# Patient Record
Sex: FEMALE | Race: WHITE | Employment: UNEMPLOYED | URBAN - METROPOLITAN AREA
[De-identification: names, ages, dates, MRNs, and addresses within clinical notes are randomized per-mention and may not be internally consistent; named-entity substitution may affect disease eponyms.]

---

## 2017-01-02 ENCOUNTER — HOSPITAL ENCOUNTER (INPATIENT)
Age: 82
LOS: 1 days | Discharge: HOME HEALTH | DRG: 203 | End: 2017-01-04
Attending: EMERGENCY MEDICINE | Admitting: INTERNAL MEDICINE
Payer: MEDICARE

## 2017-01-02 ENCOUNTER — APPOINTMENT (OUTPATIENT)
Dept: GENERAL RADIOLOGY | Age: 82
DRG: 203 | End: 2017-01-02
Payer: MEDICARE

## 2017-01-02 DIAGNOSIS — J98.8 CONGESTION OF UPPER AIRWAY: ICD-10-CM

## 2017-01-02 DIAGNOSIS — R05.9 COUGH: ICD-10-CM

## 2017-01-02 DIAGNOSIS — J18.9 PNEUMONIA DUE TO ORGANISM: Primary | ICD-10-CM

## 2017-01-02 PROBLEM — R06.89 DYSPNEA AND RESPIRATORY ABNORMALITIES: Status: ACTIVE | Noted: 2017-01-02

## 2017-01-02 PROBLEM — R06.00 DYSPNEA AND RESPIRATORY ABNORMALITIES: Status: ACTIVE | Noted: 2017-01-02

## 2017-01-02 LAB
ANION GAP SERPL CALCULATED.3IONS-SCNC: 16 MEQ/L (ref 7–13)
BASOPHILS ABSOLUTE: 0 K/UL (ref 0–0.2)
BASOPHILS RELATIVE PERCENT: 0.2 %
BUN BLDV-MCNC: 31 MG/DL (ref 8–23)
CALCIUM SERPL-MCNC: 9.8 MG/DL (ref 8.6–10.2)
CHLORIDE BLD-SCNC: 95 MEQ/L (ref 98–107)
CO2: 24 MEQ/L (ref 22–29)
CREAT SERPL-MCNC: 1.04 MG/DL (ref 0.5–0.9)
EOSINOPHILS ABSOLUTE: 0.2 K/UL (ref 0–0.7)
EOSINOPHILS RELATIVE PERCENT: 1.9 %
GFR AFRICAN AMERICAN: >60
GFR NON-AFRICAN AMERICAN: 49.8
GLUCOSE BLD-MCNC: 256 MG/DL (ref 74–109)
GLUCOSE BLD-MCNC: 286 MG/DL (ref 60–115)
GLUCOSE BLD-MCNC: 287 MG/DL (ref 60–115)
GLUCOSE BLD-MCNC: 373 MG/DL (ref 60–115)
HBA1C MFR BLD: 8.6 % (ref 4.8–5.9)
HCT VFR BLD CALC: 36.8 % (ref 37–47)
HEMOGLOBIN: 12 G/DL (ref 12–16)
LYMPHOCYTES ABSOLUTE: 1 K/UL (ref 1–4.8)
LYMPHOCYTES RELATIVE PERCENT: 10.8 %
MCH RBC QN AUTO: 30.7 PG (ref 27–31.3)
MCHC RBC AUTO-ENTMCNC: 32.6 % (ref 33–37)
MCV RBC AUTO: 94.1 FL (ref 82–100)
MONOCYTES ABSOLUTE: 0.9 K/UL (ref 0.2–0.8)
MONOCYTES RELATIVE PERCENT: 9.4 %
NEUTROPHILS ABSOLUTE: 7.4 K/UL (ref 1.4–6.5)
NEUTROPHILS RELATIVE PERCENT: 77.7 %
PDW BLD-RTO: 12.8 % (ref 11.5–14.5)
PERFORMED ON: ABNORMAL
PLATELET # BLD: 206 K/UL (ref 130–400)
POTASSIUM SERPL-SCNC: 4.5 MEQ/L (ref 3.5–5.1)
RAPID INFLUENZA  B AGN: NEGATIVE
RAPID INFLUENZA A AGN: NEGATIVE
RBC # BLD: 3.91 M/UL (ref 4.2–5.4)
SODIUM BLD-SCNC: 135 MEQ/L (ref 132–144)
WBC # BLD: 9.5 K/UL (ref 4.8–10.8)

## 2017-01-02 PROCEDURE — 96376 TX/PRO/DX INJ SAME DRUG ADON: CPT

## 2017-01-02 PROCEDURE — 83036 HEMOGLOBIN GLYCOSYLATED A1C: CPT

## 2017-01-02 PROCEDURE — 6360000002 HC RX W HCPCS: Performed by: INTERNAL MEDICINE

## 2017-01-02 PROCEDURE — G0378 HOSPITAL OBSERVATION PER HR: HCPCS

## 2017-01-02 PROCEDURE — 36415 COLL VENOUS BLD VENIPUNCTURE: CPT

## 2017-01-02 PROCEDURE — 6370000000 HC RX 637 (ALT 250 FOR IP): Performed by: INTERNAL MEDICINE

## 2017-01-02 PROCEDURE — 80048 BASIC METABOLIC PNL TOTAL CA: CPT

## 2017-01-02 PROCEDURE — 94640 AIRWAY INHALATION TREATMENT: CPT

## 2017-01-02 PROCEDURE — 96372 THER/PROPH/DIAG INJ SC/IM: CPT

## 2017-01-02 PROCEDURE — 96375 TX/PRO/DX INJ NEW DRUG ADDON: CPT

## 2017-01-02 PROCEDURE — 71020 XR CHEST STANDARD TWO VW: CPT

## 2017-01-02 PROCEDURE — 2580000003 HC RX 258

## 2017-01-02 PROCEDURE — 6360000002 HC RX W HCPCS: Performed by: EMERGENCY MEDICINE

## 2017-01-02 PROCEDURE — 93005 ELECTROCARDIOGRAM TRACING: CPT

## 2017-01-02 PROCEDURE — 86403 PARTICLE AGGLUT ANTBDY SCRN: CPT

## 2017-01-02 PROCEDURE — 85025 COMPLETE CBC W/AUTO DIFF WBC: CPT

## 2017-01-02 PROCEDURE — 96374 THER/PROPH/DIAG INJ IV PUSH: CPT

## 2017-01-02 PROCEDURE — 2580000003 HC RX 258: Performed by: INTERNAL MEDICINE

## 2017-01-02 PROCEDURE — 2700000000 HC OXYGEN THERAPY PER DAY

## 2017-01-02 PROCEDURE — 96368 THER/DIAG CONCURRENT INF: CPT

## 2017-01-02 PROCEDURE — 99285 EMERGENCY DEPT VISIT HI MDM: CPT

## 2017-01-02 PROCEDURE — 96365 THER/PROPH/DIAG IV INF INIT: CPT

## 2017-01-02 PROCEDURE — 94761 N-INVAS EAR/PLS OXIMETRY MLT: CPT

## 2017-01-02 PROCEDURE — 2580000003 HC RX 258: Performed by: EMERGENCY MEDICINE

## 2017-01-02 RX ORDER — NITROGLYCERIN 0.4 MG/1
0.4 TABLET SUBLINGUAL EVERY 5 MIN PRN
Status: DISCONTINUED | OUTPATIENT
Start: 2017-01-02 | End: 2017-01-04 | Stop reason: HOSPADM

## 2017-01-02 RX ORDER — METHYLPREDNISOLONE SODIUM SUCCINATE 40 MG/ML
40 INJECTION, POWDER, LYOPHILIZED, FOR SOLUTION INTRAMUSCULAR; INTRAVENOUS EVERY 12 HOURS
Status: COMPLETED | OUTPATIENT
Start: 2017-01-02 | End: 2017-01-02

## 2017-01-02 RX ORDER — LISINOPRIL 20 MG/1
20 TABLET ORAL DAILY
Status: DISCONTINUED | OUTPATIENT
Start: 2017-01-02 | End: 2017-01-02

## 2017-01-02 RX ORDER — DEXTROSE MONOHYDRATE 50 MG/ML
100 INJECTION, SOLUTION INTRAVENOUS PRN
Status: DISCONTINUED | OUTPATIENT
Start: 2017-01-02 | End: 2017-01-04 | Stop reason: HOSPADM

## 2017-01-02 RX ORDER — GUAIFENESIN 600 MG/1
600 TABLET, EXTENDED RELEASE ORAL 2 TIMES DAILY
Status: DISCONTINUED | OUTPATIENT
Start: 2017-01-02 | End: 2017-01-02

## 2017-01-02 RX ORDER — CLONIDINE HYDROCHLORIDE 0.1 MG/1
0.1 TABLET ORAL EVERY 4 HOURS PRN
Status: DISCONTINUED | OUTPATIENT
Start: 2017-01-02 | End: 2017-01-04 | Stop reason: HOSPADM

## 2017-01-02 RX ORDER — HYDRALAZINE HYDROCHLORIDE 20 MG/ML
10 INJECTION INTRAMUSCULAR; INTRAVENOUS EVERY 4 HOURS PRN
Status: DISCONTINUED | OUTPATIENT
Start: 2017-01-02 | End: 2017-01-04 | Stop reason: HOSPADM

## 2017-01-02 RX ORDER — FAMOTIDINE 20 MG/1
20 TABLET, FILM COATED ORAL DAILY
Status: DISCONTINUED | OUTPATIENT
Start: 2017-01-02 | End: 2017-01-04 | Stop reason: HOSPADM

## 2017-01-02 RX ORDER — DEXTROSE MONOHYDRATE 50 MG/ML
INJECTION, SOLUTION INTRAVENOUS
Status: COMPLETED
Start: 2017-01-02 | End: 2017-01-02

## 2017-01-02 RX ORDER — NICOTINE POLACRILEX 4 MG
15 LOZENGE BUCCAL PRN
Status: DISCONTINUED | OUTPATIENT
Start: 2017-01-02 | End: 2017-01-04 | Stop reason: HOSPADM

## 2017-01-02 RX ORDER — IPRATROPIUM BROMIDE AND ALBUTEROL SULFATE 2.5; .5 MG/3ML; MG/3ML
1 SOLUTION RESPIRATORY (INHALATION) 3 TIMES DAILY
Status: DISCONTINUED | OUTPATIENT
Start: 2017-01-02 | End: 2017-01-04 | Stop reason: HOSPADM

## 2017-01-02 RX ORDER — ACETAMINOPHEN 325 MG/1
650 TABLET ORAL EVERY 4 HOURS PRN
Status: DISCONTINUED | OUTPATIENT
Start: 2017-01-02 | End: 2017-01-04 | Stop reason: HOSPADM

## 2017-01-02 RX ORDER — LISINOPRIL 20 MG/1
20 TABLET ORAL 2 TIMES DAILY
Status: DISCONTINUED | OUTPATIENT
Start: 2017-01-02 | End: 2017-01-04 | Stop reason: HOSPADM

## 2017-01-02 RX ORDER — POLYETHYLENE GLYCOL 3350 17 G/17G
17 POWDER, FOR SOLUTION ORAL DAILY PRN
Status: DISCONTINUED | OUTPATIENT
Start: 2017-01-02 | End: 2017-01-04 | Stop reason: HOSPADM

## 2017-01-02 RX ORDER — GUAIFENESIN 600 MG/1
1200 TABLET, EXTENDED RELEASE ORAL 2 TIMES DAILY
Status: DISCONTINUED | OUTPATIENT
Start: 2017-01-02 | End: 2017-01-04 | Stop reason: HOSPADM

## 2017-01-02 RX ORDER — SODIUM CHLORIDE 0.9 % (FLUSH) 0.9 %
10 SYRINGE (ML) INJECTION PRN
Status: DISCONTINUED | OUTPATIENT
Start: 2017-01-02 | End: 2017-01-04 | Stop reason: HOSPADM

## 2017-01-02 RX ORDER — SODIUM CHLORIDE 450 MG/100ML
INJECTION, SOLUTION INTRAVENOUS CONTINUOUS
Status: DISCONTINUED | OUTPATIENT
Start: 2017-01-02 | End: 2017-01-02

## 2017-01-02 RX ORDER — GUAIFENESIN 100 MG/5ML
10 SOLUTION ORAL EVERY 4 HOURS PRN
Status: DISCONTINUED | OUTPATIENT
Start: 2017-01-02 | End: 2017-01-04 | Stop reason: HOSPADM

## 2017-01-02 RX ORDER — ATORVASTATIN CALCIUM 10 MG/1
10 TABLET, FILM COATED ORAL NIGHTLY
Status: DISCONTINUED | OUTPATIENT
Start: 2017-01-02 | End: 2017-01-04 | Stop reason: HOSPADM

## 2017-01-02 RX ORDER — FUROSEMIDE 10 MG/ML
40 INJECTION INTRAMUSCULAR; INTRAVENOUS ONCE
Status: COMPLETED | OUTPATIENT
Start: 2017-01-02 | End: 2017-01-02

## 2017-01-02 RX ORDER — OXYCODONE HYDROCHLORIDE 5 MG/1
10 TABLET ORAL EVERY 4 HOURS PRN
Status: DISCONTINUED | OUTPATIENT
Start: 2017-01-02 | End: 2017-01-02 | Stop reason: SDUPTHER

## 2017-01-02 RX ORDER — DOCUSATE SODIUM 100 MG/1
100 CAPSULE, LIQUID FILLED ORAL DAILY PRN
Status: DISCONTINUED | OUTPATIENT
Start: 2017-01-02 | End: 2017-01-04 | Stop reason: HOSPADM

## 2017-01-02 RX ORDER — ALBUTEROL SULFATE 2.5 MG/3ML
2.5 SOLUTION RESPIRATORY (INHALATION)
Status: DISCONTINUED | OUTPATIENT
Start: 2017-01-02 | End: 2017-01-02

## 2017-01-02 RX ORDER — ASPIRIN 81 MG/1
81 TABLET, CHEWABLE ORAL DAILY
Status: DISCONTINUED | OUTPATIENT
Start: 2017-01-02 | End: 2017-01-04 | Stop reason: HOSPADM

## 2017-01-02 RX ORDER — SENNA PLUS 8.6 MG/1
1 TABLET ORAL 2 TIMES DAILY PRN
Status: DISCONTINUED | OUTPATIENT
Start: 2017-01-02 | End: 2017-01-04 | Stop reason: HOSPADM

## 2017-01-02 RX ORDER — ONDANSETRON 2 MG/ML
4 INJECTION INTRAMUSCULAR; INTRAVENOUS EVERY 6 HOURS PRN
Status: DISCONTINUED | OUTPATIENT
Start: 2017-01-02 | End: 2017-01-04 | Stop reason: HOSPADM

## 2017-01-02 RX ORDER — SODIUM CHLORIDE 0.9 % (FLUSH) 0.9 %
10 SYRINGE (ML) INJECTION EVERY 12 HOURS SCHEDULED
Status: DISCONTINUED | OUTPATIENT
Start: 2017-01-02 | End: 2017-01-04 | Stop reason: HOSPADM

## 2017-01-02 RX ORDER — DEXTROSE MONOHYDRATE 25 G/50ML
12.5 INJECTION, SOLUTION INTRAVENOUS PRN
Status: DISCONTINUED | OUTPATIENT
Start: 2017-01-02 | End: 2017-01-04 | Stop reason: HOSPADM

## 2017-01-02 RX ORDER — OXYCODONE HYDROCHLORIDE 5 MG/1
5 TABLET ORAL EVERY 4 HOURS PRN
Status: DISCONTINUED | OUTPATIENT
Start: 2017-01-02 | End: 2017-01-02 | Stop reason: SDUPTHER

## 2017-01-02 RX ORDER — INSULIN GLARGINE 100 [IU]/ML
20 INJECTION, SOLUTION SUBCUTANEOUS NIGHTLY
Status: DISCONTINUED | OUTPATIENT
Start: 2017-01-02 | End: 2017-01-04 | Stop reason: HOSPADM

## 2017-01-02 RX ADMIN — SODIUM CHLORIDE: 4.5 INJECTION, SOLUTION INTRAVENOUS at 06:02

## 2017-01-02 RX ADMIN — ASPIRIN 81 MG 81 MG: 81 TABLET ORAL at 08:14

## 2017-01-02 RX ADMIN — LISINOPRIL 20 MG: 20 TABLET ORAL at 21:09

## 2017-01-02 RX ADMIN — GUAIFENESIN 10 ML: 100 SOLUTION ORAL at 11:59

## 2017-01-02 RX ADMIN — IPRATROPIUM BROMIDE AND ALBUTEROL SULFATE 1 AMPULE: 2.5; .5 SOLUTION RESPIRATORY (INHALATION) at 18:14

## 2017-01-02 RX ADMIN — FUROSEMIDE 40 MG: 10 INJECTION, SOLUTION INTRAMUSCULAR; INTRAVENOUS at 12:00

## 2017-01-02 RX ADMIN — VITAMIN D, TAB 1000IU (100/BT) 1000 UNITS: 25 TAB at 08:14

## 2017-01-02 RX ADMIN — METHYLPREDNISOLONE SODIUM SUCCINATE 40 MG: 40 INJECTION, POWDER, FOR SOLUTION INTRAMUSCULAR; INTRAVENOUS at 06:02

## 2017-01-02 RX ADMIN — Medication 10 ML: at 21:09

## 2017-01-02 RX ADMIN — INSULIN GLARGINE 20 UNITS: 100 INJECTION, SOLUTION SUBCUTANEOUS at 20:19

## 2017-01-02 RX ADMIN — IPRATROPIUM BROMIDE AND ALBUTEROL SULFATE 1 AMPULE: 2.5; .5 SOLUTION RESPIRATORY (INHALATION) at 11:31

## 2017-01-02 RX ADMIN — METFORMIN HYDROCHLORIDE 500 MG: 500 TABLET, FILM COATED ORAL at 17:00

## 2017-01-02 RX ADMIN — GUAIFENESIN 1200 MG: 600 TABLET, EXTENDED RELEASE ORAL at 11:59

## 2017-01-02 RX ADMIN — CEFTRIAXONE 1 G: 1 INJECTION, POWDER, FOR SOLUTION INTRAMUSCULAR; INTRAVENOUS at 05:17

## 2017-01-02 RX ADMIN — METOPROLOL TARTRATE 25 MG: 25 TABLET ORAL at 18:45

## 2017-01-02 RX ADMIN — DEXTROSE MONOHYDRATE: 50 INJECTION, SOLUTION INTRAVENOUS at 05:18

## 2017-01-02 RX ADMIN — PHENOL 1 LOZENGE: 29 LOZENGE ORAL at 10:30

## 2017-01-02 RX ADMIN — INSULIN LISPRO 3 UNITS: 100 INJECTION, SOLUTION INTRAVENOUS; SUBCUTANEOUS at 20:19

## 2017-01-02 RX ADMIN — IPRATROPIUM BROMIDE 0.5 MG: 0.5 SOLUTION RESPIRATORY (INHALATION) at 04:09

## 2017-01-02 RX ADMIN — METHYLPREDNISOLONE SODIUM SUCCINATE 40 MG: 40 INJECTION, POWDER, FOR SOLUTION INTRAMUSCULAR; INTRAVENOUS at 18:07

## 2017-01-02 RX ADMIN — ALBUTEROL SULFATE 2.5 MG: 2.5 SOLUTION RESPIRATORY (INHALATION) at 04:10

## 2017-01-02 RX ADMIN — GUAIFENESIN 10 ML: 100 SOLUTION ORAL at 06:02

## 2017-01-02 RX ADMIN — ENOXAPARIN SODIUM 40 MG: 40 INJECTION SUBCUTANEOUS at 09:00

## 2017-01-02 RX ADMIN — GUAIFENESIN 1200 MG: 600 TABLET, EXTENDED RELEASE ORAL at 21:09

## 2017-01-02 RX ADMIN — METFORMIN HYDROCHLORIDE 500 MG: 500 TABLET, FILM COATED ORAL at 08:14

## 2017-01-02 RX ADMIN — INSULIN LISPRO 10 UNITS: 100 INJECTION, SOLUTION INTRAVENOUS; SUBCUTANEOUS at 16:56

## 2017-01-02 RX ADMIN — GUAIFENESIN 10 ML: 100 SOLUTION ORAL at 18:49

## 2017-01-02 RX ADMIN — ATORVASTATIN CALCIUM 10 MG: 10 TABLET, FILM COATED ORAL at 21:09

## 2017-01-02 RX ADMIN — AZITHROMYCIN MONOHYDRATE 500 MG: 500 INJECTION, POWDER, LYOPHILIZED, FOR SOLUTION INTRAVENOUS at 05:16

## 2017-01-02 RX ADMIN — FAMOTIDINE 20 MG: 20 TABLET, FILM COATED ORAL at 08:14

## 2017-01-02 RX ADMIN — Medication 400 MG: at 08:14

## 2017-01-02 RX ADMIN — LISINOPRIL 20 MG: 20 TABLET ORAL at 08:14

## 2017-01-02 ASSESSMENT — ENCOUNTER SYMPTOMS
RHINORRHEA: 1
ABDOMINAL PAIN: 0
SHORTNESS OF BREATH: 0
COUGH: 1
BACK PAIN: 0
CHEST TIGHTNESS: 0
WHEEZING: 0
DIARRHEA: 0
EYE PAIN: 0
NAUSEA: 0
VOMITING: 0
BLOOD IN STOOL: 0
TROUBLE SWALLOWING: 0

## 2017-01-02 ASSESSMENT — PAIN SCALES - GENERAL
PAINLEVEL_OUTOF10: 0
PAINLEVEL_OUTOF10: 5
PAINLEVEL_OUTOF10: 0

## 2017-01-02 ASSESSMENT — PAIN DESCRIPTION - LOCATION: LOCATION: THROAT

## 2017-01-02 ASSESSMENT — PAIN DESCRIPTION - DESCRIPTORS: DESCRIPTORS: SORE

## 2017-01-03 LAB
ALBUMIN SERPL-MCNC: 3.9 G/DL (ref 3.9–4.9)
ALP BLD-CCNC: 53 U/L (ref 40–130)
ALT SERPL-CCNC: 15 U/L (ref 0–33)
ANION GAP SERPL CALCULATED.3IONS-SCNC: 18 MEQ/L (ref 7–13)
AST SERPL-CCNC: 12 U/L (ref 0–35)
BASOPHILS ABSOLUTE: 0 K/UL (ref 0–0.2)
BASOPHILS RELATIVE PERCENT: 0 %
BILIRUB SERPL-MCNC: 0.3 MG/DL (ref 0–1.2)
BUN BLDV-MCNC: 27 MG/DL (ref 8–23)
CALCIUM SERPL-MCNC: 9.5 MG/DL (ref 8.6–10.2)
CHLORIDE BLD-SCNC: 88 MEQ/L (ref 98–107)
CO2: 27 MEQ/L (ref 22–29)
CREAT SERPL-MCNC: 1 MG/DL (ref 0.5–0.9)
EOSINOPHILS ABSOLUTE: 0 K/UL (ref 0–0.7)
EOSINOPHILS RELATIVE PERCENT: 0 %
GFR AFRICAN AMERICAN: >60
GFR NON-AFRICAN AMERICAN: 52.1
GLOBULIN: 2.5 G/DL (ref 2.3–3.5)
GLUCOSE BLD-MCNC: 138 MG/DL (ref 60–115)
GLUCOSE BLD-MCNC: 282 MG/DL (ref 60–115)
GLUCOSE BLD-MCNC: 288 MG/DL (ref 60–115)
GLUCOSE BLD-MCNC: 335 MG/DL (ref 74–109)
GLUCOSE BLD-MCNC: 368 MG/DL (ref 60–115)
HCT VFR BLD CALC: 36 % (ref 37–47)
HEMOGLOBIN: 11.9 G/DL (ref 12–16)
LYMPHOCYTES ABSOLUTE: 0.7 K/UL (ref 1–4.8)
LYMPHOCYTES RELATIVE PERCENT: 5.4 %
MCH RBC QN AUTO: 31.2 PG (ref 27–31.3)
MCHC RBC AUTO-ENTMCNC: 33.2 % (ref 33–37)
MCV RBC AUTO: 93.9 FL (ref 82–100)
MONOCYTES ABSOLUTE: 0.7 K/UL (ref 0.2–0.8)
MONOCYTES RELATIVE PERCENT: 5.1 %
NEUTROPHILS ABSOLUTE: 11.9 K/UL (ref 1.4–6.5)
NEUTROPHILS RELATIVE PERCENT: 89.5 %
PDW BLD-RTO: 12.5 % (ref 11.5–14.5)
PERFORMED ON: ABNORMAL
PLATELET # BLD: 212 K/UL (ref 130–400)
POTASSIUM SERPL-SCNC: 4.4 MEQ/L (ref 3.5–5.1)
PRO-BNP: 555 PG/ML
RBC # BLD: 3.83 M/UL (ref 4.2–5.4)
SODIUM BLD-SCNC: 133 MEQ/L (ref 132–144)
TOTAL PROTEIN: 6.4 G/DL (ref 6.4–8.1)
WBC # BLD: 13.3 K/UL (ref 4.8–10.8)

## 2017-01-03 PROCEDURE — 2700000000 HC OXYGEN THERAPY PER DAY

## 2017-01-03 PROCEDURE — 6370000000 HC RX 637 (ALT 250 FOR IP): Performed by: INTERNAL MEDICINE

## 2017-01-03 PROCEDURE — 83880 ASSAY OF NATRIURETIC PEPTIDE: CPT

## 2017-01-03 PROCEDURE — 96372 THER/PROPH/DIAG INJ SC/IM: CPT

## 2017-01-03 PROCEDURE — 96375 TX/PRO/DX INJ NEW DRUG ADDON: CPT

## 2017-01-03 PROCEDURE — 2580000003 HC RX 258: Performed by: INTERNAL MEDICINE

## 2017-01-03 PROCEDURE — 36415 COLL VENOUS BLD VENIPUNCTURE: CPT

## 2017-01-03 PROCEDURE — 94640 AIRWAY INHALATION TREATMENT: CPT

## 2017-01-03 PROCEDURE — 96366 THER/PROPH/DIAG IV INF ADDON: CPT

## 2017-01-03 PROCEDURE — 85025 COMPLETE CBC W/AUTO DIFF WBC: CPT

## 2017-01-03 PROCEDURE — 94761 N-INVAS EAR/PLS OXIMETRY MLT: CPT

## 2017-01-03 PROCEDURE — 80053 COMPREHEN METABOLIC PANEL: CPT

## 2017-01-03 PROCEDURE — 6360000002 HC RX W HCPCS: Performed by: INTERNAL MEDICINE

## 2017-01-03 PROCEDURE — 1210000000 HC MED SURG R&B

## 2017-01-03 RX ORDER — CODEINE PHOSPHATE AND GUAIFENESIN 10; 100 MG/5ML; MG/5ML
5 SOLUTION ORAL EVERY 4 HOURS PRN
Status: DISCONTINUED | OUTPATIENT
Start: 2017-01-03 | End: 2017-01-04 | Stop reason: HOSPADM

## 2017-01-03 RX ORDER — FUROSEMIDE 40 MG/1
40 TABLET ORAL ONCE
Status: COMPLETED | OUTPATIENT
Start: 2017-01-03 | End: 2017-01-03

## 2017-01-03 RX ORDER — INSULIN GLARGINE 100 [IU]/ML
10 INJECTION, SOLUTION SUBCUTANEOUS ONCE
Status: COMPLETED | OUTPATIENT
Start: 2017-01-03 | End: 2017-01-03

## 2017-01-03 RX ORDER — CODEINE PHOSPHATE AND GUAIFENESIN 10; 100 MG/5ML; MG/5ML
5 SOLUTION ORAL ONCE
Status: COMPLETED | OUTPATIENT
Start: 2017-01-03 | End: 2017-01-03

## 2017-01-03 RX ADMIN — ONDANSETRON 4 MG: 2 INJECTION INTRAMUSCULAR; INTRAVENOUS at 05:20

## 2017-01-03 RX ADMIN — LISINOPRIL 20 MG: 20 TABLET ORAL at 19:53

## 2017-01-03 RX ADMIN — IPRATROPIUM BROMIDE AND ALBUTEROL SULFATE 1 AMPULE: 2.5; .5 SOLUTION RESPIRATORY (INHALATION) at 05:53

## 2017-01-03 RX ADMIN — IPRATROPIUM BROMIDE AND ALBUTEROL SULFATE 1 AMPULE: 2.5; .5 SOLUTION RESPIRATORY (INHALATION) at 11:22

## 2017-01-03 RX ADMIN — ENOXAPARIN SODIUM 40 MG: 40 INJECTION SUBCUTANEOUS at 09:00

## 2017-01-03 RX ADMIN — Medication 400 MG: at 08:26

## 2017-01-03 RX ADMIN — BENZOCAINE AND MENTHOL 1 LOZENGE: 15; 3.6 LOZENGE ORAL at 13:00

## 2017-01-03 RX ADMIN — METOPROLOL TARTRATE 25 MG: 25 TABLET ORAL at 08:26

## 2017-01-03 RX ADMIN — METFORMIN HYDROCHLORIDE 500 MG: 500 TABLET, FILM COATED ORAL at 17:00

## 2017-01-03 RX ADMIN — BENZOCAINE AND MENTHOL 1 LOZENGE: 15; 3.6 LOZENGE ORAL at 19:53

## 2017-01-03 RX ADMIN — ACETAMINOPHEN 650 MG: 325 TABLET ORAL at 18:43

## 2017-01-03 RX ADMIN — IPRATROPIUM BROMIDE AND ALBUTEROL SULFATE 1 AMPULE: 2.5; .5 SOLUTION RESPIRATORY (INHALATION) at 18:24

## 2017-01-03 RX ADMIN — GUAIFENESIN AND CODEINE PHOSPHATE 5 ML: 10; 100 LIQUID ORAL at 20:02

## 2017-01-03 RX ADMIN — ATORVASTATIN CALCIUM 10 MG: 10 TABLET, FILM COATED ORAL at 19:52

## 2017-01-03 RX ADMIN — INSULIN LISPRO 3 UNITS: 100 INJECTION, SOLUTION INTRAVENOUS; SUBCUTANEOUS at 20:03

## 2017-01-03 RX ADMIN — Medication 10 ML: at 09:53

## 2017-01-03 RX ADMIN — METOPROLOL TARTRATE 25 MG: 25 TABLET ORAL at 19:53

## 2017-01-03 RX ADMIN — GUAIFENESIN 10 ML: 100 SOLUTION ORAL at 15:40

## 2017-01-03 RX ADMIN — HYDRALAZINE HYDROCHLORIDE 10 MG: 20 INJECTION INTRAMUSCULAR; INTRAVENOUS at 09:48

## 2017-01-03 RX ADMIN — GUAIFENESIN 1200 MG: 600 TABLET, EXTENDED RELEASE ORAL at 08:26

## 2017-01-03 RX ADMIN — INSULIN GLARGINE 10 UNITS: 100 INJECTION, SOLUTION SUBCUTANEOUS at 09:57

## 2017-01-03 RX ADMIN — BENZOCAINE AND MENTHOL 1 LOZENGE: 15; 3.6 LOZENGE ORAL at 15:44

## 2017-01-03 RX ADMIN — ACETAMINOPHEN 650 MG: 325 TABLET ORAL at 12:17

## 2017-01-03 RX ADMIN — GUAIFENESIN 1200 MG: 600 TABLET, EXTENDED RELEASE ORAL at 19:52

## 2017-01-03 RX ADMIN — METFORMIN HYDROCHLORIDE 500 MG: 500 TABLET, FILM COATED ORAL at 08:26

## 2017-01-03 RX ADMIN — VITAMIN D, TAB 1000IU (100/BT) 1000 UNITS: 25 TAB at 08:26

## 2017-01-03 RX ADMIN — LISINOPRIL 20 MG: 20 TABLET ORAL at 08:26

## 2017-01-03 RX ADMIN — FAMOTIDINE 20 MG: 20 TABLET, FILM COATED ORAL at 08:26

## 2017-01-03 RX ADMIN — INSULIN LISPRO 6 UNITS: 100 INJECTION, SOLUTION INTRAVENOUS; SUBCUTANEOUS at 11:59

## 2017-01-03 RX ADMIN — GUAIFENESIN AND CODEINE PHOSPHATE 5 ML: 10; 100 LIQUID ORAL at 09:48

## 2017-01-03 RX ADMIN — INSULIN GLARGINE 20 UNITS: 100 INJECTION, SOLUTION SUBCUTANEOUS at 20:02

## 2017-01-03 RX ADMIN — ASPIRIN 81 MG 81 MG: 81 TABLET ORAL at 08:26

## 2017-01-03 RX ADMIN — FUROSEMIDE 40 MG: 40 TABLET ORAL at 09:48

## 2017-01-03 RX ADMIN — INSULIN LISPRO 10 UNITS: 100 INJECTION, SOLUTION INTRAVENOUS; SUBCUTANEOUS at 08:27

## 2017-01-03 RX ADMIN — BENZOCAINE AND MENTHOL 1 LOZENGE: 15; 3.6 LOZENGE ORAL at 22:40

## 2017-01-03 RX ADMIN — AZITHROMYCIN MONOHYDRATE 500 MG: 500 INJECTION, POWDER, LYOPHILIZED, FOR SOLUTION INTRAVENOUS at 05:15

## 2017-01-03 RX ADMIN — Medication 10 ML: at 19:57

## 2017-01-03 RX ADMIN — BENZOCAINE AND MENTHOL 1 LOZENGE: 15; 3.6 LOZENGE ORAL at 09:48

## 2017-01-03 ASSESSMENT — PAIN SCALES - GENERAL
PAINLEVEL_OUTOF10: 0
PAINLEVEL_OUTOF10: 2
PAINLEVEL_OUTOF10: 0
PAINLEVEL_OUTOF10: 2
PAINLEVEL_OUTOF10: 3
PAINLEVEL_OUTOF10: 0

## 2017-01-04 VITALS
OXYGEN SATURATION: 95 % | HEART RATE: 105 BPM | TEMPERATURE: 98 F | RESPIRATION RATE: 22 BRPM | HEIGHT: 63 IN | DIASTOLIC BLOOD PRESSURE: 75 MMHG | BODY MASS INDEX: 29.84 KG/M2 | WEIGHT: 168.43 LBS | SYSTOLIC BLOOD PRESSURE: 150 MMHG

## 2017-01-04 LAB
GLUCOSE BLD-MCNC: 206 MG/DL (ref 60–115)
GLUCOSE BLD-MCNC: 295 MG/DL (ref 60–115)
PERFORMED ON: ABNORMAL
PERFORMED ON: ABNORMAL

## 2017-01-04 PROCEDURE — 6360000002 HC RX W HCPCS: Performed by: INTERNAL MEDICINE

## 2017-01-04 PROCEDURE — 2580000003 HC RX 258: Performed by: INTERNAL MEDICINE

## 2017-01-04 PROCEDURE — 6370000000 HC RX 637 (ALT 250 FOR IP): Performed by: INTERNAL MEDICINE

## 2017-01-04 PROCEDURE — 94761 N-INVAS EAR/PLS OXIMETRY MLT: CPT

## 2017-01-04 PROCEDURE — 94640 AIRWAY INHALATION TREATMENT: CPT

## 2017-01-04 RX ORDER — GUAIFENESIN 600 MG/1
1200 TABLET, EXTENDED RELEASE ORAL 2 TIMES DAILY
Qty: 20 TABLET | Refills: 0 | Status: SHIPPED | OUTPATIENT
Start: 2017-01-04 | End: 2017-01-09 | Stop reason: ALTCHOICE

## 2017-01-04 RX ORDER — AZITHROMYCIN 250 MG/1
250 TABLET, FILM COATED ORAL DAILY
Qty: 5 TABLET | Refills: 0 | Status: SHIPPED | OUTPATIENT
Start: 2017-01-04 | End: 2017-01-09 | Stop reason: ALTCHOICE

## 2017-01-04 RX ORDER — INSULIN GLARGINE 100 [IU]/ML
25 INJECTION, SOLUTION SUBCUTANEOUS NIGHTLY
Qty: 1 VIAL | Refills: 3 | Status: SHIPPED | OUTPATIENT
Start: 2017-01-04 | End: 2017-01-25 | Stop reason: ALTCHOICE

## 2017-01-04 RX ADMIN — ASPIRIN 81 MG 81 MG: 81 TABLET ORAL at 08:37

## 2017-01-04 RX ADMIN — GUAIFENESIN AND CODEINE PHOSPHATE 5 ML: 10; 100 LIQUID ORAL at 08:37

## 2017-01-04 RX ADMIN — VITAMIN D, TAB 1000IU (100/BT) 1000 UNITS: 25 TAB at 08:38

## 2017-01-04 RX ADMIN — METOPROLOL TARTRATE 25 MG: 25 TABLET ORAL at 08:38

## 2017-01-04 RX ADMIN — IPRATROPIUM BROMIDE AND ALBUTEROL SULFATE 1 AMPULE: 2.5; .5 SOLUTION RESPIRATORY (INHALATION) at 11:39

## 2017-01-04 RX ADMIN — IPRATROPIUM BROMIDE AND ALBUTEROL SULFATE 1 AMPULE: 2.5; .5 SOLUTION RESPIRATORY (INHALATION) at 05:52

## 2017-01-04 RX ADMIN — ACETAMINOPHEN 650 MG: 325 TABLET ORAL at 08:37

## 2017-01-04 RX ADMIN — LISINOPRIL 20 MG: 20 TABLET ORAL at 08:37

## 2017-01-04 RX ADMIN — FAMOTIDINE 20 MG: 20 TABLET, FILM COATED ORAL at 08:37

## 2017-01-04 RX ADMIN — BENZOCAINE AND MENTHOL 1 LOZENGE: 15; 3.6 LOZENGE ORAL at 05:46

## 2017-01-04 RX ADMIN — INSULIN LISPRO 6 UNITS: 100 INJECTION, SOLUTION INTRAVENOUS; SUBCUTANEOUS at 12:49

## 2017-01-04 RX ADMIN — AZITHROMYCIN MONOHYDRATE 500 MG: 500 INJECTION, POWDER, LYOPHILIZED, FOR SOLUTION INTRAVENOUS at 05:47

## 2017-01-04 RX ADMIN — METFORMIN HYDROCHLORIDE 500 MG: 500 TABLET, FILM COATED ORAL at 08:37

## 2017-01-04 RX ADMIN — Medication 400 MG: at 08:38

## 2017-01-04 RX ADMIN — BENZOCAINE AND MENTHOL 1 LOZENGE: 15; 3.6 LOZENGE ORAL at 08:37

## 2017-01-04 RX ADMIN — INSULIN LISPRO 4 UNITS: 100 INJECTION, SOLUTION INTRAVENOUS; SUBCUTANEOUS at 08:39

## 2017-01-04 RX ADMIN — GUAIFENESIN 1200 MG: 600 TABLET, EXTENDED RELEASE ORAL at 08:37

## 2017-01-04 RX ADMIN — ENOXAPARIN SODIUM 40 MG: 40 INJECTION SUBCUTANEOUS at 08:38

## 2017-01-04 ASSESSMENT — PAIN SCALES - GENERAL: PAINLEVEL_OUTOF10: 4

## 2017-01-05 ENCOUNTER — TELEPHONE (OUTPATIENT)
Dept: INTERNAL MEDICINE | Age: 82
End: 2017-01-05

## 2017-01-09 ENCOUNTER — OFFICE VISIT (OUTPATIENT)
Dept: INTERNAL MEDICINE | Age: 82
End: 2017-01-09

## 2017-01-09 VITALS
BODY MASS INDEX: 29.3 KG/M2 | DIASTOLIC BLOOD PRESSURE: 68 MMHG | SYSTOLIC BLOOD PRESSURE: 124 MMHG | HEART RATE: 73 BPM | HEIGHT: 63 IN | WEIGHT: 165.4 LBS

## 2017-01-09 DIAGNOSIS — J40 BRONCHITIS: Primary | ICD-10-CM

## 2017-01-09 PROCEDURE — 99495 TRANSJ CARE MGMT MOD F2F 14D: CPT | Performed by: FAMILY MEDICINE

## 2017-01-09 RX ORDER — GUAIFENESIN 600 MG/1
1200 TABLET, EXTENDED RELEASE ORAL 2 TIMES DAILY
Qty: 20 TABLET | Refills: 0 | Status: SHIPPED | OUTPATIENT
Start: 2017-01-09 | End: 2017-01-14

## 2017-01-09 RX ORDER — ALBUTEROL SULFATE 2.5 MG/3ML
2.5 SOLUTION RESPIRATORY (INHALATION) EVERY 6 HOURS PRN
Qty: 120 EACH | Refills: 3 | Status: SHIPPED | OUTPATIENT
Start: 2017-01-09 | End: 2017-05-01 | Stop reason: ALTCHOICE

## 2017-01-16 DIAGNOSIS — E11.9 TYPE 2 DIABETES MELLITUS WITHOUT COMPLICATION, WITHOUT LONG-TERM CURRENT USE OF INSULIN (HCC): ICD-10-CM

## 2017-01-16 RX ORDER — GLUCOSAMINE HCL/CHONDROITIN SU 500-400 MG
CAPSULE ORAL
Qty: 200 STRIP | Refills: 5 | Status: SHIPPED | OUTPATIENT
Start: 2017-01-16 | End: 2018-04-06 | Stop reason: SDUPTHER

## 2017-01-25 ENCOUNTER — OFFICE VISIT (OUTPATIENT)
Dept: INTERNAL MEDICINE | Age: 82
End: 2017-01-25

## 2017-01-25 VITALS
BODY MASS INDEX: 29.19 KG/M2 | HEART RATE: 79 BPM | DIASTOLIC BLOOD PRESSURE: 56 MMHG | SYSTOLIC BLOOD PRESSURE: 140 MMHG | WEIGHT: 164.8 LBS

## 2017-01-25 DIAGNOSIS — J40 BRONCHITIS: Primary | ICD-10-CM

## 2017-01-25 PROCEDURE — 99213 OFFICE O/P EST LOW 20 MIN: CPT | Performed by: FAMILY MEDICINE

## 2017-01-26 LAB
EKG ATRIAL RATE: 94 BPM
EKG P AXIS: 65 DEGREES
EKG P-R INTERVAL: 178 MS
EKG Q-T INTERVAL: 348 MS
EKG QRS DURATION: 84 MS
EKG QTC CALCULATION (BAZETT): 435 MS
EKG R AXIS: -4 DEGREES
EKG T AXIS: 70 DEGREES
EKG VENTRICULAR RATE: 94 BPM

## 2017-01-27 ENCOUNTER — TELEPHONE (OUTPATIENT)
Dept: INTERNAL MEDICINE | Age: 82
End: 2017-01-27

## 2017-02-09 RX ORDER — TRIAMTERENE AND HYDROCHLOROTHIAZIDE 37.5; 25 MG/1; MG/1
TABLET ORAL
Qty: 90 TABLET | Refills: 3 | OUTPATIENT
Start: 2017-02-09

## 2017-02-09 RX ORDER — LISINOPRIL 20 MG/1
TABLET ORAL
Qty: 90 TABLET | Refills: 3 | OUTPATIENT
Start: 2017-02-09

## 2017-02-09 RX ORDER — LISINOPRIL 20 MG/1
TABLET ORAL
Qty: 90 TABLET | Refills: 2 | Status: SHIPPED | OUTPATIENT
Start: 2017-02-09 | End: 2017-11-20 | Stop reason: SDUPTHER

## 2017-02-09 RX ORDER — TRIAMTERENE AND HYDROCHLOROTHIAZIDE 37.5; 25 MG/1; MG/1
TABLET ORAL
Qty: 90 TABLET | Refills: 2 | Status: SHIPPED | OUTPATIENT
Start: 2017-02-09 | End: 2017-11-20 | Stop reason: SDUPTHER

## 2017-05-01 ENCOUNTER — HOSPITAL ENCOUNTER (OUTPATIENT)
Age: 82
Setting detail: SPECIMEN
Discharge: HOME | End: 2017-05-01
Payer: MEDICARE

## 2017-05-01 ENCOUNTER — OFFICE VISIT (OUTPATIENT)
Dept: INTERNAL MEDICINE | Age: 82
End: 2017-05-01

## 2017-05-01 VITALS
BODY MASS INDEX: 29.8 KG/M2 | HEIGHT: 63 IN | HEART RATE: 75 BPM | DIASTOLIC BLOOD PRESSURE: 80 MMHG | SYSTOLIC BLOOD PRESSURE: 132 MMHG | WEIGHT: 168.2 LBS

## 2017-05-01 DIAGNOSIS — N39.498 OTHER URINARY INCONTINENCE: ICD-10-CM

## 2017-05-01 DIAGNOSIS — R30.0 DYSURIA: ICD-10-CM

## 2017-05-01 DIAGNOSIS — I10 ESSENTIAL HYPERTENSION: ICD-10-CM

## 2017-05-01 DIAGNOSIS — R30.0 DYSURIA: Primary | ICD-10-CM

## 2017-05-01 LAB
BILIRUBIN, POC: ABNORMAL
BLOOD URINE, POC: ABNORMAL
CLARITY, POC: ABNORMAL
COLOR, POC: ABNORMAL
GLUCOSE URINE, POC: ABNORMAL
KETONES, POC: ABNORMAL
LEUKOCYTE EST, POC: ABNORMAL
NITRITE, POC: ABNORMAL
PH, POC: 6
PROTEIN, POC: ABNORMAL
SPECIFIC GRAVITY, POC: 1.02
UROBILINOGEN, POC: ABNORMAL

## 2017-05-01 PROCEDURE — 81002 URINALYSIS NONAUTO W/O SCOPE: CPT | Performed by: FAMILY MEDICINE

## 2017-05-01 PROCEDURE — 99213 OFFICE O/P EST LOW 20 MIN: CPT | Performed by: FAMILY MEDICINE

## 2017-05-01 PROCEDURE — 87086 URINE CULTURE/COLONY COUNT: CPT

## 2017-05-01 RX ORDER — INCONTINENCE PAD,LINER,DISP
1 EACH MISCELLANEOUS NIGHTLY
Qty: 1 PACKAGE | Refills: 11 | Status: SHIPPED | OUTPATIENT
Start: 2017-05-01

## 2017-05-01 RX ORDER — INCONTINENCE PAD,LINER,DISP
1 EACH MISCELLANEOUS NIGHTLY
Qty: 1 PACKAGE | Refills: 11 | Status: SHIPPED | OUTPATIENT
Start: 2017-05-01 | End: 2017-05-01 | Stop reason: SDUPTHER

## 2017-05-01 RX ORDER — SULFAMETHOXAZOLE AND TRIMETHOPRIM 800; 160 MG/1; MG/1
1 TABLET ORAL 2 TIMES DAILY
Qty: 6 TABLET | Refills: 0 | Status: SHIPPED | OUTPATIENT
Start: 2017-05-01 | End: 2017-05-04

## 2017-05-01 ASSESSMENT — PATIENT HEALTH QUESTIONNAIRE - PHQ9
SUM OF ALL RESPONSES TO PHQ9 QUESTIONS 1 & 2: 0
1. LITTLE INTEREST OR PLEASURE IN DOING THINGS: 0
SUM OF ALL RESPONSES TO PHQ QUESTIONS 1-9: 0
2. FEELING DOWN, DEPRESSED OR HOPELESS: 0

## 2017-05-01 ASSESSMENT — ENCOUNTER SYMPTOMS
EYE ITCHING: 0
EYE DISCHARGE: 0
EYE PAIN: 0

## 2017-05-03 LAB — URINE CULTURE, ROUTINE: NORMAL

## 2017-05-05 ENCOUNTER — TELEPHONE (OUTPATIENT)
Dept: INTERNAL MEDICINE | Age: 82
End: 2017-05-05

## 2017-05-05 DIAGNOSIS — R31.9 HEMATURIA: Primary | ICD-10-CM

## 2017-05-05 LAB
BILIRUBIN, POC: NORMAL
BLOOD URINE, POC: NORMAL
CLARITY, POC: CLEAR
COLOR, POC: NORMAL
GLUCOSE URINE, POC: NORMAL
KETONES, POC: NORMAL
LEUKOCYTE EST, POC: NORMAL
NITRITE, POC: NORMAL
PH, POC: NORMAL
PROTEIN, POC: NORMAL
SPECIFIC GRAVITY, POC: 1.02
UROBILINOGEN, POC: NORMAL

## 2017-05-05 PROCEDURE — 81002 URINALYSIS NONAUTO W/O SCOPE: CPT | Performed by: FAMILY MEDICINE

## 2017-05-15 RX ORDER — ATORVASTATIN CALCIUM 10 MG/1
TABLET, FILM COATED ORAL
Qty: 90 TABLET | Refills: 3 | Status: SHIPPED | OUTPATIENT
Start: 2017-05-15 | End: 2018-12-13 | Stop reason: SDUPTHER

## 2017-06-15 ENCOUNTER — OFFICE VISIT (OUTPATIENT)
Dept: INTERNAL MEDICINE | Age: 82
End: 2017-06-15

## 2017-06-15 VITALS
SYSTOLIC BLOOD PRESSURE: 122 MMHG | HEART RATE: 64 BPM | BODY MASS INDEX: 29.77 KG/M2 | DIASTOLIC BLOOD PRESSURE: 68 MMHG | WEIGHT: 168 LBS | HEIGHT: 63 IN

## 2017-06-15 DIAGNOSIS — E78.5 HYPERLIPIDEMIA, UNSPECIFIED HYPERLIPIDEMIA TYPE: ICD-10-CM

## 2017-06-15 DIAGNOSIS — L30.9 ECZEMA, UNSPECIFIED TYPE: ICD-10-CM

## 2017-06-15 DIAGNOSIS — N18.3 CKD (CHRONIC KIDNEY DISEASE), STAGE 3 (MODERATE): ICD-10-CM

## 2017-06-15 DIAGNOSIS — N39.3 SI (STRESS INCONTINENCE), FEMALE: ICD-10-CM

## 2017-06-15 DIAGNOSIS — M48.061 LUMBAR SPINAL STENOSIS: ICD-10-CM

## 2017-06-15 DIAGNOSIS — I10 ESSENTIAL HYPERTENSION: ICD-10-CM

## 2017-06-15 DIAGNOSIS — G89.29 CHRONIC LOW BACK PAIN, UNSPECIFIED BACK PAIN LATERALITY, WITH SCIATICA PRESENCE UNSPECIFIED: ICD-10-CM

## 2017-06-15 DIAGNOSIS — E11.8 TYPE 2 DIABETES MELLITUS WITH COMPLICATION, WITHOUT LONG-TERM CURRENT USE OF INSULIN (HCC): Primary | ICD-10-CM

## 2017-06-15 DIAGNOSIS — M54.5 CHRONIC LOW BACK PAIN, UNSPECIFIED BACK PAIN LATERALITY, WITH SCIATICA PRESENCE UNSPECIFIED: ICD-10-CM

## 2017-06-15 LAB — HBA1C MFR BLD: 8.8 %

## 2017-06-15 PROCEDURE — 99215 OFFICE O/P EST HI 40 MIN: CPT | Performed by: FAMILY MEDICINE

## 2017-06-15 PROCEDURE — 83036 HEMOGLOBIN GLYCOSYLATED A1C: CPT | Performed by: FAMILY MEDICINE

## 2017-06-16 DIAGNOSIS — E11.9 TYPE 2 DIABETES MELLITUS WITHOUT COMPLICATION (HCC): ICD-10-CM

## 2017-06-16 RX ORDER — GLUCOSAM/CHON-MSM1/C/MANG/BOSW 500-416.6
TABLET ORAL
Qty: 200 EACH | Refills: 3 | Status: SHIPPED | OUTPATIENT
Start: 2017-06-16 | End: 2019-04-10 | Stop reason: SDUPTHER

## 2017-10-18 ENCOUNTER — OFFICE VISIT (OUTPATIENT)
Dept: INTERNAL MEDICINE | Age: 82
End: 2017-10-18

## 2017-10-18 ENCOUNTER — HOSPITAL ENCOUNTER (OUTPATIENT)
Age: 82
Setting detail: SPECIMEN
Discharge: HOME | End: 2017-10-18
Payer: MEDICARE

## 2017-10-18 VITALS
HEIGHT: 63 IN | BODY MASS INDEX: 29.88 KG/M2 | HEART RATE: 79 BPM | DIASTOLIC BLOOD PRESSURE: 84 MMHG | WEIGHT: 168.6 LBS | SYSTOLIC BLOOD PRESSURE: 124 MMHG

## 2017-10-18 DIAGNOSIS — R31.9 URINARY TRACT INFECTION WITH HEMATURIA, SITE UNSPECIFIED: Primary | ICD-10-CM

## 2017-10-18 DIAGNOSIS — N39.0 URINARY TRACT INFECTION WITH HEMATURIA, SITE UNSPECIFIED: ICD-10-CM

## 2017-10-18 DIAGNOSIS — R31.9 URINARY TRACT INFECTION WITH HEMATURIA, SITE UNSPECIFIED: ICD-10-CM

## 2017-10-18 DIAGNOSIS — N39.0 URINARY TRACT INFECTION WITH HEMATURIA, SITE UNSPECIFIED: Primary | ICD-10-CM

## 2017-10-18 LAB
BILIRUBIN, POC: ABNORMAL
BLOOD URINE, POC: 2
CLARITY, POC: ABNORMAL
COLOR, POC: YELLOW
GLUCOSE URINE, POC: ABNORMAL
KETONES, POC: ABNORMAL
LEUKOCYTE EST, POC: 3
NITRITE, POC: ABNORMAL
PH, POC: 6
PROTEIN, POC: 2
SPECIFIC GRAVITY, POC: 1.02
UROBILINOGEN, POC: ABNORMAL

## 2017-10-18 PROCEDURE — 87186 SC STD MICRODIL/AGAR DIL: CPT

## 2017-10-18 PROCEDURE — 99213 OFFICE O/P EST LOW 20 MIN: CPT | Performed by: FAMILY MEDICINE

## 2017-10-18 PROCEDURE — 87077 CULTURE AEROBIC IDENTIFY: CPT

## 2017-10-18 PROCEDURE — 81002 URINALYSIS NONAUTO W/O SCOPE: CPT | Performed by: FAMILY MEDICINE

## 2017-10-18 PROCEDURE — 87086 URINE CULTURE/COLONY COUNT: CPT

## 2017-10-18 RX ORDER — SULFAMETHOXAZOLE AND TRIMETHOPRIM 800; 160 MG/1; MG/1
1 TABLET ORAL 2 TIMES DAILY
Qty: 10 TABLET | Refills: 0 | Status: SHIPPED | OUTPATIENT
Start: 2017-10-18 | End: 2017-10-23

## 2017-10-18 ASSESSMENT — ENCOUNTER SYMPTOMS
APNEA: 0
CHEST TIGHTNESS: 0
CHOKING: 0

## 2017-10-18 NOTE — PROGRESS NOTES
Patient: Macy Dumont    YOB: 1927    Date: 10/18/17    Patient Active Problem List    Diagnosis Date Noted    CKD (chronic kidney disease) 03/18/2015     Priority: High     Overview Note:     Stage G3a A2      HLD (hyperlipidemia) 03/18/2015     Priority: High    HTN (hypertension) 11/29/2011     Priority: High    Diabetes mellitus 11/29/2011     Priority: High    Lumbar spinal stenosis 12/22/2016     Priority: Medium    Chronic low back pain 08/17/2016     Priority: Medium    Eczematous dermatitis      Priority: Low    Vitamin D deficiency 03/18/2015     Priority: Low    SI (stress incontinence), female 05/29/2012     Priority: Low    Osteoarthritis 05/29/2012     Priority: Low     Past Medical History:   Diagnosis Date    Arthritis     Chicken pox     Chronic back pain     Chronic low back pain 8/17/2016    Eczematous dermatitis     History of bladder infections     Hyperlipidemia     Hypertension     Measles     Mumps     Osteoarthritis 5/29/2012    SCC (squamous cell carcinoma), arm 2013    right upper arm, 2015 right forarm    SI (stress incontinence), female 5/29/2012    Type II or unspecified type diabetes mellitus without mention of complication, not stated as uncontrolled     Whooping cough      Past Surgical History:   Procedure Laterality Date    ANKLE SURGERY      broken left ankle.  APPENDECTOMY      BREAST BIOPSY      x2 left breast    CATARACT REMOVAL  2004    bilateral    CYSTOCELE REPAIR      EYE SURGERY      bilateral cataract    HYSTERECTOMY      complete at age 39    [address]      as a child     Family History   Problem Relation Age of Onset    Diabetes Mother     Heart Disease Father      Social History     Social History    Marital status:      Spouse name: N/A    Number of children: N/A    Years of education: N/A     Occupational History    Not on file.      Social History Main Topics    Smoking

## 2017-10-18 NOTE — PATIENT INSTRUCTIONS
Patient Education     all antibiotics can increase the risk of developing diarrheal infections as well as diarrhea as a side effect. To combat this, I recommended eating yogurt or taking a probiotic daily while on it. Urinary Tract Infection in Women: Care Instructions  Your Care Instructions    A urinary tract infection, or UTI, is a general term for an infection anywhere between the kidneys and the urethra (where urine comes out). Most UTIs are bladder infections. They often cause pain or burning when you urinate. UTIs are caused by bacteria and can be cured with antibiotics. Be sure to complete your treatment so that the infection goes away. Follow-up care is a key part of your treatment and safety. Be sure to make and go to all appointments, and call your doctor if you are having problems. It's also a good idea to know your test results and keep a list of the medicines you take. How can you care for yourself at home? · Take your antibiotics as directed. Do not stop taking them just because you feel better. You need to take the full course of antibiotics. · Drink extra water and other fluids for the next day or two. This may help wash out the bacteria that are causing the infection. (If you have kidney, heart, or liver disease and have to limit fluids, talk with your doctor before you increase your fluid intake.)  · Avoid drinks that are carbonated or have caffeine. They can irritate the bladder. · Urinate often. Try to empty your bladder each time. · To relieve pain, take a hot bath or lay a heating pad set on low over your lower belly or genital area. Never go to sleep with a heating pad in place. To prevent UTIs  · Drink plenty of water each day. This helps you urinate often, which clears bacteria from your system. (If you have kidney, heart, or liver disease and have to limit fluids, talk with your doctor before you increase your fluid intake.)  · Urinate when you need to.   · Urinate right

## 2017-10-21 LAB
ORGANISM: ABNORMAL
URINE CULTURE, ROUTINE: ABNORMAL
URINE CULTURE, ROUTINE: ABNORMAL

## 2017-11-01 LAB
BILIRUBIN, POC: NORMAL
BLOOD URINE, POC: NORMAL
CLARITY, POC: CLEAR
COLOR, POC: NORMAL
GLUCOSE URINE, POC: NORMAL
KETONES, POC: NORMAL
LEUKOCYTE EST, POC: NORMAL
NITRITE, POC: NORMAL
PH, POC: 5.5
PROTEIN, POC: NORMAL
SPECIFIC GRAVITY, POC: 1.02
UROBILINOGEN, POC: NORMAL

## 2017-11-02 ENCOUNTER — TELEPHONE (OUTPATIENT)
Dept: INTERNAL MEDICINE | Age: 82
End: 2017-11-02

## 2017-11-02 DIAGNOSIS — R30.0 DYSURIA: Primary | ICD-10-CM

## 2017-11-02 PROCEDURE — 81002 URINALYSIS NONAUTO W/O SCOPE: CPT | Performed by: FAMILY MEDICINE

## 2017-11-20 RX ORDER — LISINOPRIL 20 MG/1
TABLET ORAL
Qty: 90 TABLET | Refills: 2 | Status: ON HOLD | OUTPATIENT
Start: 2017-11-20 | End: 2018-02-07 | Stop reason: HOSPADM

## 2017-11-20 RX ORDER — TRIAMTERENE AND HYDROCHLOROTHIAZIDE 37.5; 25 MG/1; MG/1
TABLET ORAL
Qty: 90 TABLET | Refills: 2 | Status: SHIPPED | OUTPATIENT
Start: 2017-11-20 | End: 2018-01-02

## 2017-11-20 NOTE — TELEPHONE ENCOUNTER
Received refill request from: Shilpa Rogers    Prescription for: Triamterene, Lisinopril     Last Office Visit: 06-15-17    Pharmacy: [facility]

## 2017-11-20 NOTE — TELEPHONE ENCOUNTER
Pt called and said that her medication was not refilled and she is running low, I put in for the refill request. I also looked back and saw that it was refused but unsure why.

## 2017-11-21 ENCOUNTER — APPOINTMENT (OUTPATIENT)
Dept: CT IMAGING | Age: 82
End: 2017-11-21
Payer: MEDICARE

## 2017-11-21 ENCOUNTER — HOSPITAL ENCOUNTER (EMERGENCY)
Age: 82
Discharge: HOME | End: 2017-11-21
Attending: EMERGENCY MEDICINE
Payer: MEDICARE

## 2017-11-21 VITALS
BODY MASS INDEX: 29.59 KG/M2 | SYSTOLIC BLOOD PRESSURE: 205 MMHG | WEIGHT: 167 LBS | OXYGEN SATURATION: 99 % | HEIGHT: 63 IN | DIASTOLIC BLOOD PRESSURE: 78 MMHG | RESPIRATION RATE: 16 BRPM | HEART RATE: 58 BPM | TEMPERATURE: 98.4 F

## 2017-11-21 DIAGNOSIS — S00.31XA ABRASION OF NOSE, INITIAL ENCOUNTER: ICD-10-CM

## 2017-11-21 DIAGNOSIS — S00.83XA CONTUSION OF FACE, INITIAL ENCOUNTER: ICD-10-CM

## 2017-11-21 DIAGNOSIS — S09.90XA CLOSED HEAD INJURY, INITIAL ENCOUNTER: Primary | ICD-10-CM

## 2017-11-21 PROCEDURE — 99283 EMERGENCY DEPT VISIT LOW MDM: CPT

## 2017-11-21 PROCEDURE — 70450 CT HEAD/BRAIN W/O DYE: CPT

## 2017-11-21 PROCEDURE — 70486 CT MAXILLOFACIAL W/O DYE: CPT

## 2017-11-21 PROCEDURE — 6370000000 HC RX 637 (ALT 250 FOR IP): Performed by: EMERGENCY MEDICINE

## 2017-11-21 PROCEDURE — 6360000002 HC RX W HCPCS: Performed by: EMERGENCY MEDICINE

## 2017-11-21 RX ORDER — GINSENG 100 MG
CAPSULE ORAL ONCE
Status: COMPLETED | OUTPATIENT
Start: 2017-11-21 | End: 2017-11-21

## 2017-11-21 RX ORDER — ONDANSETRON 4 MG/1
4 TABLET, ORALLY DISINTEGRATING ORAL EVERY 8 HOURS PRN
Qty: 20 TABLET | Refills: 0 | Status: SHIPPED | OUTPATIENT
Start: 2017-11-21 | End: 2017-11-27 | Stop reason: ALTCHOICE

## 2017-11-21 RX ORDER — HYDROCODONE BITARTRATE AND ACETAMINOPHEN 5; 325 MG/1; MG/1
1 TABLET ORAL EVERY 6 HOURS PRN
Qty: 10 TABLET | Refills: 0 | Status: SHIPPED | OUTPATIENT
Start: 2017-11-21 | End: 2017-11-27 | Stop reason: ALTCHOICE

## 2017-11-21 RX ORDER — ONDANSETRON 4 MG/1
4 TABLET, ORALLY DISINTEGRATING ORAL ONCE
Status: COMPLETED | OUTPATIENT
Start: 2017-11-21 | End: 2017-11-21

## 2017-11-21 RX ORDER — ACETAMINOPHEN 325 MG/1
650 TABLET ORAL ONCE
Status: COMPLETED | OUTPATIENT
Start: 2017-11-21 | End: 2017-11-21

## 2017-11-21 RX ADMIN — ACETAMINOPHEN 650 MG: 325 TABLET ORAL at 12:50

## 2017-11-21 RX ADMIN — ONDANSETRON 4 MG: 4 TABLET, ORALLY DISINTEGRATING ORAL at 12:26

## 2017-11-21 RX ADMIN — BACITRACIN: 500 OINTMENT TOPICAL at 12:50

## 2017-11-21 ASSESSMENT — ENCOUNTER SYMPTOMS
VOMITING: 0
EYES NEGATIVE: 1
DIARRHEA: 0
EYE PAIN: 0
GASTROINTESTINAL NEGATIVE: 1
RESPIRATORY NEGATIVE: 1
EYE DISCHARGE: 0
NAUSEA: 0
SHORTNESS OF BREATH: 0
ABDOMINAL DISTENTION: 0
BACK PAIN: 0
WHEEZING: 0
ABDOMINAL PAIN: 0
BLOOD IN STOOL: 0
SORE THROAT: 0
CHEST TIGHTNESS: 0
SINUS PAIN: 0
COUGH: 0

## 2017-11-21 ASSESSMENT — PAIN SCALES - GENERAL: PAINLEVEL_OUTOF10: 1

## 2017-11-21 NOTE — ED TRIAGE NOTES
Patient to room #9 for fall at 1000 today. Patient states that she landed on hands and knees while hitting the bridge of her nose on a chair. Patient denies any LOC. 2 cm x 2 cm skin tear is noted to bridge of nose. Patient states the skin tear was caused from her glasses during the fall. Denies any other injuries at this time.

## 2017-11-21 NOTE — ED PROVIDER NOTES
[address] [place] [address] ED  eMERGENCY dEPARTMENT eNCOUnter      Pt Name: Orin Dale  MRN: [id number]  [place] 6/10/1927  Date of evaluation: 11/21/2017  Provider: Leonard Beth, [facility]       Chief Complaint   Patient presents with    Fall     Fall at 1000 today causing patient to hit the bridge of her nose on a chair. HISTORY OF PRESENT ILLNESS   (Location/Symptom, Timing/Onset, Context/Setting, Quality, Duration, Modifying Factors, Severity)  Note limiting factors. Orin Dale is a 80 y.o. female who presents to the emergency department Complaining of right ORBIT right nose pain status post fall. She denies any loss of consciousness. It was a mechanical fall due to her slippers she states. Patient denies any loss of consciousness. Patient is on aspirin a day. HPI    Nursing Notes were reviewed. REVIEW OF SYSTEMS    (2-9 systems for level 4, 10 or more for level 5)     Review of Systems   Constitutional: Negative. Negative for chills and fever. HENT: Negative. Negative for ear pain, sinus pain and sore throat. Eyes: Negative. Negative for pain and discharge. Respiratory: Negative. Negative for cough, chest tightness, shortness of breath and wheezing. Cardiovascular: Negative. Negative for chest pain, palpitations and leg swelling. Gastrointestinal: Negative. Negative for abdominal distention, abdominal pain, blood in stool, diarrhea, nausea and vomiting. Endocrine: Negative. Negative for polydipsia and polyuria. Genitourinary: Negative. Negative for difficulty urinating, dysuria, flank pain, frequency, hematuria and urgency. Musculoskeletal: Negative. Negative for arthralgias, back pain, myalgias and neck pain. Skin: Negative. Negative for rash and wound. Neurological: Negative. Negative for dizziness, seizures, syncope, weakness and headaches. Hematological: Negative. Negative for adenopathy. Does not bruise/bleed easily. intracranial pathology and CT of the faceshows no acute fracture    Interpretation per the Radiologist below, if available at the time of this note:    CT FACIAL BONES WO CONTRAST   Final Result   NO FACIAL BONE FRACTURE. INCIDENTAL FINDINGS AS ABOVE. CT Head WO Contrast   Final Result   NO ACUTE INTRACRANIAL PATHOLOGY. ED BEDSIDE ULTRASOUND:   Performed by ED Physician - none    LABS:  Labs Reviewed - No data to display    All other labs were within normal range or not returned as of this dictation. EMERGENCY DEPARTMENT COURSE and DIFFERENTIAL DIAGNOSIS/MDM:   Vitals:    Vitals:    11/21/17 1111 11/21/17 1228   BP: (!) 194/76 (!) 213/88   Pulse: 60 62   Resp: 16 16   Temp: 98.4 °F (36.9 °C)    TempSrc: Oral    SpO2: 96% 96%   Weight: 167 lb (75.8 kg)    Height: 5' 3\" (1.6 m)        Ross CT of head and face are negative the patient may have a slight concussion she has some nausea. An headache she was treated with Tylenol and Zofran here. She'll be sent home with bedrest until headache dizziness and any nausea or gone. Follow-up with her primary. MDM    CRITICAL CARE TIME   Total Critical Care time was 0 minutes, excluding separately reportable procedures. There was a high probability of clinically significant/life threatening deterioration in the patient's condition which required my urgent intervention. CONSULTS:  None    PROCEDURES:  Unless otherwise noted below, none     Procedures    FINAL IMPRESSION      1. Closed head injury, initial encounter    2. Contusion of face, initial encounter    3. Abrasion of nose, initial encounter          DISPOSITION/PLAN   DISPOSITION Decision to Discharge    PATIENT REFERRED TO:  Logan Smith MD  [facility]  [address]  [address] [place] [id number]  [phone]    In 1 week        DISCHARGE MEDICATIONS:  New Prescriptions    HYDROCODONE-ACETAMINOPHEN (NORCO) 5-325 MG PER TABLET    Take 1 tablet by mouth every 6 hours as needed for Pain .

## 2017-11-21 NOTE — ED NOTES
Dr. Tanis Aase notified of patient's elevated blood pressure. No new orders.      Neetu Yeh RN  11/21/17 1527

## 2017-11-27 ENCOUNTER — OFFICE VISIT (OUTPATIENT)
Dept: INTERNAL MEDICINE | Age: 82
End: 2017-11-27

## 2017-11-27 VITALS
BODY MASS INDEX: 29.69 KG/M2 | SYSTOLIC BLOOD PRESSURE: 158 MMHG | WEIGHT: 167.6 LBS | DIASTOLIC BLOOD PRESSURE: 80 MMHG | OXYGEN SATURATION: 99 % | HEART RATE: 70 BPM

## 2017-11-27 DIAGNOSIS — Z91.81 HISTORY OF RECENT FALL: ICD-10-CM

## 2017-11-27 DIAGNOSIS — I10 ESSENTIAL HYPERTENSION: Primary | ICD-10-CM

## 2017-11-27 PROCEDURE — 1123F ACP DISCUSS/DSCN MKR DOCD: CPT | Performed by: PHYSICIAN ASSISTANT

## 2017-11-27 PROCEDURE — 4040F PNEUMOC VAC/ADMIN/RCVD: CPT | Performed by: PHYSICIAN ASSISTANT

## 2017-11-27 PROCEDURE — G8417 CALC BMI ABV UP PARAM F/U: HCPCS | Performed by: PHYSICIAN ASSISTANT

## 2017-11-27 PROCEDURE — 1090F PRES/ABSN URINE INCON ASSESS: CPT | Performed by: PHYSICIAN ASSISTANT

## 2017-11-27 PROCEDURE — 1036F TOBACCO NON-USER: CPT | Performed by: PHYSICIAN ASSISTANT

## 2017-11-27 PROCEDURE — G8427 DOCREV CUR MEDS BY ELIG CLIN: HCPCS | Performed by: PHYSICIAN ASSISTANT

## 2017-11-27 PROCEDURE — G8482 FLU IMMUNIZE ORDER/ADMIN: HCPCS | Performed by: PHYSICIAN ASSISTANT

## 2017-11-27 PROCEDURE — 99213 OFFICE O/P EST LOW 20 MIN: CPT | Performed by: PHYSICIAN ASSISTANT

## 2017-12-21 ENCOUNTER — HOSPITAL ENCOUNTER (OUTPATIENT)
Age: 82
Setting detail: SPECIMEN
Discharge: HOME | End: 2017-12-21
Payer: MEDICARE

## 2017-12-21 ENCOUNTER — OFFICE VISIT (OUTPATIENT)
Dept: INTERNAL MEDICINE | Age: 82
End: 2017-12-21

## 2017-12-21 VITALS
DIASTOLIC BLOOD PRESSURE: 62 MMHG | SYSTOLIC BLOOD PRESSURE: 110 MMHG | BODY MASS INDEX: 29.95 KG/M2 | WEIGHT: 169 LBS | HEART RATE: 63 BPM | HEIGHT: 63 IN

## 2017-12-21 DIAGNOSIS — Z13.89 SCREENING FOR NEPHROPATHY: ICD-10-CM

## 2017-12-21 DIAGNOSIS — D64.9 ANEMIA, UNSPECIFIED TYPE: ICD-10-CM

## 2017-12-21 DIAGNOSIS — E11.8 TYPE 2 DIABETES MELLITUS WITH COMPLICATION, WITHOUT LONG-TERM CURRENT USE OF INSULIN (HCC): Primary | ICD-10-CM

## 2017-12-21 DIAGNOSIS — E78.5 HYPERLIPIDEMIA, UNSPECIFIED HYPERLIPIDEMIA TYPE: ICD-10-CM

## 2017-12-21 DIAGNOSIS — I10 ESSENTIAL HYPERTENSION: ICD-10-CM

## 2017-12-21 DIAGNOSIS — E11.8 TYPE 2 DIABETES MELLITUS WITH COMPLICATION, WITHOUT LONG-TERM CURRENT USE OF INSULIN (HCC): ICD-10-CM

## 2017-12-21 LAB
BASOPHILS ABSOLUTE: 0.1 K/UL (ref 0–0.2)
BASOPHILS RELATIVE PERCENT: 1 %
CREATININE URINE: 61.9 MG/DL
EOSINOPHILS ABSOLUTE: 0.6 K/UL (ref 0–0.7)
EOSINOPHILS RELATIVE PERCENT: 8.1 %
HBA1C MFR BLD: 7.2 %
HCT VFR BLD CALC: 37.1 % (ref 37–47)
HEMOGLOBIN: 12.4 G/DL (ref 12–16)
LYMPHOCYTES ABSOLUTE: 1.6 K/UL (ref 1–4.8)
LYMPHOCYTES RELATIVE PERCENT: 19.8 %
MCH RBC QN AUTO: 31.9 PG (ref 27–31.3)
MCHC RBC AUTO-ENTMCNC: 33.4 % (ref 33–37)
MCV RBC AUTO: 95.5 FL (ref 82–100)
MICROALBUMIN UR-MCNC: 5.8 MG/DL
MICROALBUMIN/CREAT UR-RTO: 93.7 MG/G (ref 0–30)
MONOCYTES ABSOLUTE: 0.5 K/UL (ref 0.2–0.8)
MONOCYTES RELATIVE PERCENT: 5.7 %
NEUTROPHILS ABSOLUTE: 5.2 K/UL (ref 1.4–6.5)
NEUTROPHILS RELATIVE PERCENT: 65.4 %
PDW BLD-RTO: 14.2 % (ref 11.5–14.5)
PLATELET # BLD: 218 K/UL (ref 130–400)
RBC # BLD: 3.88 M/UL (ref 4.2–5.4)
RETICULOCYTE ABSOLUTE COUNT: 0.04 M/CUMM (ref 0.02–0.11)
RETICULOCYTE COUNT PCT: 1.1 % (ref 0.6–2.2)
WBC # BLD: 7.9 K/UL (ref 4.8–10.8)

## 2017-12-21 PROCEDURE — G8427 DOCREV CUR MEDS BY ELIG CLIN: HCPCS | Performed by: FAMILY MEDICINE

## 2017-12-21 PROCEDURE — 82043 UR ALBUMIN QUANTITATIVE: CPT

## 2017-12-21 PROCEDURE — 82728 ASSAY OF FERRITIN: CPT

## 2017-12-21 PROCEDURE — 82607 VITAMIN B-12: CPT

## 2017-12-21 PROCEDURE — 1036F TOBACCO NON-USER: CPT | Performed by: FAMILY MEDICINE

## 2017-12-21 PROCEDURE — G8417 CALC BMI ABV UP PARAM F/U: HCPCS | Performed by: FAMILY MEDICINE

## 2017-12-21 PROCEDURE — 99214 OFFICE O/P EST MOD 30 MIN: CPT | Performed by: FAMILY MEDICINE

## 2017-12-21 PROCEDURE — G8482 FLU IMMUNIZE ORDER/ADMIN: HCPCS | Performed by: FAMILY MEDICINE

## 2017-12-21 PROCEDURE — 80061 LIPID PANEL: CPT

## 2017-12-21 PROCEDURE — 83550 IRON BINDING TEST: CPT

## 2017-12-21 PROCEDURE — 1123F ACP DISCUSS/DSCN MKR DOCD: CPT | Performed by: FAMILY MEDICINE

## 2017-12-21 PROCEDURE — 82746 ASSAY OF FOLIC ACID SERUM: CPT

## 2017-12-21 PROCEDURE — 85025 COMPLETE CBC W/AUTO DIFF WBC: CPT

## 2017-12-21 PROCEDURE — 83036 HEMOGLOBIN GLYCOSYLATED A1C: CPT | Performed by: FAMILY MEDICINE

## 2017-12-21 PROCEDURE — 80053 COMPREHEN METABOLIC PANEL: CPT

## 2017-12-21 PROCEDURE — 82570 ASSAY OF URINE CREATININE: CPT

## 2017-12-21 PROCEDURE — 1090F PRES/ABSN URINE INCON ASSESS: CPT | Performed by: FAMILY MEDICINE

## 2017-12-21 PROCEDURE — 36415 COLL VENOUS BLD VENIPUNCTURE: CPT | Performed by: FAMILY MEDICINE

## 2017-12-21 PROCEDURE — 83540 ASSAY OF IRON: CPT

## 2017-12-21 PROCEDURE — 85046 RETICYTE/HGB CONCENTRATE: CPT

## 2017-12-21 PROCEDURE — 4040F PNEUMOC VAC/ADMIN/RCVD: CPT | Performed by: FAMILY MEDICINE

## 2017-12-21 ASSESSMENT — ENCOUNTER SYMPTOMS
EYE DISCHARGE: 0
EYE PAIN: 0
EYE ITCHING: 0
APNEA: 0
CHOKING: 0
CHEST TIGHTNESS: 0

## 2017-12-21 NOTE — PATIENT INSTRUCTIONS
Hypertension / Hyperlipidemia Management    Blood Pressure Log      Tips to manage your condition    1. Keep your blood pressure below 130/80   Make sure your blood pressure is measured at every office visit    2. If you take antihypertensive drug therapy return for follow-up monthly until B/P goal is reached. 3. Follow-up with your physician to have your potassium and creatinine measured every 6 months. 4. Measure your blood pressure at home (use log to track your progress). 5. Keep your LDL (bad) cholesterol level below 130   Make sure your lipids are measured once a year     6. If you take LDL-lowering drug therapy return for follow-up every 6 months    Tips for healthy living    1. Start your day with breakfast  2. Exercise and slowly progress to (brisk walking, bike riding, etc) 30 minutes 3 to 5 days a week. 3. Snack in moderation (limit eating sugary or salty foods to no more than 3 times a week). 4. Eat more grains and vegetables (have no more than 3 servings of fruit daily). 5. Avoid tobacco use. 6. Drink alcohol in moderation (no more than 1 serving daily for woman and no more than 2 servings daily for men)  7. Obtain annual flu shot  8. Refer to patient education handouts given to you today. [facility] [place] Address Phone Website   Patrice Ortiz [facility]  [address] [place] [address], [address] [place] [address] [phone] [email or web address]       Weight Management Community Resources   Organization Address Phone Website   [facility] [address] [facility], [address] [place] [address] [phone] n/a   Weight Watchers Multiple meeting locations throughout [address] [email or web address]     Tops n/a n/a [email or web address] [address]  [address] [phone] [email or web address]    [facility] [address] [place] [address] [facility] [phone] [facility]    [address] [facility] [address] [phone]        Diabetes Management    Blood Sugar Log  Day Breakfast Lunch Dinner    Before 2 hrs After Before 2 hrs After Before 2 hrs After    Time Number Time  Number Time Number Time Number Time  Number Time Number   Sun               Mon Tues Wed Thurs               Fri               Sat                 2. Keep your blood sugar level (A1c) below 7.0%   If your blood sugar is ABOVE 7.0%, get an A1c test every 3 months.  If your blood sugar is BELOW 7.0%, get an A1c test every 6 months. 3. Keep your blood pressure below 130/80   Make sure your blood pressure is measured at every office visit    4. Obtain a foot exam once a year during your doctors visit; also, do foot exam on yourself every week or have someone do it for you     5. Keep your LDL (bad) cholesterol level below 100   Make sure your lipids are measured once a year     6. Obtain a urine test (microalbumin) once a year    7. Obtain an eye exam once a year    8. Learn to stay healthy living with Diabetes   Attend Diabetes Education course if ordered by your physician    9. Exercise and slowly progress to (brisk walking, bike riding, etc) 30 minutes 3 to 5 days a week. 10. Obtain annual flu shot    11. Obtain pneumonia shot if you have not done so  12. Refer to patient education handouts given to you today    Diabetes Management Community Resources   Organization Address Program Phone / [address] [facility] [id number] [place] [address] Diabetes Self-Management  [phone]   [facility] GI[address] [place], [place] Diabetes Self-Management  [address] [place], [place]  Diabetes Self-Management  [address] [place] [address] [place] [facility] Help with Food [phone]   Partnership for Prescription Assistance n/a

## 2017-12-21 NOTE — PROGRESS NOTES
Patient: Angelo Murillo    YOB: 1927    Date: 12/21/17    Patient Active Problem List    Diagnosis Date Noted    CKD (chronic kidney disease) 03/18/2015     Priority: High     Overview Note:     Stage G3a A2      HLD (hyperlipidemia) 03/18/2015     Priority: High    HTN (hypertension) 11/29/2011     Priority: High    Diabetes mellitus 11/29/2011     Priority: High    Lumbar spinal stenosis 12/22/2016     Priority: Medium    Chronic low back pain 08/17/2016     Priority: Medium    Eczematous dermatitis      Priority: Low    Vitamin D deficiency 03/18/2015     Priority: Low    SI (stress incontinence), female 05/29/2012     Priority: Low    Osteoarthritis 05/29/2012     Priority: Low     Past Medical History:   Diagnosis Date    Arthritis     Chicken pox     Chronic back pain     Chronic low back pain 8/17/2016    Eczematous dermatitis     History of bladder infections     Hyperlipidemia     Hypertension     Measles     Mumps     Osteoarthritis 5/29/2012    SCC (squamous cell carcinoma), arm 2013    right upper arm, 2015 right forarm    SI (stress incontinence), female 5/29/2012    Type II or unspecified type diabetes mellitus without mention of complication, not stated as uncontrolled     Whooping cough      Past Surgical History:   Procedure Laterality Date    ANKLE SURGERY      broken left ankle.  APPENDECTOMY      BREAST BIOPSY      x2 left breast    CATARACT REMOVAL  2004    bilateral    CYSTOCELE REPAIR      EYE SURGERY      bilateral cataract    HYSTERECTOMY      complete at age 39    [address]      as a child     Social History     Social History    Marital status:      Spouse name: N/A    Number of children: N/A    Years of education: N/A     Occupational History    Not on file.      Social History Main Topics    Smoking status: Never Smoker    Smokeless tobacco: Never Used    Alcohol use No    Drug use: No    Sexual Hyperlipidemia       HPI:  Treatment Adherence:   Medication compliance:  compliant all of the time  Diet compliance:  compliant all of the time  Current exercise: no regular exercise    Diabetes Mellitus Type 2: Current symptoms/problems include none. Home blood sugar records:  fasting range: 156  Any episodes of hypoglycemia? no  Tobacco history: She  reports that she has never smoked. She has never used smokeless tobacco.   Daily Aspirin? Yes  Have you had your annual diabetic retinal (eye) exam? No    Hypertension:  Home blood pressure monitoring: No.  She is adherent to a low sodium diet. Patient denies chest pain, shortness of breath, headache and lightheadedness. Antihypertensive medication side effects: no medication side effects noted. Use of agents associated with hypertension: none. Hyperlipidemia:  No new myalgias or GI upset on atorvastatin (Lipitor).        Lab Results   Component Value Date    LABA1C 7.2 12/21/2017    LABA1C 8.8 06/15/2017    LABA1C 8.6 (H) 01/02/2017     Lab Results   Component Value Date    LABMICR 1.80 08/17/2016    CREATININE 1.00 (H) 01/03/2017     Lab Results   Component Value Date    ALT 15 01/03/2017    AST 12 01/03/2017     Lab Results   Component Value Date    CHOL 145 08/17/2016    TRIG 184 07/01/2015    HDL 56 07/01/2015    LDLCALC 65 07/01/2015    LDLDIRECT 64 08/17/2016        Diabetes and Hypertension Visit Information    BP Readings from Last 3 Encounters:   12/21/17 110/62   11/27/17 (!) 158/80   11/21/17 (!) 205/78          Hemoglobin A1C (%)   Date Value   12/21/2017 7.2   06/15/2017 8.8   01/02/2017 8.6 (H)     Microalbumin, Random Urine (mg/dL)   Date Value   08/17/2016 1.80     LDL Calculated (mg/dL)   Date Value   07/01/2015 65     HDL (mg/dL)   Date Value   07/01/2015 56     BUN (mg/dL)   Date Value   01/03/2017 27 (H)     CREATININE (mg/dL)   Date Value   01/03/2017 1.00 (H)     Glucose (mg/dL)   Date Value   01/03/2017 335 (H)   05/25/2012 154 (H) controlled  Plan: continue current medications    Hyperlipidemia, unspecified hyperlipidemia type  -     Lipid Panel; Future  Stable, controlled  Plan: continue current medications    Screening for nephropathy  -     Microalbumin / Creatinine Urine Ratio; Future    Anemia, unspecified type  -     CBC Auto Differential; Future  -     Iron And Tibc; Future  -     Ferritin; Future  -     Reticulocytes; Future  -     Vitamin B12 & Folate; Future  Will do work up        Orders Placed This Encounter   Procedures    Lipid Panel     Standing Status:   Future     Standing Expiration Date:   12/21/2018     Order Specific Question:   Is Patient Fasting?/# of Hours     Answer:   8    Microalbumin / Creatinine Urine Ratio     Standing Status:   Future     Standing Expiration Date:   12/21/2018    Comprehensive Metabolic Panel     Standing Status:   Future     Standing Expiration Date:   12/21/2018    CBC Auto Differential     Standing Status:   Future     Standing Expiration Date:   2/21/2018    Iron And Tibc     Standing Status:   Future     Standing Expiration Date:   12/21/2018     Order Specific Question:   Is Patient Fasting? Answer:   yes     Order Specific Question:   No of Hours? Answer:   10 hrs    Ferritin     Standing Status:   Future     Standing Expiration Date:   12/21/2018    Reticulocytes     Standing Status:   Future     Standing Expiration Date:   12/21/2018    Vitamin B12 & Folate     Standing Status:   Future     Standing Expiration Date:   12/21/2018    POCT glycosylated hemoglobin (Hb A1C)      I personally reviewed all recent labs and imaging pertaining to conditions mentioned above in assessment/plan in Psychiatric and care everywhere, discussed results with patient in office    Diabetes Counseling   Patient was counseled regarding disease risks and adopting healthy behaviors. Patient was provided education materials to assist with self management.  Patient was provided log (or received log during previous visit) to record blood pressure, food intake and/or blood sugar. Patient was instructed to keep log up-to-date and to always bring log to all office visits. Reviewed the following:  - Morbidity from diabetes involves both macrovascular (atherosclerosis) and microvascular disease (retinopathy, nephropathy, and neuropathy). - Monitoring recommendations for patients with diabetes were discussed  1. Smoking cessation counseling (Every visit)   2. Blood pressure (Every visit) Goal <140/80   3. Dilated eye examination (Annually, more than annually if significant retinopathy)   4. Foot examination (Annually, every visit if peripheral vascular disease or neuropathy)   5. Laboratory studies:    - Fasting serum lipid profile (Annually)  cholesterol (goal LDL less than 100  mg/dL    - A1C (Every three to six months) Goal <7 percent   - Urinary albumin-to-creatinine ratio (Annually, protein excretion and serum  creatinine should also be monitored if persistent albuminuria is present)    - Serum creatinine, initially as indicated  6. ASA (75 to 162 mg/day) in patients with or at high risk for cardiovascular disease  7. Vaccinations:   - Pneumococcus, One time Patients over age 72 need a second dose if vaccine  was received ? 5 years previously and age was <65 at time of vaccination    - Influenza, Annually    - Hepatitis B, They should have had the three dose series   8. Education, self management review Annually      Return in about 6 months (around 6/21/2018) for Diabetes, Hypertension, Hyperlipidemia.     Dr. Vicki oDminguez      12/21/17  10:15 AM

## 2017-12-22 LAB
ALBUMIN SERPL-MCNC: 4.1 G/DL (ref 3.9–4.9)
ALP BLD-CCNC: 47 U/L (ref 40–130)
ALT SERPL-CCNC: 12 U/L (ref 0–33)
ANION GAP SERPL CALCULATED.3IONS-SCNC: 10 MEQ/L (ref 7–13)
AST SERPL-CCNC: 11 U/L (ref 0–35)
BILIRUB SERPL-MCNC: 0.4 MG/DL (ref 0–1.2)
BUN BLDV-MCNC: 29 MG/DL (ref 8–23)
CALCIUM SERPL-MCNC: 10.2 MG/DL (ref 8.6–10.2)
CHLORIDE BLD-SCNC: 103 MEQ/L (ref 98–107)
CHOLESTEROL, TOTAL: 143 MG/DL (ref 0–199)
CO2: 29 MEQ/L (ref 22–29)
CREAT SERPL-MCNC: 0.87 MG/DL (ref 0.5–0.9)
FERRITIN: 99.1 NG/ML (ref 13–150)
FOLATE: 11.9 NG/ML (ref 7.3–26.1)
GFR AFRICAN AMERICAN: >60
GFR NON-AFRICAN AMERICAN: >60
GLOBULIN: 2.4 G/DL (ref 2.3–3.5)
GLUCOSE BLD-MCNC: 190 MG/DL (ref 74–109)
HDLC SERPL-MCNC: 60 MG/DL (ref 40–59)
IRON SATURATION: 24 % (ref 11–46)
IRON: 80 UG/DL (ref 37–145)
LDL CHOLESTEROL CALCULATED: 61 MG/DL (ref 0–129)
POTASSIUM SERPL-SCNC: 5.7 MEQ/L (ref 3.5–5.1)
SODIUM BLD-SCNC: 142 MEQ/L (ref 132–144)
TOTAL IRON BINDING CAPACITY: 335 UG/DL (ref 178–450)
TOTAL PROTEIN: 6.5 G/DL (ref 6.4–8.1)
TRIGL SERPL-MCNC: 110 MG/DL (ref 0–200)
VITAMIN B-12: 262 PG/ML (ref 211–946)

## 2017-12-28 ENCOUNTER — HOSPITAL ENCOUNTER (OUTPATIENT)
Age: 82
Setting detail: SPECIMEN
Discharge: HOME | End: 2017-12-28
Payer: MEDICARE

## 2017-12-28 ENCOUNTER — OFFICE VISIT (OUTPATIENT)
Dept: INTERNAL MEDICINE | Age: 82
End: 2017-12-28

## 2017-12-28 VITALS
HEIGHT: 63 IN | DIASTOLIC BLOOD PRESSURE: 64 MMHG | WEIGHT: 171 LBS | HEART RATE: 84 BPM | SYSTOLIC BLOOD PRESSURE: 128 MMHG | BODY MASS INDEX: 30.3 KG/M2

## 2017-12-28 DIAGNOSIS — M79.10 MYALGIA: ICD-10-CM

## 2017-12-28 DIAGNOSIS — E87.5 ACUTE HYPERKALEMIA: ICD-10-CM

## 2017-12-28 DIAGNOSIS — E87.5 ACUTE HYPERKALEMIA: Primary | ICD-10-CM

## 2017-12-28 PROCEDURE — 1123F ACP DISCUSS/DSCN MKR DOCD: CPT | Performed by: PHYSICIAN ASSISTANT

## 2017-12-28 PROCEDURE — G8417 CALC BMI ABV UP PARAM F/U: HCPCS | Performed by: PHYSICIAN ASSISTANT

## 2017-12-28 PROCEDURE — G8427 DOCREV CUR MEDS BY ELIG CLIN: HCPCS | Performed by: PHYSICIAN ASSISTANT

## 2017-12-28 PROCEDURE — G8482 FLU IMMUNIZE ORDER/ADMIN: HCPCS | Performed by: PHYSICIAN ASSISTANT

## 2017-12-28 PROCEDURE — 1036F TOBACCO NON-USER: CPT | Performed by: PHYSICIAN ASSISTANT

## 2017-12-28 PROCEDURE — 1090F PRES/ABSN URINE INCON ASSESS: CPT | Performed by: PHYSICIAN ASSISTANT

## 2017-12-28 PROCEDURE — 99213 OFFICE O/P EST LOW 20 MIN: CPT | Performed by: PHYSICIAN ASSISTANT

## 2017-12-28 PROCEDURE — 4040F PNEUMOC VAC/ADMIN/RCVD: CPT | Performed by: PHYSICIAN ASSISTANT

## 2017-12-28 PROCEDURE — 84132 ASSAY OF SERUM POTASSIUM: CPT

## 2017-12-28 NOTE — PROGRESS NOTES
SUBJECTIVE  Mikaela Peterson, 80 y.o. female presents today with:  Chief Complaint   Patient presents with    Fatigue     wonder if it was due to elevated potassium levels     Extremity Weakness     PCP:  Vicky Biggs MD      HPI    Here to f/u on high potassium  Needs repeat potassium today, last 5.7  Pt c/o fatigue and weakness since potassium elevated  Denies any heart symptoms, no cp or palpations     Past Medical History:   Diagnosis Date    Arthritis     Chicken pox     Chronic back pain     Chronic low back pain 8/17/2016    Eczematous dermatitis     History of bladder infections     Hyperlipidemia     Hypertension     Measles     Mumps     Osteoarthritis 5/29/2012    SCC (squamous cell carcinoma), arm 2013    right upper arm, 2015 right forarm    SI (stress incontinence), female 5/29/2012    Type II or unspecified type diabetes mellitus without mention of complication, not stated as uncontrolled     Whooping cough      Past Surgical History:   Procedure Laterality Date    ANKLE SURGERY      broken left ankle.  APPENDECTOMY      BREAST BIOPSY      x2 left breast    CATARACT REMOVAL  2004    bilateral    CYSTOCELE REPAIR      EYE SURGERY      bilateral cataract    HYSTERECTOMY      complete at age 39    [address]      as a child     Social History     Social History    Marital status:      Spouse name: N/A    Number of children: N/A    Years of education: N/A     Occupational History    Not on file. Social History Main Topics    Smoking status: Never Smoker    Smokeless tobacco: Never Used    Alcohol use No    Drug use: No    Sexual activity: Not on file     Other Topics Concern    Not on file     Social History Narrative    No narrative on file     Review of Systems   Constitutional: Positive for fatigue. Negative for appetite change and chills. Cardiovascular: Negative for chest pain, palpitations and leg swelling.    Neurological:

## 2017-12-29 LAB — POTASSIUM SERPL-SCNC: 5.6 MEQ/L (ref 3.5–5.1)

## 2018-01-02 ENCOUNTER — TELEPHONE (OUTPATIENT)
Dept: INTERNAL MEDICINE | Age: 83
End: 2018-01-02

## 2018-01-03 ENCOUNTER — APPOINTMENT (OUTPATIENT)
Dept: CT IMAGING | Age: 83
DRG: 689 | End: 2018-01-03
Payer: MEDICARE

## 2018-01-03 ENCOUNTER — APPOINTMENT (OUTPATIENT)
Dept: GENERAL RADIOLOGY | Age: 83
DRG: 689 | End: 2018-01-03
Payer: MEDICARE

## 2018-01-03 ENCOUNTER — HOSPITAL ENCOUNTER (INPATIENT)
Age: 83
LOS: 6 days | Discharge: NURSING, REHAB OR LONG-TERM CARE | DRG: 689 | End: 2018-01-09
Attending: EMERGENCY MEDICINE | Admitting: INTERNAL MEDICINE
Payer: MEDICARE

## 2018-01-03 DIAGNOSIS — R53.1 GENERAL WEAKNESS: Primary | ICD-10-CM

## 2018-01-03 DIAGNOSIS — N30.00 ACUTE CYSTITIS WITHOUT HEMATURIA: ICD-10-CM

## 2018-01-03 DIAGNOSIS — I10 ESSENTIAL HYPERTENSION: ICD-10-CM

## 2018-01-03 DIAGNOSIS — E86.0 DEHYDRATION: ICD-10-CM

## 2018-01-03 DIAGNOSIS — R26.2 AMBULATORY DYSFUNCTION: ICD-10-CM

## 2018-01-03 PROBLEM — N39.0 UTI (URINARY TRACT INFECTION): Status: ACTIVE | Noted: 2018-01-03

## 2018-01-03 LAB
ALBUMIN SERPL-MCNC: 3.9 G/DL (ref 3.9–4.9)
ALP BLD-CCNC: 49 U/L (ref 40–130)
ALT SERPL-CCNC: 11 U/L (ref 0–33)
ANION GAP SERPL CALCULATED.3IONS-SCNC: 13 MEQ/L (ref 7–13)
AST SERPL-CCNC: 10 U/L (ref 0–35)
BACTERIA: ABNORMAL /HPF
BASOPHILS ABSOLUTE: 0 K/UL (ref 0–0.2)
BASOPHILS RELATIVE PERCENT: 0.3 %
BILIRUB SERPL-MCNC: 0.3 MG/DL (ref 0–1.2)
BILIRUBIN URINE: NEGATIVE
BLOOD, URINE: ABNORMAL
BUN BLDV-MCNC: 33 MG/DL (ref 8–23)
CALCIUM SERPL-MCNC: 12.2 MG/DL (ref 8.6–10.2)
CHLORIDE BLD-SCNC: 98 MEQ/L (ref 98–107)
CLARITY: ABNORMAL
CO2: 27 MEQ/L (ref 22–29)
COLOR: ABNORMAL
CREAT SERPL-MCNC: 0.96 MG/DL (ref 0.5–0.9)
EKG ATRIAL RATE: 60 BPM
EKG P AXIS: 58 DEGREES
EKG P-R INTERVAL: 182 MS
EKG Q-T INTERVAL: 378 MS
EKG QRS DURATION: 90 MS
EKG QTC CALCULATION (BAZETT): 378 MS
EKG R AXIS: 7 DEGREES
EKG T AXIS: 62 DEGREES
EKG VENTRICULAR RATE: 60 BPM
EOSINOPHILS ABSOLUTE: 0.6 K/UL (ref 0–0.7)
EOSINOPHILS RELATIVE PERCENT: 7.8 %
EPITHELIAL CELLS, UA: ABNORMAL /HPF
GFR AFRICAN AMERICAN: >60
GFR NON-AFRICAN AMERICAN: 54.5
GLOBULIN: 2.3 G/DL (ref 2.3–3.5)
GLUCOSE BLD-MCNC: 142 MG/DL (ref 60–115)
GLUCOSE BLD-MCNC: 145 MG/DL (ref 60–115)
GLUCOSE BLD-MCNC: 158 MG/DL (ref 60–115)
GLUCOSE BLD-MCNC: 159 MG/DL (ref 74–109)
GLUCOSE URINE: NEGATIVE MG/DL
HCT VFR BLD CALC: 31.6 % (ref 37–47)
HEMOGLOBIN: 10.8 G/DL (ref 12–16)
INR BLD: 1
KETONES, URINE: NEGATIVE MG/DL
LEUKOCYTE ESTERASE, URINE: ABNORMAL
LYMPHOCYTES ABSOLUTE: 1.8 K/UL (ref 1–4.8)
LYMPHOCYTES RELATIVE PERCENT: 22.9 %
MCH RBC QN AUTO: 32 PG (ref 27–31.3)
MCHC RBC AUTO-ENTMCNC: 34.1 % (ref 33–37)
MCV RBC AUTO: 93.7 FL (ref 82–100)
MONOCYTES ABSOLUTE: 0.5 K/UL (ref 0.2–0.8)
MONOCYTES RELATIVE PERCENT: 6.6 %
NEUTROPHILS ABSOLUTE: 5 K/UL (ref 1.4–6.5)
NEUTROPHILS RELATIVE PERCENT: 62.4 %
NITRITE, URINE: POSITIVE
PDW BLD-RTO: 13.6 % (ref 11.5–14.5)
PERFORMED ON: ABNORMAL
PH UA: 6 (ref 5–9)
PLATELET # BLD: 214 K/UL (ref 130–400)
POTASSIUM SERPL-SCNC: 5 MEQ/L (ref 3.5–5.1)
PROTEIN UA: NEGATIVE MG/DL
PROTHROMBIN TIME: 10.1 SEC (ref 9.6–12.3)
RBC # BLD: 3.38 M/UL (ref 4.2–5.4)
RBC UA: ABNORMAL /HPF (ref 0–2)
SODIUM BLD-SCNC: 138 MEQ/L (ref 132–144)
SPECIFIC GRAVITY UA: 1.01 (ref 1–1.03)
TOTAL CK: 19 U/L (ref 0–170)
TOTAL PROTEIN: 6.2 G/DL (ref 6.4–8.1)
URINE REFLEX TO CULTURE: YES
URINE TYPE: ABNORMAL
UROBILINOGEN, URINE: 0.2 E.U./DL
WBC # BLD: 8 K/UL (ref 4.8–10.8)
WBC UA: ABNORMAL /HPF (ref 0–5)

## 2018-01-03 PROCEDURE — G8987 SELF CARE CURRENT STATUS: HCPCS

## 2018-01-03 PROCEDURE — 85025 COMPLETE CBC W/AUTO DIFF WBC: CPT

## 2018-01-03 PROCEDURE — 81001 URINALYSIS AUTO W/SCOPE: CPT

## 2018-01-03 PROCEDURE — 96375 TX/PRO/DX INJ NEW DRUG ADDON: CPT

## 2018-01-03 PROCEDURE — 87186 SC STD MICRODIL/AGAR DIL: CPT

## 2018-01-03 PROCEDURE — 1210000000 HC MED SURG R&B

## 2018-01-03 PROCEDURE — G8988 SELF CARE GOAL STATUS: HCPCS

## 2018-01-03 PROCEDURE — 2500000003 HC RX 250 WO HCPCS

## 2018-01-03 PROCEDURE — 97535 SELF CARE MNGMENT TRAINING: CPT

## 2018-01-03 PROCEDURE — 97166 OT EVAL MOD COMPLEX 45 MIN: CPT

## 2018-01-03 PROCEDURE — 6360000002 HC RX W HCPCS: Performed by: EMERGENCY MEDICINE

## 2018-01-03 PROCEDURE — 80053 COMPREHEN METABOLIC PANEL: CPT

## 2018-01-03 PROCEDURE — 82550 ASSAY OF CK (CPK): CPT

## 2018-01-03 PROCEDURE — 87086 URINE CULTURE/COLONY COUNT: CPT

## 2018-01-03 PROCEDURE — 84484 ASSAY OF TROPONIN QUANT: CPT

## 2018-01-03 PROCEDURE — 87077 CULTURE AEROBIC IDENTIFY: CPT

## 2018-01-03 PROCEDURE — 96376 TX/PRO/DX INJ SAME DRUG ADON: CPT

## 2018-01-03 PROCEDURE — 6360000002 HC RX W HCPCS: Performed by: INTERNAL MEDICINE

## 2018-01-03 PROCEDURE — 70450 CT HEAD/BRAIN W/O DYE: CPT

## 2018-01-03 PROCEDURE — 2500000003 HC RX 250 WO HCPCS: Performed by: EMERGENCY MEDICINE

## 2018-01-03 PROCEDURE — 36415 COLL VENOUS BLD VENIPUNCTURE: CPT

## 2018-01-03 PROCEDURE — 96372 THER/PROPH/DIAG INJ SC/IM: CPT

## 2018-01-03 PROCEDURE — 6370000000 HC RX 637 (ALT 250 FOR IP): Performed by: INTERNAL MEDICINE

## 2018-01-03 PROCEDURE — 73564 X-RAY EXAM KNEE 4 OR MORE: CPT

## 2018-01-03 PROCEDURE — 85610 PROTHROMBIN TIME: CPT

## 2018-01-03 PROCEDURE — 2580000003 HC RX 258: Performed by: EMERGENCY MEDICINE

## 2018-01-03 PROCEDURE — 97530 THERAPEUTIC ACTIVITIES: CPT

## 2018-01-03 PROCEDURE — 71045 X-RAY EXAM CHEST 1 VIEW: CPT

## 2018-01-03 PROCEDURE — 96374 THER/PROPH/DIAG INJ IV PUSH: CPT

## 2018-01-03 PROCEDURE — 93005 ELECTROCARDIOGRAM TRACING: CPT

## 2018-01-03 PROCEDURE — 99285 EMERGENCY DEPT VISIT HI MDM: CPT

## 2018-01-03 RX ORDER — SODIUM CHLORIDE 0.9 % (FLUSH) 0.9 %
3 SYRINGE (ML) INJECTION EVERY 8 HOURS
Status: DISCONTINUED | OUTPATIENT
Start: 2018-01-03 | End: 2018-01-03

## 2018-01-03 RX ORDER — ACETAMINOPHEN 325 MG/1
650 TABLET ORAL EVERY 4 HOURS PRN
Status: DISCONTINUED | OUTPATIENT
Start: 2018-01-03 | End: 2018-01-09 | Stop reason: HOSPADM

## 2018-01-03 RX ORDER — LABETALOL HYDROCHLORIDE 5 MG/ML
INJECTION, SOLUTION INTRAVENOUS
Status: COMPLETED
Start: 2018-01-03 | End: 2018-01-03

## 2018-01-03 RX ORDER — LISINOPRIL 20 MG/1
20 TABLET ORAL 2 TIMES DAILY
Status: DISCONTINUED | OUTPATIENT
Start: 2018-01-03 | End: 2018-01-04

## 2018-01-03 RX ORDER — SENNA PLUS 8.6 MG/1
1 TABLET ORAL NIGHTLY
Status: DISCONTINUED | OUTPATIENT
Start: 2018-01-03 | End: 2018-01-09 | Stop reason: HOSPADM

## 2018-01-03 RX ORDER — SODIUM CHLORIDE 9 MG/ML
INJECTION, SOLUTION INTRAVENOUS CONTINUOUS
Status: DISCONTINUED | OUTPATIENT
Start: 2018-01-03 | End: 2018-01-03

## 2018-01-03 RX ORDER — LISINOPRIL 20 MG/1
20 TABLET ORAL DAILY
Status: DISCONTINUED | OUTPATIENT
Start: 2018-01-03 | End: 2018-01-03

## 2018-01-03 RX ORDER — LABETALOL HYDROCHLORIDE 5 MG/ML
10 INJECTION, SOLUTION INTRAVENOUS ONCE
Status: COMPLETED | OUTPATIENT
Start: 2018-01-03 | End: 2018-01-03

## 2018-01-03 RX ORDER — DEXTROSE MONOHYDRATE 25 G/50ML
12.5 INJECTION, SOLUTION INTRAVENOUS PRN
Status: DISCONTINUED | OUTPATIENT
Start: 2018-01-03 | End: 2018-01-09 | Stop reason: HOSPADM

## 2018-01-03 RX ORDER — DOCUSATE SODIUM 100 MG/1
100 CAPSULE, LIQUID FILLED ORAL 2 TIMES DAILY
Status: DISCONTINUED | OUTPATIENT
Start: 2018-01-03 | End: 2018-01-09 | Stop reason: HOSPADM

## 2018-01-03 RX ORDER — DEXTROSE MONOHYDRATE 50 MG/ML
100 INJECTION, SOLUTION INTRAVENOUS PRN
Status: DISCONTINUED | OUTPATIENT
Start: 2018-01-03 | End: 2018-01-09 | Stop reason: HOSPADM

## 2018-01-03 RX ORDER — POLYETHYLENE GLYCOL 3350 17 G/17G
17 POWDER, FOR SOLUTION ORAL DAILY
Status: DISCONTINUED | OUTPATIENT
Start: 2018-01-03 | End: 2018-01-09 | Stop reason: HOSPADM

## 2018-01-03 RX ORDER — SODIUM CHLORIDE 0.9 % (FLUSH) 0.9 %
10 SYRINGE (ML) INJECTION PRN
Status: DISCONTINUED | OUTPATIENT
Start: 2018-01-03 | End: 2018-01-09 | Stop reason: HOSPADM

## 2018-01-03 RX ORDER — SODIUM CHLORIDE 0.9 % (FLUSH) 0.9 %
10 SYRINGE (ML) INJECTION EVERY 12 HOURS SCHEDULED
Status: DISCONTINUED | OUTPATIENT
Start: 2018-01-03 | End: 2018-01-09 | Stop reason: HOSPADM

## 2018-01-03 RX ORDER — ASPIRIN 81 MG/1
81 TABLET ORAL DAILY
Status: DISCONTINUED | OUTPATIENT
Start: 2018-01-03 | End: 2018-01-09 | Stop reason: HOSPADM

## 2018-01-03 RX ORDER — TRIAMTERENE AND HYDROCHLOROTHIAZIDE 37.5; 25 MG/1; MG/1
1 TABLET ORAL DAILY
Status: ON HOLD | COMMUNITY
End: 2018-02-07 | Stop reason: HOSPADM

## 2018-01-03 RX ORDER — ATORVASTATIN CALCIUM 10 MG/1
10 TABLET, FILM COATED ORAL DAILY
Status: DISCONTINUED | OUTPATIENT
Start: 2018-01-03 | End: 2018-01-09 | Stop reason: HOSPADM

## 2018-01-03 RX ORDER — TRIAMTERENE AND HYDROCHLOROTHIAZIDE 37.5; 25 MG/1; MG/1
1 TABLET ORAL DAILY
Status: DISCONTINUED | OUTPATIENT
Start: 2018-01-03 | End: 2018-01-07

## 2018-01-03 RX ORDER — ONDANSETRON 2 MG/ML
4 INJECTION INTRAMUSCULAR; INTRAVENOUS EVERY 6 HOURS PRN
Status: DISCONTINUED | OUTPATIENT
Start: 2018-01-03 | End: 2018-01-09 | Stop reason: HOSPADM

## 2018-01-03 RX ORDER — NICOTINE POLACRILEX 4 MG
15 LOZENGE BUCCAL PRN
Status: DISCONTINUED | OUTPATIENT
Start: 2018-01-03 | End: 2018-01-09 | Stop reason: HOSPADM

## 2018-01-03 RX ORDER — BISACODYL 10 MG
10 SUPPOSITORY, RECTAL RECTAL DAILY PRN
Status: DISCONTINUED | OUTPATIENT
Start: 2018-01-03 | End: 2018-01-09 | Stop reason: HOSPADM

## 2018-01-03 RX ADMIN — SODIUM CHLORIDE: 9 INJECTION, SOLUTION INTRAVENOUS at 08:56

## 2018-01-03 RX ADMIN — ENOXAPARIN SODIUM 40 MG: 100 INJECTION SUBCUTANEOUS at 10:50

## 2018-01-03 RX ADMIN — SODIUM CHLORIDE: 9 INJECTION, SOLUTION INTRAVENOUS at 10:00

## 2018-01-03 RX ADMIN — ONDANSETRON 4 MG: 2 INJECTION INTRAMUSCULAR; INTRAVENOUS at 16:16

## 2018-01-03 RX ADMIN — DOCUSATE SODIUM 100 MG: 100 CAPSULE, LIQUID FILLED ORAL at 21:40

## 2018-01-03 RX ADMIN — ACETAMINOPHEN 650 MG: 325 TABLET ORAL at 16:03

## 2018-01-03 RX ADMIN — LABETALOL HYDROCHLORIDE 10 MG: 5 INJECTION, SOLUTION INTRAVENOUS at 07:51

## 2018-01-03 RX ADMIN — WATER 1 G: 1 INJECTION INTRAMUSCULAR; INTRAVENOUS; SUBCUTANEOUS at 08:47

## 2018-01-03 RX ADMIN — Medication 3 ML: at 07:53

## 2018-01-03 RX ADMIN — ATORVASTATIN CALCIUM 10 MG: 10 TABLET, FILM COATED ORAL at 21:40

## 2018-01-03 RX ADMIN — LABETALOL HYDROCHLORIDE 5 MG: 5 INJECTION, SOLUTION INTRAVENOUS at 09:21

## 2018-01-03 RX ADMIN — LISINOPRIL 20 MG: 20 TABLET ORAL at 21:40

## 2018-01-03 RX ADMIN — METOPROLOL TARTRATE 25 MG: 25 TABLET ORAL at 21:40

## 2018-01-03 RX ADMIN — DOCUSATE SODIUM 100 MG: 100 CAPSULE, LIQUID FILLED ORAL at 10:50

## 2018-01-03 RX ADMIN — SENNOSIDES 8.6 MG: 8.6 TABLET, FILM COATED ORAL at 21:40

## 2018-01-03 RX ADMIN — METFORMIN HYDROCHLORIDE 1000 MG: 500 TABLET, FILM COATED ORAL at 16:03

## 2018-01-03 ASSESSMENT — ENCOUNTER SYMPTOMS
SORE THROAT: 0
FACIAL SWELLING: 0
TROUBLE SWALLOWING: 0
STRIDOR: 0
CONSTIPATION: 0
CHOKING: 0
SINUS PRESSURE: 0
ABDOMINAL PAIN: 0
EYE REDNESS: 0
COUGH: 0
WHEEZING: 0
CHEST TIGHTNESS: 0
EYE PAIN: 0
VOICE CHANGE: 0
BLOOD IN STOOL: 0
EYE DISCHARGE: 0
SHORTNESS OF BREATH: 0
DIARRHEA: 0
BACK PAIN: 0
VOMITING: 0

## 2018-01-03 ASSESSMENT — PAIN DESCRIPTION - PAIN TYPE
TYPE: ACUTE PAIN

## 2018-01-03 ASSESSMENT — PAIN DESCRIPTION - ORIENTATION
ORIENTATION: RIGHT;LEFT
ORIENTATION: LOWER
ORIENTATION: RIGHT;LEFT

## 2018-01-03 ASSESSMENT — PAIN SCALES - GENERAL
PAINLEVEL_OUTOF10: 0
PAINLEVEL_OUTOF10: 8
PAINLEVEL_OUTOF10: 0
PAINLEVEL_OUTOF10: 10
PAINLEVEL_OUTOF10: 2
PAINLEVEL_OUTOF10: 2

## 2018-01-03 ASSESSMENT — PAIN DESCRIPTION - LOCATION
LOCATION: KNEE
LOCATION: KNEE
LOCATION: SHOULDER;BACK

## 2018-01-03 ASSESSMENT — PAIN DESCRIPTION - ONSET
ONSET: GRADUAL
ONSET: GRADUAL

## 2018-01-03 NOTE — H&P
distress, appears stated age and cooperative. HEENT:  Normal cephalic, atraumatic without obvious deformity. Pupils equal, round, and reactive to light. Extra ocular muscles intact. Conjunctivae/corneas clear. Neck: Supple, with full range of motion. No jugular venous distention. Trachea midline. Respiratory:  Normal respiratory effort. Clear to auscultation, bilaterally without Rales/Wheezes/Rhonchi. Cardiovascular:  Regular rate and rhythm with normal S1/S2 without murmurs, rubs or gallops. Abdomen: Soft, non-tender, non-distended with normal bowel sounds. Musculoskeletal:  trace edema bilaterally. Full range of motion without deformity. Skin: Skin color, texture, turgor normal.  No rashes or lesions. Neurologic: resting tremor in upper extremities. Cranial nerves: II-XII intact, grossly non-focal.  Psychiatric:partially  Alert and oriented,   Capillary Refill: Brisk,< 3 seconds   Peripheral Pulses: +2 palpable, equal bilaterally       Labs:     Recent Labs      01/03/18   0737   WBC  8.0   HGB  10.8*   HCT  31.6*   PLT  214     Recent Labs      01/03/18   0737   NA  138   K  5.0   CL  98   CO2  27   BUN  33*   CREATININE  0.96*   CALCIUM  12.2*     Recent Labs      01/03/18   0737   AST  10   ALT  11   BILITOT  0.3   ALKPHOS  49     Recent Labs      01/03/18   0737   INR  1.0     Recent Labs      01/03/18   0737   CKTOTAL  19       Urinalysis:      Lab Results   Component Value Date    NITRU POSITIVE 01/03/2018    WBCUA 10-20 01/03/2018    BACTERIA Moderate 01/03/2018    RBCUA 3-5 01/03/2018    BLOODU TRACE 01/03/2018    SPECGRAV 1.015 01/03/2018    GLUCOSEU Negative 01/03/2018           XR CHEST PORTABLE   Final Result   Impression: No acute process         XR KNEE LEFT (MIN 4 VIEWS)   Final Result   No definitive fracture is seen bilaterally in the knees. A lucency seen on the AP projection in the superior pole of the tibia on the left could represent spurring.  There are mild degenerative changes and evidence of CPPD. Loose bodies in the    right knee are not excluded. XR KNEE RIGHT (MIN 4 VIEWS)   Final Result   No definitive fracture is seen bilaterally in the knees. A lucency seen on the AP projection in the superior pole of the tibia on the left could represent spurring. There are mild degenerative changes and evidence of CPPD. Loose bodies in the    right knee are not excluded. CT Head WO Contrast   Final Result   1. Age-appropriate cortical atrophy and periventricular microangiopathy. 2. Otherwise unremarkable unenhanced CT brain with no acute hemorrhage or extra-axial fluid collection      All CT scans at this facility use dose modulation, iterative reconstruction, and/or weight based dosing when appropriate to reduce radiation dose to as low as reasonably achievable. ASSESSMENT:    Active Hospital Problems    Diagnosis Date Noted    HTN (hypertension) [I10] 11/29/2011     Priority: High    Diabetes mellitus [E11.9] 11/29/2011     Priority: High    UTI (urinary tract infection) [N39.0] 01/03/2018       PLAN:        DVT Prophylaxis: lovenox  Diet: DIET CARB CONTROL;  Code Status: Full Code    PT/OT Eval Status: pending    Dispo - Mechanical fall, weakness, resting tremor- neurology evaluation pending, PT ordered, supportive care. No focal neurological deficit able to identified  UTI- atbs, UC obtained, report pending  HTN- not well controlled, increased lisinopril, follow up clinically  Hyperkalemia- recheck K+ in AM, if persist- may needs to DC ACE inhibitors  DM- restart home meds, ISS, diet, follow up clinically  Stable for admission at [facility], will follow along with consultants        Lorraine Ram MD    Thank you Gualberto Roldan MD for the opportunity to be involved in this patient's care. If you have any questions or concerns please feel free to contact me.

## 2018-01-03 NOTE — ED PROVIDER NOTES
COMPRESSION STOCKINGS MISC    by Does not apply route Knee high, 20-30 mmHg    FLUTICASONE (FLONASE) 50 MCG/ACT NASAL SPRAY    instill 2 sprays into each nostril once daily    GLUCOSE BLOOD (BLOOD GLUCOSE TEST STRIPS) STRP    Test 2-3 x per day  Dx: E11.9    INCONTINENCE SUPPLY DISPOSABLE (DEPEND UNDERGARMENTS) MISC    1 each by Does not apply route nightly    LISINOPRIL (PRINIVIL;ZESTRIL) 20 MG TABLET    TAKE 1 TABLET DAILY    METFORMIN (GLUCOPHAGE) 1000 MG TABLET    Take 1 tablet by mouth 2 times daily (with meals)    METOPROLOL TARTRATE (LOPRESSOR) 25 MG TABLET    Take 1 tablet by mouth 2 times daily    OMEGA-3 FATTY ACIDS (FISH OIL) 1200 MG CAPS    Take  by mouth daily. TRIAMTERENE-HYDROCHLOROTHIAZIDE (MAXZIDE-25) 37.5-25 MG PER TABLET    Take 1 tablet by mouth daily    TRUEPLUS LANCETS 28G MISC    TEST TWO TIMES DAILY    VITAMIN D (CHOLECALCIFEROL) 1000 UNITS CAPS CAPSULE    Take 1,000 Units by mouth daily. ALLERGIES     Metoclopramide; Pantoprazole; Pcn [penicillins]; Protonix [pantoprazole sodium]; and Tramadol    FAMILY HISTORY       Family History   Problem Relation Age of Onset    Diabetes Mother     Heart Disease Father           SOCIAL HISTORY       Social History     Social History    Marital status:      Spouse name: N/A    Number of children: N/A    Years of education: N/A     Social History Main Topics    Smoking status: Never Smoker    Smokeless tobacco: Never Used    Alcohol use No    Drug use: No    Sexual activity: Not Asked     Other Topics Concern    None     Social History Narrative    None       SCREENINGS   NIH Stroke Scale  NIH Stroke Scale Assessed: Yes  Interval: Baseline  Level of Consciousness (1a. ): Alert  LOC Questions (1b. ):  Answers both correctly  LOC Commands (1c. ): Obeys both correctly  Best Gaze (2. ): Normal  Visual (3. ): No visual loss  Facial Palsy (4. ): Normal  Motor Arm, Left (5a. ): No drift  Motor Arm, Right (5b. ): No drift  Motor Leg, Left (6a. ): No drift  Motor Leg, Right (6b. ): No drift  Limb Ataxia (7. ): Absent  Sensory (8. ): Normal  Best Language (9. ): No aphasia  Dysarthria (10. ): Normal  Extinction and Inattention (11): No neglect  Total: 0Glasgow Coma Scale  Eye Opening: Spontaneous  Best Verbal Response: Oriented  Best Motor Response: Obeys commands  Freeland Coma Scale Score: 15        PHYSICAL EXAM    (up to 7 for level 4, 8 or more for level 5)     ED Triage Vitals [01/03/18 0724]   BP Temp Temp src Pulse Resp SpO2 Height Weight   -- -- -- -- -- -- 5' 3\" (1.6 m) 170 lb (77.1 kg)       Physical Exam   Constitutional: She is oriented to person, place, and time. She appears well-developed. HENT:   Head: Normocephalic. Neck: Neck supple. Cardiovascular: Normal rate and normal heart sounds. Exam reveals no gallop. No murmur heard. Pulmonary/Chest: Breath sounds normal. No respiratory distress. She has no wheezes. Abdominal: Soft. Bowel sounds are normal. She exhibits no mass. There is no rebound. Musculoskeletal: Normal range of motion. She exhibits no edema or tenderness. Neurological: She is alert and oriented to person, place, and time. No cranial nerve deficit. She exhibits normal muscle tone. Skin: Skin is warm. No rash noted. No erythema. Psychiatric: Her behavior is normal. Thought content normal.       DIAGNOSTIC RESULTS     EKG: All EKG's are interpreted by the Emergency Department Physician who either signs or Co-signs this chart in the absence of a cardiologist.        RADIOLOGY:   Non-plain film images such as CT, Ultrasound and MRI are read by the radiologist. Plain radiographic images are visualized and preliminarily interpreted by the emergency physician with the below findings:      Interpretation per the Radiologist below, if available at the time of this note:    CT Head WO Contrast   Final Result   1. Age-appropriate cortical atrophy and periventricular microangiopathy.       2. Otherwise unremarkable unenhanced CT brain with no acute hemorrhage or extra-axial fluid collection      All CT scans at this facility use dose modulation, iterative reconstruction, and/or weight based dosing when appropriate to reduce radiation dose to as low as reasonably achievable. XR CHEST PORTABLE    (Results Pending)   XR KNEE LEFT (3 VIEWS)    (Results Pending)   XR KNEE RIGHT (3 VIEWS)    (Results Pending)         ED BEDSIDE ULTRASOUND:   Performed by ED Physician - none    LABS:  Labs Reviewed   COMPREHENSIVE METABOLIC PANEL - Abnormal; Notable for the following:        Result Value    Glucose 159 (*)     BUN 33 (*)     CREATININE 0.96 (*)     GFR Non- 54.5 (*)     Calcium 12.2 (*)     Total Protein 6.2 (*)     All other components within normal limits   URINE RT REFLEX TO CULTURE - Abnormal; Notable for the following:     Clarity, UA SL CLOUDY (*)     Blood, Urine TRACE (*)     Nitrite, Urine POSITIVE (*)     Leukocyte Esterase, Urine LARGE (*)     All other components within normal limits   CBC WITH AUTO DIFFERENTIAL - Abnormal; Notable for the following:     RBC 3.38 (*)     Hemoglobin 10.8 (*)     Hematocrit 31.6 (*)     MCH 32.0 (*)     All other components within normal limits   MICROSCOPIC URINALYSIS - Abnormal; Notable for the following:     WBC, UA 10-20 (*)     RBC, UA 3-5 (*)     All other components within normal limits   URINE CULTURE   CK   PROTIME-INR   TROPONIN       All other labs were within normal range or not returned as of this dictation.     EMERGENCY DEPARTMENT COURSE and DIFFERENTIAL DIAGNOSIS/MDM:   Vitals:    Vitals:    01/03/18 0724 01/03/18 0733 01/03/18 0800 01/03/18 0801   BP:  (!) 223/95  (!) 181/78   Pulse:  61     Resp:  18 18    Temp:  98.1 °F (36.7 °C)     TempSrc:  Oral     SpO2:  97%     Weight: 170 lb (77.1 kg)      Height: 5' 3\" (1.6 m)          MDM  Number of Diagnoses or Management Options  Acute cystitis without hematuria:   Ambulatory dysfunction: signed)  Attending Emergency Physician       Elijah Brady MD  01/03/18 6399       Elijah Brady MD  01/05/18 4460

## 2018-01-03 NOTE — PROGRESS NOTES
Occupational Therapy   Occupational Therapy Initial Assessment  Date: 1/3/2018   Patient Name: Mariaelena Hudson  MRN: [id number]     : 6/10/1927    Patient Diagnosis(es): The primary encounter diagnosis was General weakness. Diagnoses of Dehydration, Acute cystitis without hematuria, Ambulatory dysfunction, and Essential hypertension were also pertinent to this visit. has a past medical history of Arthritis; Chicken pox; Chronic back pain; Chronic low back pain; Eczematous dermatitis; History of bladder infections; Hyperlipidemia; Hypertension; Measles; Mumps; Osteoarthritis; SCC (squamous cell carcinoma), arm; SI (stress incontinence), female; Type II or unspecified type diabetes mellitus without mention of complication, not stated as uncontrolled; and Whooping cough. has a past surgical history that includes Appendectomy; Rectocele repair; Cystocele repair; Ankle surgery; Breast biopsy; Cataract removal (); eye surgery; Tonsillectomy; and Hysterectomy. Restrictions  Restrictions/Precautions  Restrictions/Precautions: Fall Risk  Required Braces or Orthoses?: No  Position Activity Restriction  Other position/activity restrictions:  Montero    Subjective   General  Chart Reviewed: Yes  Patient assessed for rehabilitation services?: Yes  Family / Caregiver Present: Yes (son)  Referring Practitioner: Dr. Rashi Lacy  Diagnosis: General weakness (primary), dehydration, acute cystitis with hematuria  Subjective  Subjective: \"I set this water here so I drink more\"  General Comment  Comments: Pt supine with HOB elevated, finished with lunch, agreeable to therapy/eval  Pain Assessment  Patient Currently in Pain: Yes  Pain Assessment: 0-10  Pain Level: 8  Pain Type: Acute pain  Pain Location: Shoulder, Back (both shoulders )  Pain Orientation: Lower (both shoulders, lower back)     Social/Functional History  Social/Functional History  Lives With: Son (son lives upstairs, pt lives on main level)  Type of Home:

## 2018-01-03 NOTE — PROGRESS NOTES
Chart reviewed. Patient is from home with son, who resides upstairs. Per OT note, patient has needed increased assistance with ADLs for several weeks prior to hospitalization. SS will follow with patient and family after PT completes assessment and recommendation received.

## 2018-01-03 NOTE — ED TRIAGE NOTES
Patient to room #9 via EMS for c/o fatigue, unbalanced and slight confusion. Patient A&Ox4, heart sounds strong and regular, lung sounds clear, BSx4, LUQ and LLQ are noted to have hypoactive sounds, +2 generalized edema is noted. Son at bedside.

## 2018-01-04 ENCOUNTER — HOSPITAL ENCOUNTER (OUTPATIENT)
Dept: MRI IMAGING | Age: 83
Discharge: HOME | End: 2018-01-04
Payer: MEDICARE

## 2018-01-04 LAB
AMMONIA: 37 UMOL/L (ref 11–51)
ANION GAP SERPL CALCULATED.3IONS-SCNC: 16 MEQ/L (ref 7–13)
BUN BLDV-MCNC: 34 MG/DL (ref 8–23)
C-REACTIVE PROTEIN, HIGH SENSITIVITY: 3.2 MG/L (ref 0–5)
CALCIUM SERPL-MCNC: 11.9 MG/DL (ref 8.6–10.2)
CHLORIDE BLD-SCNC: 97 MEQ/L (ref 98–107)
CO2: 23 MEQ/L (ref 22–29)
CREAT SERPL-MCNC: 1.1 MG/DL (ref 0.5–0.9)
FOLATE: 8.7 NG/ML (ref 7.3–26.1)
GFR AFRICAN AMERICAN: 56.4
GFR NON-AFRICAN AMERICAN: 46.6
GLUCOSE BLD-MCNC: 127 MG/DL (ref 60–115)
GLUCOSE BLD-MCNC: 134 MG/DL (ref 60–115)
GLUCOSE BLD-MCNC: 152 MG/DL (ref 60–115)
GLUCOSE BLD-MCNC: 178 MG/DL (ref 60–115)
GLUCOSE BLD-MCNC: 192 MG/DL (ref 74–109)
HCT VFR BLD CALC: 33.4 % (ref 37–47)
HEMOGLOBIN: 11 G/DL (ref 12–16)
MCH RBC QN AUTO: 30.8 PG (ref 27–31.3)
MCHC RBC AUTO-ENTMCNC: 32.8 % (ref 33–37)
MCV RBC AUTO: 93.7 FL (ref 82–100)
PDW BLD-RTO: 13.7 % (ref 11.5–14.5)
PERFORMED ON: ABNORMAL
PLATELET # BLD: 226 K/UL (ref 130–400)
POTASSIUM SERPL-SCNC: 4.8 MEQ/L (ref 3.5–5.1)
RBC # BLD: 3.56 M/UL (ref 4.2–5.4)
SEDIMENTATION RATE, ERYTHROCYTE: 16 MM (ref 0–30)
SODIUM BLD-SCNC: 136 MEQ/L (ref 132–144)
T4 FREE: 1.41 NG/DL (ref 0.93–1.7)
VITAMIN B-12: 188 PG/ML (ref 211–946)
WBC # BLD: 9.9 K/UL (ref 4.8–10.8)

## 2018-01-04 PROCEDURE — 1210000000 HC MED SURG R&B

## 2018-01-04 PROCEDURE — G8979 MOBILITY GOAL STATUS: HCPCS

## 2018-01-04 PROCEDURE — 87493 C DIFF AMPLIFIED PROBE: CPT

## 2018-01-04 PROCEDURE — 86141 C-REACTIVE PROTEIN HS: CPT

## 2018-01-04 PROCEDURE — 85652 RBC SED RATE AUTOMATED: CPT

## 2018-01-04 PROCEDURE — 82140 ASSAY OF AMMONIA: CPT

## 2018-01-04 PROCEDURE — 96372 THER/PROPH/DIAG INJ SC/IM: CPT

## 2018-01-04 PROCEDURE — 84439 ASSAY OF FREE THYROXINE: CPT

## 2018-01-04 PROCEDURE — 97530 THERAPEUTIC ACTIVITIES: CPT

## 2018-01-04 PROCEDURE — 82607 VITAMIN B-12: CPT

## 2018-01-04 PROCEDURE — 36415 COLL VENOUS BLD VENIPUNCTURE: CPT

## 2018-01-04 PROCEDURE — 86618 LYME DISEASE ANTIBODY: CPT

## 2018-01-04 PROCEDURE — 97110 THERAPEUTIC EXERCISES: CPT

## 2018-01-04 PROCEDURE — 70551 MRI BRAIN STEM W/O DYE: CPT

## 2018-01-04 PROCEDURE — 2580000003 HC RX 258: Performed by: INTERNAL MEDICINE

## 2018-01-04 PROCEDURE — 97162 PT EVAL MOD COMPLEX 30 MIN: CPT

## 2018-01-04 PROCEDURE — 6370000000 HC RX 637 (ALT 250 FOR IP): Performed by: INTERNAL MEDICINE

## 2018-01-04 PROCEDURE — G8978 MOBILITY CURRENT STATUS: HCPCS

## 2018-01-04 PROCEDURE — 80048 BASIC METABOLIC PNL TOTAL CA: CPT

## 2018-01-04 PROCEDURE — 82746 ASSAY OF FOLIC ACID SERUM: CPT

## 2018-01-04 PROCEDURE — 85027 COMPLETE CBC AUTOMATED: CPT

## 2018-01-04 PROCEDURE — 97116 GAIT TRAINING THERAPY: CPT

## 2018-01-04 PROCEDURE — 83921 ORGANIC ACID SINGLE QUANT: CPT

## 2018-01-04 PROCEDURE — 6360000002 HC RX W HCPCS: Performed by: INTERNAL MEDICINE

## 2018-01-04 PROCEDURE — 96376 TX/PRO/DX INJ SAME DRUG ADON: CPT

## 2018-01-04 RX ORDER — AMLODIPINE BESYLATE 5 MG/1
5 TABLET ORAL DAILY
Status: DISCONTINUED | OUTPATIENT
Start: 2018-01-04 | End: 2018-01-06

## 2018-01-04 RX ORDER — METRONIDAZOLE 500 MG/1
500 TABLET ORAL EVERY 6 HOURS SCHEDULED
Status: DISCONTINUED | OUTPATIENT
Start: 2018-01-04 | End: 2018-01-06

## 2018-01-04 RX ORDER — LISINOPRIL 20 MG/1
40 TABLET ORAL 2 TIMES DAILY
Status: DISCONTINUED | OUTPATIENT
Start: 2018-01-04 | End: 2018-01-09 | Stop reason: HOSPADM

## 2018-01-04 RX ADMIN — METFORMIN HYDROCHLORIDE 1000 MG: 500 TABLET, FILM COATED ORAL at 18:35

## 2018-01-04 RX ADMIN — METOPROLOL TARTRATE 25 MG: 25 TABLET ORAL at 20:41

## 2018-01-04 RX ADMIN — Medication 10 ML: at 09:22

## 2018-01-04 RX ADMIN — METFORMIN HYDROCHLORIDE 1000 MG: 500 TABLET, FILM COATED ORAL at 09:11

## 2018-01-04 RX ADMIN — ACETAMINOPHEN 650 MG: 325 TABLET ORAL at 06:51

## 2018-01-04 RX ADMIN — METRONIDAZOLE 500 MG: 500 TABLET ORAL at 15:11

## 2018-01-04 RX ADMIN — METRONIDAZOLE 500 MG: 500 TABLET ORAL at 18:46

## 2018-01-04 RX ADMIN — METFORMIN HYDROCHLORIDE 1000 MG: 500 TABLET, FILM COATED ORAL at 06:52

## 2018-01-04 RX ADMIN — TRIAMTERENE AND HYDROCHLOROTHIAZIDE 1 TABLET: 37.5; 25 TABLET ORAL at 09:11

## 2018-01-04 RX ADMIN — AMLODIPINE BESYLATE 5 MG: 5 TABLET ORAL at 15:11

## 2018-01-04 RX ADMIN — ENOXAPARIN SODIUM 40 MG: 100 INJECTION SUBCUTANEOUS at 09:04

## 2018-01-04 RX ADMIN — LISINOPRIL 40 MG: 20 TABLET ORAL at 20:40

## 2018-01-04 RX ADMIN — WATER 1 G: 1 INJECTION INTRAMUSCULAR; INTRAVENOUS; SUBCUTANEOUS at 00:12

## 2018-01-04 RX ADMIN — DOCUSATE SODIUM 100 MG: 100 CAPSULE, LIQUID FILLED ORAL at 20:41

## 2018-01-04 RX ADMIN — METOPROLOL TARTRATE 25 MG: 25 TABLET ORAL at 09:10

## 2018-01-04 RX ADMIN — VITAMIN D, TAB 1000IU (100/BT) 1000 UNITS: 25 TAB at 09:11

## 2018-01-04 RX ADMIN — ONDANSETRON 4 MG: 2 INJECTION INTRAMUSCULAR; INTRAVENOUS at 09:05

## 2018-01-04 RX ADMIN — Medication 10 ML: at 20:43

## 2018-01-04 RX ADMIN — Medication 10 ML: at 00:13

## 2018-01-04 RX ADMIN — SENNOSIDES 8.6 MG: 8.6 TABLET, FILM COATED ORAL at 20:40

## 2018-01-04 RX ADMIN — ATORVASTATIN CALCIUM 10 MG: 10 TABLET, FILM COATED ORAL at 20:40

## 2018-01-04 RX ADMIN — LISINOPRIL 40 MG: 20 TABLET ORAL at 09:11

## 2018-01-04 RX ADMIN — ASPIRIN 81 MG: 81 TABLET, COATED ORAL at 09:12

## 2018-01-04 ASSESSMENT — PAIN SCALES - GENERAL
PAINLEVEL_OUTOF10: 5
PAINLEVEL_OUTOF10: 9
PAINLEVEL_OUTOF10: 9
PAINLEVEL_OUTOF10: 4

## 2018-01-04 ASSESSMENT — PAIN DESCRIPTION - LOCATION
LOCATION: BACK;KNEE
LOCATION: BACK;KNEE;ARM

## 2018-01-04 ASSESSMENT — PAIN DESCRIPTION - PAIN TYPE
TYPE: ACUTE PAIN
TYPE: ACUTE PAIN

## 2018-01-04 NOTE — PROGRESS NOTES
of Home: House  Home Layout: Two level  Home Access: Stairs to enter with rails, Stairs to enter without rails  Entrance Stairs - Number of Steps:  (1 to porch and 1 to kitchen)  Entrance Stairs - Rails: Left (if 2+1  step entrance used)  Bathroom Shower/Tub: Walk-in shower, Shower chair with back  Bathroom Toilet: Standard  Bathroom Equipment: Grab bars in shower, Shower chair  Bathroom Accessibility: Walker accessible  Home Equipment: 4 wheeled walker, Reacher, [employer] Help From: Family (2 sons and dtr, family bought Rollator last Saturday)  ADL Assistance: Independent  Bath: Minimal assistance  Toileting: Needs assistance  Homemaking Responsibilities: Yes  Active : No  Occupation: Retired  Leisure & Hobbies: Senior puzzles   Additional Comments: son reports pt began needing increased assist with all ADL/fxl mob x ~1-2 wk prior to hospitalization 2* increased weakness  Objective     Observation/Palpation  Observation: Iv right UE, luna    AROM RLE (degrees)  RLE AROM: WFL  AROM LLE (degrees)  LLE AROM : WFL  AROM RUE (degrees)  RUE General AROM: see OT eval  AROM LUE (degrees)  LUE General AROM: see OT eval  Strength RLE  Comment: hip grossly 4-/5, knee 4-/5, and ankle 4/5  Strength LLE  Comment: hip grossly 4-/5, knee 4-/5, and ankle 4/5        Bed mobility  Sit to Supine: Moderate assistance (HOB at 30 deg)  Comment: retropelling in sitting with Mod A initiallly then SBA for static sit  Transfers  Sit to Stand: Minimal Assistance; Moderate Assistance (2 person)  Stand to sit: Minimal Assistance; Moderate Assistance (2 person)  Bed to Chair: Moderate assistance (2 person with max vc for safety)  Ambulation  Ambulation?: Yes  Ambulation 1  Surface: level tile  Device: Rolling Walker  Assistance:  Moderate assistance;Minimal assistance (2 person )  Quality of Gait: small JUAN, small steps decreased lift for swing phase  Distance:  3 ft  Comments: pt tries to sit too quickly , not completley turnted to chair and not reaching back for armrests, reports feels weak  Stairs/Curb  Stairs? :  (will test when appropriate)   second walk 16ft x2 with Min A x 2 person, fatigues quickly needs vc for safety getting back to chair sits quickly  Balance  Sitting - Static: Fair  Sitting - Dynamic: Fair;-  Standing - Static: Poor;+  Standing - Dynamic: Poor        Assessment   Body structures, Functions, Activity limitations: Decreased functional mobility ; Decreased strength;Decreased endurance;Decreased balance  Assessment: 89yof with generalized weakness and decreasedoverall functinal independnece, needing 2 person assist for transfers and mobility, recommend DEISY or SNF at discharge  Treatment Diagnosis: difficulty walking, LE weakness  Prognosis: Good  Patient Education: educated goals , treatment plan, and subacute/SNF recommendation  REQUIRES PT FOLLOW UP: Yes  Activity Tolerance  Activity Tolerance: Patient limited by fatigue;Patient limited by endurance  PT Equipment Recommendations  Other: TBD     Discharge Recommendations:         Plan   Plan  Times per week:  (5-7x/week)  Times per day:  (1-2x/day)  Current Treatment Recommendations: Strengthening, Gait Training, Endurance Training, Transfer Training, Home Exercise Program, Patient/Caregiver Education & Training, Safety Education & Training, Equipment Evaluation, Education, & procurement    G-Code  PT G-Codes  Functional Assessment Tool Used: professional judgement  Functional Limitation: Mobility: Walking and moving around  Mobility: Walking and Moving Around Current Status (): At least 80 percent but less than 100 percent impaired, limited or restricted  Mobility: Walking and Moving Around Goal Status ():  At least 60 percent but less than 80 percent impaired, limited or restricted    Goals  Short term goals  Time Frame for Short term goals: 3-5 days  Short term goal 1: sit to stand with Min A of 1  Short term goal 2: Ambulate >=50 ft with ww and Min A of

## 2018-01-04 NOTE — PROGRESS NOTES
Occupational Therapy  Facility/Department: [facility] SURG UNIT  Daily Treatment Note  NAME: Mikhail Rayo  : 6/10/1927  MRN: [id number]    Date of Service: 2018    Patient Diagnosis(es): The primary encounter diagnosis was General weakness. Diagnoses of Dehydration, Acute cystitis without hematuria, Ambulatory dysfunction, and Essential hypertension were also pertinent to this visit. has a past medical history of Arthritis; Chicken pox; Chronic back pain; Chronic low back pain; Eczematous dermatitis; History of bladder infections; Hyperlipidemia; Hypertension; Measles; Mumps; Osteoarthritis; SCC (squamous cell carcinoma), arm; SI (stress incontinence), female; Type II or unspecified type diabetes mellitus without mention of complication, not stated as uncontrolled; and Whooping cough. has a past surgical history that includes Appendectomy; Rectocele repair; Cystocele repair; Ankle surgery; Breast biopsy; Cataract removal (); eye surgery; Tonsillectomy; and Hysterectomy. Restrictions  Restrictions/Precautions  Restrictions/Precautions: Fall Risk  Required Braces or Orthoses?: No  Position Activity Restriction  Other position/activity restrictions:  Montero, being tested currently for C-diff possibilty  Subjective   General  Chart Reviewed: Yes  Patient assessed for rehabilitation services?: Yes  Family / Caregiver Present: Yes (son)  Referring Practitioner: Dr. America Harris  Diagnosis: General weakness (primary), dehydration, acute cystitis with hematuria  Subjective  Subjective: \"I'm just weak\"  General Comment  Comments: Pt supine with HOB elevated, finished with lunch, agreeable to therapy/eval  Pain Assessment  Patient Currently in Pain: Yes  Pain Assessment: 0-10  Pain Level: 4  Pain Type: Acute pain  Pain Location: Back;Knee;Arm (both arms & knee new, h/o back pain)  Pain Intervention(s): Ambulation/Increased activity;Repositioned  Vital Signs  Patient Currently in Pain: Yes   Orientation     Objective Neuromuscular Re-education, Safety Education & Training, Patient/Caregiver Education & Training, Self-Care / ADL  G-Code     OutComes Score                                           AM-PAC Score             Goals  Short term goals  Time Frame for Short term goals: 2 wks  Short term goal 1: Increase to Perry/CGA bed mob sup <-> sit and rolling  Short term goal 2: Increase to Perry/CGA UB dressing, modA LB dressing  Short term goal 3: Tolerate BUE ther ex/act [id number] prior to fatigue to increase strength/endurance for ADL  Short term goal 4: Increase to 3-5min standing leonardo/endurance prior to fatigue to decrease fall risk during ADL  Long term goals  Time Frame for Long term goals : 3-4 wks  Long term goal 1: Increase to SBA/Spv bed mob sup <-> sit and rolling  Long term goal 2: Increase to SBA/Spv UB dressing, CGA LB dressing  Long term goal 3: Increase BUE shoulder flexion MMT to 4-/5 to increase safety and I with ADL  Long term goal 4: Increase to 5-7min standing leonardo/endurance prior to fatigue to decrease fall risk during ADL  Long term goal 5:  Increase to setup for G/H tasks  Patient Goals   Patient goals : Get stronger and return home       Therapy Time   Individual Concurrent Group Co-treatment   Time In  10:35         Time Out  11:20         Minutes  [address], [place]

## 2018-01-04 NOTE — CONSULTS
- The patient is a 80year old, right-handed male. Consultation requested by Dr. Juliane Reyez (FMD: Era Looney). The history was  obtained from the patient and available records. Progress notes, x-rays, lab results, and other inpatient notes were reviewed. CC -- Encephalopathy -- HPI -- Fall 1 w ago at home. Since then pt is less active, confused, weak, and on day of admission  noted to have change in speech. On admission, UA highly suggestive of UTI. IMAGING: CT brain rev'd - atrophy; on my review - highly suggestive of white matter disease, especially in watershed divide  areas. PMH -- hypertension, hyperlipidaemia, diabetes mellitus, osteoarthritis, VZV, measles, mumps, pertussis, squamous cell carcinoma, eczema  PSH -- appendectomy, hysterectomy, cataract removal, tonsillectomy & adenoidectomy, ankle surg s/p fx, breast bx, cystocoele repair  Aller -- penicillins, metoclopramide, pantoprazole, tramadol  Meds -- see reconciliation sheet. FHx --  SHx -- No tob No EtOH Son lives with pt; cooks for her. ROS -- Negatives: malaise rash headache dizziness diplopia nausea ataxia angina palpitations cough vomiting incontinence dysuria  arthralgias weight gain anorexia alopecia nystagmus drowsiness tremor memory difficulty Positives: none. Also see HPI for elements of this section, particularly PMH, allergies, FH, ROS, documented therein. Physical Examination --  The patient is well groomed, mildly obese, and in no acute distress. Vital signs: reviewed as per the graphic sheet. Skin: examination demonstrated no evident lesions. HEENT: bruits absent (carotid and supraclavicular). Heart: cardiac rhythm regular, with no murmur. Lungs: breath sounds normal throughout. .  Abdomen: grossly normal.  Extremities: no clubbing, cyanosis, or edema. Neurological Examination --  Mental status: The patient is alert. Orientation is to person, place, and time.  Thought content and form is normal.  Comprehension is normal. Phonation, articulation, resonance, and prosody are normal. Calculations are not  completed, even 20-3. This despite the fact that pt stated she write her own checks for her bills. CNN: Pupils are equal, 4 mm OU, round, and normally reactive to light and accommodation, both directly and consensually. Visual fields are full to confrontation. Ptosis is absent. Extra-ocular movements are full. Nystagmus is not observed. Funduscopic exam is normal. Masseters are of normal strength. Facial movement is normal. Hearing  is grossly normal. There is no dysarthria. The gag reflex is strong bilaterally. Sternocleidomastoids and trapezii  are of normal strength. The tongue in the midline. Motor: Muscle testing including , finger abductors, biceps, triceps, deltoid, toe flexors and extensors, tibialis anterior,  triceps surae, quadriceps femoris, biceps femoris, and iliopsoases was normal. Tone is normal. Muscle bulk is normal.  Fasciculations are not seen. Pronator drift was not evident. Sensory: Sensation to to touch, temperature, and vibration was normal in the arms, legs, and face. Romberg is negative. Reflexes: biceps triceps brachioradialis patellar Achilles plantar  R 2+ 2+ 2+ 2+ 2+ flexor  L 2+ 2+ 2+ 2+ 2+ flexor  Grasp absent; glabellar absent; Antonios absent; palmomental absent; crossed adductors absent. Coordination: Dysmetria and dysdiadochokinesia are absent. Tremor is present; dystonia is absent; chorea is absent. Intention tremor BUE, mod. Father had a similar tremor. Gait: Station and gait are normal.  Musculoskeletal: Scoliosis and lordosis are not evident. Impression Encephalopathy, most likely metabolic (due to infection). Tremor, suspect benign essential tremor. 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 Recommendations and Patient Instructions --  1. Additional labs as per orders. 2. EEG. 3. MRI brain. 4. Carotid u/s.

## 2018-01-04 NOTE — PROGRESS NOTES
Hospitalist Progress Note      PCP: Jackie Sullivan MD    Date of Admission: 1/3/2018    Chief Complaint: weakness/diarrhea    Subjective: pt awake, slightly confused     Medications:  Reviewed    Infusion Medications    dextrose       Scheduled Medications    lisinopril  40 mg Oral BID    amLODIPine  5 mg Oral Daily    metroNIDAZOLE  500 mg Oral 4 times per day    vitamin D  1,000 Units Oral Daily    atorvastatin  10 mg Oral Daily    aspirin  81 mg Oral Daily    metFORMIN  1,000 mg Oral BID WC    metoprolol tartrate  25 mg Oral BID    triamterene-hydrochlorothiazide  1 tablet Oral Daily    sodium chloride flush  10 mL Intravenous 2 times per day    docusate sodium  100 mg Oral BID    enoxaparin  40 mg Subcutaneous Daily    insulin lispro  0-12 Units Subcutaneous TID WC    insulin lispro  0-6 Units Subcutaneous Nightly    cefTRIAXone (ROCEPHIN) IV  1 g Intravenous Q24H    polyethylene glycol  17 g Oral Daily    senna  1 tablet Oral Nightly     PRN Meds: sodium chloride flush, acetaminophen, magnesium hydroxide, bisacodyl, ondansetron, glucose, dextrose, glucagon (rDNA), dextrose      Intake/Output Summary (Last 24 hours) at 01/04/18 0929  Last data filed at 01/04/18 3721   Gross per 24 hour   Intake             2294 ml   Output             1475 ml   Net              819 ml       Exam:    BP (!) 183/61   Pulse 76   Temp 98.4 °F (36.9 °C) (Oral)   Resp 18   Ht 5' 3\" (1.6 m)   Wt 170 lb (77.1 kg)   SpO2 96%   BMI 30.11 kg/m²     General appearance: No apparent distress, appears stated age and cooperative. HEENT: Pupils equal, round, and reactive to light. Conjunctivae/corneas clear. Neck: Supple, with full range of motion. No jugular venous distention. Trachea midline. Respiratory:  Normal respiratory effort. Clear to auscultation, bilaterally without Rales/Wheezes/Rhonchi. Cardiovascular: Regular rate and rhythm with normal S1/S2 without murmurs, rubs or gallops.   Abdomen: Soft, non-tender, non-distended with normal bowel sounds. Musculoskeletal: No clubbing, cyanosis or edema bilaterally. Full range of motion without deformity. Skin: Skin color, texture, turgor normal.  No rashes or lesions. Neurologic:  Neurovascularly intact without any focal sensory/motor deficits. Psychiatric:partially Alert and oriented, slow with responds   Capillary Refill: Brisk,< 3 seconds   Peripheral Pulses: +2 palpable, equal bilaterally       Labs:   Recent Labs      01/03/18   0737  01/04/18   0813   WBC  8.0  9.9   HGB  10.8*  11.0*   HCT  31.6*  33.4*   PLT  214  226     Recent Labs      01/03/18   0737   NA  138   K  5.0   CL  98   CO2  27   BUN  33*   CREATININE  0.96*   CALCIUM  12.2*     Recent Labs      01/03/18   0737   AST  10   ALT  11   BILITOT  0.3   ALKPHOS  49     Recent Labs      01/03/18   0737   INR  1.0     Recent Labs      01/03/18   0737   CKTOTAL  19       Urinalysis:    Lab Results   Component Value Date    NITRU POSITIVE 01/03/2018    WBCUA 10-20 01/03/2018    BACTERIA Moderate 01/03/2018    RBCUA 3-5 01/03/2018    BLOODU TRACE 01/03/2018    SPECGRAV 1.015 01/03/2018    GLUCOSEU Negative 01/03/2018       Radiology:  XR CHEST PORTABLE   Final Result   Impression: No acute process         XR KNEE LEFT (MIN 4 VIEWS)   Final Result   No definitive fracture is seen bilaterally in the knees. A lucency seen on the AP projection in the superior pole of the tibia on the left could represent spurring. There are mild degenerative changes and evidence of CPPD. Loose bodies in the    right knee are not excluded. XR KNEE RIGHT (MIN 4 VIEWS)   Final Result   No definitive fracture is seen bilaterally in the knees. A lucency seen on the AP projection in the superior pole of the tibia on the left could represent spurring. There are mild degenerative changes and evidence of CPPD. Loose bodies in the    right knee are not excluded. CT Head WO Contrast   Final Result   1.

## 2018-01-05 LAB
ANION GAP SERPL CALCULATED.3IONS-SCNC: 16 MEQ/L (ref 7–13)
BASOPHILS ABSOLUTE: 0 K/UL (ref 0–0.2)
BASOPHILS RELATIVE PERCENT: 0.4 %
BUN BLDV-MCNC: 29 MG/DL (ref 8–23)
CHLORIDE BLD-SCNC: 98 MEQ/L (ref 98–107)
CLOSTRIDIUM DIFFICILE DNA AMPLIFICATION: NORMAL
CO2: 24 MEQ/L (ref 22–29)
CREAT SERPL-MCNC: 1.08 MG/DL (ref 0.5–0.9)
EOSINOPHILS ABSOLUTE: 0.3 K/UL (ref 0–0.7)
EOSINOPHILS RELATIVE PERCENT: 3 %
GFR AFRICAN AMERICAN: 57.6
GFR NON-AFRICAN AMERICAN: 47.6
GLUCOSE BLD-MCNC: 147 MG/DL (ref 60–115)
GLUCOSE BLD-MCNC: 180 MG/DL (ref 60–115)
GLUCOSE BLD-MCNC: 183 MG/DL (ref 60–115)
GLUCOSE BLD-MCNC: 184 MG/DL (ref 74–109)
GLUCOSE BLD-MCNC: 187 MG/DL (ref 60–115)
HCT VFR BLD CALC: 36.2 % (ref 37–47)
HEMOGLOBIN: 12.1 G/DL (ref 12–16)
LYME, EIA: 0.08 LIV (ref 0–1.2)
LYMPHOCYTES ABSOLUTE: 1.6 K/UL (ref 1–4.8)
LYMPHOCYTES RELATIVE PERCENT: 15.2 %
MCH RBC QN AUTO: 31.1 PG (ref 27–31.3)
MCHC RBC AUTO-ENTMCNC: 33.5 % (ref 33–37)
MCV RBC AUTO: 92.9 FL (ref 82–100)
MONOCYTES ABSOLUTE: 0.6 K/UL (ref 0.2–0.8)
MONOCYTES RELATIVE PERCENT: 5.7 %
NEUTROPHILS ABSOLUTE: 7.9 K/UL (ref 1.4–6.5)
NEUTROPHILS RELATIVE PERCENT: 75.7 %
ORGANISM: ABNORMAL
PDW BLD-RTO: 13.9 % (ref 11.5–14.5)
PERFORMED ON: ABNORMAL
PLATELET # BLD: 252 K/UL (ref 130–400)
POTASSIUM SERPL-SCNC: 4.9 MEQ/L (ref 3.5–5.1)
RBC # BLD: 3.9 M/UL (ref 4.2–5.4)
SODIUM BLD-SCNC: 138 MEQ/L (ref 132–144)
TSH SERPL DL<=0.05 MIU/L-ACNC: 2.05 UIU/ML (ref 0.27–4.2)
URINE CULTURE, ROUTINE: ABNORMAL
URINE CULTURE, ROUTINE: ABNORMAL
WBC # BLD: 10.4 K/UL (ref 4.8–10.8)

## 2018-01-05 PROCEDURE — 96375 TX/PRO/DX INJ NEW DRUG ADDON: CPT

## 2018-01-05 PROCEDURE — 97530 THERAPEUTIC ACTIVITIES: CPT

## 2018-01-05 PROCEDURE — 1210000000 HC MED SURG R&B

## 2018-01-05 PROCEDURE — 97535 SELF CARE MNGMENT TRAINING: CPT

## 2018-01-05 PROCEDURE — 6370000000 HC RX 637 (ALT 250 FOR IP): Performed by: INTERNAL MEDICINE

## 2018-01-05 PROCEDURE — 96372 THER/PROPH/DIAG INJ SC/IM: CPT

## 2018-01-05 PROCEDURE — 95813 EEG EXTND MNTR 61-119 MIN: CPT

## 2018-01-05 PROCEDURE — 97112 NEUROMUSCULAR REEDUCATION: CPT

## 2018-01-05 PROCEDURE — 6360000002 HC RX W HCPCS: Performed by: INTERNAL MEDICINE

## 2018-01-05 PROCEDURE — 80048 BASIC METABOLIC PNL TOTAL CA: CPT

## 2018-01-05 PROCEDURE — 97110 THERAPEUTIC EXERCISES: CPT

## 2018-01-05 PROCEDURE — 97116 GAIT TRAINING THERAPY: CPT

## 2018-01-05 PROCEDURE — 84443 ASSAY THYROID STIM HORMONE: CPT

## 2018-01-05 PROCEDURE — 36415 COLL VENOUS BLD VENIPUNCTURE: CPT

## 2018-01-05 PROCEDURE — 2580000003 HC RX 258: Performed by: INTERNAL MEDICINE

## 2018-01-05 PROCEDURE — 85025 COMPLETE CBC W/AUTO DIFF WBC: CPT

## 2018-01-05 RX ORDER — NITROFURANTOIN 25; 75 MG/1; MG/1
100 CAPSULE ORAL EVERY 12 HOURS SCHEDULED
Status: DISCONTINUED | OUTPATIENT
Start: 2018-01-05 | End: 2018-01-09 | Stop reason: HOSPADM

## 2018-01-05 RX ADMIN — WATER 1 G: 1 INJECTION INTRAMUSCULAR; INTRAVENOUS; SUBCUTANEOUS at 00:11

## 2018-01-05 RX ADMIN — DOCUSATE SODIUM 100 MG: 100 CAPSULE, LIQUID FILLED ORAL at 09:14

## 2018-01-05 RX ADMIN — METRONIDAZOLE 500 MG: 500 TABLET ORAL at 06:21

## 2018-01-05 RX ADMIN — VITAMIN D, TAB 1000IU (100/BT) 1000 UNITS: 25 TAB at 09:14

## 2018-01-05 RX ADMIN — ACETAMINOPHEN 650 MG: 325 TABLET ORAL at 06:27

## 2018-01-05 RX ADMIN — NITROFURANTOIN (MONOHYDRATE/MACROCRYSTALS) 100 MG: 75; 25 CAPSULE ORAL at 09:57

## 2018-01-05 RX ADMIN — ASPIRIN 81 MG: 81 TABLET, COATED ORAL at 09:15

## 2018-01-05 RX ADMIN — METRONIDAZOLE 500 MG: 500 TABLET ORAL at 12:00

## 2018-01-05 RX ADMIN — POLYETHYLENE GLYCOL 3350 17 G: 17 POWDER, FOR SOLUTION ORAL at 09:13

## 2018-01-05 RX ADMIN — LISINOPRIL 40 MG: 20 TABLET ORAL at 21:33

## 2018-01-05 RX ADMIN — DOCUSATE SODIUM 100 MG: 100 CAPSULE, LIQUID FILLED ORAL at 21:38

## 2018-01-05 RX ADMIN — METOPROLOL TARTRATE 25 MG: 25 TABLET ORAL at 21:27

## 2018-01-05 RX ADMIN — METOPROLOL TARTRATE 25 MG: 25 TABLET ORAL at 09:14

## 2018-01-05 RX ADMIN — AMLODIPINE BESYLATE 5 MG: 5 TABLET ORAL at 09:15

## 2018-01-05 RX ADMIN — METRONIDAZOLE 500 MG: 500 TABLET ORAL at 00:11

## 2018-01-05 RX ADMIN — METFORMIN HYDROCHLORIDE 1000 MG: 500 TABLET, FILM COATED ORAL at 09:14

## 2018-01-05 RX ADMIN — ATORVASTATIN CALCIUM 10 MG: 10 TABLET, FILM COATED ORAL at 21:26

## 2018-01-05 RX ADMIN — LISINOPRIL 40 MG: 20 TABLET ORAL at 09:14

## 2018-01-05 RX ADMIN — TRIAMTERENE AND HYDROCHLOROTHIAZIDE 1 TABLET: 37.5; 25 TABLET ORAL at 09:14

## 2018-01-05 RX ADMIN — Medication 10 ML: at 22:33

## 2018-01-05 RX ADMIN — NITROFURANTOIN (MONOHYDRATE/MACROCRYSTALS) 100 MG: 75; 25 CAPSULE ORAL at 21:27

## 2018-01-05 RX ADMIN — ENOXAPARIN SODIUM 40 MG: 100 INJECTION SUBCUTANEOUS at 21:33

## 2018-01-05 RX ADMIN — SENNOSIDES 8.6 MG: 8.6 TABLET, FILM COATED ORAL at 21:26

## 2018-01-05 ASSESSMENT — ENCOUNTER SYMPTOMS
COUGH: 0
FACIAL SWELLING: 0
EYE DISCHARGE: 0
SHORTNESS OF BREATH: 0
CHEST TIGHTNESS: 0
SINUS PRESSURE: 0
VOICE CHANGE: 0
WHEEZING: 0
TROUBLE SWALLOWING: 0
DIARRHEA: 0
BACK PAIN: 0
CONSTIPATION: 0
STRIDOR: 0
BLOOD IN STOOL: 0
EYE REDNESS: 0
EYE PAIN: 0
ABDOMINAL PAIN: 0
CHOKING: 0
VOMITING: 0
SORE THROAT: 0

## 2018-01-05 ASSESSMENT — PAIN SCALES - GENERAL
PAINLEVEL_OUTOF10: 0
PAINLEVEL_OUTOF10: 5
PAINLEVEL_OUTOF10: 0

## 2018-01-05 NOTE — PROGRESS NOTES
Occupational Therapy  Facility/Department: [facility] MED SURG UNIT  Daily Treatment Note  NAME: Mariaelena Hudson  : 6/10/1927  MRN: [id number]    Date of Service: 2018    Patient Diagnosis(es): The primary encounter diagnosis was General weakness. Diagnoses of Dehydration, Acute cystitis without hematuria, Ambulatory dysfunction, and Essential hypertension were also pertinent to this visit. has a past medical history of Arthritis; Chicken pox; Chronic back pain; Chronic low back pain; Eczematous dermatitis; History of bladder infections; Hyperlipidemia; Hypertension; Measles; Mumps; Osteoarthritis; SCC (squamous cell carcinoma), arm; SI (stress incontinence), female; Type II or unspecified type diabetes mellitus without mention of complication, not stated as uncontrolled; and Whooping cough. has a past surgical history that includes Appendectomy; Rectocele repair; Cystocele repair; Ankle surgery; Breast biopsy; Cataract removal (); eye surgery; Tonsillectomy; and Hysterectomy. Restrictions  Restrictions/Precautions  Restrictions/Precautions: Fall Risk  Required Braces or Orthoses?: No  Position Activity Restriction  Other position/activity restrictions: Kylah, being tested currently for C-diff possibilty  Subjective   General  Chart Reviewed: Yes  Patient assessed for rehabilitation services?: Yes  Family / Caregiver Present: Yes (son)  Referring Practitioner: Dr. Rashi Lacy  Diagnosis: General weakness (primary), dehydration, acute cystitis with hematuria  Subjective  Subjective: \"I'm afraid of falling\"  General Comment  Comments: Pt supine with HOB elevated, finished with lunch, agreeable to therapy/eval  Pain Assessment  Patient Currently in Pain: No  Pain Assessment: 0-10  Pain Level: 0  Vital Signs  Patient Currently in Pain: No   Orientation     Objective    ADL  UE Dressing:  Moderate assistance  Toileting: Maximum assistance (x2 at toilet- BM incontinence episode- totalA cleanup)        Balance  Sitting

## 2018-01-05 NOTE — PROGRESS NOTES
Hospitalist Progress Note      PCP: Lenka Hutchinson MD    Date of Admission: 1/3/2018    Chief Complaint: weakness    Subjective: pt in bed, looks comfortable     Medications:  Reviewed    Infusion Medications    dextrose       Scheduled Medications    nitrofurantoin (macrocrystal-monohydrate)  100 mg Oral 2 times per day    lisinopril  40 mg Oral BID    amLODIPine  5 mg Oral Daily    metroNIDAZOLE  500 mg Oral 4 times per day    vitamin D  1,000 Units Oral Daily    atorvastatin  10 mg Oral Daily    aspirin  81 mg Oral Daily    metFORMIN  1,000 mg Oral BID     metoprolol tartrate  25 mg Oral BID    triamterene-hydrochlorothiazide  1 tablet Oral Daily    sodium chloride flush  10 mL Intravenous 2 times per day    docusate sodium  100 mg Oral BID    enoxaparin  40 mg Subcutaneous Daily    insulin lispro  0-12 Units Subcutaneous TID WC    insulin lispro  0-6 Units Subcutaneous Nightly    polyethylene glycol  17 g Oral Daily    senna  1 tablet Oral Nightly     PRN Meds: sodium chloride flush, acetaminophen, magnesium hydroxide, bisacodyl, ondansetron, glucose, dextrose, glucagon (rDNA), dextrose      Intake/Output Summary (Last 24 hours) at 01/05/18 0921  Last data filed at 01/05/18 8079   Gross per 24 hour   Intake              720 ml   Output             1200 ml   Net             -480 ml       Exam:    BP (!) 144/73   Pulse 70   Temp 98.8 °F (37.1 °C) (Oral)   Resp 20   Ht 5' 3\" (1.6 m)   Wt 170 lb (77.1 kg)   SpO2 95%   BMI 30.11 kg/m²     General appearance: No apparent distress, appears stated age and cooperative. HEENT: Pupils equal, round, and reactive to light. Conjunctivae/corneas clear. Neck: Supple, with full range of motion. No jugular venous distention. Trachea midline. Respiratory:  Normal respiratory effort. Clear to auscultation, bilaterally without Rales/Wheezes/Rhonchi.   Cardiovascular: Regular rate and rhythm with normal S1/S2 without murmurs, rubs or gallops. Abdomen: Soft, non-tender, non-distended with normal bowel sounds. Musculoskeletal: No clubbing, cyanosis or edema bilaterally. Full range of motion without deformity. Skin: Skin color, texture, turgor normal.  No rashes or lesions. Neurologic:  Neurovascularly intact without any focal sensory/motor deficits. Cranial nerves: II-XII intact, grossly non-focal.  Psychiatric:partially Alert and oriented, thought content appropriate, normal insight  Capillary Refill: Brisk,< 3 seconds   Peripheral Pulses: +2 palpable, equal bilaterally       Labs:   Recent Labs      01/03/18   0737  01/04/18   0813  01/05/18   0526   WBC  8.0  9.9  10.4   HGB  10.8*  11.0*  12.1   HCT  31.6*  33.4*  36.2*   PLT  214  226  252     Recent Labs      01/03/18   0737  01/04/18   0814  01/05/18   0526   NA  138  136  138   K  5.0  4.8  4.9   CL  98  97*  98   CO2  27  23  24   BUN  33*  34*  29*   CREATININE  0.96*  1.10*  1.08*   CALCIUM  12.2*  11.9*   --      Recent Labs      01/03/18   0737   AST  10   ALT  11   BILITOT  0.3   ALKPHOS  49     Recent Labs      01/03/18   0737   INR  1.0     Recent Labs      01/03/18   0737   CKTOTAL  19       Urinalysis:    Lab Results   Component Value Date    NITRU POSITIVE 01/03/2018    WBCUA 10-20 01/03/2018    BACTERIA Moderate 01/03/2018    RBCUA 3-5 01/03/2018    BLOODU TRACE 01/03/2018    SPECGRAV 1.015 01/03/2018    GLUCOSEU Negative 01/03/2018       Radiology:  MRI Brain WO Contrast   Final Result      XR CHEST PORTABLE   Final Result   Impression: No acute process         XR KNEE LEFT (MIN 4 VIEWS)   Final Result   No definitive fracture is seen bilaterally in the knees. A lucency seen on the AP projection in the superior pole of the tibia on the left could represent spurring. There are mild degenerative changes and evidence of CPPD. Loose bodies in the    right knee are not excluded.             XR KNEE RIGHT (MIN 4 VIEWS)   Final Result   No definitive fracture is seen bilaterally

## 2018-01-05 NOTE — PROGRESS NOTES
Reviewed: Yes  Family / Caregiver Present: Yes  Referring Practitioner: Dr Adryan Hsu  Subjective  Subjective: Pt lying in recliner and agreeable to therapy session. Pain Screening  Patient Currently in Pain: No  Vital Signs  Patient Currently in Pain: No       Orientation     Objective   Bed mobility  Scooting: Moderate assistance (in preparation for transfer)  Transfers  Sit to Stand: Minimal Assistance; Moderate Assistance (min/modAx2 to stand from recliner. pt able to scoot to edge )  Stand to sit: Minimal Assistance (minAx2)  Bed to Chair: Minimal assistance; Moderate assistance (min/modAx2 w/ assistance to turn AD and maxVC's)  Comment: at beginning of session pt transfered from bed>WC with small steps and plopped down in seat without reaching back and without stepping back towards chair all the way. pt performed total of 4 stand pivot transfers. std pvt WC<>toilet w/ use of grab bars [place]/modAx2 and stand pivot from WC>recliner with use of [employer] minAx2 with pt displaying increased follow through reaching back for chair and stepping back towards chair at end of session. Ambulation  Ambulation?: Yes  Ambulation 1  Surface: level tile  Device: Rolling Walker  Other Apparatus: Wheelchair follow  Assistance: Minimal assistance (minAx2 )  Quality of Gait: narrow JUAN, small step lengths, tends to keep AD farther OBOS, tends to look at floor. Distance: 54'  Comments: VC's for fwd gaze and inc posture with good follow through, pt started walking out of her brief at end of walk and had pt sit in [facility] to transfer to restroom      Balance  Sitting - Static: Fair  Sitting - Dynamic: Fair;-  Standing - Static: Fair;-  Standing - Dynamic: Poor;+  Comments: Pt sat EOB to change gown with minAx1 progressing to CGAx1 with frequent VC's to increase posture and fwd lean to prevent retro lean.    Exercises  Hamstring Sets: x 5 w/ YTB (maxVC's)  Quad Sets: attempted with pt unable to follow thorugh with VC's  Hip Flexion: x 10 alternating  Knee Long Arc Quad: x10 alternating H2  Ankle Pumps: x 10 seated  Other exercises  Other exercises?: Yes  Other exercises 1: pillow sq x 10                         Assessment   Body structures, Functions, Activity limitations: Decreased functional mobility ; Decreased strength;Decreased endurance;Decreased balance  Assessment: Pt tolerated seated LE ther ex with no c/o pain, just fatigue. Pt had difficulty keeping her eyes open and requested to lay back in bed. Pt had been sitting up in chair since 10:00 this morning. Pt transfered from chair>bed with [employer] w/ maxVC's for sequencing and min/modAx2 for safety. Call light and bed alarm in place. COntinue to progress as leonardo.    Treatment Diagnosis: difficulty walking, LE weakness  REQUIRES PT FOLLOW UP: Yes  Activity Tolerance  Activity Tolerance: Patient limited by fatigue;Patient limited by endurance       Discharge Recommendations:       G-Code     OutComes Score                                                    AM-PAC Score             Goals  Short term goals  Time Frame for Short term goals: 3-5 days  Short term goal 1: sit to stand with Min A of 1  Short term goal 2: Ambulate >=50 ft with ww and Min A of 2  Short term goal 3: tolerate 2x10 reps LE seated or supine EFRAÍN  Long term goals  Time Frame for Long term goals : same as [facility] pt  Patient Goals   Patient goals : get stronger and not fall again    Plan    Plan  Times per week:  (5-7x)  Times per day:  (1-2x)  Current Treatment Recommendations: Strengthening, Gait Training, Endurance Training, Transfer Training, Home Exercise Program, Patient/Caregiver Education & Training, Safety Education & Training, Equipment Evaluation, Education, & procurement     Therapy Time   Individual Concurrent Group Co-treatment   Time In  1400         Time Out  1440         Minutes  [address], [place]  License and [address] Number: [id number]

## 2018-01-06 ENCOUNTER — APPOINTMENT (OUTPATIENT)
Dept: ULTRASOUND IMAGING | Age: 83
DRG: 689 | End: 2018-01-06
Payer: MEDICARE

## 2018-01-06 LAB
GLUCOSE BLD-MCNC: 140 MG/DL (ref 60–115)
GLUCOSE BLD-MCNC: 144 MG/DL (ref 60–115)
GLUCOSE BLD-MCNC: 155 MG/DL (ref 60–115)
GLUCOSE BLD-MCNC: 223 MG/DL (ref 60–115)
METHYLMALONIC ACID: 0.3 UMOL/L (ref 0–0.4)
PERFORMED ON: ABNORMAL

## 2018-01-06 PROCEDURE — 97110 THERAPEUTIC EXERCISES: CPT

## 2018-01-06 PROCEDURE — 97116 GAIT TRAINING THERAPY: CPT

## 2018-01-06 PROCEDURE — 96372 THER/PROPH/DIAG INJ SC/IM: CPT

## 2018-01-06 PROCEDURE — 6370000000 HC RX 637 (ALT 250 FOR IP): Performed by: INTERNAL MEDICINE

## 2018-01-06 PROCEDURE — 1210000000 HC MED SURG R&B

## 2018-01-06 PROCEDURE — 6360000002 HC RX W HCPCS: Performed by: INTERNAL MEDICINE

## 2018-01-06 PROCEDURE — 2580000003 HC RX 258: Performed by: INTERNAL MEDICINE

## 2018-01-06 PROCEDURE — 93880 EXTRACRANIAL BILAT STUDY: CPT

## 2018-01-06 RX ORDER — AMLODIPINE BESYLATE 10 MG/1
10 TABLET ORAL DAILY
Status: DISCONTINUED | OUTPATIENT
Start: 2018-01-07 | End: 2018-01-09 | Stop reason: HOSPADM

## 2018-01-06 RX ADMIN — NITROFURANTOIN (MONOHYDRATE/MACROCRYSTALS) 100 MG: 75; 25 CAPSULE ORAL at 09:18

## 2018-01-06 RX ADMIN — METRONIDAZOLE 500 MG: 500 TABLET ORAL at 06:03

## 2018-01-06 RX ADMIN — VITAMIN D, TAB 1000IU (100/BT) 1000 UNITS: 25 TAB at 09:18

## 2018-01-06 RX ADMIN — METOPROLOL TARTRATE 25 MG: 25 TABLET ORAL at 20:32

## 2018-01-06 RX ADMIN — METFORMIN HYDROCHLORIDE 1000 MG: 500 TABLET, FILM COATED ORAL at 09:17

## 2018-01-06 RX ADMIN — METFORMIN HYDROCHLORIDE 1000 MG: 500 TABLET, FILM COATED ORAL at 17:37

## 2018-01-06 RX ADMIN — TRIAMTERENE AND HYDROCHLOROTHIAZIDE 1 TABLET: 37.5; 25 TABLET ORAL at 09:18

## 2018-01-06 RX ADMIN — ASPIRIN 81 MG: 81 TABLET, COATED ORAL at 09:18

## 2018-01-06 RX ADMIN — METOPROLOL TARTRATE 25 MG: 25 TABLET ORAL at 09:18

## 2018-01-06 RX ADMIN — LISINOPRIL 40 MG: 20 TABLET ORAL at 09:18

## 2018-01-06 RX ADMIN — Medication 10 ML: at 20:58

## 2018-01-06 RX ADMIN — METRONIDAZOLE 500 MG: 500 TABLET ORAL at 00:51

## 2018-01-06 RX ADMIN — NITROFURANTOIN (MONOHYDRATE/MACROCRYSTALS) 100 MG: 75; 25 CAPSULE ORAL at 20:32

## 2018-01-06 RX ADMIN — ATORVASTATIN CALCIUM 10 MG: 10 TABLET, FILM COATED ORAL at 20:32

## 2018-01-06 RX ADMIN — LISINOPRIL 40 MG: 20 TABLET ORAL at 20:32

## 2018-01-06 RX ADMIN — ENOXAPARIN SODIUM 40 MG: 100 INJECTION SUBCUTANEOUS at 20:58

## 2018-01-06 RX ADMIN — AMLODIPINE BESYLATE 5 MG: 5 TABLET ORAL at 09:18

## 2018-01-06 ASSESSMENT — PAIN SCALES - GENERAL: PAINLEVEL_OUTOF10: 0

## 2018-01-06 NOTE — PROGRESS NOTES
Hospitalist Progress Note      PCP: Marlene Herrera MD    Date of Admission: 1/3/2018    Chief Complaint: weakness    Subjective: pt awake, looks comfortable     Medications:  Reviewed    Infusion Medications    dextrose       Scheduled Medications    [START ON 1/7/2018] amLODIPine  10 mg Oral Daily    nitrofurantoin (macrocrystal-monohydrate)  100 mg Oral 2 times per day    lisinopril  40 mg Oral BID    vitamin D  1,000 Units Oral Daily    atorvastatin  10 mg Oral Daily    aspirin  81 mg Oral Daily    metFORMIN  1,000 mg Oral BID     metoprolol tartrate  25 mg Oral BID    triamterene-hydrochlorothiazide  1 tablet Oral Daily    sodium chloride flush  10 mL Intravenous 2 times per day    docusate sodium  100 mg Oral BID    enoxaparin  40 mg Subcutaneous Daily    insulin lispro  0-12 Units Subcutaneous TID     insulin lispro  0-6 Units Subcutaneous Nightly    polyethylene glycol  17 g Oral Daily    senna  1 tablet Oral Nightly     PRN Meds: sodium chloride flush, acetaminophen, magnesium hydroxide, bisacodyl, ondansetron, glucose, dextrose, glucagon (rDNA), dextrose      Intake/Output Summary (Last 24 hours) at 01/06/18 0919  Last data filed at 01/05/18 2000   Gross per 24 hour   Intake                0 ml   Output              300 ml   Net             -300 ml       Exam:    BP (!) 159/78   Pulse 75   Temp 98.2 °F (36.8 °C) (Oral)   Resp 18   Ht 5' 3\" (1.6 m)   Wt 170 lb (77.1 kg)   SpO2 99%   BMI 30.11 kg/m²     General appearance: No apparent distress, appears stated age and cooperative. HEENT: Pupils equal, round, and reactive to light. Conjunctivae/corneas clear. Neck: Supple, with full range of motion. No jugular venous distention. Trachea midline. Respiratory:  Normal respiratory effort. Clear to auscultation, bilaterally without Rales/Wheezes/Rhonchi. Cardiovascular: Regular rate and rhythm with normal S1/S2 without murmurs, rubs or gallops.   Abdomen: Soft, non-tender,

## 2018-01-07 LAB
GLUCOSE BLD-MCNC: 156 MG/DL (ref 60–115)
GLUCOSE BLD-MCNC: 169 MG/DL (ref 60–115)
GLUCOSE BLD-MCNC: 177 MG/DL (ref 60–115)
GLUCOSE BLD-MCNC: 291 MG/DL (ref 60–115)
PERFORMED ON: ABNORMAL

## 2018-01-07 PROCEDURE — 1210000000 HC MED SURG R&B

## 2018-01-07 PROCEDURE — 97116 GAIT TRAINING THERAPY: CPT

## 2018-01-07 PROCEDURE — 97530 THERAPEUTIC ACTIVITIES: CPT

## 2018-01-07 PROCEDURE — 6360000002 HC RX W HCPCS: Performed by: INTERNAL MEDICINE

## 2018-01-07 PROCEDURE — 96372 THER/PROPH/DIAG INJ SC/IM: CPT

## 2018-01-07 PROCEDURE — 6370000000 HC RX 637 (ALT 250 FOR IP): Performed by: INTERNAL MEDICINE

## 2018-01-07 RX ORDER — TRIAMTERENE AND HYDROCHLOROTHIAZIDE 37.5; 25 MG/1; MG/1
2 TABLET ORAL DAILY
Status: DISCONTINUED | OUTPATIENT
Start: 2018-01-08 | End: 2018-01-07

## 2018-01-07 RX ORDER — TRIAMTERENE AND HYDROCHLOROTHIAZIDE 37.5; 25 MG/1; MG/1
1 TABLET ORAL DAILY
Status: DISCONTINUED | OUTPATIENT
Start: 2018-01-08 | End: 2018-01-09 | Stop reason: HOSPADM

## 2018-01-07 RX ADMIN — NITROFURANTOIN (MONOHYDRATE/MACROCRYSTALS) 100 MG: 75; 25 CAPSULE ORAL at 21:53

## 2018-01-07 RX ADMIN — ENOXAPARIN SODIUM 40 MG: 100 INJECTION SUBCUTANEOUS at 09:28

## 2018-01-07 RX ADMIN — ASPIRIN 81 MG: 81 TABLET, COATED ORAL at 09:28

## 2018-01-07 RX ADMIN — DOCUSATE SODIUM 100 MG: 100 CAPSULE, LIQUID FILLED ORAL at 21:53

## 2018-01-07 RX ADMIN — TRIAMTERENE AND HYDROCHLOROTHIAZIDE 1 TABLET: 37.5; 25 TABLET ORAL at 09:33

## 2018-01-07 RX ADMIN — METOPROLOL TARTRATE 25 MG: 25 TABLET ORAL at 09:33

## 2018-01-07 RX ADMIN — AMLODIPINE BESYLATE 10 MG: 10 TABLET ORAL at 09:33

## 2018-01-07 RX ADMIN — NITROFURANTOIN (MONOHYDRATE/MACROCRYSTALS) 100 MG: 75; 25 CAPSULE ORAL at 09:27

## 2018-01-07 RX ADMIN — LISINOPRIL 40 MG: 20 TABLET ORAL at 21:53

## 2018-01-07 RX ADMIN — LISINOPRIL 40 MG: 20 TABLET ORAL at 09:33

## 2018-01-07 RX ADMIN — SENNOSIDES 8.6 MG: 8.6 TABLET, FILM COATED ORAL at 21:53

## 2018-01-07 RX ADMIN — METFORMIN HYDROCHLORIDE 1000 MG: 500 TABLET, FILM COATED ORAL at 09:28

## 2018-01-07 RX ADMIN — METOPROLOL TARTRATE 25 MG: 25 TABLET ORAL at 21:53

## 2018-01-07 RX ADMIN — ATORVASTATIN CALCIUM 10 MG: 10 TABLET, FILM COATED ORAL at 21:53

## 2018-01-07 RX ADMIN — METFORMIN HYDROCHLORIDE 1000 MG: 500 TABLET, FILM COATED ORAL at 17:31

## 2018-01-07 RX ADMIN — VITAMIN D, TAB 1000IU (100/BT) 1000 UNITS: 25 TAB at 09:28

## 2018-01-07 ASSESSMENT — PAIN SCALES - GENERAL
PAINLEVEL_OUTOF10: 0

## 2018-01-07 ASSESSMENT — PAIN DESCRIPTION - PAIN TYPE: TYPE: CHRONIC PAIN

## 2018-01-07 ASSESSMENT — PAIN DESCRIPTION - LOCATION: LOCATION: OTHER (COMMENT)

## 2018-01-07 NOTE — PROGRESS NOTES
Physical Therapy  Facility/Department: [facility] MED SURG UNIT  Daily Treatment Note  NAME: Lord Garcia  : 6/10/1927  MRN: [id number]    Date of Service: 2018    Patient Diagnosis(es):   Patient Active Problem List    Diagnosis Date Noted    CKD (chronic kidney disease) 2015     Priority: High    HLD (hyperlipidemia) 2015     Priority: High    HTN (hypertension) 2011     Priority: High    Diabetes mellitus 2011     Priority: High    Vitamin D deficiency 2015     Priority: Low    SI (stress incontinence), female 2012     Priority: Low    Osteoarthritis 2012     Priority: Low    UTI (urinary tract infection) 2018    Lumbar spinal stenosis 2016    Chronic low back pain 2016    Eczematous dermatitis        Past Medical History:   Diagnosis Date    Arthritis     Chicken pox     Chronic back pain     Chronic low back pain 2016    Eczematous dermatitis     History of bladder infections     Hyperlipidemia     Hypertension     Measles     Mumps     Osteoarthritis 2012    SCC (squamous cell carcinoma), arm     right upper arm,  right forarm    SI (stress incontinence), female 2012    Type II or unspecified type diabetes mellitus without mention of complication, not stated as uncontrolled     Whooping cough      Past Surgical History:   Procedure Laterality Date    ANKLE SURGERY      broken left ankle.  APPENDECTOMY      BREAST BIOPSY      x2 left breast    CATARACT REMOVAL      bilateral    CYSTOCELE REPAIR      EYE SURGERY      bilateral cataract    HYSTERECTOMY      complete at age 39    [address]      as a child       Restrictions  Restrictions/Precautions  Restrictions/Precautions: Fall Risk  Required Braces or Orthoses?: No  Position Activity Restriction  Other position/activity restrictions:  Kylah, being tested currently for C-diff possibilty  Subjective   General  Chart Reviewed: Yes  Family / Caregiver Present: No  Subjective  Subjective: Dizziness and nausea prevented ambuation and exercises this morning (vital signs were off, nursing aware and intervening for pt.)  General Comment  Comments: Amb and ex deferrred secondary to pt not feeling well, nursing notified  Pain Screening  Patient Currently in Pain: Denies (\"normal aches and pains\")  Pain Assessment  Pain Assessment: 0-10  Pain Level: 0  Pain Type: Chronic pain  Pain Location: Other (Comment) (Generalized)  Vital Signs  Patient Currently in Pain: Denies (\"normal aches and pains\")  Patient Observation  Observations: Did'nt sleep well last night       Orientation     Objective   Bed mobility  Bridging: Contact guard assistance  Transfers  Sit to Stand:  Moderate Assistance (x1, became dizzy and nauseous)  Stand to sit: Minimal Assistance (x1, with verbal and tactile cues for hand placment)  Comment: Needed verbal and tactile cues for both standing and sitting  Ambulation  Ambulation?: Yes  Ambulation 1  Surface: level tile  Device: Rolling Walker  Assistance: Minimal assistance  Quality of Gait:  (Unable to step secondary to dizziness)  Comments: sat on EOB and told tp pup feet to see if dissiness cleared, became worse  Stairs/Curb  Stairs?: No     Balance  Posture: Poor  Sitting - Static: Fair (while sitting would lean to right, corrected with verbal cue)  Sitting - Dynamic: Fair;-  Standing - Static: Fair  Standing - Dynamic: Fair;-  Comments: Sat EOB for vital check, SAT%@ low-86 (nursing got O2 for pt)  Exercises  Comments: deferred secondary to vitals being off                        Assessment      Patient Education: hand placement for transitions and ankle pumping for circulation  REQUIRES PT FOLLOW UP: Yes  Activity Tolerance  Activity Tolerance: Treatment limited secondary to medical complications (free text)  Activity Tolerance:  (nursing asware and they were notifing )       Discharge Recommendations: G-Code     OutComes Score                                                    AM-PAC Score             Goals  Short term goals  Time Frame for Short term goals: 3-5 days  Short term goal 1: sit to stand with Min A of 1  Short term goal 2: Ambulate >=50 ft with ww and Min A of 2  Short term goal 3: tolerate 2x10 reps LE seated or supine EFRAÍN  Long term goals  Time Frame for Long term goals : same as [facility] pt  Patient Goals   Patient goals : get stronger and not fall again    Plan    Plan  Times per week:  (5-7x)  Times per day:  (1-2x)  Current Treatment Recommendations: Strengthening, Gait Training, Endurance Training, Transfer Training, Home Exercise Program, Patient/Caregiver Education & Training, Safety Education & Training, Equipment Evaluation, Education, & procurement  Safety Devices  Type of devices: Bed alarm in place, Call light within reach, Gait belt, Left in bed  Restraints  Initially in place: Yes     Therapy Time   Individual Concurrent Group Co-treatment   Time In  11:00         Time Out  11:50         Minutes  50 min                 Elda Chauhan PT  License and Documentation Cosign  Therapy License Number: [id number]

## 2018-01-08 LAB
ANION GAP SERPL CALCULATED.3IONS-SCNC: 17 MEQ/L (ref 7–13)
BASOPHILS ABSOLUTE: 0.1 K/UL (ref 0–0.2)
BASOPHILS RELATIVE PERCENT: 1.1 %
BUN BLDV-MCNC: 39 MG/DL (ref 8–23)
CALCIUM SERPL-MCNC: 12.9 MG/DL (ref 8.6–10.2)
CHLORIDE BLD-SCNC: 96 MEQ/L (ref 98–107)
CO2: 22 MEQ/L (ref 22–29)
CREAT SERPL-MCNC: 1.64 MG/DL (ref 0.5–0.9)
EOSINOPHILS ABSOLUTE: 1.2 K/UL (ref 0–0.7)
EOSINOPHILS RELATIVE PERCENT: 10.1 %
GFR AFRICAN AMERICAN: 35.6
GFR NON-AFRICAN AMERICAN: 29.4
GLUCOSE BLD-MCNC: 125 MG/DL (ref 60–115)
GLUCOSE BLD-MCNC: 138 MG/DL (ref 60–115)
GLUCOSE BLD-MCNC: 145 MG/DL (ref 60–115)
GLUCOSE BLD-MCNC: 152 MG/DL (ref 74–109)
GLUCOSE BLD-MCNC: 157 MG/DL (ref 60–115)
HCT VFR BLD CALC: 34 % (ref 37–47)
HEMOGLOBIN: 11.3 G/DL (ref 12–16)
LYMPHOCYTES ABSOLUTE: 1.9 K/UL (ref 1–4.8)
LYMPHOCYTES RELATIVE PERCENT: 15.3 %
MCH RBC QN AUTO: 31.1 PG (ref 27–31.3)
MCHC RBC AUTO-ENTMCNC: 33.1 % (ref 33–37)
MCV RBC AUTO: 93.9 FL (ref 82–100)
MONOCYTES ABSOLUTE: 0.8 K/UL (ref 0.2–0.8)
MONOCYTES RELATIVE PERCENT: 6.6 %
NEUTROPHILS ABSOLUTE: 8.2 K/UL (ref 1.4–6.5)
NEUTROPHILS RELATIVE PERCENT: 66.9 %
PDW BLD-RTO: 13.9 % (ref 11.5–14.5)
PERFORMED ON: ABNORMAL
PLATELET # BLD: 176 K/UL (ref 130–400)
POTASSIUM SERPL-SCNC: 4.8 MEQ/L (ref 3.5–5.1)
RBC # BLD: 3.62 M/UL (ref 4.2–5.4)
SODIUM BLD-SCNC: 135 MEQ/L (ref 132–144)
WBC # BLD: 12.3 K/UL (ref 4.8–10.8)

## 2018-01-08 PROCEDURE — 97110 THERAPEUTIC EXERCISES: CPT

## 2018-01-08 PROCEDURE — 97530 THERAPEUTIC ACTIVITIES: CPT

## 2018-01-08 PROCEDURE — 97535 SELF CARE MNGMENT TRAINING: CPT

## 2018-01-08 PROCEDURE — 80048 BASIC METABOLIC PNL TOTAL CA: CPT

## 2018-01-08 PROCEDURE — 6370000000 HC RX 637 (ALT 250 FOR IP): Performed by: INTERNAL MEDICINE

## 2018-01-08 PROCEDURE — 2500000003 HC RX 250 WO HCPCS: Performed by: INTERNAL MEDICINE

## 2018-01-08 PROCEDURE — 36415 COLL VENOUS BLD VENIPUNCTURE: CPT

## 2018-01-08 PROCEDURE — 94761 N-INVAS EAR/PLS OXIMETRY MLT: CPT

## 2018-01-08 PROCEDURE — 2700000000 HC OXYGEN THERAPY PER DAY

## 2018-01-08 PROCEDURE — 6360000002 HC RX W HCPCS: Performed by: INTERNAL MEDICINE

## 2018-01-08 PROCEDURE — 96372 THER/PROPH/DIAG INJ SC/IM: CPT

## 2018-01-08 PROCEDURE — 85025 COMPLETE CBC W/AUTO DIFF WBC: CPT

## 2018-01-08 PROCEDURE — 97116 GAIT TRAINING THERAPY: CPT

## 2018-01-08 PROCEDURE — 1210000000 HC MED SURG R&B

## 2018-01-08 RX ORDER — SODIUM CHLORIDE 0.9 % (FLUSH) 0.9 %
10 SYRINGE (ML) INJECTION PRN
Status: CANCELLED | OUTPATIENT
Start: 2018-01-08

## 2018-01-08 RX ORDER — LISINOPRIL 20 MG/1
40 TABLET ORAL 2 TIMES DAILY
Status: CANCELLED | OUTPATIENT
Start: 2018-01-08

## 2018-01-08 RX ORDER — DEXTROSE MONOHYDRATE 25 G/50ML
12.5 INJECTION, SOLUTION INTRAVENOUS PRN
Status: CANCELLED | OUTPATIENT
Start: 2018-01-08

## 2018-01-08 RX ORDER — NITROFURANTOIN 25; 75 MG/1; MG/1
100 CAPSULE ORAL EVERY 12 HOURS SCHEDULED
Status: CANCELLED | OUTPATIENT
Start: 2018-01-08 | End: 2018-01-12

## 2018-01-08 RX ORDER — NICOTINE POLACRILEX 4 MG
15 LOZENGE BUCCAL PRN
Status: CANCELLED | OUTPATIENT
Start: 2018-01-08

## 2018-01-08 RX ORDER — SODIUM CHLORIDE 0.9 % (FLUSH) 0.9 %
10 SYRINGE (ML) INJECTION EVERY 12 HOURS SCHEDULED
Status: CANCELLED | OUTPATIENT
Start: 2018-01-08

## 2018-01-08 RX ORDER — TRIAMTERENE AND HYDROCHLOROTHIAZIDE 37.5; 25 MG/1; MG/1
1 TABLET ORAL DAILY
Status: CANCELLED | OUTPATIENT
Start: 2018-01-09

## 2018-01-08 RX ORDER — SENNA PLUS 8.6 MG/1
1 TABLET ORAL NIGHTLY
Status: CANCELLED | OUTPATIENT
Start: 2018-01-08

## 2018-01-08 RX ORDER — DOCUSATE SODIUM 100 MG/1
100 CAPSULE, LIQUID FILLED ORAL 2 TIMES DAILY
Status: CANCELLED | OUTPATIENT
Start: 2018-01-08

## 2018-01-08 RX ORDER — PREDNISONE 10 MG/1
10 TABLET ORAL DAILY
Status: DISCONTINUED | OUTPATIENT
Start: 2018-01-08 | End: 2018-01-09 | Stop reason: HOSPADM

## 2018-01-08 RX ORDER — ONDANSETRON 2 MG/ML
4 INJECTION INTRAMUSCULAR; INTRAVENOUS EVERY 6 HOURS PRN
Status: CANCELLED | OUTPATIENT
Start: 2018-01-08

## 2018-01-08 RX ORDER — POLYETHYLENE GLYCOL 3350 17 G/17G
17 POWDER, FOR SOLUTION ORAL DAILY
Status: CANCELLED | OUTPATIENT
Start: 2018-01-09

## 2018-01-08 RX ORDER — BISACODYL 10 MG
10 SUPPOSITORY, RECTAL RECTAL DAILY PRN
Status: CANCELLED | OUTPATIENT
Start: 2018-01-08

## 2018-01-08 RX ORDER — ATORVASTATIN CALCIUM 10 MG/1
10 TABLET, FILM COATED ORAL DAILY
Status: CANCELLED | OUTPATIENT
Start: 2018-01-08

## 2018-01-08 RX ORDER — DIPHENHYDRAMINE HCL 12.5MG/5ML
12.5 LIQUID (ML) ORAL EVERY 6 HOURS PRN
Status: DISCONTINUED | OUTPATIENT
Start: 2018-01-08 | End: 2018-01-09 | Stop reason: HOSPADM

## 2018-01-08 RX ORDER — DEXTROSE MONOHYDRATE 50 MG/ML
100 INJECTION, SOLUTION INTRAVENOUS PRN
Status: CANCELLED | OUTPATIENT
Start: 2018-01-08

## 2018-01-08 RX ORDER — AMLODIPINE BESYLATE 10 MG/1
10 TABLET ORAL DAILY
Status: CANCELLED | OUTPATIENT
Start: 2018-01-09

## 2018-01-08 RX ORDER — ASPIRIN 81 MG/1
81 TABLET ORAL DAILY
Status: CANCELLED | OUTPATIENT
Start: 2018-01-09

## 2018-01-08 RX ORDER — ACETAMINOPHEN 325 MG/1
650 TABLET ORAL EVERY 4 HOURS PRN
Status: CANCELLED | OUTPATIENT
Start: 2018-01-08

## 2018-01-08 RX ADMIN — VITAMIN D, TAB 1000IU (100/BT) 1000 UNITS: 25 TAB at 08:31

## 2018-01-08 RX ADMIN — TRIAMTERENE AND HYDROCHLOROTHIAZIDE 1 TABLET: 37.5; 25 TABLET ORAL at 08:31

## 2018-01-08 RX ADMIN — POLYETHYLENE GLYCOL 3350 17 G: 17 POWDER, FOR SOLUTION ORAL at 08:30

## 2018-01-08 RX ADMIN — METOPROLOL TARTRATE 25 MG: 25 TABLET ORAL at 08:31

## 2018-01-08 RX ADMIN — NITROFURANTOIN (MONOHYDRATE/MACROCRYSTALS) 100 MG: 75; 25 CAPSULE ORAL at 20:07

## 2018-01-08 RX ADMIN — Medication: at 14:38

## 2018-01-08 RX ADMIN — METOPROLOL TARTRATE 25 MG: 25 TABLET ORAL at 20:07

## 2018-01-08 RX ADMIN — LISINOPRIL 40 MG: 20 TABLET ORAL at 20:07

## 2018-01-08 RX ADMIN — METFORMIN HYDROCHLORIDE 1000 MG: 500 TABLET, FILM COATED ORAL at 08:31

## 2018-01-08 RX ADMIN — DOCUSATE SODIUM 100 MG: 100 CAPSULE, LIQUID FILLED ORAL at 08:31

## 2018-01-08 RX ADMIN — ENOXAPARIN SODIUM 40 MG: 100 INJECTION SUBCUTANEOUS at 08:29

## 2018-01-08 RX ADMIN — NITROFURANTOIN (MONOHYDRATE/MACROCRYSTALS) 100 MG: 75; 25 CAPSULE ORAL at 08:31

## 2018-01-08 RX ADMIN — ASPIRIN 81 MG: 81 TABLET, COATED ORAL at 08:32

## 2018-01-08 RX ADMIN — LISINOPRIL 40 MG: 20 TABLET ORAL at 08:31

## 2018-01-08 RX ADMIN — Medication: at 20:12

## 2018-01-08 RX ADMIN — ATORVASTATIN CALCIUM 10 MG: 10 TABLET, FILM COATED ORAL at 20:07

## 2018-01-08 RX ADMIN — METFORMIN HYDROCHLORIDE 1000 MG: 500 TABLET, FILM COATED ORAL at 16:27

## 2018-01-08 RX ADMIN — AMLODIPINE BESYLATE 10 MG: 10 TABLET ORAL at 08:32

## 2018-01-08 RX ADMIN — PREDNISONE 10 MG: 10 TABLET ORAL at 20:08

## 2018-01-08 ASSESSMENT — PAIN SCALES - GENERAL
PAINLEVEL_OUTOF10: 0

## 2018-01-08 NOTE — PROGRESS NOTES
Chart reviewed and patient discussed in daily quality rounds. Awaiting insurance approval to admit patient into skilled unit for PT/OT. SS to follow.

## 2018-01-08 NOTE — PROGRESS NOTES
end therapy, required constant cuing to stay alert.    Treatment Diagnosis: difficulty walking, LE weakness  REQUIRES PT FOLLOW UP: Yes  Activity Tolerance  Activity Tolerance: Patient limited by fatigue;Patient limited by endurance       Discharge Recommendations:       G-Code     OutComes Score                                                    AM-PAC Score             Goals  Short term goals  Time Frame for Short term goals: 3-5 days  Short term goal 1: sit to stand with Min A of 1  Short term goal 2: Ambulate >=50 ft with ww and Min A of 2  Short term goal 3: tolerate 2x10 reps LE seated or supine EFRAÍN  Long term goals  Time Frame for Long term goals : same as [facility] pt  Patient Goals   Patient goals : get stronger and not fall again    Plan    Plan  Times per week:  (5-7x)  Times per day:  (1-2x)  Current Treatment Recommendations: Strengthening, Gait Training, Endurance Training, Transfer Training, Home Exercise Program, Patient/Caregiver Education & Training, Safety Education & Training, Equipment Evaluation, Education, & procurement  Safety Devices  Type of devices: Bed alarm in place, Call light within reach, Gait belt, Left in bed  Restraints  Initially in place: Yes     Therapy Time   Individual      Time In 1100         Time Out 1200         Minutes 60  therex 39  theract [address] [facility]  License and [address] Number: [id number]

## 2018-01-08 NOTE — PROGRESS NOTES
Reviewed: Yes  Family / Caregiver Present: No  Subjective  Subjective: pt sitting in chair upon arrival and agreeable to therapy. Pain Screening  Patient Currently in Pain: Denies  Vital Signs  BP Location: Right upper arm  Level of Consciousness: Alert  Patient Currently in Pain: Denies  Oxygen Therapy  O2 Device: Nasal cannula       Orientation     Objective      Transfers  Sit to Stand: Moderate Assistance x2 from recliner   Stand to sit: Minimal Assistance   VC's for hand placement  Ambulation  Ambulation?: Yes  Ambulation 1  Surface: level tile  Device: Rolling Walker  Assistance: Minimal assistance  Quality of Gait: small shuffling steps  Distance: 2 steps x2  Comments: Pt c/o knees hurting, very weak and shakey  Stairs/Curb  Stairs?: No     Balance  Posture: Poor  Sitting - Static: Fair   Sitting - Dynamic: Fair;-  Standing - Static: Fair  Standing - Dynamic: Fair;-  Exercises  Hip Flexion: 2x10  Hip Abduction: 2x10  Knee Long Arc Quad: 2x10  Ankle Pumps: 20x  Comments: slow pace, rest breaks required, falling asleep                        Assessment   Body structures, Functions, Activity limitations: Decreased functional mobility ; Decreased strength;Decreased endurance;Decreased balance  Assessment: Pt performed seated exercises very slowly. fatigues quickly. attempted ambulating but to tired at this time.   Mod A x2 sit to stand   Treatment Diagnosis: difficulty walking, LE weakness  Prognosis: Good  Patient Education: hand placement for transitions and ankle pumping for circulation  REQUIRES PT FOLLOW UP: Yes  Activity Tolerance  Activity Tolerance: Treatment limited secondary to medical complications (free text)  Activity Tolerance:  (nursing asware and they were notifing )  PT Equipment Recommendations  Other: TBD       Discharge Recommendations:  [facility] rehab       Goals  Short term goals  Time Frame for Short term goals: 3-5 days  Short term goal 1: sit to stand with Min A of 1  Short term goal 2:

## 2018-01-08 NOTE — PROGRESS NOTES
Occupational Therapy  Facility/Department: [facility] MED SURG UNIT  Daily Treatment Note  NAME: Cody Moreno  : 6/10/1927  MRN: [id number]    Date of Service: 2018    Patient Diagnosis(es): The primary encounter diagnosis was General weakness. Diagnoses of Dehydration, Acute cystitis without hematuria, Ambulatory dysfunction, and Essential hypertension were also pertinent to this visit. has a past medical history of Arthritis; Chicken pox; Chronic back pain; Chronic low back pain; Eczematous dermatitis; History of bladder infections; Hyperlipidemia; Hypertension; Measles; Mumps; Osteoarthritis; SCC (squamous cell carcinoma), arm; SI (stress incontinence), female; Type II or unspecified type diabetes mellitus without mention of complication, not stated as uncontrolled; and Whooping cough. has a past surgical history that includes Appendectomy; Rectocele repair; Cystocele repair; Ankle surgery; Breast biopsy; Cataract removal (); eye surgery; Tonsillectomy; and Hysterectomy. Restrictions  Restrictions/Precautions  Restrictions/Precautions: Fall Risk  Required Braces or Orthoses?: No  Position Activity Restriction  Other position/activity restrictions: Kylah, being tested currently for C-diff possibilty  Subjective   General  Chart Reviewed: Yes  Patient assessed for rehabilitation services?: Yes  Family / Caregiver Present: Yes (son)  Referring Practitioner: Dr. Charity Sommer  Diagnosis: General weakness (primary), dehydration, acute cystitis with hematuria  Subjective  Subjective: \"I'm afraid of falling\"  General Comment  Comments: Pt supine with HOB elevated, finished with lunch, agreeable to therapy/eval      Orientation     Objective    ADL  UE Dressing:  Moderate assistance  LE Dressing: Maximum assistance        Standing Balance  Time:  (30-40 seconds 2x)  Sit to stand:  (Mod A x2)  Stand to sit:  (Mod A x2)     Transfers  Sit to stand:  (Mod A x2)  Stand to sit:  (Mod A x2)        Coordination  Fine

## 2018-01-09 ENCOUNTER — HOSPITAL ENCOUNTER (INPATIENT)
Age: 83
LOS: 3 days | Discharge: NURSING, REHAB OR LONG-TERM CARE | DRG: 947 | End: 2018-01-12
Attending: INTERNAL MEDICINE | Admitting: INTERNAL MEDICINE
Payer: MEDICARE

## 2018-01-09 VITALS
SYSTOLIC BLOOD PRESSURE: 155 MMHG | TEMPERATURE: 98.5 F | DIASTOLIC BLOOD PRESSURE: 86 MMHG | HEIGHT: 63 IN | RESPIRATION RATE: 18 BRPM | BODY MASS INDEX: 30.12 KG/M2 | OXYGEN SATURATION: 94 % | WEIGHT: 170 LBS | HEART RATE: 69 BPM

## 2018-01-09 LAB
GLUCOSE BLD-MCNC: 194 MG/DL (ref 60–115)
GLUCOSE BLD-MCNC: 196 MG/DL (ref 60–115)
GLUCOSE BLD-MCNC: 222 MG/DL (ref 60–115)
GLUCOSE BLD-MCNC: 229 MG/DL (ref 60–115)
PERFORMED ON: ABNORMAL

## 2018-01-09 PROCEDURE — 97530 THERAPEUTIC ACTIVITIES: CPT

## 2018-01-09 PROCEDURE — 97535 SELF CARE MNGMENT TRAINING: CPT

## 2018-01-09 PROCEDURE — 6370000000 HC RX 637 (ALT 250 FOR IP): Performed by: INTERNAL MEDICINE

## 2018-01-09 PROCEDURE — G8996 SWALLOW CURRENT STATUS: HCPCS

## 2018-01-09 PROCEDURE — 1200000000 HC SEMI PRIVATE

## 2018-01-09 PROCEDURE — 97110 THERAPEUTIC EXERCISES: CPT

## 2018-01-09 PROCEDURE — 2580000003 HC RX 258: Performed by: INTERNAL MEDICINE

## 2018-01-09 PROCEDURE — 97116 GAIT TRAINING THERAPY: CPT

## 2018-01-09 PROCEDURE — 92610 EVALUATE SWALLOWING FUNCTION: CPT

## 2018-01-09 PROCEDURE — G8997 SWALLOW GOAL STATUS: HCPCS

## 2018-01-09 RX ORDER — NITROFURANTOIN 25; 75 MG/1; MG/1
100 CAPSULE ORAL EVERY 12 HOURS SCHEDULED
Status: DISCONTINUED | OUTPATIENT
Start: 2018-01-09 | End: 2018-01-12 | Stop reason: HOSPADM

## 2018-01-09 RX ORDER — TRIAMTERENE AND HYDROCHLOROTHIAZIDE 37.5; 25 MG/1; MG/1
1 TABLET ORAL DAILY
Status: DISCONTINUED | OUTPATIENT
Start: 2018-01-09 | End: 2018-01-10

## 2018-01-09 RX ORDER — ASPIRIN 81 MG/1
81 TABLET ORAL DAILY
Status: DISCONTINUED | OUTPATIENT
Start: 2018-01-09 | End: 2018-01-12 | Stop reason: HOSPADM

## 2018-01-09 RX ORDER — ATORVASTATIN CALCIUM 10 MG/1
10 TABLET, FILM COATED ORAL DAILY
Status: DISCONTINUED | OUTPATIENT
Start: 2018-01-09 | End: 2018-01-12 | Stop reason: HOSPADM

## 2018-01-09 RX ORDER — ACETAMINOPHEN 325 MG/1
650 TABLET ORAL EVERY 4 HOURS PRN
Status: DISCONTINUED | OUTPATIENT
Start: 2018-01-09 | End: 2018-01-12 | Stop reason: HOSPADM

## 2018-01-09 RX ORDER — DEXTROSE MONOHYDRATE 25 G/50ML
12.5 INJECTION, SOLUTION INTRAVENOUS PRN
Status: DISCONTINUED | OUTPATIENT
Start: 2018-01-09 | End: 2018-01-12 | Stop reason: HOSPADM

## 2018-01-09 RX ORDER — SODIUM CHLORIDE 0.9 % (FLUSH) 0.9 %
10 SYRINGE (ML) INJECTION PRN
Status: DISCONTINUED | OUTPATIENT
Start: 2018-01-09 | End: 2018-01-12 | Stop reason: HOSPADM

## 2018-01-09 RX ORDER — NICOTINE POLACRILEX 4 MG
15 LOZENGE BUCCAL PRN
Status: DISCONTINUED | OUTPATIENT
Start: 2018-01-09 | End: 2018-01-12 | Stop reason: HOSPADM

## 2018-01-09 RX ORDER — ONDANSETRON 2 MG/ML
4 INJECTION INTRAMUSCULAR; INTRAVENOUS EVERY 6 HOURS PRN
Status: DISCONTINUED | OUTPATIENT
Start: 2018-01-09 | End: 2018-01-12 | Stop reason: HOSPADM

## 2018-01-09 RX ORDER — SENNA PLUS 8.6 MG/1
1 TABLET ORAL NIGHTLY
Status: DISCONTINUED | OUTPATIENT
Start: 2018-01-09 | End: 2018-01-10

## 2018-01-09 RX ORDER — AMLODIPINE BESYLATE 10 MG/1
10 TABLET ORAL DAILY
Status: DISCONTINUED | OUTPATIENT
Start: 2018-01-09 | End: 2018-01-12 | Stop reason: HOSPADM

## 2018-01-09 RX ORDER — BISACODYL 10 MG
10 SUPPOSITORY, RECTAL RECTAL DAILY PRN
Status: DISCONTINUED | OUTPATIENT
Start: 2018-01-09 | End: 2018-01-10

## 2018-01-09 RX ORDER — SODIUM CHLORIDE 0.9 % (FLUSH) 0.9 %
10 SYRINGE (ML) INJECTION EVERY 12 HOURS SCHEDULED
Status: DISCONTINUED | OUTPATIENT
Start: 2018-01-09 | End: 2018-01-12 | Stop reason: HOSPADM

## 2018-01-09 RX ORDER — DEXTROSE MONOHYDRATE 50 MG/ML
100 INJECTION, SOLUTION INTRAVENOUS PRN
Status: DISCONTINUED | OUTPATIENT
Start: 2018-01-09 | End: 2018-01-12 | Stop reason: HOSPADM

## 2018-01-09 RX ORDER — DOCUSATE SODIUM 100 MG/1
100 CAPSULE, LIQUID FILLED ORAL 2 TIMES DAILY
Status: DISCONTINUED | OUTPATIENT
Start: 2018-01-09 | End: 2018-01-10

## 2018-01-09 RX ORDER — POLYETHYLENE GLYCOL 3350 17 G/17G
17 POWDER, FOR SOLUTION ORAL DAILY
Status: DISCONTINUED | OUTPATIENT
Start: 2018-01-09 | End: 2018-01-10

## 2018-01-09 RX ORDER — LISINOPRIL 20 MG/1
40 TABLET ORAL 2 TIMES DAILY
Status: DISCONTINUED | OUTPATIENT
Start: 2018-01-09 | End: 2018-01-12

## 2018-01-09 RX ADMIN — NITROFURANTOIN (MONOHYDRATE/MACROCRYSTALS) 100 MG: 75; 25 CAPSULE ORAL at 20:27

## 2018-01-09 RX ADMIN — ATORVASTATIN CALCIUM 10 MG: 10 TABLET, FILM COATED ORAL at 20:27

## 2018-01-09 RX ADMIN — METOPROLOL TARTRATE 25 MG: 25 TABLET ORAL at 20:27

## 2018-01-09 RX ADMIN — LISINOPRIL 40 MG: 20 TABLET ORAL at 20:27

## 2018-01-09 RX ADMIN — ACETAMINOPHEN 650 MG: 325 TABLET ORAL at 06:21

## 2018-01-09 RX ADMIN — Medication 10 ML: at 20:27

## 2018-01-09 ASSESSMENT — PAIN SCALES - GENERAL: PAINLEVEL_OUTOF10: 4

## 2018-01-09 NOTE — H&P
Hospital Medicine History & Physical      PCP: Javier Evans MD    Date of Admission: 1/3/2018    Date of Service: 1/9/18      Chief Complaint:  weakness      History Of Present Illness:  80 y.o. female who presented to [facility] after fall, severe weakness, was diagnosed with UTI/dehydration, after completing acute stay was transferred to skilled status to complete rehabilitation     Past Medical History:          Diagnosis Date    Arthritis     Chicken pox     Chronic back pain     Chronic low back pain 8/17/2016    Eczematous dermatitis     History of bladder infections     Hyperlipidemia     Hypertension     Measles     Mumps     Osteoarthritis 5/29/2012    SCC (squamous cell carcinoma), arm 2013    right upper arm, 2015 right forarm    SI (stress incontinence), female 5/29/2012    Type II or unspecified type diabetes mellitus without mention of complication, not stated as uncontrolled     Whooping cough        Past Surgical History:          Procedure Laterality Date    ANKLE SURGERY      broken left ankle.  APPENDECTOMY      BREAST BIOPSY      x2 left breast    CATARACT REMOVAL  2004    bilateral    CYSTOCELE REPAIR      EYE SURGERY      bilateral cataract    HYSTERECTOMY      complete at age 39    [address]      as a child       Medications Prior to Admission:      Prior to Admission medications    Medication Sig Start Date End Date Taking?  Authorizing Provider   triamterene-hydrochlorothiazide (MAXZIDE-25) 37.5-25 MG per tablet Take 1 tablet by mouth daily   Yes Historical Provider, MD   lisinopril (PRINIVIL;ZESTRIL) 20 MG tablet TAKE 1 TABLET DAILY 11/20/17  Yes Javier Evans MD   TRUEPLUS LANCETS 28G MISC TEST TWO TIMES DAILY 6/16/17  Yes PEDRO LUIS Villa   metFORMIN (GLUCOPHAGE) 1000 MG tablet Take 1 tablet by mouth 2 times daily (with meals) 6/15/17  Yes Javeir Evans MD   metoprolol tartrate (LOPRESSOR) 25 MG tablet Take 1 tablet by mouth 2 times daily 5/1/17  Yes Blanca Lugo MD   Incontinence Supply Disposable (DEPEND UNDERGARMENTS) MISC 1 each by Does not apply route nightly 5/1/17  Yes Blanca Lugo MD   Glucose Blood (BLOOD GLUCOSE TEST STRIPS) STRP Test 2-3 x per day  Dx: E11.9 1/16/17  Yes Blanca Lugo MD   Blood Glucose Monitoring Suppl (TRUE METRIX AIR GLUCOSE METER) W/DEVICE KIT  4/26/16  Yes Historical Provider, MD   Blood Glucose Monitoring Suppl PRUDENCIO Dx: E11.9 4/27/16  Yes Jaguar March, DO   Compression Stockings MISC by Does not apply route Knee high, 20-30 mmHg 6/24/15  Yes Blnaca Lugo MD   aspirin 81 MG tablet Take 81 mg by mouth daily. Yes Historical Provider, MD   atorvastatin (LIPITOR) 10 MG tablet take 1 tablet by mouth once daily 5/15/17   Blanca Lugo MD   fluticasone [facility]) 50 MCG/ACT nasal spray instill 2 sprays into each nostril once daily 7/18/16   Mariaelena Post PA-C   Vitamin D (CHOLECALCIFEROL) 1000 UNITS CAPS capsule Take 1,000 Units by mouth daily. Historical Provider, MD   Omega-3 Fatty Acids (FISH OIL) 1200 MG CAPS Take  by mouth daily. Historical Provider, MD       Allergies:  Metoclopramide; Pantoprazole; Pcn [penicillins]; Protonix [pantoprazole sodium]; and Tramadol    Social History:      The patient currently lives at home    TOBACCO:   reports that she has never smoked. She has never used smokeless tobacco.  ETOH:   reports that she does not drink alcohol. Family History:       Reviewed in detail and negative for DM, CAD, Cancer, CVA. Positive as follows:        Problem Relation Age of Onset    Diabetes Mother     Heart Disease Father        REVIEW OF SYSTEMS:   Pertinent positives as noted in the HPI. All other systems reviewed and negative.     PHYSICAL EXAM:    BP (!) 155/86   Pulse 69   Temp 98.5 °F (36.9 °C) (Oral)   Resp 18   Ht 5' 3\" (1.6 m)   Wt 170 lb (77.1 kg)   SpO2 96%   BMI 30.11 kg/m²     General appearance:  No apparent distress, appears stated

## 2018-01-09 NOTE — PROGRESS NOTES
leakage and reduced oral performances. Recommendations: The following recommendations are suggested:    Pureed diet  All liquids to nectar thickness  Proper positioning  Dysphagia therapy to address oral motor performances  Aspiration precautions                  Plan: Patient to be seen 1-3 times weekly. Goals: Patient to demonstrate good oral agility performances on demand. Patient to demonstrate no pouching of solids. Patient/family involved in developing goals and treatment plan: Reviewed with patient's son. Subjective:   Previous level of function and limitations: Normal diet,but the son reported a decline in status and occasional chocking.                     Objective:Return to normal diet consistency                                             Cognition: appears adequate          Additional Assessments: none at this time             Prognosis: favorable       Education: Reviewed with patient,son, staff       G-Code:CK            Therapy Time:   Individual Concurrent Group Co-treatment   Time In  11:10         Time Out  11:45         Minutes  35                 PK Kendrick  Ph.D.  [facility]-SLP/A  [id number]  [id number]  1/9/2018 11:45 AM

## 2018-01-09 NOTE — PROGRESS NOTES
sit-Mod A x2  Pt. Appears tired and confused  Pt. Does not answer when asked questions  Pt. Falls asleep in between exercises  Pt.'s family wants pt. To engage in OT and get stronger  Trained and educated pt.'s family in pt.'s statis and exercises                                                          Type of ROM/Therapeutic Exercise  Type of ROM/Therapeutic Exercise: AROM (6 sets in all planes and directions 2 sets of 10)  With rest breaks in between and demonstrated 3x each  Exercises  Grasp/Release:  (hand and arm squeezes with BUE with therapy balls 15x)  To increase and maintain UB strength                    Assessment      REQUIRES OT FOLLOW UP: Yes       Discharge Recommendations:  [address]  Times per week: 3-6  Plan weeks: 3-4  Current Treatment Recommendations: Strengthening, ROM, Balance Training, Functional Mobility Training, Endurance Training, Neuromuscular Re-education, Safety Education & Training, Patient/Caregiver Education & Training, Self-Care / ADL  G-Code     OutComes Score                                           AM-PAC Score             Goals  Short term goals  Time Frame for Short term goals: 2 wks  Short term goal 1: Increase to Perry/CGA bed mob sup <-> sit and rolling  Short term goal 2: Increase to Perry/CGA UB dressing, modA LB dressing  Short term goal 3: Tolerate BUE ther ex/act [id number] prior to fatigue to increase strength/endurance for ADL  Short term goal 4: Increase to 3-5min standing leonardo/endurance prior to fatigue to decrease fall risk during ADL  Long term goals  Time Frame for Long term goals : 3-4 wks  Long term goal 1: Increase to SBA/Spv bed mob sup <-> sit and rolling  Long term goal 2: Increase to SBA/Spv UB dressing, CGA LB dressing  Long term goal 3: Increase BUE shoulder flexion MMT to 4-/5 to increase safety and I with ADL  Long term goal 4:  Increase to 5-7min standing leonardo/endurance prior to fatigue to decrease fall risk

## 2018-01-10 LAB
GLUCOSE BLD-MCNC: 137 MG/DL (ref 60–115)
GLUCOSE BLD-MCNC: 143 MG/DL (ref 60–115)
GLUCOSE BLD-MCNC: 195 MG/DL (ref 60–115)
GLUCOSE BLD-MCNC: 257 MG/DL (ref 60–115)
PERFORMED ON: ABNORMAL

## 2018-01-10 PROCEDURE — G8987 SELF CARE CURRENT STATUS: HCPCS

## 2018-01-10 PROCEDURE — 97165 OT EVAL LOW COMPLEX 30 MIN: CPT

## 2018-01-10 PROCEDURE — 6360000002 HC RX W HCPCS: Performed by: INTERNAL MEDICINE

## 2018-01-10 PROCEDURE — 97530 THERAPEUTIC ACTIVITIES: CPT

## 2018-01-10 PROCEDURE — 1200000000 HC SEMI PRIVATE

## 2018-01-10 PROCEDURE — 2500000003 HC RX 250 WO HCPCS: Performed by: INTERNAL MEDICINE

## 2018-01-10 PROCEDURE — 6370000000 HC RX 637 (ALT 250 FOR IP): Performed by: INTERNAL MEDICINE

## 2018-01-10 PROCEDURE — 97110 THERAPEUTIC EXERCISES: CPT

## 2018-01-10 PROCEDURE — G8988 SELF CARE GOAL STATUS: HCPCS

## 2018-01-10 PROCEDURE — 97162 PT EVAL MOD COMPLEX 30 MIN: CPT

## 2018-01-10 RX ORDER — TRIAMTERENE AND HYDROCHLOROTHIAZIDE 37.5; 25 MG/1; MG/1
1 TABLET ORAL 2 TIMES DAILY
Status: DISCONTINUED | OUTPATIENT
Start: 2018-01-10 | End: 2018-01-12

## 2018-01-10 RX ADMIN — NITROFURANTOIN (MONOHYDRATE/MACROCRYSTALS) 100 MG: 75; 25 CAPSULE ORAL at 10:09

## 2018-01-10 RX ADMIN — TRIAMTERENE AND HYDROCHLOROTHIAZIDE 1 TABLET: 37.5; 25 TABLET ORAL at 21:32

## 2018-01-10 RX ADMIN — METFORMIN HYDROCHLORIDE 1000 MG: 500 TABLET, FILM COATED ORAL at 10:09

## 2018-01-10 RX ADMIN — ACETAMINOPHEN 650 MG: 325 TABLET ORAL at 06:48

## 2018-01-10 RX ADMIN — NITROFURANTOIN (MONOHYDRATE/MACROCRYSTALS) 100 MG: 75; 25 CAPSULE ORAL at 21:32

## 2018-01-10 RX ADMIN — Medication: at 17:13

## 2018-01-10 RX ADMIN — METOPROLOL TARTRATE 25 MG: 25 TABLET ORAL at 21:32

## 2018-01-10 RX ADMIN — ASPIRIN 81 MG: 81 TABLET, COATED ORAL at 10:09

## 2018-01-10 RX ADMIN — METFORMIN HYDROCHLORIDE 1000 MG: 500 TABLET, FILM COATED ORAL at 17:13

## 2018-01-10 RX ADMIN — VITAMIN D, TAB 1000IU (100/BT) 1000 UNITS: 25 TAB at 10:09

## 2018-01-10 RX ADMIN — LISINOPRIL 40 MG: 20 TABLET ORAL at 21:32

## 2018-01-10 RX ADMIN — METOPROLOL TARTRATE 25 MG: 25 TABLET ORAL at 10:09

## 2018-01-10 RX ADMIN — ENOXAPARIN SODIUM 30 MG: 30 INJECTION SUBCUTANEOUS at 10:08

## 2018-01-10 RX ADMIN — TRIAMTERENE AND HYDROCHLOROTHIAZIDE 1 TABLET: 37.5; 25 TABLET ORAL at 10:09

## 2018-01-10 RX ADMIN — LISINOPRIL 40 MG: 20 TABLET ORAL at 10:09

## 2018-01-10 RX ADMIN — AMLODIPINE BESYLATE 10 MG: 10 TABLET ORAL at 10:09

## 2018-01-10 RX ADMIN — ATORVASTATIN CALCIUM 10 MG: 10 TABLET, FILM COATED ORAL at 21:32

## 2018-01-10 ASSESSMENT — PAIN SCALES - GENERAL
PAINLEVEL_OUTOF10: 0
PAINLEVEL_OUTOF10: 4

## 2018-01-10 ASSESSMENT — PAIN DESCRIPTION - PAIN TYPE: TYPE: CHRONIC PAIN

## 2018-01-10 NOTE — PROGRESS NOTES
with Moda A and wheelchair follow  Short term goal 3:  [address] lift 2 minutes before fatigued  Short term goal 4: toelrate 2x10 LE supine or seated EFRAÍN with Assist  Long term goals  Time Frame for Long term goals : 3-4 weeks  Long term goal 1: bed to chair with <= CGA of 1 person/ sit to stand with <= CGA of 1 person  Long term goal 2: ambulate >=75ft with ww and <= CGA of 1 person  Long term goal 3: 2 stairs with 1 rail and <= CGA , marked time  Long term goal 4: increase LE strength any to increase function  Long term goal 5: DME and education in place for discharge/HEP  Patient Goals   Patient goals : get stronger and go home    Plan    Plan  Times per week: 5-7x/week  Times per day:  (1-2 x/day)  Current Treatment Recommendations: Strengthening, Functional Mobility Training, Transfer Training, Endurance Training, Gait Training, Stair training, Home Exercise Program, Cognitive Reorientation, Patient/Caregiver Education & Training, Equipment Evaluation, Education, & procurement, Positioning  Safety Devices  Type of devices:  All fall risk precautions in place, Bed alarm in place, Call light within reach, Left in bed  Restraints  Initially in place: Yes     Therapy Time   Individual Concurrent Group Co-treatment   Time In  1300         Time Out  1340         Minutes  [address], PTA  License and [address] Number: [id number]

## 2018-01-10 NOTE — PROGRESS NOTES
Occupational Therapy   Occupational Therapy Initial Assessment  Date: 1/10/2018   Patient Name: Vero Schulz  MRN: [id number]     : 6/10/1927    Patient Diagnosis(es): There were no encounter diagnoses. has a past medical history of Arthritis; Chicken pox; Chronic back pain; Chronic low back pain; Eczematous dermatitis; History of bladder infections; Hyperlipidemia; Hypertension; Measles; Mumps; Osteoarthritis; SCC (squamous cell carcinoma), arm; SI (stress incontinence), female; Type II or unspecified type diabetes mellitus without mention of complication, not stated as uncontrolled; and Whooping cough. has a past surgical history that includes Appendectomy; Rectocele repair; Cystocele repair; Ankle surgery; Breast biopsy; Cataract removal (); eye surgery; Tonsillectomy; and Hysterectomy. Restrictions  Restrictions/Precautions  Restrictions/Precautions: (P) Fall Risk (Puree/NTL diet per ST as of 18)  Required Braces or Orthoses?: (P) No  Co-tx with PTA for safety and pt requires 2 person assist for xfers, bed mob, ADL    Subjective   General  Chart Reviewed: Yes  Patient assessed for rehabilitation services?: Yes  Family / Caregiver Present: No  Referring Practitioner: Dr. Gina Burroughs  Subjective  Subjective: \"I'm getting tired\"  General Comment  Comments: Pt seated up in recliner chair BLE elevated, visiting with 2 sons.   Pain Assessment  Patient Currently in Pain: Yes  Pain Assessment: 0-10  Pain Level:  (unable to state a number)  Pain Type: Chronic pain  Pain Location:  (both elbows)  Response to Pain Intervention: Asleep with RR greater than 10     Social/Functional History  Social/Functional History  Lives With: Son (son lives upstairs)  Type of Home: House  Home Layout: Two level  Home Access: Stairs to enter without rails  Entrance Stairs - Number of Steps: 1 step to patio and one step in the door  Entrance Stairs - Rails: Left  Bathroom Shower/Tub: Walk-in shower, Shower chair with awareness;Decreased endurance;Decreased balance;Decreased coordination  Assessment: co-tx with PTA 2* pt 2 person assist and for safety and 2* pt very poor endurance. Noted decline in fxn since last OT eval. pt also presents very fatigued, max cues for sequencing of all activities  Prognosis: Poor  Decision Making: High Complexity  Discharge Recommendations: Subacute/Skilled Nursing Facility;24 hour supervision or assist  REQUIRES OT FOLLOW UP: Yes  OT Equipment Recommendations  Equipment Needed: No        Discharge Recommendations:  [address], 24 hour supervision or assist     Plan   Plan  Times per week: 3-6  Plan weeks: 3-4  Current Treatment Recommendations: Strengthening, ROM, Balance Training, Functional Mobility Training, Endurance Training, Neuromuscular Re-education, Safety Education & Training, Patient/Caregiver Education & Training, Self-Care / ADL    G-Code  OT G-codes  Functional Assessment Tool Used: PSFS  Functional Limitation: Self care  Self Care Current Status (): At least 80 percent but less than 100 percent impaired, limited or restricted  Self Care Goal Status (): At least 60 percent but less than 80 percent impaired, limited or restricted  OutComes Score                                           AM-PAC Score             Goals  Short term goals  Time Frame for Short term goals: 2 wks  Short term goal 1: Increase to modA bed mob sup <-> sit and rolling  Short term goal 2: Increase to maxA UB/LB dressing  Short term goal 3: Tolerate BUE ther ex/act [id number] prior to fatigue to increase strength/endurance for ADL  Short term goal 4: Increase to 1-3min standing leonardo/endurance prior to fatigue to decrease fall risk during ADL  Long term goals  Time Frame for Long term goals : 3-4 wks  Long term goal 1: Increase to [place] bed mob sup <-> sit and rolling  Long term goal 2:  Increase to modA UB/LB dressing  Long term goal 3: Tolerate BUE ther ex/act 2x15 reps all planes

## 2018-01-10 NOTE — PROGRESS NOTES
Intake Improving    Nutrition Risk Level: Moderate    Nutrition Interventions:   Continue current diet  Continued Inpatient Monitoring, Education not appropriate at this time    Nutrition Evaluation:   · Evaluation: Goals set   · Goals: po intake > 75% of meals, if intake decreases to 50%, will trial frozen ONS. Glu<140. Fluid intake to meet  75% of estimated needs. · Monitoring: Meal Intake, Diet Tolerance, Fluid Balance, Ascites/Edema, Skin Integrity, Weight, Pertinent Labs, Chewing/Swallowing    See Adult Nutrition Doc Flowsheet for more detail.      Electronically signed by Juan Shane RD, LD on 1/10/18 at 2:49 PM

## 2018-01-10 NOTE — PROGRESS NOTES
Neurovascularly intact without any focal sensory/motor deficits. Psychiatric: Alert and oriented,   Capillary Refill: Brisk,< 3 seconds   Peripheral Pulses: +2 palpable, equal bilaterally       Labs:   Recent Labs      01/08/18   0546   WBC  12.3*   HGB  11.3*   HCT  34.0*   PLT  176     Recent Labs      01/08/18   0546   NA  135   K  4.8   CL  96*   CO2  22   BUN  39*   CREATININE  1.64*   CALCIUM  12.9*     No results for input(s): AST, ALT, BILIDIR, BILITOT, ALKPHOS in the last 72 hours. No results for input(s): INR in the last 72 hours. No results for input(s): Dalphine [place] in the last 72 hours. Urinalysis:      Lab Results   Component Value Date    NITRU POSITIVE 01/03/2018    WBCUA 10-20 01/03/2018    BACTERIA Moderate 01/03/2018    RBCUA 3-5 01/03/2018    BLOODU TRACE 01/03/2018    SPECGRAV 1.015 01/03/2018    GLUCOSEU Negative 01/03/2018       Radiology:  No orders to display           Assessment/Plan:    Active Hospital Problems    Diagnosis Date Noted    UTI (urinary tract infection) [N39.0] 01/03/2018         DVT Prophylaxis: lovenox  Diet: DIET DYSPHAGIA I PUREED;  Nectar Thick  Code Status: Full Code    PT/OT Eval Status: done    Dispo - Weakness- rehab orders  HTN-increase maxzide to BID, follow up clinically    Vomiting, chocking-speech eval done, diet modified to pureed   UTI- to complete PO atbs  metabolic encephalopathy, dementia- baseline, continue with supportive care    Stable for subacute status at [facility]       Electronically signed by Alek Colon MD on 1/10/2018 at 8:56 AM

## 2018-01-10 NOTE — PROGRESS NOTES
Physical Therapy    Facility/Department: [facility] MED SURG UNIT  Initial Assessment    NAME: Gianfranco Centeno  : 6/10/1927  MRN: [id number]    Date of Service: 1/10/2018    Patient Diagnosis(es): There were no encounter diagnoses. has a past medical history of Arthritis; Chicken pox; Chronic back pain; Chronic low back pain; Eczematous dermatitis; History of bladder infections; Hyperlipidemia; Hypertension; Measles; Mumps; Osteoarthritis; SCC (squamous cell carcinoma), arm; SI (stress incontinence), female; Type II or unspecified type diabetes mellitus without mention of complication, not stated as uncontrolled; and Whooping cough. has a past surgical history that includes Appendectomy; Rectocele repair; Cystocele repair; Ankle surgery; Breast biopsy; Cataract removal (); eye surgery; Tonsillectomy; and Hysterectomy.     Restrictions  Restrictions/Precautions  Restrictions/Precautions: Fall Risk (diet nectar thick and pureed as of  18 per ST)  Vision/Hearing        Subjective  General  Family / Caregiver Present: No  Referring Practitioner: Dr Deejay Warren  Referral Date : 01/10/18  Diagnosis: UTI, weakness, with recent fall   Follows Commands: Impaired (slower response time than medical evalaution, needs more time to process)  Subjective  Subjective: Pt with flat affect, but responsive to questions at slower pace response, ? time to process  Pain Screening  Patient Currently in Pain: Denies  Pain Assessment  Pain Assessment: 0-10  Pain Level: 0       Orientation  Orientation  Overall Orientation Status: Impaired  Orientation Level: Oriented to person;Oriented to situation (2018 year with [facility])    Social/Functional History  Social/Functional History  Lives With: Son (son lives upstairs)  Type of Home: House  Home Layout: Two level  Home Access: Stairs to enter without rails  Entrance Stairs - Number of Steps: 1 step to patio and one step in the door  Entrance Stairs - Rails: Left  Bathroom Balance  Posture: Fair (flexed)  Sitting - Static: Fair;- (slight lenan back and to right needed cues)  Sitting - Dynamic: Poor;+  Standing - Static: Fair;- (clarke steady)  Standing - Dynamic: Poor  Comments: max standing with [place] lift of 30sec x 1 this date  Exercises  Heelslides: in recliner AAROM x5 reps each therapist doing majority of motion  Hip Abduction: in recliner AAROM x5 reps each therapist doing majority of motion  Ankle Pumps: 20x     Assessment   Body structures, Functions, Activity limitations: Decreased functional mobility ; Decreased strength;Decreased balance;Decreased endurance;Decreased cognition  Assessment: 89yof known to PT from medical evaluation last week, pt with decrease in strength now more weakness noted in LLE than RLE, LLE was strongest leg last week, slow to process and initiate, increased cing from past week, appropriatee for subacute trial, but may need slower paced rehaba and/ or 24 hour asssist at discharge if does nto progress as quickly as anticipated  Treatment Diagnosis: decresed functional mobility, LE weakness  Prognosis: Good  Decision Making: Low Complexity  Activity Tolerance  Activity Tolerance: Patient limited by fatigue;Patient limited by cognitive status; Patient limited by endurance  Activity Tolerance: slow response time, cuing to stay on task and participate  PT Equipment Recommendations  Other: TBD     Discharge Recommendations:  Continue to assess pending progress (may need 24 hour assist at facility)      Plan   Plan  Times per week: 5-7x/week  Times per day:  (1-2x/day)  Current Treatment Recommendations: Strengthening, Functional Mobility Training, Transfer Training, Endurance Training, Gait Training, Stair training, Home Exercise Program, Cognitive Reorientation, Patient/Caregiver Education & Training, Equipment Evaluation, Education, & procurement, Positioning  Safety Devices  Type of devices: Bed alarm in place, Call light within reach, Gait belt,

## 2018-01-11 LAB
ANION GAP SERPL CALCULATED.3IONS-SCNC: 18 MEQ/L (ref 7–13)
BASOPHILS ABSOLUTE: 0.1 K/UL (ref 0–0.2)
BASOPHILS RELATIVE PERCENT: 0.5 %
BUN BLDV-MCNC: 52 MG/DL (ref 8–23)
CALCIUM SERPL-MCNC: 14.9 MG/DL (ref 8.6–10.2)
CHLORIDE BLD-SCNC: 98 MEQ/L (ref 98–107)
CO2: 20 MEQ/L (ref 22–29)
CREAT SERPL-MCNC: 1.51 MG/DL (ref 0.5–0.9)
EKG ATRIAL RATE: 71 BPM
EKG ATRIAL RATE: 77 BPM
EKG P AXIS: 57 DEGREES
EKG P AXIS: 59 DEGREES
EKG P-R INTERVAL: 182 MS
EKG P-R INTERVAL: 204 MS
EKG Q-T INTERVAL: 338 MS
EKG Q-T INTERVAL: 366 MS
EKG QRS DURATION: 88 MS
EKG QRS DURATION: 88 MS
EKG QTC CALCULATION (BAZETT): 382 MS
EKG QTC CALCULATION (BAZETT): 397 MS
EKG R AXIS: 0 DEGREES
EKG R AXIS: 2 DEGREES
EKG T AXIS: 90 DEGREES
EKG T AXIS: 98 DEGREES
EKG VENTRICULAR RATE: 71 BPM
EKG VENTRICULAR RATE: 77 BPM
EOSINOPHILS ABSOLUTE: 0.8 K/UL (ref 0–0.7)
EOSINOPHILS RELATIVE PERCENT: 6.7 %
GFR AFRICAN AMERICAN: 39.1
GFR NON-AFRICAN AMERICAN: 32.3
GLUCOSE BLD-MCNC: 142 MG/DL (ref 60–115)
GLUCOSE BLD-MCNC: 145 MG/DL (ref 74–109)
GLUCOSE BLD-MCNC: 146 MG/DL (ref 60–115)
GLUCOSE BLD-MCNC: 174 MG/DL (ref 60–115)
HCT VFR BLD CALC: 39.5 % (ref 37–47)
HEMOGLOBIN: 13 G/DL (ref 12–16)
LYMPHOCYTES ABSOLUTE: 2.1 K/UL (ref 1–4.8)
LYMPHOCYTES RELATIVE PERCENT: 17.2 %
MAGNESIUM: 1.4 MG/DL (ref 1.7–2.3)
MCH RBC QN AUTO: 30.9 PG (ref 27–31.3)
MCHC RBC AUTO-ENTMCNC: 32.9 % (ref 33–37)
MCV RBC AUTO: 93.9 FL (ref 82–100)
MONOCYTES ABSOLUTE: 0.5 K/UL (ref 0.2–0.8)
MONOCYTES RELATIVE PERCENT: 4 %
NEUTROPHILS ABSOLUTE: 8.6 K/UL (ref 1.4–6.5)
NEUTROPHILS RELATIVE PERCENT: 71.6 %
PARATHYROID HORMONE INTACT: 10.5 PG/ML (ref 15–65)
PDW BLD-RTO: 13.6 % (ref 11.5–14.5)
PERFORMED ON: ABNORMAL
PHOSPHORUS: 3.9 MG/DL (ref 2.5–4.5)
PLATELET # BLD: 283 K/UL (ref 130–400)
POTASSIUM SERPL-SCNC: 5.3 MEQ/L (ref 3.5–5.1)
RBC # BLD: 4.2 M/UL (ref 4.2–5.4)
SODIUM BLD-SCNC: 136 MEQ/L (ref 132–144)
TSH SERPL DL<=0.05 MIU/L-ACNC: 1.72 UIU/ML (ref 0.27–4.2)
VITAMIN D 25-HYDROXY: 37.3 NG/ML (ref 30–100)
WBC # BLD: 12.1 K/UL (ref 4.8–10.8)

## 2018-01-11 PROCEDURE — 83970 ASSAY OF PARATHORMONE: CPT

## 2018-01-11 PROCEDURE — G8997 SWALLOW GOAL STATUS: HCPCS

## 2018-01-11 PROCEDURE — 92610 EVALUATE SWALLOWING FUNCTION: CPT

## 2018-01-11 PROCEDURE — 84443 ASSAY THYROID STIM HORMONE: CPT

## 2018-01-11 PROCEDURE — 83735 ASSAY OF MAGNESIUM: CPT

## 2018-01-11 PROCEDURE — 85025 COMPLETE CBC W/AUTO DIFF WBC: CPT

## 2018-01-11 PROCEDURE — 6370000000 HC RX 637 (ALT 250 FOR IP): Performed by: INTERNAL MEDICINE

## 2018-01-11 PROCEDURE — 93005 ELECTROCARDIOGRAM TRACING: CPT

## 2018-01-11 PROCEDURE — 97530 THERAPEUTIC ACTIVITIES: CPT

## 2018-01-11 PROCEDURE — 2580000003 HC RX 258: Performed by: INTERNAL MEDICINE

## 2018-01-11 PROCEDURE — G8996 SWALLOW CURRENT STATUS: HCPCS

## 2018-01-11 PROCEDURE — 82306 VITAMIN D 25 HYDROXY: CPT

## 2018-01-11 PROCEDURE — 97535 SELF CARE MNGMENT TRAINING: CPT

## 2018-01-11 PROCEDURE — 36415 COLL VENOUS BLD VENIPUNCTURE: CPT

## 2018-01-11 PROCEDURE — 6360000002 HC RX W HCPCS: Performed by: INTERNAL MEDICINE

## 2018-01-11 PROCEDURE — 80048 BASIC METABOLIC PNL TOTAL CA: CPT

## 2018-01-11 PROCEDURE — 1200000000 HC SEMI PRIVATE

## 2018-01-11 PROCEDURE — 97140 MANUAL THERAPY 1/> REGIONS: CPT

## 2018-01-11 PROCEDURE — 84100 ASSAY OF PHOSPHORUS: CPT

## 2018-01-11 RX ORDER — MAGNESIUM SULFATE 1 G/100ML
1 INJECTION INTRAVENOUS ONCE
Status: COMPLETED | OUTPATIENT
Start: 2018-01-11 | End: 2018-01-11

## 2018-01-11 RX ORDER — SODIUM CHLORIDE 9 MG/ML
INJECTION, SOLUTION INTRAVENOUS CONTINUOUS
Status: DISCONTINUED | OUTPATIENT
Start: 2018-01-11 | End: 2018-01-12 | Stop reason: HOSPADM

## 2018-01-11 RX ADMIN — METOPROLOL TARTRATE 25 MG: 25 TABLET ORAL at 09:07

## 2018-01-11 RX ADMIN — ENOXAPARIN SODIUM 30 MG: 30 INJECTION SUBCUTANEOUS at 09:06

## 2018-01-11 RX ADMIN — NITROFURANTOIN (MONOHYDRATE/MACROCRYSTALS) 100 MG: 75; 25 CAPSULE ORAL at 09:07

## 2018-01-11 RX ADMIN — LISINOPRIL 40 MG: 20 TABLET ORAL at 20:16

## 2018-01-11 RX ADMIN — METFORMIN HYDROCHLORIDE 1000 MG: 500 TABLET, FILM COATED ORAL at 09:06

## 2018-01-11 RX ADMIN — METOPROLOL TARTRATE 25 MG: 25 TABLET ORAL at 20:16

## 2018-01-11 RX ADMIN — MAGNESIUM SULFATE HEPTAHYDRATE 1 G: 1 INJECTION, SOLUTION INTRAVENOUS at 12:40

## 2018-01-11 RX ADMIN — LISINOPRIL 40 MG: 20 TABLET ORAL at 09:07

## 2018-01-11 RX ADMIN — METFORMIN HYDROCHLORIDE 1000 MG: 500 TABLET, FILM COATED ORAL at 17:18

## 2018-01-11 RX ADMIN — AMLODIPINE BESYLATE 10 MG: 10 TABLET ORAL at 09:07

## 2018-01-11 RX ADMIN — SODIUM CHLORIDE: 900 INJECTION, SOLUTION INTRAVENOUS at 09:09

## 2018-01-11 RX ADMIN — Medication 10 ML: at 20:18

## 2018-01-11 RX ADMIN — TRIAMTERENE AND HYDROCHLOROTHIAZIDE 1 TABLET: 37.5; 25 TABLET ORAL at 20:16

## 2018-01-11 RX ADMIN — TRIAMTERENE AND HYDROCHLOROTHIAZIDE 1 TABLET: 37.5; 25 TABLET ORAL at 09:07

## 2018-01-11 RX ADMIN — ATORVASTATIN CALCIUM 10 MG: 10 TABLET, FILM COATED ORAL at 20:16

## 2018-01-11 RX ADMIN — NITROFURANTOIN (MONOHYDRATE/MACROCRYSTALS) 100 MG: 75; 25 CAPSULE ORAL at 20:16

## 2018-01-11 RX ADMIN — VITAMIN D, TAB 1000IU (100/BT) 1000 UNITS: 25 TAB at 09:07

## 2018-01-11 RX ADMIN — SODIUM CHLORIDE: 900 INJECTION, SOLUTION INTRAVENOUS at 23:07

## 2018-01-11 RX ADMIN — ASPIRIN 81 MG: 81 TABLET, COATED ORAL at 09:07

## 2018-01-11 ASSESSMENT — PAIN SCALES - GENERAL
PAINLEVEL_OUTOF10: 0

## 2018-01-11 NOTE — H&P
Take 1 tablet by mouth 2 times daily (with meals) 6/15/17   Yes Luis Scott MD   metoprolol tartrate (LOPRESSOR) 25 MG tablet Take 1 tablet by mouth 2 times daily 5/1/17   Yes Luis Scott MD   Incontinence Supply Disposable (DEPEND UNDERGARMENTS) MISC 1 each by Does not apply route nightly 5/1/17   Yes Luis Scott MD   Glucose Blood (BLOOD GLUCOSE TEST STRIPS) STRP Test 2-3 x per day  Dx: E11.9 1/16/17   Yes Luis Scott MD   Blood Glucose Monitoring Suppl (TRUE METRIX AIR GLUCOSE METER) W/DEVICE KIT   4/26/16   Yes Historical Provider, MD   Blood Glucose Monitoring Suppl PRUDENCIO Dx: E11.9 4/27/16   Yes Sherren Balm, DO   Compression Stockings MISC by Does not apply route Knee high, 20-30 mmHg 6/24/15   Yes Luis Scott MD   aspirin 81 MG tablet Take 81 mg by mouth daily.     Yes Historical Provider, MD   atorvastatin (LIPITOR) 10 MG tablet take 1 tablet by mouth once daily 5/15/17     Luis Scott MD   fluticasone (FLONASE) 50 MCG/ACT nasal spray instill 2 sprays into each nostril once daily 7/18/16     Mariaelena Post PA-C   Vitamin D (CHOLECALCIFEROL) 1000 UNITS CAPS capsule Take 1,000 Units by mouth daily.       Historical Provider, MD   Omega-3 Fatty Acids (FISH OIL) 1200 MG CAPS Take  by mouth daily.       Historical Provider, MD            Allergies:  Metoclopramide; Pantoprazole; Pcn [penicillins]; Protonix [pantoprazole sodium]; and Tramadol     Social History:       The patient currently lives at home     TOBACCO:   reports that she has never smoked. She has never used smokeless tobacco.  ETOH:   reports that she does not drink alcohol.        Family History:        Reviewed in detail and negative for DM, CAD, Cancer, CVA. Positive as follows:     Family History             Problem Relation Age of Onset    Diabetes Mother      Heart Disease Father              REVIEW OF SYSTEMS:   Pertinent positives as noted in the HPI.  All other systems reviewed and negative.     PHYSICAL Result   1. RIGHT INTERNAL CAROTID ARTERY: <50% STENOSIS. 2. LEFT INTERNAL CAROTID ARTERY: <50% STENOSIS. 3. MILD TO MODERATE CAROTID PLAQUE BURDEN IN BOTH CAROTID BIFURCATIONS IN THE NECK. 4. BILATERALLY PATENT VERTEBRAL ARTERIES WITH ANTEGRADE BLOOD FLOW.       Validated velocity measurements with angiographic measurements, velocity criteria are extrapolated from diameter data as defined by the Society of Radiologist in [address] [place] [address] Radiology 2003; 209;401-702\".         MRI Brain WO Contrast   Final Result       XR CHEST PORTABLE   Final Result   Impression: No acute process           XR KNEE LEFT (MIN 4 VIEWS)   Final Result   No definitive fracture is seen bilaterally in the knees. A lucency seen on the AP projection in the superior pole of the tibia on the left could represent spurring. There are mild degenerative changes and evidence of CPPD. Loose bodies in the    right knee are not excluded.               XR KNEE RIGHT (MIN 4 VIEWS)   Final Result   No definitive fracture is seen bilaterally in the knees. A lucency seen on the AP projection in the superior pole of the tibia on the left could represent spurring. There are mild degenerative changes and evidence of CPPD. Loose bodies in the    right knee are not excluded.               CT Head WO Contrast   Final Result   1.   Age-appropriate cortical atrophy and periventricular microangiopathy.       2. Otherwise unremarkable unenhanced CT brain with no acute hemorrhage or extra-axial fluid collection       All CT scans at this facility use dose modulation, iterative reconstruction, and/or weight based dosing when appropriate to reduce radiation dose to as low as reasonably achievable.                 ASSESSMENT:           Active Hospital Problems     Diagnosis Date Noted    HTN (hypertension) [I10] 11/29/2011       Priority: High    Diabetes mellitus [E11.9] 11/29/2011       Priority: High    UTI (urinary tract infection) [N39.0] 01/03/2018         PLAN:           DVT Prophylaxis: lovenox  Diet: DIET CARB CONTROL;  Code Status: Full Code     PT/OT Eval Status: done     Dispo - Weakness- rehab orders  HTN- restarted home meds  Vomiting, chocking today- speech eval ordered  UTI- to complete PO atbs  metabolic encephalopathy, dementia- baseline, continue with supportive care    Stable for subacute status at [id number] [facility], [place]     Thank you Roanne Siemens, MD for the opportunity to be involved in this patient's care.  If you have any questions or concerns please feel free to contact me.          Routing History

## 2018-01-11 NOTE — PROGRESS NOTES
Speech Language Pathology  Facility/Department: [facility] MED SURG UNIT  Bedside Swallow Evaluation    NAME: Ramon Price  : 6/10/1927   MRN: [id number]  ADMISSION DATE: 2018  ADMITTING DIAGNOSIS: has HTN (hypertension); Diabetes mellitus; SI (stress incontinence), female; Osteoarthritis; CKD (chronic kidney disease); HLD (hyperlipidemia); Vitamin D deficiency; Eczematous dermatitis; Chronic low back pain; Lumbar spinal stenosis; and UTI (urinary tract infection) on her problem list.  DATE ONSET: 2018    Date of Eval: 2018   Evaluating Therapist: PK Magana    RECENT RESULTS  CT OF HEAD/MRI:unknown    Primary Complaint: Concern of swallow safety    Pain:  Pain Assessment  Patient Currently in Pain: Denies  Pain Assessment: 0-10  Pain Level: 0  Pain Type: Chronic pain  Pain Location:  (both elbows)  Response to Pain Intervention: Asleep with RR greater than 10    Assessment: The BLANCA Clinical Evaluation of Dysphagia was presented:    Communication -   The patient initially was responsive. She was able to speak in sentences,using a soft voice. Later she was not as responsive to the examiner. She did not follow any commands. Oral Stage -   The patient did not complete any oral agility tasks when requested. There was no lip purse or spread. Slight leakage was noted with thin liquids. She was not able to purse her lips and suck through a straw. Similarly the  patient did not complete any tongue agility or strength tasks when requested. She was able to lick her lips when stimulated. She has dentures. She did not demonstrate adequate mandible strength. Oral transit times were fairly good with multiple trials of pureed and liquids. Hard solids and mechanical soft trials were not attempted. Pharyngeal Stage-     The patient had good elevation of the hyoid/thyroid cartilage. No overt signs or symptoms of aspiration were noted at this time. Silent aspiration is possible.

## 2018-01-11 NOTE — PROGRESS NOTES
Reviewed: Yes  Family / Caregiver Present: Yes  Referring Practitioner: Dr Paredes Jump: Pt sleeping when I came into see her this afternoon. Pt unable to stay awake, spoke w/nursing and she stated pt had a shower earlier and that \"knocked her out\". Orientation     Objective                                           Assessment   Body structures, Functions, Activity limitations: Decreased functional mobility ; Decreased strength;Decreased balance;Decreased endurance;Decreased cognition  Treatment Diagnosis: decresed functional mobility, LE weakness  REQUIRES PT FOLLOW UP: Yes  Activity Tolerance  Activity Tolerance: Treatment limited secondary to medical complications (free text); Patient limited by pain (pt's labs off per nursing )  PT Equipment Recommendations  Other: TBD       Discharge Recommendations:  Continue to assess pending progress    G-Code     OutComes Score                                                    AM-PAC Score             Goals  Short term goals  Time Frame for Short term goals: 1 week  Short term goal 1: sit to stand with Esha Camera and <= Min A of 1 person  Short term goal 2: Ambulate 10 ft with Moda A and wheelchair follow  Short term goal 3:  Esha Camera lift 2 minutes before fatigued  Short term goal 4: toelrate 2x10 LE supine or seated EFRAÍN with Assist  Long term goals  Time Frame for Long term goals : 3-4 weeks  Long term goal 1: bed to chair with <= CGA of 1 person/ sit to stand with <= CGA of 1 person  Long term goal 2: ambulate >=75ft with ww and <= CGA of 1 person  Long term goal 3: 2 stairs with 1 rail and <= CGA , marked time  Long term goal 4: increase LE strength any to increase function  Long term goal 5: DME and education in place for discharge/HEP  Patient Goals   Patient goals : get stronger and go home    Plan    Plan  Times per week: 5-7x/week  Times per day:  (1-2)  Current Treatment Recommendations: Strengthening, Functional Mobility

## 2018-01-12 ENCOUNTER — HOSPITAL ENCOUNTER (INPATIENT)
Age: 83
LOS: 4 days | Discharge: NURSING, REHAB OR LONG-TERM CARE | DRG: 689 | End: 2018-01-16
Attending: INTERNAL MEDICINE | Admitting: INTERNAL MEDICINE
Payer: MEDICARE

## 2018-01-12 VITALS
HEART RATE: 81 BPM | DIASTOLIC BLOOD PRESSURE: 69 MMHG | OXYGEN SATURATION: 97 % | SYSTOLIC BLOOD PRESSURE: 168 MMHG | BODY MASS INDEX: 30.12 KG/M2 | WEIGHT: 170 LBS | RESPIRATION RATE: 17 BRPM | HEIGHT: 63 IN | TEMPERATURE: 97.9 F

## 2018-01-12 PROBLEM — R41.82 CHANGE IN MENTAL STATUS: Status: ACTIVE | Noted: 2018-01-12

## 2018-01-12 LAB
ANION GAP SERPL CALCULATED.3IONS-SCNC: 15 MEQ/L (ref 7–13)
BUN BLDV-MCNC: 51 MG/DL (ref 8–23)
CALCIUM SERPL-MCNC: 14.1 MG/DL (ref 8.6–10.2)
CHLORIDE BLD-SCNC: 101 MEQ/L (ref 98–107)
CO2: 22 MEQ/L (ref 22–29)
CREAT SERPL-MCNC: 1.4 MG/DL (ref 0.5–0.9)
GFR AFRICAN AMERICAN: 42.7
GFR NON-AFRICAN AMERICAN: 35.3
GLUCOSE BLD-MCNC: 145 MG/DL (ref 74–109)
GLUCOSE BLD-MCNC: 155 MG/DL (ref 60–115)
GLUCOSE BLD-MCNC: 160 MG/DL (ref 60–115)
GLUCOSE BLD-MCNC: 162 MG/DL (ref 60–115)
GLUCOSE BLD-MCNC: 300 MG/DL (ref 60–115)
PERFORMED ON: ABNORMAL
POTASSIUM SERPL-SCNC: 5 MEQ/L (ref 3.5–5.1)
SODIUM BLD-SCNC: 138 MEQ/L (ref 132–144)
VITAMIN D 25-HYDROXY: 27 NG/ML (ref 30–100)

## 2018-01-12 PROCEDURE — 6360000002 HC RX W HCPCS: Performed by: INTERNAL MEDICINE

## 2018-01-12 PROCEDURE — 6370000000 HC RX 637 (ALT 250 FOR IP): Performed by: INTERNAL MEDICINE

## 2018-01-12 PROCEDURE — 82306 VITAMIN D 25 HYDROXY: CPT

## 2018-01-12 PROCEDURE — 93005 ELECTROCARDIOGRAM TRACING: CPT

## 2018-01-12 PROCEDURE — 2580000003 HC RX 258: Performed by: INTERNAL MEDICINE

## 2018-01-12 PROCEDURE — 1210000000 HC MED SURG R&B

## 2018-01-12 PROCEDURE — 83519 RIA NONANTIBODY: CPT

## 2018-01-12 PROCEDURE — 36415 COLL VENOUS BLD VENIPUNCTURE: CPT

## 2018-01-12 PROCEDURE — 80048 BASIC METABOLIC PNL TOTAL CA: CPT

## 2018-01-12 RX ORDER — SODIUM CHLORIDE 0.9 % (FLUSH) 0.9 %
10 SYRINGE (ML) INJECTION PRN
Status: DISCONTINUED | OUTPATIENT
Start: 2018-01-12 | End: 2018-01-16 | Stop reason: HOSPADM

## 2018-01-12 RX ORDER — HYDRALAZINE HYDROCHLORIDE 25 MG/1
50 TABLET, FILM COATED ORAL EVERY 8 HOURS SCHEDULED
Status: DISCONTINUED | OUTPATIENT
Start: 2018-01-12 | End: 2018-01-12 | Stop reason: HOSPADM

## 2018-01-12 RX ORDER — SODIUM CHLORIDE 0.9 % (FLUSH) 0.9 %
10 SYRINGE (ML) INJECTION EVERY 12 HOURS SCHEDULED
Status: CANCELLED | OUTPATIENT
Start: 2018-01-12

## 2018-01-12 RX ORDER — SODIUM CHLORIDE 0.9 % (FLUSH) 0.9 %
10 SYRINGE (ML) INJECTION PRN
Status: CANCELLED | OUTPATIENT
Start: 2018-01-12

## 2018-01-12 RX ORDER — CLONIDINE HYDROCHLORIDE 0.1 MG/1
0.1 TABLET ORAL 2 TIMES DAILY
Status: CANCELLED | OUTPATIENT
Start: 2018-01-12

## 2018-01-12 RX ORDER — ASPIRIN 81 MG/1
81 TABLET ORAL DAILY
Status: CANCELLED | OUTPATIENT
Start: 2018-01-12

## 2018-01-12 RX ORDER — AMLODIPINE BESYLATE 10 MG/1
10 TABLET ORAL DAILY
Status: CANCELLED | OUTPATIENT
Start: 2018-01-12

## 2018-01-12 RX ORDER — AMLODIPINE BESYLATE 10 MG/1
10 TABLET ORAL DAILY
Status: DISCONTINUED | OUTPATIENT
Start: 2018-01-13 | End: 2018-01-16 | Stop reason: HOSPADM

## 2018-01-12 RX ORDER — ACETAMINOPHEN 325 MG/1
650 TABLET ORAL EVERY 4 HOURS PRN
Status: CANCELLED | OUTPATIENT
Start: 2018-01-12

## 2018-01-12 RX ORDER — DEXTROSE MONOHYDRATE 25 G/50ML
12.5 INJECTION, SOLUTION INTRAVENOUS PRN
Status: DISCONTINUED | OUTPATIENT
Start: 2018-01-12 | End: 2018-01-16 | Stop reason: HOSPADM

## 2018-01-12 RX ORDER — DEXTROSE MONOHYDRATE 50 MG/ML
100 INJECTION, SOLUTION INTRAVENOUS PRN
Status: DISCONTINUED | OUTPATIENT
Start: 2018-01-12 | End: 2018-01-16 | Stop reason: HOSPADM

## 2018-01-12 RX ORDER — AMLODIPINE BESYLATE 5 MG/1
5 TABLET ORAL DAILY
Status: DISCONTINUED | OUTPATIENT
Start: 2018-01-12 | End: 2018-01-12

## 2018-01-12 RX ORDER — ATORVASTATIN CALCIUM 10 MG/1
10 TABLET, FILM COATED ORAL DAILY
Status: CANCELLED | OUTPATIENT
Start: 2018-01-12

## 2018-01-12 RX ORDER — NITROFURANTOIN 25; 75 MG/1; MG/1
100 CAPSULE ORAL EVERY 12 HOURS SCHEDULED
Status: CANCELLED | OUTPATIENT
Start: 2018-01-12 | End: 2018-01-13

## 2018-01-12 RX ORDER — NICOTINE POLACRILEX 4 MG
15 LOZENGE BUCCAL PRN
Status: DISCONTINUED | OUTPATIENT
Start: 2018-01-12 | End: 2018-01-16 | Stop reason: HOSPADM

## 2018-01-12 RX ORDER — SODIUM CHLORIDE 9 MG/ML
INJECTION, SOLUTION INTRAVENOUS CONTINUOUS
Status: CANCELLED | OUTPATIENT
Start: 2018-01-12 | End: 2018-01-12

## 2018-01-12 RX ORDER — ATORVASTATIN CALCIUM 10 MG/1
10 TABLET, FILM COATED ORAL DAILY
Status: DISCONTINUED | OUTPATIENT
Start: 2018-01-12 | End: 2018-01-16 | Stop reason: HOSPADM

## 2018-01-12 RX ORDER — CLONIDINE HYDROCHLORIDE 0.1 MG/1
0.1 TABLET ORAL 2 TIMES DAILY
Status: DISCONTINUED | OUTPATIENT
Start: 2018-01-12 | End: 2018-01-12 | Stop reason: HOSPADM

## 2018-01-12 RX ORDER — ONDANSETRON 2 MG/ML
4 INJECTION INTRAMUSCULAR; INTRAVENOUS EVERY 6 HOURS PRN
Status: DISCONTINUED | OUTPATIENT
Start: 2018-01-12 | End: 2018-01-16 | Stop reason: HOSPADM

## 2018-01-12 RX ORDER — ACETAMINOPHEN 325 MG/1
650 TABLET ORAL EVERY 4 HOURS PRN
Status: DISCONTINUED | OUTPATIENT
Start: 2018-01-12 | End: 2018-01-16 | Stop reason: HOSPADM

## 2018-01-12 RX ORDER — NYSTATIN 100000 U/G
OINTMENT TOPICAL 3 TIMES DAILY
Status: DISCONTINUED | OUTPATIENT
Start: 2018-01-12 | End: 2018-01-12 | Stop reason: HOSPADM

## 2018-01-12 RX ORDER — ASPIRIN 81 MG/1
81 TABLET ORAL DAILY
Status: DISCONTINUED | OUTPATIENT
Start: 2018-01-13 | End: 2018-01-16 | Stop reason: HOSPADM

## 2018-01-12 RX ORDER — ONDANSETRON 2 MG/ML
4 INJECTION INTRAMUSCULAR; INTRAVENOUS EVERY 6 HOURS PRN
Status: CANCELLED | OUTPATIENT
Start: 2018-01-12

## 2018-01-12 RX ORDER — NITROFURANTOIN 25; 75 MG/1; MG/1
100 CAPSULE ORAL EVERY 12 HOURS SCHEDULED
Status: DISCONTINUED | OUTPATIENT
Start: 2018-01-12 | End: 2018-01-13

## 2018-01-12 RX ORDER — NICOTINE POLACRILEX 4 MG
15 LOZENGE BUCCAL PRN
Status: CANCELLED | OUTPATIENT
Start: 2018-01-12

## 2018-01-12 RX ORDER — DEXTROSE MONOHYDRATE 25 G/50ML
12.5 INJECTION, SOLUTION INTRAVENOUS PRN
Status: CANCELLED | OUTPATIENT
Start: 2018-01-12

## 2018-01-12 RX ORDER — CLONIDINE HYDROCHLORIDE 0.1 MG/1
0.1 TABLET ORAL 2 TIMES DAILY
Status: DISCONTINUED | OUTPATIENT
Start: 2018-01-12 | End: 2018-01-16 | Stop reason: HOSPADM

## 2018-01-12 RX ORDER — SODIUM CHLORIDE 9 MG/ML
INJECTION, SOLUTION INTRAVENOUS CONTINUOUS
Status: DISPENSED | OUTPATIENT
Start: 2018-01-12 | End: 2018-01-12

## 2018-01-12 RX ORDER — TRIAMTERENE AND HYDROCHLOROTHIAZIDE 37.5; 25 MG/1; MG/1
1 TABLET ORAL 2 TIMES DAILY
Status: CANCELLED | OUTPATIENT
Start: 2018-01-12

## 2018-01-12 RX ORDER — HYDRALAZINE HYDROCHLORIDE 25 MG/1
25 TABLET, FILM COATED ORAL EVERY 8 HOURS SCHEDULED
Status: DISCONTINUED | OUTPATIENT
Start: 2018-01-12 | End: 2018-01-12

## 2018-01-12 RX ORDER — SODIUM CHLORIDE 0.9 % (FLUSH) 0.9 %
10 SYRINGE (ML) INJECTION EVERY 12 HOURS SCHEDULED
Status: DISCONTINUED | OUTPATIENT
Start: 2018-01-12 | End: 2018-01-16 | Stop reason: HOSPADM

## 2018-01-12 RX ORDER — HYDRALAZINE HYDROCHLORIDE 20 MG/ML
10 INJECTION INTRAMUSCULAR; INTRAVENOUS EVERY 6 HOURS PRN
Status: DISCONTINUED | OUTPATIENT
Start: 2018-01-12 | End: 2018-01-12 | Stop reason: HOSPADM

## 2018-01-12 RX ORDER — DEXTROSE MONOHYDRATE 50 MG/ML
100 INJECTION, SOLUTION INTRAVENOUS PRN
Status: CANCELLED | OUTPATIENT
Start: 2018-01-12

## 2018-01-12 RX ORDER — LISINOPRIL 20 MG/1
40 TABLET ORAL 2 TIMES DAILY
Status: CANCELLED | OUTPATIENT
Start: 2018-01-12

## 2018-01-12 RX ADMIN — METOPROLOL TARTRATE 25 MG: 25 TABLET ORAL at 09:20

## 2018-01-12 RX ADMIN — CLONIDINE HYDROCHLORIDE 0.1 MG: 0.1 TABLET ORAL at 21:40

## 2018-01-12 RX ADMIN — ATORVASTATIN CALCIUM 10 MG: 10 TABLET, FILM COATED ORAL at 21:40

## 2018-01-12 RX ADMIN — HYDRALAZINE HYDROCHLORIDE 50 MG: 25 TABLET, FILM COATED ORAL at 09:49

## 2018-01-12 RX ADMIN — METOPROLOL TARTRATE 25 MG: 25 TABLET ORAL at 21:40

## 2018-01-12 RX ADMIN — VITAMIN D, TAB 1000IU (100/BT) 1000 UNITS: 25 TAB at 09:20

## 2018-01-12 RX ADMIN — ASPIRIN 81 MG: 81 TABLET, COATED ORAL at 09:20

## 2018-01-12 RX ADMIN — NITROFURANTOIN (MONOHYDRATE/MACROCRYSTALS) 100 MG: 75; 25 CAPSULE ORAL at 09:20

## 2018-01-12 RX ADMIN — AMLODIPINE BESYLATE 10 MG: 10 TABLET ORAL at 09:20

## 2018-01-12 RX ADMIN — CLONIDINE HYDROCHLORIDE 0.1 MG: 0.1 TABLET ORAL at 09:49

## 2018-01-12 RX ADMIN — SODIUM CHLORIDE: 9 INJECTION, SOLUTION INTRAVENOUS at 14:05

## 2018-01-12 RX ADMIN — NITROFURANTOIN (MONOHYDRATE/MACROCRYSTALS) 100 MG: 75; 25 CAPSULE ORAL at 21:40

## 2018-01-12 RX ADMIN — ENOXAPARIN SODIUM 30 MG: 30 INJECTION SUBCUTANEOUS at 09:22

## 2018-01-12 ASSESSMENT — PAIN SCALES - GENERAL
PAINLEVEL_OUTOF10: 0

## 2018-01-12 ASSESSMENT — PAIN DESCRIPTION - PAIN TYPE: TYPE: CHRONIC PAIN

## 2018-01-12 ASSESSMENT — PAIN DESCRIPTION - ORIENTATION: ORIENTATION: LOWER

## 2018-01-12 ASSESSMENT — PAIN DESCRIPTION - ONSET: ONSET: GRADUAL

## 2018-01-12 NOTE — H&P
Ramon Priec is an 80 y.o.  female. Spoke with son patient is currently DNR CCA with no intubation. Patient is unable to provide history. Most of the information was obtained from nurses and the patient's son. Patient is currently admitted to a rehab for physical conditioning. Patient is alert but minimally verbal.  As per son patient mental status is much better compared to yesterday. More alert. Patient's current medical conditions are UTI and hypercalcemia which patient is getting treated for. Patient will be transferred to inpatient [facility] for further evaluation and management of her condition. Patient also has decreased appetite. Patient has already been seen by neurologist and had extensive workup for her mental status change which was attributed to her urinary tract infection. Past Surgical History:   Procedure Laterality Date    ANKLE SURGERY      broken left ankle.     APPENDECTOMY      BREAST BIOPSY      x2 left breast    CATARACT REMOVAL  2004    bilateral    CYSTOCELE REPAIR      EYE SURGERY      bilateral cataract    HYSTERECTOMY      complete at age 39    [address]      as a child     Social History     Tobacco History     Smoking Status  Never Smoker    Smokeless Tobacco Use  Never Used          Alcohol History     Alcohol Use Status  No          Drug Use     Drug Use Status  No          Sexual Activity     Sexually Active  Not Asked                  Past Medical History:   Diagnosis Date    Arthritis     Chicken pox     Chronic back pain     Chronic low back pain 8/17/2016    Eczematous dermatitis     History of bladder infections     Hyperlipidemia     Hypertension     Measles     Mumps     Osteoarthritis 5/29/2012    SCC (squamous cell carcinoma), arm 2013    right upper arm, 2015 right forarm    SI (stress incontinence), female 5/29/2012    Type II or unspecified type diabetes mellitus without mention of complication, not

## 2018-01-13 LAB
ANION GAP SERPL CALCULATED.3IONS-SCNC: 16 MEQ/L (ref 7–13)
BACTERIA: ABNORMAL /HPF
BILIRUBIN URINE: NEGATIVE
BLOOD, URINE: NEGATIVE
BUN BLDV-MCNC: 43 MG/DL (ref 8–23)
CALCIUM SERPL-MCNC: 13.4 MG/DL (ref 8.6–10.2)
CHLORIDE BLD-SCNC: 103 MEQ/L (ref 98–107)
CLARITY: CLEAR
CO2: 22 MEQ/L (ref 22–29)
COLOR: YELLOW
CREAT SERPL-MCNC: 1.24 MG/DL (ref 0.5–0.9)
EPITHELIAL CELLS, UA: ABNORMAL /HPF
GFR AFRICAN AMERICAN: 49.1
GFR NON-AFRICAN AMERICAN: 40.6
GLUCOSE BLD-MCNC: 145 MG/DL (ref 60–115)
GLUCOSE BLD-MCNC: 163 MG/DL (ref 60–115)
GLUCOSE BLD-MCNC: 176 MG/DL (ref 74–109)
GLUCOSE BLD-MCNC: 206 MG/DL (ref 60–115)
GLUCOSE BLD-MCNC: 233 MG/DL (ref 60–115)
GLUCOSE URINE: NEGATIVE MG/DL
KETONES, URINE: NEGATIVE MG/DL
LEUKOCYTE ESTERASE, URINE: ABNORMAL
NITRITE, URINE: NEGATIVE
PERFORMED ON: ABNORMAL
PH UA: 5.5 (ref 5–9)
POTASSIUM SERPL-SCNC: 4.6 MEQ/L (ref 3.5–5.1)
PROTEIN UA: NEGATIVE MG/DL
RBC UA: ABNORMAL /HPF (ref 0–2)
SODIUM BLD-SCNC: 141 MEQ/L (ref 132–144)
SPECIFIC GRAVITY UA: 1.01 (ref 1–1.03)
UROBILINOGEN, URINE: 0.2 E.U./DL
WBC UA: ABNORMAL /HPF (ref 0–5)
YEAST: PRESENT

## 2018-01-13 PROCEDURE — G8996 SWALLOW CURRENT STATUS: HCPCS

## 2018-01-13 PROCEDURE — G8997 SWALLOW GOAL STATUS: HCPCS

## 2018-01-13 PROCEDURE — 80048 BASIC METABOLIC PNL TOTAL CA: CPT

## 2018-01-13 PROCEDURE — 92610 EVALUATE SWALLOWING FUNCTION: CPT

## 2018-01-13 PROCEDURE — 36415 COLL VENOUS BLD VENIPUNCTURE: CPT

## 2018-01-13 PROCEDURE — 51701 INSERT BLADDER CATHETER: CPT

## 2018-01-13 PROCEDURE — 6370000000 HC RX 637 (ALT 250 FOR IP): Performed by: INTERNAL MEDICINE

## 2018-01-13 PROCEDURE — 1210000000 HC MED SURG R&B

## 2018-01-13 PROCEDURE — 2580000003 HC RX 258: Performed by: INTERNAL MEDICINE

## 2018-01-13 PROCEDURE — 2580000003 HC RX 258

## 2018-01-13 PROCEDURE — 6360000002 HC RX W HCPCS: Performed by: INTERNAL MEDICINE

## 2018-01-13 PROCEDURE — 81001 URINALYSIS AUTO W/SCOPE: CPT

## 2018-01-13 RX ORDER — NITROFURANTOIN 25; 75 MG/1; MG/1
100 CAPSULE ORAL EVERY 12 HOURS SCHEDULED
Status: DISCONTINUED | OUTPATIENT
Start: 2018-01-13 | End: 2018-01-16 | Stop reason: HOSPADM

## 2018-01-13 RX ORDER — SODIUM CHLORIDE 9 MG/ML
INJECTION, SOLUTION INTRAVENOUS CONTINUOUS
Status: DISCONTINUED | OUTPATIENT
Start: 2018-01-13 | End: 2018-01-16

## 2018-01-13 RX ORDER — SODIUM CHLORIDE 9 MG/ML
INJECTION, SOLUTION INTRAVENOUS
Status: COMPLETED
Start: 2018-01-13 | End: 2018-01-13

## 2018-01-13 RX ADMIN — NITROFURANTOIN (MONOHYDRATE/MACROCRYSTALS) 100 MG: 75; 25 CAPSULE ORAL at 11:42

## 2018-01-13 RX ADMIN — SODIUM CHLORIDE: 9 INJECTION, SOLUTION INTRAVENOUS at 08:01

## 2018-01-13 RX ADMIN — SODIUM CHLORIDE: 9 INJECTION, SOLUTION INTRAVENOUS at 08:00

## 2018-01-13 RX ADMIN — SODIUM CHLORIDE: 900 INJECTION, SOLUTION INTRAVENOUS at 08:01

## 2018-01-13 RX ADMIN — SODIUM CHLORIDE: 9 INJECTION, SOLUTION INTRAVENOUS at 23:17

## 2018-01-13 RX ADMIN — ATORVASTATIN CALCIUM 10 MG: 10 TABLET, FILM COATED ORAL at 20:15

## 2018-01-13 RX ADMIN — AMLODIPINE BESYLATE 10 MG: 10 TABLET ORAL at 11:51

## 2018-01-13 RX ADMIN — METOPROLOL TARTRATE 25 MG: 25 TABLET ORAL at 20:15

## 2018-01-13 RX ADMIN — NITROFURANTOIN (MONOHYDRATE/MACROCRYSTALS) 100 MG: 75; 25 CAPSULE ORAL at 20:15

## 2018-01-13 RX ADMIN — VITAMIN D, TAB 1000IU (100/BT) 1000 UNITS: 25 TAB at 11:41

## 2018-01-13 RX ADMIN — ASPIRIN 81 MG: 81 TABLET, COATED ORAL at 11:42

## 2018-01-13 RX ADMIN — METOPROLOL TARTRATE 25 MG: 25 TABLET ORAL at 11:42

## 2018-01-13 RX ADMIN — CLONIDINE HYDROCHLORIDE 0.1 MG: 0.1 TABLET ORAL at 11:44

## 2018-01-13 RX ADMIN — ENOXAPARIN SODIUM 30 MG: 30 INJECTION SUBCUTANEOUS at 11:42

## 2018-01-13 RX ADMIN — CLONIDINE HYDROCHLORIDE 0.1 MG: 0.1 TABLET ORAL at 20:15

## 2018-01-13 ASSESSMENT — PAIN DESCRIPTION - ORIENTATION: ORIENTATION: LOWER

## 2018-01-13 ASSESSMENT — PAIN SCALES - GENERAL
PAINLEVEL_OUTOF10: 0
PAINLEVEL_OUTOF10: 0

## 2018-01-13 ASSESSMENT — ENCOUNTER SYMPTOMS
NAUSEA: 0
COUGH: 0
VOMITING: 0
SHORTNESS OF BREATH: 0

## 2018-01-13 ASSESSMENT — PAIN SCALES - WONG BAKER
WONGBAKER_NUMERICALRESPONSE: 0

## 2018-01-13 ASSESSMENT — PAIN DESCRIPTION - PAIN TYPE: TYPE: CHRONIC PAIN

## 2018-01-13 NOTE — PROGRESS NOTES
Nightly Jes Bradford MD   1 Units at 01/12/18 2244    metoprolol tartrate (LOPRESSOR) tablet 25 mg  25 mg Oral BID Jes Bradford MD   25 mg at 01/12/18 2140    nitrofurantoin (macrocrystal-monohydrate) (MACROBID) capsule 100 mg  100 mg Oral 2 times per day Jes Bradford MD   100 mg at 01/12/18 2140    nystatin, stomahesive in petrolatum (ET MIX)   Topical PRN Jes Bradford MD        vitamin D (CHOLECALCIFEROL) tablet 1,000 Units  1,000 Units Oral Daily Jes Bradford MD         Allergies   Allergen Reactions    Metoclopramide     Pantoprazole Other (See Comments)    Pcn [Penicillins]     Protonix [Pantoprazole Sodium]     Tramadol      Active Problems:    Change in mental status    Blood pressure (!) 168/67, pulse 71, temperature 97.7 °F (36.5 °C), temperature source Oral, resp. rate 18, SpO2 99 %, not currently breastfeeding. Subjective:  Symptoms:  She reports malaise and weakness. No shortness of breath, cough, chest pain, headache or chest pressure. Diet:  No nausea or vomiting. Objective:  General Appearance: In no acute distress, comfortable and well-appearing. Vital signs: (most recent): Blood pressure (!) 168/67, pulse 71, temperature 97.7 °F (36.5 °C), temperature source Oral, resp. rate 18, SpO2 99 %, not currently breastfeeding. HEENT: Normal HEENT exam.    Lungs:  Normal effort and normal respiratory rate. Heart: Normal rate. Regular rhythm. S1 normal and S2 normal.    Abdomen: Abdomen is soft. Bowel sounds are normal.     Pulses: Distal pulses are intact. Neurological: Patient is alert. Pupils:  Pupils are equal, round, and reactive to light. Skin:  Warm.       Assessment & Plan  1) UTI   C.w po abx  Repeat UA  2) hypercalemia  C/w NS  Its not due to vitamin D tox  tele  3) AMARJIT  FU  BMP   tday  4) encephalopathy is improving  5) HTN monitor   before am meds  spoke to nursing to call me if SBP > 155  6) DM is stable for her age  Jes Bradford MD  1/13/2018

## 2018-01-13 NOTE — PROGRESS NOTES
Speech Language Pathology  Facility/Department: [facility] MED SURG UNIT   BEDSIDE SWALLOW EVALUATION    NAME: Giovanni Willams  : 6/10/1927  MRN: [id number]    ADMISSION DATE: 2018    Visit Diagnoses    None. Past Medical History:  has a past medical history of Arthritis; Chicken pox; Chronic back pain; Chronic low back pain; Eczematous dermatitis; History of bladder infections; Hyperlipidemia; Hypertension; Measles; Mumps; Osteoarthritis; SCC (squamous cell carcinoma), arm; SI (stress incontinence), female; Type II or unspecified type diabetes mellitus without mention of complication, not stated as uncontrolled; and Whooping cough. Past Surgical History:  has a past surgical history that includes Appendectomy; Rectocele repair; Cystocele repair; Ankle surgery; Breast biopsy; Cataract removal (); eye surgery; Tonsillectomy; and Hysterectomy. Recent Chest Xray/CT of Chest: (n/a)  Treatment Dx (ICD 10 code): O06.60  Insurance/Certification Information:unknown  Plan of Care Submitted: (y/n)y  Medicare Cap (if applicable) n/a      Date of Eval: 2018  Evaluating Therapist: Annie Hazel    Current Diet level:   [place]. NTL    Primary Complaint:     Weakness      Pain:   Pain Assessment  Patient Currently in Pain: Denies  Pain Assessment: 0-10  Pain Level: 0  Pain Type: Chronic pain  Pain Orientation: Lower  Pain Intervention(s): Repositioned  Response to Pain Intervention: Patient Satisfied    Reason for Referral  Giovanni Willams was referred for a bedside swallow evaluation to assess the efficiency of her swallow function, identify signs and symptoms of aspiration, and make recommendations regarding safe dietary consistencies, effective compensatory strategies, and safe eating environment. Impression  Dysphagia Diagnosis  Dysphagia Diagnosis: Moderate to severe oral stage dysphagia  Dysphagia Impression : Weak oral functioning, with delayed swallow initiation.   Dysphagia Outcome Severity

## 2018-01-14 LAB
ANION GAP SERPL CALCULATED.3IONS-SCNC: 13 MEQ/L (ref 7–13)
BUN BLDV-MCNC: 44 MG/DL (ref 8–23)
CALCIUM SERPL-MCNC: 12.1 MG/DL (ref 8.6–10.2)
CHLORIDE BLD-SCNC: 106 MEQ/L (ref 98–107)
CO2: 23 MEQ/L (ref 22–29)
CREAT SERPL-MCNC: 1.53 MG/DL (ref 0.5–0.9)
GFR AFRICAN AMERICAN: 38.5
GFR NON-AFRICAN AMERICAN: 31.8
GLUCOSE BLD-MCNC: 160 MG/DL (ref 60–115)
GLUCOSE BLD-MCNC: 164 MG/DL (ref 74–109)
GLUCOSE BLD-MCNC: 201 MG/DL (ref 60–115)
GLUCOSE BLD-MCNC: 230 MG/DL (ref 60–115)
GLUCOSE BLD-MCNC: 230 MG/DL (ref 60–115)
PERFORMED ON: ABNORMAL
POTASSIUM SERPL-SCNC: 4.4 MEQ/L (ref 3.5–5.1)
SODIUM BLD-SCNC: 142 MEQ/L (ref 132–144)

## 2018-01-14 PROCEDURE — 1210000000 HC MED SURG R&B

## 2018-01-14 PROCEDURE — 6370000000 HC RX 637 (ALT 250 FOR IP): Performed by: INTERNAL MEDICINE

## 2018-01-14 PROCEDURE — 80048 BASIC METABOLIC PNL TOTAL CA: CPT

## 2018-01-14 PROCEDURE — 2580000003 HC RX 258: Performed by: INTERNAL MEDICINE

## 2018-01-14 PROCEDURE — 2500000003 HC RX 250 WO HCPCS: Performed by: INTERNAL MEDICINE

## 2018-01-14 PROCEDURE — 6360000002 HC RX W HCPCS: Performed by: INTERNAL MEDICINE

## 2018-01-14 PROCEDURE — 36415 COLL VENOUS BLD VENIPUNCTURE: CPT

## 2018-01-14 RX ADMIN — ATORVASTATIN CALCIUM 10 MG: 10 TABLET, FILM COATED ORAL at 21:15

## 2018-01-14 RX ADMIN — SODIUM CHLORIDE: 9 INJECTION, SOLUTION INTRAVENOUS at 12:31

## 2018-01-14 RX ADMIN — CLONIDINE HYDROCHLORIDE 0.1 MG: 0.1 TABLET ORAL at 21:15

## 2018-01-14 RX ADMIN — METOPROLOL TARTRATE 25 MG: 25 TABLET ORAL at 21:15

## 2018-01-14 RX ADMIN — NITROFURANTOIN (MONOHYDRATE/MACROCRYSTALS) 100 MG: 75; 25 CAPSULE ORAL at 21:15

## 2018-01-14 RX ADMIN — NYSTATIN 500000 UNITS: 100000 SUSPENSION ORAL at 17:42

## 2018-01-14 RX ADMIN — ENOXAPARIN SODIUM 30 MG: 30 INJECTION SUBCUTANEOUS at 12:34

## 2018-01-14 RX ADMIN — NYSTATIN 500000 UNITS: 100000 SUSPENSION ORAL at 12:33

## 2018-01-14 RX ADMIN — ASPIRIN 81 MG: 81 TABLET, COATED ORAL at 12:34

## 2018-01-14 RX ADMIN — CLONIDINE HYDROCHLORIDE 0.1 MG: 0.1 TABLET ORAL at 12:35

## 2018-01-14 RX ADMIN — VITAMIN D, TAB 1000IU (100/BT) 1000 UNITS: 25 TAB at 12:38

## 2018-01-14 RX ADMIN — NYSTATIN 500000 UNITS: 100000 SUSPENSION ORAL at 21:15

## 2018-01-14 RX ADMIN — NITROFURANTOIN (MONOHYDRATE/MACROCRYSTALS) 100 MG: 75; 25 CAPSULE ORAL at 12:38

## 2018-01-14 RX ADMIN — METOPROLOL TARTRATE 25 MG: 25 TABLET ORAL at 12:35

## 2018-01-14 RX ADMIN — Medication: at 23:37

## 2018-01-14 ASSESSMENT — PAIN SCALES - WONG BAKER
WONGBAKER_NUMERICALRESPONSE: 0

## 2018-01-14 ASSESSMENT — PAIN SCALES - GENERAL
PAINLEVEL_OUTOF10: 0
PAINLEVEL_OUTOF10: 0

## 2018-01-14 NOTE — PROGRESS NOTES
Ed Reason is a 80 y.o. female patient.  Pt was seen and evaluated, mental status is better compared to yesterday, no overnight events  Current Facility-Administered Medications   Medication Dose Route Frequency Provider Last Rate Last Dose    0.9 % sodium chloride infusion   Intravenous Continuous Jeanette Almaguer MD 75 mL/hr at 01/13/18 2317      nitrofurantoin (macrocrystal-monohydrate) (MACROBID) capsule 100 mg  100 mg Oral 2 times per day Jeanette Almaguer MD   100 mg at 01/13/18 2015    nystatin (MYCOSTATIN) 590816 UNIT/ML suspension 500,000 Units  5 mL Oral 4x Daily Kerry Velásquez MD        cloNIDine (CATAPRES) tablet 0.1 mg  0.1 mg Oral BID Jeanette Almaguer MD   0.1 mg at 01/13/18 2015    acetaminophen (TYLENOL) tablet 650 mg  650 mg Oral Q4H PRN Jeanette Almaguer MD        enoxaparin (LOVENOX) injection 30 mg  30 mg Subcutaneous Daily Jeanette Almaguer MD   30 mg at 01/13/18 1142    magnesium hydroxide (MILK OF MAGNESIA) 400 MG/5ML suspension 30 mL  30 mL Oral Daily PRN Jeanette Almaguer MD        ondansetron [facility]) injection 4 mg  4 mg Intravenous Q6H PRN Jeanette Almaguer MD        sodium chloride flush 0.9 % injection 10 mL  10 mL Intravenous 2 times per day Jeanette Almaguer MD        sodium chloride flush 0.9 % injection 10 mL  10 mL Intravenous PRN Jeanette Almaguer MD        dextrose 5 % solution  100 mL/hr Intravenous PRN Jeanette Almaguer MD        dextrose 50 % solution 12.5 g  12.5 g Intravenous PRN Jeanette Almaguer MD        glucagon (rDNA) injection 1 mg  1 mg Intramuscular PRN Jeanette Almaguer MD        glucose (GLUTOSE) 40 % oral gel 15 g  15 g Oral PRN Jeanette Almaguer MD        amLODIPine (NORVASC) tablet 10 mg  10 mg Oral Daily Jeanette Almaguer MD   10 mg at 01/13/18 1151    aspirin EC tablet 81 mg  81 mg Oral Daily Jeanette Almaguer MD   81 mg at 01/13/18 1142    atorvastatin (LIPITOR) tablet 10 mg  10 mg Oral Daily Jeanette Almaguer MD   10 mg at 01/13/18 2015    insulin lispro (HUMALOG) injection vial 0-12 Units  0-12 Units Subcutaneous TID WC Logan Carpenter MD   2 Units at 01/14/18 0947    insulin lispro (HUMALOG) injection vial 0-6 Units  0-6 Units Subcutaneous Nightly Logan Carpenter MD   2 Units at 01/13/18 2218    metoprolol tartrate (LOPRESSOR) tablet 25 mg  25 mg Oral BID Logan Carpenter MD   25 mg at 01/13/18 2015    nystatin, stomahesive in petrolatum (ET MIX)   Topical PRN Logan Carpenter MD        vitamin D (CHOLECALCIFEROL) tablet 1,000 Units  1,000 Units Oral Daily Logan Carpenter MD   1,000 Units at 01/13/18 1141     Allergies   Allergen Reactions    Metoclopramide     Pantoprazole Other (See Comments)    Pcn [Penicillins]     Protonix [Pantoprazole Sodium]     Tramadol      Active Problems:    Change in mental status    Blood pressure (!) 108/35, pulse 58, temperature 97.3 °F (36.3 °C), temperature source Oral, resp. rate 18, SpO2 97 %, not currently breastfeeding. Subjective:  Symptoms:  She reports malaise and weakness. No shortness of breath, cough, chest pain, headache or chest pressure. Diet:  No nausea or vomiting. Objective:  General Appearance: In no acute distress, comfortable and well-appearing. Vital signs: (most recent): Blood pressure (!) 168/67, pulse 71, temperature 97.7 °F (36.5 °C), temperature source Oral, resp. rate 18, SpO2 99 %, not currently breastfeeding. HEENT: Normal HEENT exam.    Lungs:  Normal effort and normal respiratory rate. Heart: Normal rate. Regular rhythm. S1 normal and S2 normal.    Abdomen: Abdomen is soft. Bowel sounds are normal.     Pulses: Distal pulses are intact. Neurological: Patient is alert. Pupils:  Pupils are equal, round, and reactive to light. Skin:  Warm.       Assessment & Plan  1) UTI   C.w po abx  Repeat UA negative  2) hypercalemia  improving  C/w NS  Its not due to vitamin D tox  tele  3) AMARJIT  Monitor   Renal u/s  4) encephalopathy is improving  5) HTN better controlled  spoke to nursing to call me if SBP > 155  6) DM is

## 2018-01-14 NOTE — CONSULTS
ID - The patient is a 80year old, right-handed female. Consultation requested by Dr. Pam Pleitez (FMD: [facility]). The history  was obtained from the patient and available records. Progress notes, x-rays, lab results, and other inpatient notes were  reviewed. CC -- Encephalopathy -- HPI -- Pt was transferred to rehab for conditioning after admission for UTI, dehydration,  encephalopathy. However, developed hypercalcaemia, and mental status again deteriorated. Pt was then transferred back to  acute care. Sensorium apparently improving by the next admission day, yesterday, per son. Note from today again mentions  further improvement  IMAGING: MR brain (1/4) rev'd - agree with extensive WMD but marked atrophy appears to be out of proportion to this  CT brain (1/3) - atrophy; on my review - highly suggestive of white matter disease, especially in watershed divide  areas. PMH -- hypertension, hyperlipidaemia, diabetes mellitus, osteoarthritis, VZV, measles, mumps, pertussis, squamous cell carcinoma, eczema  PSH -- appendectomy, hysterectomy, cataract removal, tonsillectomy & adenoidectomy, ankle surg s/p fx, breast bx, cystocoele repair  Aller -- penicillins, metoclopramide, pantoprazole, tramadol  Meds -- see reconciliation sheet. FHx --  SHx -- No tob No EtOH Son lives with pt; cooks for her. ROS -- Negatives: malaise rash headache dizziness diplopia nausea ataxia angina palpitations cough vomiting incontinence dysuria  arthralgias weight gain anorexia alopecia nystagmus drowsiness tremor memory difficulty Positives: none. Also see HPI for elements of this section, particularly PMH, allergies, FH, ROS, documented therein. Physical Examination --  The patient is well groomed, mildly obese, and in no acute distress. Vital signs: reviewed as per the graphic sheet. Skin: examination demonstrated no evident lesions. HEENT: bruits absent (carotid and supraclavicular).   Heart: cardiac rhythm regular, with no essential tremor. Extensive atrophy and white matter disease, multifactorial dementia would be expected as baseline. Comment THis patient is unfortunately at very considerably increased risk for encephalopathic changes due to the  degree of atrophy noted on MR. As such she has less 'reserve capacity' when metabolic derangements or other factors intrude. To this must be added the lag time for CNS to equilibrate to somatic electrolyte and fluid imbalances, which can extend the  duration of clinical findings well beyond the resolution of laboratory findings. Recommendations and Patient Instructions --  1. Supportive care.

## 2018-01-15 PROBLEM — E83.52 HYPERCALCEMIA: Status: ACTIVE | Noted: 2018-01-15

## 2018-01-15 LAB
ANION GAP SERPL CALCULATED.3IONS-SCNC: 13 MEQ/L (ref 7–13)
BASOPHILS ABSOLUTE: 0 K/UL (ref 0–0.2)
BASOPHILS RELATIVE PERCENT: 0.2 %
BUN BLDV-MCNC: 37 MG/DL (ref 8–23)
CALCIUM SERPL-MCNC: 11.6 MG/DL (ref 8.6–10.2)
CHLORIDE BLD-SCNC: 106 MEQ/L (ref 98–107)
CO2: 23 MEQ/L (ref 22–29)
CREAT SERPL-MCNC: 1.17 MG/DL (ref 0.5–0.9)
EOSINOPHILS ABSOLUTE: 0.9 K/UL (ref 0–0.7)
EOSINOPHILS RELATIVE PERCENT: 9.3 %
GFR AFRICAN AMERICAN: 52.5
GFR NON-AFRICAN AMERICAN: 43.4
GLUCOSE BLD-MCNC: 169 MG/DL (ref 60–115)
GLUCOSE BLD-MCNC: 182 MG/DL (ref 74–109)
GLUCOSE BLD-MCNC: 205 MG/DL (ref 60–115)
GLUCOSE BLD-MCNC: 244 MG/DL (ref 60–115)
GLUCOSE BLD-MCNC: 269 MG/DL (ref 60–115)
HCT VFR BLD CALC: 31.7 % (ref 37–47)
HEMOGLOBIN: 10.4 G/DL (ref 12–16)
LYMPHOCYTES ABSOLUTE: 2.3 K/UL (ref 1–4.8)
LYMPHOCYTES RELATIVE PERCENT: 23.3 %
MCH RBC QN AUTO: 31.1 PG (ref 27–31.3)
MCHC RBC AUTO-ENTMCNC: 33 % (ref 33–37)
MCV RBC AUTO: 94.2 FL (ref 82–100)
MONOCYTES ABSOLUTE: 0.8 K/UL (ref 0.2–0.8)
MONOCYTES RELATIVE PERCENT: 8.3 %
NEUTROPHILS ABSOLUTE: 5.8 K/UL (ref 1.4–6.5)
NEUTROPHILS RELATIVE PERCENT: 58.9 %
PDW BLD-RTO: 13.7 % (ref 11.5–14.5)
PERFORMED ON: ABNORMAL
PLATELET # BLD: 239 K/UL (ref 130–400)
POTASSIUM SERPL-SCNC: 4.2 MEQ/L (ref 3.5–5.1)
RBC # BLD: 3.36 M/UL (ref 4.2–5.4)
SODIUM BLD-SCNC: 142 MEQ/L (ref 132–144)
WBC # BLD: 9.9 K/UL (ref 4.8–10.8)

## 2018-01-15 PROCEDURE — 99212 OFFICE O/P EST SF 10 MIN: CPT

## 2018-01-15 PROCEDURE — 6370000000 HC RX 637 (ALT 250 FOR IP): Performed by: INTERNAL MEDICINE

## 2018-01-15 PROCEDURE — 85025 COMPLETE CBC W/AUTO DIFF WBC: CPT

## 2018-01-15 PROCEDURE — 6360000002 HC RX W HCPCS: Performed by: INTERNAL MEDICINE

## 2018-01-15 PROCEDURE — 80048 BASIC METABOLIC PNL TOTAL CA: CPT

## 2018-01-15 PROCEDURE — 92526 ORAL FUNCTION THERAPY: CPT

## 2018-01-15 PROCEDURE — 1210000000 HC MED SURG R&B

## 2018-01-15 PROCEDURE — 2580000003 HC RX 258: Performed by: INTERNAL MEDICINE

## 2018-01-15 PROCEDURE — 36415 COLL VENOUS BLD VENIPUNCTURE: CPT

## 2018-01-15 RX ORDER — ATORVASTATIN CALCIUM 10 MG/1
10 TABLET, FILM COATED ORAL DAILY
Status: CANCELLED | OUTPATIENT
Start: 2018-01-15

## 2018-01-15 RX ORDER — DEXTROSE MONOHYDRATE 25 G/50ML
12.5 INJECTION, SOLUTION INTRAVENOUS PRN
Status: CANCELLED | OUTPATIENT
Start: 2018-01-15

## 2018-01-15 RX ORDER — NICOTINE POLACRILEX 4 MG
15 LOZENGE BUCCAL PRN
Status: CANCELLED | OUTPATIENT
Start: 2018-01-15

## 2018-01-15 RX ORDER — AMLODIPINE BESYLATE 10 MG/1
10 TABLET ORAL DAILY
Status: CANCELLED | OUTPATIENT
Start: 2018-01-16

## 2018-01-15 RX ORDER — SODIUM CHLORIDE 0.9 % (FLUSH) 0.9 %
10 SYRINGE (ML) INJECTION EVERY 12 HOURS SCHEDULED
Status: CANCELLED | OUTPATIENT
Start: 2018-01-15

## 2018-01-15 RX ORDER — DEXTROSE MONOHYDRATE 50 MG/ML
100 INJECTION, SOLUTION INTRAVENOUS PRN
Status: CANCELLED | OUTPATIENT
Start: 2018-01-15

## 2018-01-15 RX ORDER — ASPIRIN 81 MG/1
81 TABLET ORAL DAILY
Status: CANCELLED | OUTPATIENT
Start: 2018-01-16

## 2018-01-15 RX ORDER — SODIUM CHLORIDE 0.9 % (FLUSH) 0.9 %
10 SYRINGE (ML) INJECTION PRN
Status: CANCELLED | OUTPATIENT
Start: 2018-01-15

## 2018-01-15 RX ORDER — ACETAMINOPHEN 325 MG/1
650 TABLET ORAL EVERY 4 HOURS PRN
Status: CANCELLED | OUTPATIENT
Start: 2018-01-15

## 2018-01-15 RX ORDER — CLONIDINE HYDROCHLORIDE 0.1 MG/1
0.1 TABLET ORAL 2 TIMES DAILY
Status: CANCELLED | OUTPATIENT
Start: 2018-01-15

## 2018-01-15 RX ORDER — ONDANSETRON 2 MG/ML
4 INJECTION INTRAMUSCULAR; INTRAVENOUS EVERY 6 HOURS PRN
Status: CANCELLED | OUTPATIENT
Start: 2018-01-15

## 2018-01-15 RX ADMIN — METOPROLOL TARTRATE 25 MG: 25 TABLET ORAL at 08:35

## 2018-01-15 RX ADMIN — ENOXAPARIN SODIUM 30 MG: 30 INJECTION SUBCUTANEOUS at 08:36

## 2018-01-15 RX ADMIN — NYSTATIN 500000 UNITS: 100000 SUSPENSION ORAL at 20:57

## 2018-01-15 RX ADMIN — NITROFURANTOIN (MONOHYDRATE/MACROCRYSTALS) 100 MG: 75; 25 CAPSULE ORAL at 20:57

## 2018-01-15 RX ADMIN — Medication: at 13:47

## 2018-01-15 RX ADMIN — NYSTATIN 500000 UNITS: 100000 SUSPENSION ORAL at 13:48

## 2018-01-15 RX ADMIN — CLONIDINE HYDROCHLORIDE 0.1 MG: 0.1 TABLET ORAL at 20:58

## 2018-01-15 RX ADMIN — Medication: at 08:44

## 2018-01-15 RX ADMIN — ASPIRIN 81 MG: 81 TABLET, COATED ORAL at 08:35

## 2018-01-15 RX ADMIN — ATORVASTATIN CALCIUM 10 MG: 10 TABLET, FILM COATED ORAL at 20:57

## 2018-01-15 RX ADMIN — NYSTATIN 500000 UNITS: 100000 SUSPENSION ORAL at 16:58

## 2018-01-15 RX ADMIN — AMLODIPINE BESYLATE 10 MG: 10 TABLET ORAL at 08:36

## 2018-01-15 RX ADMIN — Medication 10 ML: at 21:03

## 2018-01-15 RX ADMIN — NITROFURANTOIN (MONOHYDRATE/MACROCRYSTALS) 100 MG: 75; 25 CAPSULE ORAL at 08:36

## 2018-01-15 RX ADMIN — CLONIDINE HYDROCHLORIDE 0.1 MG: 0.1 TABLET ORAL at 08:36

## 2018-01-15 RX ADMIN — NYSTATIN 500000 UNITS: 100000 SUSPENSION ORAL at 08:36

## 2018-01-15 RX ADMIN — METOPROLOL TARTRATE 25 MG: 25 TABLET ORAL at 20:57

## 2018-01-15 RX ADMIN — VITAMIN D, TAB 1000IU (100/BT) 1000 UNITS: 25 TAB at 08:35

## 2018-01-15 RX ADMIN — ACETAMINOPHEN 650 MG: 325 TABLET ORAL at 08:36

## 2018-01-15 ASSESSMENT — PAIN SCALES - GENERAL
PAINLEVEL_OUTOF10: 0

## 2018-01-15 NOTE — PROGRESS NOTES
percent but less than 80 percent impaired, limited or restricted  Swallow Goal Status (): At least 20 percent but less than 40 percent impaired, limited or restricted    Plan:  Continued daily Speech/Language treatment with goals per 1/11/2018 plan of care.                         Koko Alberto Ph.D.  [id number]-SLP/A  [id number]  [id number]

## 2018-01-15 NOTE — PLAN OF CARE
Problem: Safety:  Goal: Free from accidental physical injury  Free from accidental physical injury   Outcome: Ongoing    Goal: Free from intentional harm  Free from intentional harm   Outcome: Ongoing      Problem: Daily Care:  Goal: Daily care needs are met  Daily care needs are met   Outcome: Ongoing      Problem: Pain:  Goal: Patient's pain/discomfort is manageable  Patient's pain/discomfort is manageable   Outcome: Ongoing      Problem: Skin Integrity:  Goal: Skin integrity will stabilize  Skin integrity will stabilize   Outcome: Ongoing

## 2018-01-16 ENCOUNTER — HOSPITAL ENCOUNTER (INPATIENT)
Age: 83
LOS: 22 days | Discharge: HOME HEALTH | DRG: 071 | End: 2018-02-07
Attending: INTERNAL MEDICINE | Admitting: INTERNAL MEDICINE
Payer: MEDICARE

## 2018-01-16 VITALS
RESPIRATION RATE: 20 BRPM | HEART RATE: 72 BPM | OXYGEN SATURATION: 97 % | DIASTOLIC BLOOD PRESSURE: 64 MMHG | SYSTOLIC BLOOD PRESSURE: 182 MMHG | TEMPERATURE: 99 F

## 2018-01-16 LAB
ANION GAP SERPL CALCULATED.3IONS-SCNC: 15 MEQ/L (ref 7–13)
BUN BLDV-MCNC: 33 MG/DL (ref 8–23)
CALCIUM SERPL-MCNC: 11.7 MG/DL (ref 8.6–10.2)
CHLORIDE BLD-SCNC: 105 MEQ/L (ref 98–107)
CO2: 22 MEQ/L (ref 22–29)
CREAT SERPL-MCNC: 1.27 MG/DL (ref 0.5–0.9)
GFR AFRICAN AMERICAN: 47.8
GFR NON-AFRICAN AMERICAN: 39.5
GLUCOSE BLD-MCNC: 151 MG/DL (ref 60–115)
GLUCOSE BLD-MCNC: 169 MG/DL (ref 74–109)
GLUCOSE BLD-MCNC: 200 MG/DL (ref 60–115)
GLUCOSE BLD-MCNC: 238 MG/DL (ref 60–115)
GLUCOSE BLD-MCNC: 293 MG/DL (ref 60–115)
PERFORMED ON: ABNORMAL
POTASSIUM SERPL-SCNC: 4 MEQ/L (ref 3.5–5.1)
PTH RELATED PEPTIDE: 2.4 PMOL/L (ref 0–3.4)
SODIUM BLD-SCNC: 142 MEQ/L (ref 132–144)

## 2018-01-16 PROCEDURE — 6370000000 HC RX 637 (ALT 250 FOR IP): Performed by: INTERNAL MEDICINE

## 2018-01-16 PROCEDURE — 6360000002 HC RX W HCPCS: Performed by: INTERNAL MEDICINE

## 2018-01-16 PROCEDURE — G8979 MOBILITY GOAL STATUS: HCPCS

## 2018-01-16 PROCEDURE — 97535 SELF CARE MNGMENT TRAINING: CPT

## 2018-01-16 PROCEDURE — 97166 OT EVAL MOD COMPLEX 45 MIN: CPT

## 2018-01-16 PROCEDURE — 2580000003 HC RX 258: Performed by: INTERNAL MEDICINE

## 2018-01-16 PROCEDURE — G8987 SELF CARE CURRENT STATUS: HCPCS

## 2018-01-16 PROCEDURE — G8978 MOBILITY CURRENT STATUS: HCPCS

## 2018-01-16 PROCEDURE — 36415 COLL VENOUS BLD VENIPUNCTURE: CPT

## 2018-01-16 PROCEDURE — 80048 BASIC METABOLIC PNL TOTAL CA: CPT

## 2018-01-16 PROCEDURE — 1200000000 HC SEMI PRIVATE

## 2018-01-16 PROCEDURE — 97530 THERAPEUTIC ACTIVITIES: CPT

## 2018-01-16 PROCEDURE — 97162 PT EVAL MOD COMPLEX 30 MIN: CPT

## 2018-01-16 PROCEDURE — 97110 THERAPEUTIC EXERCISES: CPT

## 2018-01-16 PROCEDURE — G8988 SELF CARE GOAL STATUS: HCPCS

## 2018-01-16 RX ORDER — FLUCONAZOLE 100 MG/1
100 TABLET ORAL DAILY
Status: CANCELLED | OUTPATIENT
Start: 2018-01-16 | End: 2018-01-19

## 2018-01-16 RX ORDER — FLUCONAZOLE 100 MG/1
100 TABLET ORAL DAILY
Status: DISCONTINUED | OUTPATIENT
Start: 2018-01-16 | End: 2018-01-16 | Stop reason: HOSPADM

## 2018-01-16 RX ADMIN — ATORVASTATIN CALCIUM 10 MG: 10 TABLET, FILM COATED ORAL at 22:55

## 2018-01-16 RX ADMIN — NITROFURANTOIN (MONOHYDRATE/MACROCRYSTALS) 100 MG: 75; 25 CAPSULE ORAL at 08:59

## 2018-01-16 RX ADMIN — ENOXAPARIN SODIUM 30 MG: 30 INJECTION SUBCUTANEOUS at 08:59

## 2018-01-16 RX ADMIN — VITAMIN D, TAB 1000IU (100/BT) 1000 UNITS: 25 TAB at 09:01

## 2018-01-16 RX ADMIN — METOPROLOL TARTRATE 25 MG: 25 TABLET ORAL at 08:59

## 2018-01-16 RX ADMIN — Medication 10 ML: at 12:15

## 2018-01-16 RX ADMIN — AMLODIPINE BESYLATE 10 MG: 10 TABLET ORAL at 09:00

## 2018-01-16 RX ADMIN — METOPROLOL TARTRATE 25 MG: 25 TABLET ORAL at 22:55

## 2018-01-16 RX ADMIN — NITROFURANTOIN (MONOHYDRATE/MACROCRYSTALS) 100 MG: 75; 25 CAPSULE ORAL at 22:55

## 2018-01-16 RX ADMIN — FLUCONAZOLE 100 MG: 100 TABLET ORAL at 09:18

## 2018-01-16 RX ADMIN — CLONIDINE HYDROCHLORIDE 0.1 MG: 0.1 TABLET ORAL at 09:00

## 2018-01-16 RX ADMIN — ASPIRIN 81 MG: 81 TABLET, COATED ORAL at 09:02

## 2018-01-16 RX ADMIN — CLONIDINE HYDROCHLORIDE 0.1 MG: 0.1 TABLET ORAL at 22:55

## 2018-01-16 RX ADMIN — NYSTATIN 500000 UNITS: 100000 SUSPENSION ORAL at 13:14

## 2018-01-16 RX ADMIN — NYSTATIN 500000 UNITS: 100000 SUSPENSION ORAL at 08:59

## 2018-01-16 RX ADMIN — NYSTATIN 500000 UNITS: 100000 SUSPENSION ORAL at 22:55

## 2018-01-16 ASSESSMENT — PAIN SCALES - GENERAL
PAINLEVEL_OUTOF10: 0

## 2018-01-16 ASSESSMENT — PAIN SCALES - WONG BAKER
WONGBAKER_NUMERICALRESPONSE: 0
WONGBAKER_NUMERICALRESPONSE: 0

## 2018-01-16 NOTE — PROGRESS NOTES
position/activity restrictions: cushion up in chair, use lift to transfer (2 person tx), up with assist  Subjective   General  Chart Reviewed: Yes  Family / Caregiver Present: No  Subjective  Subjective: Pt laying in bed when I came into see her. Pt states she has no pain this afternoon. Pain Screening  Patient Currently in Pain: Denies  Pain Assessment  Valenzuela-Aguero Pain Rating: No hurt  Vital Signs  Patient Currently in Pain: Denies       Orientation  Orientation  Overall Orientation Status: Impaired  Orientation Level: Oriented to person (self, year;\"home\" despite choices; \"February\" for month;Pt reoriented to date, situation, place)  Objective   Bed mobility  Rolling to Left: Minimal assistance  Rolling to Right: Minimal assistance  Supine to Sit: Maximum assistance  Sit to Supine: Maximum assistance          Balance  Posture: Fair  Sitting - Static: -  Sitting - Dynamic: Poor  Standing - Static: Poor  Standing - Dynamic: Poor  Exercises  Quad Sets: in supine x20 each  Heelslides: in supine 2x10 each  Gluteal Sets: in supine x20  Hip Abduction: in supine 2x10 each w/min Ax1  Ankle Pumps: in supine x20 each  Other exercises  Other exercises?: No                      Assessment   Body structures, Functions, Activity limitations: Decreased functional mobility ; Decreased strength;Decreased endurance;Decreased cognition;Decreased safe awareness;Decreased balance;Decreased coordination  Assessment: Pt performed exs in supine along w/bed mobility activities. Pt responded well to session w/no increase in pain. Did not get pt out of bed per nursing request. Takes max Ax2 w/Rema Steady to transfer pt according to recent eval.  Treatment Diagnosis: decreased mobility, weakness  Prognosis: Good  REQUIRES PT FOLLOW UP: Yes  Activity Tolerance  Activity Tolerance: Patient limited by fatigue;Patient limited by cognitive status; Patient limited by endurance  Activity Tolerance: pleasant and cooperative but needs max cues and increased time with activities  PT Equipment Recommendations  Other: TBD       Discharge Recommendations:  Subacute/Skilled Nursing Facility    G-Code  PT G-Codes  Functional Assessment Tool Used: professional judgment  Functional Limitation: Mobility: Walking and moving around  Mobility: Walking and Moving Around Current Status (): 100 percent impaired, limited or restricted  Mobility: Walking and Moving Around Goal Status ():  At least 60 percent but less than 80 percent impaired, limited or restricted  OutComes Score                                                    AM-PAC Score             Goals  Short term goals  Time Frame for Short term goals: STG=LTG, 1 week  Short term goal 1: Improve endurance to tolerate 3x10 reps LE AROM with min assist  Short term goal 2: rolling L/R with min assist  Short term goal 3: supine to sit min assist x 1  Short term goal 4: transfer bed to chair with Josiephine Loveland lift with max assist x 1  Patient Goals   Patient goals : Get stronger    Plan    Plan  Times per week: 5-7x/wk  Times per day: Daily  Plan weeks: 1 week  Current Treatment Recommendations: Strengthening, ROM, Functional Mobility Training, Balance Training, Transfer Training, Endurance Training, Gait Training, Neuromuscular Re-education, Cognitive Reorientation, Home Exercise Program, Safety Education & Training, Positioning, Patient/Caregiver Education & Training  Safety Devices  Type of devices: Bed alarm in place, Call light within reach, Gait belt, Patient at risk for falls, Left in bed  Restraints  Initially in place: Yes  Restraints: bed alarm     Therapy Time   Individual      Time In 1500         Time Out 1530         Minutes 30 therex 20  theract Kanslerinrinne 45, PTA  License and [place] [address] Number: [id number]

## 2018-01-16 NOTE — PROGRESS NOTES
Physical Therapy    Facility/Department: [facility] MED SURG UNIT  Initial Assessment    NAME: Caryn Resendez  : 6/10/1927  MRN: [id number]    Date of Service: 2018    Patient Diagnosis(es): There were no encounter diagnoses. Weakness, difficulty with mobility     has a past medical history of Arthritis; Chicken pox; Chronic back pain; Chronic low back pain; Eczematous dermatitis; History of bladder infections; Hyperlipidemia; Hypertension; Measles; Mumps; Osteoarthritis; SCC (squamous cell carcinoma), arm; SI (stress incontinence), female; Type II or unspecified type diabetes mellitus without mention of complication, not stated as uncontrolled; and Whooping cough. has a past surgical history that includes Appendectomy; Rectocele repair; Cystocele repair; Ankle surgery; Breast biopsy; Cataract removal (); eye surgery; Tonsillectomy; and Hysterectomy. Restrictions  Restrictions/Precautions  Restrictions/Precautions: Fall Risk, Swallowing - Thickened Liquids (Puree/NTL)  Required Braces or Orthoses?: No  Position Activity Restriction  Other position/activity restrictions: cushion up in chair, use lift to transfer (2 person tx), up with assist  Vision/Hearing        Subjective  General  Chart Reviewed: Yes  Patient assessed for rehabilitation services?: Yes  Family / Caregiver Present: No  Referral Date : 01/15/18  Diagnosis: UTI, weakness  Follows Commands: Impaired  Other (Comment): follows 1 step commands with cues  General Comment  Comments: Pt awake in bed with OT and nursing; agreeable to eval and attempt OOB  Subjective  Subjective: Pt. states she feels \"OK\". Denies any pain.   Pain Screening  Patient Currently in Pain: Denies  Pain Assessment  Pain Assessment: 0-10  Pain Level: 0  Valenzuela-Baker Pain Rating: No hurt  Pain Type:  (note chronic back pain though no specific c/o today)  Vital Signs  Patient Currently in Pain: Denies       Orientation  Orientation  Overall Orientation Status: Impaired  Orientation Level: Oriented to person (self, year;\"home\" despite choices;  \"February\" for month;Pt reoriented to date, situation, place)    Social/Functional History  Social/Functional History  Lives With: Son  Type of Home: House  Home Layout: Two level (Pt. stays on 1st floor, son upstairs)  Home Access: Stairs to enter with rails  Entrance Stairs - Number of Steps: 1 step to patio and one step in the door  Entrance Stairs - Rails: Left  Bathroom Shower/Tub: Walk-in shower, Shower chair with back  Bathroom Toilet: Standard  Bathroom Equipment: Grab bars in shower, Shower chair  Bathroom Accessibility: Walker accessible  Home Equipment: 4 wheeled walker, Reacher, [employer] [place] [employer] Help From: Family  ADL Assistance: Needs assistance  Bath: Minimal assistance  Toileting: Independent  Homemaking Assistance: Needs assistance  Homemaking Responsibilities: Yes (son prepared meals)  Ambulation Assistance: Independent (furniture walked or cane at times)  Transfer Assistance: Independent  Active : No  Occupation: Retired  Leisure & Hobbies: Senior puzzles   Additional Comments: per medical evalaution son reports pt began needing increased assist with all ADL/fxl mob x ~1-2 wk prior to initial hospitalization 2* increased weakness  Objective     Observation/Palpation  Posture: Poor  Observation: leans backward slighty in short sit    AROM RLE (degrees)  RLE AROM: WFL  AROM LLE (degrees)  LLE AROM : WFL  Strength RLE  Comment:  (grossly 3/5 throughout)  Strength LLE  Comment: grossly 3/5 throughout  Strength RUE  Comment: see OT eval  Strength LUE  Comment: see OT eval  Tone RLE  RLE Tone: Normotonic  Tone LLE  LLE Tone: Normotonic  Motor Control  Gross Motor?: WFL  Sensation  Overall Sensation Status: WFL  Bed mobility  Bridging: Maximum assistance (max cues)  Rolling to Left: Moderate assistance  Rolling to Right: Moderate assistance  Supine to Sit: Moderate assistance  Sit to Supine: Maximum assistance  Scooting: Dependent/Total  Transfers  Sit to Stand: Maximum Assistance (x2 to walker; unable to stand fully upright, very flexed at trunk with retropulsion)  Stand to sit: Maximum Assistance  Bed to Chair: Unable to assess  Comment: Attempted to tranfser bed to chair initialy with walker, but unable; attempted with Sera Stedy lift, but unable to maneuver safely to current chair in room and required max assist x 2 persons to get in/out of lift  Ambulation  Ambulation?: No  Ambulation 1  Comments: unable at this time due to weakness and poor balance and posture     Balance  Posture: Fair  Sitting - Static: -  Sitting - Dynamic: Poor  Standing - Static: Poor  Standing - Dynamic: Poor  Exercises  Heelslides: 2x10 bilateral LE with mod cues  Hip Abduction: supine 2x10 bilateral with min assist  Knee Short Arc Quad: x10 bilateral  Ankle Pumps: x10 with max cues  Comments: CS and cues  Other exercises  Other exercises?: No     Assessment   Body structures, Functions, Activity limitations: Decreased functional mobility ; Decreased strength;Decreased endurance;Decreased cognition;Decreased safe awareness;Decreased balance;Decreased coordination  Assessment: Pt is a 81 yo with UTI, weakness throughout and confusion resulting in increased need for assistance with all mobility incuding rolling, sit <> supine and transfers. Sitting and standign balance also impaired and requiring max assist x 2 persons at this time. Will benefit from course of PT to help imrpove strength, balance and mobility. Treatment Diagnosis: decreased mobility, weakness  Prognosis: Good  Decision Making: Medium Complexity  Barriers to Learning: cognition  REQUIRES PT FOLLOW UP: Yes  Activity Tolerance  Activity Tolerance: Patient limited by fatigue;Patient limited by cognitive status; Patient limited by endurance  Activity Tolerance: pleasant and cooperative but needs max cues and increased time with activities  PT Equipment Recommendations  Other: TBD     Discharge

## 2018-01-16 NOTE — PROGRESS NOTES
Occupational Therapy   Occupational Therapy Initial Assessment  Date: 2018   Patient Name: Marya Lindsey  MRN: [id number]     : 6/10/1927    Patient Diagnosis(es): There were no encounter diagnoses. has a past medical history of Arthritis; Chicken pox; Chronic back pain; Chronic low back pain; Eczematous dermatitis; History of bladder infections; Hyperlipidemia; Hypertension; Measles; Mumps; Osteoarthritis; SCC (squamous cell carcinoma), arm; SI (stress incontinence), female; Type II or unspecified type diabetes mellitus without mention of complication, not stated as uncontrolled; and Whooping cough. has a past surgical history that includes Appendectomy; Rectocele repair; Cystocele repair; Ankle surgery; Breast biopsy; Cataract removal (); eye surgery; Tonsillectomy; and Hysterectomy.            Restrictions  Restrictions/Precautions  Restrictions/Precautions: Fall Risk, Swallowing - Thickened Liquids (Puree/NTL)  Required Braces or Orthoses?: No  Position Activity Restriction  Other position/activity restrictions: cushion up in chair, use lift to transfer (2 person tx), up with assist    Subjective   General  Chart Reviewed: Yes  Patient assessed for rehabilitation services?: Yes  Family / Caregiver Present: Yes  Referring Practitioner: Dr. Luanne Flanagan  Diagnosis: UTI  Pain Assessment  Patient Currently in Pain: Denies  Pain Assessment: 0-10  Valenzuela-Baker Pain Rating: No hurt  Pain Level: 0  Pain Type:  (note chronic back pain though no specific c/o today)  Pain Orientation: Lower  Patient's Stated Pain Goal: No pain  Pain Intervention(s): Repositioned  Response to Pain Intervention: Patient Satisfied  Multiple Pain Sites: No  Pre Treatment Pain Screening  Comments / Details: Pt seen for part of tx with PT for safety with xfer training  Social/Functional History  Social/Functional History  Lives With: Son  Type of Home: House  Home Layout: Two level (Pt. stays on 1st floor, son upstairs)  Home Access: Stairs to enter with rails  Entrance Stairs - Number of Steps: 1 step to patio and one step in the door  Entrance Stairs - Rails: Left  Bathroom Shower/Tub: Walk-in shower, Shower chair with back  Bathroom Toilet: Standard  Bathroom Equipment: Grab bars in shower, Shower chair  Bathroom Accessibility: Walker accessible  Home Equipment: 4 wheeled walker, Reacher, [address] Help From: Family  ADL Assistance: Needs assistance  Bath: Minimal assistance  Toileting: Independent  Homemaking Assistance: Needs assistance  Homemaking Responsibilities: Yes (son prepared meals)  Ambulation Assistance: Independent (furniture walked or cane at times)  Transfer Assistance: Independent  Active : No  Occupation: Retired  Leisure & Hobbies: Senior puzzles   Additional Comments: per medical evalaution son reports pt began needing increased assist with all ADL/fxl mob x ~1-2 wk prior to initial hospitalization 2* increased weakness       Objective   Vision: Impaired  Vision Exceptions: Cataracts; Wears glasses at all times  Hearing: Within functional limits    Orientation  Overall Orientation Status: Impaired  Orientation Level: Disoriented to time;Disoriented to place; Disoriented to situation  Observation/Palpation  Observation: Pt more alert and visiting with son upon therapist entry. pt smiling, agreeable to therapy, no reports of pain or distress.  IV R elbow ditch  Balance  Sitting Balance: Minimal assistance (to CGA while sitting EOB)  Standing Balance: Unable to assess(comment) (NT due to safety)  Standing Balance  Sit to stand: Maximum assistance (x2 person)  Stand to sit: Maximum assistance (x2 person)  ADL  Grooming: Minimal assistance  UE Bathing: Maximum assistance  LE Bathing: Dependent/Total  UE Dressing: Maximum assistance  LE Dressing: Dependent/Total  Toileting: Dependent/Total  Tone RUE  RUE Tone: Normotonic  Tone LUE  LUE Tone: Normotonic  Coordination  Coordination and Movement description: Decreased restricted  Self Care Goal Status (): At least 60 percent but less than 80 percent impaired, limited or restricted  OutComes Score                                           AM-PAC Score             Goals  Short term goals  Time Frame for Short term goals: 2 wks  Short term goal 1: Increase to [place] bed mob sup <-> sit and rolling  Short term goal 2: Increase to mod/maxA UB/LB dressing  Short term goal 3: Tolerate BUE ther ex/act [id number] prior to fatigue to increase strength/endurance for ADL  Short term goal 4: Increase to 1-3min standing leonardo/endurance prior to fatigue to decrease fall risk during ADL  Long term goals  Time Frame for Long term goals : 3-4 wks  Long term goal 1: Increase to [place]/CGA bed mob sup <-> sit and rolling  Long term goal 2: Increase to min/modA UB/LB dressing  Long term goal 3: Tolerate BUE ther ex/act 2x15 reps all planes prior to fatigue to increase strength/endurance for ADL  Long term goal 4: Increase to 3-5min standing leonardo/endurance prior to fatigue to decrease fall risk during ADL  Long term goal 5:  Increase to Setup/spv for G/H tasks  Patient Goals   Patient goals : Get stronger       Therapy Time   Individual Concurrent Group Co-treatment   Time In  8:15         Time Out  9:30         Minutes  Holland Meehan [address] [place] [address] [phone]

## 2018-01-16 NOTE — PROGRESS NOTES
Hospitalist Progress Note      PCP: Theodis Moritz, MD    Date of Admission: 1/12/2018    Chief Complaint: none today    Subjective: pt awake/alert, looks comfortable     Medications:  Reviewed    Infusion Medications    dextrose       Scheduled Medications    nitrofurantoin (macrocrystal-monohydrate)  100 mg Oral 2 times per day    nystatin  5 mL Oral 4x Daily    cloNIDine  0.1 mg Oral BID    enoxaparin  30 mg Subcutaneous Daily    sodium chloride flush  10 mL Intravenous 2 times per day    amLODIPine  10 mg Oral Daily    aspirin  81 mg Oral Daily    atorvastatin  10 mg Oral Daily    insulin lispro  0-12 Units Subcutaneous TID WC    insulin lispro  0-6 Units Subcutaneous Nightly    metoprolol tartrate  25 mg Oral BID    vitamin D  1,000 Units Oral Daily     PRN Meds: acetaminophen, magnesium hydroxide, ondansetron, sodium chloride flush, dextrose, dextrose, glucagon (rDNA), glucose, nystatin, stomahesive in petrolatum      Intake/Output Summary (Last 24 hours) at 01/16/18 0843  Last data filed at 01/15/18 2308   Gross per 24 hour   Intake              915 ml   Output                0 ml   Net              915 ml       Exam:    BP (!) 158/72   Pulse 66   Temp 98.1 °F (36.7 °C) (Oral)   Resp 20   SpO2 99%     General appearance: No apparent distress, appears stated age and cooperative. HEENT: Pupils equal, round, and reactive to light. Conjunctivae/corneas clear. Neck: Supple, with full range of motion. No jugular venous distention. Trachea midline. Respiratory:  Normal respiratory effort. Clear to auscultation, bilaterally without Rales/Wheezes/Rhonchi. Cardiovascular: Regular rate and rhythm with normal S1/S2 without murmurs, rubs or gallops. Abdomen: Soft, non-tender, non-distended with normal bowel sounds. Musculoskeletal: No clubbing, cyanosis or edema bilaterally. Full range of motion without deformity. Skin: Skin color, texture, turgor normal.  No rashes or lesions.   Neurologic: Neurovascularly intact without any focal sensory/motor deficits. Cranial nerves: II-XII intact, grossly non-focal.  Psychiatric:partially Alert and oriented,  Capillary Refill: Brisk,< 3 seconds   Peripheral Pulses: +2 palpable, equal bilaterally       Labs:   Recent Labs      01/15/18   0455   WBC  9.9   HGB  10.4*   HCT  31.7*   PLT  239     Recent Labs      01/14/18   0441  01/15/18   0455  01/16/18   0530   NA  142  142  142   K  4.4  4.2  4.0   CL  106  106  105   CO2  23  23  22   BUN  44*  37*  33*   CREATININE  1.53*  1.17*  1.27*   CALCIUM  12.1*  11.6*  11.7*     No results for input(s): AST, ALT, BILIDIR, BILITOT, ALKPHOS in the last 72 hours. No results for input(s): INR in the last 72 hours. No results for input(s): [place] in the last 72 hours. Urinalysis:    Lab Results   Component Value Date    NITRU Negative 01/13/2018    WBCUA 6-10 01/13/2018    BACTERIA Few 01/13/2018    RBCUA 0-2 01/13/2018    BLOODU Negative 01/13/2018    SPECGRAV 1.015 01/13/2018    GLUCOSEU Negative 01/13/2018       Radiology:  No orders to display           Assessment/Plan:    Active Hospital Problems    Diagnosis Date Noted    Hypercalcemia [E83.52] 01/15/2018    Change in mental status [R41.82] 01/12/2018         DVT Prophylaxis: lovenox  Diet: DIET DYSPHAGIA I PUREED;  Nectar Thick  Code Status: DNR-CCA    PT/OT Eval Status: done    Dispo - UTI- treated, will complete PO atbs  Encephalopathy/dementia- baseline, supportive care  Hypercalcemia- resolving with IV hydration, nephrology to see pt  HTN- controlled  General weakness- rehab orders   Stable for subacute status at [facility]- awaiting pre sertification per insurance company       Electronically signed by Caty Bartholomew MD on 1/16/2018 at 8:43 AM

## 2018-01-17 LAB
ANION GAP SERPL CALCULATED.3IONS-SCNC: 15 MEQ/L (ref 7–13)
BUN BLDV-MCNC: 27 MG/DL (ref 8–23)
CALCIUM SERPL-MCNC: 11.7 MG/DL (ref 8.6–10.2)
CHLORIDE BLD-SCNC: 102 MEQ/L (ref 98–107)
CO2: 24 MEQ/L (ref 22–29)
CREAT SERPL-MCNC: 1.08 MG/DL (ref 0.5–0.9)
GFR AFRICAN AMERICAN: 57.6
GFR NON-AFRICAN AMERICAN: 47.6
GLUCOSE BLD-MCNC: 145 MG/DL (ref 60–115)
GLUCOSE BLD-MCNC: 154 MG/DL (ref 74–109)
GLUCOSE BLD-MCNC: 167 MG/DL (ref 60–115)
GLUCOSE BLD-MCNC: 195 MG/DL (ref 60–115)
GLUCOSE BLD-MCNC: 207 MG/DL (ref 60–115)
HOMOCYSTEINE: 32.7 UMOL/L (ref 0–15)
PERFORMED ON: ABNORMAL
POTASSIUM SERPL-SCNC: 3.5 MEQ/L (ref 3.5–5.1)
SODIUM BLD-SCNC: 141 MEQ/L (ref 132–144)
VITAMIN D 25-HYDROXY: 27.9 NG/ML (ref 30–100)

## 2018-01-17 PROCEDURE — 97530 THERAPEUTIC ACTIVITIES: CPT

## 2018-01-17 PROCEDURE — 82784 ASSAY IGA/IGD/IGG/IGM EACH: CPT

## 2018-01-17 PROCEDURE — 86334 IMMUNOFIX E-PHORESIS SERUM: CPT

## 2018-01-17 PROCEDURE — 80048 BASIC METABOLIC PNL TOTAL CA: CPT

## 2018-01-17 PROCEDURE — 82306 VITAMIN D 25 HYDROXY: CPT

## 2018-01-17 PROCEDURE — 36415 COLL VENOUS BLD VENIPUNCTURE: CPT

## 2018-01-17 PROCEDURE — 6370000000 HC RX 637 (ALT 250 FOR IP): Performed by: INTERNAL MEDICINE

## 2018-01-17 PROCEDURE — 2580000003 HC RX 258: Performed by: INTERNAL MEDICINE

## 2018-01-17 PROCEDURE — 93010 ELECTROCARDIOGRAM REPORT: CPT | Performed by: INTERNAL MEDICINE

## 2018-01-17 PROCEDURE — 84165 PROTEIN E-PHORESIS SERUM: CPT

## 2018-01-17 PROCEDURE — 97110 THERAPEUTIC EXERCISES: CPT

## 2018-01-17 PROCEDURE — 6360000002 HC RX W HCPCS: Performed by: INTERNAL MEDICINE

## 2018-01-17 PROCEDURE — 84160 ASSAY OF PROTEIN ANY SOURCE: CPT

## 2018-01-17 PROCEDURE — 1200000000 HC SEMI PRIVATE

## 2018-01-17 PROCEDURE — 86592 SYPHILIS TEST NON-TREP QUAL: CPT

## 2018-01-17 PROCEDURE — 83090 ASSAY OF HOMOCYSTEINE: CPT

## 2018-01-17 RX ORDER — SODIUM CHLORIDE 0.9 % (FLUSH) 0.9 %
10 SYRINGE (ML) INJECTION PRN
Status: DISCONTINUED | OUTPATIENT
Start: 2018-01-17 | End: 2018-02-05

## 2018-01-17 RX ORDER — ATORVASTATIN CALCIUM 10 MG/1
10 TABLET, FILM COATED ORAL DAILY
Status: DISCONTINUED | OUTPATIENT
Start: 2018-01-17 | End: 2018-02-07 | Stop reason: HOSPADM

## 2018-01-17 RX ORDER — NICOTINE POLACRILEX 4 MG
15 LOZENGE BUCCAL PRN
Status: DISCONTINUED | OUTPATIENT
Start: 2018-01-17 | End: 2018-02-07 | Stop reason: HOSPADM

## 2018-01-17 RX ORDER — SODIUM CHLORIDE 0.9 % (FLUSH) 0.9 %
10 SYRINGE (ML) INJECTION EVERY 12 HOURS SCHEDULED
Status: DISCONTINUED | OUTPATIENT
Start: 2018-01-17 | End: 2018-02-05

## 2018-01-17 RX ORDER — DEXTROSE MONOHYDRATE 25 G/50ML
12.5 INJECTION, SOLUTION INTRAVENOUS PRN
Status: DISCONTINUED | OUTPATIENT
Start: 2018-01-17 | End: 2018-02-07 | Stop reason: HOSPADM

## 2018-01-17 RX ORDER — ONDANSETRON 2 MG/ML
4 INJECTION INTRAMUSCULAR; INTRAVENOUS EVERY 6 HOURS PRN
Status: DISCONTINUED | OUTPATIENT
Start: 2018-01-17 | End: 2018-02-07 | Stop reason: HOSPADM

## 2018-01-17 RX ORDER — CLONIDINE HYDROCHLORIDE 0.1 MG/1
0.1 TABLET ORAL 2 TIMES DAILY
Status: DISCONTINUED | OUTPATIENT
Start: 2018-01-17 | End: 2018-01-19

## 2018-01-17 RX ORDER — ASPIRIN 81 MG/1
81 TABLET ORAL DAILY
Status: DISCONTINUED | OUTPATIENT
Start: 2018-01-17 | End: 2018-02-07 | Stop reason: HOSPADM

## 2018-01-17 RX ORDER — FLUCONAZOLE 100 MG/1
100 TABLET ORAL DAILY
Status: COMPLETED | OUTPATIENT
Start: 2018-01-17 | End: 2018-01-19

## 2018-01-17 RX ORDER — ACETAMINOPHEN 325 MG/1
650 TABLET ORAL EVERY 4 HOURS PRN
Status: DISCONTINUED | OUTPATIENT
Start: 2018-01-17 | End: 2018-02-07 | Stop reason: HOSPADM

## 2018-01-17 RX ORDER — DEXTROSE MONOHYDRATE 50 MG/ML
100 INJECTION, SOLUTION INTRAVENOUS PRN
Status: DISCONTINUED | OUTPATIENT
Start: 2018-01-17 | End: 2018-02-07 | Stop reason: HOSPADM

## 2018-01-17 RX ORDER — AMLODIPINE BESYLATE 10 MG/1
10 TABLET ORAL DAILY
Status: DISCONTINUED | OUTPATIENT
Start: 2018-01-17 | End: 2018-02-07 | Stop reason: HOSPADM

## 2018-01-17 RX ADMIN — METOPROLOL TARTRATE 25 MG: 25 TABLET, FILM COATED ORAL at 09:15

## 2018-01-17 RX ADMIN — AMLODIPINE BESYLATE 10 MG: 10 TABLET ORAL at 09:15

## 2018-01-17 RX ADMIN — NYSTATIN 500000 UNITS: 100000 SUSPENSION ORAL at 14:03

## 2018-01-17 RX ADMIN — VITAMIN D, TAB 1000IU (100/BT) 1000 UNITS: 25 TAB at 09:15

## 2018-01-17 RX ADMIN — ENOXAPARIN SODIUM 30 MG: 30 INJECTION SUBCUTANEOUS at 09:15

## 2018-01-17 RX ADMIN — NYSTATIN 500000 UNITS: 100000 SUSPENSION ORAL at 09:15

## 2018-01-17 RX ADMIN — METOPROLOL TARTRATE 25 MG: 25 TABLET, FILM COATED ORAL at 20:08

## 2018-01-17 RX ADMIN — FLUCONAZOLE 100 MG: 100 TABLET ORAL at 09:15

## 2018-01-17 RX ADMIN — ATORVASTATIN CALCIUM 10 MG: 10 TABLET, FILM COATED ORAL at 20:08

## 2018-01-17 RX ADMIN — ASPIRIN 81 MG: 81 TABLET ORAL at 09:15

## 2018-01-17 RX ADMIN — CLONIDINE HYDROCHLORIDE 0.1 MG: 0.1 TABLET ORAL at 20:08

## 2018-01-17 RX ADMIN — ACETAMINOPHEN 650 MG: 325 TABLET ORAL at 14:03

## 2018-01-17 RX ADMIN — NYSTATIN 500000 UNITS: 100000 SUSPENSION ORAL at 20:11

## 2018-01-17 RX ADMIN — NYSTATIN 500000 UNITS: 100000 SUSPENSION ORAL at 17:33

## 2018-01-17 RX ADMIN — CLONIDINE HYDROCHLORIDE 0.1 MG: 0.1 TABLET ORAL at 09:15

## 2018-01-17 ASSESSMENT — PAIN SCALES - GENERAL
PAINLEVEL_OUTOF10: 0
PAINLEVEL_OUTOF10: 4
PAINLEVEL_OUTOF10: 10
PAINLEVEL_OUTOF10: 0

## 2018-01-17 ASSESSMENT — PAIN SCALES - WONG BAKER: WONGBAKER_NUMERICALRESPONSE: 0

## 2018-01-17 NOTE — PROGRESS NOTES
position/activity restrictions: cushion up in chair, use lift to transfer (2 person tx), up with assist  Subjective              Orientation     Objective    LTGs added this date as pt evaluated as medical patient but was actually a patient transferred to subacute unit. Assessment   Assessment: will need 24 hour assist at discharge home provided f=versus facility at current levels  Treatment Diagnosis: decreased mobility, weakness  REQUIRES PT FOLLOW UP: Yes  Activity Tolerance  Activity Tolerance: Patient limited by pain; Patient limited by fatigue  PT Equipment Recommendations  Other: TBD       Discharge Recommendations:  2hour assist at home vs SNF/NH   Goals  Short term goals  Time Frame for Short term goals: 1  Short term goal 1: Improve endurance to tolerate 2x10 reps LE AROM with min assist  Short term goal 2: rolling L/R with min assist  Short term goal 3: supine to sit min assist x 1  Short term goal 4: transfer bed to chair with [place] Steady lift with max assist x 1  Long term goals  Time Frame for Long term goals : 3  Long term goal 1: bed to chair with <= CGA of 1 person/ sit to stand with <= CGA of 1 person  Long term goal 2: ambulate >=75ft with ww and <= CGA of 1 person  Long term goal 3: 2 stairs with 1 rail and <= CGA , marked time  Long term goal 4: increase LE strength any to increase function  Long term goal 5: DME and education in place for discharge/HEP  Patient Goals   Patient goals : Get stronger    Plan    Plan  Times per week: 5-7x/wk  Times per day:  (1-2x/day)  Plan weeks: 3  Current Treatment Recommendations: Strengthening, ROM, Functional Mobility Training, Balance Training, Transfer Training, Endurance Training, Gait Training, Neuromuscular Re-education, Cognitive Reorientation, Home Exercise Program, Safety Education & Training, Positioning, Patient/Caregiver Education & Training  Safety Devices  Type of devices: Bed alarm in place, Call light within reach, Gait belt, Patient at risk for falls, Left in bed  Restraints  Initially in place: Yes     Therapy Time   Individual Concurrent Group Co-treatment   Time In           Time Out           Minutes  [address], [id number]

## 2018-01-17 NOTE — PROGRESS NOTES
position/activity restrictions: cushion up in chair, use lift to transfer (2 person tx), up with assist  Subjective   General  Chart Reviewed: Yes  Family / Caregiver Present: Yes  Referring Practitioner: Dr Tierra Roberts  Subjective  Subjective: Pt. up in bed reports just got back to bed before lunch. Pt. reports L hip sore rates as 10/10 but pt. smiling upon arrival for PT. Orientation     Objective   Bed mobility  Scooting: Dependent/Total  Transfers  Sit to Stand: Moderate Assistance (x2)  Stand to sit: Moderate Assistance (x2)  Comment: Pt. toelrates standing with [facility] with max x2 for 1 min then trialed using Genet Daugherty for increased safety for improved static standing. 1  [facility] A x1 for 1 min and second stand with min mod A x2 for ~30 sec. Pt. requires max cues for upright posture verbal and tactile. Balance  Posture: Fair (flexed )  Sitting - Static: Fair  Sitting - Dynamic: Poor  Standing - Static: Poor  Standing - Dynamic: Poor                           Assessment   Body structures, Functions, Activity limitations: Decreased functional mobility ; Decreased strength;Decreased endurance;Decreased cognition;Decreased safe awareness;Decreased balance;Decreased coordination  Assessment: Focused bed mobility and transfers pt. toelrates increased stancding with max VC and tactile cues using both [facility] and Genet Daugherty. Pt. reports fatigue post and returned to bed with bed alarm and call light in place. Cotreatment for safety with transfers. Treatment Diagnosis: decreased mobility, weakness  Prognosis: Good  REQUIRES PT FOLLOW UP: Yes  Activity Tolerance  Activity Tolerance: Patient limited by pain; Patient limited by fatigue       Discharge Recommendations:  Subacute/Skilled Nursing Facility    G-Code     OutComes Score                                                    AM-PAC Score             Goals  Short term goals  Time Frame for Short term goals: STG=LTG, 1 week  Short term

## 2018-01-17 NOTE — PROGRESS NOTES
position/activity restrictions: cushion up in chair, use lift to transfer (2 person tx), up with assist  Subjective   General  Chart Reviewed: (P) Yes  Family / Caregiver Present: (P) No  Subjective  Subjective: (P) pt sitting up in chair since 7am and agreeable to therapy      Pain Screening  Patient Currently in Pain: (P) Denies           Objective    Sit to supine maxA BLE and trunk into bed     Transfers  Sit to Stand: (P) Moderate Assistance (x2 to walker; unable to stand fully upright, very flexed at trunk with retropulsion)  Stand to sit: (P) Moderate Assistance  Ambulation  Ambulation?: (P) No  Ambulation 1  Surface: (P) level tile  Device: (P) Rolling Walker  Assistance: (P) Moderate assistance (x2)  Quality of Gait: (P) shuffling feet  Distance: (P) 3-4 steps  Comments: (P) max vc's to move feet and ww     Balance  Posture: (P) Fair  Sitting - Static: (P) -  Sitting - Dynamic: (P) Poor  Standing - Static: (P) Poor  Standing - Dynamic: (P) Poor  Exercises  Hip flex 10x  Hip ab/ad 10x  LAQ 10x  AP 20x  GS 20x  l                 Assessment   Body structures, Functions, Activity limitations: (P) Decreased functional mobility ; Decreased strength;Decreased endurance;Decreased cognition;Decreased safe awareness;Decreased balance;Decreased coordination  Assessment: (P) pt performed seated exercise. [address] [place] x2 for sit to stand from recliner. Pt took 3-4 steps from chair to bed with max vc's to move ww and feet. Treatment Diagnosis: (P) decreased mobility, weakness  Prognosis: (P) Good  REQUIRES PT FOLLOW UP: (P) Yes  Activity Tolerance  Activity Tolerance: (P) Patient limited by fatigue;Patient limited by cognitive status; Patient limited by endurance  Activity Tolerance: (P) pleasant and cooperative but needs max cues and increased time with activities  PT Equipment Recommendations  Other: (P) TBD       Discharge Recommendations:  Subacute/Skilled Nursing Facility  Goals  Short term goals  Time Frame for Short term

## 2018-01-17 NOTE — PROGRESS NOTES
vs Estimated Needs: Intake Meets Needs    Nutrition Risk Level: Moderate    Nutrition Interventions:   Continue current diet, Start ONS  Continued Inpatient Monitoring, Education not appropriate at this time    Nutrition Evaluation:   · Evaluation: Goals set   · Goals: po intake > 75% of meals. Fluid intake to meet > 75% estimated needs. Glu <140. Offer least restrictive, safe diet as per SLT recommendations. · Monitoring: Diet Progression, Meal Intake, Supplement Intake, Fluid Balance, Ascites/Edema, Mental Status/Confusion, Weight, Pertinent Labs, Chewing/Swallowing    See Adult Nutrition Doc Flowsheet for more detail.      Electronically signed by Adan Cadet RD, LD on 1/17/18 at 5:51 PM

## 2018-01-18 LAB
ANION GAP SERPL CALCULATED.3IONS-SCNC: 15 MEQ/L (ref 7–13)
BASOPHILS ABSOLUTE: 0 K/UL (ref 0–0.2)
BASOPHILS RELATIVE PERCENT: 0.3 %
BUN BLDV-MCNC: 27 MG/DL (ref 8–23)
CALCIUM SERPL-MCNC: 11.6 MG/DL (ref 8.6–10.2)
CHLORIDE BLD-SCNC: 102 MEQ/L (ref 98–107)
CO2: 23 MEQ/L (ref 22–29)
CREAT SERPL-MCNC: 1.23 MG/DL (ref 0.5–0.9)
EOSINOPHILS ABSOLUTE: 0.8 K/UL (ref 0–0.7)
EOSINOPHILS RELATIVE PERCENT: 6.5 %
GFR AFRICAN AMERICAN: 49.6
GFR NON-AFRICAN AMERICAN: 41
GLUCOSE BLD-MCNC: 169 MG/DL (ref 60–115)
GLUCOSE BLD-MCNC: 171 MG/DL (ref 74–109)
GLUCOSE BLD-MCNC: 230 MG/DL (ref 60–115)
GLUCOSE BLD-MCNC: 344 MG/DL (ref 60–115)
GLUCOSE BLD-MCNC: 369 MG/DL (ref 60–115)
GLUCOSE BLD-MCNC: 383 MG/DL (ref 60–115)
HCT VFR BLD CALC: 32.7 % (ref 37–47)
HEMOGLOBIN: 10.9 G/DL (ref 12–16)
LYMPHOCYTES ABSOLUTE: 2.9 K/UL (ref 1–4.8)
LYMPHOCYTES RELATIVE PERCENT: 25 %
MCH RBC QN AUTO: 31.1 PG (ref 27–31.3)
MCHC RBC AUTO-ENTMCNC: 33.2 % (ref 33–37)
MCV RBC AUTO: 93.6 FL (ref 82–100)
MONOCYTES ABSOLUTE: 0.8 K/UL (ref 0.2–0.8)
MONOCYTES RELATIVE PERCENT: 6.8 %
NEUTROPHILS ABSOLUTE: 7.2 K/UL (ref 1.4–6.5)
NEUTROPHILS RELATIVE PERCENT: 61.4 %
PDW BLD-RTO: 13.6 % (ref 11.5–14.5)
PERFORMED ON: ABNORMAL
PLATELET # BLD: 255 K/UL (ref 130–400)
POTASSIUM SERPL-SCNC: 3.9 MEQ/L (ref 3.5–5.1)
RBC # BLD: 3.49 M/UL (ref 4.2–5.4)
RPR: NORMAL
SODIUM BLD-SCNC: 140 MEQ/L (ref 132–144)
WBC # BLD: 11.7 K/UL (ref 4.8–10.8)

## 2018-01-18 PROCEDURE — 97110 THERAPEUTIC EXERCISES: CPT

## 2018-01-18 PROCEDURE — 97530 THERAPEUTIC ACTIVITIES: CPT

## 2018-01-18 PROCEDURE — 80048 BASIC METABOLIC PNL TOTAL CA: CPT

## 2018-01-18 PROCEDURE — 6370000000 HC RX 637 (ALT 250 FOR IP): Performed by: INTERNAL MEDICINE

## 2018-01-18 PROCEDURE — 36415 COLL VENOUS BLD VENIPUNCTURE: CPT

## 2018-01-18 PROCEDURE — 1200000000 HC SEMI PRIVATE

## 2018-01-18 PROCEDURE — 6360000002 HC RX W HCPCS: Performed by: INTERNAL MEDICINE

## 2018-01-18 PROCEDURE — G8998 SWALLOW D/C STATUS: HCPCS

## 2018-01-18 PROCEDURE — 92610 EVALUATE SWALLOWING FUNCTION: CPT

## 2018-01-18 PROCEDURE — 85025 COMPLETE CBC W/AUTO DIFF WBC: CPT

## 2018-01-18 RX ADMIN — AMLODIPINE BESYLATE 10 MG: 10 TABLET ORAL at 08:26

## 2018-01-18 RX ADMIN — ATORVASTATIN CALCIUM 10 MG: 10 TABLET, FILM COATED ORAL at 20:39

## 2018-01-18 RX ADMIN — FLUCONAZOLE 100 MG: 100 TABLET ORAL at 08:26

## 2018-01-18 RX ADMIN — NYSTATIN 500000 UNITS: 100000 SUSPENSION ORAL at 20:40

## 2018-01-18 RX ADMIN — CLONIDINE HYDROCHLORIDE 0.1 MG: 0.1 TABLET ORAL at 20:41

## 2018-01-18 RX ADMIN — NYSTATIN 500000 UNITS: 100000 SUSPENSION ORAL at 12:04

## 2018-01-18 RX ADMIN — METOPROLOL TARTRATE 25 MG: 25 TABLET, FILM COATED ORAL at 20:39

## 2018-01-18 RX ADMIN — NYSTATIN 500000 UNITS: 100000 SUSPENSION ORAL at 08:28

## 2018-01-18 RX ADMIN — ACETAMINOPHEN 650 MG: 325 TABLET ORAL at 16:48

## 2018-01-18 RX ADMIN — METOPROLOL TARTRATE 25 MG: 25 TABLET, FILM COATED ORAL at 08:26

## 2018-01-18 RX ADMIN — CLONIDINE HYDROCHLORIDE 0.1 MG: 0.1 TABLET ORAL at 08:26

## 2018-01-18 RX ADMIN — ENOXAPARIN SODIUM 30 MG: 30 INJECTION SUBCUTANEOUS at 08:25

## 2018-01-18 RX ADMIN — ASPIRIN 81 MG: 81 TABLET ORAL at 08:26

## 2018-01-18 ASSESSMENT — PAIN SCALES - WONG BAKER
WONGBAKER_NUMERICALRESPONSE: 0
WONGBAKER_NUMERICALRESPONSE: 0

## 2018-01-18 ASSESSMENT — PAIN SCALES - GENERAL
PAINLEVEL_OUTOF10: 0
PAINLEVEL_OUTOF10: 2
PAINLEVEL_OUTOF10: 0
PAINLEVEL_OUTOF10: 2

## 2018-01-18 NOTE — PROGRESS NOTES
Physical Therapy  Facility/Department: [facility] MED SURG UNIT  Daily Treatment Note  NAME: Gianfranco Centeno  : 6/10/1927  MRN: [id number]    Date of Service: 2018    Patient Diagnosis(es):   Patient Active Problem List    Diagnosis Date Noted    CKD (chronic kidney disease) 2015     Priority: High    HLD (hyperlipidemia) 2015     Priority: High    HTN (hypertension) 2011     Priority: High    Diabetes mellitus 2011     Priority: High    Vitamin D deficiency 2015     Priority: Low    SI (stress incontinence), female 2012     Priority: Low    Osteoarthritis 2012     Priority: Low    Hypercalcemia 01/15/2018    Change in mental status 2018    UTI (urinary tract infection) 2018    Lumbar spinal stenosis 2016    Chronic low back pain 2016    Eczematous dermatitis        Past Medical History:   Diagnosis Date    Arthritis     Chicken pox     Chronic back pain     Chronic low back pain 2016    Eczematous dermatitis     History of bladder infections     Hyperlipidemia     Hypertension     Measles     Mumps     Osteoarthritis 2012    SCC (squamous cell carcinoma), arm     right upper arm,  right forarm    SI (stress incontinence), female 2012    Type II or unspecified type diabetes mellitus without mention of complication, not stated as uncontrolled     Whooping cough      Past Surgical History:   Procedure Laterality Date    ANKLE SURGERY      broken left ankle.     APPENDECTOMY      BREAST BIOPSY      x2 left breast    CATARACT REMOVAL      bilateral    CYSTOCELE REPAIR      EYE SURGERY      bilateral cataract    HYSTERECTOMY      complete at age 39    [address]      as a child       Restrictions  Restrictions/Precautions  Restrictions/Precautions: Fall Risk (Pt now on normal consistency diet)  Required Braces or Orthoses?: No  Position Activity Restriction  Other assist  Short term goal 2: rolling L/R with min assist  Short term goal 3: supine to sit min assist x 1  Short term goal 4: transfer bed to chair with Travis Crow Steady lift with max assist x 1  Long term goals  Time Frame for Long term goals : 3-4 weeks  Long term goal 1: bed to chair with <= CGA of 1 person/ sit to stand with <= CGA of 1 person  Long term goal 2: ambulate >=75ft with ww and <= CGA of 1 person  Long term goal 3: 2 stairs with 1 rail and <= CGA , marked time  Long term goal 4: increase LE strength any to increase function  Long term goal 5: DME and education in place for discharge/HEP  Patient Goals   Patient goals : Get stronger    Plan    Plan  Times per week: 5-7x/wk  Times per day: Daily  Plan weeks: 1 week  Current Treatment Recommendations: Strengthening, ROM, Functional Mobility Training, Balance Training, Transfer Training, Endurance Training, Gait Training, Neuromuscular Re-education, Cognitive Reorientation, Home Exercise Program, Safety Education & Training, Positioning, Patient/Caregiver Education & Training  Safety Devices  Type of devices: Bed alarm in place, Call light within reach, Gait belt, Patient at risk for falls, Left in bed  Restraints  Initially in place: Yes  Restraints: bed alarm     Therapy Time   Individual Concurrent Group Co-treatment   Time In  1030         Time Out  1100         Minutes  Parisa Wu 34, PTA  License and [address] Number: [id number]

## 2018-01-18 NOTE — PROGRESS NOTES
wks  Long term goal 1: Increase to Perry/CGA bed mob sup <-> sit and rolling  Long term goal 2: Increase to min/modA UB/LB dressing  Long term goal 3: Tolerate BUE ther ex/act 2x15 reps all planes prior to fatigue to increase strength/endurance for ADL  Long term goal 4: Increase to 3-5min standing leonardo/endurance prior to fatigue to decrease fall risk during ADL  Long term goal 5:  Increase to Setup/spv for G/H tasks       Therapy Time   Individual Concurrent Group Co-treatment   Time In  12:40         Time Out  1:35         Minutes  54      [address], [place]

## 2018-01-18 NOTE — CONSULTS
[address]                       [address], [address] [facility]                                   CONSULTATION    PATIENT NAME: René Figueroa                      :        06/10/1927  MED REC NO:   [id number]                            ROOM:  ACCOUNT NO:   [de-identified]                           ADMIT DATE: 2018  PROVIDER:     oJni Zaragoza DO    CONSULT DATE:  2018    HISTORY OF PRESENT ILLNESS:  This is a 79-year-old who is being seen at the  request of Dr. Trino Reyna for hypercalcemia. Son is in the room who she lives  with at this time. The patient appears to be in no distress. At the time  of her admission to the hospital, the patient was known to have an elevated  calcium of 13.4. There was no evidence of malignancy and PTH _____ in  addition to intact PTH were normal.  The patient was noted to be on  thiazide diuretic. The patient has had an improvement in her calcium with  hydration and discontinuation of thiazide diuretic. At this time, she  appears to be in no distress. ALLERGIES TO MEDICATIONS:  REGLAN, PROTONIX, PENICILLIN, TRAMADOL. PAST SURGICAL HISTORY:  Appendectomy, rectocele repair, cystocele repair,  left ankle surgery repair. Biopsy, left breast x2, cataracts bilaterally,  hysterectomy, and adenoidectomy. MEDICATIONS:  At the time of her admission Zestril, Maxzide, Lipitor,  Lopressor, vitamin D, fish oil, Flonase. HABITS:  No smoking, no alcohol. No opioids. PHYSICAL EXAMINATION:  VITAL SIGNS:  173 pounds at the time of my evaluation, blood pressure  140/60, heart rate is 65 and regular, respirations 18 a minute, afebrile. HEENT:  Normocephalic. Pupils equal and react to light. Extraocular  muscles are intact. NECK:  Supple. No JVD, bruits or adenopathy. LUNGS:  Clear. No wheezing, rales, or rhonchi. HEART:  Regular. 1/6 systolic murmur. ABDOMEN:  Soft. No guarding or rigidity. Bowel sounds are present. No  distention. Lower limbs show no edema. SKIN:  Warm and dry. IMPRESSION:  1. Hypercalcemia, possibly related to thiazide diuretic in addition to  mild dehydration on admission. 2.  Diabetes mellitus type 2.  3.  Hypertension. 4.  Mental status change, possibly related to hypercalcemia. 5.  Stress incontinence. 6.  CKD 3, GFR 47 mL per minute. PLAN:  Discontinue thiazide diuretic, IV fluids. Further evaluation and  treatment based on results of hydration and discontinued thiazide. No  evidence of malignancy.         Mercedez Salguero DO    D: 01/17/2018 13:32:18       T: 01/17/2018 13:36:33     [email or web address]  Job#: [id number]     Doc#: [id number]    CC:

## 2018-01-18 NOTE — PROGRESS NOTES
Speech Language Pathology  Facility/Department: [facility] MED SURG UNIT  Dysphagia Re-Evaluation/ Discharge Summary    NAME: Dionicio Fraire  : 6/10/1927   MRN: [id number]  ADMISSION DATE: 2018  ADMITTING DIAGNOSIS: has HTN (hypertension); Diabetes mellitus; SI (stress incontinence), female; Osteoarthritis; CKD (chronic kidney disease); HLD (hyperlipidemia); Vitamin D deficiency; Eczematous dermatitis; Chronic low back pain; Lumbar spinal stenosis; UTI (urinary tract infection); Change in mental status; and Hypercalcemia on her problem list.  DATE ONSET:     Date of Eval: 2018   Evaluating Therapist: PK Cheng    RECENT RESULTS  CT OF HEAD/MRI:unknown    Primary Complaint: Dysphagia  Patient seen for previous evaluation/treatment and on a modified diet. This evaluation is to determine an appropriate diet consistency for safe eating and to develop intervention strategies to address these difficulties. Pain:  Pain Assessment  Patient Currently in Pain: Yes  Pain Assessment: 0-10  Valenzuela-Baker Pain Rating: No hurt  Pain Level: 0  Patient's Stated Pain Goal: No pain  Pain Intervention(s): Repositioned  Response to Pain Intervention: Patient Satisfied  Multiple Pain Sites: No    Assessment: The BLANCA Clinical Evaluation of Dysphagia was presented:  Oral Stage -  The patient demonstrated good performances for lip seal, spread, and seal.  No leakage was noted. Lingual strength, range, and performances were judged to be adequate. No pouching  occurred. Dentition is good with dentures that are well fitting. There was no pouching of food. Mandible strength was good. Pharyngeal/Esophageal Stage -  The patient had good elevation of the hyoid/thyroid cartilage. No signs or symptoms of aspiration were observed with multiple trials of solids and liquids. There was no coughing, throat clearing, change in vocal quality noted. Silent aspiration is possible. Recommendations:  1.  Change diet to regular consistency  2. Change liquids to thin consistency. 3. Proper positioning. 4. Loose aspiration precautions  5. Possible MBS if continued aspiration is suspected. 6. Further dysphagia therapy is not indicated. Plan: D/C dysphagia therapy  Goals: none      Patient/family involved in developing goals and treatment plan: Reviewed with patient and staff. Subjective:   Previous level of function and limitations: Normal food and liquid consistencies. Objective:Met                                             Cognition: [facility]          Additional Assessments: none indicated. Prognosis:Completed. Education: Reviewed with patient/staff.        G-Code:CH            Therapy Time:   Individual Concurrent Group Co-treatment   Time In  9:25         Time Out  10:05         Minutes  40                 Tabitha Izquierdo, SLP Ph.D.  CCC-SLP/A  [id number]  [id number]  1/18/2018 12:52 PM

## 2018-01-19 ENCOUNTER — APPOINTMENT (OUTPATIENT)
Dept: ULTRASOUND IMAGING | Age: 83
DRG: 071 | End: 2018-01-19
Attending: INTERNAL MEDICINE
Payer: MEDICARE

## 2018-01-19 ENCOUNTER — APPOINTMENT (OUTPATIENT)
Dept: GENERAL RADIOLOGY | Age: 83
DRG: 071 | End: 2018-01-19
Attending: INTERNAL MEDICINE
Payer: MEDICARE

## 2018-01-19 LAB
GLUCOSE BLD-MCNC: 148 MG/DL (ref 60–115)
GLUCOSE BLD-MCNC: 193 MG/DL (ref 60–115)
GLUCOSE BLD-MCNC: 193 MG/DL (ref 60–115)
GLUCOSE BLD-MCNC: 294 MG/DL (ref 60–115)
PERFORMED ON: ABNORMAL

## 2018-01-19 PROCEDURE — 6370000000 HC RX 637 (ALT 250 FOR IP): Performed by: INTERNAL MEDICINE

## 2018-01-19 PROCEDURE — 97116 GAIT TRAINING THERAPY: CPT

## 2018-01-19 PROCEDURE — 97530 THERAPEUTIC ACTIVITIES: CPT

## 2018-01-19 PROCEDURE — 97112 NEUROMUSCULAR REEDUCATION: CPT

## 2018-01-19 PROCEDURE — 6360000002 HC RX W HCPCS: Performed by: INTERNAL MEDICINE

## 2018-01-19 PROCEDURE — 74018 RADEX ABDOMEN 1 VIEW: CPT

## 2018-01-19 PROCEDURE — 97110 THERAPEUTIC EXERCISES: CPT

## 2018-01-19 PROCEDURE — 1200000000 HC SEMI PRIVATE

## 2018-01-19 PROCEDURE — 76705 ECHO EXAM OF ABDOMEN: CPT

## 2018-01-19 RX ORDER — CLONIDINE HYDROCHLORIDE 0.1 MG/1
0.1 TABLET ORAL 3 TIMES DAILY
Status: DISCONTINUED | OUTPATIENT
Start: 2018-01-19 | End: 2018-01-20

## 2018-01-19 RX ADMIN — ASPIRIN 81 MG: 81 TABLET ORAL at 09:26

## 2018-01-19 RX ADMIN — METOPROLOL TARTRATE 25 MG: 25 TABLET, FILM COATED ORAL at 09:26

## 2018-01-19 RX ADMIN — AMLODIPINE BESYLATE 10 MG: 10 TABLET ORAL at 09:26

## 2018-01-19 RX ADMIN — NYSTATIN 500000 UNITS: 100000 SUSPENSION ORAL at 09:31

## 2018-01-19 RX ADMIN — ACETAMINOPHEN 650 MG: 325 TABLET ORAL at 09:26

## 2018-01-19 RX ADMIN — METOPROLOL TARTRATE 25 MG: 25 TABLET, FILM COATED ORAL at 20:13

## 2018-01-19 RX ADMIN — CLONIDINE HYDROCHLORIDE 0.1 MG: 0.1 TABLET ORAL at 09:26

## 2018-01-19 RX ADMIN — CLONIDINE HYDROCHLORIDE 0.1 MG: 0.1 TABLET ORAL at 20:13

## 2018-01-19 RX ADMIN — ENOXAPARIN SODIUM 30 MG: 30 INJECTION SUBCUTANEOUS at 20:18

## 2018-01-19 RX ADMIN — ATORVASTATIN CALCIUM 10 MG: 10 TABLET, FILM COATED ORAL at 20:13

## 2018-01-19 RX ADMIN — FLUCONAZOLE 100 MG: 100 TABLET ORAL at 09:26

## 2018-01-19 RX ADMIN — METFORMIN HYDROCHLORIDE 1000 MG: 500 TABLET ORAL at 17:04

## 2018-01-19 ASSESSMENT — PAIN DESCRIPTION - LOCATION
LOCATION: BACK
LOCATION: BACK

## 2018-01-19 ASSESSMENT — PAIN SCALES - GENERAL
PAINLEVEL_OUTOF10: 8
PAINLEVEL_OUTOF10: 8
PAINLEVEL_OUTOF10: 7
PAINLEVEL_OUTOF10: 0

## 2018-01-19 ASSESSMENT — PAIN DESCRIPTION - PAIN TYPE: TYPE: CHRONIC PAIN

## 2018-01-19 ASSESSMENT — PAIN SCALES - WONG BAKER: WONGBAKER_NUMERICALRESPONSE: 0

## 2018-01-19 NOTE — PROGRESS NOTES
strength;Decreased endurance;Decreased cognition;Decreased safe awareness;Decreased balance;Decreased coordination  Assessment: Pt performed exs in both supine and seated this session along w/bed mobility and functional transfers requiring mod to max Ax1. Ambulates 3-4 steps from bed to chair w/mod-max Ax1 and vc's for posture and technique. Pt states her back pain after therapy is a 5/10. Treatment Diagnosis: decreased mobility, weakness  Prognosis: Good  REQUIRES PT FOLLOW UP: Yes  Activity Tolerance  Activity Tolerance: Patient limited by pain; Patient limited by fatigue  Activity Tolerance: pleasant and cooperative but needs max cues and increased time with activities  PT Equipment Recommendations  Other: TBD       Discharge Recommendations:  Subacute/Skilled Nursing Facility    G-Code     OutComes Score                                                    AM-PAC Score             Goals  Short term goals  Time Frame for Short term goals: STG=LTG, 1 week  Short term goal 1: Improve endurance to tolerate 3x10 reps LE AROM with min assist  Short term goal 2: rolling L/R with min assist  Short term goal 3: supine to sit min assist x 1  Short term goal 4: [facility] with max assist x 1  Long term goals  Time Frame for Long term goals : 3-4 weeks  Long term goal 1: bed to chair with <= CGA of 1 person/ sit to stand with <= CGA of 1 person  Long term goal 2: ambulate >=75ft with ww and <= CGA of 1 person  Long term goal 3: 2 stairs with 1 rail and <= CGA , marked time  Long term goal 4: increase LE strength any to increase function  Long term goal 5: DME and education in place for discharge/HEP  Patient Goals   Patient goals : Get stronger    Plan    Plan  Times per week: 5-7x/wk  Times per day: Daily  Plan weeks: 1 week  Current Treatment Recommendations: Strengthening, ROM, Functional Mobility Training, Balance Training, Transfer Training, Endurance Training, Gait Training,

## 2018-01-19 NOTE — PROGRESS NOTES
without any focal sensory/motor deficits. Cranial nerves: II-XII intact, grossly non-focal.  Psychiatric:more Alert and oriented,   Capillary Refill: Brisk,< 3 seconds   Peripheral Pulses: +2 palpable, equal bilaterally       Labs:   Recent Labs      01/18/18   0446   WBC  11.7*   HGB  10.9*   HCT  32.7*   PLT  255     Recent Labs      01/17/18   0445  01/18/18   0446   NA  141  140   K  3.5  3.9   CL  102  102   CO2  24  23   BUN  27*  27*   CREATININE  1.08*  1.23*   CALCIUM  11.7*  11.6*     No results for input(s): AST, ALT, BILIDIR, BILITOT, ALKPHOS in the last 72 hours. No results for input(s): INR in the last 72 hours. No results for input(s): [place] in the last 72 hours.     Urinalysis:    Lab Results   Component Value Date    NITRU Negative 01/13/2018    WBCUA 6-10 01/13/2018    BACTERIA Few 01/13/2018    RBCUA 0-2 01/13/2018    BLOODU Negative 01/13/2018    SPECGRAV 1.015 01/13/2018    GLUCOSEU Negative 01/13/2018       Radiology:  XR ABDOMEN (KUB) (SINGLE AP VIEW)    (Results Pending)   US GALLBLADDER RUQ    (Results Pending)           Assessment/Plan:    Active Hospital Problems    Diagnosis Date Noted    HTN (hypertension) [I10] 11/29/2011     Priority: High    Diabetes mellitus [E11.9] 11/29/2011     Priority: High    Change in mental status [R41.82] 01/12/2018         DVT Prophylaxis: lovenox  Diet: Dietary Nutrition Supplements: Frozen Oral Supplement  DIET GENERAL;  Code Status: DNR-CCA    PT/OT Eval Status: done    Dispo -Abd pain- work up in progress   UTI- treated,    Encephalopathy/dementia- baseline, supportive care  Hypercalcemia- resolving with IV hydration, nephrology to see pt  HTN- not well controlled, increase clonidine to TID  General weakness- rehab orders   Stable for subacute status at [facility] signed by Namita Bermeo MD on 1/19/2018 at 9:43 AM

## 2018-01-19 NOTE — PROGRESS NOTES
Occupational Therapy  Facility/Department: [facility] MED SURG UNIT  Daily Treatment Note  NAME: Amy Martinez  : 6/10/1927  MRN: [id number]    Date of Service: 2018    Patient Diagnosis(es): There were no encounter diagnoses. has a past medical history of Arthritis; Chicken pox; Chronic back pain; Chronic low back pain; Eczematous dermatitis; History of bladder infections; Hyperlipidemia; Hypertension; Measles; Mumps; Osteoarthritis; SCC (squamous cell carcinoma), arm; SI (stress incontinence), female; Type II or unspecified type diabetes mellitus without mention of complication, not stated as uncontrolled; and Whooping cough. has a past surgical history that includes Appendectomy; Rectocele repair; Cystocele repair; Ankle surgery; Breast biopsy; Cataract removal (); eye surgery; Tonsillectomy; and Hysterectomy. Restrictions  Restrictions/Precautions  Restrictions/Precautions: Fall Risk  Required Braces or Orthoses?: No  Position Activity Restriction  Other position/activity restrictions: cushion up in chair, use lift to transfer (2 person tx), up with assist  Subjective   Subjective  Subjective: Co-tx with PTA for safety, 2 person assist, and 2* pt low endurance  Pain Assessment  Patient Currently in Pain: Yes  Pain Assessment: 0-10  Pain Level: 8  Pain Location: Back (however, pt smiling, pleasant- appears 3/10 on Faces rating)  Vital Signs  Patient Currently in Pain: Yes   Orientation     Objective             Standing Balance  Time: Stand <1min with modA initially, then Perry  Sit to stand:  Moderate assistance (provided margaux pad for pt should pt fatigue in afternoon)  Stand to sit: Moderate assistance  Comment: stand pivot xfer maxA (max cues for sequencing/safety)  Bed mobility  Rolling to Right: Minimal assistance  Supine to Sit: Moderate assistance  Sit to Supine: Maximum assistance  Scooting: Maximal assistance  Comment: pulling up in bed totalA  Transfers  Stand Pivot Transfers:  (bed <-> w/c

## 2018-01-20 LAB
ALBUMIN SERPL-MCNC: 3.12 G/DL (ref 3.75–5.01)
ALPHA-1-GLOBULIN: 0.34 G/DL (ref 0.19–0.46)
ALPHA-2-GLOBULIN: 1.07 G/DL (ref 0.48–1.05)
BETA GLOBULIN: 0.76 G/DL (ref 0.48–1.1)
GAMMA GLOBULIN: 0.61 G/DL (ref 0.62–1.51)
GLUCOSE BLD-MCNC: 186 MG/DL (ref 60–115)
GLUCOSE BLD-MCNC: 214 MG/DL (ref 60–115)
GLUCOSE BLD-MCNC: 291 MG/DL (ref 60–115)
IGA: 238 MG/DL (ref 68–408)
IGG: 640 MG/DL (ref 768–1632)
IGM: 42 MG/DL (ref 35–263)
IMMUNOFIXATION REFLEX: ABNORMAL
PERFORMED ON: ABNORMAL
SPE/IFE INTERPRETATION: ABNORMAL
TOTAL PROTEIN: 5.9 G/DL (ref 6–8.3)

## 2018-01-20 PROCEDURE — 97530 THERAPEUTIC ACTIVITIES: CPT

## 2018-01-20 PROCEDURE — 6360000002 HC RX W HCPCS: Performed by: INTERNAL MEDICINE

## 2018-01-20 PROCEDURE — 97110 THERAPEUTIC EXERCISES: CPT

## 2018-01-20 PROCEDURE — 1200000000 HC SEMI PRIVATE

## 2018-01-20 PROCEDURE — 6370000000 HC RX 637 (ALT 250 FOR IP): Performed by: INTERNAL MEDICINE

## 2018-01-20 RX ORDER — CLONIDINE HYDROCHLORIDE 0.1 MG/1
0.2 TABLET ORAL 3 TIMES DAILY
Status: DISCONTINUED | OUTPATIENT
Start: 2018-01-20 | End: 2018-01-21

## 2018-01-20 RX ADMIN — ASPIRIN 81 MG: 81 TABLET ORAL at 08:37

## 2018-01-20 RX ADMIN — AMLODIPINE BESYLATE 10 MG: 10 TABLET ORAL at 07:28

## 2018-01-20 RX ADMIN — METFORMIN HYDROCHLORIDE 1000 MG: 500 TABLET ORAL at 17:00

## 2018-01-20 RX ADMIN — METOPROLOL TARTRATE 25 MG: 25 TABLET, FILM COATED ORAL at 08:37

## 2018-01-20 RX ADMIN — ENOXAPARIN SODIUM 30 MG: 30 INJECTION SUBCUTANEOUS at 08:36

## 2018-01-20 RX ADMIN — CLONIDINE HYDROCHLORIDE 0.2 MG: 0.1 TABLET ORAL at 15:04

## 2018-01-20 RX ADMIN — METFORMIN HYDROCHLORIDE 1000 MG: 500 TABLET ORAL at 08:37

## 2018-01-20 RX ADMIN — ATORVASTATIN CALCIUM 10 MG: 10 TABLET, FILM COATED ORAL at 21:59

## 2018-01-20 RX ADMIN — CLONIDINE HYDROCHLORIDE 0.1 MG: 0.1 TABLET ORAL at 08:37

## 2018-01-20 ASSESSMENT — PAIN SCALES - GENERAL
PAINLEVEL_OUTOF10: 0
PAINLEVEL_OUTOF10: 0

## 2018-01-20 ASSESSMENT — PAIN DESCRIPTION - ORIENTATION: ORIENTATION: RIGHT;LEFT

## 2018-01-20 ASSESSMENT — PAIN DESCRIPTION - LOCATION: LOCATION: LEG

## 2018-01-20 NOTE — PROGRESS NOTES
Recommendations:  Subacute/Skilled Nursing Facility    G-Code     OutComes Score                                                    AM-PAC Score             Goals  Short term goals  Time Frame for Short term goals: STG=LTG, 1 week  Short term goal 1: Improve endurance to tolerate 3x10 reps LE AROM with min assist  Short term goal 2: rolling L/R with min assist  Short term goal 3: supine to sit min assist x 1  Short term goal 4: transfer bed to chair with Edouard Ramos Steady lift with max assist x 1  Long term goals  Time Frame for Long term goals : 3-4 weeks  Long term goal 1: bed to chair with <= CGA of 1 person/ sit to stand with <= CGA of 1 person  Long term goal 2: ambulate >=75ft with ww and <= CGA of 1 person  Long term goal 3: 2 stairs with 1 rail and <= CGA , marked time  Long term goal 4: increase LE strength any to increase function  Long term goal 5: DME and education in place for discharge/HEP  Patient Goals   Patient goals : Get stronger    Plan    Plan  Times per week: 5-7x/wk  Times per day: Daily  Plan weeks: 1 week  Current Treatment Recommendations: Strengthening, ROM, Functional Mobility Training, Balance Training, Transfer Training, Endurance Training, Gait Training, Neuromuscular Re-education, Cognitive Reorientation, Home Exercise Program, Safety Education & Training, Positioning, Patient/Caregiver Education & Training  Safety Devices  Type of devices: Bed alarm in place, Call light within reach, Gait belt, Patient at risk for falls, Left in bed  Restraints  Initially in place: Yes  Restraints: bed alarm     Therapy Time   Individual Concurrent Group Co-treatment   Time In  0830         Time Out  0900         Minutes  [facility], [facility]  License and [address] Number: [id number]

## 2018-01-20 NOTE — PROGRESS NOTES
without any focal sensory/motor deficits. Cranial nerves: II-XII intact, grossly non-focal.  Psychiatric: Alert and oriented, thought content appropriate, normal insight  Capillary Refill: Brisk,< 3 seconds   Peripheral Pulses: +2 palpable, equal bilaterally       Labs:   Recent Labs      01/18/18   0446   WBC  11.7*   HGB  10.9*   HCT  32.7*   PLT  255     Recent Labs      01/18/18   0446   NA  140   K  3.9   CL  102   CO2  23   BUN  27*   CREATININE  1.23*   CALCIUM  11.6*     No results for input(s): AST, ALT, BILIDIR, BILITOT, ALKPHOS in the last 72 hours. No results for input(s): INR in the last 72 hours. No results for input(s): [place] in the last 72 hours. Urinalysis:    Lab Results   Component Value Date    NITRU Negative 01/13/2018    WBCUA 6-10 01/13/2018    BACTERIA Few 01/13/2018    RBCUA 0-2 01/13/2018    BLOODU Negative 01/13/2018    SPECGRAV 1.015 01/13/2018    GLUCOSEU Negative 01/13/2018       Radiology:  US GALLBLADDER RUQ   Final Result   1. The liver is unremarkable 2. No evidence of cholecystitis or Cholelithiasis 3. The pancreas is unremarkable      COMPARISON: No prior      HISTORY:  ABDOMINAL PAIN       COMMENTS: R41.82 Change in mental status ICD10      TECHNIQUE: US GALLBLADDER RUQ      FINDINGS:   The liver is normal in echogenicity. No intrahepatic ductal dilatation seen. The gallbladder contains no echogenic foci to suggest gallstones or polyps. No pericholecystic edema or gallbladder wall thickening seen. The common duct measures  4 mm . The pancreas is normal in echogenicity. No peripancreatic fluid or focal mass identified. It is also within normal limits of size.          XR ABDOMEN (KUB) (SINGLE AP VIEW)   Final Result   NONSPECIFIC BOWEL GAS PATTERN              Assessment/Plan:    Active Hospital Problems    Diagnosis Date Noted    HTN (hypertension) [I10] 11/29/2011     Priority: High    Diabetes mellitus [E11.9] 11/29/2011     Priority: High

## 2018-01-21 LAB
GLUCOSE BLD-MCNC: 151 MG/DL (ref 60–115)
GLUCOSE BLD-MCNC: 245 MG/DL (ref 60–115)
GLUCOSE BLD-MCNC: 276 MG/DL (ref 60–115)
GLUCOSE BLD-MCNC: 281 MG/DL (ref 60–115)
PERFORMED ON: ABNORMAL

## 2018-01-21 PROCEDURE — 6360000002 HC RX W HCPCS: Performed by: INTERNAL MEDICINE

## 2018-01-21 PROCEDURE — 1200000000 HC SEMI PRIVATE

## 2018-01-21 PROCEDURE — 97110 THERAPEUTIC EXERCISES: CPT

## 2018-01-21 PROCEDURE — 6370000000 HC RX 637 (ALT 250 FOR IP): Performed by: INTERNAL MEDICINE

## 2018-01-21 PROCEDURE — 97530 THERAPEUTIC ACTIVITIES: CPT

## 2018-01-21 RX ORDER — CLONIDINE HYDROCHLORIDE 0.1 MG/1
0.1 TABLET ORAL 3 TIMES DAILY
Status: DISCONTINUED | OUTPATIENT
Start: 2018-01-21 | End: 2018-01-27

## 2018-01-21 RX ADMIN — CLONIDINE HYDROCHLORIDE 0.1 MG: 0.1 TABLET ORAL at 21:38

## 2018-01-21 RX ADMIN — ACETAMINOPHEN 650 MG: 325 TABLET ORAL at 15:02

## 2018-01-21 RX ADMIN — ATORVASTATIN CALCIUM 10 MG: 10 TABLET, FILM COATED ORAL at 21:36

## 2018-01-21 RX ADMIN — METFORMIN HYDROCHLORIDE 1000 MG: 500 TABLET ORAL at 07:29

## 2018-01-21 RX ADMIN — AMLODIPINE BESYLATE 10 MG: 10 TABLET ORAL at 07:29

## 2018-01-21 RX ADMIN — METFORMIN HYDROCHLORIDE 1000 MG: 500 TABLET ORAL at 17:17

## 2018-01-21 RX ADMIN — CLONIDINE HYDROCHLORIDE 0.1 MG: 0.1 TABLET ORAL at 15:30

## 2018-01-21 RX ADMIN — ASPIRIN 81 MG: 81 TABLET ORAL at 07:29

## 2018-01-21 RX ADMIN — ENOXAPARIN SODIUM 30 MG: 30 INJECTION SUBCUTANEOUS at 07:29

## 2018-01-21 ASSESSMENT — PAIN SCALES - GENERAL
PAINLEVEL_OUTOF10: 4
PAINLEVEL_OUTOF10: 4

## 2018-01-21 ASSESSMENT — PAIN DESCRIPTION - LOCATION: LOCATION: GENERALIZED

## 2018-01-21 ASSESSMENT — PAIN DESCRIPTION - DESCRIPTORS: DESCRIPTORS: ACHING

## 2018-01-21 ASSESSMENT — PAIN DESCRIPTION - PAIN TYPE: TYPE: CHRONIC PAIN

## 2018-01-21 NOTE — PROGRESS NOTES
focal sensory/motor deficits. Cranial nerves: II-XII intact, grossly non-focal.  Psychiatric:partially Alert and oriented,   Capillary Refill: Brisk,< 3 seconds   Peripheral Pulses: +2 palpable, equal bilaterally       Labs:   No results for input(s): WBC, HGB, HCT, PLT in the last 72 hours. No results for input(s): NA, K, CL, CO2, BUN, CREATININE, CALCIUM, PHOS in the last 72 hours. Invalid input(s): MAGNES  No results for input(s): AST, ALT, BILIDIR, BILITOT, ALKPHOS in the last 72 hours. No results for input(s): INR in the last 72 hours. No results for input(s): Germán Godinez in the last 72 hours. Urinalysis:    Lab Results   Component Value Date    NITRU Negative 01/13/2018    WBCUA 6-10 01/13/2018    BACTERIA Few 01/13/2018    RBCUA 0-2 01/13/2018    BLOODU Negative 01/13/2018    SPECGRAV 1.015 01/13/2018    GLUCOSEU Negative 01/13/2018       Radiology:  US GALLBLADDER RUQ   Final Result   1. The liver is unremarkable 2. No evidence of cholecystitis or Cholelithiasis 3. The pancreas is unremarkable      COMPARISON: No prior      HISTORY:  ABDOMINAL PAIN       COMMENTS: R41.82 Change in mental status ICD10      TECHNIQUE: US GALLBLADDER RUQ      FINDINGS:   The liver is normal in echogenicity. No intrahepatic ductal dilatation seen. The gallbladder contains no echogenic foci to suggest gallstones or polyps. No pericholecystic edema or gallbladder wall thickening seen. The common duct measures  4 mm . The pancreas is normal in echogenicity. No peripancreatic fluid or focal mass identified. It is also within normal limits of size.          XR ABDOMEN (KUB) (SINGLE AP VIEW)   Final Result   NONSPECIFIC BOWEL GAS PATTERN              Assessment/Plan:    Active Hospital Problems    Diagnosis Date Noted    HTN (hypertension) [I10] 11/29/2011     Priority: High    Diabetes mellitus [E11.9] 11/29/2011     Priority: High    Change in mental status [R41.82] 01/12/2018         DVT

## 2018-01-22 LAB
ANION GAP SERPL CALCULATED.3IONS-SCNC: 17 MEQ/L (ref 7–13)
BASOPHILS ABSOLUTE: 0 K/UL (ref 0–0.2)
BASOPHILS RELATIVE PERCENT: 0.4 %
BUN BLDV-MCNC: 28 MG/DL (ref 8–23)
CALCIUM SERPL-MCNC: 11.4 MG/DL (ref 8.6–10.2)
CHLORIDE BLD-SCNC: 101 MEQ/L (ref 98–107)
CO2: 22 MEQ/L (ref 22–29)
CREAT SERPL-MCNC: 1.09 MG/DL (ref 0.5–0.9)
EOSINOPHILS ABSOLUTE: 0.7 K/UL (ref 0–0.7)
EOSINOPHILS RELATIVE PERCENT: 6.2 %
GFR AFRICAN AMERICAN: 57
GFR NON-AFRICAN AMERICAN: 47.1
GLUCOSE BLD-MCNC: 140 MG/DL (ref 60–115)
GLUCOSE BLD-MCNC: 147 MG/DL (ref 60–115)
GLUCOSE BLD-MCNC: 148 MG/DL (ref 74–109)
GLUCOSE BLD-MCNC: 152 MG/DL (ref 60–115)
GLUCOSE BLD-MCNC: 153 MG/DL (ref 60–115)
GLUCOSE BLD-MCNC: 201 MG/DL (ref 60–115)
HCT VFR BLD CALC: 31.6 % (ref 37–47)
HEMOGLOBIN: 10.5 G/DL (ref 12–16)
LYMPHOCYTES ABSOLUTE: 2.5 K/UL (ref 1–4.8)
LYMPHOCYTES RELATIVE PERCENT: 22.8 %
MCH RBC QN AUTO: 31.1 PG (ref 27–31.3)
MCHC RBC AUTO-ENTMCNC: 33.4 % (ref 33–37)
MCV RBC AUTO: 93 FL (ref 82–100)
MONOCYTES ABSOLUTE: 0.7 K/UL (ref 0.2–0.8)
MONOCYTES RELATIVE PERCENT: 6.6 %
NEUTROPHILS ABSOLUTE: 6.9 K/UL (ref 1.4–6.5)
NEUTROPHILS RELATIVE PERCENT: 64 %
PDW BLD-RTO: 13.8 % (ref 11.5–14.5)
PERFORMED ON: ABNORMAL
PLATELET # BLD: 252 K/UL (ref 130–400)
POTASSIUM SERPL-SCNC: 4.2 MEQ/L (ref 3.5–5.1)
RBC # BLD: 3.39 M/UL (ref 4.2–5.4)
SODIUM BLD-SCNC: 140 MEQ/L (ref 132–144)
WBC # BLD: 10.7 K/UL (ref 4.8–10.8)

## 2018-01-22 PROCEDURE — 97110 THERAPEUTIC EXERCISES: CPT

## 2018-01-22 PROCEDURE — 85025 COMPLETE CBC W/AUTO DIFF WBC: CPT

## 2018-01-22 PROCEDURE — 80048 BASIC METABOLIC PNL TOTAL CA: CPT

## 2018-01-22 PROCEDURE — 36415 COLL VENOUS BLD VENIPUNCTURE: CPT

## 2018-01-22 PROCEDURE — 97530 THERAPEUTIC ACTIVITIES: CPT

## 2018-01-22 PROCEDURE — 6360000002 HC RX W HCPCS: Performed by: INTERNAL MEDICINE

## 2018-01-22 PROCEDURE — 1200000000 HC SEMI PRIVATE

## 2018-01-22 PROCEDURE — 6370000000 HC RX 637 (ALT 250 FOR IP): Performed by: INTERNAL MEDICINE

## 2018-01-22 PROCEDURE — 97116 GAIT TRAINING THERAPY: CPT

## 2018-01-22 RX ADMIN — ASPIRIN 81 MG: 81 TABLET ORAL at 09:26

## 2018-01-22 RX ADMIN — METOPROLOL TARTRATE 25 MG: 25 TABLET, FILM COATED ORAL at 20:19

## 2018-01-22 RX ADMIN — CLONIDINE HYDROCHLORIDE 0.1 MG: 0.1 TABLET ORAL at 20:19

## 2018-01-22 RX ADMIN — METFORMIN HYDROCHLORIDE 1000 MG: 500 TABLET ORAL at 09:26

## 2018-01-22 RX ADMIN — ATORVASTATIN CALCIUM 10 MG: 10 TABLET, FILM COATED ORAL at 20:19

## 2018-01-22 RX ADMIN — METOPROLOL TARTRATE 25 MG: 25 TABLET, FILM COATED ORAL at 09:26

## 2018-01-22 RX ADMIN — CLONIDINE HYDROCHLORIDE 0.1 MG: 0.1 TABLET ORAL at 14:41

## 2018-01-22 RX ADMIN — CLONIDINE HYDROCHLORIDE 0.1 MG: 0.1 TABLET ORAL at 09:25

## 2018-01-22 RX ADMIN — AMLODIPINE BESYLATE 10 MG: 10 TABLET ORAL at 09:26

## 2018-01-22 RX ADMIN — ENOXAPARIN SODIUM 30 MG: 30 INJECTION SUBCUTANEOUS at 09:25

## 2018-01-22 RX ADMIN — ACETAMINOPHEN 650 MG: 325 TABLET ORAL at 17:31

## 2018-01-22 RX ADMIN — METFORMIN HYDROCHLORIDE 1000 MG: 500 TABLET ORAL at 16:45

## 2018-01-22 ASSESSMENT — PAIN SCALES - GENERAL
PAINLEVEL_OUTOF10: 0
PAINLEVEL_OUTOF10: 3
PAINLEVEL_OUTOF10: 0

## 2018-01-22 NOTE — PROGRESS NOTES
chair, use lift to transfer (2 person tx), up with assist  Subjective   General  Chart Reviewed: Yes  Family / Caregiver Present: No  Referring Practitioner: Dr Christine Coker  Subjective  Subjective: Pt lying in bed and agreeable to therapy session. Pain Screening  Patient Currently in Pain: No  Vital Signs  Patient Currently in Pain: No       Orientation     Objective   Bed mobility  Scooting: Moderate assistance  Transfers  Sit to Stand: Minimal Assistance (minAx2 )  Stand to sit: Minimal Assistance (minAx2 )  Ambulation  Ambulation?: Yes  Ambulation 1  Surface: level tile  Device: Rolling Walker  Assistance:  (modAx1 + CGAx1/minAx1)  Quality of Gait: shuffling feet, narrow JUAN, retro lean, flexed trunk  Distance: 5'   Comments: vc's for posture and technique along w/safety        Exercises  Hamstring Sets: 2 x 10 RTB   Hip Flexion: 2 x 10   Knee Long Arc Quad: x20   Ankle Pumps: x 20                        Assessment   Body structures, Functions, Activity limitations: Decreased functional mobility ; Decreased strength;Decreased endurance;Decreased cognition;Decreased safe awareness;Decreased balance;Decreased coordination  Assessment: Performed co-treat with OT this date for inc safety and assist with transfers. Pt tolerated small gait trial this modAx2 +CGA/minAx1 w/ WC follow. Pt returned to room with call light and chair alarm in place. Continue to progress as leonardo.    Treatment Diagnosis: decreased mobility, weakness  REQUIRES PT FOLLOW UP: Yes  Activity Tolerance  Activity Tolerance: Patient limited by fatigue  PT Equipment Recommendations  Other: TBD       Discharge Recommendations:  Subacute/Skilled Nursing Facility    G-Code     OutComes Score                                                    AM-PAC Score             Goals  Short term goals  Time Frame for Short term goals: STG=LTG, 1 week  Short term goal 1: Improve endurance to tolerate 3x10 reps LE AROM with min assist  Short term goal 2: rolling L/R with min assist  Short term goal 3: supine to sit min assist x 1  Short term goal 4: transfer bed to chair with [facility] Steady lift with max assist x 1  Long term goals  Time Frame for Long term goals : 3-4 weeks  Long term goal 1: bed to chair with <= CGA of 1 person/ sit to stand with <= CGA of 1 person  Long term goal 2: ambulate >=75ft with ww and <= CGA of 1 person  Long term goal 3: 2 stairs with 1 rail and <= CGA , marked time  Long term goal 4: increase LE strength any to increase function  Long term goal 5: DME and education in place for discharge/HEP  Patient Goals   Patient goals : Get stronger    Plan    Plan  Times per week: 5-7x/wk  Times per day: Daily  Plan weeks: 1 week  Current Treatment Recommendations: Strengthening, ROM, Functional Mobility Training, Balance Training, Transfer Training, Endurance Training, Gait Training, Neuromuscular Re-education, Cognitive Reorientation, Home Exercise Program, Safety Education & Training, Positioning, Patient/Caregiver Education & Training  Safety Devices  Type of devices: Bed alarm in place, Call light within reach, Gait belt, Patient at risk for falls, Left in bed  Restraints  Initially in place: Yes  Restraints: bed alarm     Therapy Time   Individual Concurrent Group Co-treatment   Time In  1115         Time Out  1200         Minutes  45      cotreat with OT 45 min           Ethel Bailey PTA  License and [address] Number: [id number]

## 2018-01-22 NOTE — PROGRESS NOTES
Occupational Therapy  Facility/Department: [facility] MED SURG UNIT  Daily Treatment Note  NAME: Giovanni Willams  : 6/10/1927  MRN: [id number]    Date of Service: 2018    Patient Diagnosis(es): There were no encounter diagnoses. has a past medical history of Arthritis; Chicken pox; Chronic back pain; Chronic low back pain; Eczematous dermatitis; History of bladder infections; Hyperlipidemia; Hypertension; Measles; Mumps; Osteoarthritis; SCC (squamous cell carcinoma), arm; SI (stress incontinence), female; Type II or unspecified type diabetes mellitus without mention of complication, not stated as uncontrolled; and Whooping cough. has a past surgical history that includes Appendectomy; Rectocele repair; Cystocele repair; Ankle surgery; Breast biopsy; Cataract removal (); eye surgery; Tonsillectomy; and Hysterectomy.     Restrictions  Restrictions/Precautions  Restrictions/Precautions: Fall Risk  Required Braces or Orthoses?: No  Position Activity Restriction  Other position/activity restrictions: cushion up in chair, use lift to transfer (2 person tx), up with assist  Subjective   Subjective  Subjective: Co-tx with PTA for safety, 2 person assist, and 2* pt low endurance  Pain Assessment  Patient Currently in Pain: No  Pain Assessment: 0-10  Pain Level: 0  Vital Signs  Patient Currently in Pain: No   Orientation     Objective             Standing Balance  Time: 3min  Activity: fxl mob with RW- Perry x2/modA  Sit to stand: Minimal assistance (x2 with cues for arm placement)  Stand to sit: Minimal assistance (x2 with cues for arm placement)  Functional Mobility  Functional - Mobility Device: Rolling Walker  Assist Level: Minimal assistance (x2/modA)  Functional Mobility Comments: max cues for upright posture 2* retropulsion and bending  Bed mobility  Supine to Sit: Minimal assistance (using bed rail, cues for sequencing )  Scooting: Contact guard assistance (while seated EOB- scooting forward in prep for Tolerate BUE ther ex/act [id number] prior to fatigue to increase strength/endurance for ADL  Short term goal 4: Increase to 1-3min standing leonardo/endurance prior to fatigue to decrease fall risk during ADL  Long term goals  Time Frame for Long term goals : 3-4 wks  Long term goal 1: Increase to Perry/CGA bed mob sup <-> sit and rolling  Long term goal 2: Increase to min/modA UB/LB dressing  Long term goal 3: Tolerate BUE ther ex/act 2x15 reps all planes prior to fatigue to increase strength/endurance for ADL  Long term goal 4: Increase to 3-5min standing leonardo/endurance prior to fatigue to decrease fall risk during ADL  Long term goal 5:  Increase to Setup/spv for G/H tasks       Therapy Time   Individual Concurrent Group Co-treatment   Time In  11:15         Time Out  12:00         Minutes  [address], [place]

## 2018-01-23 LAB
GLUCOSE BLD-MCNC: 128 MG/DL (ref 60–115)
GLUCOSE BLD-MCNC: 139 MG/DL (ref 60–115)
GLUCOSE BLD-MCNC: 169 MG/DL (ref 60–115)
GLUCOSE BLD-MCNC: 170 MG/DL (ref 60–115)
PERFORMED ON: ABNORMAL

## 2018-01-23 PROCEDURE — 97530 THERAPEUTIC ACTIVITIES: CPT

## 2018-01-23 PROCEDURE — 97116 GAIT TRAINING THERAPY: CPT

## 2018-01-23 PROCEDURE — 97112 NEUROMUSCULAR REEDUCATION: CPT

## 2018-01-23 PROCEDURE — 6360000002 HC RX W HCPCS: Performed by: INTERNAL MEDICINE

## 2018-01-23 PROCEDURE — 97535 SELF CARE MNGMENT TRAINING: CPT

## 2018-01-23 PROCEDURE — 6370000000 HC RX 637 (ALT 250 FOR IP): Performed by: INTERNAL MEDICINE

## 2018-01-23 PROCEDURE — 1200000000 HC SEMI PRIVATE

## 2018-01-23 RX ADMIN — CLONIDINE HYDROCHLORIDE 0.1 MG: 0.1 TABLET ORAL at 08:40

## 2018-01-23 RX ADMIN — METOPROLOL TARTRATE 25 MG: 25 TABLET, FILM COATED ORAL at 20:23

## 2018-01-23 RX ADMIN — ENOXAPARIN SODIUM 30 MG: 30 INJECTION SUBCUTANEOUS at 08:40

## 2018-01-23 RX ADMIN — ASPIRIN 81 MG: 81 TABLET ORAL at 09:34

## 2018-01-23 RX ADMIN — CLONIDINE HYDROCHLORIDE 0.1 MG: 0.1 TABLET ORAL at 20:23

## 2018-01-23 RX ADMIN — CLONIDINE HYDROCHLORIDE 0.1 MG: 0.1 TABLET ORAL at 16:06

## 2018-01-23 RX ADMIN — ATORVASTATIN CALCIUM 10 MG: 10 TABLET, FILM COATED ORAL at 20:23

## 2018-01-23 RX ADMIN — AMLODIPINE BESYLATE 10 MG: 10 TABLET ORAL at 08:40

## 2018-01-23 RX ADMIN — METFORMIN HYDROCHLORIDE 1000 MG: 500 TABLET ORAL at 08:40

## 2018-01-23 RX ADMIN — METFORMIN HYDROCHLORIDE 1000 MG: 500 TABLET ORAL at 18:16

## 2018-01-23 RX ADMIN — METOPROLOL TARTRATE 25 MG: 25 TABLET, FILM COATED ORAL at 08:40

## 2018-01-23 ASSESSMENT — PAIN SCALES - GENERAL
PAINLEVEL_OUTOF10: 0
PAINLEVEL_OUTOF10: 5
PAINLEVEL_OUTOF10: 0

## 2018-01-23 NOTE — PROGRESS NOTES
Physical Therapy  Facility/Department: [facility] MED SURG UNIT  Daily Treatment Note  NAME: Elisabeth Waldron  : 6/10/1927  MRN: [id number]    Date of Service: 2018    Patient Diagnosis(es):   Patient Active Problem List    Diagnosis Date Noted    CKD (chronic kidney disease) 2015     Priority: High    HLD (hyperlipidemia) 2015     Priority: High    HTN (hypertension) 2011     Priority: High    Diabetes mellitus 2011     Priority: High    Vitamin D deficiency 2015     Priority: Low    SI (stress incontinence), female 2012     Priority: Low    Osteoarthritis 2012     Priority: Low    Hypercalcemia 01/15/2018    Change in mental status 2018    UTI (urinary tract infection) 2018    Lumbar spinal stenosis 2016    Chronic low back pain 2016    Eczematous dermatitis        Past Medical History:   Diagnosis Date    Arthritis     Chicken pox     Chronic back pain     Chronic low back pain 2016    Eczematous dermatitis     History of bladder infections     Hyperlipidemia     Hypertension     Measles     Mumps     Osteoarthritis 2012    SCC (squamous cell carcinoma), arm     right upper arm,  right forarm    SI (stress incontinence), female 2012    Type II or unspecified type diabetes mellitus without mention of complication, not stated as uncontrolled     Whooping cough      Past Surgical History:   Procedure Laterality Date    ANKLE SURGERY      broken left ankle.     APPENDECTOMY      BREAST BIOPSY      x2 left breast    CATARACT REMOVAL      bilateral    CYSTOCELE REPAIR      EYE SURGERY      bilateral cataract    HYSTERECTOMY      complete at age 39    [address]      as a child       Restrictions  Restrictions/Precautions  Restrictions/Precautions: Fall Risk  Required Braces or Orthoses?: No  Position Activity Restriction  Other position/activity restrictions: cushion up in Performed co-treat with OT secondary to low endurance and for inc safety. Pt ambulated inc distance this date with CGAx1 + SBAx1. Pt left to sit up in chair with call light and chair alarm in place. Continue to progress as tolerated.    Treatment Diagnosis: decreased mobility, weakness  REQUIRES PT FOLLOW UP: Yes  Activity Tolerance  Activity Tolerance: Patient limited by endurance  PT Equipment Recommendations  Other: TBD       Discharge Recommendations:  Subacute/Skilled Nursing Facility    G-Code     OutComes Score                                                    AM-PAC Score             Goals  Short term goals  Time Frame for Short term goals: STG=LTG, 1 week  Short term goal 1: Improve endurance to tolerate 3x10 reps LE AROM with min assist  Short term goal 2: rolling L/R with min assist  Short term goal 3: supine to sit min assist x 1  Short term goal 4: [facility] with max assist x 1  Long term goals  Time Frame for Long term goals : 3-4 weeks  Long term goal 1: bed to chair with <= CGA of 1 person/ sit to stand with <= CGA of 1 person  Long term goal 2: ambulate >=75ft with ww and <= CGA of 1 person  Long term goal 3: 2 stairs with 1 rail and <= CGA , marked time  Long term goal 4: increase LE strength any to increase function  Long term goal 5: DME and education in place for discharge/HEP  Patient Goals   Patient goals : Get stronger    Plan    Plan  Times per week: 5-7x/wk  Times per day: Daily  Plan weeks: 1 week  Current Treatment Recommendations: Strengthening, ROM, Functional Mobility Training, Balance Training, Transfer Training, Endurance Training, Gait Training, Neuromuscular Re-education, Cognitive Reorientation, Home Exercise Program, Safety Education & Training, Positioning, Patient/Caregiver Education & Training  Safety Devices  Type of devices: Bed alarm in place, Call light within reach, Gait belt, Patient at risk for falls, Left in bed  Restraints  Initially in place: Yes  Restraints: bed alarm     Therapy Time   Individual Concurrent Group Co-treatment   Time In  0830         Time Out  0905         Minutes  35      35 min cotreat with [address], [facility]  License and [address] Number: [id number]

## 2018-01-23 NOTE — PROGRESS NOTES
Hospitalist Progress Note      PCP: Corine Chavez MD    Date of Admission: 1/16/2018    Chief Complaint:    Rehab for weakness    HPI:   No new events     Subjective:  12 point ROS negative other than mentioned above       Medications:  Reviewed    Infusion Medications    dextrose       Scheduled Medications    cloNIDine  0.1 mg Oral TID    metFORMIN  1,000 mg Oral BID WC    enoxaparin  30 mg Subcutaneous Daily    sodium chloride flush  10 mL Intravenous 2 times per day    amLODIPine  10 mg Oral Daily    aspirin  81 mg Oral Daily    atorvastatin  10 mg Oral Daily    insulin lispro  0-12 Units Subcutaneous TID     insulin lispro  0-6 Units Subcutaneous Nightly    metoprolol tartrate  25 mg Oral BID     PRN Meds: acetaminophen, magnesium hydroxide, ondansetron, sodium chloride flush, dextrose, dextrose, glucagon (rDNA), glucose, nystatin, stomahesive in petrolatum      Intake/Output Summary (Last 24 hours) at 01/23/18 1010  Last data filed at 01/22/18 2025   Gross per 24 hour   Intake              890 ml   Output              400 ml   Net              490 ml       Exam:    BP (!) 149/52   Pulse 69   Temp 97.7 °F (36.5 °C) (Oral)   Resp 18   Ht 5' 3\" (1.6 m)   Wt 173 lb 11.2 oz (78.8 kg)   SpO2 95%   BMI 30.77 kg/m²     General appearance: No apparent distress, appears stated age and cooperative. HEENT: Pupils equal, round, and reactive to light. Conjunctivae/corneas clear. Neck: Supple, with full range of motion. No jugular venous distention. Trachea midline. Respiratory:  Normal respiratory effort. Clear to auscultation, bilaterally without Rales/Wheezes/Rhonchi. Cardiovascular: Regular rate and rhythm with normal S1/S2 without murmurs, rubs or gallops. Abdomen: Soft, non-tender, non-distended with normal bowel sounds. Musculoskeletal: No clubbing, cyanosis or edema bilaterally. Full range of motion without deformity.   Skin: Skin color, texture, turgor normal.  No rashes or lesions. Neuro: Non Focal. Symetrical motor and tone. Nl Comprehension, Alert,awake and oriented. NL CN. Symetrical tone and reflexes. Psychiatric: Alert and oriented, thought content appropriate, normal insight  Capillary Refill: Brisk,< 3 seconds   Peripheral Pulses: +2 palpable, equal bilaterally       Labs:   Recent Labs      01/22/18   0611   WBC  10.7   HGB  10.5*   HCT  31.6*   PLT  252     Recent Labs      01/22/18   0611   NA  140   K  4.2   CL  101   CO2  22   BUN  28*   CREATININE  1.09*   CALCIUM  11.4*     No results for input(s): AST, ALT, BILIDIR, BILITOT, ALKPHOS in the last 72 hours. No results for input(s): INR in the last 72 hours. No results for input(s): Jose Dooley in the last 72 hours. Urinalysis:    Lab Results   Component Value Date    NITRU Negative 01/13/2018    WBCUA 6-10 01/13/2018    BACTERIA Few 01/13/2018    RBCUA 0-2 01/13/2018    BLOODU Negative 01/13/2018    SPECGRAV 1.015 01/13/2018    GLUCOSEU Negative 01/13/2018       Radiology:  US GALLBLADDER RUQ   Final Result   1. The liver is unremarkable 2. No evidence of cholecystitis or Cholelithiasis 3. The pancreas is unremarkable      COMPARISON: No prior      HISTORY:  ABDOMINAL PAIN       COMMENTS: R41.82 Change in mental status ICD10      TECHNIQUE: US GALLBLADDER RUQ      FINDINGS:   The liver is normal in echogenicity. No intrahepatic ductal dilatation seen. The gallbladder contains no echogenic foci to suggest gallstones or polyps. No pericholecystic edema or gallbladder wall thickening seen. The common duct measures  4 mm . The pancreas is normal in echogenicity. No peripancreatic fluid or focal mass identified. It is also within normal limits of size.          XR ABDOMEN (KUB) (SINGLE AP VIEW)   Final Result   NONSPECIFIC BOWEL GAS PATTERN              Assessment/Plan:    Active Hospital Problems    Diagnosis Date Noted    HTN (hypertension) [I10] 11/29/2011     Priority: High    Diabetes

## 2018-01-23 NOTE — PROGRESS NOTES
Physical Therapy  Facility/Department: [facility] MED SURG UNIT  Daily Treatment Note  NAME: Dionicio Fraire  : 6/10/1927  MRN: [id number]    Date of Service: 2018    Patient Diagnosis(es):   Patient Active Problem List    Diagnosis Date Noted    CKD (chronic kidney disease) 2015     Priority: High    HLD (hyperlipidemia) 2015     Priority: High    HTN (hypertension) 2011     Priority: High    Diabetes mellitus 2011     Priority: High    Vitamin D deficiency 2015     Priority: Low    SI (stress incontinence), female 2012     Priority: Low    Osteoarthritis 2012     Priority: Low    Hypercalcemia 01/15/2018    Change in mental status 2018    UTI (urinary tract infection) 2018    Lumbar spinal stenosis 2016    Chronic low back pain 2016    Eczematous dermatitis        Past Medical History:   Diagnosis Date    Arthritis     Chicken pox     Chronic back pain     Chronic low back pain 2016    Eczematous dermatitis     History of bladder infections     Hyperlipidemia     Hypertension     Measles     Mumps     Osteoarthritis 2012    SCC (squamous cell carcinoma), arm     right upper arm,  right forarm    SI (stress incontinence), female 2012    Type II or unspecified type diabetes mellitus without mention of complication, not stated as uncontrolled     Whooping cough      Past Surgical History:   Procedure Laterality Date    ANKLE SURGERY      broken left ankle.     APPENDECTOMY      BREAST BIOPSY      x2 left breast    CATARACT REMOVAL      bilateral    CYSTOCELE REPAIR      EYE SURGERY      bilateral cataract    HYSTERECTOMY      complete at age 39    [address]      as a child       Restrictions  Restrictions/Precautions  Restrictions/Precautions: Fall Risk  Required Braces or Orthoses?: No  Position Activity Restriction  Other position/activity restrictions: cushion up in bed alarm     Therapy Time   Individual Concurrent Group Co-treatment   Time In  1         Time Out  1540         Minutes  [address] [facility], [facility]  License and [address] Number: [id number]

## 2018-01-23 NOTE — PROGRESS NOTES
total sit <-> stands)  Functional Mobility  Functional - Mobility Device: Rolling Walker  Functional Mobility Comments: CGA x1 with SBA x1 for 16ft  Bed mobility  Supine to Sit: Contact guard assistance (with HOB elevated 15*, use of bed rail and cues for sequence)  Scooting: Minimal assistance (scoot toward EOB 2* mattress, but able to scoot CGA in chair)  Transfers  Stand Step Transfers: Minimal assistance (x1 and SBA x1 for safety)  Sit to stand: Minimal assistance (x2)  Stand to sit: Minimal assistance (x2 person. pt completed 4 sit <-> stands)  Transfer Comments: cues for arm placement and safety                                                                 Assessment   Performance deficits / Impairments: Decreased functional mobility ; Decreased ADL status; Decreased strength;Decreased ROM; Decreased safe awareness;Decreased endurance;Decreased balance;Decreased coordination;Decreased cognition  Assessment: Pt participating very well this date with decreased assist for bed mob/fxl mob and xfers  Prognosis: Good  Discharge Recommendations: Subacute/Skilled Nursing Facility;24 hour supervision or assist  REQUIRES OT FOLLOW UP: Yes     Pt now making good gains/improvements in therapy. Seen with PTA for session for safety/2 person assist    Discharge Recommendations:  [address], 24 hour supervision or assist     Plan   Plan  Times per week: 3-6  Plan weeks: 3-4  Current Treatment Recommendations: Strengthening, ROM, Balance Training, Functional Mobility Training, Endurance Training, Neuromuscular Re-education, Safety Education & Training, Patient/Caregiver Education & Training, Self-Care / ADL  G-Code     OutComes Score                                           AM-PAC Score             Goals  Short term goals  Time Frame for Short term goals: 2 wks  Short term goal 1: Increase to Perry bed mob sup <-> sit and rolling  Short term goal 2:  Increase to mod/maxA UB/LB dressing  Short term goal 3:

## 2018-01-24 LAB
GLUCOSE BLD-MCNC: 149 MG/DL (ref 60–115)
GLUCOSE BLD-MCNC: 161 MG/DL (ref 60–115)
GLUCOSE BLD-MCNC: 170 MG/DL (ref 60–115)
GLUCOSE BLD-MCNC: 185 MG/DL (ref 60–115)
PERFORMED ON: ABNORMAL

## 2018-01-24 PROCEDURE — 97116 GAIT TRAINING THERAPY: CPT

## 2018-01-24 PROCEDURE — 97530 THERAPEUTIC ACTIVITIES: CPT

## 2018-01-24 PROCEDURE — 6370000000 HC RX 637 (ALT 250 FOR IP): Performed by: INTERNAL MEDICINE

## 2018-01-24 PROCEDURE — 97535 SELF CARE MNGMENT TRAINING: CPT

## 2018-01-24 PROCEDURE — 6360000002 HC RX W HCPCS: Performed by: INTERNAL MEDICINE

## 2018-01-24 PROCEDURE — 97110 THERAPEUTIC EXERCISES: CPT

## 2018-01-24 PROCEDURE — 1200000000 HC SEMI PRIVATE

## 2018-01-24 RX ADMIN — ACETAMINOPHEN 650 MG: 325 TABLET ORAL at 08:32

## 2018-01-24 RX ADMIN — ENOXAPARIN SODIUM 30 MG: 30 INJECTION SUBCUTANEOUS at 08:32

## 2018-01-24 RX ADMIN — METFORMIN HYDROCHLORIDE 1000 MG: 500 TABLET ORAL at 16:42

## 2018-01-24 RX ADMIN — CLONIDINE HYDROCHLORIDE 0.1 MG: 0.1 TABLET ORAL at 20:05

## 2018-01-24 RX ADMIN — METFORMIN HYDROCHLORIDE 1000 MG: 500 TABLET ORAL at 08:32

## 2018-01-24 RX ADMIN — ATORVASTATIN CALCIUM 10 MG: 10 TABLET, FILM COATED ORAL at 20:04

## 2018-01-24 RX ADMIN — ACETAMINOPHEN 650 MG: 325 TABLET ORAL at 20:04

## 2018-01-24 RX ADMIN — METOPROLOL TARTRATE 25 MG: 25 TABLET, FILM COATED ORAL at 08:32

## 2018-01-24 RX ADMIN — METOPROLOL TARTRATE 25 MG: 25 TABLET, FILM COATED ORAL at 20:05

## 2018-01-24 RX ADMIN — AMLODIPINE BESYLATE 10 MG: 10 TABLET ORAL at 08:32

## 2018-01-24 RX ADMIN — ASPIRIN 81 MG: 81 TABLET ORAL at 08:33

## 2018-01-24 RX ADMIN — CLONIDINE HYDROCHLORIDE 0.1 MG: 0.1 TABLET ORAL at 08:33

## 2018-01-24 RX ADMIN — CLONIDINE HYDROCHLORIDE 0.1 MG: 0.1 TABLET ORAL at 14:03

## 2018-01-24 ASSESSMENT — PAIN SCALES - GENERAL
PAINLEVEL_OUTOF10: 2
PAINLEVEL_OUTOF10: 0
PAINLEVEL_OUTOF10: 2
PAINLEVEL_OUTOF10: 0
PAINLEVEL_OUTOF10: 0

## 2018-01-24 NOTE — PROGRESS NOTES
Physical Therapy  Facility/Department: [facility] SUBACUTE UNIT  Daily Treatment Note  NAME: Adri Nicole  : 6/10/1927  MRN: [id number]    Date of Service: 2018    Patient Diagnosis(es):   Patient Active Problem List    Diagnosis Date Noted    CKD (chronic kidney disease) 2015     Priority: High    HLD (hyperlipidemia) 2015     Priority: High    HTN (hypertension) 2011     Priority: High    Diabetes mellitus 2011     Priority: High    Vitamin D deficiency 2015     Priority: Low    SI (stress incontinence), female 2012     Priority: Low    Osteoarthritis 2012     Priority: Low    Hypercalcemia 01/15/2018    Change in mental status 2018    UTI (urinary tract infection) 2018    Lumbar spinal stenosis 2016    Chronic low back pain 2016    Eczematous dermatitis        Past Medical History:   Diagnosis Date    Arthritis     Chicken pox     Chronic back pain     Chronic low back pain 2016    Eczematous dermatitis     History of bladder infections     Hyperlipidemia     Hypertension     Measles     Mumps     Osteoarthritis 2012    SCC (squamous cell carcinoma), arm     right upper arm,  right forarm    SI (stress incontinence), female 2012    Type II or unspecified type diabetes mellitus without mention of complication, not stated as uncontrolled     Whooping cough      Past Surgical History:   Procedure Laterality Date    ANKLE SURGERY      broken left ankle.     APPENDECTOMY      BREAST BIOPSY      x2 left breast    CATARACT REMOVAL      bilateral    CYSTOCELE REPAIR      EYE SURGERY      bilateral cataract    HYSTERECTOMY      complete at age 39    [address]      as a child       Restrictions  Restrictions/Precautions  Restrictions/Precautions: Fall Risk  Required Braces or Orthoses?: No  Position Activity Restriction  Other position/activity restrictions: cushion up in chair, use lift to transfer (2 person tx), up with assist  Subjective      Objective     Pt and 3 sons present for Interdisciplinary Care Conference this date. See separate note for full report. Pt being able to participate with ambulation now and needing less assistance for transfers. Assessment    Pt more alert and able to participate physically past 5 days, needing less assistance with transfers and increasing standing tolerance and ambulation distance.           Discharge Recommendations:  24 hour care at discharge recommended with [address] term goals  Time Frame for Short term goals: STG=LTG, 1 week  Short term goal 1: Improve endurance to tolerate 3x10 reps LE AROM with min assist  Short term goal 2: rolling L/R with min assist  Short term goal 3: supine to sit min assist x 1  Short term goal 4: transfer bed to chair with Lavone Cocker Steady lift with max assist x 1  Long term goals  Time Frame for Long term goals : 3-4 weeks  Long term goal 1: bed to chair with <=SBA of 1 person/ sit to stand with <= SBA of 1 person  Long term goal 2: ambulate >=100ft with ww and <= SBA of 1 person  Long term goal 3: 2 stairs with 1 rail and <= CGA , marked time  Long term goal 4: increase LE strength any to increase function  Long term goal 5: DME and education in place for discharge/HEP  Patient Goals   Patient goals : Get stronger    Plan    Plan  Times per week: 5-7x/wk  Times per day: Daily  Plan weeks: 1 week  Current Treatment Recommendations: Strengthening, ROM, Functional Mobility Training, Balance Training, Transfer Training, Endurance Training, Gait Training, Neuromuscular Re-education, Cognitive Reorientation, Home Exercise Program, Safety Education & Training, Positioning, Patient/Caregiver Education & Training  Safety Devices  Type of devices: Bed alarm in place, Call light within reach, Gait belt, Patient at risk for falls, Left in bed  Restraints  Initially in place: Yes  Restraints: bed alarm Therapy Time   Individual Concurrent Group Co-treatment   Time In  105         Time Out  [address], [id number]

## 2018-01-24 NOTE — PROGRESS NOTES
WC to go to care conference. Continue to progress as leonardo.    Treatment Diagnosis: decreased mobility, weakness  REQUIRES PT FOLLOW UP: Yes  Activity Tolerance  Activity Tolerance: Patient limited by endurance  PT Equipment Recommendations  Other: TBD       Discharge Recommendations:  Subacute/Skilled Nursing Facility    G-Code     OutComes Score                                                    AM-PAC Score             Goals  Short term goals  Time Frame for Short term goals: STG=LTG, 1 week  Short term goal 1: Improve endurance to tolerate 3x10 reps LE AROM with min assist  Short term goal 2: rolling L/R with min assist  Short term goal 3: supine to sit min assist x 1  Short term goal 4: transfer bed to chair with Edouard Ramos Steady lift with max assist x 1  Long term goals  Time Frame for Long term goals : 3-4 weeks  Long term goal 1: bed to chair with <= CGA of 1 person/ sit to stand with <= CGA of 1 person  Long term goal 2: ambulate >=75ft with ww and <= CGA of 1 person  Long term goal 3: 2 stairs with 1 rail and <= CGA , marked time  Long term goal 4: increase LE strength any to increase function  Long term goal 5: DME and education in place for discharge/HEP  Patient Goals   Patient goals : Get stronger    Plan    Plan  Times per week: 5-7x/wk  Times per day: Daily  Plan weeks: 1 week  Current Treatment Recommendations: Strengthening, ROM, Functional Mobility Training, Balance Training, Transfer Training, Endurance Training, Gait Training, Neuromuscular Re-education, Cognitive Reorientation, Home Exercise Program, Safety Education & Training, Positioning, Patient/Caregiver Education & Training  Safety Devices  Type of devices: Bed alarm in place, Call light within reach, Gait belt, Patient at risk for falls, Left in bed  Restraints  Initially in place: Yes  Restraints: bed alarm     Therapy Time   Individual Concurrent Group Co-treatment   Time In  1230         Time Out  1300         Minutes  30 Tigre Grissom, PTA  License and [address] Number: [id number]

## 2018-01-24 NOTE — PROGRESS NOTES
Physical Therapy  Facility/Department: [facility] MED SURG UNIT  Daily Treatment Note  NAME: Henna Kendrick  : 6/10/1927  MRN: [id number]    Date of Service: 2018    Patient Diagnosis(es):   Patient Active Problem List    Diagnosis Date Noted    CKD (chronic kidney disease) 2015     Priority: High    HLD (hyperlipidemia) 2015     Priority: High    HTN (hypertension) 2011     Priority: High    Diabetes mellitus 2011     Priority: High    Vitamin D deficiency 2015     Priority: Low    SI (stress incontinence), female 2012     Priority: Low    Osteoarthritis 2012     Priority: Low    Hypercalcemia 01/15/2018    Change in mental status 2018    UTI (urinary tract infection) 2018    Lumbar spinal stenosis 2016    Chronic low back pain 2016    Eczematous dermatitis        Past Medical History:   Diagnosis Date    Arthritis     Chicken pox     Chronic back pain     Chronic low back pain 2016    Eczematous dermatitis     History of bladder infections     Hyperlipidemia     Hypertension     Measles     Mumps     Osteoarthritis 2012    SCC (squamous cell carcinoma), arm     right upper arm,  right forarm    SI (stress incontinence), female 2012    Type II or unspecified type diabetes mellitus without mention of complication, not stated as uncontrolled     Whooping cough      Past Surgical History:   Procedure Laterality Date    ANKLE SURGERY      broken left ankle.     APPENDECTOMY      BREAST BIOPSY      x2 left breast    CATARACT REMOVAL      bilateral    CYSTOCELE REPAIR      EYE SURGERY      bilateral cataract    HYSTERECTOMY      complete at age 39    [address]      as a child       Restrictions  Restrictions/Precautions  Restrictions/Precautions: Fall Risk  Required Braces or Orthoses?: No  Position Activity Restriction  Other position/activity restrictions: cushion up in

## 2018-01-25 LAB
ANION GAP SERPL CALCULATED.3IONS-SCNC: 14 MEQ/L (ref 7–13)
BASOPHILS ABSOLUTE: 0.1 K/UL (ref 0–0.2)
BASOPHILS RELATIVE PERCENT: 0.5 %
BUN BLDV-MCNC: 32 MG/DL (ref 8–23)
CALCIUM SERPL-MCNC: 11.5 MG/DL (ref 8.6–10.2)
CHLORIDE BLD-SCNC: 100 MEQ/L (ref 98–107)
CO2: 24 MEQ/L (ref 22–29)
CREAT SERPL-MCNC: 1.07 MG/DL (ref 0.5–0.9)
EOSINOPHILS ABSOLUTE: 0.6 K/UL (ref 0–0.7)
EOSINOPHILS RELATIVE PERCENT: 6.2 %
GFR AFRICAN AMERICAN: 58.2
GFR NON-AFRICAN AMERICAN: 48.1
GLUCOSE BLD-MCNC: 134 MG/DL (ref 74–109)
GLUCOSE BLD-MCNC: 136 MG/DL (ref 60–115)
GLUCOSE BLD-MCNC: 141 MG/DL (ref 60–115)
GLUCOSE BLD-MCNC: 148 MG/DL (ref 60–115)
GLUCOSE BLD-MCNC: 184 MG/DL (ref 60–115)
HCT VFR BLD CALC: 32.4 % (ref 37–47)
HEMOGLOBIN: 10.8 G/DL (ref 12–16)
LYMPHOCYTES ABSOLUTE: 2.3 K/UL (ref 1–4.8)
LYMPHOCYTES RELATIVE PERCENT: 21.8 %
MCH RBC QN AUTO: 31.1 PG (ref 27–31.3)
MCHC RBC AUTO-ENTMCNC: 33.3 % (ref 33–37)
MCV RBC AUTO: 93.4 FL (ref 82–100)
MONOCYTES ABSOLUTE: 0.6 K/UL (ref 0.2–0.8)
MONOCYTES RELATIVE PERCENT: 5.4 %
NEUTROPHILS ABSOLUTE: 6.8 K/UL (ref 1.4–6.5)
NEUTROPHILS RELATIVE PERCENT: 66.1 %
PDW BLD-RTO: 13.9 % (ref 11.5–14.5)
PERFORMED ON: ABNORMAL
PLATELET # BLD: 241 K/UL (ref 130–400)
POTASSIUM SERPL-SCNC: 4.8 MEQ/L (ref 3.5–5.1)
RBC # BLD: 3.47 M/UL (ref 4.2–5.4)
SODIUM BLD-SCNC: 138 MEQ/L (ref 132–144)
WBC # BLD: 10.3 K/UL (ref 4.8–10.8)

## 2018-01-25 PROCEDURE — 97112 NEUROMUSCULAR REEDUCATION: CPT

## 2018-01-25 PROCEDURE — 80048 BASIC METABOLIC PNL TOTAL CA: CPT

## 2018-01-25 PROCEDURE — 1200000000 HC SEMI PRIVATE

## 2018-01-25 PROCEDURE — 97116 GAIT TRAINING THERAPY: CPT

## 2018-01-25 PROCEDURE — 85025 COMPLETE CBC W/AUTO DIFF WBC: CPT

## 2018-01-25 PROCEDURE — 97530 THERAPEUTIC ACTIVITIES: CPT

## 2018-01-25 PROCEDURE — 36415 COLL VENOUS BLD VENIPUNCTURE: CPT

## 2018-01-25 PROCEDURE — 6360000002 HC RX W HCPCS: Performed by: INTERNAL MEDICINE

## 2018-01-25 PROCEDURE — 2500000003 HC RX 250 WO HCPCS: Performed by: INTERNAL MEDICINE

## 2018-01-25 PROCEDURE — 97535 SELF CARE MNGMENT TRAINING: CPT

## 2018-01-25 PROCEDURE — 97110 THERAPEUTIC EXERCISES: CPT

## 2018-01-25 PROCEDURE — 6370000000 HC RX 637 (ALT 250 FOR IP): Performed by: INTERNAL MEDICINE

## 2018-01-25 RX ADMIN — CLONIDINE HYDROCHLORIDE 0.1 MG: 0.1 TABLET ORAL at 08:12

## 2018-01-25 RX ADMIN — METOPROLOL TARTRATE 25 MG: 25 TABLET, FILM COATED ORAL at 20:52

## 2018-01-25 RX ADMIN — ENOXAPARIN SODIUM 30 MG: 30 INJECTION SUBCUTANEOUS at 08:11

## 2018-01-25 RX ADMIN — Medication: at 15:50

## 2018-01-25 RX ADMIN — METFORMIN HYDROCHLORIDE 1000 MG: 500 TABLET ORAL at 16:32

## 2018-01-25 RX ADMIN — METOPROLOL TARTRATE 25 MG: 25 TABLET, FILM COATED ORAL at 08:12

## 2018-01-25 RX ADMIN — CLONIDINE HYDROCHLORIDE 0.1 MG: 0.1 TABLET ORAL at 15:49

## 2018-01-25 RX ADMIN — Medication: at 08:11

## 2018-01-25 RX ADMIN — ASPIRIN 81 MG: 81 TABLET ORAL at 08:11

## 2018-01-25 RX ADMIN — AMLODIPINE BESYLATE 10 MG: 10 TABLET ORAL at 08:12

## 2018-01-25 RX ADMIN — ATORVASTATIN CALCIUM 10 MG: 10 TABLET, FILM COATED ORAL at 20:52

## 2018-01-25 RX ADMIN — METFORMIN HYDROCHLORIDE 1000 MG: 500 TABLET ORAL at 08:11

## 2018-01-25 RX ADMIN — CLONIDINE HYDROCHLORIDE 0.1 MG: 0.1 TABLET ORAL at 20:52

## 2018-01-25 ASSESSMENT — PAIN SCALES - GENERAL
PAINLEVEL_OUTOF10: 0

## 2018-01-25 ASSESSMENT — PAIN SCALES - WONG BAKER
WONGBAKER_NUMERICALRESPONSE: 0
WONGBAKER_NUMERICALRESPONSE: 0

## 2018-01-25 NOTE — PROGRESS NOTES
Physical Therapy  Facility/Department: [facility] MED SURG UNIT  Daily Treatment Note  NAME: Ed Reason  : 6/10/1927  MRN: [id number]    Date of Service: 2018    Patient Diagnosis(es):   Patient Active Problem List    Diagnosis Date Noted    CKD (chronic kidney disease) 2015     Priority: High    HLD (hyperlipidemia) 2015     Priority: High    HTN (hypertension) 2011     Priority: High    Diabetes mellitus 2011     Priority: High    Vitamin D deficiency 2015     Priority: Low    SI (stress incontinence), female 2012     Priority: Low    Osteoarthritis 2012     Priority: Low    Hypercalcemia 01/15/2018    Change in mental status 2018    UTI (urinary tract infection) 2018    Lumbar spinal stenosis 2016    Chronic low back pain 2016    Eczematous dermatitis        Past Medical History:   Diagnosis Date    Arthritis     Chicken pox     Chronic back pain     Chronic low back pain 2016    Eczematous dermatitis     History of bladder infections     Hyperlipidemia     Hypertension     Measles     Mumps     Osteoarthritis 2012    SCC (squamous cell carcinoma), arm     right upper arm,  right forarm    SI (stress incontinence), female 2012    Type II or unspecified type diabetes mellitus without mention of complication, not stated as uncontrolled     Whooping cough      Past Surgical History:   Procedure Laterality Date    ANKLE SURGERY      broken left ankle.     APPENDECTOMY      BREAST BIOPSY      x2 left breast    CATARACT REMOVAL      bilateral    CYSTOCELE REPAIR      EYE SURGERY      bilateral cataract    HYSTERECTOMY      complete at age 39    [address]      as a child       Restrictions  Restrictions/Precautions  Restrictions/Precautions: Fall Risk  Required Braces or Orthoses?: No  Position Activity Restriction  Other position/activity restrictions: cushion up in

## 2018-01-25 NOTE — PROGRESS NOTES
Occupational Therapy  Facility/Department: [facility] MED SURG UNIT  Daily Treatment Note  NAME: Souleymane Wright  : 6/10/1927  MRN: [id number]    Date of Service: 2018    Patient Diagnosis(es): There were no encounter diagnoses. has a past medical history of Arthritis; Chicken pox; Chronic back pain; Chronic low back pain; Eczematous dermatitis; History of bladder infections; Hyperlipidemia; Hypertension; Measles; Mumps; Osteoarthritis; SCC (squamous cell carcinoma), arm; SI (stress incontinence), female; Type II or unspecified type diabetes mellitus without mention of complication, not stated as uncontrolled; and Whooping cough. has a past surgical history that includes Appendectomy; Rectocele repair; Cystocele repair; Ankle surgery; Breast biopsy; Cataract removal (); eye surgery; Tonsillectomy; and Hysterectomy. Restrictions  Restrictions/Precautions  Restrictions/Precautions: Fall Risk  Required Braces or Orthoses?: No  Position Activity Restriction  Other position/activity restrictions: cushion up in chair, use lift to transfer (2 person tx), up with assist  Subjective   Subjective  Subjective: pt. Is agreeable to OT      Orientation     Objective    ADL  Grooming: Minimal assistance  UE Bathing: Stand by assistance;Contact guard assistance  LE Bathing: Moderate assistance (feet and back side)  UE Dressing: Stand by assistance;Contact guard assistance  LE Dressing: Moderate assistance (start pantsMin A and finish pulling up Min A)  Toileting:  Moderate assistance (cleanliness of backside)        Standing Balance  Time:  (7 minutes while in bathroom for ADLs)  Sit to stand: Minimal assistance  Stand to sit: Minimal assistance  Comment:  (second person at [facility] for safety with close w/c follow)     Transfers  Sit to stand: Minimal assistance  Stand to sit: Minimal assistance                                                                 Assessment      REQUIRES OT FOLLOW UP: Yes       Discharge

## 2018-01-26 LAB
GLUCOSE BLD-MCNC: 121 MG/DL (ref 60–115)
GLUCOSE BLD-MCNC: 168 MG/DL (ref 60–115)
GLUCOSE BLD-MCNC: 171 MG/DL (ref 60–115)
GLUCOSE BLD-MCNC: 184 MG/DL (ref 60–115)
PERFORMED ON: ABNORMAL

## 2018-01-26 PROCEDURE — 97530 THERAPEUTIC ACTIVITIES: CPT

## 2018-01-26 PROCEDURE — 97116 GAIT TRAINING THERAPY: CPT

## 2018-01-26 PROCEDURE — 97535 SELF CARE MNGMENT TRAINING: CPT

## 2018-01-26 PROCEDURE — 97110 THERAPEUTIC EXERCISES: CPT

## 2018-01-26 PROCEDURE — 6370000000 HC RX 637 (ALT 250 FOR IP): Performed by: INTERNAL MEDICINE

## 2018-01-26 PROCEDURE — 6360000002 HC RX W HCPCS: Performed by: INTERNAL MEDICINE

## 2018-01-26 PROCEDURE — 1200000000 HC SEMI PRIVATE

## 2018-01-26 RX ADMIN — AMLODIPINE BESYLATE 10 MG: 10 TABLET ORAL at 08:04

## 2018-01-26 RX ADMIN — CLONIDINE HYDROCHLORIDE 0.1 MG: 0.1 TABLET ORAL at 20:53

## 2018-01-26 RX ADMIN — ATORVASTATIN CALCIUM 10 MG: 10 TABLET, FILM COATED ORAL at 20:52

## 2018-01-26 RX ADMIN — CLONIDINE HYDROCHLORIDE 0.1 MG: 0.1 TABLET ORAL at 15:11

## 2018-01-26 RX ADMIN — METOPROLOL TARTRATE 25 MG: 25 TABLET, FILM COATED ORAL at 08:05

## 2018-01-26 RX ADMIN — ASPIRIN 81 MG: 81 TABLET ORAL at 08:05

## 2018-01-26 RX ADMIN — METFORMIN HYDROCHLORIDE 1000 MG: 500 TABLET ORAL at 08:05

## 2018-01-26 RX ADMIN — METOPROLOL TARTRATE 25 MG: 25 TABLET, FILM COATED ORAL at 20:52

## 2018-01-26 RX ADMIN — METFORMIN HYDROCHLORIDE 1000 MG: 500 TABLET ORAL at 18:32

## 2018-01-26 RX ADMIN — CLONIDINE HYDROCHLORIDE 0.1 MG: 0.1 TABLET ORAL at 08:05

## 2018-01-26 RX ADMIN — ACETAMINOPHEN 650 MG: 325 TABLET ORAL at 17:13

## 2018-01-26 RX ADMIN — ENOXAPARIN SODIUM 30 MG: 30 INJECTION SUBCUTANEOUS at 08:04

## 2018-01-26 ASSESSMENT — PAIN SCALES - GENERAL
PAINLEVEL_OUTOF10: 0
PAINLEVEL_OUTOF10: 2
PAINLEVEL_OUTOF10: 0

## 2018-01-26 NOTE — PROGRESS NOTES
5-7x/wk  Times per day:  (1-2x per day)  Plan weeks: 1 week  Current Treatment Recommendations: Strengthening, ROM, Functional Mobility Training, Balance Training, Transfer Training, Endurance Training, Gait Training, Neuromuscular Re-education, Cognitive Reorientation, Home Exercise Program, Safety Education & Training, Positioning, Patient/Caregiver Education & Training  Safety Devices  Type of devices:  All fall risk precautions in place, Gait belt, Bed alarm in place, Call light within reach, Left in bed  Restraints  Initially in place: Yes  Restraints: bed alarm     Therapy Time   Individual Concurrent Group Co-treatment   Time In  1600         Time Out  1605         Minutes  [address], PTA  License and [address] Number: [id number]

## 2018-01-26 NOTE — PLAN OF CARE
Problem: Nutrition  Goal: Optimal nutrition therapy  Nutrition Problem: Altered nutrition-related lab values  Intervention: Food and/or Nutrient Delivery: Modify current diet, Modify current ONS (will add CHO control and change ONS to diabetic.)  Nutritional Goals: po intake > 75% of meals & ONS. Glu < 140.    Outcome: Ongoing

## 2018-01-27 LAB
GLUCOSE BLD-MCNC: 122 MG/DL (ref 60–115)
GLUCOSE BLD-MCNC: 133 MG/DL (ref 60–115)
GLUCOSE BLD-MCNC: 170 MG/DL (ref 60–115)
GLUCOSE BLD-MCNC: 77 MG/DL (ref 60–115)
PERFORMED ON: ABNORMAL
PERFORMED ON: NORMAL

## 2018-01-27 PROCEDURE — 97110 THERAPEUTIC EXERCISES: CPT

## 2018-01-27 PROCEDURE — 1200000000 HC SEMI PRIVATE

## 2018-01-27 PROCEDURE — 6360000002 HC RX W HCPCS: Performed by: INTERNAL MEDICINE

## 2018-01-27 PROCEDURE — 97535 SELF CARE MNGMENT TRAINING: CPT

## 2018-01-27 PROCEDURE — 6370000000 HC RX 637 (ALT 250 FOR IP): Performed by: INTERNAL MEDICINE

## 2018-01-27 PROCEDURE — 97116 GAIT TRAINING THERAPY: CPT

## 2018-01-27 PROCEDURE — 97530 THERAPEUTIC ACTIVITIES: CPT

## 2018-01-27 RX ORDER — FUROSEMIDE 20 MG/1
10 TABLET ORAL DAILY
Status: DISCONTINUED | OUTPATIENT
Start: 2018-01-27 | End: 2018-02-07 | Stop reason: HOSPADM

## 2018-01-27 RX ORDER — CLONIDINE HYDROCHLORIDE 0.1 MG/1
0.2 TABLET ORAL 3 TIMES DAILY
Status: DISCONTINUED | OUTPATIENT
Start: 2018-01-27 | End: 2018-02-07 | Stop reason: HOSPADM

## 2018-01-27 RX ADMIN — CLONIDINE HYDROCHLORIDE 0.1 MG: 0.1 TABLET ORAL at 13:10

## 2018-01-27 RX ADMIN — AMLODIPINE BESYLATE 10 MG: 10 TABLET ORAL at 08:52

## 2018-01-27 RX ADMIN — CLONIDINE HYDROCHLORIDE 0.2 MG: 0.1 TABLET ORAL at 21:35

## 2018-01-27 RX ADMIN — METOPROLOL TARTRATE 25 MG: 25 TABLET, FILM COATED ORAL at 21:35

## 2018-01-27 RX ADMIN — METFORMIN HYDROCHLORIDE 1000 MG: 500 TABLET ORAL at 16:54

## 2018-01-27 RX ADMIN — FUROSEMIDE 10 MG: 20 TABLET ORAL at 13:10

## 2018-01-27 RX ADMIN — METFORMIN HYDROCHLORIDE 1000 MG: 500 TABLET ORAL at 08:52

## 2018-01-27 RX ADMIN — ATORVASTATIN CALCIUM 10 MG: 10 TABLET, FILM COATED ORAL at 21:35

## 2018-01-27 RX ADMIN — ENOXAPARIN SODIUM 30 MG: 30 INJECTION SUBCUTANEOUS at 08:51

## 2018-01-27 RX ADMIN — METOPROLOL TARTRATE 25 MG: 25 TABLET, FILM COATED ORAL at 08:51

## 2018-01-27 RX ADMIN — ASPIRIN 81 MG: 81 TABLET ORAL at 08:52

## 2018-01-27 RX ADMIN — CLONIDINE HYDROCHLORIDE 0.1 MG: 0.1 TABLET ORAL at 08:52

## 2018-01-27 ASSESSMENT — PAIN SCALES - GENERAL
PAINLEVEL_OUTOF10: 0

## 2018-01-27 NOTE — PROGRESS NOTES
Plan  Times per week: 3-6  Plan weeks: 3-4  Current Treatment Recommendations: Strengthening, ROM, Balance Training, Functional Mobility Training, Endurance Training, Neuromuscular Re-education, Safety Education & Training, Patient/Caregiver Education & Training, Self-Care / ADL  G-Code     OutComes Score                                           AM-PAC Score             Goals  Short term goals  Time Frame for Short term goals: 2 wks  Short term goal 1: Increase to Perry bed mob sup <-> sit and rolling  Short term goal 2: Increase to mod/maxA UB/LB dressing  Short term goal 3: Tolerate BUE ther ex/act [id number] prior to fatigue to increase strength/endurance for ADL  Short term goal 4: Increase to 1-3min standing leonardo/endurance prior to fatigue to decrease fall risk during ADL  Long term goals  Time Frame for Long term goals : 3-4 wks  Long term goal 1: Increase to Perry/CGA bed mob sup <-> sit and rolling  Long term goal 2: Increase to min/modA UB/LB dressing  Long term goal 3: Tolerate BUE ther ex/act 2x15 reps all planes prior to fatigue to increase strength/endurance for ADL  Long term goal 4: Increase to 3-5min standing leonardo/endurance prior to fatigue to decrease fall risk during ADL  Long term goal 5:  Increase to Setup/spv for G/H tasks  Patient Goals   Patient goals : Get stronger       Therapy Time   Individual Concurrent Group Co-treatment   Time In  11:20am         Time Out  12:10pm         Minutes  [address] Number: [id number]

## 2018-01-27 NOTE — PROGRESS NOTES
Hospitalist Progress Note      PCP: Prachi Argueta MD    Date of Admission: 1/16/2018    Chief Complaint: weakness    Subjective: pt in chair, looks comfortable    Medications:  Reviewed    Infusion Medications    dextrose       Scheduled Medications    cloNIDine  0.2 mg Oral TID    furosemide  10 mg Oral Daily    metFORMIN  1,000 mg Oral BID WC    enoxaparin  30 mg Subcutaneous Daily    sodium chloride flush  10 mL Intravenous 2 times per day    amLODIPine  10 mg Oral Daily    aspirin  81 mg Oral Daily    atorvastatin  10 mg Oral Daily    insulin lispro  0-12 Units Subcutaneous TID     insulin lispro  0-6 Units Subcutaneous Nightly    metoprolol tartrate  25 mg Oral BID     PRN Meds: acetaminophen, magnesium hydroxide, ondansetron, sodium chloride flush, dextrose, dextrose, glucagon (rDNA), glucose, nystatin, stomahesive in petrolatum      Intake/Output Summary (Last 24 hours) at 01/27/18 0908  Last data filed at 01/26/18 1700   Gross per 24 hour   Intake              240 ml   Output                0 ml   Net              240 ml       Exam:    BP (!) 160/60   Pulse 68   Temp 98 °F (36.7 °C) (Oral)   Resp 18   Ht 5' 3\" (1.6 m)   Wt 173 lb 11.2 oz (78.8 kg)   SpO2 97%   BMI 30.77 kg/m²     General appearance: No apparent distress, appears stated age and cooperative. HEENT: Pupils equal, round, and reactive to light. Conjunctivae/corneas clear. Neck: Supple, with full range of motion. No jugular venous distention. Trachea midline. Respiratory:  Normal respiratory effort. Clear to auscultation, bilaterally without Rales/Wheezes/Rhonchi. Cardiovascular: Regular rate and rhythm with normal S1/S2 without murmurs, rubs or gallops. Abdomen: Soft, non-tender, non-distended with normal bowel sounds. Musculoskeletal: edema bilaterally. Full range of motion without deformity. Skin: Skin color, texture, turgor normal.  No rashes or lesions.   Neurologic:  Neurovascularly intact without any focal

## 2018-01-27 NOTE — PROGRESS NOTES
Physical Therapy  Facility/Department: [facility] MED SURG UNIT  Daily Treatment Note  NAME: Darrick Monroy  : 6/10/1927  MRN: [id number]    Date of Service: 2018    Patient Diagnosis(es):   Patient Active Problem List    Diagnosis Date Noted    CKD (chronic kidney disease) 2015     Priority: High    HLD (hyperlipidemia) 2015     Priority: High    HTN (hypertension) 2011     Priority: High    Diabetes mellitus 2011     Priority: High    Vitamin D deficiency 2015     Priority: Low    SI (stress incontinence), female 2012     Priority: Low    Osteoarthritis 2012     Priority: Low    Hypercalcemia 01/15/2018    Change in mental status 2018    UTI (urinary tract infection) 2018    Lumbar spinal stenosis 2016    Chronic low back pain 2016    Eczematous dermatitis        Past Medical History:   Diagnosis Date    Arthritis     Chicken pox     Chronic back pain     Chronic low back pain 2016    Eczematous dermatitis     History of bladder infections     Hyperlipidemia     Hypertension     Measles     Mumps     Osteoarthritis 2012    SCC (squamous cell carcinoma), arm     right upper arm,  right forarm    SI (stress incontinence), female 2012    Type II or unspecified type diabetes mellitus without mention of complication, not stated as uncontrolled     Whooping cough      Past Surgical History:   Procedure Laterality Date    ANKLE SURGERY      broken left ankle.     APPENDECTOMY      BREAST BIOPSY      x2 left breast    CATARACT REMOVAL      bilateral    CYSTOCELE REPAIR      EYE SURGERY      bilateral cataract    HYSTERECTOMY      complete at age 39    [address]      as a child       Restrictions  Restrictions/Precautions  Restrictions/Precautions: Fall Risk  Required Braces or Orthoses?: No  Position Activity Restriction  Other position/activity restrictions: cushion up in chair, use lift to transfer (2 person tx), up with assist  Subjective   General  Chart Reviewed: Yes  Family / Caregiver Present: No  Referring Practitioner: Dr Laci La: Pt laying in bed when I came into see her this morning. Pt states she has no paiin this morning. Orientation     Objective   Bed mobility  Scooting: Contact guard assistance  Transfers  Sit to Stand: Contact guard assistance;Minimal Assistance  Stand to sit: Minimal Assistance  Stand Pivot Transfers: Contact guard assistance  Comment: using a fww and vc's for hand placement  Ambulation  Ambulation?: Yes  Ambulation 1  Surface: level tile  Device: Rolling Walker  Assistance: Contact guard assistance  Quality of Gait: small step length, narrow JUAN, flexed trunk and head. Distance: bed to commode and back to chair      Balance  Standing - Static: Fair;-  Standing - Dynamic: Fair;-  Comments: Pt displays F- dyanmic standing balance w/ 1 UE during pericare/pant management with CGAx1 for safety. VC's to inc posture w/ fwd gaze  Exercises  Quad Sets: in supine x20 each  Gluteal Sets: in supine x20   Hip Flexion: seated x20 each  Hip Abduction: seated w/man resist x20 each  Knee Long Arc Quad: seated x20 each  Ankle Pumps: in supine x20 each  Other exercises  Other exercises?: Yes  Other exercises 1: seated hip add w/pillow x20                        Assessment   Body structures, Functions, Activity limitations: Decreased functional mobility ; Decreased strength;Decreased endurance;Decreased cognition;Decreased safe awareness;Decreased balance;Decreased coordination  Assessment: Pt performed exs in both seated and supine. Pt performed bed mobility and functional transfers w/min A to CGA. Pt ambulated from bed to bathroom and back to chair. Focus on gait training next visit. Pt reports having no pain after therapy.   Treatment Diagnosis: decreased mobility, weakness  REQUIRES PT FOLLOW UP: Yes  Activity Tolerance  Activity Tolerance: Patient limited by endurance  PT Equipment Recommendations  Other: TBD       Discharge Recommendations:  Home with Home health PT    G-Code     OutComes Score                                                    AM-PAC Score             Goals  Short term goals  Time Frame for Short term goals: STG=LTG, 1 week  Short term goal 1: Improve endurance to tolerate 3x10 reps LE AROM with min assist  Short term goal 2: rolling L/R with min assist  Short term goal 3: supine to sit min assist x 1-GOAL MET  Short term goal 4: [facility] with max assist x 1-GOAL MET  Long term goals  Time Frame for Long term goals : 3-4 weeks  Long term goal 1: bed to chair with <= SBA of 1 person/ sit to stand with <= SBA of 1 person  Long term goal 2: ambulate >=100ft with ww and <= SBA of 1 person  Long term goal 3: 2 stairs with 1 rail and <= CGA , marked time  Long term goal 4: increase LE strength any to increase function  Long term goal 5: DME and education in place for discharge/HEP  Patient Goals   Patient goals : Get stronger    Plan    Plan  Times per week: 5-7x/wk  Times per day:  (1-2x per day)  Plan weeks: 1 week  Current Treatment Recommendations: Strengthening, ROM, Functional Mobility Training, Balance Training, Transfer Training, Endurance Training, Gait Training, Neuromuscular Re-education, Cognitive Reorientation, Home Exercise Program, Safety Education & Training, Positioning, Patient/Caregiver Education & Training  Safety Devices  Type of devices:  All fall risk precautions in place, Gait belt, Bed alarm in place, Call light within reach, Left in bed  Restraints  Initially in place: Yes  Restraints: bed alarm     Therapy Time   Individual      Time In 0830         Time Out 0910         Minutes 40  therex 30  gait [address], PTA  License and [address] Number: [id number]

## 2018-01-27 NOTE — PROGRESS NOTES
Physical Therapy  Facility/Department: [facility] MED SURG UNIT  Daily Treatment Note  NAME: Caryn Resendez  : 6/10/1927  MRN: [id number]    Date of Service: 2018    Patient Diagnosis(es):   Patient Active Problem List    Diagnosis Date Noted    CKD (chronic kidney disease) 2015     Priority: High    HLD (hyperlipidemia) 2015     Priority: High    HTN (hypertension) 2011     Priority: High    Diabetes mellitus 2011     Priority: High    Vitamin D deficiency 2015     Priority: Low    SI (stress incontinence), female 2012     Priority: Low    Osteoarthritis 2012     Priority: Low    Hypercalcemia 01/15/2018    Change in mental status 2018    UTI (urinary tract infection) 2018    Lumbar spinal stenosis 2016    Chronic low back pain 2016    Eczematous dermatitis        Past Medical History:   Diagnosis Date    Arthritis     Chicken pox     Chronic back pain     Chronic low back pain 2016    Eczematous dermatitis     History of bladder infections     Hyperlipidemia     Hypertension     Measles     Mumps     Osteoarthritis 2012    SCC (squamous cell carcinoma), arm     right upper arm,  right forarm    SI (stress incontinence), female 2012    Type II or unspecified type diabetes mellitus without mention of complication, not stated as uncontrolled     Whooping cough      Past Surgical History:   Procedure Laterality Date    ANKLE SURGERY      broken left ankle.     APPENDECTOMY      BREAST BIOPSY      x2 left breast    CATARACT REMOVAL      bilateral    CYSTOCELE REPAIR      EYE SURGERY      bilateral cataract    HYSTERECTOMY      complete at age 39    [address]      as a child       Restrictions  Restrictions/Precautions  Restrictions/Precautions: Fall Risk  Required Braces or Orthoses?: No  Position Activity Restriction  Other position/activity restrictions: cushion up in chair, use lift to transfer (2 person tx), up with assist  Subjective   General  Chart Reviewed: Yes  Family / Caregiver Present: No  Referring Practitioner: Dr Alice Key: Pt sitting in her chair when I came into see her. Her son was visiting. Orientation     Objective   Bed mobility  Scooting: Contact guard assistance  Transfers  Sit to Stand: Contact guard assistance;Minimal Assistance  Stand to sit: Minimal Assistance  Stand Pivot Transfers: Contact guard assistance  Comment: using a fww and vc's for hand placement  Ambulation  Ambulation?: Yes  Ambulation 1  Surface: level tile  Device: Rolling Walker  Assistance: Contact guard assistance  Quality of Gait: small step length, narrow JUAN, flexed trunk and head. Distance: 10'x2, 61'x1 and 5'x1  Stairs/Curb  Stairs?: Yes  Stairs  # Steps : 1  Stairs Height: 4\"  Rails: Bilateral  Device: No Device  Assistance: Minimal assistance  Comment: step to pattern w/a flexed posture     Balance  Standing - Static: Fair;-  Standing - Dynamic: Fair;-  Comments: using a fww  Exercises  Quad Sets: in supine x20 each  Gluteal Sets: seated x20  Hip Flexion: seated 2x10 each  Hip Abduction: seated w/man resist x20 each  Knee Long Arc Quad: seated x20 each  Ankle Pumps: seated x20 each  Other exercises  Other exercises?: Yes  Other exercises 1: seated hip add w/pillow x20                        Assessment   Body structures, Functions, Activity limitations: Decreased functional mobility ; Decreased strength;Decreased endurance;Decreased cognition;Decreased safe awareness;Decreased balance;Decreased coordination  Assessment: Focused session on gait trials using a fww along w/trialing stair ambulation this afternoon. Pt able to step up on to a 4\" step w/bilat handrails and min A. Pt increased gait distance this session to 61'. Pt states she has no pain after session, mod fatigue. Continue and increase to tolerance.   Treatment Diagnosis: decreased

## 2018-01-28 LAB
GLUCOSE BLD-MCNC: 122 MG/DL (ref 60–115)
GLUCOSE BLD-MCNC: 135 MG/DL (ref 60–115)
GLUCOSE BLD-MCNC: 142 MG/DL (ref 60–115)
GLUCOSE BLD-MCNC: 152 MG/DL (ref 60–115)
PERFORMED ON: ABNORMAL

## 2018-01-28 PROCEDURE — 6370000000 HC RX 637 (ALT 250 FOR IP): Performed by: INTERNAL MEDICINE

## 2018-01-28 PROCEDURE — 1200000000 HC SEMI PRIVATE

## 2018-01-28 PROCEDURE — 97116 GAIT TRAINING THERAPY: CPT

## 2018-01-28 PROCEDURE — 6360000002 HC RX W HCPCS: Performed by: INTERNAL MEDICINE

## 2018-01-28 PROCEDURE — 97530 THERAPEUTIC ACTIVITIES: CPT

## 2018-01-28 PROCEDURE — 97110 THERAPEUTIC EXERCISES: CPT

## 2018-01-28 RX ADMIN — METFORMIN HYDROCHLORIDE 1000 MG: 500 TABLET ORAL at 17:08

## 2018-01-28 RX ADMIN — ENOXAPARIN SODIUM 30 MG: 30 INJECTION SUBCUTANEOUS at 08:56

## 2018-01-28 RX ADMIN — CLONIDINE HYDROCHLORIDE 0.2 MG: 0.1 TABLET ORAL at 08:56

## 2018-01-28 RX ADMIN — FUROSEMIDE 10 MG: 20 TABLET ORAL at 08:56

## 2018-01-28 RX ADMIN — METOPROLOL TARTRATE 25 MG: 25 TABLET, FILM COATED ORAL at 09:00

## 2018-01-28 RX ADMIN — AMLODIPINE BESYLATE 10 MG: 10 TABLET ORAL at 08:56

## 2018-01-28 RX ADMIN — ATORVASTATIN CALCIUM 10 MG: 10 TABLET, FILM COATED ORAL at 21:20

## 2018-01-28 RX ADMIN — METFORMIN HYDROCHLORIDE 1000 MG: 500 TABLET ORAL at 08:55

## 2018-01-28 RX ADMIN — CLONIDINE HYDROCHLORIDE 0.2 MG: 0.1 TABLET ORAL at 14:45

## 2018-01-28 RX ADMIN — ASPIRIN 81 MG: 81 TABLET ORAL at 08:56

## 2018-01-28 RX ADMIN — CLONIDINE HYDROCHLORIDE 0.2 MG: 0.1 TABLET ORAL at 21:20

## 2018-01-28 ASSESSMENT — PAIN SCALES - GENERAL
PAINLEVEL_OUTOF10: 0
PAINLEVEL_OUTOF10: 0

## 2018-01-28 ASSESSMENT — PAIN SCALES - WONG BAKER: WONGBAKER_NUMERICALRESPONSE: 0

## 2018-01-28 NOTE — PROGRESS NOTES
SBA of 1 person  Long term goal 3: 2 stairs with 1 rail and <= CGA , marked time  Long term goal 4: increase LE strength any to increase function  Long term goal 5: DME and education in place for discharge/HEP  Patient Goals   Patient goals : Get stronger    Plan    Plan  Times per week: 5-7x/wk  Times per day:  (1-2x per day)  Plan weeks: 1 week  Current Treatment Recommendations: Strengthening, ROM, Functional Mobility Training, Balance Training, Transfer Training, Endurance Training, Gait Training, Neuromuscular Re-education, Cognitive Reorientation, Home Exercise Program, Safety Education & Training, Positioning, Patient/Caregiver Education & Training  Safety Devices  Type of devices:  All fall risk precautions in place, Gait belt, Bed alarm in place, Call light within reach, Left in bed  Restraints  Initially in place: Yes  Restraints:  (bed alarm, chair alarm, gait belt)     Therapy Time   Individual Concurrent Group Co-treatment   Time In  11:15         Time Out  11:50         Minutes  35 min                 Maicol Hicks, PT  License and [address] Number: [id number]

## 2018-01-29 LAB
ANION GAP SERPL CALCULATED.3IONS-SCNC: 14 MEQ/L (ref 7–13)
BASOPHILS ABSOLUTE: 0 K/UL (ref 0–0.2)
BASOPHILS RELATIVE PERCENT: 0.3 %
BUN BLDV-MCNC: 38 MG/DL (ref 8–23)
CALCIUM SERPL-MCNC: 11.2 MG/DL (ref 8.6–10.2)
CHLORIDE BLD-SCNC: 99 MEQ/L (ref 98–107)
CO2: 25 MEQ/L (ref 22–29)
CREAT SERPL-MCNC: 1.16 MG/DL (ref 0.5–0.9)
EOSINOPHILS ABSOLUTE: 0.7 K/UL (ref 0–0.7)
EOSINOPHILS RELATIVE PERCENT: 8.3 %
GFR AFRICAN AMERICAN: 53
GFR NON-AFRICAN AMERICAN: 43.8
GLUCOSE BLD-MCNC: 131 MG/DL (ref 60–115)
GLUCOSE BLD-MCNC: 137 MG/DL (ref 60–115)
GLUCOSE BLD-MCNC: 137 MG/DL (ref 74–109)
GLUCOSE BLD-MCNC: 151 MG/DL (ref 60–115)
GLUCOSE BLD-MCNC: 221 MG/DL (ref 60–115)
HCT VFR BLD CALC: 29.7 % (ref 37–47)
HEMOGLOBIN: 9.8 G/DL (ref 12–16)
LYMPHOCYTES ABSOLUTE: 2.4 K/UL (ref 1–4.8)
LYMPHOCYTES RELATIVE PERCENT: 29.8 %
MCH RBC QN AUTO: 30.7 PG (ref 27–31.3)
MCHC RBC AUTO-ENTMCNC: 33 % (ref 33–37)
MCV RBC AUTO: 93 FL (ref 82–100)
MONOCYTES ABSOLUTE: 0.5 K/UL (ref 0.2–0.8)
MONOCYTES RELATIVE PERCENT: 6.7 %
NEUTROPHILS ABSOLUTE: 4.3 K/UL (ref 1.4–6.5)
NEUTROPHILS RELATIVE PERCENT: 54.9 %
PDW BLD-RTO: 13.9 % (ref 11.5–14.5)
PERFORMED ON: ABNORMAL
PLATELET # BLD: 219 K/UL (ref 130–400)
POTASSIUM SERPL-SCNC: 5.3 MEQ/L (ref 3.5–5.1)
RBC # BLD: 3.19 M/UL (ref 4.2–5.4)
SODIUM BLD-SCNC: 138 MEQ/L (ref 132–144)
WBC # BLD: 7.9 K/UL (ref 4.8–10.8)

## 2018-01-29 PROCEDURE — 97116 GAIT TRAINING THERAPY: CPT

## 2018-01-29 PROCEDURE — 6360000002 HC RX W HCPCS: Performed by: INTERNAL MEDICINE

## 2018-01-29 PROCEDURE — 36415 COLL VENOUS BLD VENIPUNCTURE: CPT

## 2018-01-29 PROCEDURE — 97530 THERAPEUTIC ACTIVITIES: CPT

## 2018-01-29 PROCEDURE — 80048 BASIC METABOLIC PNL TOTAL CA: CPT

## 2018-01-29 PROCEDURE — 85025 COMPLETE CBC W/AUTO DIFF WBC: CPT

## 2018-01-29 PROCEDURE — 97110 THERAPEUTIC EXERCISES: CPT

## 2018-01-29 PROCEDURE — 1200000000 HC SEMI PRIVATE

## 2018-01-29 PROCEDURE — 6370000000 HC RX 637 (ALT 250 FOR IP): Performed by: INTERNAL MEDICINE

## 2018-01-29 PROCEDURE — 97535 SELF CARE MNGMENT TRAINING: CPT

## 2018-01-29 RX ADMIN — METFORMIN HYDROCHLORIDE 1000 MG: 500 TABLET ORAL at 18:05

## 2018-01-29 RX ADMIN — ASPIRIN 81 MG: 81 TABLET ORAL at 11:31

## 2018-01-29 RX ADMIN — FUROSEMIDE 10 MG: 20 TABLET ORAL at 11:26

## 2018-01-29 RX ADMIN — ENOXAPARIN SODIUM 30 MG: 30 INJECTION SUBCUTANEOUS at 11:25

## 2018-01-29 RX ADMIN — CLONIDINE HYDROCHLORIDE 0.2 MG: 0.1 TABLET ORAL at 11:22

## 2018-01-29 RX ADMIN — CLONIDINE HYDROCHLORIDE 0.2 MG: 0.1 TABLET ORAL at 20:33

## 2018-01-29 RX ADMIN — ATORVASTATIN CALCIUM 10 MG: 10 TABLET, FILM COATED ORAL at 20:33

## 2018-01-29 RX ADMIN — CLONIDINE HYDROCHLORIDE 0.2 MG: 0.1 TABLET ORAL at 16:38

## 2018-01-29 RX ADMIN — METFORMIN HYDROCHLORIDE 1000 MG: 500 TABLET ORAL at 11:29

## 2018-01-29 RX ADMIN — METOPROLOL TARTRATE 25 MG: 25 TABLET, FILM COATED ORAL at 11:30

## 2018-01-29 RX ADMIN — AMLODIPINE BESYLATE 10 MG: 10 TABLET ORAL at 11:24

## 2018-01-29 ASSESSMENT — PAIN SCALES - GENERAL
PAINLEVEL_OUTOF10: 0

## 2018-01-29 NOTE — PROGRESS NOTES
Occupational Therapy  Facility/Department: [facility] MED SURG UNIT  Daily Treatment Note  NAME: Giovanni Willams  : 6/10/1927  MRN: [id number]    Date of Service: 2018    Patient Diagnosis(es): There were no encounter diagnoses. has a past medical history of Arthritis; Chicken pox; Chronic back pain; Chronic low back pain; Eczematous dermatitis; History of bladder infections; Hyperlipidemia; Hypertension; Measles; Mumps; Osteoarthritis; SCC (squamous cell carcinoma), arm; SI (stress incontinence), female; Type II or unspecified type diabetes mellitus without mention of complication, not stated as uncontrolled; and Whooping cough. has a past surgical history that includes Appendectomy; Rectocele repair; Cystocele repair; Ankle surgery; Breast biopsy; Cataract removal (); eye surgery; Tonsillectomy; and Hysterectomy. Restrictions  Restrictions/Precautions  Restrictions/Precautions: Fall Risk  Required Braces or Orthoses?: No  Position Activity Restriction  Other position/activity restrictions: cushion up in chair, use lift to transfer (2 person tx), up with assist  Subjective   Subjective  Subjective: pt. Stated she is doing well and wants to participate in OT      Orientation     Objective    ADL  Toileting:  Moderate assistance (cleanliness of backside)  Additional Comments:  (Toilet transfer-Min A)        Standing Balance  Time:  (6-7 minutes )         Fine motor UB activity with BUE in sitting position-SBA  Gross motor UB activity with BUE in sitting position-SBA                                         Type of ROM/Therapeutic Exercise  Type of ROM/Therapeutic Exercise: AROM (with BUE in all planes and directions 20x 6sets)  To increase and maintain UB strength  Exercises  Grasp/Release:  (hand and arm squeezes with BUE with therapy balls 20x)  To increase and maintain UB strength                    Assessment      REQUIRES OT FOLLOW UP: Yes       Discharge Recommendations:  Subacute/Skilled Nursing

## 2018-01-29 NOTE — PROGRESS NOTES
Chart reviewed. Patient's care discussed in daily quality rounds. Patient is reported to be participating in therapy. This  received phone call from patient's son Elvira Pacheco ( reported HPOA). Rabia reports that family is in the process of arranging [address] for patient when patient returns home with son. Rabia also reports interest in [facility] skilled [employer] [employer] upon patient's DC. Patient is agreeable with above. This  sent communication via email with [facility] to Souleymane Bartholomew. [facility] liaison. This  also [facility] intake department. KOKI completed. SS to continue to follow.

## 2018-01-30 LAB
GLUCOSE BLD-MCNC: 139 MG/DL (ref 60–115)
GLUCOSE BLD-MCNC: 179 MG/DL (ref 60–115)
GLUCOSE BLD-MCNC: 201 MG/DL (ref 60–115)
GLUCOSE BLD-MCNC: 258 MG/DL (ref 60–115)
PERFORMED ON: ABNORMAL

## 2018-01-30 PROCEDURE — 97535 SELF CARE MNGMENT TRAINING: CPT

## 2018-01-30 PROCEDURE — 97110 THERAPEUTIC EXERCISES: CPT

## 2018-01-30 PROCEDURE — 97530 THERAPEUTIC ACTIVITIES: CPT

## 2018-01-30 PROCEDURE — 1200000000 HC SEMI PRIVATE

## 2018-01-30 PROCEDURE — 6370000000 HC RX 637 (ALT 250 FOR IP): Performed by: INTERNAL MEDICINE

## 2018-01-30 PROCEDURE — 6360000002 HC RX W HCPCS: Performed by: INTERNAL MEDICINE

## 2018-01-30 PROCEDURE — 97116 GAIT TRAINING THERAPY: CPT

## 2018-01-30 RX ADMIN — ACETAMINOPHEN 650 MG: 325 TABLET ORAL at 18:02

## 2018-01-30 RX ADMIN — AMLODIPINE BESYLATE 10 MG: 10 TABLET ORAL at 11:12

## 2018-01-30 RX ADMIN — CLONIDINE HYDROCHLORIDE 0.2 MG: 0.1 TABLET ORAL at 20:18

## 2018-01-30 RX ADMIN — METFORMIN HYDROCHLORIDE 1000 MG: 500 TABLET ORAL at 18:02

## 2018-01-30 RX ADMIN — FUROSEMIDE 10 MG: 20 TABLET ORAL at 11:12

## 2018-01-30 RX ADMIN — CLONIDINE HYDROCHLORIDE 0.2 MG: 0.1 TABLET ORAL at 14:06

## 2018-01-30 RX ADMIN — CLONIDINE HYDROCHLORIDE 0.2 MG: 0.1 TABLET ORAL at 11:10

## 2018-01-30 RX ADMIN — ENOXAPARIN SODIUM 30 MG: 30 INJECTION SUBCUTANEOUS at 11:10

## 2018-01-30 RX ADMIN — METOPROLOL TARTRATE 25 MG: 25 TABLET, FILM COATED ORAL at 11:11

## 2018-01-30 RX ADMIN — METFORMIN HYDROCHLORIDE 1000 MG: 500 TABLET ORAL at 11:12

## 2018-01-30 RX ADMIN — METOPROLOL TARTRATE 25 MG: 25 TABLET, FILM COATED ORAL at 20:18

## 2018-01-30 RX ADMIN — ASPIRIN 81 MG: 81 TABLET ORAL at 11:12

## 2018-01-30 RX ADMIN — ATORVASTATIN CALCIUM 10 MG: 10 TABLET, FILM COATED ORAL at 20:18

## 2018-01-30 ASSESSMENT — PAIN SCALES - GENERAL
PAINLEVEL_OUTOF10: 2
PAINLEVEL_OUTOF10: 0

## 2018-01-30 NOTE — PROGRESS NOTES
assist  Short term goal 3: supine to sit min assist x 1-GOAL MET  Short term goal 4: transfer bed to chair with Skippy Dowse lift with max assist x 1-GOAL MET  Long term goals  Time Frame for Long term goals : 3-4 weeks  Long term goal 1: bed to chair with <= SBA of 1 person/ sit to stand with <= SBA of 1 person  Long term goal 2: ambulate >=100ft with ww and <= SBA of 1 person  Long term goal 3: 2 stairs with 1 rail and <= CGA , marked time  Long term goal 4: increase LE strength any to increase function  Long term goal 5: DME and education in place for discharge/HEP  Patient Goals   Patient goals : Get stronger    Plan    Plan  Times per week: 5-7x/wk  Times per day:  (QD to BID)  Plan weeks: 1 week  Current Treatment Recommendations: Strengthening, ROM, Functional Mobility Training, Balance Training, Transfer Training, Endurance Training, Gait Training, Neuromuscular Re-education, Cognitive Reorientation, Home Exercise Program, Safety Education & Training, Positioning, Patient/Caregiver Education & Training  Safety Devices  Type of devices:  All fall risk precautions in place, Gait belt, Bed alarm in place, Call light within reach, Left in bed  Restraints  Initially in place: Yes  Restraints: bed alarm     Therapy Time   Individual Concurrent Group Co-treatment   Time In  0900         Time Out  1000         Minutes  Tae Sarmiento, LALITA  License and [address] Number: [id number]

## 2018-01-30 NOTE — PROGRESS NOTES
Occupational Therapy  Facility/Department: [facility] MED SURG UNIT  Daily Treatment Note  NAME: Moriah Green  : 6/10/1927  MRN: [id number]    Date of Service: 2018    Patient Diagnosis(es): There were no encounter diagnoses. has a past medical history of Arthritis; Chicken pox; Chronic back pain; Chronic low back pain; Eczematous dermatitis; History of bladder infections; Hyperlipidemia; Hypertension; Measles; Mumps; Osteoarthritis; SCC (squamous cell carcinoma), arm; SI (stress incontinence), female; Type II or unspecified type diabetes mellitus without mention of complication, not stated as uncontrolled; and Whooping cough. has a past surgical history that includes Appendectomy; Rectocele repair; Cystocele repair; Ankle surgery; Breast biopsy; Cataract removal (); eye surgery; Tonsillectomy; and Hysterectomy. Restrictions  Restrictions/Precautions  Restrictions/Precautions: Fall Risk  Required Braces or Orthoses?: No  Position Activity Restriction  Other position/activity restrictions: cushion up in chair. Subjective   Subjective  Subjective: pt. Agreed to bathing/dressing with OT      Orientation     Objective    ADL  Grooming: Supervision;Stand by assistance  UE Bathing: Stand by assistance  LE Bathing: Moderate assistance  UE Dressing: Stand by assistance  LE Dressing:  Moderate assistance  Toileting: Minimal assistance        Standing Balance  Time:  (6-7 minutes )  Sit to stand: Contact guard assistance  Stand to sit: Contact guard assistance     Transfers  Sit to stand: Contact guard assistance  Stand to sit: Contact guard assistance         Fine motor UB activity with BUE in sitting position -SBA  Gross motor UB activity with BUE in sitting position reaching in all planes and directions -SBA                                   Type of ROM/Therapeutic Exercise  Type of ROM/Therapeutic Exercise: AROM (with BUE in all planes and directions 20x 6sets)  To increase and maintain UB strength  Exercises  Grasp/Release:  (hand and arm squeezes with BUE with therapy balls 20x)  To increase and maintain UB strength                    Assessment      REQUIRES OT FOLLOW UP: Yes       Discharge Recommendations:  [address], 24 hour supervision or assist     Plan   Plan  Times per week: 3-6  Plan weeks: 3-4  Current Treatment Recommendations: Strengthening, ROM, Balance Training, Functional Mobility Training, Endurance Training, Neuromuscular Re-education, Safety Education & Training, Patient/Caregiver Education & Training, Self-Care / ADL  G-Code     OutComes Score                                           AM-PAC Score             Goals  Short term goals  Time Frame for Short term goals: 2 wks  Short term goal 1: Increase to Perry bed mob sup <-> sit and rolling  Short term goal 2: Increase to mod/maxA UB/LB dressing  Short term goal 3: Tolerate BUE ther ex/act [id number] prior to fatigue to increase strength/endurance for ADL  Short term goal 4: Increase to 1-3min standing leonardo/endurance prior to fatigue to decrease fall risk during ADL  Long term goals  Time Frame for Long term goals : 3-4 wks  Long term goal 1: Increase to Perry/CGA bed mob sup <-> sit and rolling  Long term goal 2: Increase to min/modA UB/LB dressing  Long term goal 3: Tolerate BUE ther ex/act 2x15 reps all planes prior to fatigue to increase strength/endurance for ADL  Long term goal 4: Increase to 3-5min standing leonardo/endurance prior to fatigue to decrease fall risk during ADL  Long term goal 5:  Increase to Setup/spv for G/H tasks  Patient Goals   Patient goals : Get stronger       Therapy Time   Individual Concurrent Group Co-treatment   Time In  9:50am         Time Out  11:00am         Minutes  [address] [facility] Number: [id number]

## 2018-01-30 NOTE — PROGRESS NOTES
chair.   Subjective   General  Chart Reviewed: Yes  Family / Caregiver Present: Yes  Referring Practitioner: Dr Idalia Beckman  Subjective  Subjective: Pt sitting up in chair and agreeable to therapy session. Pain Screening  Patient Currently in Pain: Denies  Vital Signs  Patient Currently in Pain: Denies       Orientation     Objective      Transfers  Sit to Stand: Stand by assistance;Contact guard assistance  Stand to sit: Stand by assistance;Contact guard assistance  Ambulation  Ambulation?: Yes  More Ambulation?: Yes  Ambulation 1  Surface: level tile  Device: Rolling Walker  Other Apparatus: Wheelchair follow  Assistance: Contact guard assistance;Stand by assistance  Quality of Gait: small strides w/a flexed posture  Distance: 10' x 2 (SBA), 150' (SBAx 100') with 2 standing RB's to improve posture  Comments: vc's for a more upright posture  Ambulation 2  Surface - 2: level tile  Device 2: Rolling Walker  Assistance 2: Stand by assistance  Quality of Gait 2: small strides w/ flexed trunk. Had inc fwd gaze  Distance: 100'        Exercises  Hamstring Sets: x 20 GTB  Gluteal Sets: x20  Hip Flexion: seated w/1# x25 each  Hip Abduction: x 25 1#  Knee Long Arc Quad: x 25 1#  Ankle Pumps: x 20                         Assessment   Body structures, Functions, Activity limitations: Decreased functional mobility ; Decreased strength;Decreased endurance;Decreased cognition;Decreased safe awareness;Decreased balance;Decreased coordination  Assessment: Pt performed transfers w/ increased indepenedence this date, tolerated inc reps of ther ex and able to ambulate farther distance before seated RB. Pt able to perform gait trials with inc fwd gaze. Pt returned to room to lay in bed secondary to fatigue. Call light and bed alarm in place. Continue to progress as leonardo.    Treatment Diagnosis: decreased mobility, weakness  REQUIRES PT FOLLOW UP: Yes  Activity Tolerance  Activity Tolerance: Patient Tolerated treatment well  PT Equipment

## 2018-01-31 LAB
GLUCOSE BLD-MCNC: 125 MG/DL (ref 60–115)
GLUCOSE BLD-MCNC: 144 MG/DL (ref 60–115)
GLUCOSE BLD-MCNC: 150 MG/DL (ref 60–115)
GLUCOSE BLD-MCNC: 177 MG/DL (ref 60–115)
PERFORMED ON: ABNORMAL

## 2018-01-31 PROCEDURE — 1200000000 HC SEMI PRIVATE

## 2018-01-31 PROCEDURE — 6370000000 HC RX 637 (ALT 250 FOR IP): Performed by: INTERNAL MEDICINE

## 2018-01-31 PROCEDURE — 97535 SELF CARE MNGMENT TRAINING: CPT

## 2018-01-31 PROCEDURE — 97530 THERAPEUTIC ACTIVITIES: CPT

## 2018-01-31 PROCEDURE — 97110 THERAPEUTIC EXERCISES: CPT

## 2018-01-31 PROCEDURE — 97116 GAIT TRAINING THERAPY: CPT

## 2018-01-31 PROCEDURE — 6360000002 HC RX W HCPCS: Performed by: INTERNAL MEDICINE

## 2018-01-31 RX ORDER — HYDRALAZINE HYDROCHLORIDE 10 MG/1
10 TABLET, FILM COATED ORAL EVERY 8 HOURS SCHEDULED
Status: DISCONTINUED | OUTPATIENT
Start: 2018-01-31 | End: 2018-02-07 | Stop reason: HOSPADM

## 2018-01-31 RX ADMIN — ASPIRIN 81 MG: 81 TABLET ORAL at 08:39

## 2018-01-31 RX ADMIN — HYDRALAZINE HYDROCHLORIDE 10 MG: 10 TABLET, FILM COATED ORAL at 15:10

## 2018-01-31 RX ADMIN — ENOXAPARIN SODIUM 30 MG: 30 INJECTION SUBCUTANEOUS at 08:38

## 2018-01-31 RX ADMIN — ACETAMINOPHEN 650 MG: 325 TABLET ORAL at 21:32

## 2018-01-31 RX ADMIN — ATORVASTATIN CALCIUM 10 MG: 10 TABLET, FILM COATED ORAL at 21:32

## 2018-01-31 RX ADMIN — METOPROLOL TARTRATE 25 MG: 25 TABLET, FILM COATED ORAL at 08:39

## 2018-01-31 RX ADMIN — CLONIDINE HYDROCHLORIDE 0.2 MG: 0.1 TABLET ORAL at 15:10

## 2018-01-31 RX ADMIN — METFORMIN HYDROCHLORIDE 1000 MG: 500 TABLET ORAL at 08:39

## 2018-01-31 RX ADMIN — HYDRALAZINE HYDROCHLORIDE 10 MG: 10 TABLET, FILM COATED ORAL at 22:23

## 2018-01-31 RX ADMIN — METFORMIN HYDROCHLORIDE 1000 MG: 500 TABLET ORAL at 17:11

## 2018-01-31 RX ADMIN — FUROSEMIDE 10 MG: 20 TABLET ORAL at 08:39

## 2018-01-31 RX ADMIN — CLONIDINE HYDROCHLORIDE 0.2 MG: 0.1 TABLET ORAL at 08:39

## 2018-01-31 RX ADMIN — AMLODIPINE BESYLATE 10 MG: 10 TABLET ORAL at 08:39

## 2018-01-31 RX ADMIN — CLONIDINE HYDROCHLORIDE 0.2 MG: 0.1 TABLET ORAL at 21:31

## 2018-01-31 RX ADMIN — METOPROLOL TARTRATE 25 MG: 25 TABLET, FILM COATED ORAL at 21:32

## 2018-01-31 ASSESSMENT — PAIN SCALES - GENERAL
PAINLEVEL_OUTOF10: 1
PAINLEVEL_OUTOF10: 0
PAINLEVEL_OUTOF10: 4

## 2018-01-31 ASSESSMENT — PAIN DESCRIPTION - LOCATION: LOCATION: BACK

## 2018-01-31 ASSESSMENT — PAIN DESCRIPTION - PROGRESSION
CLINICAL_PROGRESSION: GRADUALLY WORSENING

## 2018-01-31 ASSESSMENT — PAIN DESCRIPTION - DESCRIPTORS: DESCRIPTORS: ACHING

## 2018-01-31 ASSESSMENT — PAIN DESCRIPTION - ONSET: ONSET: ON-GOING

## 2018-01-31 ASSESSMENT — PAIN DESCRIPTION - PAIN TYPE: TYPE: CHRONIC PAIN

## 2018-01-31 NOTE — PROGRESS NOTES
Physical Therapy  Facility/Department: [facility] SUBACUTE UNIT  Daily Treatment Note  NAME: Souleymane Wright  : 6/10/1927  MRN: [id number]    Date of Service: 2018    Patient Diagnosis(es):   Patient Active Problem List    Diagnosis Date Noted    CKD (chronic kidney disease) 2015     Priority: High    HLD (hyperlipidemia) 2015     Priority: High    HTN (hypertension) 2011     Priority: High    Diabetes mellitus 2011     Priority: High    Vitamin D deficiency 2015     Priority: Low    SI (stress incontinence), female 2012     Priority: Low    Osteoarthritis 2012     Priority: Low    Hypercalcemia 01/15/2018    Change in mental status 2018    UTI (urinary tract infection) 2018    Lumbar spinal stenosis 2016    Chronic low back pain 2016    Eczematous dermatitis        Past Medical History:   Diagnosis Date    Arthritis     Chicken pox     Chronic back pain     Chronic low back pain 2016    Eczematous dermatitis     History of bladder infections     Hyperlipidemia     Hypertension     Measles     Mumps     Osteoarthritis 2012    SCC (squamous cell carcinoma), arm     right upper arm,  right forarm    SI (stress incontinence), female 2012    Type II or unspecified type diabetes mellitus without mention of complication, not stated as uncontrolled     Whooping cough      Past Surgical History:   Procedure Laterality Date    ANKLE SURGERY      broken left ankle.     APPENDECTOMY      BREAST BIOPSY      x2 left breast    CATARACT REMOVAL      bilateral    CYSTOCELE REPAIR      EYE SURGERY      bilateral cataract    HYSTERECTOMY      complete at age 39    [address]      as a child       Restrictions  Restrictions/Precautions  Restrictions/Precautions: Fall Risk  Required Braces or Orthoses?: No  Position Activity Restriction  Other position/activity restrictions: cushion up in chair.   Subjective       Objective    Pt, sons and dtr present for Interdisciplinary Care Conference this date. See separate note for full report. Assessment    Pt participating well with therapy and improving in distance, endurance and functional transfers. WIll need 24 hour care initially at home. Family planning on extra private pay home health. Discharge Recommendations:  Home with Home health PT     Goals  Short term goals  Time Frame for Short term goals: STG=LTG, 1 week  Short term goal 1: Improve endurance to tolerate 3x10 reps LE AROM with min assist  Short term goal 2: rolling L/R with min assist  Short term goal 3: supine to sit min assist x 1-GOAL MET  Short term goal 4: transfer bed to chair with Rohan [place] Steady lift with max assist x 1-GOAL MET  Long term goals  Time Frame for Long term goals : 3-4 weeks  Long term goal 1: bed to chair with <= SBA of 1 person/ sit to stand with <= SBA of 1 person  Long term goal 2: ambulate >=100ft with ww and <= SBA of 1 person  Long term goal 3: 2 stairs with 1 rail and <= CGA , marked time  Long term goal 4: increase LE strength any to increase function  Long term goal 5: DME and education in place for discharge/HEP  Patient Goals   Patient goals : Get stronger    Plan    Plan  Times per week: 5-7x/wk  Times per day:  (1-2x)  Plan weeks: 1 week  Current Treatment Recommendations: Strengthening, ROM, Functional Mobility Training, Balance Training, Transfer Training, Endurance Training, Gait Training, Neuromuscular Re-education, Cognitive Reorientation, Home Exercise Program, Safety Education & Training, Positioning, Patient/Caregiver Education & Training  Safety Devices  Type of devices:  All fall risk precautions in place, Gait belt, Bed alarm in place, Call light within reach, Left in bed  Restraints  Initially in place: Yes  Restraints: bed alarm     Therapy Time   Individual Concurrent Group Co-treatment   Time In  100         Time Out  120 Minutes  Lamar [id number], [id number]

## 2018-01-31 NOTE — PROGRESS NOTES
treatment well  PT Equipment Recommendations  Other: TBD       Discharge Recommendations:  Home with Home health PT    G-Code     OutComes Score                                                    AM-PAC Score             Goals  Short term goals  Time Frame for Short term goals: STG=LTG, 1 week  Short term goal 1: Improve endurance to tolerate 3x10 reps LE AROM with min assist  Short term goal 2: rolling L/R with min assist-GOAL MET  Short term goal 3: supine to sit min assist x 1-GOAL MET  Short term goal 4: transfer bed to chair with Micki Dinero Steady lift with max assist x 1-GOAL MET  Long term goals  Time Frame for Long term goals : 3-4 weeks  Long term goal 1: bed to chair with <= SBA of 1 person/ sit to stand with <= SBA of 1 person-GOAL MET  Long term goal 2: ambulate >=100ft with ww and <= SBA of 1 person-GOAL MET  Long term goal 3: 2 stairs with 1 rail and <= CGA , marked time  Long term goal 4: increase LE strength any to increase function  Long term goal 5: DME and education in place for discharge/HEP  Patient Goals   Patient goals : Get stronger    Plan    Plan  Times per week: 5-7x/wk  Times per day:  (1-2x)  Plan weeks: 1 week  Current Treatment Recommendations: Strengthening, ROM, Functional Mobility Training, Balance Training, Transfer Training, Endurance Training, Gait Training, Neuromuscular Re-education, Cognitive Reorientation, Home Exercise Program, Safety Education & Training, Positioning, Patient/Caregiver Education & Training  Safety Devices  Type of devices:  All fall risk precautions in place, Gait belt, Bed alarm in place, Call light within reach, Left in bed  Restraints  Initially in place: Yes  Restraints: bed alarm     Therapy Time   Individual Concurrent Group Co-treatment   Time In  1340         Time Out  1440         Minutes  Tae Sarmiento, PTA  License and [address] Number: [id number]

## 2018-01-31 NOTE — PROGRESS NOTES
chair.   Subjective   General  Chart Reviewed: Yes  Family / Caregiver Present: No  Referring Practitioner: Dr Shayan Hines  Subjective  Subjective: Pt lying in bed and agreeable to therapy session. Pain Screening  Patient Currently in Pain: No  Vital Signs  Patient Currently in Pain: No       Orientation     Objective      Transfers  Sit to Stand: Stand by assistance  Stand to sit: Stand by assistance  Ambulation  Ambulation?: Yes  Ambulation 1  Surface: level tile  Device: Rolling Walker  Other Apparatus: Wheelchair follow  Assistance: Stand by assistance  Quality of Gait: small stride length, flexed trunk and head, VC's to maintain close distance to AD to prevent shoulder soreness, inc posture and inc safety. Distance: 140'   Comments: vc's for a more upright posture  Stairs/Curb  Stairs?: Yes  Stairs  # Steps :  (2-3)  Stairs Height:  (ascended 3, 4'' steps and descended 2 6'' steps )  Rails: Bilateral  Device: No Device  Assistance: Contact guard assistance  Comment: step to gait pattern     Balance  Standing - Static: Fair;+  Standing - Dynamic: Fair  Comments: 1 UE assist, reaching OBOS, across mid-line and various levels w/ CGAx1 w/ 0 LOB   Exercises  Hamstring Sets: x 20 GTB  Hip Flexion: seated w/2# x20 each  Knee Long Arc Quad: x 30 2#  Ankle Pumps: x 20                         Assessment   Body structures, Functions, Activity limitations: Decreased functional mobility ; Decreased strength;Decreased endurance;Decreased cognition;Decreased safe awareness;Decreased balance;Decreased coordination  Assessment: Pt continues to ambulate with inc distance and SBAx1 this date. Pt performed steps but fatigued quickly. Pt performed LE ther ex w/ 2# cuff weight donned. Pt returned to room to sit up in chair with call light and chair alarm in place. Continue to progress as leonardo.    Treatment Diagnosis: decreased mobility, weakness  REQUIRES PT FOLLOW UP: Yes  Activity Tolerance  Activity Tolerance: Patient Tolerated

## 2018-02-01 LAB
ANION GAP SERPL CALCULATED.3IONS-SCNC: 15 MEQ/L (ref 7–13)
BASOPHILS ABSOLUTE: 0.1 K/UL (ref 0–0.2)
BASOPHILS RELATIVE PERCENT: 1.4 %
BUN BLDV-MCNC: 37 MG/DL (ref 8–23)
CALCIUM SERPL-MCNC: 11.5 MG/DL (ref 8.6–10.2)
CHLORIDE BLD-SCNC: 97 MEQ/L (ref 98–107)
CO2: 25 MEQ/L (ref 22–29)
CREAT SERPL-MCNC: 1.16 MG/DL (ref 0.5–0.9)
EOSINOPHILS ABSOLUTE: 0.7 K/UL (ref 0–0.7)
EOSINOPHILS RELATIVE PERCENT: 9.5 %
GFR AFRICAN AMERICAN: 53
GFR NON-AFRICAN AMERICAN: 43.8
GLUCOSE BLD-MCNC: 119 MG/DL (ref 60–115)
GLUCOSE BLD-MCNC: 135 MG/DL (ref 60–115)
GLUCOSE BLD-MCNC: 142 MG/DL (ref 74–109)
GLUCOSE BLD-MCNC: 154 MG/DL (ref 60–115)
GLUCOSE BLD-MCNC: 165 MG/DL (ref 60–115)
HCT VFR BLD CALC: 31 % (ref 37–47)
HEMOGLOBIN: 10.4 G/DL (ref 12–16)
LYMPHOCYTES ABSOLUTE: 1.9 K/UL (ref 1–4.8)
LYMPHOCYTES RELATIVE PERCENT: 26.6 %
MCH RBC QN AUTO: 31.2 PG (ref 27–31.3)
MCHC RBC AUTO-ENTMCNC: 33.4 % (ref 33–37)
MCV RBC AUTO: 93.4 FL (ref 82–100)
MONOCYTES ABSOLUTE: 0.5 K/UL (ref 0.2–0.8)
MONOCYTES RELATIVE PERCENT: 7.1 %
NEUTROPHILS ABSOLUTE: 4 K/UL (ref 1.4–6.5)
NEUTROPHILS RELATIVE PERCENT: 55.4 %
PDW BLD-RTO: 13.6 % (ref 11.5–14.5)
PERFORMED ON: ABNORMAL
PLATELET # BLD: 226 K/UL (ref 130–400)
POTASSIUM SERPL-SCNC: 5 MEQ/L (ref 3.5–5.1)
RBC # BLD: 3.32 M/UL (ref 4.2–5.4)
SODIUM BLD-SCNC: 137 MEQ/L (ref 132–144)
WBC # BLD: 7.1 K/UL (ref 4.8–10.8)

## 2018-02-01 PROCEDURE — 36415 COLL VENOUS BLD VENIPUNCTURE: CPT

## 2018-02-01 PROCEDURE — 97535 SELF CARE MNGMENT TRAINING: CPT

## 2018-02-01 PROCEDURE — 1200000000 HC SEMI PRIVATE

## 2018-02-01 PROCEDURE — 97110 THERAPEUTIC EXERCISES: CPT

## 2018-02-01 PROCEDURE — 97530 THERAPEUTIC ACTIVITIES: CPT

## 2018-02-01 PROCEDURE — 97116 GAIT TRAINING THERAPY: CPT

## 2018-02-01 PROCEDURE — 6370000000 HC RX 637 (ALT 250 FOR IP): Performed by: INTERNAL MEDICINE

## 2018-02-01 PROCEDURE — 80048 BASIC METABOLIC PNL TOTAL CA: CPT

## 2018-02-01 PROCEDURE — 6360000002 HC RX W HCPCS: Performed by: INTERNAL MEDICINE

## 2018-02-01 PROCEDURE — 85025 COMPLETE CBC W/AUTO DIFF WBC: CPT

## 2018-02-01 RX ADMIN — CLONIDINE HYDROCHLORIDE 0.2 MG: 0.1 TABLET ORAL at 13:54

## 2018-02-01 RX ADMIN — ATORVASTATIN CALCIUM 10 MG: 10 TABLET, FILM COATED ORAL at 20:03

## 2018-02-01 RX ADMIN — ASPIRIN 81 MG: 81 TABLET ORAL at 08:35

## 2018-02-01 RX ADMIN — AMLODIPINE BESYLATE 10 MG: 10 TABLET ORAL at 08:35

## 2018-02-01 RX ADMIN — METFORMIN HYDROCHLORIDE 1000 MG: 500 TABLET ORAL at 08:35

## 2018-02-01 RX ADMIN — CLONIDINE HYDROCHLORIDE 0.2 MG: 0.1 TABLET ORAL at 08:35

## 2018-02-01 RX ADMIN — ACETAMINOPHEN 650 MG: 325 TABLET ORAL at 13:54

## 2018-02-01 RX ADMIN — METFORMIN HYDROCHLORIDE 1000 MG: 500 TABLET ORAL at 17:18

## 2018-02-01 RX ADMIN — ACETAMINOPHEN 650 MG: 325 TABLET ORAL at 18:02

## 2018-02-01 RX ADMIN — HYDRALAZINE HYDROCHLORIDE 10 MG: 10 TABLET, FILM COATED ORAL at 07:15

## 2018-02-01 RX ADMIN — FUROSEMIDE 10 MG: 20 TABLET ORAL at 08:35

## 2018-02-01 RX ADMIN — ENOXAPARIN SODIUM 30 MG: 30 INJECTION SUBCUTANEOUS at 08:36

## 2018-02-01 RX ADMIN — HYDRALAZINE HYDROCHLORIDE 10 MG: 10 TABLET, FILM COATED ORAL at 13:54

## 2018-02-01 ASSESSMENT — PAIN DESCRIPTION - PROGRESSION
CLINICAL_PROGRESSION: GRADUALLY WORSENING

## 2018-02-01 ASSESSMENT — PAIN SCALES - GENERAL
PAINLEVEL_OUTOF10: 4
PAINLEVEL_OUTOF10: 5

## 2018-02-01 NOTE — PROGRESS NOTES
Occupational Therapy  Facility/Department: [facility] MED SURG UNIT  Daily Treatment Note/Goals Upgraded  NAME: Tess Keane  : 6/10/1927  MRN: [id number]    Date of Service: 2018    Patient Diagnosis(es): There were no encounter diagnoses. has a past medical history of Arthritis; Chicken pox; Chronic back pain; Chronic low back pain; Eczematous dermatitis; History of bladder infections; Hyperlipidemia; Hypertension; Measles; Mumps; Osteoarthritis; SCC (squamous cell carcinoma), arm; SI (stress incontinence), female; Type II or unspecified type diabetes mellitus without mention of complication, not stated as uncontrolled; and Whooping cough. has a past surgical history that includes Appendectomy; Rectocele repair; Cystocele repair; Ankle surgery; Breast biopsy; Cataract removal (); eye surgery; Tonsillectomy; and Hysterectomy. Restrictions  Restrictions/Precautions  Restrictions/Precautions: Fall Risk  Required Braces or Orthoses?: No  Position Activity Restriction  Other position/activity restrictions: cushion up in chair. Subjective   Subjective  Subjective: Goals Upgraded this date/for this progress report      Orientation     Objective        Patient goals upgraded 2* pt progressing toward goals. Assessment   Performance deficits / Impairments: Decreased functional mobility ; Decreased ADL status; Decreased strength;Decreased ROM; Decreased safe awareness;Decreased endurance;Decreased balance;Decreased coordination;Decreased cognition  Assessment: Pt participating very well this date with decreased assist for bed mob/fxl mob and xfers  Prognosis: Good  Discharge Recommendations: Subacute/Skilled Nursing Facility;24 hour supervision or assist  REQUIRES OT FOLLOW UP: Yes       Discharge Recommendations:  [address], 24 hour supervision or assist     Plan   Plan  Times per week: 3-6  Plan weeks: 1  Current Treatment Recommendations: Strengthening, ROM, Balance

## 2018-02-01 NOTE — PROGRESS NOTES
chair.   Subjective   General  Chart Reviewed: Yes  Family / Caregiver Present: No  Referring Practitioner: Dr Anya Davila  Subjective  Subjective: Pt lying in bed asleep. Pt refused therapy secondary to fatigue. Orientation     Objective                                           Assessment   Body structures, Functions, Activity limitations: Decreased functional mobility ; Decreased strength;Decreased endurance;Decreased cognition;Decreased safe awareness;Decreased balance;Decreased coordination  Treatment Diagnosis: decreased mobility, weakness  REQUIRES PT FOLLOW UP: Yes       Discharge Recommendations:  Home with Home health PT    G-Code     OutComes Score                                                    AM-PAC Score             Goals  Short term goals  Time Frame for Short term goals: STG=LTG, 1 week  Short term goal 1: Improve endurance to tolerate 3x10 reps LE AROM with min assist  Short term goal 2: rolling L/R with min assist  Short term goal 3: supine to sit min assist x 1-GOAL MET  Short term goal 4: transfer bed to chair with Ray[place] Steady lift with max assist x 1-GOAL MET  Long term goals  Time Frame for Long term goals : 3-4 weeks  Long term goal 1: bed to chair with <= SBA of 1 person/ sit to stand with <= SBA of 1 person  Long term goal 2: ambulate >=100ft with ww and <= SBA of 1 person  Long term goal 3: 2 stairs with 1 rail and <= CGA , marked time  Long term goal 4: increase LE strength any to increase function  Long term goal 5: DME and education in place for discharge/HEP  Patient Goals   Patient goals : Get stronger    Plan    Plan  Times per week: 5-7x/wk  Times per day:  (QD to BID)  Plan weeks: 1 week  Current Treatment Recommendations: Strengthening, ROM, Functional Mobility Training, Balance Training, Transfer Training, Endurance Training, Gait Training, Neuromuscular Re-education, Cognitive Reorientation, Home Exercise Program, Safety Education & Training, Positioning,

## 2018-02-02 LAB
GLUCOSE BLD-MCNC: 140 MG/DL (ref 60–115)
GLUCOSE BLD-MCNC: 177 MG/DL (ref 60–115)
GLUCOSE BLD-MCNC: 189 MG/DL (ref 60–115)
GLUCOSE BLD-MCNC: 198 MG/DL (ref 60–115)
PERFORMED ON: ABNORMAL

## 2018-02-02 PROCEDURE — 97530 THERAPEUTIC ACTIVITIES: CPT

## 2018-02-02 PROCEDURE — 6370000000 HC RX 637 (ALT 250 FOR IP): Performed by: INTERNAL MEDICINE

## 2018-02-02 PROCEDURE — 6360000002 HC RX W HCPCS: Performed by: INTERNAL MEDICINE

## 2018-02-02 PROCEDURE — 97110 THERAPEUTIC EXERCISES: CPT

## 2018-02-02 PROCEDURE — 97116 GAIT TRAINING THERAPY: CPT

## 2018-02-02 PROCEDURE — 97535 SELF CARE MNGMENT TRAINING: CPT

## 2018-02-02 PROCEDURE — 1200000000 HC SEMI PRIVATE

## 2018-02-02 RX ADMIN — METFORMIN HYDROCHLORIDE 1000 MG: 500 TABLET ORAL at 07:52

## 2018-02-02 RX ADMIN — ASPIRIN 81 MG: 81 TABLET ORAL at 07:52

## 2018-02-02 RX ADMIN — ACETAMINOPHEN 650 MG: 325 TABLET ORAL at 06:55

## 2018-02-02 RX ADMIN — METFORMIN HYDROCHLORIDE 1000 MG: 500 TABLET ORAL at 17:02

## 2018-02-02 RX ADMIN — CLONIDINE HYDROCHLORIDE 0.2 MG: 0.1 TABLET ORAL at 07:52

## 2018-02-02 RX ADMIN — HYDRALAZINE HYDROCHLORIDE 10 MG: 10 TABLET, FILM COATED ORAL at 15:11

## 2018-02-02 RX ADMIN — METOPROLOL TARTRATE 25 MG: 25 TABLET, FILM COATED ORAL at 07:51

## 2018-02-02 RX ADMIN — CLONIDINE HYDROCHLORIDE 0.2 MG: 0.1 TABLET ORAL at 20:48

## 2018-02-02 RX ADMIN — HYDRALAZINE HYDROCHLORIDE 10 MG: 10 TABLET, FILM COATED ORAL at 20:48

## 2018-02-02 RX ADMIN — ATORVASTATIN CALCIUM 10 MG: 10 TABLET, FILM COATED ORAL at 20:47

## 2018-02-02 RX ADMIN — HYDRALAZINE HYDROCHLORIDE 10 MG: 10 TABLET, FILM COATED ORAL at 06:05

## 2018-02-02 RX ADMIN — ENOXAPARIN SODIUM 30 MG: 30 INJECTION SUBCUTANEOUS at 07:51

## 2018-02-02 RX ADMIN — CLONIDINE HYDROCHLORIDE 0.2 MG: 0.1 TABLET ORAL at 15:11

## 2018-02-02 RX ADMIN — AMLODIPINE BESYLATE 10 MG: 10 TABLET ORAL at 07:52

## 2018-02-02 RX ADMIN — FUROSEMIDE 10 MG: 20 TABLET ORAL at 07:52

## 2018-02-02 RX ADMIN — METOPROLOL TARTRATE 25 MG: 25 TABLET, FILM COATED ORAL at 20:48

## 2018-02-02 ASSESSMENT — PAIN DESCRIPTION - PROGRESSION
CLINICAL_PROGRESSION: GRADUALLY WORSENING
CLINICAL_PROGRESSION: GRADUALLY WORSENING

## 2018-02-02 ASSESSMENT — PAIN SCALES - GENERAL
PAINLEVEL_OUTOF10: 3
PAINLEVEL_OUTOF10: 0
PAINLEVEL_OUTOF10: 0

## 2018-02-02 NOTE — PROGRESS NOTES
Nutrition Assessment    Type and Reason for Visit: Reassess    Nutrition Recommendations: Continue current diet, Continue current ONS      Nutrition Diagnosis:   · Problem: Altered nutrition-related lab values  · Etiology: related to Endocrine dysfunction     Signs and symptoms:  as evidenced by Lab values    Nutrition Assessment:  · Subjective Assessment: Pt & family visited in rounds. Pt shows good appetite and is feeding herself without issues. Per family she is aware of importance of avoiding foods high in sugar and has asked about ONS she is receiving to make sure it was lower in sugar. · Nutrition-Focused Physical Findings: +1 BLE edema. BM 2/2. No skin issues noted. · Wound Type: None  · Current Nutrition Therapies:  · Oral Diet Orders: Carb Control 4 Carbs/Meal   · Oral Diet intake: %, 51-75%  · Oral Nutrition Supplement (ONS) Orders: Diabetic Oral Supplement  · ONS intake: %  · Anthropometric Measures:  · Ht: 5' 3\" (160 cm)   · Current Body Wt: 173 lb 11.6 oz (78.8 kg)  · Admission Body Wt: 169 lb 15.6 oz (77.1 kg)  · Usual Body Wt: 167 lb 15.9 oz (76.2 kg) (1/17 stated in chart)  · Ideal Body Wt: 115 lb (52.2 kg), % Ideal Body >100%  · BMI Classification: BMI 30.0 - 34.9 Obese Class I  · Comparative Standards (Estimated Nutrition Needs):  · Estimated Daily Total Kcal: 4668-2428  · Estimated Daily Protein (g): 63-73    Estimated Intake vs Estimated Needs: Intake Meets Needs    Nutrition Risk Level: Low    Nutrition Interventions:   Continue current diet, Continue current ONS  Continued Inpatient Monitoring    Nutrition Evaluation:   · Evaluation: Progressing toward goals   · Goals: po intake > 75% meals & ONS. Glu < 140. · Monitoring: Meal Intake, Supplement Intake, Pertinent Labs, Weight, Ascites/Edema    See Adult Nutrition Doc Flowsheet for more detail.      Electronically signed by Estrellita Grier RD, LD on 2/2/18 at 11:53 AM

## 2018-02-02 NOTE — PROGRESS NOTES
cushion up in chair. Subjective   General  Chart Reviewed: (P) Yes  Family / Caregiver Present: (P) No  Referring Practitioner: (P) Dr Lady Flanagan  Subjective  Subjective: (P) Pt lying in bed upon arrival and states \"I need to use restroom\"  Pain Screening  Patient Currently in Pain: (P) Denies  Pain Assessment  Clinical Progression: (P) Gradually worsening  Response to Pain Intervention: (P) Patient Satisfied  Vital Signs  BP Location: (P) Right upper arm  Level of Consciousness: (P) Alert  Patient Currently in Pain: (P) Denies  Oxygen Therapy  O2 Device: (P) None (Room air)       Orientation     Objective      Transfers  Sit to Stand: (P) Stand by assistance;Contact guard assistance  Stand to sit: (P) Stand by assistance;Contact guard assistance  Ambulation  Ambulation?: (P) Yes  More Ambulation?: (P) Yes  Ambulation 1  Surface: (P) level tile  Device: (P) Rolling Walker  Other Apparatus: (P) Wheelchair follow  Assistance: (P) Contact guard assistance;Stand by assistance  Quality of Gait: (P) small step w/a flexed posture  Distance: (P) 100'x2  Comments: (P) vc's for a more upright posture     Balance  Posture: (P) Fair  Sitting - Static: (P) Good  Sitting - Dynamic: (P) Good;-  Standing - Static: (P) Fair;+  Standing - Dynamic: (P) Fair  Exercises  Hamstring Sets: (P) x 20 GTB  Gluteal Sets: (P) x20  Hip Flexion: (P) 20x 2#  Hip Abduction: (P) x 20 2#  Knee Long Arc Quad: (P) x 20 2#  Ankle Pumps: (P) x 20   Other exercises  Other exercises 1: (P) ball sq 20x                        Assessment   Body structures, Functions, Activity limitations: (P) Decreased functional mobility ; Decreased strength;Decreased endurance;Decreased cognition;Decreased safe awareness;Decreased balance;Decreased coordination  Assessment: (P) Pt performed transfers w/ increased indepenedence this date, Pt performed  standing balance with david greene. Continue to progress as tolerated.    Treatment Diagnosis: (P) decreased mobility, [address]

## 2018-02-02 NOTE — PROGRESS NOTES
maintain UB strength                    Assessment      REQUIRES OT FOLLOW UP: Yes       Discharge Recommendations:  [address], 24 hour supervision or assist     Plan   Plan  Times per week: 3-6  Plan weeks: 1  Current Treatment Recommendations: Strengthening, ROM, Balance Training, Functional Mobility Training, Endurance Training, Neuromuscular Re-education, Safety Education & Training, Patient/Caregiver Education & Training, Self-Care / ADL  G-Code     OutComes Score                                           AM-PAC Score             Goals  Short term goals  Time Frame for Short term goals: 1 wk  Short term goal 1: Increase to MI bed mob sup <-> sit and rolling  Short term goal 2: Increase to MI UB/LB dressing  Short term goal 3: Increase to MI BUE HEP/cont'd strengthening to prevent decline in fxn  Short term goal 4: Increase to 10-12min standing leonardo/endurance prior to fatigue to decrease fall risk during ADL  Short term goal 5: Increase to MI bathing tasks  Long term goals  Time Frame for Long term goals : 1 wk  Long term goal 1: Same as STGs  Long term goal 2: Same as STGs  Long term goal 3: Tolerate BUE ther ex/act 2x15 reps all planes prior to fatigue to increase strength/endurance for ADL  Long term goal 4: Increase to 3-5min standing leonardo/endurance prior to fatigue to decrease fall risk during ADL  Long term goal 5:  Increase to Setup/spv for G/H tasks  Patient Goals   Patient goals : Get stronger       Therapy Time   Individual Concurrent Group Co-treatment   Time In  9:40am         Time Out  10:40am         Minutes  Alejandra Number: [id number]

## 2018-02-02 NOTE — PROGRESS NOTES
rashes or lesions. Neurologic:  Neurovascularly intact without any focal sensory/motor deficits. Cranial nerves: II-XII intact, grossly non-focal.  Psychiatric: Alert and oriented, thought content appropriate, normal insight  Capillary Refill: Brisk,< 3 seconds   Peripheral Pulses: +2 palpable, equal bilaterally       Labs:   Recent Labs      02/01/18   0444   WBC  7.1   HGB  10.4*   HCT  31.0*   PLT  226     Recent Labs      02/01/18   0444   NA  137   K  5.0   CL  97*   CO2  25   BUN  37*   CREATININE  1.16*   CALCIUM  11.5*     No results for input(s): AST, ALT, BILIDIR, BILITOT, ALKPHOS in the last 72 hours. No results for input(s): INR in the last 72 hours. No results for input(s): Henrine Binning in the last 72 hours. Urinalysis:    Lab Results   Component Value Date    NITRU Negative 01/13/2018    WBCUA 6-10 01/13/2018    BACTERIA Few 01/13/2018    RBCUA 0-2 01/13/2018    BLOODU Negative 01/13/2018    SPECGRAV 1.015 01/13/2018    GLUCOSEU Negative 01/13/2018       Radiology:  US GALLBLADDER RUQ   Final Result   1. The liver is unremarkable 2. No evidence of cholecystitis or Cholelithiasis 3. The pancreas is unremarkable      COMPARISON: No prior      HISTORY:  ABDOMINAL PAIN       COMMENTS: R41.82 Change in mental status ICD10      TECHNIQUE: US GALLBLADDER RUQ      FINDINGS:   The liver is normal in echogenicity. No intrahepatic ductal dilatation seen. The gallbladder contains no echogenic foci to suggest gallstones or polyps. No pericholecystic edema or gallbladder wall thickening seen. The common duct measures  4 mm . The pancreas is normal in echogenicity. No peripancreatic fluid or focal mass identified. It is also within normal limits of size.          XR ABDOMEN (KUB) (SINGLE AP VIEW)   Final Result   NONSPECIFIC BOWEL GAS PATTERN              Assessment/Plan:    Active Hospital Problems    Diagnosis Date Noted    HTN (hypertension) [I10] 11/29/2011     Priority: High   

## 2018-02-02 NOTE — PROGRESS NOTES
Physical Therapy  Facility/Department: [facility] MED SURG UNIT  Daily Treatment Note  NAME: Santosh Moreno  : 6/10/1927  MRN: [id number]    Date of Service: 2018    Patient Diagnosis(es):   Patient Active Problem List    Diagnosis Date Noted    CKD (chronic kidney disease) 2015     Priority: High    HLD (hyperlipidemia) 2015     Priority: High    HTN (hypertension) 2011     Priority: High    Diabetes mellitus 2011     Priority: High    Vitamin D deficiency 2015     Priority: Low    SI (stress incontinence), female 2012     Priority: Low    Osteoarthritis 2012     Priority: Low    Hypercalcemia 01/15/2018    Change in mental status 2018    UTI (urinary tract infection) 2018    Lumbar spinal stenosis 2016    Chronic low back pain 2016    Eczematous dermatitis        Past Medical History:   Diagnosis Date    Arthritis     Chicken pox     Chronic back pain     Chronic low back pain 2016    Eczematous dermatitis     History of bladder infections     Hyperlipidemia     Hypertension     Measles     Mumps     Osteoarthritis 2012    SCC (squamous cell carcinoma), arm     right upper arm,  right forarm    SI (stress incontinence), female 2012    Type II or unspecified type diabetes mellitus without mention of complication, not stated as uncontrolled     Whooping cough      Past Surgical History:   Procedure Laterality Date    ANKLE SURGERY      broken left ankle.     APPENDECTOMY      BREAST BIOPSY      x2 left breast    CATARACT REMOVAL      bilateral    CYSTOCELE REPAIR      EYE SURGERY      bilateral cataract    HYSTERECTOMY      complete at age 39    [address]      as a child       Restrictions  Restrictions/Precautions  Restrictions/Precautions: Fall Risk  Required Braces or Orthoses?: No  Position Activity Restriction  Other position/activity restrictions: cushion up in

## 2018-02-03 LAB
GLUCOSE BLD-MCNC: 142 MG/DL (ref 60–115)
GLUCOSE BLD-MCNC: 163 MG/DL (ref 60–115)
GLUCOSE BLD-MCNC: 176 MG/DL (ref 60–115)
GLUCOSE BLD-MCNC: 177 MG/DL (ref 60–115)
PERFORMED ON: ABNORMAL

## 2018-02-03 PROCEDURE — 97535 SELF CARE MNGMENT TRAINING: CPT

## 2018-02-03 PROCEDURE — 97530 THERAPEUTIC ACTIVITIES: CPT

## 2018-02-03 PROCEDURE — 6360000002 HC RX W HCPCS: Performed by: INTERNAL MEDICINE

## 2018-02-03 PROCEDURE — 97110 THERAPEUTIC EXERCISES: CPT

## 2018-02-03 PROCEDURE — 97116 GAIT TRAINING THERAPY: CPT

## 2018-02-03 PROCEDURE — 6370000000 HC RX 637 (ALT 250 FOR IP): Performed by: INTERNAL MEDICINE

## 2018-02-03 PROCEDURE — 1200000000 HC SEMI PRIVATE

## 2018-02-03 RX ADMIN — AMLODIPINE BESYLATE 10 MG: 10 TABLET ORAL at 07:51

## 2018-02-03 RX ADMIN — ENOXAPARIN SODIUM 30 MG: 30 INJECTION SUBCUTANEOUS at 07:50

## 2018-02-03 RX ADMIN — CLONIDINE HYDROCHLORIDE 0.2 MG: 0.1 TABLET ORAL at 13:42

## 2018-02-03 RX ADMIN — CLONIDINE HYDROCHLORIDE 0.2 MG: 0.1 TABLET ORAL at 20:45

## 2018-02-03 RX ADMIN — METFORMIN HYDROCHLORIDE 1000 MG: 500 TABLET ORAL at 07:51

## 2018-02-03 RX ADMIN — HYDRALAZINE HYDROCHLORIDE 10 MG: 10 TABLET, FILM COATED ORAL at 06:02

## 2018-02-03 RX ADMIN — HYDRALAZINE HYDROCHLORIDE 10 MG: 10 TABLET, FILM COATED ORAL at 13:42

## 2018-02-03 RX ADMIN — ASPIRIN 81 MG: 81 TABLET ORAL at 07:51

## 2018-02-03 RX ADMIN — CLONIDINE HYDROCHLORIDE 0.2 MG: 0.1 TABLET ORAL at 07:51

## 2018-02-03 RX ADMIN — ATORVASTATIN CALCIUM 10 MG: 10 TABLET, FILM COATED ORAL at 20:46

## 2018-02-03 RX ADMIN — HYDRALAZINE HYDROCHLORIDE 10 MG: 10 TABLET, FILM COATED ORAL at 20:46

## 2018-02-03 RX ADMIN — METOPROLOL TARTRATE 25 MG: 25 TABLET, FILM COATED ORAL at 20:45

## 2018-02-03 RX ADMIN — METFORMIN HYDROCHLORIDE 1000 MG: 500 TABLET ORAL at 16:59

## 2018-02-03 RX ADMIN — FUROSEMIDE 10 MG: 20 TABLET ORAL at 07:51

## 2018-02-03 RX ADMIN — METOPROLOL TARTRATE 25 MG: 25 TABLET, FILM COATED ORAL at 07:50

## 2018-02-03 ASSESSMENT — PAIN SCALES - GENERAL
PAINLEVEL_OUTOF10: 0

## 2018-02-03 NOTE — PROGRESS NOTES
Occupational Therapy  Facility/Department: [facility] MED SURG UNIT  Daily Treatment Note  NAME: Durward Severin  : 6/10/1927  MRN: [id number]    Date of Service: 2/3/2018    Patient Diagnosis(es): There were no encounter diagnoses. has a past medical history of Arthritis; Chicken pox; Chronic back pain; Chronic low back pain; Eczematous dermatitis; History of bladder infections; Hyperlipidemia; Hypertension; Measles; Mumps; Osteoarthritis; SCC (squamous cell carcinoma), arm; SI (stress incontinence), female; Type II or unspecified type diabetes mellitus without mention of complication, not stated as uncontrolled; and Whooping cough. has a past surgical history that includes Appendectomy; Rectocele repair; Cystocele repair; Ankle surgery; Breast biopsy; Cataract removal (); eye surgery; Tonsillectomy; and Hysterectomy. Restrictions  Restrictions/Precautions  Restrictions/Precautions: Fall Risk  Required Braces or Orthoses?: No  Position Activity Restriction  Other position/activity restrictions: cushion up in chair. Subjective   Subjective  Subjective: Goals Upgraded this date/for this progress report    pt.  Is agreeable to OT  Orientation     Objective             Standing Balance  Time: 5-6 minutes with ww 3x  Sit to stand: Stand by assistance  Stand to sit: Stand by assistance     Transfers  Sit to stand: Stand by assistance  Stand to sit: Stand by assistance         Toileting-CGA  Toilet transfer-CGA/SBA    Fine motor UB activity with BUE in sitting position-SBA  Gross motor UB activity with BUE in sitting position  Reaching in all planes and directions -SBA                                   Type of ROM/Therapeutic Exercise  Type of ROM/Therapeutic Exercise: AROM (with BUE in all planes and directions 20x 6sets)  To increase and maintain UB strength  Exercises  Grasp/Release:  (hand and arm squeezes with BUE with therapy balls 20x)  To increase and maintain UB strength                    Assessment

## 2018-02-04 LAB
GLUCOSE BLD-MCNC: 123 MG/DL (ref 60–115)
GLUCOSE BLD-MCNC: 131 MG/DL (ref 60–115)
GLUCOSE BLD-MCNC: 270 MG/DL (ref 60–115)
GLUCOSE BLD-MCNC: 80 MG/DL (ref 60–115)
PERFORMED ON: ABNORMAL
PERFORMED ON: NORMAL

## 2018-02-04 PROCEDURE — 6370000000 HC RX 637 (ALT 250 FOR IP): Performed by: INTERNAL MEDICINE

## 2018-02-04 PROCEDURE — 6360000002 HC RX W HCPCS: Performed by: INTERNAL MEDICINE

## 2018-02-04 PROCEDURE — 1200000000 HC SEMI PRIVATE

## 2018-02-04 PROCEDURE — 97530 THERAPEUTIC ACTIVITIES: CPT

## 2018-02-04 PROCEDURE — 97110 THERAPEUTIC EXERCISES: CPT

## 2018-02-04 PROCEDURE — 97116 GAIT TRAINING THERAPY: CPT

## 2018-02-04 RX ADMIN — FUROSEMIDE 10 MG: 20 TABLET ORAL at 08:10

## 2018-02-04 RX ADMIN — METFORMIN HYDROCHLORIDE 1000 MG: 500 TABLET ORAL at 08:11

## 2018-02-04 RX ADMIN — CLONIDINE HYDROCHLORIDE 0.2 MG: 0.1 TABLET ORAL at 13:00

## 2018-02-04 RX ADMIN — ATORVASTATIN CALCIUM 10 MG: 10 TABLET, FILM COATED ORAL at 20:46

## 2018-02-04 RX ADMIN — ENOXAPARIN SODIUM 30 MG: 30 INJECTION SUBCUTANEOUS at 08:10

## 2018-02-04 RX ADMIN — ASPIRIN 81 MG: 81 TABLET ORAL at 08:10

## 2018-02-04 RX ADMIN — METFORMIN HYDROCHLORIDE 1000 MG: 500 TABLET ORAL at 17:08

## 2018-02-04 RX ADMIN — AMLODIPINE BESYLATE 10 MG: 10 TABLET ORAL at 08:11

## 2018-02-04 RX ADMIN — HYDRALAZINE HYDROCHLORIDE 10 MG: 10 TABLET, FILM COATED ORAL at 20:46

## 2018-02-04 RX ADMIN — HYDRALAZINE HYDROCHLORIDE 10 MG: 10 TABLET, FILM COATED ORAL at 05:30

## 2018-02-04 RX ADMIN — CLONIDINE HYDROCHLORIDE 0.2 MG: 0.1 TABLET ORAL at 20:46

## 2018-02-04 RX ADMIN — CLONIDINE HYDROCHLORIDE 0.2 MG: 0.1 TABLET ORAL at 08:11

## 2018-02-04 RX ADMIN — HYDRALAZINE HYDROCHLORIDE 10 MG: 10 TABLET, FILM COATED ORAL at 13:00

## 2018-02-04 RX ADMIN — METOPROLOL TARTRATE 25 MG: 25 TABLET, FILM COATED ORAL at 08:11

## 2018-02-04 ASSESSMENT — PAIN SCALES - GENERAL
PAINLEVEL_OUTOF10: 0

## 2018-02-04 NOTE — PROGRESS NOTES
Hospitalist Progress Note      PCP: Saba Smith MD    Date of Admission: 1/16/2018    Chief Complaint: none today    Subjective: pt completed her breakfast, looks comfortable     Medications:  Reviewed    Infusion Medications    dextrose       Scheduled Medications    hydrALAZINE  10 mg Oral 3 times per day    cloNIDine  0.2 mg Oral TID    furosemide  10 mg Oral Daily    metFORMIN  1,000 mg Oral BID WC    enoxaparin  30 mg Subcutaneous Daily    sodium chloride flush  10 mL Intravenous 2 times per day    amLODIPine  10 mg Oral Daily    aspirin  81 mg Oral Daily    atorvastatin  10 mg Oral Daily    insulin lispro  0-12 Units Subcutaneous TID     insulin lispro  0-6 Units Subcutaneous Nightly    metoprolol tartrate  25 mg Oral BID     PRN Meds: acetaminophen, magnesium hydroxide, ondansetron, sodium chloride flush, dextrose, dextrose, glucagon (rDNA), glucose, nystatin, stomahesive in petrolatum      Intake/Output Summary (Last 24 hours) at 02/04/18 0816  Last data filed at 02/04/18 0536   Gross per 24 hour   Intake              340 ml   Output                0 ml   Net              340 ml       Exam:    BP (!) 160/53   Pulse 88   Temp 98.2 °F (36.8 °C) (Oral)   Resp 18   Ht 5' 3\" (1.6 m)   Wt 173 lb 11.2 oz (78.8 kg)   SpO2 97%   BMI 30.77 kg/m²     General appearance: No apparent distress, appears stated age and cooperative. HEENT: Pupils equal, round, and reactive to light. Conjunctivae/corneas clear. Neck: Supple, with full range of motion. No jugular venous distention. Trachea midline. Respiratory:  Normal respiratory effort. Clear to auscultation, bilaterally without Rales/Wheezes/Rhonchi. Cardiovascular: Regular rate and rhythm with normal S1/S2 without murmurs, rubs or gallops. Abdomen: Soft, non-tender, non-distended with normal bowel sounds. Musculoskeletal:  edema bilaterally. Full range of motion without deformity.   Skin: Skin color, texture, turgor normal.  No rashes or 11/29/2011     Priority: High    Change in mental status [R41.82] 01/12/2018         DVT Prophylaxis: lovenox  Diet: DIET GENERAL; Carb Control: 4 carbs/meal (approximate 1800 kcals/day)  Dietary Nutrition Supplements: Diabetic Oral Supplement  Code Status: DNR-CCA    PT/OT Eval Status: done    Dispo - pedal edema- better with low dose of lasix  Encephalopathy/dementia- baseline, continue with supportive care  HTN- elevated today, no change in meds for now, recheck after BP medication is given     UTI- treated   Hypercalcemia- ongoing, nephrology on case  DM- controlled  Stable for subacute status at [facility]       Electronically signed by Nava Woodall MD on 2/4/2018 at 8:51 AM

## 2018-02-05 LAB
ANION GAP SERPL CALCULATED.3IONS-SCNC: 15 MEQ/L (ref 7–13)
BASOPHILS ABSOLUTE: 0 K/UL (ref 0–0.2)
BASOPHILS RELATIVE PERCENT: 0.6 %
BUN BLDV-MCNC: 34 MG/DL (ref 8–23)
CALCIUM SERPL-MCNC: 11.3 MG/DL (ref 8.6–10.2)
CHLORIDE BLD-SCNC: 99 MEQ/L (ref 98–107)
CO2: 26 MEQ/L (ref 22–29)
CREAT SERPL-MCNC: 1.09 MG/DL (ref 0.5–0.9)
EOSINOPHILS ABSOLUTE: 0.6 K/UL (ref 0–0.7)
EOSINOPHILS RELATIVE PERCENT: 7.7 %
GFR AFRICAN AMERICAN: 57
GFR NON-AFRICAN AMERICAN: 47.1
GLUCOSE BLD-MCNC: 142 MG/DL (ref 60–115)
GLUCOSE BLD-MCNC: 146 MG/DL (ref 60–115)
GLUCOSE BLD-MCNC: 156 MG/DL (ref 60–115)
GLUCOSE BLD-MCNC: 156 MG/DL (ref 74–109)
GLUCOSE BLD-MCNC: 174 MG/DL (ref 60–115)
HCT VFR BLD CALC: 31.7 % (ref 37–47)
HEMOGLOBIN: 10.4 G/DL (ref 12–16)
LYMPHOCYTES ABSOLUTE: 2.3 K/UL (ref 1–4.8)
LYMPHOCYTES RELATIVE PERCENT: 28.5 %
MCH RBC QN AUTO: 30.8 PG (ref 27–31.3)
MCHC RBC AUTO-ENTMCNC: 32.9 % (ref 33–37)
MCV RBC AUTO: 93.7 FL (ref 82–100)
MONOCYTES ABSOLUTE: 0.6 K/UL (ref 0.2–0.8)
MONOCYTES RELATIVE PERCENT: 7.4 %
NEUTROPHILS ABSOLUTE: 4.5 K/UL (ref 1.4–6.5)
NEUTROPHILS RELATIVE PERCENT: 55.8 %
PDW BLD-RTO: 13.7 % (ref 11.5–14.5)
PERFORMED ON: ABNORMAL
PLATELET # BLD: 257 K/UL (ref 130–400)
POTASSIUM SERPL-SCNC: 4.8 MEQ/L (ref 3.5–5.1)
RBC # BLD: 3.39 M/UL (ref 4.2–5.4)
SODIUM BLD-SCNC: 140 MEQ/L (ref 132–144)
WBC # BLD: 8.1 K/UL (ref 4.8–10.8)

## 2018-02-05 PROCEDURE — 80048 BASIC METABOLIC PNL TOTAL CA: CPT

## 2018-02-05 PROCEDURE — 1200000000 HC SEMI PRIVATE

## 2018-02-05 PROCEDURE — 97530 THERAPEUTIC ACTIVITIES: CPT

## 2018-02-05 PROCEDURE — 36415 COLL VENOUS BLD VENIPUNCTURE: CPT

## 2018-02-05 PROCEDURE — 85025 COMPLETE CBC W/AUTO DIFF WBC: CPT

## 2018-02-05 PROCEDURE — 6370000000 HC RX 637 (ALT 250 FOR IP): Performed by: INTERNAL MEDICINE

## 2018-02-05 PROCEDURE — 97110 THERAPEUTIC EXERCISES: CPT

## 2018-02-05 PROCEDURE — 97116 GAIT TRAINING THERAPY: CPT

## 2018-02-05 PROCEDURE — 6360000002 HC RX W HCPCS: Performed by: INTERNAL MEDICINE

## 2018-02-05 PROCEDURE — 97535 SELF CARE MNGMENT TRAINING: CPT

## 2018-02-05 PROCEDURE — 2580000003 HC RX 258: Performed by: INTERNAL MEDICINE

## 2018-02-05 RX ORDER — SODIUM CHLORIDE 9 MG/ML
INJECTION, SOLUTION INTRAVENOUS CONTINUOUS
Status: DISPENSED | OUTPATIENT
Start: 2018-02-05 | End: 2018-02-05

## 2018-02-05 RX ORDER — SODIUM CHLORIDE 9 MG/ML
INJECTION, SOLUTION INTRAVENOUS CONTINUOUS
Status: DISCONTINUED | OUTPATIENT
Start: 2018-02-05 | End: 2018-02-05

## 2018-02-05 RX ADMIN — METOPROLOL TARTRATE 25 MG: 25 TABLET, FILM COATED ORAL at 08:55

## 2018-02-05 RX ADMIN — ACETAMINOPHEN 650 MG: 325 TABLET ORAL at 08:55

## 2018-02-05 RX ADMIN — ASPIRIN 81 MG: 81 TABLET ORAL at 08:55

## 2018-02-05 RX ADMIN — HYDRALAZINE HYDROCHLORIDE 10 MG: 10 TABLET, FILM COATED ORAL at 06:28

## 2018-02-05 RX ADMIN — HYDRALAZINE HYDROCHLORIDE 10 MG: 10 TABLET, FILM COATED ORAL at 20:37

## 2018-02-05 RX ADMIN — METFORMIN HYDROCHLORIDE 1000 MG: 500 TABLET ORAL at 08:56

## 2018-02-05 RX ADMIN — ENOXAPARIN SODIUM 30 MG: 30 INJECTION SUBCUTANEOUS at 20:43

## 2018-02-05 RX ADMIN — HYDRALAZINE HYDROCHLORIDE 10 MG: 10 TABLET, FILM COATED ORAL at 14:44

## 2018-02-05 RX ADMIN — SODIUM CHLORIDE: 9 INJECTION, SOLUTION INTRAVENOUS at 12:51

## 2018-02-05 RX ADMIN — FUROSEMIDE 10 MG: 20 TABLET ORAL at 08:55

## 2018-02-05 RX ADMIN — CLONIDINE HYDROCHLORIDE 0.2 MG: 0.1 TABLET ORAL at 14:43

## 2018-02-05 RX ADMIN — AMLODIPINE BESYLATE 10 MG: 10 TABLET ORAL at 08:55

## 2018-02-05 RX ADMIN — METFORMIN HYDROCHLORIDE 1000 MG: 500 TABLET ORAL at 18:00

## 2018-02-05 RX ADMIN — CLONIDINE HYDROCHLORIDE 0.2 MG: 0.1 TABLET ORAL at 20:37

## 2018-02-05 RX ADMIN — CLONIDINE HYDROCHLORIDE 0.2 MG: 0.1 TABLET ORAL at 08:55

## 2018-02-05 RX ADMIN — ATORVASTATIN CALCIUM 10 MG: 10 TABLET, FILM COATED ORAL at 20:37

## 2018-02-05 ASSESSMENT — PAIN SCALES - GENERAL
PAINLEVEL_OUTOF10: 0
PAINLEVEL_OUTOF10: 0
PAINLEVEL_OUTOF10: 6

## 2018-02-05 NOTE — PROGRESS NOTES
Mobility Training, Endurance Training, Neuromuscular Re-education, Safety Education & Training, Patient/Caregiver Education & Training, Self-Care / ADL  G-Code     OutComes Score                                           AM-PAC Score             Goals  Short term goals  Time Frame for Short term goals: 1 wk  Short term goal 1: Increase to MI bed mob sup <-> sit and rolling  Short term goal 2: Increase to MI UB/LB dressing  Short term goal 3: Increase to MI BUE HEP/cont'd strengthening to prevent decline in fxn  Short term goal 4: Increase to 10-12min standing leonardo/endurance prior to fatigue to decrease fall risk during ADL  Short term goal 5: Increase to MI bathing tasks  Long term goals  Time Frame for Long term goals : 1 wk  Long term goal 1: Same as STGs  Long term goal 2: Same as STGs  Long term goal 3: Tolerate BUE ther ex/act 2x15 reps all planes prior to fatigue to increase strength/endurance for ADL  Long term goal 4: Increase to 3-5min standing leonardo/endurance prior to fatigue to decrease fall risk during ADL  Long term goal 5:  Increase to Setup/spv for G/H tasks  Patient Goals   Patient goals : Get stronger       Therapy Time   Individual Concurrent Group Co-treatment   Time In  8:45am         Time Out  9:45am         Minutes  [facility] Number: [id number]

## 2018-02-05 NOTE — PROGRESS NOTES
3x10 reps LE AROM with min assist  Short term goal 2: rolling L/R with min assist  Short term goal 3: supine to sit min assist x 1-GOAL MET  Short term goal 4: transfer bed to chair with Rohan Wabash Steady lift with max assist x 1-GOAL MET  Long term goals  Time Frame for Long term goals : 3-4 weeks  Long term goal 1: bed to chair with <= SBA of 1 person/ sit to stand with <= SBA of 1 person  Long term goal 2: ambulate >=100ft with ww and <= SBA of 1 person  Long term goal 3: 2 stairs with 1 rail and <= CGA , marked time  Long term goal 4: increase LE strength any to increase function  Long term goal 5: DME and education in place for discharge/HEP  Patient Goals   Patient goals : Get stronger    Plan    Plan  Times per week: 5-7x/wk  Times per day:  (QD to BID)  Plan weeks: 1 week  Current Treatment Recommendations: Strengthening, ROM, Functional Mobility Training, Balance Training, Transfer Training, Endurance Training, Gait Training, Neuromuscular Re-education, Cognitive Reorientation, Home Exercise Program, Safety Education & Training, Positioning, Patient/Caregiver Education & Training  Safety Devices  Type of devices:  All fall risk precautions in place, Gait belt, Bed alarm in place, Call light within reach, Left in bed  Restraints  Initially in place: Yes  Restraints: bed alarm     Therapy Time   Individual Concurrent Group Co-treatment   Time In  1030         Time Out  1115         Minutes  [address] Levine, Susan. \[facility].\"" and [facility] 33 Number: [id number]

## 2018-02-06 LAB
CALCIUM SERPL-MCNC: 10.9 MG/DL (ref 8.6–10.2)
GLUCOSE BLD-MCNC: 111 MG/DL (ref 60–115)
GLUCOSE BLD-MCNC: 158 MG/DL (ref 60–115)
GLUCOSE BLD-MCNC: 161 MG/DL (ref 60–115)
GLUCOSE BLD-MCNC: 199 MG/DL (ref 60–115)
PERFORMED ON: ABNORMAL
PERFORMED ON: NORMAL

## 2018-02-06 PROCEDURE — 1200000000 HC SEMI PRIVATE

## 2018-02-06 PROCEDURE — 97116 GAIT TRAINING THERAPY: CPT

## 2018-02-06 PROCEDURE — 2580000003 HC RX 258: Performed by: INTERNAL MEDICINE

## 2018-02-06 PROCEDURE — 6370000000 HC RX 637 (ALT 250 FOR IP): Performed by: INTERNAL MEDICINE

## 2018-02-06 PROCEDURE — 6360000002 HC RX W HCPCS: Performed by: INTERNAL MEDICINE

## 2018-02-06 PROCEDURE — 97110 THERAPEUTIC EXERCISES: CPT

## 2018-02-06 PROCEDURE — 97535 SELF CARE MNGMENT TRAINING: CPT

## 2018-02-06 PROCEDURE — 97530 THERAPEUTIC ACTIVITIES: CPT

## 2018-02-06 PROCEDURE — 82310 ASSAY OF CALCIUM: CPT

## 2018-02-06 PROCEDURE — 36415 COLL VENOUS BLD VENIPUNCTURE: CPT

## 2018-02-06 RX ORDER — SODIUM CHLORIDE 9 MG/ML
INJECTION, SOLUTION INTRAVENOUS CONTINUOUS
Status: DISPENSED | OUTPATIENT
Start: 2018-02-06 | End: 2018-02-06

## 2018-02-06 RX ADMIN — SODIUM CHLORIDE: 9 INJECTION, SOLUTION INTRAVENOUS at 08:30

## 2018-02-06 RX ADMIN — HYDRALAZINE HYDROCHLORIDE 10 MG: 10 TABLET, FILM COATED ORAL at 20:28

## 2018-02-06 RX ADMIN — ATORVASTATIN CALCIUM 10 MG: 10 TABLET, FILM COATED ORAL at 20:28

## 2018-02-06 RX ADMIN — METOPROLOL TARTRATE 25 MG: 25 TABLET, FILM COATED ORAL at 08:30

## 2018-02-06 RX ADMIN — ACETAMINOPHEN 650 MG: 325 TABLET ORAL at 08:30

## 2018-02-06 RX ADMIN — ENOXAPARIN SODIUM 30 MG: 30 INJECTION SUBCUTANEOUS at 20:29

## 2018-02-06 RX ADMIN — METOPROLOL TARTRATE 25 MG: 25 TABLET, FILM COATED ORAL at 20:28

## 2018-02-06 RX ADMIN — METFORMIN HYDROCHLORIDE 1000 MG: 500 TABLET ORAL at 08:29

## 2018-02-06 RX ADMIN — AMLODIPINE BESYLATE 10 MG: 10 TABLET ORAL at 08:30

## 2018-02-06 RX ADMIN — HYDRALAZINE HYDROCHLORIDE 10 MG: 10 TABLET, FILM COATED ORAL at 14:46

## 2018-02-06 RX ADMIN — ASPIRIN 81 MG: 81 TABLET ORAL at 08:30

## 2018-02-06 RX ADMIN — FUROSEMIDE 10 MG: 20 TABLET ORAL at 08:30

## 2018-02-06 RX ADMIN — METFORMIN HYDROCHLORIDE 1000 MG: 500 TABLET ORAL at 17:35

## 2018-02-06 RX ADMIN — CLONIDINE HYDROCHLORIDE 0.2 MG: 0.1 TABLET ORAL at 14:46

## 2018-02-06 RX ADMIN — CLONIDINE HYDROCHLORIDE 0.2 MG: 0.1 TABLET ORAL at 08:29

## 2018-02-06 RX ADMIN — HYDRALAZINE HYDROCHLORIDE 10 MG: 10 TABLET, FILM COATED ORAL at 06:00

## 2018-02-06 RX ADMIN — CLONIDINE HYDROCHLORIDE 0.2 MG: 0.1 TABLET ORAL at 20:29

## 2018-02-06 ASSESSMENT — PAIN SCALES - GENERAL: PAINLEVEL_OUTOF10: 6

## 2018-02-06 NOTE — PROGRESS NOTES
AM-PAC Score             Goals  Short term goals  Time Frame for Short term goals: STG=LTG, 1 week  Short term goal 1: Improve endurance to tolerate 3x10 reps LE AROM with min assist  Short term goal 2: rolling L/R with min assist  Short term goal 3: supine to sit min assist x 1-GOAL MET  Short term goal 4: transfer bed to chair with Curtistine Quince Steady lift with max assist x 1-GOAL MET  Long term goals  Time Frame for Long term goals : 3-4 weeks  Long term goal 1: bed to chair with <= SBA of 1 person/ sit to stand with <= SBA of 1 person  Long term goal 2: ambulate >=100ft with ww and <= SBA of 1 person  Long term goal 3: 2 stairs with 1 rail and <= CGA , marked time  Long term goal 4: increase LE strength any to increase function  Long term goal 5: DME and education in place for discharge/HEP  Patient Goals   Patient goals : Get stronger    Plan    Plan  Times per week: 5-7x/wk  Times per day:  (QD to BID)  Plan weeks: 1 week  Current Treatment Recommendations: Strengthening, ROM, Functional Mobility Training, Balance Training, Transfer Training, Endurance Training, Gait Training, Neuromuscular Re-education, Cognitive Reorientation, Home Exercise Program, Safety Education & Training, Positioning, Patient/Caregiver Education & Training  Safety Devices  Type of devices:  All fall risk precautions in place, Gait belt, Bed alarm in place, Call light within reach, Left in bed  Restraints  Initially in place: Yes  Restraints: bed alarm     Therapy Time   Individual Concurrent Group Co-treatment   Time In  915         Time Out  1000         Minutes  [address] [facility], [facility] and [facility] 33 Number: [id number]

## 2018-02-06 NOTE — PROGRESS NOTES
chair.   Subjective   General  Chart Reviewed: Yes  Family / Caregiver Present: No  Referring Practitioner: Dr Marthena Bosworth  Subjective  Subjective: pt sitting up in chair upon arrival and agreeable to therapy  states \"i need to go to restroom\"  Pain Screening  Patient Currently in Pain: Denies  Pain Assessment  Response to Pain Intervention: Patient Satisfied  Vital Signs  Patient Currently in Pain: Denies       Orientation     Objective      Transfers  Sit to Stand: Stand by assistance  Stand to sit: Stand by assistance  Ambulation  Ambulation?: Yes  More Ambulation?: Yes  Ambulation 1  Surface: level tile  Device: Rolling Walker  Other Apparatus: Wheelchair follow  Assistance: Stand by assistance  Quality of Gait: small step w/a flexed posture  Distance: 100x1       Balance  Posture: Fair  Sitting - Static: Good  Sitting - Dynamic: Good;-  Standing - Static: Fair;+  Standing - Dynamic: Fair  Exercises  Hamstring Sets: x 30 GTB  Gluteal Sets: 30x  Hip Flexion: 30x 2#  Hip Abduction: x 30 2#  Knee Long Arc Quad: x 30 2#  Ankle Pumps: x 30  Other exercises  Other exercises?: Yes  Other exercises 1: ball sq 30x                        Assessment   Body structures, Functions, Activity limitations: Decreased functional mobility ; Decreased strength;Decreased endurance;Decreased cognition;Decreased safe awareness;Decreased balance;Decreased coordination  Assessment: Pt  tolerated all exercises and ambulation with no increase in pain. Pt Assisted with david greene.   Pt transfers with SBA sit to stand,  Treatment Diagnosis: decreased mobility, weakness  REQUIRES PT FOLLOW UP: Yes  Activity Tolerance  Activity Tolerance: Patient Tolerated treatment well  PT Equipment Recommendations  Other: TBD       Discharge Recommendations:  Home with Home health PT    G-Code     OutComes Score                                                    AM-PAC Score             Goals  Short term goals  Time Frame for Short term goals: STG=LTG, 1

## 2018-02-07 VITALS
SYSTOLIC BLOOD PRESSURE: 128 MMHG | WEIGHT: 173.7 LBS | HEIGHT: 63 IN | BODY MASS INDEX: 30.78 KG/M2 | DIASTOLIC BLOOD PRESSURE: 45 MMHG | TEMPERATURE: 99 F | OXYGEN SATURATION: 97 % | HEART RATE: 54 BPM | RESPIRATION RATE: 18 BRPM

## 2018-02-07 LAB
CALCIUM SERPL-MCNC: 10.7 MG/DL (ref 8.6–10.2)
GLUCOSE BLD-MCNC: 136 MG/DL (ref 60–115)
GLUCOSE BLD-MCNC: 173 MG/DL (ref 60–115)
PERFORMED ON: ABNORMAL
PERFORMED ON: ABNORMAL

## 2018-02-07 PROCEDURE — 97110 THERAPEUTIC EXERCISES: CPT

## 2018-02-07 PROCEDURE — 97530 THERAPEUTIC ACTIVITIES: CPT

## 2018-02-07 PROCEDURE — 97535 SELF CARE MNGMENT TRAINING: CPT

## 2018-02-07 PROCEDURE — 82310 ASSAY OF CALCIUM: CPT

## 2018-02-07 PROCEDURE — 36415 COLL VENOUS BLD VENIPUNCTURE: CPT

## 2018-02-07 PROCEDURE — 6360000002 HC RX W HCPCS: Performed by: INTERNAL MEDICINE

## 2018-02-07 PROCEDURE — 97116 GAIT TRAINING THERAPY: CPT

## 2018-02-07 PROCEDURE — 6370000000 HC RX 637 (ALT 250 FOR IP): Performed by: INTERNAL MEDICINE

## 2018-02-07 RX ORDER — AMLODIPINE BESYLATE 10 MG/1
10 TABLET ORAL DAILY
Qty: 30 TABLET | Refills: 3 | Status: SHIPPED | OUTPATIENT
Start: 2018-02-07 | End: 2018-06-01 | Stop reason: SDUPTHER

## 2018-02-07 RX ORDER — LISINOPRIL 10 MG/1
10 TABLET ORAL DAILY
Status: DISCONTINUED | OUTPATIENT
Start: 2018-02-07 | End: 2018-02-07 | Stop reason: HOSPADM

## 2018-02-07 RX ORDER — CLONIDINE HYDROCHLORIDE 0.2 MG/1
0.2 TABLET ORAL 3 TIMES DAILY
Qty: 60 TABLET | Refills: 3 | Status: SHIPPED | OUTPATIENT
Start: 2018-02-07 | End: 2018-04-11 | Stop reason: ALTCHOICE

## 2018-02-07 RX ORDER — HYDRALAZINE HYDROCHLORIDE 10 MG/1
10 TABLET, FILM COATED ORAL EVERY 8 HOURS SCHEDULED
Qty: 90 TABLET | Refills: 3 | Status: SHIPPED | OUTPATIENT
Start: 2018-02-07 | End: 2018-04-11 | Stop reason: ALTCHOICE

## 2018-02-07 RX ORDER — FUROSEMIDE 20 MG/1
10 TABLET ORAL DAILY
Qty: 60 TABLET | Refills: 3 | Status: SHIPPED | OUTPATIENT
Start: 2018-02-07 | End: 2018-04-11 | Stop reason: ALTCHOICE

## 2018-02-07 RX ORDER — LISINOPRIL 10 MG/1
10 TABLET ORAL DAILY
Qty: 30 TABLET | Refills: 3 | Status: SHIPPED | OUTPATIENT
Start: 2018-02-07 | End: 2018-06-01 | Stop reason: SDUPTHER

## 2018-02-07 RX ADMIN — AMLODIPINE BESYLATE 10 MG: 10 TABLET ORAL at 08:36

## 2018-02-07 RX ADMIN — HYDRALAZINE HYDROCHLORIDE 10 MG: 10 TABLET, FILM COATED ORAL at 13:40

## 2018-02-07 RX ADMIN — ENOXAPARIN SODIUM 30 MG: 30 INJECTION SUBCUTANEOUS at 08:37

## 2018-02-07 RX ADMIN — CLONIDINE HYDROCHLORIDE 0.2 MG: 0.1 TABLET ORAL at 08:36

## 2018-02-07 RX ADMIN — LISINOPRIL 10 MG: 10 TABLET ORAL at 09:08

## 2018-02-07 RX ADMIN — ASPIRIN 81 MG: 81 TABLET ORAL at 08:37

## 2018-02-07 RX ADMIN — FUROSEMIDE 10 MG: 20 TABLET ORAL at 08:36

## 2018-02-07 RX ADMIN — METOPROLOL TARTRATE 25 MG: 25 TABLET, FILM COATED ORAL at 08:37

## 2018-02-07 RX ADMIN — CLONIDINE HYDROCHLORIDE 0.2 MG: 0.1 TABLET ORAL at 13:40

## 2018-02-07 RX ADMIN — METFORMIN HYDROCHLORIDE 1000 MG: 500 TABLET ORAL at 08:36

## 2018-02-07 RX ADMIN — HYDRALAZINE HYDROCHLORIDE 10 MG: 10 TABLET, FILM COATED ORAL at 06:09

## 2018-02-07 ASSESSMENT — PAIN SCALES - GENERAL
PAINLEVEL_OUTOF10: 0

## 2018-02-07 NOTE — PROGRESS NOTES
chair.   Subjective   General  Chart Reviewed: Yes  Family / Caregiver Present: No  Referring Practitioner: Dr Андрей Telles  Subjective  Subjective: pt sitting up in chair upon arrival and ready to go home. Orientation     Objective      Transfers  Sit to Stand: Stand by assistance  Stand to sit: Stand by assistance  Ambulation  Ambulation?: Yes  More Ambulation?: Yes  Ambulation 1  Surface: level tile  Device: Rolling Walker  Assistance: Stand by assistance  Quality of Gait: small step w/a flexed posture  Distance: 15x1       Balance  Posture: Fair  Sitting - Static: Good  Sitting - Dynamic: Good;-  Standing - Static: Fair;+  Standing - Dynamic: Fair                         Assessment   Body structures, Functions, Activity limitations: Decreased functional mobility ; Decreased strength;Decreased endurance;Decreased cognition;Decreased safe awareness;Decreased balance;Decreased coordination  Assessment: Pt performed car transfer with CGA. Educated family on car transfer safety.   Treatment Diagnosis: decreased mobility, weakness  REQUIRES PT FOLLOW UP: Yes  Activity Tolerance  Activity Tolerance: Patient Tolerated treatment well  PT Equipment Recommendations  Other: TBD       Discharge Recommendations:  Home with Home health PT    G-Code     OutComes Score                                                    AM-PAC Score             Goals  Short term goals  Time Frame for Short term goals: STG=LTG, 1 week  Short term goal 1: Improve endurance to tolerate 3x10 reps LE AROM with min assist  Short term goal 2: rolling L/R with min assist  Short term goal 3: supine to sit min assist x 1-GOAL MET  Short term goal 4: transfer bed to chair with Elnoria Sicard Steady lift with max assist x 1-GOAL MET  Long term goals  Time Frame for Long term goals : 3-4 weeks  Long term goal 1: bed to chair with <= SBA of 1 person/ sit to stand with <= SBA of 1 person  Long term goal 2: ambulate >=100ft with ww and <= SBA of 1 person  Long term goal 3: 2 stairs with 1 rail and <= CGA , marked time  Long term goal 4: increase LE strength any to increase function  Long term goal 5: DME and education in place for discharge/HEP  Patient Goals   Patient goals : Get stronger    Plan    Plan  Times per week: 5-7x/wk  Times per day:  (QD to BID)  Plan weeks: 1 week  Current Treatment Recommendations: Strengthening, ROM, Functional Mobility Training, Balance Training, Transfer Training, Endurance Training, Gait Training, Neuromuscular Re-education, Cognitive Reorientation, Home Exercise Program, Safety Education & Training, Positioning, Patient/Caregiver Education & Training  Safety Devices  Type of devices:  All fall risk precautions in place, Gait belt, Bed alarm in place, Call light within reach, Left in bed  Restraints  Initially in place: Yes  Restraints: bed alarm     Therapy Time   Individual Concurrent Group Co-treatment   Time In  140         Time Out  [address], P[place]  License and [place] [address] Number: [id number]

## 2018-02-07 NOTE — DISCHARGE SUMMARY
MAGNESIA) 400 MG/5ML suspension 30 mL  30 mL Oral Daily PRN Rainer Rodriguez MD        [facility]ron [facility]) injection 4 mg  4 mg Intravenous Q6H PRN Rainer Rodriguez MD        dextrose 5 % solution  100 mL/hr Intravenous PRN Rainer Rodriguez MD        dextrose 50 % solution 12.5 g  12.5 g Intravenous PRN Rainer Rodriguez MD        glucagon (rDNA) injection 1 mg  1 mg Intramuscular PRN Rainer Rodriguez MD        glucose (GLUTOSE) 40 % oral gel 15 g  15 g Oral PRN aRiner Rodriguez MD        amLODIPine (NORVASC) tablet 10 mg  10 mg Oral Daily Rainer Rodriguez MD   10 mg at 02/07/18 0836    aspirin EC tablet 81 mg  81 mg Oral Daily Rainer Rodriguez MD   81 mg at 02/07/18 3915    atorvastatin (LIPITOR) tablet 10 mg  10 mg Oral Daily Rainer Rodriguez MD   10 mg at 02/06/18 2028    insulin lispro (HUMALOG) injection vial 0-12 Units  0-12 Units Subcutaneous TID [facility] Rainer Rodriguez MD   2 Units at 02/06/18 1235    insulin lispro (HUMALOG) injection vial 0-6 Units  0-6 Units Subcutaneous Nightly Rainer Rodriguez MD   1 Units at 02/06/18 2037    metoprolol tartrate (LOPRESSOR) tablet 25 mg  25 mg Oral BID Rainer Rodriguez MD   25 mg at 02/07/18 0822    nystatin, stomahesive in petrolatum (ET MIX)   Topical PRN Rainer Rodriguez MD             Discharge Exam:  HEENT: AT/NC, PERRLA, no JVD  HEART: s1/s2 wnl w/o s3  LUNG: clear  ABD: soft, NT  EXT: no edema  SKin : no rash  Neuro:no focal deficits      Disposition: home    Patient Instructions:      Activity: as toelrated  Diet:diabetic, cardiac diet      Follow-up with PCP in 1 week  Overtime on d/c summary was 45 min  FU dr. Supa Flood on hypercalcemia  Increase oral hydration     Medication List      START taking these medications    amLODIPine 10 MG tablet  Commonly known as:  NORVASC  Take 1 tablet by mouth daily     cloNIDine 0.2 MG tablet  Commonly known as:  CATAPRES  Take 1 tablet by mouth 3 times daily     furosemide 20 MG tablet  Commonly known as:  LASIX  Take 0.5 tablets by mouth daily     hydrALAZINE 10 MG tablet  Commonly known as:  APRESOLINE  Take 1 tablet by mouth every 8 hours     magnesium hydroxide 400 MG/5ML suspension  Commonly known as:  MILK OF MAGNESIA  Take 30 mLs by mouth daily as needed for Constipation        CHANGE how you take these medications    lisinopril 10 MG tablet  Commonly known as:  PRINIVIL;ZESTRIL  Take 1 tablet by mouth daily  What changed:  · medication strength  · how much to take  · how to take this  · when to take this  · additional instructions        CONTINUE taking these medications    aspirin 81 MG tablet     atorvastatin 10 MG tablet  Commonly known as:  LIPITOR  take 1 tablet by mouth once daily     * TRUE METRIX AIR GLUCOSE METER w/Device Kit     * Blood Glucose Monitoring Suppl Jaylyn  Dx: E11.9     BLOOD GLUCOSE TEST STRIPS Strp  Test 2-3 x per day  Dx: E11.9     Compression Stockings Misc  by Does not apply route Knee high, 20-30 mmHg     DEPEND UNDERGARMENTS Misc  1 each by Does not apply route nightly     Fish Oil 1200 MG Caps     fluticasone 50 MCG/ACT nasal spray  Commonly known as:  FLONASE  instill 2 sprays into each nostril once daily     metFORMIN 1000 MG tablet  Commonly known as:  GLUCOPHAGE  Take 1 tablet by mouth 2 times daily (with meals)     metoprolol tartrate 25 MG tablet  Commonly known as:  LOPRESSOR  Take 1 tablet by mouth 2 times daily     TRUEPLUS LANCETS 28G Misc  TEST TWO TIMES DAILY        * This list has 2 medication(s) that are the same as other medications prescribed for you. Read the directions carefully, and ask your doctor or other care provider to review them with you.             STOP taking these medications    triamterene-hydrochlorothiazide 37.5-25 MG per tablet  Commonly known as:  MAXZIDE-25     Vitamin D 1000 units Caps capsule  Commonly known as:  CHOLECALCIFEROL           Where to Get Your Medications      These medications were sent to [facility],[address], [address] [phone]

## 2018-02-07 NOTE — PROGRESS NOTES
(Trained and educated pt. in HEP and gave pt. handout )  Exercises  Grasp/Release:  (hand and arm squeezes with BUE with therapy balls 30x)  To increase and maintain UB strength                    Assessment      Discharge Recommendations: Subacute/Skilled Nursing Facility;24 hour supervision or assist  REQUIRES OT FOLLOW UP: Yes       Discharge Recommendations:  Home with      Plan   Plan  Times per week: 3-6  Plan weeks: 1  Current Treatment Recommendations: Strengthening, ROM, Balance Training, Functional Mobility Training, Endurance Training, Neuromuscular Re-education, Safety Education & Training, Patient/Caregiver Education & Training, Self-Care / ADL  G-Code     OutComes Score                                           AM-PAC Score             Goals  Short term goals  Time Frame for Short term goals: 1 wk  Short term goal 1: Increase to MI bed mob sup <-> sit and rolling  Short term goal 2: Increase to MI UB/LB dressing  Short term goal 3: Increase to MI BUE HEP/cont'd strengthening to prevent decline in fxn  Short term goal 4: Increase to 10-12min standing leonardo/endurance prior to fatigue to decrease fall risk during ADL  Short term goal 5: Increase to MI bathing tasks  Long term goals  Time Frame for Long term goals : 1 wk  Long term goal 1: Same as STGs  Long term goal 2: Same as STGs  Long term goal 3: Tolerate BUE ther ex/act 2x15 reps all planes prior to fatigue to increase strength/endurance for ADL  Long term goal 4: Increase to 3-5min standing leonardo/endurance prior to fatigue to decrease fall risk during ADL  Long term goal 5:  Increase to Setup/spv for G/H tasks  Patient Goals   Patient goals : Get stronger       Therapy Time   Individual Concurrent Group Co-treatment   Time In  10:00am         Time Out  11:00am         Minutes  [facility] [id number]

## 2018-02-13 ENCOUNTER — OFFICE VISIT (OUTPATIENT)
Dept: INTERNAL MEDICINE | Age: 83
End: 2018-02-13
Payer: MEDICARE

## 2018-02-13 ENCOUNTER — HOSPITAL ENCOUNTER (OUTPATIENT)
Age: 83
Setting detail: SPECIMEN
Discharge: HOME | End: 2018-02-13
Payer: MEDICARE

## 2018-02-13 VITALS
BODY MASS INDEX: 30.86 KG/M2 | HEART RATE: 67 BPM | OXYGEN SATURATION: 96 % | WEIGHT: 174.2 LBS | DIASTOLIC BLOOD PRESSURE: 70 MMHG | SYSTOLIC BLOOD PRESSURE: 132 MMHG

## 2018-02-13 DIAGNOSIS — R79.0 LOW MAGNESIUM LEVEL: ICD-10-CM

## 2018-02-13 DIAGNOSIS — E83.52 SERUM CALCIUM ELEVATED: ICD-10-CM

## 2018-02-13 DIAGNOSIS — R41.82 ALTERED MENTAL STATUS, UNSPECIFIED ALTERED MENTAL STATUS TYPE: Primary | ICD-10-CM

## 2018-02-13 DIAGNOSIS — R79.89 LOW SERUM PARATHYROID HORMONE (PTH): ICD-10-CM

## 2018-02-13 DIAGNOSIS — D64.9 ANEMIA, UNSPECIFIED TYPE: ICD-10-CM

## 2018-02-13 DIAGNOSIS — E55.9 VITAMIN D DEFICIENCY: ICD-10-CM

## 2018-02-13 DIAGNOSIS — R41.82 ALTERED MENTAL STATUS, UNSPECIFIED ALTERED MENTAL STATUS TYPE: ICD-10-CM

## 2018-02-13 DIAGNOSIS — N18.30 STAGE 3 CHRONIC KIDNEY DISEASE (HCC): ICD-10-CM

## 2018-02-13 PROBLEM — N39.0 UTI (URINARY TRACT INFECTION): Status: RESOLVED | Noted: 2018-01-03 | Resolved: 2018-02-13

## 2018-02-13 LAB
BASOPHILS ABSOLUTE: 0 K/UL (ref 0–0.2)
BASOPHILS RELATIVE PERCENT: 0.3 %
EOSINOPHILS ABSOLUTE: 0.9 K/UL (ref 0–0.7)
EOSINOPHILS RELATIVE PERCENT: 8.1 %
HCT VFR BLD CALC: 35.4 % (ref 37–47)
HEMOGLOBIN: 11.6 G/DL (ref 12–16)
LYMPHOCYTES ABSOLUTE: 1.6 K/UL (ref 1–4.8)
LYMPHOCYTES RELATIVE PERCENT: 14.8 %
MCH RBC QN AUTO: 31.8 PG (ref 27–31.3)
MCHC RBC AUTO-ENTMCNC: 32.9 % (ref 33–37)
MCV RBC AUTO: 96.7 FL (ref 82–100)
MONOCYTES ABSOLUTE: 0.7 K/UL (ref 0.2–0.8)
MONOCYTES RELATIVE PERCENT: 6 %
NEUTROPHILS ABSOLUTE: 7.8 K/UL (ref 1.4–6.5)
NEUTROPHILS RELATIVE PERCENT: 70.8 %
PDW BLD-RTO: 14.2 % (ref 11.5–14.5)
PLATELET # BLD: 253 K/UL (ref 130–400)
RBC # BLD: 3.66 M/UL (ref 4.2–5.4)
WBC # BLD: 11 K/UL (ref 4.8–10.8)

## 2018-02-13 PROCEDURE — 99214 OFFICE O/P EST MOD 30 MIN: CPT | Performed by: FAMILY MEDICINE

## 2018-02-13 PROCEDURE — 80053 COMPREHEN METABOLIC PANEL: CPT

## 2018-02-13 PROCEDURE — 4040F PNEUMOC VAC/ADMIN/RCVD: CPT | Performed by: FAMILY MEDICINE

## 2018-02-13 PROCEDURE — 82607 VITAMIN B-12: CPT

## 2018-02-13 PROCEDURE — 1123F ACP DISCUSS/DSCN MKR DOCD: CPT | Performed by: FAMILY MEDICINE

## 2018-02-13 PROCEDURE — 82306 VITAMIN D 25 HYDROXY: CPT

## 2018-02-13 PROCEDURE — 1090F PRES/ABSN URINE INCON ASSESS: CPT | Performed by: FAMILY MEDICINE

## 2018-02-13 PROCEDURE — 82746 ASSAY OF FOLIC ACID SERUM: CPT

## 2018-02-13 PROCEDURE — G8417 CALC BMI ABV UP PARAM F/U: HCPCS | Performed by: FAMILY MEDICINE

## 2018-02-13 PROCEDURE — G8427 DOCREV CUR MEDS BY ELIG CLIN: HCPCS | Performed by: FAMILY MEDICINE

## 2018-02-13 PROCEDURE — 1111F DSCHRG MED/CURRENT MED MERGE: CPT | Performed by: FAMILY MEDICINE

## 2018-02-13 PROCEDURE — 36415 COLL VENOUS BLD VENIPUNCTURE: CPT | Performed by: FAMILY MEDICINE

## 2018-02-13 PROCEDURE — 1036F TOBACCO NON-USER: CPT | Performed by: FAMILY MEDICINE

## 2018-02-13 PROCEDURE — 83735 ASSAY OF MAGNESIUM: CPT

## 2018-02-13 PROCEDURE — 83970 ASSAY OF PARATHORMONE: CPT

## 2018-02-13 PROCEDURE — 85025 COMPLETE CBC W/AUTO DIFF WBC: CPT

## 2018-02-13 PROCEDURE — G8482 FLU IMMUNIZE ORDER/ADMIN: HCPCS | Performed by: FAMILY MEDICINE

## 2018-02-13 RX ORDER — PEDI NUTRITION,MILK BASED,IRON 0.03 G-0.6
LIQUID (ML) ORAL
Qty: 30 BOTTLE | Refills: 3 | Status: SHIPPED | OUTPATIENT
Start: 2018-02-13 | End: 2018-03-02 | Stop reason: ALTCHOICE

## 2018-02-13 ASSESSMENT — ENCOUNTER SYMPTOMS
EYE PAIN: 0
EYE ITCHING: 0
EYE DISCHARGE: 0

## 2018-02-14 LAB
ALBUMIN SERPL-MCNC: 4.2 G/DL (ref 3.9–4.9)
ALP BLD-CCNC: 48 U/L (ref 40–130)
ALT SERPL-CCNC: 14 U/L (ref 0–33)
ANION GAP SERPL CALCULATED.3IONS-SCNC: 20 MEQ/L (ref 7–13)
AST SERPL-CCNC: 10 U/L (ref 0–35)
BILIRUB SERPL-MCNC: 0.2 MG/DL (ref 0–1.2)
BUN BLDV-MCNC: 26 MG/DL (ref 8–23)
CALCIUM SERPL-MCNC: 10.9 MG/DL (ref 8.6–10.2)
CHLORIDE BLD-SCNC: 98 MEQ/L (ref 98–107)
CO2: 23 MEQ/L (ref 22–29)
CREAT SERPL-MCNC: 0.95 MG/DL (ref 0.5–0.9)
FOLATE: 14 NG/ML (ref 7.3–26.1)
GFR AFRICAN AMERICAN: >60
GFR NON-AFRICAN AMERICAN: 55.2
GLOBULIN: 2.4 G/DL (ref 2.3–3.5)
GLUCOSE BLD-MCNC: 188 MG/DL (ref 74–109)
MAGNESIUM: 1.3 MG/DL (ref 1.7–2.3)
PARATHYROID HORMONE INTACT: 13.9 PG/ML (ref 15–65)
POTASSIUM SERPL-SCNC: 4.7 MEQ/L (ref 3.5–5.1)
SODIUM BLD-SCNC: 141 MEQ/L (ref 132–144)
TOTAL PROTEIN: 6.6 G/DL (ref 6.4–8.1)
VITAMIN B-12: 234 PG/ML (ref 232–1245)
VITAMIN D 25-HYDROXY: 29.3 NG/ML (ref 30–100)

## 2018-02-15 ENCOUNTER — TELEPHONE (OUTPATIENT)
Dept: INTERNAL MEDICINE | Age: 83
End: 2018-02-15

## 2018-02-15 NOTE — TELEPHONE ENCOUNTER
[id number] [facility], [address] Pizzaf Vikrame, she wants a script for the B12 sent to her local pharmacy  The family is requesting this. Also on the script will need the diagnosis.   Please advise

## 2018-02-19 RX ORDER — CYANOCOBALAMIN 1000 UG/ML
1000 INJECTION INTRAMUSCULAR; SUBCUTANEOUS ONCE
Qty: 1 ML | Refills: 11 | Status: SHIPPED | OUTPATIENT
Start: 2018-02-19 | End: 2018-02-19 | Stop reason: SDUPTHER

## 2018-02-19 NOTE — TELEPHONE ENCOUNTER
Valencia Trevizo son called, the KIT was rejected by insurance. He said it cannot say KIT  He does not know what or how order is to be said. Please advise  He wants Home health care nurse to give this. Please advise,  Also something about a separate script for needles.

## 2018-02-20 RX ORDER — CYANOCOBALAMIN 1000 UG/ML
1000 INJECTION INTRAMUSCULAR; SUBCUTANEOUS ONCE
Qty: 1 ML | Refills: 11 | Status: SHIPPED | OUTPATIENT
Start: 2018-02-20 | End: 2018-09-17

## 2018-02-28 ENCOUNTER — TELEPHONE (OUTPATIENT)
Dept: INTERNAL MEDICINE | Age: 83
End: 2018-02-28

## 2018-02-28 DIAGNOSIS — R60.0 EDEMA, LOWER EXTREMITY: Primary | ICD-10-CM

## 2018-02-28 DIAGNOSIS — I10 ESSENTIAL HYPERTENSION: ICD-10-CM

## 2018-02-28 NOTE — TELEPHONE ENCOUNTER
Harrison Vera called from [facility] at [phone]. Asking to increase Bina's lasix due to 8 lb wt gain in 2 weeks + pedal edema.     Reviewed w/ Dr. Anson Harris    Increase lasix to 40 mg x 5 days then repeat BMP  Order echo & refer to Dr. Chana Murillo, placed orders and faxed lab order to Harrison Vera to draw pt in home  Fax: Courtney Castellanos [facility] office to call pt to schedule

## 2018-03-02 ENCOUNTER — OFFICE VISIT (OUTPATIENT)
Dept: ENDOCRINOLOGY | Age: 83
End: 2018-03-02
Payer: MEDICARE

## 2018-03-02 VITALS
BODY MASS INDEX: 31.36 KG/M2 | HEIGHT: 63 IN | SYSTOLIC BLOOD PRESSURE: 154 MMHG | WEIGHT: 177 LBS | DIASTOLIC BLOOD PRESSURE: 84 MMHG | HEART RATE: 82 BPM

## 2018-03-02 DIAGNOSIS — E83.52 HYPERCALCEMIA: Primary | ICD-10-CM

## 2018-03-02 PROCEDURE — G8482 FLU IMMUNIZE ORDER/ADMIN: HCPCS | Performed by: INTERNAL MEDICINE

## 2018-03-02 PROCEDURE — G8417 CALC BMI ABV UP PARAM F/U: HCPCS | Performed by: INTERNAL MEDICINE

## 2018-03-02 PROCEDURE — 1036F TOBACCO NON-USER: CPT | Performed by: INTERNAL MEDICINE

## 2018-03-02 PROCEDURE — 99202 OFFICE O/P NEW SF 15 MIN: CPT | Performed by: INTERNAL MEDICINE

## 2018-03-02 PROCEDURE — G8427 DOCREV CUR MEDS BY ELIG CLIN: HCPCS | Performed by: INTERNAL MEDICINE

## 2018-03-02 PROCEDURE — 1090F PRES/ABSN URINE INCON ASSESS: CPT | Performed by: INTERNAL MEDICINE

## 2018-03-02 PROCEDURE — 4040F PNEUMOC VAC/ADMIN/RCVD: CPT | Performed by: INTERNAL MEDICINE

## 2018-03-02 PROCEDURE — 1111F DSCHRG MED/CURRENT MED MERGE: CPT | Performed by: INTERNAL MEDICINE

## 2018-03-02 PROCEDURE — 1123F ACP DISCUSS/DSCN MKR DOCD: CPT | Performed by: INTERNAL MEDICINE

## 2018-03-02 RX ORDER — MAGNESIUM OXIDE 400 MG/1
400 TABLET ORAL DAILY
COMMUNITY
End: 2020-01-07 | Stop reason: ALTCHOICE

## 2018-03-05 ENCOUNTER — HOSPITAL ENCOUNTER (OUTPATIENT)
Age: 83
Setting detail: SPECIMEN
Discharge: HOME | End: 2018-03-05
Payer: MEDICARE

## 2018-03-05 LAB
ANION GAP SERPL CALCULATED.3IONS-SCNC: 19 MEQ/L (ref 7–13)
BUN BLDV-MCNC: 32 MG/DL (ref 8–23)
CALCIUM SERPL-MCNC: 10.4 MG/DL (ref 8.6–10.2)
CHLORIDE BLD-SCNC: 95 MEQ/L (ref 98–107)
CO2: 25 MEQ/L (ref 22–29)
CREAT SERPL-MCNC: 1.06 MG/DL (ref 0.5–0.9)
GFR AFRICAN AMERICAN: 58.8
GFR NON-AFRICAN AMERICAN: 48.6
GLUCOSE BLD-MCNC: 224 MG/DL (ref 74–109)
POTASSIUM SERPL-SCNC: 4.6 MEQ/L (ref 3.5–5.1)
SODIUM BLD-SCNC: 139 MEQ/L (ref 132–144)

## 2018-03-05 PROCEDURE — 80048 BASIC METABOLIC PNL TOTAL CA: CPT

## 2018-03-06 ENCOUNTER — HOSPITAL ENCOUNTER (OUTPATIENT)
Dept: NON INVASIVE DIAGNOSTICS | Age: 83
Discharge: HOME | End: 2018-03-06
Payer: MEDICARE

## 2018-03-06 DIAGNOSIS — R60.0 EDEMA, LOWER EXTREMITY: ICD-10-CM

## 2018-03-06 LAB
LV EF: 55 %
LVEF MODALITY: NORMAL

## 2018-03-06 PROCEDURE — 93306 TTE W/DOPPLER COMPLETE: CPT

## 2018-03-07 DIAGNOSIS — E11.8 TYPE 2 DIABETES MELLITUS WITH COMPLICATION, WITHOUT LONG-TERM CURRENT USE OF INSULIN (HCC): ICD-10-CM

## 2018-03-07 PROBLEM — I38 VALVULAR HEART DISEASE: Status: ACTIVE | Noted: 2018-03-07

## 2018-03-07 PROBLEM — I51.89 DIASTOLIC DYSFUNCTION: Status: ACTIVE | Noted: 2018-03-07

## 2018-03-13 ENCOUNTER — OFFICE VISIT (OUTPATIENT)
Dept: INTERNAL MEDICINE | Age: 83
End: 2018-03-13
Payer: MEDICARE

## 2018-03-13 VITALS
BODY MASS INDEX: 31.18 KG/M2 | WEIGHT: 176 LBS | OXYGEN SATURATION: 98 % | HEART RATE: 71 BPM | DIASTOLIC BLOOD PRESSURE: 70 MMHG | SYSTOLIC BLOOD PRESSURE: 134 MMHG

## 2018-03-13 DIAGNOSIS — I10 ESSENTIAL HYPERTENSION: Primary | ICD-10-CM

## 2018-03-13 DIAGNOSIS — E11.22 TYPE 2 DIABETES MELLITUS WITH CHRONIC KIDNEY DISEASE, WITHOUT LONG-TERM CURRENT USE OF INSULIN, UNSPECIFIED CKD STAGE (HCC): ICD-10-CM

## 2018-03-13 DIAGNOSIS — E11.8 TYPE 2 DIABETES MELLITUS WITH COMPLICATION, WITHOUT LONG-TERM CURRENT USE OF INSULIN (HCC): ICD-10-CM

## 2018-03-13 DIAGNOSIS — E53.8 B12 DEFICIENCY: ICD-10-CM

## 2018-03-13 DIAGNOSIS — R79.0 LOW MAGNESIUM LEVEL: ICD-10-CM

## 2018-03-13 DIAGNOSIS — E83.52 HYPERCALCEMIA: ICD-10-CM

## 2018-03-13 LAB — HBA1C MFR BLD: 7.8 %

## 2018-03-13 PROCEDURE — 4040F PNEUMOC VAC/ADMIN/RCVD: CPT | Performed by: FAMILY MEDICINE

## 2018-03-13 PROCEDURE — G8427 DOCREV CUR MEDS BY ELIG CLIN: HCPCS | Performed by: FAMILY MEDICINE

## 2018-03-13 PROCEDURE — 83036 HEMOGLOBIN GLYCOSYLATED A1C: CPT | Performed by: FAMILY MEDICINE

## 2018-03-13 PROCEDURE — 99213 OFFICE O/P EST LOW 20 MIN: CPT | Performed by: FAMILY MEDICINE

## 2018-03-13 PROCEDURE — 1036F TOBACCO NON-USER: CPT | Performed by: FAMILY MEDICINE

## 2018-03-13 PROCEDURE — G8482 FLU IMMUNIZE ORDER/ADMIN: HCPCS | Performed by: FAMILY MEDICINE

## 2018-03-13 PROCEDURE — 1123F ACP DISCUSS/DSCN MKR DOCD: CPT | Performed by: FAMILY MEDICINE

## 2018-03-13 PROCEDURE — G8417 CALC BMI ABV UP PARAM F/U: HCPCS | Performed by: FAMILY MEDICINE

## 2018-03-13 PROCEDURE — 1090F PRES/ABSN URINE INCON ASSESS: CPT | Performed by: FAMILY MEDICINE

## 2018-03-13 RX ORDER — SYRINGE WITH NEEDLE, 1 ML 25GX5/8"
SYRINGE, EMPTY DISPOSABLE MISCELLANEOUS
Refills: 0 | COMMUNITY
Start: 2018-02-19 | End: 2018-11-16

## 2018-03-13 ASSESSMENT — ENCOUNTER SYMPTOMS
CHOKING: 0
APNEA: 0
CHEST TIGHTNESS: 0

## 2018-03-13 NOTE — PROGRESS NOTES
aspirin 81 MG tablet Take 81 mg by mouth daily.  cyanocobalamin 1000 MCG/ML injection Inject 1 mL into the muscle once for 1 dose 1 mL 11     No current facility-administered medications on file prior to visit. Allergies   Allergen Reactions    Metoclopramide     Pantoprazole Other (See Comments)    Pcn [Penicillins]     Protonix [Pantoprazole Sodium]     Tramadol        Chief Complaint   Patient presents with    1 Month Follow-Up     f/u from hospital f/u. Pt states everything seems fine. Wants to discuss medication that was given at hospital.    [facility] to discuss Echocardiogram done 3/6/18       HPI  Patient is here for f/u from last visit     Admission on 1/9/18  Was admitted after diagnosed with UTI, mental status change, and electrolyte imbalance 2/2 diuretic therapy and dehydration     She is here with her son today who gives us the history  Per son, She got very weak and sick so she was taken to the ER  She was Diagnosed with UTI and dehydration  During the stay had mental status changed 2/2 high calcium   She was admitted for 5 weeks, last 1.5 weeks for PT due to weakness   She was also seen by neurology, she had EEG and scans done as well  Carotid US and MRI negative, had a lot of age related atrophy - more than expected     Her BP meds were adjusted due to electrolyte changes and dehydration, diuretic therapy d/c     She is home now with [facility] labs & imaging:  - GFR was down, Cr elevated, she has hx of CKD  - calcium was high, thiazide d/c'd  - H/H was low then resolved 1/5/18  - B12 was low, currently on no replacement  - elevated WBC, trending down  - + Urine cx E. Coli  - low mag   - low PTH  - low vit D  - elevated homocysteine  - low IgG     Patient says she feels pretty good today  Getting stronger day by day    They did see   Repeat BMP showed calcium trending down    During last hospital stay BP meds were changed  Her son is wondering if we can d/c the clonidine and hydralazine and increase lisinopril instead  She will be seeing cardiology this month the 23rd    She has started her B12 shots and magnesium supplementation     Review of Systems   Constitutional: Negative for activity change, appetite change and chills. HENT: Negative for congestion, dental problem and drooling. Respiratory: Negative for apnea, choking and chest tightness. Neurological: Negative for dizziness, facial asymmetry and headaches. Physical Exam  Vitals:    03/13/18 1332   BP: 134/70   Pulse: 71   SpO2: 98%   Body mass index is 31.18 kg/m². Physical Exam  Constitutional:  Appears well-developed and well-nourished. No distress. HENT:   Head: Normocephalic and atraumatic. Right Ear: External ear normal.   Left Ear: External ear normal.   Nose: Nose normal.   Eyes: Conjunctivae and EOM are normal. Pupils are equal, round, and reactive to light. Right eye exhibits no discharge. Left eye exhibits no discharge. No scleral icterus. Neck: Normal range of motion. Cardiovascular: Normal rate, regular rhythm and normal heart sounds. Pulmonary/Chest: Effort normal and breath sounds normal. No respiratory distress. no wheezes. no rales. no tenderness. Musculoskeletal: Normal range of motion. Neurological: alert. Skin: not diaphoretic. Psychiatric: normal mood and affect. behavior is normal. Judgment and thought content normal.   Nursing note and vitals reviewed. Assessment:  Ochoa Liang was seen today for 1 month follow-up and discuss labs.     Diagnoses and all orders for this visit:    Essential hypertension  Advised to discuss med changes with cardiology, if  in agreement then we can monitor the change together  Rev recent echo    Type 2 diabetes mellitus with complication, without long-term current use of insulin (HCC)  -     POCT glycosylated hemoglobin (Hb A1C)  Recheck A1C    Hypercalcemia  Stable  Seeing endo    Low magnesium level  On mag supplement    B12 deficiency  B12 injections    Orders Placed This Encounter   Procedures    POCT glycosylated hemoglobin (Hb A1C)      I personally reviewed all recent labs and imaging pertaining to conditions mentioned above in assessment/plan in EPIC and care everywhere, discussed results with patient in office    Quality & Risk Score Accuracy - MEDICARE ADVANTAGE    Visit Dx:  Type 2 diabetes mellitus with chronic kidney disease, without long-term current use of insulin, unspecified CKD stage ([address]  Stable Diabetes type 2 and complications based on lab values and symptoms. Continue present treatment plan, control of risk factors, and recheck at least yearly.   Last edited 03/13/18 14:17 EDT by Constance Willoughby MD           rtc 3 mo's DM visit   Constance Willoughby MD

## 2018-03-28 ENCOUNTER — OFFICE VISIT (OUTPATIENT)
Dept: CARDIOLOGY CLINIC | Age: 83
End: 2018-03-28
Payer: MEDICARE

## 2018-03-28 VITALS
SYSTOLIC BLOOD PRESSURE: 138 MMHG | BODY MASS INDEX: 30.82 KG/M2 | HEART RATE: 71 BPM | OXYGEN SATURATION: 97 % | WEIGHT: 174 LBS | DIASTOLIC BLOOD PRESSURE: 62 MMHG | RESPIRATION RATE: 16 BRPM

## 2018-03-28 DIAGNOSIS — I38 VALVULAR HEART DISEASE: ICD-10-CM

## 2018-03-28 DIAGNOSIS — I10 HYPERTENSION, UNSPECIFIED TYPE: Primary | ICD-10-CM

## 2018-03-28 DIAGNOSIS — R60.0 BILATERAL LEG EDEMA: ICD-10-CM

## 2018-03-28 DIAGNOSIS — I27.20 PULMONARY HYPERTENSION (HCC): ICD-10-CM

## 2018-03-28 DIAGNOSIS — N28.9 ACUTE RENAL INSUFFICIENCY: ICD-10-CM

## 2018-03-28 DIAGNOSIS — I51.89 DIASTOLIC DYSFUNCTION: ICD-10-CM

## 2018-03-28 PROCEDURE — G8482 FLU IMMUNIZE ORDER/ADMIN: HCPCS | Performed by: INTERNAL MEDICINE

## 2018-03-28 PROCEDURE — 4040F PNEUMOC VAC/ADMIN/RCVD: CPT | Performed by: INTERNAL MEDICINE

## 2018-03-28 PROCEDURE — 99202 OFFICE O/P NEW SF 15 MIN: CPT | Performed by: INTERNAL MEDICINE

## 2018-03-28 PROCEDURE — G8417 CALC BMI ABV UP PARAM F/U: HCPCS | Performed by: INTERNAL MEDICINE

## 2018-03-28 PROCEDURE — G8427 DOCREV CUR MEDS BY ELIG CLIN: HCPCS | Performed by: INTERNAL MEDICINE

## 2018-03-28 PROCEDURE — 1090F PRES/ABSN URINE INCON ASSESS: CPT | Performed by: INTERNAL MEDICINE

## 2018-03-28 ASSESSMENT — ENCOUNTER SYMPTOMS
COLOR CHANGE: 0
CHEST TIGHTNESS: 0
GASTROINTESTINAL NEGATIVE: 1
RESPIRATORY NEGATIVE: 1
APNEA: 0
SHORTNESS OF BREATH: 0

## 2018-04-06 DIAGNOSIS — E11.9 TYPE 2 DIABETES MELLITUS WITHOUT COMPLICATION, WITHOUT LONG-TERM CURRENT USE OF INSULIN (HCC): ICD-10-CM

## 2018-04-09 RX ORDER — CALCIUM CITRATE/VITAMIN D3 200MG-6.25
TABLET ORAL
Qty: 200 STRIP | Refills: 5 | Status: SHIPPED | OUTPATIENT
Start: 2018-04-09 | End: 2022-07-01 | Stop reason: SDUPTHER

## 2018-04-11 ENCOUNTER — OFFICE VISIT (OUTPATIENT)
Dept: CARDIOLOGY CLINIC | Age: 83
End: 2018-04-11
Payer: MEDICARE

## 2018-04-11 VITALS
OXYGEN SATURATION: 98 % | TEMPERATURE: 98.1 F | SYSTOLIC BLOOD PRESSURE: 128 MMHG | BODY MASS INDEX: 29.95 KG/M2 | RESPIRATION RATE: 16 BRPM | HEART RATE: 60 BPM | HEIGHT: 63 IN | DIASTOLIC BLOOD PRESSURE: 82 MMHG | WEIGHT: 169 LBS

## 2018-04-11 DIAGNOSIS — N18.30 STAGE 3 CHRONIC KIDNEY DISEASE (HCC): ICD-10-CM

## 2018-04-11 DIAGNOSIS — I10 HYPERTENSION, UNSPECIFIED TYPE: Primary | ICD-10-CM

## 2018-04-11 PROCEDURE — 1090F PRES/ABSN URINE INCON ASSESS: CPT | Performed by: INTERNAL MEDICINE

## 2018-04-11 PROCEDURE — G8417 CALC BMI ABV UP PARAM F/U: HCPCS | Performed by: INTERNAL MEDICINE

## 2018-04-11 PROCEDURE — 99214 OFFICE O/P EST MOD 30 MIN: CPT | Performed by: INTERNAL MEDICINE

## 2018-04-11 PROCEDURE — G8427 DOCREV CUR MEDS BY ELIG CLIN: HCPCS | Performed by: INTERNAL MEDICINE

## 2018-04-11 PROCEDURE — 1036F TOBACCO NON-USER: CPT | Performed by: INTERNAL MEDICINE

## 2018-04-11 PROCEDURE — 1123F ACP DISCUSS/DSCN MKR DOCD: CPT | Performed by: INTERNAL MEDICINE

## 2018-04-11 PROCEDURE — 4040F PNEUMOC VAC/ADMIN/RCVD: CPT | Performed by: INTERNAL MEDICINE

## 2018-04-11 ASSESSMENT — ENCOUNTER SYMPTOMS
NAUSEA: 0
CHEST TIGHTNESS: 0
VOMITING: 0
APNEA: 0
SHORTNESS OF BREATH: 0

## 2018-04-12 DIAGNOSIS — I10 ESSENTIAL HYPERTENSION: ICD-10-CM

## 2018-05-03 ENCOUNTER — HOSPITAL ENCOUNTER (OUTPATIENT)
Age: 83
Setting detail: SPECIMEN
Discharge: HOME | End: 2018-05-03
Payer: MEDICARE

## 2018-05-03 ENCOUNTER — OFFICE VISIT (OUTPATIENT)
Dept: INTERNAL MEDICINE | Age: 83
End: 2018-05-03
Payer: MEDICARE

## 2018-05-03 VITALS
HEIGHT: 63 IN | DIASTOLIC BLOOD PRESSURE: 72 MMHG | HEART RATE: 60 BPM | BODY MASS INDEX: 29.95 KG/M2 | SYSTOLIC BLOOD PRESSURE: 144 MMHG | WEIGHT: 169 LBS

## 2018-05-03 DIAGNOSIS — R30.0 DYSURIA: ICD-10-CM

## 2018-05-03 DIAGNOSIS — R30.0 DYSURIA: Primary | ICD-10-CM

## 2018-05-03 DIAGNOSIS — N39.0 RECURRENT UTI: ICD-10-CM

## 2018-05-03 PROCEDURE — 87186 SC STD MICRODIL/AGAR DIL: CPT

## 2018-05-03 PROCEDURE — 4040F PNEUMOC VAC/ADMIN/RCVD: CPT | Performed by: FAMILY MEDICINE

## 2018-05-03 PROCEDURE — 1090F PRES/ABSN URINE INCON ASSESS: CPT | Performed by: FAMILY MEDICINE

## 2018-05-03 PROCEDURE — 1123F ACP DISCUSS/DSCN MKR DOCD: CPT | Performed by: FAMILY MEDICINE

## 2018-05-03 PROCEDURE — 87077 CULTURE AEROBIC IDENTIFY: CPT

## 2018-05-03 PROCEDURE — 1036F TOBACCO NON-USER: CPT | Performed by: FAMILY MEDICINE

## 2018-05-03 PROCEDURE — G8417 CALC BMI ABV UP PARAM F/U: HCPCS | Performed by: FAMILY MEDICINE

## 2018-05-03 PROCEDURE — 81002 URINALYSIS NONAUTO W/O SCOPE: CPT | Performed by: FAMILY MEDICINE

## 2018-05-03 PROCEDURE — 87086 URINE CULTURE/COLONY COUNT: CPT

## 2018-05-03 PROCEDURE — 99214 OFFICE O/P EST MOD 30 MIN: CPT | Performed by: FAMILY MEDICINE

## 2018-05-03 PROCEDURE — G8427 DOCREV CUR MEDS BY ELIG CLIN: HCPCS | Performed by: FAMILY MEDICINE

## 2018-05-03 RX ORDER — NITROFURANTOIN 25; 75 MG/1; MG/1
100 CAPSULE ORAL 2 TIMES DAILY
Qty: 20 CAPSULE | Refills: 0 | Status: SHIPPED | OUTPATIENT
Start: 2018-05-03 | End: 2018-05-13

## 2018-05-03 ASSESSMENT — PATIENT HEALTH QUESTIONNAIRE - PHQ9
SUM OF ALL RESPONSES TO PHQ QUESTIONS 1-9: 0
1. LITTLE INTEREST OR PLEASURE IN DOING THINGS: 0
2. FEELING DOWN, DEPRESSED OR HOPELESS: 0
SUM OF ALL RESPONSES TO PHQ9 QUESTIONS 1 & 2: 0

## 2018-05-06 LAB
ORGANISM: ABNORMAL
URINE CULTURE, ROUTINE: ABNORMAL
URINE CULTURE, ROUTINE: ABNORMAL

## 2018-05-11 ENCOUNTER — HOSPITAL ENCOUNTER (OUTPATIENT)
Dept: ULTRASOUND IMAGING | Age: 83
Discharge: HOME | End: 2018-05-13
Payer: MEDICARE

## 2018-05-11 DIAGNOSIS — R30.0 DYSURIA: ICD-10-CM

## 2018-05-11 DIAGNOSIS — N39.0 RECURRENT UTI: ICD-10-CM

## 2018-05-11 PROCEDURE — 51798 US URINE CAPACITY MEASURE: CPT

## 2018-05-11 PROCEDURE — 76775 US EXAM ABDO BACK WALL LIM: CPT

## 2018-05-15 ENCOUNTER — OFFICE VISIT (OUTPATIENT)
Dept: CARDIOLOGY CLINIC | Age: 83
End: 2018-05-15
Payer: MEDICARE

## 2018-05-15 VITALS — DIASTOLIC BLOOD PRESSURE: 80 MMHG | SYSTOLIC BLOOD PRESSURE: 126 MMHG | RESPIRATION RATE: 12 BRPM | HEART RATE: 80 BPM

## 2018-05-15 DIAGNOSIS — I51.89 DIASTOLIC DYSFUNCTION: ICD-10-CM

## 2018-05-15 DIAGNOSIS — I38 VALVULAR HEART DISEASE: ICD-10-CM

## 2018-05-15 DIAGNOSIS — N18.30 STAGE 3 CHRONIC KIDNEY DISEASE (HCC): ICD-10-CM

## 2018-05-15 DIAGNOSIS — I10 HYPERTENSION, UNSPECIFIED TYPE: Primary | ICD-10-CM

## 2018-05-15 PROCEDURE — 1036F TOBACCO NON-USER: CPT | Performed by: INTERNAL MEDICINE

## 2018-05-15 PROCEDURE — 1123F ACP DISCUSS/DSCN MKR DOCD: CPT | Performed by: INTERNAL MEDICINE

## 2018-05-15 PROCEDURE — 4040F PNEUMOC VAC/ADMIN/RCVD: CPT | Performed by: INTERNAL MEDICINE

## 2018-05-15 PROCEDURE — G8417 CALC BMI ABV UP PARAM F/U: HCPCS | Performed by: INTERNAL MEDICINE

## 2018-05-15 PROCEDURE — 99213 OFFICE O/P EST LOW 20 MIN: CPT | Performed by: INTERNAL MEDICINE

## 2018-05-15 PROCEDURE — 1090F PRES/ABSN URINE INCON ASSESS: CPT | Performed by: INTERNAL MEDICINE

## 2018-05-15 PROCEDURE — G8427 DOCREV CUR MEDS BY ELIG CLIN: HCPCS | Performed by: INTERNAL MEDICINE

## 2018-05-15 ASSESSMENT — ENCOUNTER SYMPTOMS
DIARRHEA: 0
ANAL BLEEDING: 0
ABDOMINAL DISTENTION: 0
NAUSEA: 0
ABDOMINAL PAIN: 0
VOMITING: 0
BLOOD IN STOOL: 0
CHEST TIGHTNESS: 0
APNEA: 0
SHORTNESS OF BREATH: 0
COLOR CHANGE: 0
COUGH: 0

## 2018-05-16 ENCOUNTER — TELEPHONE (OUTPATIENT)
Dept: INTERNAL MEDICINE | Age: 83
End: 2018-05-16

## 2018-06-01 DIAGNOSIS — I10 ESSENTIAL HYPERTENSION: ICD-10-CM

## 2018-06-04 RX ORDER — AMLODIPINE BESYLATE 10 MG/1
10 TABLET ORAL DAILY
Qty: 90 TABLET | Refills: 4 | OUTPATIENT
Start: 2018-06-04 | End: 2018-06-20 | Stop reason: SDUPTHER

## 2018-06-04 RX ORDER — LISINOPRIL 10 MG/1
10 TABLET ORAL DAILY
Qty: 90 TABLET | Refills: 4 | Status: ON HOLD | OUTPATIENT
Start: 2018-06-04 | End: 2019-01-11

## 2018-06-20 ENCOUNTER — HOSPITAL ENCOUNTER (OUTPATIENT)
Age: 83
Setting detail: SPECIMEN
Discharge: HOME | End: 2018-06-20
Payer: MEDICARE

## 2018-06-20 ENCOUNTER — OFFICE VISIT (OUTPATIENT)
Dept: INTERNAL MEDICINE | Age: 83
End: 2018-06-20
Payer: MEDICARE

## 2018-06-20 VITALS
HEIGHT: 63 IN | HEART RATE: 68 BPM | SYSTOLIC BLOOD PRESSURE: 110 MMHG | BODY MASS INDEX: 29.84 KG/M2 | DIASTOLIC BLOOD PRESSURE: 82 MMHG | WEIGHT: 168.4 LBS

## 2018-06-20 DIAGNOSIS — I51.89 DIASTOLIC DYSFUNCTION: ICD-10-CM

## 2018-06-20 DIAGNOSIS — N18.30 STAGE 3 CHRONIC KIDNEY DISEASE (HCC): ICD-10-CM

## 2018-06-20 DIAGNOSIS — I38 VALVULAR HEART DISEASE: ICD-10-CM

## 2018-06-20 DIAGNOSIS — E78.5 HYPERLIPIDEMIA, UNSPECIFIED HYPERLIPIDEMIA TYPE: ICD-10-CM

## 2018-06-20 DIAGNOSIS — N39.0 RECURRENT UTI: ICD-10-CM

## 2018-06-20 DIAGNOSIS — E11.8 TYPE 2 DIABETES MELLITUS WITH COMPLICATION, WITHOUT LONG-TERM CURRENT USE OF INSULIN (HCC): Primary | ICD-10-CM

## 2018-06-20 DIAGNOSIS — I10 HYPERTENSION, UNSPECIFIED TYPE: ICD-10-CM

## 2018-06-20 DIAGNOSIS — E83.52 HYPERCALCEMIA: ICD-10-CM

## 2018-06-20 DIAGNOSIS — N39.0 URINARY TRACT INFECTION WITHOUT HEMATURIA, SITE UNSPECIFIED: ICD-10-CM

## 2018-06-20 LAB
ALBUMIN SERPL-MCNC: 4.3 G/DL (ref 3.9–4.9)
ALP BLD-CCNC: 58 U/L (ref 40–130)
ALT SERPL-CCNC: 11 U/L (ref 0–33)
ANION GAP SERPL CALCULATED.3IONS-SCNC: 18 MEQ/L (ref 7–13)
AST SERPL-CCNC: 10 U/L (ref 0–35)
BILIRUB SERPL-MCNC: <0.2 MG/DL (ref 0–1.2)
BILIRUBIN, POC: ABNORMAL
BLOOD URINE, POC: ABNORMAL
BUN BLDV-MCNC: 30 MG/DL (ref 8–23)
CALCIUM SERPL-MCNC: 9.6 MG/DL (ref 8.6–10.2)
CHLORIDE BLD-SCNC: 96 MEQ/L (ref 98–107)
CLARITY, POC: ABNORMAL
CO2: 26 MEQ/L (ref 22–29)
COLOR, POC: ABNORMAL
CREAT SERPL-MCNC: 0.99 MG/DL (ref 0.5–0.9)
GFR AFRICAN AMERICAN: >60
GFR NON-AFRICAN AMERICAN: 52.6
GLOBULIN: 3.1 G/DL (ref 2.3–3.5)
GLUCOSE BLD-MCNC: 174 MG/DL (ref 74–109)
GLUCOSE URINE, POC: ABNORMAL
HBA1C MFR BLD: 7.7 %
KETONES, POC: ABNORMAL
LEUKOCYTE EST, POC: ABNORMAL
MAGNESIUM: 1.9 MG/DL (ref 1.7–2.3)
NITRITE, POC: ABNORMAL
PH, POC: 6
POTASSIUM SERPL-SCNC: 4.5 MEQ/L (ref 3.5–5.1)
PROTEIN, POC: ABNORMAL
SODIUM BLD-SCNC: 140 MEQ/L (ref 132–144)
SPECIFIC GRAVITY, POC: 1.02
TOTAL PROTEIN: 7.4 G/DL (ref 6.4–8.1)
UROBILINOGEN, POC: ABNORMAL

## 2018-06-20 PROCEDURE — G8417 CALC BMI ABV UP PARAM F/U: HCPCS | Performed by: FAMILY MEDICINE

## 2018-06-20 PROCEDURE — 1090F PRES/ABSN URINE INCON ASSESS: CPT | Performed by: FAMILY MEDICINE

## 2018-06-20 PROCEDURE — 99214 OFFICE O/P EST MOD 30 MIN: CPT | Performed by: FAMILY MEDICINE

## 2018-06-20 PROCEDURE — 1036F TOBACCO NON-USER: CPT | Performed by: FAMILY MEDICINE

## 2018-06-20 PROCEDURE — 81002 URINALYSIS NONAUTO W/O SCOPE: CPT | Performed by: FAMILY MEDICINE

## 2018-06-20 PROCEDURE — 87086 URINE CULTURE/COLONY COUNT: CPT

## 2018-06-20 PROCEDURE — 87077 CULTURE AEROBIC IDENTIFY: CPT

## 2018-06-20 PROCEDURE — 80053 COMPREHEN METABOLIC PANEL: CPT

## 2018-06-20 PROCEDURE — 87186 SC STD MICRODIL/AGAR DIL: CPT

## 2018-06-20 PROCEDURE — G8427 DOCREV CUR MEDS BY ELIG CLIN: HCPCS | Performed by: FAMILY MEDICINE

## 2018-06-20 PROCEDURE — 1123F ACP DISCUSS/DSCN MKR DOCD: CPT | Performed by: FAMILY MEDICINE

## 2018-06-20 PROCEDURE — 4040F PNEUMOC VAC/ADMIN/RCVD: CPT | Performed by: FAMILY MEDICINE

## 2018-06-20 PROCEDURE — 83036 HEMOGLOBIN GLYCOSYLATED A1C: CPT | Performed by: FAMILY MEDICINE

## 2018-06-20 PROCEDURE — 83735 ASSAY OF MAGNESIUM: CPT

## 2018-06-20 PROCEDURE — 36415 COLL VENOUS BLD VENIPUNCTURE: CPT | Performed by: FAMILY MEDICINE

## 2018-06-20 RX ORDER — AMLODIPINE BESYLATE 10 MG/1
5 TABLET ORAL DAILY
Qty: 90 TABLET | Refills: 4 | COMMUNITY
Start: 2018-06-20 | End: 2018-08-21 | Stop reason: DRUGHIGH

## 2018-06-20 RX ORDER — SULFAMETHOXAZOLE AND TRIMETHOPRIM 800; 160 MG/1; MG/1
1 TABLET ORAL 2 TIMES DAILY
Qty: 20 TABLET | Refills: 0 | Status: SHIPPED | OUTPATIENT
Start: 2018-06-20 | End: 2018-08-06 | Stop reason: SDUPTHER

## 2018-06-20 ASSESSMENT — ENCOUNTER SYMPTOMS
EYE DISCHARGE: 0
CHOKING: 0
EYE PAIN: 0
EYE ITCHING: 0
CHEST TIGHTNESS: 0
APNEA: 0

## 2018-06-23 LAB
ORGANISM: ABNORMAL
URINE CULTURE, ROUTINE: ABNORMAL
URINE CULTURE, ROUTINE: ABNORMAL

## 2018-06-29 ENCOUNTER — OFFICE VISIT (OUTPATIENT)
Dept: ENDOCRINOLOGY | Age: 83
End: 2018-06-29
Payer: MEDICARE

## 2018-06-29 VITALS
HEIGHT: 63 IN | DIASTOLIC BLOOD PRESSURE: 70 MMHG | HEART RATE: 77 BPM | BODY MASS INDEX: 29.77 KG/M2 | SYSTOLIC BLOOD PRESSURE: 182 MMHG | WEIGHT: 168 LBS

## 2018-06-29 DIAGNOSIS — E83.52 HYPERCALCEMIA: Primary | ICD-10-CM

## 2018-06-29 PROCEDURE — 82962 GLUCOSE BLOOD TEST: CPT | Performed by: INTERNAL MEDICINE

## 2018-06-29 PROCEDURE — G8427 DOCREV CUR MEDS BY ELIG CLIN: HCPCS | Performed by: INTERNAL MEDICINE

## 2018-06-29 PROCEDURE — 4040F PNEUMOC VAC/ADMIN/RCVD: CPT | Performed by: INTERNAL MEDICINE

## 2018-06-29 PROCEDURE — 99213 OFFICE O/P EST LOW 20 MIN: CPT | Performed by: INTERNAL MEDICINE

## 2018-06-29 PROCEDURE — 1090F PRES/ABSN URINE INCON ASSESS: CPT | Performed by: INTERNAL MEDICINE

## 2018-06-29 PROCEDURE — G8417 CALC BMI ABV UP PARAM F/U: HCPCS | Performed by: INTERNAL MEDICINE

## 2018-06-29 PROCEDURE — 1036F TOBACCO NON-USER: CPT | Performed by: INTERNAL MEDICINE

## 2018-06-29 PROCEDURE — 1123F ACP DISCUSS/DSCN MKR DOCD: CPT | Performed by: INTERNAL MEDICINE

## 2018-06-29 NOTE — PROGRESS NOTES
Subjective:      Patient ID: Mark Brown is a 80 y.o. female. Other   This is a chronic (hypercalcemia) problem. The current episode started more than 1 month ago. The problem has been gradually improving. Associated symptoms include fatigue, myalgias and weakness.  Associated symptoms comments:   .   pt off lasix     Rpt calcium level normal  Lab Results   Component Value Date    CALCIUM 9.6 06/20/2018    PHOS 3.9 01/11/2018         Patient Active Problem List   Diagnosis    HTN (hypertension)    Diabetes mellitus    SI (stress incontinence), female    Osteoarthritis    CKD (chronic kidney disease)    HLD (hyperlipidemia)    Vitamin D deficiency    Eczematous dermatitis    Chronic low back pain    Lumbar spinal stenosis    Hypercalcemia    Diastolic dysfunction    Valvular heart disease         Allergies   Allergen Reactions    Metoclopramide     Pantoprazole Other (See Comments)    Pcn [Penicillins]     Protonix [Pantoprazole Sodium]     Tramadol        Current Outpatient Prescriptions:     amLODIPine (NORVASC) 10 MG tablet, Take 0.5 tablets by mouth daily, Disp: 90 tablet, Rfl: 4    lisinopril (PRINIVIL;ZESTRIL) 10 MG tablet, Take 1 tablet by mouth daily, Disp: 90 tablet, Rfl: 4    metoprolol tartrate (LOPRESSOR) 25 MG tablet, take 1 tablet by mouth twice a day, Disp: 180 tablet, Rfl: 4    TRUE METRIX BLOOD GLUCOSE TEST strip, TEST two to three times a day, Disp: 200 strip, Rfl: 5    B-D 3CC LUER-ANDREW SYR 25GX1\" 25G X 1\" 3 ML MISC, use every month with B-12 INJECTION as directed by prescriber, Disp: , Rfl: 0    metFORMIN (GLUCOPHAGE) 1000 MG tablet, take 1 tablet by mouth twice a day with meals, Disp: 180 tablet, Rfl: 3    magnesium oxide (MAG-OX) 400 MG tablet, Take 400 mg by mouth daily, Disp: , Rfl:     Needles & Syringes MISC, 1 each by Does not apply route daily, Disp: 50 each, Rfl: 0    TRUEPLUS LANCETS 28G MISC, TEST TWO TIMES DAILY, Disp: 200 each, Rfl: 3    atorvastatin (LIPITOR) 10 MG tablet, take 1 tablet by mouth once daily, Disp: 90 tablet, Rfl: 3    Incontinence Supply Disposable (DEPEND UNDERGARMENTS) MISC, 1 each by Does not apply route nightly, Disp: 1 Package, Rfl: 11    fluticasone (FLONASE) 50 MCG/ACT nasal spray, instill 2 sprays into each nostril once daily (Patient taking differently: instill 2 sprays into each nostril prn), Disp: 16 g, Rfl: 1    Blood Glucose Monitoring Suppl (TRUE METRIX AIR GLUCOSE METER) W/DEVICE KIT, , Disp: , Rfl:     Blood Glucose Monitoring Suppl PRUDENCIO, Dx: E11.9, Disp: 1 Device, Rfl: 0    Compression Stockings MISC, by Does not apply route Knee high, 20-30 mmHg, Disp: 1 each, Rfl: 1    Omega-3 Fatty Acids (FISH OIL) 1200 MG CAPS, Take  by mouth daily. , Disp: , Rfl:     aspirin 81 MG tablet, Take 81 mg by mouth daily. , Disp: , Rfl:     cyanocobalamin 1000 MCG/ML injection, Inject 1 mL into the muscle once for 1 dose, Disp: 1 mL, Rfl: 11    Review of Systems   Constitutional: Positive for fatigue. Musculoskeletal: Positive for myalgias. Neurological: Positive for weakness. Vitals:    06/29/18 1340   BP: (!) 182/70   Site: Left Arm   Position: Sitting   Cuff Size: Medium Adult   Pulse: 77   Weight: 168 lb (76.2 kg)   Height: 5' 3\" (1.6 m)       Objective:   Physical Exam   Constitutional: She appears well-developed and well-nourished. HENT:   Head: Normocephalic and atraumatic. Eyes: Conjunctivae are normal.   Neck: Neck supple. Cardiovascular: Normal rate. Musculoskeletal: Normal range of motion. She exhibits edema. Neurological: She is alert. Psychiatric: She has a normal mood and affect. Results for [employer] Counter (MRN [id number]) as of 7/1/2018 22:14   Ref.  Range 6/20/2018 18:36   Sodium Latest Ref Range: 132 - 144 mEq/L 140   Potassium Latest Ref Range: 3.5 - 5.1 mEq/L 4.5   Chloride Latest Ref Range: 98 - 107 mEq/L 96 (L)   CO2 Latest Ref Range: 22 - 29 mEq/L 26   BUN Latest Ref Range: 8 - 23 mg/dL 30 (H)

## 2018-07-02 LAB — GLUCOSE BLD-MCNC: 156 MG/DL

## 2018-07-23 ENCOUNTER — HOSPITAL ENCOUNTER (OUTPATIENT)
Age: 83
Setting detail: SPECIMEN
Discharge: HOME | End: 2018-07-23
Payer: MEDICARE

## 2018-07-23 ENCOUNTER — OFFICE VISIT (OUTPATIENT)
Dept: INTERNAL MEDICINE | Age: 83
End: 2018-07-23
Payer: MEDICARE

## 2018-07-23 VITALS
OXYGEN SATURATION: 97 % | WEIGHT: 169.2 LBS | DIASTOLIC BLOOD PRESSURE: 74 MMHG | SYSTOLIC BLOOD PRESSURE: 112 MMHG | HEIGHT: 63 IN | RESPIRATION RATE: 16 BRPM | TEMPERATURE: 98.1 F | BODY MASS INDEX: 29.98 KG/M2 | HEART RATE: 78 BPM

## 2018-07-23 DIAGNOSIS — N30.01 ACUTE CYSTITIS WITH HEMATURIA: ICD-10-CM

## 2018-07-23 DIAGNOSIS — N30.01 ACUTE CYSTITIS WITH HEMATURIA: Primary | ICD-10-CM

## 2018-07-23 LAB
BILIRUBIN, POC: NORMAL
BLOOD URINE, POC: NORMAL
CLARITY, POC: NORMAL
COLOR, POC: NORMAL
GLUCOSE URINE, POC: NORMAL
KETONES, POC: NORMAL
LEUKOCYTE EST, POC: NORMAL
NITRITE, POC: NORMAL
PH, POC: 6
PROTEIN, POC: NORMAL
SPECIFIC GRAVITY, POC: 1.03
UROBILINOGEN, POC: NORMAL

## 2018-07-23 PROCEDURE — 99213 OFFICE O/P EST LOW 20 MIN: CPT | Performed by: PHYSICIAN ASSISTANT

## 2018-07-23 PROCEDURE — 1123F ACP DISCUSS/DSCN MKR DOCD: CPT | Performed by: PHYSICIAN ASSISTANT

## 2018-07-23 PROCEDURE — 4040F PNEUMOC VAC/ADMIN/RCVD: CPT | Performed by: PHYSICIAN ASSISTANT

## 2018-07-23 PROCEDURE — 81003 URINALYSIS AUTO W/O SCOPE: CPT | Performed by: PHYSICIAN ASSISTANT

## 2018-07-23 PROCEDURE — 1101F PT FALLS ASSESS-DOCD LE1/YR: CPT | Performed by: PHYSICIAN ASSISTANT

## 2018-07-23 PROCEDURE — G8417 CALC BMI ABV UP PARAM F/U: HCPCS | Performed by: PHYSICIAN ASSISTANT

## 2018-07-23 PROCEDURE — 87077 CULTURE AEROBIC IDENTIFY: CPT

## 2018-07-23 PROCEDURE — 1090F PRES/ABSN URINE INCON ASSESS: CPT | Performed by: PHYSICIAN ASSISTANT

## 2018-07-23 PROCEDURE — 87086 URINE CULTURE/COLONY COUNT: CPT

## 2018-07-23 PROCEDURE — 87186 SC STD MICRODIL/AGAR DIL: CPT

## 2018-07-23 PROCEDURE — 1036F TOBACCO NON-USER: CPT | Performed by: PHYSICIAN ASSISTANT

## 2018-07-23 PROCEDURE — G8427 DOCREV CUR MEDS BY ELIG CLIN: HCPCS | Performed by: PHYSICIAN ASSISTANT

## 2018-07-23 ASSESSMENT — ENCOUNTER SYMPTOMS
ABDOMINAL PAIN: 0
NAUSEA: 0
DIARRHEA: 0

## 2018-07-25 LAB
ORGANISM: ABNORMAL
URINE CULTURE, ROUTINE: ABNORMAL
URINE CULTURE, ROUTINE: ABNORMAL

## 2018-08-06 ENCOUNTER — OFFICE VISIT (OUTPATIENT)
Dept: INTERNAL MEDICINE | Age: 83
End: 2018-08-06
Payer: MEDICARE

## 2018-08-06 VITALS
HEART RATE: 78 BPM | DIASTOLIC BLOOD PRESSURE: 88 MMHG | WEIGHT: 170 LBS | HEIGHT: 63 IN | SYSTOLIC BLOOD PRESSURE: 170 MMHG | BODY MASS INDEX: 30.12 KG/M2

## 2018-08-06 DIAGNOSIS — N39.0 RECURRENT UTI (URINARY TRACT INFECTION): Primary | ICD-10-CM

## 2018-08-06 LAB
BILIRUBIN, POC: NORMAL
BLOOD URINE, POC: NORMAL
CLARITY, POC: CLEAR
COLOR, POC: NORMAL
GLUCOSE URINE, POC: NORMAL
KETONES, POC: NORMAL
LEUKOCYTE EST, POC: NORMAL
NITRITE, POC: NORMAL
PH, POC: 5.5
PROTEIN, POC: NORMAL
SPECIFIC GRAVITY, POC: 1.03
UROBILINOGEN, POC: NORMAL

## 2018-08-06 PROCEDURE — 1036F TOBACCO NON-USER: CPT | Performed by: FAMILY MEDICINE

## 2018-08-06 PROCEDURE — 4040F PNEUMOC VAC/ADMIN/RCVD: CPT | Performed by: FAMILY MEDICINE

## 2018-08-06 PROCEDURE — G8417 CALC BMI ABV UP PARAM F/U: HCPCS | Performed by: FAMILY MEDICINE

## 2018-08-06 PROCEDURE — 1101F PT FALLS ASSESS-DOCD LE1/YR: CPT | Performed by: FAMILY MEDICINE

## 2018-08-06 PROCEDURE — G8427 DOCREV CUR MEDS BY ELIG CLIN: HCPCS | Performed by: FAMILY MEDICINE

## 2018-08-06 PROCEDURE — 1123F ACP DISCUSS/DSCN MKR DOCD: CPT | Performed by: FAMILY MEDICINE

## 2018-08-06 PROCEDURE — 99214 OFFICE O/P EST MOD 30 MIN: CPT | Performed by: FAMILY MEDICINE

## 2018-08-06 PROCEDURE — 81002 URINALYSIS NONAUTO W/O SCOPE: CPT | Performed by: FAMILY MEDICINE

## 2018-08-06 PROCEDURE — 1090F PRES/ABSN URINE INCON ASSESS: CPT | Performed by: FAMILY MEDICINE

## 2018-08-06 RX ORDER — SULFAMETHOXAZOLE AND TRIMETHOPRIM 800; 160 MG/1; MG/1
1 TABLET ORAL 2 TIMES DAILY
Qty: 6 TABLET | Refills: 5 | Status: SHIPPED | OUTPATIENT
Start: 2018-08-06 | End: 2018-08-09

## 2018-08-06 RX ORDER — NITROFURANTOIN MACROCRYSTALS 50 MG/1
50 CAPSULE ORAL DAILY
Qty: 90 CAPSULE | Refills: 2 | Status: ON HOLD | OUTPATIENT
Start: 2018-08-06 | End: 2018-11-12 | Stop reason: HOSPADM

## 2018-08-06 ASSESSMENT — ENCOUNTER SYMPTOMS
APNEA: 0
EYE DISCHARGE: 0
EYE ITCHING: 0
CHOKING: 0
CHEST TIGHTNESS: 0
EYE PAIN: 0

## 2018-08-06 NOTE — PROGRESS NOTES
Patient: Cody Moreno    YOB: 1927    Date: 8/6/18    Patient Active Problem List    Diagnosis Date Noted    CKD (chronic kidney disease) 03/18/2015     Priority: High     Overview Note:     Stage G3a A2      HLD (hyperlipidemia) 03/18/2015     Priority: High    HTN (hypertension) 11/29/2011     Priority: High    Diabetes mellitus 11/29/2011     Priority: High    Diastolic dysfunction 85/38/7117     Priority: Medium    Valvular heart disease 03/07/2018     Priority: Medium    Hypercalcemia 01/15/2018     Priority: Medium     Overview Note:     kami Tripathi      Lumbar spinal stenosis 12/22/2016     Priority: Medium    Chronic low back pain 08/17/2016     Priority: Medium    Eczematous dermatitis      Priority: Low    Vitamin D deficiency 03/18/2015     Priority: Low    SI (stress incontinence), female 05/29/2012     Priority: Low    Osteoarthritis 05/29/2012     Priority: Low    Recurrent UTI (urinary tract infection) 08/06/2018     Past Medical History:   Diagnosis Date    Arthritis     Chicken pox     Chronic back pain     Chronic low back pain 8/17/2016    Eczematous dermatitis     History of bladder infections     Hyperlipidemia     Hypertension     Measles     Mumps     Osteoarthritis 5/29/2012    SCC (squamous cell carcinoma), arm 2013    right upper arm, 2015 right forarm    SI (stress incontinence), female 5/29/2012    Type II or unspecified type diabetes mellitus without mention of complication, not stated as uncontrolled     Whooping cough      Past Surgical History:   Procedure Laterality Date    ANKLE SURGERY      broken left ankle.     APPENDECTOMY      BREAST BIOPSY      x2 left breast    CATARACT REMOVAL  2004    bilateral    CYSTOCELE REPAIR      EYE SURGERY      bilateral cataract    HYSTERECTOMY      complete at age 39    [address]      as a child     Family History   Problem Relation Age of Onset    Diabetes Mother    Lincoln Nathan normal. Judgment and thought content normal.   Nursing note and vitals reviewed. Assessment:  Kanika  was seen today for urinary tract infection. Diagnoses and all orders for this visit:    Recurrent UTI (urinary tract infection)  -     POCT Urinalysis no Micro  -     nitrofurantoin (MACRODANTIN) 50 MG capsule; Take 1 capsule by mouth daily  -     sulfamethoxazole-trimethoprim (BACTRIM DS) 800-160 MG per tablet; Take 1 tablet by mouth 2 times daily for 3 days  hand out provided, had a lengthy discussion with patient about treatment, it's side effects, the diagnosis & etiology of symptoms, along with management and expectations of outcome with the recommended treatment. Patient verbalized understanding. Orders Placed This Encounter   Procedures    POCT Urinalysis no Micro      Total visit time >25 mins, more than 50% time spent on counseling, treatment, side effects, and follow up      Return if symptoms worsen or fail to improve.

## 2018-08-21 ENCOUNTER — OFFICE VISIT (OUTPATIENT)
Dept: CARDIOLOGY CLINIC | Age: 83
End: 2018-08-21
Payer: MEDICARE

## 2018-08-21 VITALS
HEART RATE: 76 BPM | SYSTOLIC BLOOD PRESSURE: 122 MMHG | WEIGHT: 170 LBS | DIASTOLIC BLOOD PRESSURE: 82 MMHG | RESPIRATION RATE: 12 BRPM | HEIGHT: 63 IN | BODY MASS INDEX: 30.12 KG/M2

## 2018-08-21 DIAGNOSIS — I10 HYPERTENSION, UNSPECIFIED TYPE: Primary | ICD-10-CM

## 2018-08-21 DIAGNOSIS — I38 VALVULAR HEART DISEASE: ICD-10-CM

## 2018-08-21 DIAGNOSIS — I51.89 DIASTOLIC DYSFUNCTION: ICD-10-CM

## 2018-08-21 DIAGNOSIS — N18.30 STAGE 3 CHRONIC KIDNEY DISEASE (HCC): ICD-10-CM

## 2018-08-21 PROCEDURE — 1101F PT FALLS ASSESS-DOCD LE1/YR: CPT | Performed by: INTERNAL MEDICINE

## 2018-08-21 PROCEDURE — G8417 CALC BMI ABV UP PARAM F/U: HCPCS | Performed by: INTERNAL MEDICINE

## 2018-08-21 PROCEDURE — 1090F PRES/ABSN URINE INCON ASSESS: CPT | Performed by: INTERNAL MEDICINE

## 2018-08-21 PROCEDURE — G8427 DOCREV CUR MEDS BY ELIG CLIN: HCPCS | Performed by: INTERNAL MEDICINE

## 2018-08-21 PROCEDURE — 4040F PNEUMOC VAC/ADMIN/RCVD: CPT | Performed by: INTERNAL MEDICINE

## 2018-08-21 PROCEDURE — 1036F TOBACCO NON-USER: CPT | Performed by: INTERNAL MEDICINE

## 2018-08-21 PROCEDURE — 1123F ACP DISCUSS/DSCN MKR DOCD: CPT | Performed by: INTERNAL MEDICINE

## 2018-08-21 PROCEDURE — 99213 OFFICE O/P EST LOW 20 MIN: CPT | Performed by: INTERNAL MEDICINE

## 2018-08-21 RX ORDER — AMLODIPINE BESYLATE 5 MG/1
5 TABLET ORAL DAILY
COMMUNITY
End: 2018-12-13 | Stop reason: SDUPTHER

## 2018-08-21 ASSESSMENT — ENCOUNTER SYMPTOMS
ABDOMINAL DISTENTION: 0
ABDOMINAL PAIN: 0
APNEA: 0
BLOOD IN STOOL: 0
ANAL BLEEDING: 0
COLOR CHANGE: 0
DIARRHEA: 0
VOMITING: 0
COUGH: 0
SHORTNESS OF BREATH: 0
NAUSEA: 0
CHEST TIGHTNESS: 0

## 2018-08-21 NOTE — PROGRESS NOTES
(hyperlipidemia)    Vitamin D deficiency    Eczematous dermatitis    Chronic low back pain    Lumbar spinal stenosis    Hypercalcemia    Diastolic dysfunction    Valvular heart disease    Recurrent UTI (urinary tract infection)       Medications Discontinued During This Encounter   Medication Reason    amLODIPine (NORVASC) 10 MG tablet DOSE ADJUSTMENT       Modified Medications    No medications on file       No orders of the defined types were placed in this encounter. Assessment:    1. Hypertension, unspecified type    2. Diastolic dysfunction    3. Valvular heart disease    4. Stage 3 chronic kidney disease       Plan:   Stay on same medications. Patient to see me in 6 months. This note was partially generated using Dragon voice recognition system, and there may be some incorrect words, spellings, punctuation that were not noticed in checking the note before saving.         Electronically signed by Raisa Lynch MD on 8/21/2018 at 10:59 AM

## 2018-08-25 ENCOUNTER — TELEPHONE (OUTPATIENT)
Dept: INTERNAL MEDICINE | Age: 83
End: 2018-08-25
Payer: MEDICARE

## 2018-08-25 DIAGNOSIS — R30.0 DYSURIA: Primary | ICD-10-CM

## 2018-08-25 LAB
BILIRUBIN, POC: ABNORMAL
BLOOD URINE, POC: ABNORMAL
CLARITY, POC: ABNORMAL
COLOR, POC: ABNORMAL
GLUCOSE URINE, POC: POSITIVE
KETONES, POC: ABNORMAL
LEUKOCYTE EST, POC: ABNORMAL
NITRITE, POC: ABNORMAL
PH, POC: 6
PROTEIN, POC: ABNORMAL
SPECIFIC GRAVITY, POC: 1.02
UROBILINOGEN, POC: ABNORMAL

## 2018-08-25 PROCEDURE — 81003 URINALYSIS AUTO W/O SCOPE: CPT | Performed by: FAMILY MEDICINE

## 2018-08-27 ENCOUNTER — HOSPITAL ENCOUNTER (OUTPATIENT)
Age: 83
Setting detail: SPECIMEN
Discharge: HOME | End: 2018-08-27
Payer: MEDICARE

## 2018-08-27 DIAGNOSIS — R30.0 DYSURIA: ICD-10-CM

## 2018-08-27 PROCEDURE — 87086 URINE CULTURE/COLONY COUNT: CPT

## 2018-08-29 LAB — URINE CULTURE, ROUTINE: NORMAL

## 2018-09-17 ENCOUNTER — HOSPITAL ENCOUNTER (OUTPATIENT)
Age: 83
Setting detail: SPECIMEN
Discharge: HOME | End: 2018-09-17
Payer: MEDICARE

## 2018-09-17 ENCOUNTER — OFFICE VISIT (OUTPATIENT)
Dept: INTERNAL MEDICINE | Age: 83
End: 2018-09-17
Payer: MEDICARE

## 2018-09-17 VITALS
HEIGHT: 63 IN | BODY MASS INDEX: 30.16 KG/M2 | HEART RATE: 60 BPM | WEIGHT: 170.2 LBS | SYSTOLIC BLOOD PRESSURE: 138 MMHG | DIASTOLIC BLOOD PRESSURE: 84 MMHG

## 2018-09-17 DIAGNOSIS — Z23 NEED FOR INFLUENZA VACCINATION: ICD-10-CM

## 2018-09-17 DIAGNOSIS — E53.8 VITAMIN B12 DEFICIENCY: ICD-10-CM

## 2018-09-17 DIAGNOSIS — E55.9 VITAMIN D DEFICIENCY: ICD-10-CM

## 2018-09-17 DIAGNOSIS — N39.0 RECURRENT UTI (URINARY TRACT INFECTION): ICD-10-CM

## 2018-09-17 DIAGNOSIS — N18.30 STAGE 3 CHRONIC KIDNEY DISEASE (HCC): ICD-10-CM

## 2018-09-17 DIAGNOSIS — N39.0 RECURRENT UTI: ICD-10-CM

## 2018-09-17 DIAGNOSIS — E83.52 HYPERCALCEMIA: ICD-10-CM

## 2018-09-17 DIAGNOSIS — I51.89 DIASTOLIC DYSFUNCTION: ICD-10-CM

## 2018-09-17 DIAGNOSIS — E78.5 HYPERLIPIDEMIA, UNSPECIFIED HYPERLIPIDEMIA TYPE: ICD-10-CM

## 2018-09-17 DIAGNOSIS — E11.8 TYPE 2 DIABETES MELLITUS WITH COMPLICATION, WITHOUT LONG-TERM CURRENT USE OF INSULIN (HCC): Primary | ICD-10-CM

## 2018-09-17 DIAGNOSIS — M48.061 SPINAL STENOSIS OF LUMBAR REGION, UNSPECIFIED WHETHER NEUROGENIC CLAUDICATION PRESENT: ICD-10-CM

## 2018-09-17 DIAGNOSIS — I10 HYPERTENSION, UNSPECIFIED TYPE: ICD-10-CM

## 2018-09-17 DIAGNOSIS — I38 VALVULAR HEART DISEASE: ICD-10-CM

## 2018-09-17 LAB
ALBUMIN SERPL-MCNC: 4.3 G/DL (ref 3.9–4.9)
ALP BLD-CCNC: 67 U/L (ref 40–130)
ALT SERPL-CCNC: 9 U/L (ref 0–33)
ANION GAP SERPL CALCULATED.3IONS-SCNC: 17 MEQ/L (ref 7–13)
AST SERPL-CCNC: 13 U/L (ref 0–35)
BASOPHILS ABSOLUTE: 0 K/UL (ref 0–0.2)
BASOPHILS RELATIVE PERCENT: 0.5 %
BILIRUB SERPL-MCNC: <0.2 MG/DL (ref 0–1.2)
BILIRUBIN, POC: ABNORMAL
BLOOD URINE, POC: ABNORMAL
BUN BLDV-MCNC: 23 MG/DL (ref 8–23)
CALCIUM SERPL-MCNC: 9.9 MG/DL (ref 8.6–10.2)
CHLORIDE BLD-SCNC: 98 MEQ/L (ref 98–107)
CHOLESTEROL, TOTAL: 228 MG/DL (ref 0–199)
CLARITY, POC: ABNORMAL
CO2: 25 MEQ/L (ref 22–29)
COLOR, POC: ABNORMAL
CREAT SERPL-MCNC: 1 MG/DL (ref 0.5–0.9)
EOSINOPHILS ABSOLUTE: 0.4 K/UL (ref 0–0.7)
EOSINOPHILS RELATIVE PERCENT: 4.3 %
FOLATE: 7.3 NG/ML (ref 7.3–26.1)
GFR AFRICAN AMERICAN: >60
GFR NON-AFRICAN AMERICAN: 51.9
GLOBULIN: 2.8 G/DL (ref 2.3–3.5)
GLUCOSE BLD-MCNC: 190 MG/DL (ref 74–109)
GLUCOSE URINE, POC: ABNORMAL
HBA1C MFR BLD: 7.7 %
HCT VFR BLD CALC: 38.4 % (ref 37–47)
HDLC SERPL-MCNC: 53 MG/DL (ref 40–59)
HEMOGLOBIN: 12.5 G/DL (ref 12–16)
KETONES, POC: ABNORMAL
LDL CHOLESTEROL CALCULATED: 141 MG/DL (ref 0–129)
LEUKOCYTE EST, POC: ABNORMAL
LYMPHOCYTES ABSOLUTE: 1.5 K/UL (ref 1–4.8)
LYMPHOCYTES RELATIVE PERCENT: 17.9 %
MCH RBC QN AUTO: 30.9 PG (ref 27–31.3)
MCHC RBC AUTO-ENTMCNC: 32.5 % (ref 33–37)
MCV RBC AUTO: 95.2 FL (ref 82–100)
MONOCYTES ABSOLUTE: 0.6 K/UL (ref 0.2–0.8)
MONOCYTES RELATIVE PERCENT: 6.6 %
NEUTROPHILS ABSOLUTE: 5.9 K/UL (ref 1.4–6.5)
NEUTROPHILS RELATIVE PERCENT: 70.7 %
NITRITE, POC: ABNORMAL
PARATHYROID HORMONE INTACT: 33.3 PG/ML (ref 15–65)
PDW BLD-RTO: 14.2 % (ref 11.5–14.5)
PH, POC: 6
PHOSPHORUS: 3.2 MG/DL (ref 2.5–4.5)
PLATELET # BLD: 256 K/UL (ref 130–400)
POTASSIUM SERPL-SCNC: 4.8 MEQ/L (ref 3.5–5.1)
PROTEIN, POC: ABNORMAL
RBC # BLD: 4.03 M/UL (ref 4.2–5.4)
SODIUM BLD-SCNC: 140 MEQ/L (ref 132–144)
SPECIFIC GRAVITY, POC: 1.01
TOTAL PROTEIN: 7.1 G/DL (ref 6.4–8.1)
TRIGL SERPL-MCNC: 170 MG/DL (ref 0–200)
UROBILINOGEN, POC: ABNORMAL
VITAMIN B-12: 567 PG/ML (ref 232–1245)
VITAMIN D 25-HYDROXY: 34.1 NG/ML (ref 30–100)
WBC # BLD: 8.4 K/UL (ref 4.8–10.8)

## 2018-09-17 PROCEDURE — G8417 CALC BMI ABV UP PARAM F/U: HCPCS | Performed by: FAMILY MEDICINE

## 2018-09-17 PROCEDURE — 83036 HEMOGLOBIN GLYCOSYLATED A1C: CPT | Performed by: FAMILY MEDICINE

## 2018-09-17 PROCEDURE — 84100 ASSAY OF PHOSPHORUS: CPT

## 2018-09-17 PROCEDURE — 90662 IIV NO PRSV INCREASED AG IM: CPT | Performed by: FAMILY MEDICINE

## 2018-09-17 PROCEDURE — 82746 ASSAY OF FOLIC ACID SERUM: CPT

## 2018-09-17 PROCEDURE — 1123F ACP DISCUSS/DSCN MKR DOCD: CPT | Performed by: FAMILY MEDICINE

## 2018-09-17 PROCEDURE — 83970 ASSAY OF PARATHORMONE: CPT

## 2018-09-17 PROCEDURE — 82607 VITAMIN B-12: CPT

## 2018-09-17 PROCEDURE — 99215 OFFICE O/P EST HI 40 MIN: CPT | Performed by: FAMILY MEDICINE

## 2018-09-17 PROCEDURE — 81002 URINALYSIS NONAUTO W/O SCOPE: CPT | Performed by: FAMILY MEDICINE

## 2018-09-17 PROCEDURE — 80061 LIPID PANEL: CPT

## 2018-09-17 PROCEDURE — G8427 DOCREV CUR MEDS BY ELIG CLIN: HCPCS | Performed by: FAMILY MEDICINE

## 2018-09-17 PROCEDURE — 80053 COMPREHEN METABOLIC PANEL: CPT

## 2018-09-17 PROCEDURE — 36415 COLL VENOUS BLD VENIPUNCTURE: CPT | Performed by: FAMILY MEDICINE

## 2018-09-17 PROCEDURE — 82306 VITAMIN D 25 HYDROXY: CPT

## 2018-09-17 PROCEDURE — 4040F PNEUMOC VAC/ADMIN/RCVD: CPT | Performed by: FAMILY MEDICINE

## 2018-09-17 PROCEDURE — 85025 COMPLETE CBC W/AUTO DIFF WBC: CPT

## 2018-09-17 PROCEDURE — 1036F TOBACCO NON-USER: CPT | Performed by: FAMILY MEDICINE

## 2018-09-17 PROCEDURE — 1101F PT FALLS ASSESS-DOCD LE1/YR: CPT | Performed by: FAMILY MEDICINE

## 2018-09-17 PROCEDURE — G0008 ADMIN INFLUENZA VIRUS VAC: HCPCS | Performed by: FAMILY MEDICINE

## 2018-09-17 PROCEDURE — 1090F PRES/ABSN URINE INCON ASSESS: CPT | Performed by: FAMILY MEDICINE

## 2018-09-17 ASSESSMENT — ENCOUNTER SYMPTOMS
EYE DISCHARGE: 0
EYE ITCHING: 0
APNEA: 0
EYE PAIN: 0
CHOKING: 0
CHEST TIGHTNESS: 0

## 2018-09-17 NOTE — PROGRESS NOTES
Social History     Social History    Marital status:      Spouse name: N/A    Number of children: N/A    Years of education: N/A     Occupational History    Not on file.      Social History Main Topics    Smoking status: Never Smoker    Smokeless tobacco: Never Used    Alcohol use No    Drug use: No    Sexual activity: Not on file     Other Topics Concern    Not on file     Social History Narrative    No narrative on file     Family History   Problem Relation Age of Onset    Diabetes Mother     Heart Disease Father      Current Outpatient Prescriptions on File Prior to Visit   Medication Sig Dispense Refill    amLODIPine (NORVASC) 5 MG tablet Take 5 mg by mouth daily      nitrofurantoin (MACRODANTIN) 50 MG capsule Take 1 capsule by mouth daily 90 capsule 2    lisinopril (PRINIVIL;ZESTRIL) 10 MG tablet Take 1 tablet by mouth daily 90 tablet 4    metoprolol tartrate (LOPRESSOR) 25 MG tablet take 1 tablet by mouth twice a day 180 tablet 4    TRUE METRIX BLOOD GLUCOSE TEST strip TEST two to three times a day 200 strip 5    B-D 3CC LUER-ANDREW SYR 25GX1\" 25G X 1\" 3 ML MISC use every month with B-12 INJECTION as directed by prescriber  0    metFORMIN (GLUCOPHAGE) 1000 MG tablet take 1 tablet by mouth twice a day with meals 180 tablet 3    magnesium oxide (MAG-OX) 400 MG tablet Take 400 mg by mouth daily      Needles & Syringes MISC 1 each by Does not apply route daily 50 each 0    TRUEPLUS LANCETS 28G MISC TEST TWO TIMES DAILY 200 each 3    atorvastatin (LIPITOR) 10 MG tablet take 1 tablet by mouth once daily 90 tablet 3    Incontinence Supply Disposable (DEPEND UNDERGARMENTS) MISC 1 each by Does not apply route nightly 1 Package 11    fluticasone (FLONASE) 50 MCG/ACT nasal spray instill 2 sprays into each nostril once daily (Patient taking differently: instill 2 sprays into each nostril prn) 16 g 1    Blood Glucose Monitoring Suppl (TRUE METRIX AIR GLUCOSE METER) W/DEVICE KIT       64 08/17/2016        Diabetes and Hypertension Visit Information    BP Readings from Last 3 Encounters:   09/17/18 138/84   08/21/18 122/82   08/06/18 (!) 170/88          Hemoglobin A1C (%)   Date Value   09/17/2018 7.7   06/20/2018 7.7   03/13/2018 7.8     Microalbumin, Random Urine (mg/dL)   Date Value   12/21/2017 5.80 (H)     LDL Calculated (mg/dL)   Date Value   12/21/2017 61     HDL (mg/dL)   Date Value   12/21/2017 60 (H)     BUN (mg/dL)   Date Value   06/20/2018 30 (H)     CREATININE (mg/dL)   Date Value   06/20/2018 0.99 (H)     Glucose (mg/dL)   Date Value   07/02/2018 156   05/25/2012 154 (H)            Have you seen any other physician or provider since your last visit? No  Have you had any other diagnostic tests since your last visit? No  Have you been seen in the emergency room and/or had an admission to a hospital since we last saw you?  No    Patient Care Team:  Lenka Hutchinson MD as PCP - General (Family Medicine)  Blake Mower, DO Lavelle Habermann, MD (Internal Medicine)           Health Maintenance   Topic Date Due    Shingles Vaccine (1 of 2 - 2 Dose Series) 09/17/2019 (Originally 6/10/1977)    Potassium monitoring  06/20/2019    Creatinine monitoring  06/20/2019    DTaP/Tdap/Td vaccine (2 - Td) 02/11/2027    Flu vaccine  Completed    Pneumococcal low/med risk  Completed       Other Concerns:    Recurrent UTI's  Started on macrobid for UTI prophylaxis on 8/6/17  Has been taking Abx as prescribed, no med SE's  No UTI's since starting medication  Lab Results   Component Value Date    COLORU pale 09/17/2018    COLORU Yellow 01/13/2018    NITRU Negative 01/13/2018    GLUCOSEU neg 09/17/2018    GLUCOSEU Negative 01/13/2018    KETUA neg 09/17/2018    KETUA Negative 01/13/2018    UROBILINOGEN 0.2 01/13/2018    BILIRUBINUR neg 09/17/2018     Hypercalcemia  No longer seeing   Calcium trending down      Diastolic dysfunction, valvular disease  Stable  Sees cardiology     Hypersensitivity eruption with eczema  She does not Follow anymore with  in [place]   itchy scaly skin lesions on her arms and legs - doing ok currently  She has seen derm  multiple times   She has been tried on [place] in the past but did not tolerate it  Has tried topical steroid therapies and oral atarax - Not much change noted  Was oral doxy the rash gets better but she has significant GI side effects so that has been d/c'd  Her glipizide was stopped in the past since it was initially suspected to be causing her symptoms  She is currently on a cream which has been helping     CKD:  Has had increased Cr/GFR for >6 months  Has been stable  No symptoms from it  No nephrology referral in the past  CKD work up 6/2015 was WNL      Back Pain:  Previous MRI reviewed  Had back injection with pain management in 2015 - has not seen them since  Did not tolerate Neurontin  Currently being controlled with tylenol      Anemia  stable       Review of Systems   Constitutional: Negative for activity change, appetite change and chills. HENT: Negative for congestion, dental problem and drooling. Eyes: Negative for pain, discharge and itching. Respiratory: Negative for apnea, choking and chest tightness. Physical Exam  Vitals:    09/17/18 0800   BP: 138/84   Pulse: Body mass index is 30.15 kg/m². BP Readings from Last 3 Encounters:   09/17/18 138/84   08/21/18 122/82   08/06/18 (!) 170/88     Physical Exam  Constitutional:  Appears well-developed and well-nourished. No distress. HENT:   Head: Normocephalic and atraumatic. Right Ear: External ear normal.   Left Ear: External ear normal.   Nose: Nose normal.   Eyes: Conjunctivae and EOM are normal.  Right eye exhibits no discharge. Left eye exhibits no discharge. No scleral icterus. Neck: Normal range of motion. Cardiovascular: Normal rate, regular rhythm and normal heart sounds.     + murmur   Pulmonary/Chest: Effort normal and breath sounds normal. No respiratory distress. no wheezes. no rales. no tenderness. Musculoskeletal: Normal range of motion. Neurological: alert. Skin: not diaphoretic. Psychiatric: normal mood and affect. behavior is normal. Judgment and thought content normal.   Nursing note and vitals reviewed. Assessment:  Ashley Tafoya was seen today for diabetes, hypertension and hyperlipidemia. Diagnoses and all orders for this visit:    Type 2 diabetes mellitus with complication, without long-term current use of insulin (HCC)  -     POCT glycosylated hemoglobin (Hb A1C)  Stable, controlled  Plan: continue current medications    Recurrent UTI  -     POCT Urinalysis no Micro  Doing well on Abx    Vitamin B12 deficiency  -     CBC Auto Differential; Future  -     Vitamin B12 & Folate; Future    Hypertension, unspecified type  Stable, controlled  Plan: continue current medications    Stage 3 chronic kidney disease  -     CBC Auto Differential; Future  -     Comprehensive Metabolic Panel; Future  -     Vitamin D 25 Hydroxy; Future  -     PTH, Intact; Future  -     Phosphorus; Future  Stable    Hyperlipidemia, unspecified hyperlipidemia type  -     Lipid Panel; Future  Stable, controlled  Plan: continue current medications    Spinal stenosis of lumbar region, unspecified whether neurogenic claudication present  Stable    Hypercalcemia  Stable, stayed below 11  Endocrine signed off    Diastolic dysfunction  Valvular heart disease  Stable    Vitamin D deficiency  -     Vitamin D 25 Hydroxy;  Future    Recurrent UTI (urinary tract infection)  As above    Need for influenza vaccination  -     INFLUENZA, HIGH DOSE, 65 YRS +, IM, PF, PREFILL SYR, 0.5ML (FLUZONE HD)      Orders Placed This Encounter   Procedures    INFLUENZA, HIGH DOSE, 65 YRS +, IM, PF, PREFILL SYR, 0.5ML (FLUZONE HD)    CBC Auto Differential     Standing Status:   Future     Number of Occurrences:   1     Standing Expiration Date:   12/17/2018   Kristie Brandon Comprehensive Metabolic Panel     Standing Status:   Future     Number of Occurrences:   1     Standing Expiration Date:   12/17/2018    Lipid Panel     Standing Status:   Future     Number of Occurrences:   1     Standing Expiration Date:   9/17/2019     Order Specific Question:   Is Patient Fasting?/# of Hours     Answer:   yes    Vitamin D 25 Hydroxy     Standing Status:   Future     Number of Occurrences:   1     Standing Expiration Date:   12/17/2018    Vitamin B12 & Folate     Standing Status:   Future     Number of Occurrences:   1     Standing Expiration Date:   9/17/2019    PTH, Intact     Standing Status:   Future     Number of Occurrences:   1     Standing Expiration Date:   9/17/2019    Phosphorus     Standing Status:   Future     Number of Occurrences:   1     Standing Expiration Date:   9/17/2019    POCT glycosylated hemoglobin (Hb A1C)    POCT Urinalysis no Micro      I personally reviewed all recent labs and imaging pertaining to conditions mentioned above in assessment/plan in [facility] and care everywhere, discussed results with patient in office    Diabetes Counseling   Patient was counseled regarding disease risks and adopting healthy behaviors. Patient was provided education materials to assist with self management. Patient was provided log (or received log during previous visit) to record blood pressure, food intake and/or blood sugar. Patient was instructed to keep log up-to-date and to always bring log to all office visits. Reviewed the following:  - Morbidity from diabetes involves both macrovascular (atherosclerosis) and microvascular disease (retinopathy, nephropathy, and neuropathy). - Monitoring recommendations for patients with diabetes were discussed  1. Smoking cessation counseling (Every visit)   2. Blood pressure (Every visit) Goal <140/80   3. Dilated eye examination (Annually, more than annually if significant retinopathy)   4.  Foot examination (Annually, every visit if

## 2018-10-04 ENCOUNTER — OFFICE VISIT (OUTPATIENT)
Dept: INTERNAL MEDICINE | Age: 83
End: 2018-10-04
Payer: MEDICARE

## 2018-10-04 VITALS
WEIGHT: 170.6 LBS | BODY MASS INDEX: 30.22 KG/M2 | SYSTOLIC BLOOD PRESSURE: 132 MMHG | OXYGEN SATURATION: 98 % | DIASTOLIC BLOOD PRESSURE: 78 MMHG | HEART RATE: 84 BPM

## 2018-10-04 DIAGNOSIS — R04.0 ANTERIOR EPISTAXIS: Primary | ICD-10-CM

## 2018-10-04 PROCEDURE — G8482 FLU IMMUNIZE ORDER/ADMIN: HCPCS | Performed by: FAMILY MEDICINE

## 2018-10-04 PROCEDURE — 1123F ACP DISCUSS/DSCN MKR DOCD: CPT | Performed by: FAMILY MEDICINE

## 2018-10-04 PROCEDURE — 1101F PT FALLS ASSESS-DOCD LE1/YR: CPT | Performed by: FAMILY MEDICINE

## 2018-10-04 PROCEDURE — 1090F PRES/ABSN URINE INCON ASSESS: CPT | Performed by: FAMILY MEDICINE

## 2018-10-04 PROCEDURE — 4040F PNEUMOC VAC/ADMIN/RCVD: CPT | Performed by: FAMILY MEDICINE

## 2018-10-04 PROCEDURE — G8417 CALC BMI ABV UP PARAM F/U: HCPCS | Performed by: FAMILY MEDICINE

## 2018-10-04 PROCEDURE — 1036F TOBACCO NON-USER: CPT | Performed by: FAMILY MEDICINE

## 2018-10-04 PROCEDURE — G8427 DOCREV CUR MEDS BY ELIG CLIN: HCPCS | Performed by: FAMILY MEDICINE

## 2018-10-04 PROCEDURE — 99213 OFFICE O/P EST LOW 20 MIN: CPT | Performed by: FAMILY MEDICINE

## 2018-10-04 ASSESSMENT — PATIENT HEALTH QUESTIONNAIRE - PHQ9
2. FEELING DOWN, DEPRESSED OR HOPELESS: 0
1. LITTLE INTEREST OR PLEASURE IN DOING THINGS: 0
SUM OF ALL RESPONSES TO PHQ QUESTIONS 1-9: 0
SUM OF ALL RESPONSES TO PHQ9 QUESTIONS 1 & 2: 0
SUM OF ALL RESPONSES TO PHQ QUESTIONS 1-9: 0

## 2018-10-04 NOTE — PROGRESS NOTES
not apply route nightly 1 Package 11    Blood Glucose Monitoring Suppl (TRUE METRIX AIR GLUCOSE METER) W/DEVICE KIT       Blood Glucose Monitoring Suppl [place] Dx: E11.9 1 Device 0    Compression Stockings MISC by Does not apply route Knee high, 20-30 mmHg 1 each 1     No current facility-administered medications on file prior to visit. Allergies   Allergen Reactions    Metoclopramide     Pantoprazole Other (See Comments)    Pcn [Penicillins]     Protonix [Pantoprazole Sodium]     Tramadol        Chief Complaint   Patient presents with    Epistaxis     pt has been having nose bleeds has been having several for the last 10 days     Urinary Tract Infection     denies sx but son wants it checked today        HPI  Symptoms:nosebleeds  Location:left nostril  Quality:no pain  Severity:mild  Duration:for 10 days  Timing:on and off  Context:not on anticoagulation  Alleviating/aggravting factors:none  Associated signs & symptoms:  No nasal injury or trauma  Has had nosebleeds in the past, over a year ago, at the time she was on anticoagulation  Currently, she is only on ASA    Review of Systems  Constitutional: Negative for fatigue, fever and sweats. HEENT: Negative for eye discharge and vision loss. Negative for ear drainage, hearing loss and nasal drainage. Respiratory: Negative for cough, dyspnea and wheezing. Cardiovascular:  Negative for chest pain, claudication and irregular heartbeat/palpitations. Physical Exam  Vitals:    10/04/18 1510   BP: 132/78   Pulse: 84   SpO2: 98%   Body mass index is 30.22 kg/m². Physical Exam  Constitutional: appears well-developed and well-nourished. No distress. HENT:   Head: Normocephalic and atraumatic.    Right Ear: Tympanic membrane, external ear and ear canal normal.   Left Ear: Tympanic membrane, external ear and ear canal normal.   Nose: Nose normal. Nasal mucosa is normal, superficial vessel on left side noted - not bleeding and clotted over  Mouth/Throat:

## 2018-10-23 ENCOUNTER — HOSPITAL ENCOUNTER (OUTPATIENT)
Age: 83
Setting detail: SPECIMEN
Discharge: HOME | End: 2018-10-23
Payer: MEDICARE

## 2018-10-23 ENCOUNTER — OFFICE VISIT (OUTPATIENT)
Dept: INTERNAL MEDICINE | Age: 83
End: 2018-10-23
Payer: MEDICARE

## 2018-10-23 VITALS
SYSTOLIC BLOOD PRESSURE: 124 MMHG | BODY MASS INDEX: 30.65 KG/M2 | HEIGHT: 63 IN | HEART RATE: 79 BPM | WEIGHT: 173 LBS | DIASTOLIC BLOOD PRESSURE: 80 MMHG

## 2018-10-23 DIAGNOSIS — N39.0 RECURRENT UTI (URINARY TRACT INFECTION): ICD-10-CM

## 2018-10-23 DIAGNOSIS — N39.0 RECURRENT UTI (URINARY TRACT INFECTION): Primary | ICD-10-CM

## 2018-10-23 DIAGNOSIS — R30.0 DYSURIA: ICD-10-CM

## 2018-10-23 LAB
BILIRUBIN, POC: NORMAL
BLOOD URINE, POC: NORMAL
CLARITY, POC: CLEAR
COLOR, POC: YELLOW
GLUCOSE URINE, POC: NORMAL
KETONES, POC: NORMAL
LEUKOCYTE EST, POC: NORMAL
NITRITE, POC: NORMAL
PH, POC: 6
PROTEIN, POC: NORMAL
SPECIFIC GRAVITY, POC: 1.03
UROBILINOGEN, POC: NORMAL

## 2018-10-23 PROCEDURE — 81002 URINALYSIS NONAUTO W/O SCOPE: CPT | Performed by: FAMILY MEDICINE

## 2018-10-23 PROCEDURE — 99213 OFFICE O/P EST LOW 20 MIN: CPT | Performed by: FAMILY MEDICINE

## 2018-10-23 PROCEDURE — G8417 CALC BMI ABV UP PARAM F/U: HCPCS | Performed by: FAMILY MEDICINE

## 2018-10-23 PROCEDURE — G8482 FLU IMMUNIZE ORDER/ADMIN: HCPCS | Performed by: FAMILY MEDICINE

## 2018-10-23 PROCEDURE — 1101F PT FALLS ASSESS-DOCD LE1/YR: CPT | Performed by: FAMILY MEDICINE

## 2018-10-23 PROCEDURE — 1123F ACP DISCUSS/DSCN MKR DOCD: CPT | Performed by: FAMILY MEDICINE

## 2018-10-23 PROCEDURE — 4040F PNEUMOC VAC/ADMIN/RCVD: CPT | Performed by: FAMILY MEDICINE

## 2018-10-23 PROCEDURE — 1036F TOBACCO NON-USER: CPT | Performed by: FAMILY MEDICINE

## 2018-10-23 PROCEDURE — 1090F PRES/ABSN URINE INCON ASSESS: CPT | Performed by: FAMILY MEDICINE

## 2018-10-23 PROCEDURE — G8427 DOCREV CUR MEDS BY ELIG CLIN: HCPCS | Performed by: FAMILY MEDICINE

## 2018-10-23 PROCEDURE — 87086 URINE CULTURE/COLONY COUNT: CPT

## 2018-10-23 ASSESSMENT — ENCOUNTER SYMPTOMS
BLOOD IN STOOL: 0
EYE PAIN: 0
ANAL BLEEDING: 0
EYE ITCHING: 0
EYE DISCHARGE: 0
ABDOMINAL PAIN: 1

## 2018-10-23 NOTE — PROGRESS NOTES
Patient: Seng Davey    YOB: 1927    Date: 10/23/18    Patient Active Problem List    Diagnosis Date Noted    CKD (chronic kidney disease) 03/18/2015     Priority: High     Overview Note:     Stage G3a A2      HLD (hyperlipidemia) 03/18/2015     Priority: High    HTN (hypertension) 11/29/2011     Priority: High    Diabetes mellitus 11/29/2011     Priority: High    Diastolic dysfunction 96/92/2127     Priority: Medium    Valvular heart disease 03/07/2018     Priority: Medium    Hypercalcemia 01/15/2018     Priority: Medium     Overview Note:     kami Tripathi      Lumbar spinal stenosis 12/22/2016     Priority: Medium    Chronic low back pain 08/17/2016     Priority: Medium    Eczematous dermatitis      Priority: Low    Vitamin D deficiency 03/18/2015     Priority: Low    SI (stress incontinence), female 05/29/2012     Priority: Low    Osteoarthritis 05/29/2012     Priority: Low    Vitamin B12 deficiency 09/17/2018     Overview Note:     Started 2/2018      Recurrent UTI (urinary tract infection) 08/06/2018     Past Medical History:   Diagnosis Date    Arthritis     Chicken pox     Chronic back pain     Chronic low back pain 8/17/2016    Eczematous dermatitis     History of bladder infections     Hyperlipidemia     Hypertension     Measles     Mumps     Osteoarthritis 5/29/2012    SCC (squamous cell carcinoma), arm 2013    right upper arm, 2015 right forarm    SI (stress incontinence), female 5/29/2012    Type II or unspecified type diabetes mellitus without mention of complication, not stated as uncontrolled     Whooping cough      Past Surgical History:   Procedure Laterality Date    ANKLE SURGERY      broken left ankle.     APPENDECTOMY      BREAST BIOPSY      x2 left breast    CATARACT REMOVAL  2004    bilateral    CYSTOCELE REPAIR      EYE SURGERY      bilateral cataract    HYSTERECTOMY      complete at age 40   [address]      as a child Blood Glucose Monitoring Suppl [place] Dx: E11.9 1 Device 0    Compression Stockings MISC by Does not apply route Knee high, 20-30 mmHg 1 each 1    Omega-3 Fatty Acids (FISH OIL) 1200 MG CAPS Take  by mouth daily.  aspirin 81 MG tablet Take 81 mg by mouth daily. No current facility-administered medications on file prior to visit. Allergies   Allergen Reactions    Metoclopramide     Pantoprazole Other (See Comments)    Pcn [Penicillins]     Protonix [Pantoprazole Sodium]     Tramadol        Chief Complaint   Patient presents with    Dysuria     more during the night than the day x couple weeks     Pain     abdomen pressure x since last visit        HPI  Patient is here today with her son history provided by both the patient and her son    Andria Fritz more frequently and urgently x 1 month  Having to urinate at night time   Per son she has been feeling well, getting around well  RLQ ab pressure, started a few months ago     No N,V,F  BM's normal, non bloody    Review of Systems   Constitutional: Negative for activity change, appetite change and chills. Eyes: Negative for pain, discharge and itching. Gastrointestinal: Positive for abdominal pain. Negative for anal bleeding and blood in stool. Genitourinary: Positive for dysuria and urgency. Negative for genital sores. Physical Exam  Vitals:    10/23/18 1338   BP: 124/80   Pulse: 79   Body mass index is 30.65 kg/m². Physical Exam  Constitutional:  Patient appears well nourished, well developed, and hydrated. Alert and oriented x 3. Non icteric and not pale. Eyes:    Conjunctiva and lids are normal.  Ears:  Hearing grossly intact. External ears normal.   Nose/Mouth/Throat:  External nose normal  Lips are normal in appearance. Musculoskeletal:  Normal musculature. No joint deformity or abnormalities. Normal range of motion for age. Moving all extremities equally. Normal gait.    Extremities:  No edema  Neuro:  memory normal, mood &

## 2018-10-24 ENCOUNTER — HOSPITAL ENCOUNTER (EMERGENCY)
Age: 83
Discharge: HOME | End: 2018-10-24
Attending: EMERGENCY MEDICINE
Payer: MEDICARE

## 2018-10-24 ENCOUNTER — APPOINTMENT (OUTPATIENT)
Dept: GENERAL RADIOLOGY | Age: 83
End: 2018-10-24
Payer: MEDICARE

## 2018-10-24 VITALS
OXYGEN SATURATION: 95 % | SYSTOLIC BLOOD PRESSURE: 164 MMHG | HEART RATE: 74 BPM | DIASTOLIC BLOOD PRESSURE: 65 MMHG | TEMPERATURE: 98.6 F

## 2018-10-24 DIAGNOSIS — R53.1 GENERAL WEAKNESS: Primary | ICD-10-CM

## 2018-10-24 DIAGNOSIS — J40 BRONCHITIS: ICD-10-CM

## 2018-10-24 LAB
ALBUMIN SERPL-MCNC: 4.4 G/DL (ref 3.9–4.9)
ALP BLD-CCNC: 69 U/L (ref 40–130)
ALT SERPL-CCNC: 12 U/L (ref 0–33)
ANION GAP SERPL CALCULATED.3IONS-SCNC: 15 MEQ/L (ref 7–13)
AST SERPL-CCNC: 11 U/L (ref 0–35)
BACTERIA: NORMAL /HPF
BASOPHILS ABSOLUTE: 0 K/UL (ref 0–0.2)
BASOPHILS RELATIVE PERCENT: 0.4 %
BILIRUB SERPL-MCNC: 0.3 MG/DL (ref 0–1.2)
BILIRUBIN URINE: NEGATIVE
BLOOD, URINE: NEGATIVE
BUN BLDV-MCNC: 28 MG/DL (ref 8–23)
CALCIUM SERPL-MCNC: 10.4 MG/DL (ref 8.6–10.2)
CHLORIDE BLD-SCNC: 98 MEQ/L (ref 98–107)
CLARITY: CLEAR
CO2: 27 MEQ/L (ref 22–29)
COLOR: ABNORMAL
CREAT SERPL-MCNC: 0.91 MG/DL (ref 0.5–0.9)
EKG ATRIAL RATE: 76 BPM
EKG P AXIS: 21 DEGREES
EKG P-R INTERVAL: 174 MS
EKG Q-T INTERVAL: 378 MS
EKG QRS DURATION: 88 MS
EKG QTC CALCULATION (BAZETT): 425 MS
EKG R AXIS: -5 DEGREES
EKG T AXIS: 124 DEGREES
EKG VENTRICULAR RATE: 76 BPM
EOSINOPHILS ABSOLUTE: 0.5 K/UL (ref 0–0.7)
EOSINOPHILS RELATIVE PERCENT: 6.5 %
EPITHELIAL CELLS, UA: NORMAL /HPF
GFR AFRICAN AMERICAN: >60
GFR NON-AFRICAN AMERICAN: 57.9
GLOBULIN: 3.2 G/DL (ref 2.3–3.5)
GLUCOSE BLD-MCNC: 202 MG/DL (ref 74–109)
GLUCOSE URINE: NEGATIVE MG/DL
HCT VFR BLD CALC: 39.5 % (ref 37–47)
HEMOGLOBIN: 13.3 G/DL (ref 12–16)
KETONES, URINE: NEGATIVE MG/DL
LACTIC ACID: 1.4 MMOL/L (ref 0.5–2.2)
LEUKOCYTE ESTERASE, URINE: NEGATIVE
LYMPHOCYTES ABSOLUTE: 2.1 K/UL (ref 1–4.8)
LYMPHOCYTES RELATIVE PERCENT: 25.8 %
MCH RBC QN AUTO: 30.9 PG (ref 27–31.3)
MCHC RBC AUTO-ENTMCNC: 33.6 % (ref 33–37)
MCV RBC AUTO: 92.1 FL (ref 82–100)
MONOCYTES ABSOLUTE: 0.4 K/UL (ref 0.2–0.8)
MONOCYTES RELATIVE PERCENT: 5.4 %
NEUTROPHILS ABSOLUTE: 5.1 K/UL (ref 1.4–6.5)
NEUTROPHILS RELATIVE PERCENT: 61.9 %
NITRITE, URINE: NEGATIVE
PDW BLD-RTO: 14 % (ref 11.5–14.5)
PH UA: 6.5 (ref 5–9)
PLATELET # BLD: 284 K/UL (ref 130–400)
POTASSIUM SERPL-SCNC: 4.3 MEQ/L (ref 3.5–5.1)
PROTEIN UA: 100 MG/DL
RBC # BLD: 4.29 M/UL (ref 4.2–5.4)
RBC UA: NORMAL /HPF (ref 0–2)
SODIUM BLD-SCNC: 140 MEQ/L (ref 132–144)
SPECIFIC GRAVITY UA: 1.01 (ref 1–1.03)
TOTAL PROTEIN: 7.6 G/DL (ref 6.4–8.1)
TROPONIN: <0.01 NG/ML (ref 0–0.01)
URINE REFLEX TO CULTURE: YES
URINE TYPE: ABNORMAL
UROBILINOGEN, URINE: 0.2 E.U./DL
WBC # BLD: 8.3 K/UL (ref 4.8–10.8)
WBC UA: NORMAL /HPF (ref 0–5)

## 2018-10-24 PROCEDURE — 81001 URINALYSIS AUTO W/SCOPE: CPT

## 2018-10-24 PROCEDURE — 2580000003 HC RX 258: Performed by: EMERGENCY MEDICINE

## 2018-10-24 PROCEDURE — 2500000003 HC RX 250 WO HCPCS

## 2018-10-24 PROCEDURE — 80053 COMPREHEN METABOLIC PANEL: CPT

## 2018-10-24 PROCEDURE — 96374 THER/PROPH/DIAG INJ IV PUSH: CPT

## 2018-10-24 PROCEDURE — 87040 BLOOD CULTURE FOR BACTERIA: CPT

## 2018-10-24 PROCEDURE — 6360000002 HC RX W HCPCS: Performed by: EMERGENCY MEDICINE

## 2018-10-24 PROCEDURE — 36415 COLL VENOUS BLD VENIPUNCTURE: CPT

## 2018-10-24 PROCEDURE — 83605 ASSAY OF LACTIC ACID: CPT

## 2018-10-24 PROCEDURE — 84484 ASSAY OF TROPONIN QUANT: CPT

## 2018-10-24 PROCEDURE — 71045 X-RAY EXAM CHEST 1 VIEW: CPT

## 2018-10-24 PROCEDURE — 87086 URINE CULTURE/COLONY COUNT: CPT

## 2018-10-24 PROCEDURE — 93005 ELECTROCARDIOGRAM TRACING: CPT

## 2018-10-24 PROCEDURE — 99284 EMERGENCY DEPT VISIT MOD MDM: CPT

## 2018-10-24 PROCEDURE — 2500000003 HC RX 250 WO HCPCS: Performed by: EMERGENCY MEDICINE

## 2018-10-24 PROCEDURE — 85025 COMPLETE CBC W/AUTO DIFF WBC: CPT

## 2018-10-24 PROCEDURE — 96375 TX/PRO/DX INJ NEW DRUG ADDON: CPT

## 2018-10-24 RX ORDER — CEFDINIR 300 MG/1
300 CAPSULE ORAL 2 TIMES DAILY
Qty: 20 CAPSULE | Refills: 0 | Status: SHIPPED | OUTPATIENT
Start: 2018-10-24 | End: 2018-11-03

## 2018-10-24 RX ORDER — SODIUM CHLORIDE 9 MG/ML
INJECTION, SOLUTION INTRAVENOUS CONTINUOUS
Status: DISCONTINUED | OUTPATIENT
Start: 2018-10-24 | End: 2018-10-24 | Stop reason: HOSPADM

## 2018-10-24 RX ORDER — 0.9 % SODIUM CHLORIDE 0.9 %
500 INTRAVENOUS SOLUTION INTRAVENOUS ONCE
Status: COMPLETED | OUTPATIENT
Start: 2018-10-24 | End: 2018-10-24

## 2018-10-24 RX ORDER — LABETALOL HYDROCHLORIDE 5 MG/ML
10 INJECTION, SOLUTION INTRAVENOUS ONCE
Status: COMPLETED | OUTPATIENT
Start: 2018-10-24 | End: 2018-10-24

## 2018-10-24 RX ORDER — LABETALOL HYDROCHLORIDE 5 MG/ML
INJECTION, SOLUTION INTRAVENOUS
Status: COMPLETED
Start: 2018-10-24 | End: 2018-10-24

## 2018-10-24 RX ORDER — ONDANSETRON 2 MG/ML
4 INJECTION INTRAMUSCULAR; INTRAVENOUS ONCE
Status: COMPLETED | OUTPATIENT
Start: 2018-10-24 | End: 2018-10-24

## 2018-10-24 RX ADMIN — SODIUM CHLORIDE 500 ML: 9 INJECTION, SOLUTION INTRAVENOUS at 08:15

## 2018-10-24 RX ADMIN — LABETALOL HYDROCHLORIDE 10 MG: 5 INJECTION, SOLUTION INTRAVENOUS at 08:37

## 2018-10-24 RX ADMIN — SODIUM CHLORIDE: 9 INJECTION, SOLUTION INTRAVENOUS at 07:22

## 2018-10-24 RX ADMIN — LABETALOL HYDROCHLORIDE 10 MG: 5 INJECTION, SOLUTION INTRAVENOUS at 08:07

## 2018-10-24 RX ADMIN — WATER 1 G: 1 INJECTION INTRAMUSCULAR; INTRAVENOUS; SUBCUTANEOUS at 08:07

## 2018-10-24 RX ADMIN — ONDANSETRON 4 MG: 2 INJECTION INTRAMUSCULAR; INTRAVENOUS at 07:22

## 2018-10-24 NOTE — ED PROVIDER NOTES
signs or Co-signsthis chart in the absence of a cardiologist.        RADIOLOGY:   Non-plain filmimages such as CT, Ultrasound and MRI are read by the radiologist. Plain radiographic images are visualized and preliminarily interpreted by the emergency physician with the below findings:    erpretation per the Radiologist below, if available at the time of this note:    XR CHEST PORTABLE   Final Result      NO EVIDENCE OF ACTIVE CARDIOPULMONARY DISEASE. STABLE MILD COMPENSATED CARDIOMEGALY. LABS:  Labs Reviewed   COMPREHENSIVE METABOLIC PANEL - Abnormal; Notable for the following:        Result Value    Anion Gap 15 (*)     Glucose 202 (*)     BUN 28 (*)     CREATININE 0.91 (*)     GFR Non- 57.9 (*)     Calcium 10.4 (*)     All other components within normal limits   URINE RT REFLEX TO CULTURE - Abnormal; Notable for the following:     Protein,  (*)     All other components within normal limits   CULTURE BLOOD #1   CULTURE BLOOD #2   URINE CULTURE   CBC WITH AUTO DIFFERENTIAL   TROPONIN   LACTIC ACID, PLASMA   MICROSCOPIC URINALYSIS       All other labs were within normal range or not returned as of this dictation. EMERGENCY DEPARTMENT COURSE and DIFFERENTIAL DIAGNOSIS/MDM:   Vitals:    Vitals:    10/24/18 0639 10/24/18 0800   BP:  (!) 210/95   Pulse: 81    Temp: 98.6 °F (37 °C)    TempSrc: Oral    SpO2: 94%        Patient will be signed out to Dr. Veronica Ann. Workup was initiated. Dr. Veronica Ann will determine appropriate disposition for the patient.     MDM  Number of Diagnoses or Management Options  Bronchitis: established and improving  General weakness: established and improving  Diagnosis management comments: Patient has generalized weakness seen by primary care physician yesterday urine was sent for urine culture report is still pending patient had no documented fever less active son is concerned about UTI but patient also has a cough congestion and a minimal productive

## 2018-10-25 LAB — URINE CULTURE, ROUTINE: NORMAL

## 2018-10-26 ENCOUNTER — OFFICE VISIT (OUTPATIENT)
Dept: INTERNAL MEDICINE | Age: 83
End: 2018-10-26
Payer: MEDICARE

## 2018-10-26 VITALS
HEART RATE: 66 BPM | BODY MASS INDEX: 30.37 KG/M2 | DIASTOLIC BLOOD PRESSURE: 68 MMHG | WEIGHT: 171.4 LBS | SYSTOLIC BLOOD PRESSURE: 124 MMHG | HEIGHT: 63 IN | TEMPERATURE: 97.3 F | OXYGEN SATURATION: 97 %

## 2018-10-26 DIAGNOSIS — J20.9 ACUTE BRONCHITIS, UNSPECIFIED ORGANISM: Primary | ICD-10-CM

## 2018-10-26 DIAGNOSIS — K52.1 DIARRHEA DUE TO DRUG: ICD-10-CM

## 2018-10-26 LAB — URINE CULTURE, ROUTINE: NORMAL

## 2018-10-26 PROCEDURE — G8417 CALC BMI ABV UP PARAM F/U: HCPCS | Performed by: PHYSICIAN ASSISTANT

## 2018-10-26 PROCEDURE — 1123F ACP DISCUSS/DSCN MKR DOCD: CPT | Performed by: PHYSICIAN ASSISTANT

## 2018-10-26 PROCEDURE — 99214 OFFICE O/P EST MOD 30 MIN: CPT | Performed by: PHYSICIAN ASSISTANT

## 2018-10-26 PROCEDURE — 4040F PNEUMOC VAC/ADMIN/RCVD: CPT | Performed by: PHYSICIAN ASSISTANT

## 2018-10-26 PROCEDURE — G8427 DOCREV CUR MEDS BY ELIG CLIN: HCPCS | Performed by: PHYSICIAN ASSISTANT

## 2018-10-26 PROCEDURE — 1101F PT FALLS ASSESS-DOCD LE1/YR: CPT | Performed by: PHYSICIAN ASSISTANT

## 2018-10-26 PROCEDURE — 1036F TOBACCO NON-USER: CPT | Performed by: PHYSICIAN ASSISTANT

## 2018-10-26 PROCEDURE — 1090F PRES/ABSN URINE INCON ASSESS: CPT | Performed by: PHYSICIAN ASSISTANT

## 2018-10-26 PROCEDURE — G8482 FLU IMMUNIZE ORDER/ADMIN: HCPCS | Performed by: PHYSICIAN ASSISTANT

## 2018-10-26 NOTE — PROGRESS NOTES
10/26/2018     Tip Walker (:  6/10/1927) is a 80 y.o. female, here for evaluation of the following medical concerns:    Chief Complaint   Patient presents with    Follow-Up from Hospital     pt was in for bronchitis, pt states she is doing better still on antibiotic but thinks it is causing diarreah, pt wants to know her results from her urine culture while in hosptial and wants to know if she can have copies of them     Cough     with loose phlegm        HPI      ER follow UP:  Patient is here for a follow up after an ER visit at [facility] on 10/24 . She went to the ER for weakness, and diagnosed with bronchitis and dysuria . Labs, urine culture, blood cultures  and CXR was done and results were normal   She states Her condition has slightly improved. Only a little cough now  Denies fever, chills, and SOB   New treatment includes Omnicef  She has started to have diarrhea since starting the ANGELO FAITH . Review of Systems   Constitutional: Negative for chills, fatigue and fever. HENT: Negative for postnasal drip, rhinorrhea and sinus pain. Respiratory: Positive for cough. Negative for chest tightness, shortness of breath and wheezing. Genitourinary: Negative for dysuria. Prior to Visit Medications    Medication Sig Taking?  Authorizing Provider   cefdinir (OMNICEF) 300 MG capsule Take 1 capsule by mouth 2 times daily for 10 days Yes Yolanda Holden MD   amLODIPine (NORVASC) 5 MG tablet Take 5 mg by mouth daily Yes Historical Provider, MD   nitrofurantoin (MACRODANTIN) 50 MG capsule Take 1 capsule by mouth daily Yes Peg Gamble MD   lisinopril (PRINIVIL;ZESTRIL) 10 MG tablet Take 1 tablet by mouth daily Yes Peg Gamble MD   metoprolol tartrate (LOPRESSOR) 25 MG tablet take 1 tablet by mouth twice a day Yes Peg Gamble MD   TRUE METRIX BLOOD GLUCOSE TEST strip TEST two to three times a day Yes MD KAMILA Jeffrey-D 3CC LUER-ANDREW SYR 25GX1\" 25G X 1\" 3 ML MISC use every month with B-12 Normocephalic. Mouth/Throat: Oropharynx is clear and moist.   Eyes: Conjunctivae are normal.   Neck: Normal range of motion. Neck supple. Cardiovascular: Normal rate, regular rhythm and normal heart sounds. Pulmonary/Chest: Effort normal and breath sounds normal. She has no wheezes. She has no rales. She exhibits no tenderness. Lymphadenopathy:     She has no cervical adenopathy. ASSESSMENT/PLAN:  1. Acute bronchitis, unspecified organism  - much better, after treatment  - only dry cough is lingering      2. Diarrhea due to drug  - stop Omnicef , since causing diarrhea  - advised increasing fluids   - advised to bring her back, if her condition dose not continue to improve       No Follow-up on file. An electronic signature was used to authenticate this note.     --PEDRO LUIS Horton on 10/28/2018 at 4:47 PM

## 2018-10-28 ASSESSMENT — ENCOUNTER SYMPTOMS
SHORTNESS OF BREATH: 0
COUGH: 1
RHINORRHEA: 0
WHEEZING: 0
SINUS PAIN: 0
CHEST TIGHTNESS: 0

## 2018-10-29 LAB
BLOOD CULTURE, ROUTINE: NORMAL
CULTURE, BLOOD 2: NORMAL

## 2018-11-10 ENCOUNTER — APPOINTMENT (OUTPATIENT)
Dept: GENERAL RADIOLOGY | Age: 83
DRG: 392 | End: 2018-11-10
Payer: MEDICARE

## 2018-11-10 ENCOUNTER — HOSPITAL ENCOUNTER (INPATIENT)
Age: 83
LOS: 1 days | Discharge: HOME HEALTH | DRG: 392 | End: 2018-11-12
Attending: EMERGENCY MEDICINE | Admitting: INTERNAL MEDICINE
Payer: MEDICARE

## 2018-11-10 DIAGNOSIS — R07.2 PRECORDIAL PAIN: ICD-10-CM

## 2018-11-10 DIAGNOSIS — R11.0 NAUSEA: Primary | ICD-10-CM

## 2018-11-10 PROBLEM — R07.9 CHEST PAIN: Status: ACTIVE | Noted: 2018-11-10

## 2018-11-10 LAB
ALBUMIN SERPL-MCNC: 4.2 G/DL (ref 3.9–4.9)
ALP BLD-CCNC: 90 U/L (ref 40–130)
ALT SERPL-CCNC: 12 U/L (ref 0–33)
ANION GAP SERPL CALCULATED.3IONS-SCNC: 18 MEQ/L (ref 7–13)
AST SERPL-CCNC: 11 U/L (ref 0–35)
BASOPHILS ABSOLUTE: 0.1 K/UL (ref 0–0.2)
BASOPHILS RELATIVE PERCENT: 1.3 %
BILIRUB SERPL-MCNC: 0.2 MG/DL (ref 0–1.2)
BUN BLDV-MCNC: 24 MG/DL (ref 8–23)
CALCIUM SERPL-MCNC: 9.9 MG/DL (ref 8.6–10.2)
CHLORIDE BLD-SCNC: 93 MEQ/L (ref 98–107)
CO2: 26 MEQ/L (ref 22–29)
CREAT SERPL-MCNC: 0.96 MG/DL (ref 0.5–0.9)
EKG ATRIAL RATE: 75 BPM
EKG P AXIS: 43 DEGREES
EKG P-R INTERVAL: 186 MS
EKG Q-T INTERVAL: 404 MS
EKG QRS DURATION: 104 MS
EKG QTC CALCULATION (BAZETT): 451 MS
EKG R AXIS: -27 DEGREES
EKG T AXIS: 127 DEGREES
EKG VENTRICULAR RATE: 75 BPM
EOSINOPHILS ABSOLUTE: 0.2 K/UL (ref 0–0.7)
EOSINOPHILS RELATIVE PERCENT: 2 %
GFR AFRICAN AMERICAN: >60
GFR NON-AFRICAN AMERICAN: 54.4
GLOBULIN: 3.2 G/DL (ref 2.3–3.5)
GLUCOSE BLD-MCNC: 292 MG/DL (ref 74–109)
HCT VFR BLD CALC: 38.2 % (ref 37–47)
HEMOGLOBIN: 12.5 G/DL (ref 12–16)
INR BLD: 0.9
LYMPHOCYTES ABSOLUTE: 1.6 K/UL (ref 1–4.8)
LYMPHOCYTES RELATIVE PERCENT: 14.4 %
MAGNESIUM: 1.7 MG/DL (ref 1.7–2.3)
MCH RBC QN AUTO: 30.3 PG (ref 27–31.3)
MCHC RBC AUTO-ENTMCNC: 32.7 % (ref 33–37)
MCV RBC AUTO: 92.7 FL (ref 82–100)
MONOCYTES ABSOLUTE: 0.5 K/UL (ref 0.2–0.8)
MONOCYTES RELATIVE PERCENT: 4.3 %
NEUTROPHILS ABSOLUTE: 8.7 K/UL (ref 1.4–6.5)
NEUTROPHILS RELATIVE PERCENT: 78 %
PDW BLD-RTO: 13.7 % (ref 11.5–14.5)
PLATELET # BLD: 292 K/UL (ref 130–400)
POTASSIUM SERPL-SCNC: 3.9 MEQ/L (ref 3.5–5.1)
PROTHROMBIN TIME: 9.3 SEC (ref 9.6–12.3)
RBC # BLD: 4.13 M/UL (ref 4.2–5.4)
SODIUM BLD-SCNC: 137 MEQ/L (ref 132–144)
TOTAL PROTEIN: 7.4 G/DL (ref 6.4–8.1)
TROPONIN: <0.01 NG/ML (ref 0–0.01)
WBC # BLD: 11.1 K/UL (ref 4.8–10.8)

## 2018-11-10 PROCEDURE — 6360000002 HC RX W HCPCS: Performed by: EMERGENCY MEDICINE

## 2018-11-10 PROCEDURE — 96374 THER/PROPH/DIAG INJ IV PUSH: CPT

## 2018-11-10 PROCEDURE — 96375 TX/PRO/DX INJ NEW DRUG ADDON: CPT

## 2018-11-10 PROCEDURE — 6370000000 HC RX 637 (ALT 250 FOR IP): Performed by: EMERGENCY MEDICINE

## 2018-11-10 PROCEDURE — 36415 COLL VENOUS BLD VENIPUNCTURE: CPT

## 2018-11-10 PROCEDURE — 71045 X-RAY EXAM CHEST 1 VIEW: CPT

## 2018-11-10 PROCEDURE — 2500000003 HC RX 250 WO HCPCS: Performed by: EMERGENCY MEDICINE

## 2018-11-10 PROCEDURE — 99285 EMERGENCY DEPT VISIT HI MDM: CPT

## 2018-11-10 PROCEDURE — 83735 ASSAY OF MAGNESIUM: CPT

## 2018-11-10 PROCEDURE — G0378 HOSPITAL OBSERVATION PER HR: HCPCS

## 2018-11-10 PROCEDURE — 93005 ELECTROCARDIOGRAM TRACING: CPT

## 2018-11-10 PROCEDURE — 85025 COMPLETE CBC W/AUTO DIFF WBC: CPT

## 2018-11-10 PROCEDURE — 83036 HEMOGLOBIN GLYCOSYLATED A1C: CPT

## 2018-11-10 PROCEDURE — 84484 ASSAY OF TROPONIN QUANT: CPT

## 2018-11-10 PROCEDURE — 80053 COMPREHEN METABOLIC PANEL: CPT

## 2018-11-10 PROCEDURE — 85610 PROTHROMBIN TIME: CPT

## 2018-11-10 PROCEDURE — 96376 TX/PRO/DX INJ SAME DRUG ADON: CPT

## 2018-11-10 RX ORDER — SODIUM CHLORIDE 0.9 % (FLUSH) 0.9 %
10 SYRINGE (ML) INJECTION EVERY 12 HOURS SCHEDULED
Status: DISCONTINUED | OUTPATIENT
Start: 2018-11-10 | End: 2018-11-12 | Stop reason: HOSPADM

## 2018-11-10 RX ORDER — METOPROLOL TARTRATE 5 MG/5ML
5 INJECTION INTRAVENOUS ONCE
Status: DISCONTINUED | OUTPATIENT
Start: 2018-11-10 | End: 2018-11-10

## 2018-11-10 RX ORDER — ATORVASTATIN CALCIUM 10 MG/1
20 TABLET, FILM COATED ORAL NIGHTLY
Status: DISCONTINUED | OUTPATIENT
Start: 2018-11-10 | End: 2018-11-12 | Stop reason: HOSPADM

## 2018-11-10 RX ORDER — NICOTINE POLACRILEX 4 MG
15 LOZENGE BUCCAL PRN
Status: DISCONTINUED | OUTPATIENT
Start: 2018-11-10 | End: 2018-11-12 | Stop reason: HOSPADM

## 2018-11-10 RX ORDER — ENALAPRILAT 2.5 MG/2ML
1.25 INJECTION INTRAVENOUS EVERY 6 HOURS PRN
Status: DISCONTINUED | OUTPATIENT
Start: 2018-11-10 | End: 2018-11-12 | Stop reason: HOSPADM

## 2018-11-10 RX ORDER — DEXTROSE MONOHYDRATE 50 MG/ML
100 INJECTION, SOLUTION INTRAVENOUS PRN
Status: DISCONTINUED | OUTPATIENT
Start: 2018-11-10 | End: 2018-11-12 | Stop reason: HOSPADM

## 2018-11-10 RX ORDER — DEXTROSE MONOHYDRATE 25 G/50ML
12.5 INJECTION, SOLUTION INTRAVENOUS PRN
Status: DISCONTINUED | OUTPATIENT
Start: 2018-11-10 | End: 2018-11-12 | Stop reason: HOSPADM

## 2018-11-10 RX ORDER — ONDANSETRON 2 MG/ML
4 INJECTION INTRAMUSCULAR; INTRAVENOUS ONCE
Status: COMPLETED | OUTPATIENT
Start: 2018-11-10 | End: 2018-11-10

## 2018-11-10 RX ORDER — AMLODIPINE BESYLATE 5 MG/1
5 TABLET ORAL DAILY
Status: DISCONTINUED | OUTPATIENT
Start: 2018-11-11 | End: 2018-11-11

## 2018-11-10 RX ORDER — SODIUM CHLORIDE 0.9 % (FLUSH) 0.9 %
10 SYRINGE (ML) INJECTION PRN
Status: DISCONTINUED | OUTPATIENT
Start: 2018-11-10 | End: 2018-11-12 | Stop reason: HOSPADM

## 2018-11-10 RX ORDER — MORPHINE SULFATE 4 MG/ML
2 INJECTION, SOLUTION INTRAMUSCULAR; INTRAVENOUS ONCE
Status: COMPLETED | OUTPATIENT
Start: 2018-11-10 | End: 2018-11-10

## 2018-11-10 RX ORDER — HYDRALAZINE HYDROCHLORIDE 20 MG/ML
10 INJECTION INTRAMUSCULAR; INTRAVENOUS EVERY 4 HOURS PRN
Status: DISCONTINUED | OUTPATIENT
Start: 2018-11-10 | End: 2018-11-12 | Stop reason: HOSPADM

## 2018-11-10 RX ORDER — NITROFURANTOIN MACROCRYSTALS 50 MG/1
50 CAPSULE ORAL DAILY
Status: DISCONTINUED | OUTPATIENT
Start: 2018-11-11 | End: 2018-11-11

## 2018-11-10 RX ORDER — METOPROLOL TARTRATE 5 MG/5ML
2.5 INJECTION INTRAVENOUS ONCE
Status: COMPLETED | OUTPATIENT
Start: 2018-11-10 | End: 2018-11-10

## 2018-11-10 RX ORDER — ASPIRIN 81 MG/1
81 TABLET, CHEWABLE ORAL DAILY
Status: DISCONTINUED | OUTPATIENT
Start: 2018-11-11 | End: 2018-11-12 | Stop reason: HOSPADM

## 2018-11-10 RX ORDER — LISINOPRIL 10 MG/1
10 TABLET ORAL DAILY
Status: DISCONTINUED | OUTPATIENT
Start: 2018-11-11 | End: 2018-11-12 | Stop reason: HOSPADM

## 2018-11-10 RX ORDER — ONDANSETRON 2 MG/ML
4 INJECTION INTRAMUSCULAR; INTRAVENOUS EVERY 6 HOURS PRN
Status: DISCONTINUED | OUTPATIENT
Start: 2018-11-10 | End: 2018-11-12 | Stop reason: HOSPADM

## 2018-11-10 RX ADMIN — METOPROLOL TARTRATE 2.5 MG: 5 INJECTION, SOLUTION INTRAVENOUS at 20:01

## 2018-11-10 RX ADMIN — MORPHINE SULFATE 2 MG: 4 INJECTION INTRAVENOUS at 20:15

## 2018-11-10 RX ADMIN — NITROGLYCERIN 0.5 INCH: 20 OINTMENT TOPICAL at 20:02

## 2018-11-10 RX ADMIN — ONDANSETRON 4 MG: 2 INJECTION INTRAMUSCULAR; INTRAVENOUS at 18:41

## 2018-11-10 RX ADMIN — ONDANSETRON 4 MG: 2 INJECTION INTRAMUSCULAR; INTRAVENOUS at 20:15

## 2018-11-10 ASSESSMENT — PAIN SCALES - GENERAL
PAINLEVEL_OUTOF10: 10
PAINLEVEL_OUTOF10: 3
PAINLEVEL_OUTOF10: 0
PAINLEVEL_OUTOF10: 0
PAINLEVEL_OUTOF10: 5

## 2018-11-10 ASSESSMENT — PAIN DESCRIPTION - DESCRIPTORS
DESCRIPTORS: TIGHTNESS
DESCRIPTORS: SHARP;ACHING

## 2018-11-10 ASSESSMENT — PAIN DESCRIPTION - PAIN TYPE
TYPE: ACUTE PAIN
TYPE: ACUTE PAIN

## 2018-11-10 ASSESSMENT — ENCOUNTER SYMPTOMS
EYE REDNESS: 0
STRIDOR: 0
EYE DISCHARGE: 0
TROUBLE SWALLOWING: 0
SORE THROAT: 0
SINUS PRESSURE: 0
CONSTIPATION: 0
CHEST TIGHTNESS: 0
CHOKING: 0
EYE PAIN: 0
BACK PAIN: 0
FACIAL SWELLING: 0
WHEEZING: 0
ABDOMINAL PAIN: 0
NAUSEA: 1
VOICE CHANGE: 0
SHORTNESS OF BREATH: 0
COUGH: 0
VOMITING: 1
BLOOD IN STOOL: 0
DIARRHEA: 0

## 2018-11-10 ASSESSMENT — PAIN DESCRIPTION - ORIENTATION: ORIENTATION: LEFT

## 2018-11-10 ASSESSMENT — PAIN DESCRIPTION - LOCATION
LOCATION: CHEST;RIB CAGE
LOCATION: CHEST

## 2018-11-10 NOTE — ED PROVIDER NOTES
[facility] ED  eMERGENCY dEPARTMENT eNCOUnter      Pt Name: Cristofer Payton  MRN: [id number]  [place] 6/10/1927  Date of evaluation: 11/10/2018  Provider: Kenny Burns MD    [address] [place] [address]       Chief Complaint   Patient presents with    Chest Pain     tightness x1 hr    Emesis     HISTORY OF PRESENT ILLNESS   (Location/Symptom, Timing/Onset,Context/Setting, Quality, Duration, Modifying Factors, Severity)  Note limiting factors. Cristofer Payton is a 80 y.o. female who presents to the emergency department Patient complaining of chest pain while she was at home before her dinner as per son. Her nausea did and on the way patient vomited large amount of vomitus history of recurrent UTI hypertension diabetes patient was initially seen file's ago for bronchitis and getting better no short of breath no fever no chills patient has no numbness tingling to the arms    HPI    NursingNotes were reviewed. REVIEW OF SYSTEMS    (2-9 systems for level 4, 10 or more for level 5)     Review of Systems   Constitutional: Negative. Negative for activity change and fever. HENT: Negative for congestion, drooling, facial swelling, mouth sores, nosebleeds, sinus pressure, sore throat, trouble swallowing and voice change. Eyes: Negative for pain, discharge, redness and visual disturbance. Respiratory: Negative for cough, choking, chest tightness, shortness of breath, wheezing and stridor. Cardiovascular: Positive for chest pain. Negative for palpitations and leg swelling. Gastrointestinal: Positive for nausea and vomiting. Negative for abdominal pain, blood in stool, constipation and diarrhea. Endocrine: Negative for cold intolerance, polyphagia and polyuria. Genitourinary: Negative for dysuria, flank pain, frequency, genital sores and urgency. Musculoskeletal: Negative for back pain, joint swelling, neck pain and neck stiffness. Skin: Negative for pallor and rash.    Neurological: Negative for tremors, Reviewed  Clinical lab tests: ordered and reviewed  Tests in the radiology section of CPT®: ordered and reviewed        CRITICAL CARE TIME   Total Critical Care time was  minutes, excluding separately reportableprocedures. There was a high probability of clinicallysignificant/life threatening deterioration in the patient's condition which required my urgent intervention. CONSULTS:  None    PROCEDURES:  Unless otherwise noted below, none     Procedures    FINAL IMPRESSION      1. Nausea    2. Precordial pain          DISPOSITION/PLAN   DISPOSITION        PATIENT REFERRED TO:  No follow-up provider specified.     DISCHARGE MEDICATIONS:  New Prescriptions    No medications on file          (Please note that portions of this note were completed with a voice recognition program.  Efforts were made to edit the dictations but occasionally words are mis-transcribed.)    Kenny Burns MD (electronically signed)  Attending Emergency Physician       Kenny Burns MD  11/10/18 2030       Kenny Burns MD  11/11/18 [id number]

## 2018-11-11 ENCOUNTER — APPOINTMENT (OUTPATIENT)
Dept: CT IMAGING | Age: 83
DRG: 392 | End: 2018-11-11
Payer: MEDICARE

## 2018-11-11 ENCOUNTER — APPOINTMENT (OUTPATIENT)
Dept: GENERAL RADIOLOGY | Age: 83
DRG: 392 | End: 2018-11-11
Payer: MEDICARE

## 2018-11-11 PROBLEM — R11.2 NAUSEA & VOMITING: Status: ACTIVE | Noted: 2018-11-11

## 2018-11-11 LAB
ALBUMIN SERPL-MCNC: 3.6 G/DL (ref 3.9–4.9)
ALP BLD-CCNC: 63 U/L (ref 40–130)
ALT SERPL-CCNC: 18 U/L (ref 0–33)
ANION GAP SERPL CALCULATED.3IONS-SCNC: 15 MEQ/L (ref 7–13)
AST SERPL-CCNC: 64 U/L (ref 0–35)
BASE EXCESS ARTERIAL: 4
BASOPHILS ABSOLUTE: 0 K/UL (ref 0–0.2)
BASOPHILS RELATIVE PERCENT: 0.4 %
BILIRUB SERPL-MCNC: 0.3 MG/DL (ref 0–1.2)
BILIRUBIN URINE: NEGATIVE
BLOOD, URINE: ABNORMAL
BUN BLDV-MCNC: 20 MG/DL (ref 8–23)
CALCIUM SERPL-MCNC: 9.5 MG/DL (ref 8.6–10.2)
CHLORIDE BLD-SCNC: 96 MEQ/L (ref 98–107)
CLARITY: CLEAR
CO2: 27 MEQ/L (ref 22–29)
COLOR: YELLOW
CREAT SERPL-MCNC: 0.87 MG/DL (ref 0.5–0.9)
D DIMER: 0.69 MG/L FEU (ref 0–0.5)
EOSINOPHILS ABSOLUTE: 0 K/UL (ref 0–0.7)
EOSINOPHILS RELATIVE PERCENT: 0.4 %
GFR AFRICAN AMERICAN: >60
GFR NON-AFRICAN AMERICAN: >60
GLOBULIN: 2.9 G/DL (ref 2.3–3.5)
GLUCOSE BLD-MCNC: 175 MG/DL (ref 60–115)
GLUCOSE BLD-MCNC: 184 MG/DL (ref 60–115)
GLUCOSE BLD-MCNC: 216 MG/DL (ref 60–115)
GLUCOSE BLD-MCNC: 229 MG/DL (ref 60–115)
GLUCOSE BLD-MCNC: 235 MG/DL (ref 74–109)
GLUCOSE URINE: 250 MG/DL
HBA1C MFR BLD: 7.6 % (ref 4.8–5.9)
HCO3 ARTERIAL: 28 MMOL/L (ref 21–29)
HCT VFR BLD CALC: 35.8 % (ref 37–47)
HEMOGLOBIN: 12 G/DL (ref 12–16)
KETONES, URINE: NEGATIVE MG/DL
LACTIC ACID: 2 MMOL/L (ref 0.5–2.2)
LEUKOCYTE ESTERASE, URINE: NEGATIVE
LIPASE: 35 U/L (ref 13–60)
LYMPHOCYTES ABSOLUTE: 1.7 K/UL (ref 1–4.8)
LYMPHOCYTES RELATIVE PERCENT: 15.3 %
MAGNESIUM: 1.7 MG/DL (ref 1.7–2.3)
MCH RBC QN AUTO: 30.9 PG (ref 27–31.3)
MCHC RBC AUTO-ENTMCNC: 33.5 % (ref 33–37)
MCV RBC AUTO: 92.3 FL (ref 82–100)
MONOCYTES ABSOLUTE: 0.7 K/UL (ref 0.2–0.8)
MONOCYTES RELATIVE PERCENT: 6.1 %
NEUTROPHILS ABSOLUTE: 8.8 K/UL (ref 1.4–6.5)
NEUTROPHILS RELATIVE PERCENT: 77.8 %
NITRITE, URINE: NEGATIVE
O2 SAT, ARTERIAL: 95 % (ref 93–100)
PCO2 ARTERIAL: 41 MM HG (ref 35–45)
PDW BLD-RTO: 13.4 % (ref 11.5–14.5)
PERFORMED ON: ABNORMAL
PH ARTERIAL: 7.45 (ref 7.35–7.45)
PH UA: 7 (ref 5–9)
PLATELET # BLD: 312 K/UL (ref 130–400)
PO2 ARTERIAL: 74 MM HG (ref 75–108)
POC FIO2: 28
POC SAMPLE TYPE: ABNORMAL
POTASSIUM REFLEX MAGNESIUM: 4.1 MEQ/L (ref 3.5–5.1)
PROTEIN UA: >=300 MG/DL
RBC # BLD: 3.88 M/UL (ref 4.2–5.4)
RBC UA: NORMAL /HPF (ref 0–2)
SODIUM BLD-SCNC: 138 MEQ/L (ref 132–144)
SPECIFIC GRAVITY UA: 1.02 (ref 1–1.03)
TCO2 ARTERIAL: 29
TOTAL PROTEIN: 6.5 G/DL (ref 6.4–8.1)
URINE REFLEX TO CULTURE: YES
UROBILINOGEN, URINE: 0.2 E.U./DL
WBC # BLD: 11.3 K/UL (ref 4.8–10.8)
WBC UA: NORMAL /HPF (ref 0–5)

## 2018-11-11 PROCEDURE — 6370000000 HC RX 637 (ALT 250 FOR IP): Performed by: INTERNAL MEDICINE

## 2018-11-11 PROCEDURE — 96376 TX/PRO/DX INJ SAME DRUG ADON: CPT

## 2018-11-11 PROCEDURE — G0378 HOSPITAL OBSERVATION PER HR: HCPCS

## 2018-11-11 PROCEDURE — 83690 ASSAY OF LIPASE: CPT

## 2018-11-11 PROCEDURE — 82803 BLOOD GASES ANY COMBINATION: CPT

## 2018-11-11 PROCEDURE — 82948 REAGENT STRIP/BLOOD GLUCOSE: CPT

## 2018-11-11 PROCEDURE — S0028 INJECTION, FAMOTIDINE, 20 MG: HCPCS | Performed by: INTERNAL MEDICINE

## 2018-11-11 PROCEDURE — 2580000003 HC RX 258: Performed by: INTERNAL MEDICINE

## 2018-11-11 PROCEDURE — 83605 ASSAY OF LACTIC ACID: CPT

## 2018-11-11 PROCEDURE — 74018 RADEX ABDOMEN 1 VIEW: CPT

## 2018-11-11 PROCEDURE — 6360000004 HC RX CONTRAST MEDICATION: Performed by: INTERNAL MEDICINE

## 2018-11-11 PROCEDURE — 6360000002 HC RX W HCPCS: Performed by: INTERNAL MEDICINE

## 2018-11-11 PROCEDURE — 85379 FIBRIN DEGRADATION QUANT: CPT

## 2018-11-11 PROCEDURE — 2500000003 HC RX 250 WO HCPCS: Performed by: INTERNAL MEDICINE

## 2018-11-11 PROCEDURE — 71275 CT ANGIOGRAPHY CHEST: CPT

## 2018-11-11 PROCEDURE — 96375 TX/PRO/DX INJ NEW DRUG ADDON: CPT

## 2018-11-11 PROCEDURE — 80053 COMPREHEN METABOLIC PANEL: CPT

## 2018-11-11 PROCEDURE — 83735 ASSAY OF MAGNESIUM: CPT

## 2018-11-11 PROCEDURE — 6360000002 HC RX W HCPCS

## 2018-11-11 PROCEDURE — 36415 COLL VENOUS BLD VENIPUNCTURE: CPT

## 2018-11-11 PROCEDURE — 1210000000 HC MED SURG R&B

## 2018-11-11 PROCEDURE — 81001 URINALYSIS AUTO W/SCOPE: CPT

## 2018-11-11 PROCEDURE — 85025 COMPLETE CBC W/AUTO DIFF WBC: CPT

## 2018-11-11 RX ORDER — LORAZEPAM 2 MG/ML
0.5 INJECTION INTRAMUSCULAR EVERY 6 HOURS PRN
Status: DISCONTINUED | OUTPATIENT
Start: 2018-11-11 | End: 2018-11-12 | Stop reason: HOSPADM

## 2018-11-11 RX ORDER — LORAZEPAM 2 MG/ML
INJECTION INTRAMUSCULAR
Status: COMPLETED
Start: 2018-11-11 | End: 2018-11-11

## 2018-11-11 RX ORDER — LACTULOSE 10 G/15ML
20 SOLUTION ORAL ONCE
Status: COMPLETED | OUTPATIENT
Start: 2018-11-11 | End: 2018-11-11

## 2018-11-11 RX ORDER — SODIUM CHLORIDE 9 MG/ML
INJECTION, SOLUTION INTRAVENOUS CONTINUOUS
Status: DISCONTINUED | OUTPATIENT
Start: 2018-11-11 | End: 2018-11-12

## 2018-11-11 RX ORDER — AMLODIPINE BESYLATE 10 MG/1
10 TABLET ORAL DAILY
Status: DISCONTINUED | OUTPATIENT
Start: 2018-11-11 | End: 2018-11-12 | Stop reason: HOSPADM

## 2018-11-11 RX ORDER — SENNA PLUS 8.6 MG/1
1 TABLET ORAL 2 TIMES DAILY
Status: DISCONTINUED | OUTPATIENT
Start: 2018-11-11 | End: 2018-11-12 | Stop reason: HOSPADM

## 2018-11-11 RX ADMIN — LORAZEPAM 0.5 MG: 2 INJECTION INTRAMUSCULAR; INTRAVENOUS at 18:49

## 2018-11-11 RX ADMIN — LACTULOSE 20 G: 20 SOLUTION ORAL at 12:56

## 2018-11-11 RX ADMIN — ONDANSETRON 4 MG: 2 SOLUTION INTRAMUSCULAR; INTRAVENOUS at 15:22

## 2018-11-11 RX ADMIN — NITROGLYCERIN 0.5 INCH: 20 OINTMENT TOPICAL at 21:00

## 2018-11-11 RX ADMIN — NITROGLYCERIN 0.5 INCH: 20 OINTMENT TOPICAL at 00:24

## 2018-11-11 RX ADMIN — ONDANSETRON 4 MG: 2 SOLUTION INTRAMUSCULAR; INTRAVENOUS at 08:20

## 2018-11-11 RX ADMIN — NITROGLYCERIN 0.5 INCH: 20 OINTMENT TOPICAL at 06:37

## 2018-11-11 RX ADMIN — AMLODIPINE BESYLATE 10 MG: 10 TABLET ORAL at 12:56

## 2018-11-11 RX ADMIN — LORAZEPAM 0.5 MG: 2 INJECTION INTRAMUSCULAR at 18:49

## 2018-11-11 RX ADMIN — SENNOSIDES 8.6 MG: 8.6 TABLET, FILM COATED ORAL at 12:56

## 2018-11-11 RX ADMIN — ATORVASTATIN CALCIUM 20 MG: 10 TABLET, FILM COATED ORAL at 00:25

## 2018-11-11 RX ADMIN — SODIUM CHLORIDE: 900 INJECTION, SOLUTION INTRAVENOUS at 12:56

## 2018-11-11 RX ADMIN — FAMOTIDINE 10 MG: 10 INJECTION, SOLUTION INTRAVENOUS at 09:25

## 2018-11-11 RX ADMIN — FAMOTIDINE 10 MG: 10 INJECTION, SOLUTION INTRAVENOUS at 21:00

## 2018-11-11 RX ADMIN — FAMOTIDINE 10 MG: 10 INJECTION, SOLUTION INTRAVENOUS at 00:23

## 2018-11-11 RX ADMIN — NITROGLYCERIN 0.5 INCH: 20 OINTMENT TOPICAL at 15:22

## 2018-11-11 RX ADMIN — METOPROLOL TARTRATE 25 MG: 25 TABLET ORAL at 00:25

## 2018-11-11 RX ADMIN — Medication 10 ML: at 00:26

## 2018-11-11 RX ADMIN — IOPAMIDOL 100 ML: 755 INJECTION, SOLUTION INTRAVENOUS at 19:55

## 2018-11-11 ASSESSMENT — ENCOUNTER SYMPTOMS
SORE THROAT: 0
DIARRHEA: 0
COUGH: 0
STRIDOR: 0
EYE DISCHARGE: 0
TROUBLE SWALLOWING: 0
BACK PAIN: 0
CONSTIPATION: 0
SINUS PRESSURE: 0
EYE REDNESS: 0
FACIAL SWELLING: 0
NAUSEA: 1
ABDOMINAL PAIN: 0
VOICE CHANGE: 0
VOMITING: 1
WHEEZING: 0
EYE PAIN: 0
CHEST TIGHTNESS: 0
SHORTNESS OF BREATH: 0
BLOOD IN STOOL: 0
CHOKING: 0

## 2018-11-11 ASSESSMENT — PAIN SCALES - GENERAL
PAINLEVEL_OUTOF10: 0

## 2018-11-11 ASSESSMENT — PAIN SCALES - WONG BAKER
WONGBAKER_NUMERICALRESPONSE: 0

## 2018-11-11 NOTE — PROGRESS NOTES
Patient becoming increasingly confused and noncooperative. She was trying to get out of bed without calling for help during dinner time and then was refusing to sit back in the bed so we could turn on the bed alarm. Verbally aggressive toward staff and yelling out. Thinks she is at home and upset that there are too may cars in the driveway (she has view of parking lot.) No AVASYS available so Alejandra notified to send one back and they are sending one. Moved to room 202 to be closer to nurse's station. D-dimer critical called at this time 0.69. Dr Dieter Andrade notified.

## 2018-11-11 NOTE — PROGRESS NOTES
Patient was combative when attempting to do CT Scan. Supervisor spoke with Dr Shayne Dick and he placed order for Ativan.  Supervisor then spoke with son who said he wanted meds given and whatever needed to be done, done, so that we could do the test.

## 2018-11-12 ENCOUNTER — APPOINTMENT (OUTPATIENT)
Dept: CT IMAGING | Age: 83
DRG: 392 | End: 2018-11-12
Payer: MEDICARE

## 2018-11-12 ENCOUNTER — HOSPITAL ENCOUNTER (EMERGENCY)
Age: 83
Discharge: ANOTHER HOSPITAL | End: 2018-11-12
Attending: EMERGENCY MEDICINE
Payer: MEDICARE

## 2018-11-12 ENCOUNTER — HOSPITAL ENCOUNTER (INPATIENT)
Age: 83
LOS: 4 days | Discharge: NURSING, REHAB OR LONG-TERM CARE | DRG: 280 | End: 2018-11-16
Attending: INTERNAL MEDICINE | Admitting: INTERNAL MEDICINE
Payer: MEDICARE

## 2018-11-12 ENCOUNTER — APPOINTMENT (OUTPATIENT)
Dept: CT IMAGING | Age: 83
End: 2018-11-12
Payer: MEDICARE

## 2018-11-12 VITALS
BODY MASS INDEX: 30.65 KG/M2 | SYSTOLIC BLOOD PRESSURE: 146 MMHG | RESPIRATION RATE: 16 BRPM | HEIGHT: 63 IN | TEMPERATURE: 98.5 F | HEART RATE: 89 BPM | OXYGEN SATURATION: 99 % | DIASTOLIC BLOOD PRESSURE: 79 MMHG | WEIGHT: 173 LBS

## 2018-11-12 VITALS
HEART RATE: 86 BPM | WEIGHT: 173 LBS | OXYGEN SATURATION: 97 % | SYSTOLIC BLOOD PRESSURE: 127 MMHG | RESPIRATION RATE: 20 BRPM | HEIGHT: 63 IN | DIASTOLIC BLOOD PRESSURE: 60 MMHG | TEMPERATURE: 98.6 F | BODY MASS INDEX: 30.65 KG/M2

## 2018-11-12 DIAGNOSIS — I20.0 UNSTABLE ANGINA (HCC): Primary | ICD-10-CM

## 2018-11-12 DIAGNOSIS — I21.4 NON-STEMI (NON-ST ELEVATED MYOCARDIAL INFARCTION) (HCC): ICD-10-CM

## 2018-11-12 LAB
ALBUMIN SERPL-MCNC: 3.4 G/DL (ref 3.9–4.9)
ALP BLD-CCNC: 65 U/L (ref 40–130)
ALT SERPL-CCNC: 19 U/L (ref 0–33)
AMMONIA: 16 UMOL/L (ref 11–51)
ANION GAP SERPL CALCULATED.3IONS-SCNC: 17 MEQ/L (ref 7–13)
AST SERPL-CCNC: 40 U/L (ref 0–35)
BASOPHILS ABSOLUTE: 0.1 K/UL (ref 0–0.2)
BASOPHILS RELATIVE PERCENT: 0.6 %
BILIRUB SERPL-MCNC: 0.4 MG/DL (ref 0–1.2)
BUN BLDV-MCNC: 15 MG/DL (ref 8–23)
CALCIUM SERPL-MCNC: 9.4 MG/DL (ref 8.6–10.2)
CHLORIDE BLD-SCNC: 92 MEQ/L (ref 98–107)
CO2: 25 MEQ/L (ref 22–29)
CREAT SERPL-MCNC: 1.06 MG/DL (ref 0.5–0.9)
EKG ATRIAL RATE: 98 BPM
EKG P AXIS: 61 DEGREES
EKG P-R INTERVAL: 166 MS
EKG Q-T INTERVAL: 348 MS
EKG QRS DURATION: 90 MS
EKG QTC CALCULATION (BAZETT): 444 MS
EKG R AXIS: -44 DEGREES
EKG T AXIS: 145 DEGREES
EKG VENTRICULAR RATE: 98 BPM
EOSINOPHILS ABSOLUTE: 0 K/UL (ref 0–0.7)
EOSINOPHILS RELATIVE PERCENT: 0.2 %
GFR AFRICAN AMERICAN: 58.7
GFR NON-AFRICAN AMERICAN: 48.5
GLOBULIN: 3.4 G/DL (ref 2.3–3.5)
GLUCOSE BLD-MCNC: 235 MG/DL (ref 74–109)
GLUCOSE BLD-MCNC: 251 MG/DL (ref 60–115)
GLUCOSE BLD-MCNC: 268 MG/DL (ref 60–115)
HCT VFR BLD CALC: 38 % (ref 37–47)
HEMOGLOBIN: 12.7 G/DL (ref 12–16)
LYMPHOCYTES ABSOLUTE: 1.7 K/UL (ref 1–4.8)
LYMPHOCYTES RELATIVE PERCENT: 12.8 %
MCH RBC QN AUTO: 30.5 PG (ref 27–31.3)
MCHC RBC AUTO-ENTMCNC: 33.3 % (ref 33–37)
MCV RBC AUTO: 91.6 FL (ref 82–100)
MONOCYTES ABSOLUTE: 1.1 K/UL (ref 0.2–0.8)
MONOCYTES RELATIVE PERCENT: 8.4 %
NEUTROPHILS ABSOLUTE: 10.5 K/UL (ref 1.4–6.5)
NEUTROPHILS RELATIVE PERCENT: 78 %
PDW BLD-RTO: 13.6 % (ref 11.5–14.5)
PERFORMED ON: ABNORMAL
PERFORMED ON: ABNORMAL
PLATELET # BLD: 300 K/UL (ref 130–400)
POTASSIUM SERPL-SCNC: 3.8 MEQ/L (ref 3.5–5.1)
RBC # BLD: 4.15 M/UL (ref 4.2–5.4)
SODIUM BLD-SCNC: 134 MEQ/L (ref 132–144)
TOTAL PROTEIN: 6.8 G/DL (ref 6.4–8.1)
TROPONIN: 1.68 NG/ML (ref 0–0.01)
WBC # BLD: 13.4 K/UL (ref 4.8–10.8)

## 2018-11-12 PROCEDURE — 6370000000 HC RX 637 (ALT 250 FOR IP): Performed by: INTERNAL MEDICINE

## 2018-11-12 PROCEDURE — 84484 ASSAY OF TROPONIN QUANT: CPT

## 2018-11-12 PROCEDURE — 87086 URINE CULTURE/COLONY COUNT: CPT

## 2018-11-12 PROCEDURE — 81001 URINALYSIS AUTO W/SCOPE: CPT

## 2018-11-12 PROCEDURE — 85025 COMPLETE CBC W/AUTO DIFF WBC: CPT

## 2018-11-12 PROCEDURE — G0378 HOSPITAL OBSERVATION PER HR: HCPCS

## 2018-11-12 PROCEDURE — 6360000002 HC RX W HCPCS

## 2018-11-12 PROCEDURE — 70450 CT HEAD/BRAIN W/O DYE: CPT

## 2018-11-12 PROCEDURE — 87186 SC STD MICRODIL/AGAR DIL: CPT

## 2018-11-12 PROCEDURE — 2060000000 HC ICU INTERMEDIATE R&B

## 2018-11-12 PROCEDURE — 93005 ELECTROCARDIOGRAM TRACING: CPT

## 2018-11-12 PROCEDURE — S0028 INJECTION, FAMOTIDINE, 20 MG: HCPCS | Performed by: INTERNAL MEDICINE

## 2018-11-12 PROCEDURE — 96372 THER/PROPH/DIAG INJ SC/IM: CPT

## 2018-11-12 PROCEDURE — 2580000003 HC RX 258: Performed by: INTERNAL MEDICINE

## 2018-11-12 PROCEDURE — 2700000000 HC OXYGEN THERAPY PER DAY

## 2018-11-12 PROCEDURE — 2500000003 HC RX 250 WO HCPCS: Performed by: INTERNAL MEDICINE

## 2018-11-12 PROCEDURE — 96376 TX/PRO/DX INJ SAME DRUG ADON: CPT

## 2018-11-12 PROCEDURE — 6370000000 HC RX 637 (ALT 250 FOR IP)

## 2018-11-12 PROCEDURE — 80053 COMPREHEN METABOLIC PANEL: CPT

## 2018-11-12 PROCEDURE — 82140 ASSAY OF AMMONIA: CPT

## 2018-11-12 PROCEDURE — 87077 CULTURE AEROBIC IDENTIFY: CPT

## 2018-11-12 PROCEDURE — 99285 EMERGENCY DEPT VISIT HI MDM: CPT

## 2018-11-12 RX ORDER — DEXTROSE MONOHYDRATE 25 G/50ML
12.5 INJECTION, SOLUTION INTRAVENOUS PRN
Status: DISCONTINUED | OUTPATIENT
Start: 2018-11-12 | End: 2018-11-16 | Stop reason: HOSPADM

## 2018-11-12 RX ORDER — AMLODIPINE BESYLATE 5 MG/1
5 TABLET ORAL DAILY
Status: DISCONTINUED | OUTPATIENT
Start: 2018-11-13 | End: 2018-11-16 | Stop reason: HOSPADM

## 2018-11-12 RX ORDER — SODIUM CHLORIDE 0.9 % (FLUSH) 0.9 %
3 SYRINGE (ML) INJECTION EVERY 8 HOURS
Status: DISCONTINUED | OUTPATIENT
Start: 2018-11-13 | End: 2018-11-16 | Stop reason: HOSPADM

## 2018-11-12 RX ORDER — NICOTINE POLACRILEX 4 MG
15 LOZENGE BUCCAL PRN
Status: DISCONTINUED | OUTPATIENT
Start: 2018-11-12 | End: 2018-11-16 | Stop reason: HOSPADM

## 2018-11-12 RX ORDER — ATORVASTATIN CALCIUM 10 MG/1
10 TABLET, FILM COATED ORAL NIGHTLY
Status: DISCONTINUED | OUTPATIENT
Start: 2018-11-12 | End: 2018-11-16 | Stop reason: HOSPADM

## 2018-11-12 RX ORDER — MORPHINE SULFATE 2 MG/ML
1 INJECTION, SOLUTION INTRAMUSCULAR; INTRAVENOUS
Status: DISCONTINUED | OUTPATIENT
Start: 2018-11-12 | End: 2018-11-16 | Stop reason: HOSPADM

## 2018-11-12 RX ORDER — LISINOPRIL 10 MG/1
10 TABLET ORAL DAILY
Status: DISCONTINUED | OUTPATIENT
Start: 2018-11-13 | End: 2018-11-16 | Stop reason: HOSPADM

## 2018-11-12 RX ORDER — SODIUM CHLORIDE 0.9 % (FLUSH) 0.9 %
3 SYRINGE (ML) INJECTION EVERY 8 HOURS
Status: DISCONTINUED | OUTPATIENT
Start: 2018-11-12 | End: 2018-11-12 | Stop reason: HOSPADM

## 2018-11-12 RX ORDER — DEXTROSE MONOHYDRATE 50 MG/ML
100 INJECTION, SOLUTION INTRAVENOUS PRN
Status: DISCONTINUED | OUTPATIENT
Start: 2018-11-12 | End: 2018-11-16 | Stop reason: HOSPADM

## 2018-11-12 RX ORDER — ASPIRIN 81 MG/1
81 TABLET, CHEWABLE ORAL DAILY
Status: DISCONTINUED | OUTPATIENT
Start: 2018-11-13 | End: 2018-11-15

## 2018-11-12 RX ADMIN — Medication 10 ML: at 08:44

## 2018-11-12 RX ADMIN — MAGNESIUM OXIDE TAB 400 MG (241.3 MG ELEMENTAL MG) 400 MG: 400 (241.3 MG) TAB at 08:43

## 2018-11-12 RX ADMIN — ENOXAPARIN SODIUM 60 MG: 80 INJECTION SUBCUTANEOUS at 18:31

## 2018-11-12 RX ADMIN — AMLODIPINE BESYLATE 10 MG: 10 TABLET ORAL at 08:43

## 2018-11-12 RX ADMIN — ATORVASTATIN CALCIUM 10 MG: 10 TABLET, FILM COATED ORAL at 22:53

## 2018-11-12 RX ADMIN — SODIUM CHLORIDE: 900 INJECTION, SOLUTION INTRAVENOUS at 04:03

## 2018-11-12 RX ADMIN — METOPROLOL TARTRATE 25 MG: 25 TABLET ORAL at 08:43

## 2018-11-12 RX ADMIN — SENNOSIDES 8.6 MG: 8.6 TABLET, FILM COATED ORAL at 08:43

## 2018-11-12 RX ADMIN — LISINOPRIL 10 MG: 10 TABLET ORAL at 08:43

## 2018-11-12 RX ADMIN — NITROGLYCERIN 0.5 INCH: 20 OINTMENT TOPICAL at 18:29

## 2018-11-12 RX ADMIN — METOPROLOL TARTRATE 25 MG: 25 TABLET ORAL at 22:53

## 2018-11-12 RX ADMIN — ASPIRIN 81 MG 81 MG: 81 TABLET ORAL at 08:43

## 2018-11-12 RX ADMIN — FAMOTIDINE 10 MG: 10 INJECTION, SOLUTION INTRAVENOUS at 08:43

## 2018-11-12 ASSESSMENT — PAIN SCALES - GENERAL
PAINLEVEL_OUTOF10: 0
PAINLEVEL_OUTOF10: 0

## 2018-11-12 NOTE — DISCHARGE SUMMARY
11/11/2018    GLUCOSE 235 11/11/2018    GLUCOSE 154 05/25/2012    CALCIUM 9.5 11/11/2018          Treatments:   Current Facility-Administered Medications   Medication Dose Route Frequency Provider Last Rate Last Dose    amLODIPine (NORVASC) tablet 10 mg  10 mg Oral Daily Tom Odom MD   10 mg at 11/12/18 0843    senna (SENOKOT) tablet 8.6 mg  1 tablet Oral BID Tom Odom MD   8.6 mg at 11/12/18 0843    LORazepam (ATIVAN) injection 0.5 mg  0.5 mg Intravenous Q6H PRN Tom Odom MD   0.5 mg at 11/11/18 1849    aspirin chewable tablet 81 mg  81 mg Oral Daily Kerry Velásquez MD   81 mg at 11/12/18 0843    atorvastatin (LIPITOR) tablet 20 mg  20 mg Oral Nightly Tere Landers MD   Stopped at 11/11/18 2058    lisinopril (PRINIVIL;ZESTRIL) tablet 10 mg  10 mg Oral Daily Kerry Velásquez MD   10 mg at 11/12/18 0843    magnesium oxide (MAG-OX) tablet 400 mg  400 mg Oral Daily Kerry Velásquez MD   400 mg at 11/12/18 0843    metoprolol tartrate (LOPRESSOR) tablet 25 mg  25 mg Oral BID Tere Landers MD   25 mg at 11/12/18 0843    sodium chloride flush 0.9 % injection 10 mL  10 mL Intravenous 2 times per day Tere Landers MD   10 mL at 11/12/18 0844    sodium chloride flush 0.9 % injection 10 mL  10 mL Intravenous PRN Tere Landers MD        magnesium hydroxide (MILK OF MAGNESIA) 400 MG/5ML suspension 30 mL  30 mL Oral Daily PRN Kerry Velásquez MD        ondansetron (ZOFRAN) injection 4 mg  4 mg Intravenous Q6H PRN Kerry Velásquez MD   4 mg at 11/11/18 1522    glucose (GLUTOSE) 40 % oral gel 15 g  15 g Oral PRN Kerry Velásquez MD        dextrose 50 % solution 12.5 g  12.5 g Intravenous PRN Kerry Velásquez MD        glucagon (rDNA) injection 1 mg  1 mg Intramuscular PRN Kerry Velásquez MD        dextrose 5 % solution  100 mL/hr Intravenous PRN Kerry Velásquez MD        insulin lispro (HUMALOG) injection vial 0-12 Units  0-12 Units Subcutaneous TID  Kerry Velásquez MD   6 Units at 11/12/18 0847    insulin lispro tablet  Commonly known as:  LOPRESSOR  take 1 tablet by mouth twice a day     Needles & Syringes Misc  1 each by Does not apply route daily     TRUE METRIX BLOOD GLUCOSE TEST strip  Generic drug:  blood glucose test strips  TEST two to three times a day     TRUEPLUS LANCETS 28G Misc  TEST TWO TIMES DAILY        * This list has 2 medication(s) that are the same as other medications prescribed for you. Read the directions carefully, and ask your doctor or other care provider to review them with you.             STOP taking these medications    Fish Oil 1200 MG Caps     nitrofurantoin 50 MG capsule  Commonly known as:  MACRODANTIN            Han Fulton  11/12/2018  9:44 AM

## 2018-11-12 NOTE — PROGRESS NOTES
Pt has been reoriented to to time day and situation, pt has no c/o pain this am, no distress noted, pt on RA, SPO2 98%, pt skin warm to touch, R enid noted, IVF infusing with no difficulties, pt has been reoriented to call light, pt safety maintained, call light has been placed within reach.

## 2018-11-12 NOTE — ED PROVIDER NOTES
Negative for chest pain, palpitations and leg swelling. Gastrointestinal: Negative for abdominal pain, blood in stool, constipation, diarrhea and vomiting. Endocrine: Negative for cold intolerance, polyphagia and polyuria. Genitourinary: Negative for dysuria, flank pain, frequency, genital sores and urgency. Musculoskeletal: Negative for back pain, joint swelling, neck pain and neck stiffness. Skin: Negative for pallor and rash. Neurological: Positive for weakness. Negative for tremors, seizures, syncope, numbness and headaches. Hematological: Negative for adenopathy. Does not bruise/bleed easily. Psychiatric/Behavioral: Positive for confusion. Negative for agitation, behavioral problems, hallucinations and sleep disturbance. The patient is not hyperactive. All other systems reviewed and are negative. Except as noted above the remainder of the review of systems was reviewed and negative. PAST MEDICAL HISTORY     Past Medical History:   Diagnosis Date    Arthritis     Chicken pox     Chronic back pain     Chronic low back pain 8/17/2016    Eczematous dermatitis     History of bladder infections     Hyperlipidemia     Hypertension     Measles     Mumps     Osteoarthritis 5/29/2012    SCC (squamous cell carcinoma), arm 2013    right upper arm, 2015 right forarm    SI (stress incontinence), female 5/29/2012    Type II or unspecified type diabetes mellitus without mention of complication, not stated as uncontrolled     Whooping cough          SURGICALHISTORY       Past Surgical History:   Procedure Laterality Date    ANKLE SURGERY      broken left ankle.     APPENDECTOMY      BREAST BIOPSY      x2 left breast    CATARACT REMOVAL  2004    bilateral    CYSTOCELE REPAIR      EYE SURGERY      bilateral cataract    HYSTERECTOMY      complete at age 39    [address]      as a child         CURRENT MEDICATIONS       Previous Medications    AMLODIPINE

## 2018-11-13 ENCOUNTER — APPOINTMENT (OUTPATIENT)
Dept: CT IMAGING | Age: 83
DRG: 280 | End: 2018-11-13
Attending: INTERNAL MEDICINE
Payer: MEDICARE

## 2018-11-13 LAB
BACTERIA: ABNORMAL /HPF
BILIRUBIN URINE: ABNORMAL
BLOOD, URINE: ABNORMAL
CLARITY: ABNORMAL
COLOR: YELLOW
EKG ATRIAL RATE: 78 BPM
EKG P AXIS: 47 DEGREES
EKG P-R INTERVAL: 202 MS
EKG Q-T INTERVAL: 446 MS
EKG QRS DURATION: 96 MS
EKG QTC CALCULATION (BAZETT): 508 MS
EKG R AXIS: -43 DEGREES
EKG T AXIS: 149 DEGREES
EKG VENTRICULAR RATE: 78 BPM
GLUCOSE BLD-MCNC: 201 MG/DL (ref 60–115)
GLUCOSE BLD-MCNC: 221 MG/DL (ref 60–115)
GLUCOSE BLD-MCNC: 236 MG/DL (ref 60–115)
GLUCOSE BLD-MCNC: 293 MG/DL (ref 60–115)
GLUCOSE BLD-MCNC: 301 MG/DL (ref 60–115)
GLUCOSE URINE: 100 MG/DL
KETONES, URINE: ABNORMAL MG/DL
LEUKOCYTE ESTERASE, URINE: ABNORMAL
LV EF: 25 %
LVEF MODALITY: NORMAL
NITRITE, URINE: NEGATIVE
PERFORMED ON: ABNORMAL
PH UA: 5 (ref 5–9)
PROTEIN UA: >=300 MG/DL
RBC UA: ABNORMAL /HPF (ref 0–2)
SPECIFIC GRAVITY UA: 1.03 (ref 1–1.03)
TROPONIN: 2.46 NG/ML (ref 0–0.01)
URINE REFLEX TO CULTURE: YES
UROBILINOGEN, URINE: 0.2 E.U./DL
WBC UA: ABNORMAL /HPF (ref 0–5)

## 2018-11-13 PROCEDURE — 2000000000 HC ICU R&B

## 2018-11-13 PROCEDURE — 2580000003 HC RX 258: Performed by: INTERNAL MEDICINE

## 2018-11-13 PROCEDURE — 93010 ELECTROCARDIOGRAM REPORT: CPT | Performed by: INTERNAL MEDICINE

## 2018-11-13 PROCEDURE — 6360000002 HC RX W HCPCS: Performed by: INTERNAL MEDICINE

## 2018-11-13 PROCEDURE — 6370000000 HC RX 637 (ALT 250 FOR IP): Performed by: INTERNAL MEDICINE

## 2018-11-13 PROCEDURE — 70450 CT HEAD/BRAIN W/O DYE: CPT

## 2018-11-13 PROCEDURE — 93005 ELECTROCARDIOGRAM TRACING: CPT

## 2018-11-13 PROCEDURE — 93306 TTE W/DOPPLER COMPLETE: CPT

## 2018-11-13 PROCEDURE — 3E03317 INTRODUCTION OF OTHER THROMBOLYTIC INTO PERIPHERAL VEIN, PERCUTANEOUS APPROACH: ICD-10-PCS | Performed by: INTERNAL MEDICINE

## 2018-11-13 PROCEDURE — 84484 ASSAY OF TROPONIN QUANT: CPT

## 2018-11-13 PROCEDURE — 99223 1ST HOSP IP/OBS HIGH 75: CPT | Performed by: INTERNAL MEDICINE

## 2018-11-13 PROCEDURE — 36415 COLL VENOUS BLD VENIPUNCTURE: CPT

## 2018-11-13 RX ORDER — SODIUM CHLORIDE 0.9 % (FLUSH) 0.9 %
10 SYRINGE (ML) INJECTION EVERY 12 HOURS SCHEDULED
Status: DISCONTINUED | OUTPATIENT
Start: 2018-11-13 | End: 2018-11-16 | Stop reason: HOSPADM

## 2018-11-13 RX ORDER — DEXTROSE MONOHYDRATE 25 G/50ML
12.5 INJECTION, SOLUTION INTRAVENOUS
Status: ACTIVE | OUTPATIENT
Start: 2018-11-13 | End: 2018-11-13

## 2018-11-13 RX ORDER — NITROFURANTOIN MACROCRYSTALS 50 MG/1
50 CAPSULE ORAL DAILY
Status: DISCONTINUED | OUTPATIENT
Start: 2018-11-13 | End: 2018-11-15

## 2018-11-13 RX ORDER — METFORMIN HYDROCHLORIDE 500 MG/1
1000 TABLET, EXTENDED RELEASE ORAL
Status: DISCONTINUED | OUTPATIENT
Start: 2018-11-13 | End: 2018-11-13

## 2018-11-13 RX ORDER — METFORMIN HYDROCHLORIDE 500 MG/1
1000 TABLET, EXTENDED RELEASE ORAL
Status: DISCONTINUED | OUTPATIENT
Start: 2018-11-14 | End: 2018-11-14

## 2018-11-13 RX ORDER — SODIUM CHLORIDE 0.9 % (FLUSH) 0.9 %
10 SYRINGE (ML) INJECTION PRN
Status: DISCONTINUED | OUTPATIENT
Start: 2018-11-13 | End: 2018-11-16 | Stop reason: HOSPADM

## 2018-11-13 RX ADMIN — ALTEPLASE 62.5 MG: KIT at 12:23

## 2018-11-13 RX ADMIN — Medication 10 ML: at 21:06

## 2018-11-13 RX ADMIN — ATORVASTATIN CALCIUM 10 MG: 10 TABLET, FILM COATED ORAL at 21:05

## 2018-11-13 RX ADMIN — NITROFURANTOIN MACROCRYSTALS 50 MG: 50 CAPSULE ORAL at 16:58

## 2018-11-13 RX ADMIN — ASPIRIN 81 MG: 81 TABLET, CHEWABLE ORAL at 09:29

## 2018-11-13 RX ADMIN — Medication 3 ML: at 17:02

## 2018-11-13 RX ADMIN — INSULIN LISPRO 2 UNITS: 100 INJECTION, SOLUTION INTRAVENOUS; SUBCUTANEOUS at 09:30

## 2018-11-13 RX ADMIN — Medication 3 ML: at 00:40

## 2018-11-13 RX ADMIN — MAGNESIUM OXIDE TAB 400 MG (241.3 MG ELEMENTAL MG) 400 MG: 400 (241.3 MG) TAB at 09:29

## 2018-11-13 RX ADMIN — Medication 3 ML: at 09:29

## 2018-11-13 RX ADMIN — METOPROLOL TARTRATE 25 MG: 25 TABLET ORAL at 21:05

## 2018-11-13 RX ADMIN — LISINOPRIL 10 MG: 10 TABLET ORAL at 09:29

## 2018-11-13 RX ADMIN — INSULIN LISPRO 2 UNITS: 100 INJECTION, SOLUTION INTRAVENOUS; SUBCUTANEOUS at 17:05

## 2018-11-13 RX ADMIN — METOPROLOL TARTRATE 25 MG: 25 TABLET ORAL at 09:29

## 2018-11-13 RX ADMIN — AMLODIPINE BESYLATE 5 MG: 5 TABLET ORAL at 09:29

## 2018-11-13 ASSESSMENT — PAIN SCALES - GENERAL
PAINLEVEL_OUTOF10: 0

## 2018-11-13 NOTE — ED NOTES
Pt's Son Duane Peterson [phone] notified of room assignment at [facility] [facility] understanding.       Gulshan Apodaca, [address] [place] [address]  11/12/18 4399

## 2018-11-13 NOTE — CONSULTS
Inpatient consult to Neurology  Consult performed by: Binh Marrufo  Consult ordered by: Jose Alejandro Jaimes      CVA, aphasia  Question right weakness  30 mins  TPA  Discussed with family, expectation and 6% bleeding risk  They are agreeable   CT negative    Elijah Nolen MD, [facility], American Board of Psychiatry & Neurology  Board Certified in Vascular Neurology  Board Certified in Neuromuscular Medicine  Certified in . [place]

## 2018-11-13 NOTE — FLOWSHEET NOTE
Pt's family in to visit. 2 sons. One came to nurses station to talk with me. States they were just here to visit. Woke pt up and were talking to her. She was doing just fine, talking and seemed to look much better than when she came into ER yest. Pt's son said pt started having a hard time finding words and slurring. RN into room to assess pt. Pt slurring words, having a difficult time finding the right words. Slight facial drooping noted - philip pronounced when asked to smile. Pt able to hand grasp, move extremities, and do push / pulls with legs / feet. Dr. Froilan Mccauley contacted via perfect serve. He immediately called back. Instructed to consult hospitalist and to call a code BAT which was done. Dr. Lisa Shannon already on the unit and immediately at bedside to assess. By this time pt unable to hand grasp with right arm, unable to raise right arm, and speech is progressively worsening.  Electronically signed by Daya Velasco RN on 11/13/2018 at 1:34 PM

## 2018-11-13 NOTE — FLOWSHEET NOTE
Pt to CT scan via bed, then planned transfer to ICU for TPA infusion if appropriate. Neuro consulted. Dr. Yvonne Bunch speaking with both of pt's sons about treatment options.  Electronically signed by Heriberto Couch RN on 11/13/2018 at 1:37 PM

## 2018-11-13 NOTE — PROGRESS NOTES
Metformin and IV Contrast Review    Zelalem Max, a 80 y.o. female, has been identified as a recipient of metformin therapy and IV contrast media. Metformin therapy is contraindicated within 48 hours of IV contrast administration. IV Contrast Administration Date and Time: 11/11/18 1955   Metformin Dose and Frequency: 1000mg BID w/meals   SCr Prior to IV Contrast: 0.87     Metformin has been discontinued by pharmacy and will be reordered within 48 hours or when patient's SCr returns to baseline. VERNON Enamorado. Ph.  11/12/2018  10:20 PM

## 2018-11-13 NOTE — H&P
History and Physical  Patient: Adelso Delgado  Unit/Bed: S067/V914-76  YOB: 1927  MRN: [id number]  Acct: [de-identified]   Admitting Diagnosis: NSTEMI (non-ST elevated myocardial infarction) [facility]) [I21.4]  Admit Date:  11/12/2018  Hospital Day: 1      Chief Complaint: NSTEMI Dementia recurrent UTI      History of Present Illness:   Transferred from [facility]. Ruled in for NSTEMI  Pt is confused and has underlying Dementia. Apparently has recurrent UTi and has been on chronic Macrodantin    Pt is confused. Voices no complaints at this time    I spoke with Son Kaylen Del Cid this am.  Family does not want any invasive CV procedures. EKG:SR   PMHx:  Past Medical History:   Diagnosis Date    Arthritis     Chicken pox     Chronic back pain     Chronic low back pain 8/17/2016    Eczematous dermatitis     History of bladder infections     Hyperlipidemia     Hypertension     Measles     Mumps     Osteoarthritis 5/29/2012    SCC (squamous cell carcinoma), arm 2013    right upper arm, 2015 right forarm    SI (stress incontinence), female 5/29/2012    Type II or unspecified type diabetes mellitus without mention of complication, not stated as uncontrolled     Whooping cough        PSHx:  Past Surgical History:   Procedure Laterality Date    ANKLE SURGERY      broken left ankle.  APPENDECTOMY      BREAST BIOPSY      x2 left breast    CATARACT REMOVAL  2004    bilateral    CYSTOCELE REPAIR      EYE SURGERY      bilateral cataract    HYSTERECTOMY      complete at age 39    [address]      as a child       Social Hx:  Social History     Social History    Marital status:       Spouse name: N/A    Number of children: N/A    Years of education: N/A     Social History Main Topics    Smoking status: Never Smoker    Smokeless tobacco: Never Used    Alcohol use No    Drug use: No    Sexual activity: Not on file     Other Topics Concern    Not on file     Social History Narrative    No narrative on file       Family Hx:  Family History   Problem Relation Age of Onset    Diabetes Mother     Heart Disease Father        Allergies   Allergen Reactions    Metoclopramide     Pantoprazole Other (See Comments)    Pcn [Penicillins]     Protonix [Pantoprazole Sodium]     Tramadol        Current Facility-Administered Medications   Medication Dose Route Frequency Provider Last Rate Last Dose    amLODIPine (NORVASC) tablet 5 mg  5 mg Oral Daily Jai Brock MD        aspirin chewable tablet 81 mg  81 mg Oral Daily Jai Brock MD        atorvastatin (LIPITOR) tablet 10 mg  10 mg Oral Nightly Jai Brock MD   10 mg at 11/12/18 2253    lisinopril (PRINIVIL;ZESTRIL) tablet 10 mg  10 mg Oral Daily Jai Brock MD        magnesium oxide (MAG-OX) tablet 400 mg  400 mg Oral Daily Jai Brock MD        metoprolol tartrate (LOPRESSOR) tablet 25 mg  25 mg Oral BID Jai Brock MD   25 mg at 11/12/18 2253    sodium chloride flush 0.9 % injection 3 mL  3 mL Intravenous Q8H Jai Brock MD   3 mL at 11/13/18 0040    glucose (GLUTOSE) 40 % oral gel 15 g  15 g Oral PRN Jai Brock MD        dextrose 50 % solution 12.5 g  12.5 g Intravenous PRN Jai Brock MD        glucagon (rDNA) injection 1 mg  1 mg Intramuscular PRN Jai Brock MD        dextrose 5 % solution  100 mL/hr Intravenous PRN Jai Brock MD        insulin lispro (HUMALOG) injection vial 0-6 Units  0-6 Units Subcutaneous TID WC Jai Brock MD        insulin lispro (HUMALOG) injection vial 0-3 Units  0-3 Units Subcutaneous Nightly Jai Brock MD   1 Units at 11/12/18 2253    morphine injection 1 mg  1 mg Intravenous Q3H PRN Jai Brock MD           Review of Systems:   Review of Systems   Not reliable    Physical Examination:    BP (!) 143/57   Pulse 99   Temp 98.8 °F (37.1 °C) (Oral)   Resp 18   Ht 5' 3\" (1.6 m)   Wt 169 lb 15.6 oz (77.1 kg)   SpO2 99%   BMI 30.11 kg/m²    Physical Exam

## 2018-11-14 ENCOUNTER — APPOINTMENT (OUTPATIENT)
Dept: MRI IMAGING | Age: 83
DRG: 280 | End: 2018-11-14
Attending: INTERNAL MEDICINE
Payer: MEDICARE

## 2018-11-14 ENCOUNTER — APPOINTMENT (OUTPATIENT)
Dept: ULTRASOUND IMAGING | Age: 83
DRG: 280 | End: 2018-11-14
Attending: INTERNAL MEDICINE
Payer: MEDICARE

## 2018-11-14 ENCOUNTER — TELEPHONE (OUTPATIENT)
Dept: PRIMARY CARE CLINIC | Age: 83
End: 2018-11-14

## 2018-11-14 LAB
ANION GAP SERPL CALCULATED.3IONS-SCNC: 13 MEQ/L (ref 7–13)
BUN BLDV-MCNC: 26 MG/DL (ref 8–23)
CALCIUM SERPL-MCNC: 8.6 MG/DL (ref 8.6–10.2)
CHLORIDE BLD-SCNC: 97 MEQ/L (ref 98–107)
CO2: 27 MEQ/L (ref 22–29)
CREAT SERPL-MCNC: 1.31 MG/DL (ref 0.5–0.9)
GFR AFRICAN AMERICAN: 46
GFR NON-AFRICAN AMERICAN: 38
GLUCOSE BLD-MCNC: 193 MG/DL (ref 74–109)
GLUCOSE BLD-MCNC: 230 MG/DL (ref 60–115)
GLUCOSE BLD-MCNC: 283 MG/DL (ref 60–115)
HCT VFR BLD CALC: 31.1 % (ref 37–47)
HEMOGLOBIN: 10.7 G/DL (ref 12–16)
MCH RBC QN AUTO: 31.7 PG (ref 27–31.3)
MCHC RBC AUTO-ENTMCNC: 34.3 % (ref 33–37)
MCV RBC AUTO: 92.4 FL (ref 82–100)
PDW BLD-RTO: 13.5 % (ref 11.5–14.5)
PERFORMED ON: ABNORMAL
PERFORMED ON: ABNORMAL
PLATELET # BLD: 226 K/UL (ref 130–400)
POTASSIUM SERPL-SCNC: 3.8 MEQ/L (ref 3.5–5.1)
RBC # BLD: 3.37 M/UL (ref 4.2–5.4)
SODIUM BLD-SCNC: 137 MEQ/L (ref 132–144)
URINE CULTURE, ROUTINE: NORMAL
WBC # BLD: 9.3 K/UL (ref 4.8–10.8)

## 2018-11-14 PROCEDURE — 70551 MRI BRAIN STEM W/O DYE: CPT

## 2018-11-14 PROCEDURE — 6370000000 HC RX 637 (ALT 250 FOR IP): Performed by: INTERNAL MEDICINE

## 2018-11-14 PROCEDURE — 2580000003 HC RX 258: Performed by: INTERNAL MEDICINE

## 2018-11-14 PROCEDURE — 70544 MR ANGIOGRAPHY HEAD W/O DYE: CPT

## 2018-11-14 PROCEDURE — 85027 COMPLETE CBC AUTOMATED: CPT

## 2018-11-14 PROCEDURE — 93880 EXTRACRANIAL BILAT STUDY: CPT

## 2018-11-14 PROCEDURE — 1210000000 HC MED SURG R&B

## 2018-11-14 PROCEDURE — 36415 COLL VENOUS BLD VENIPUNCTURE: CPT

## 2018-11-14 PROCEDURE — 80048 BASIC METABOLIC PNL TOTAL CA: CPT

## 2018-11-14 PROCEDURE — 99232 SBSQ HOSP IP/OBS MODERATE 35: CPT | Performed by: INTERNAL MEDICINE

## 2018-11-14 RX ORDER — LANOLIN ALCOHOL/MO/W.PET/CERES
3 CREAM (GRAM) TOPICAL NIGHTLY PRN
Status: DISCONTINUED | OUTPATIENT
Start: 2018-11-14 | End: 2018-11-16 | Stop reason: HOSPADM

## 2018-11-14 RX ADMIN — MELATONIN TAB 3 MG 3 MG: 3 TAB at 20:54

## 2018-11-14 RX ADMIN — ATORVASTATIN CALCIUM 10 MG: 10 TABLET, FILM COATED ORAL at 20:54

## 2018-11-14 RX ADMIN — LISINOPRIL 10 MG: 10 TABLET ORAL at 20:54

## 2018-11-14 RX ADMIN — Medication 3 ML: at 08:15

## 2018-11-14 RX ADMIN — MAGNESIUM OXIDE TAB 400 MG (241.3 MG ELEMENTAL MG) 400 MG: 400 (241.3 MG) TAB at 08:01

## 2018-11-14 RX ADMIN — Medication 10 ML: at 20:54

## 2018-11-14 RX ADMIN — METOPROLOL TARTRATE 25 MG: 25 TABLET ORAL at 20:55

## 2018-11-14 RX ADMIN — INSULIN LISPRO 1 UNITS: 100 INJECTION, SOLUTION INTRAVENOUS; SUBCUTANEOUS at 08:18

## 2018-11-14 RX ADMIN — Medication 10 ML: at 08:15

## 2018-11-14 RX ADMIN — INSULIN LISPRO 3 UNITS: 100 INJECTION, SOLUTION INTRAVENOUS; SUBCUTANEOUS at 17:42

## 2018-11-14 RX ADMIN — AMLODIPINE BESYLATE 5 MG: 5 TABLET ORAL at 08:02

## 2018-11-14 ASSESSMENT — ENCOUNTER SYMPTOMS
CHOKING: 0
SORE THROAT: 0
ABDOMINAL PAIN: 0
EYE PAIN: 0
SINUS PRESSURE: 0
CHEST TIGHTNESS: 0
VOICE CHANGE: 0
COUGH: 0
SHORTNESS OF BREATH: 0
CONSTIPATION: 0
STRIDOR: 0
EYE REDNESS: 0
EYE DISCHARGE: 0
BACK PAIN: 0
WHEEZING: 0
DIARRHEA: 0
FACIAL SWELLING: 0
BLOOD IN STOOL: 0
VOMITING: 0
TROUBLE SWALLOWING: 0

## 2018-11-14 ASSESSMENT — PAIN SCALES - GENERAL
PAINLEVEL_OUTOF10: 0

## 2018-11-14 NOTE — CONSULTS
[address]                      [address], [address] [facility]                                  CONSULTATION    PATIENT NAME: Katie Campo                      :        06/10/1927  MED REC NO:   [id number]                            ROOM:       IC14  ACCOUNT NO:   [de-identified]                           ADMIT DATE: 2018  PROVIDER:     Laurent Cummings MD    CONSULT DATE:  2018    ATTENDING:  Inez Nuñez M.D. HISTORY OF PRESENT ILLNESS:  A 80-year-old right-handed female was  admitted with a history of myocardial infarction. She presented  yesterday with chest pain. She was diagnosed to have a non-STEMI MI. The patient was in the room today at [facility] and the family had just  seen and she actually could not speak. She appears to be an aphasic and  some right-sided weakness. Past 24-hour history is obtained. The  patient has been seen at [facility] for other symptoms as well. She has  gained our comfort care and nothing much was done with her myocardial  infarction for now. When I saw her, right away, a CT scan was done stat  which did not show any bleed, saw her in the intensive care unit. Her  blood pressure was 140/90. She was looking around, could not speak  much. She was able to say a few words, but they were dysarthric, though  aphasia was notable. She had weakness on the right of 4/5. She  continued to have word-finding difficulty. She reported no headache. She is able to answer some of my questions. PAST MEDICAL HISTORY:  Remarkable for arthritis, chicken pox, chronic  low back pain, she has hyperlipidemia, hypertension, measles, mumps, she  has a history of whooping cough. PAST SURGICAL HISTORY:  Ankle surgery, appendectomy, breast biopsy,  cataract removal, hysterectomy, rectocele, and tonsillectomy. MEDICATIONS:  Aspirin and atorvastatin. She was not on any blood  thinners. Lovenox was not given.   She is on metformin

## 2018-11-14 NOTE — CARE COORDINATION
MELVINW spoke with Pt, she stated she lives with her son and he works during the day. LSW talked about rehab at [facility] since it is close to home. Pt said what ever the doctors say. LSW spoke to son he will be in this am and can meet with LSW. Nika Feldman Electronically signed by CAYETANO Cisneros on 11/14/2018 at 10:55 AM

## 2018-11-14 NOTE — PROGRESS NOTES
Neurology Follow up    SUBJECTIVE:  NO Headache, double vision,blurry vision,difficulty with speech,difficulty with swallowing,weakness,numbness,pain,nausea,vomitting,chocking,neck pain,dizziness,    PHYSICAL EXAM:    BP (!) 140/65   Pulse 81   Temp 98.5 °F (36.9 °C) (Oral)   Resp 16   Ht 5' 3\" (1.6 m)   Wt 169 lb 15.6 oz (77.1 kg)   SpO2 100%   BMI 30.11 kg/m²    General Appearance:      Mental Status Exam:             Level of Alertness:   awake                      Fund of Knowledge:  normal            Attention/Concentration:  normal            Language:  normal      Funduscopic Exam:     Cranial Nerves          Cranial nerve III           Pupils:  equal, round, reactive to light      Cranial nerves III, IV, VI           Extraocular Movements: intact          Cranial nerve VIII           Hearing:  intact      Cranial nerve IX           Palate:  intact      Cranial nerve XI         Shoulder shrug:  intact      Cranial nerve XII          Tongue movement:  normal    Motor:    Drift:  absent  Motor exam is symmetrical 4+ out of 5 all extremities bilaterally  Tone:  normal  Abnormal Movements:  absent            Sensory:        Pinprick             Right Upper Extremity:  normal             Left Upper Extremity:  normal             Right Lower Extremity:  normal             Left Lower Extremity:  normal           Vibration                         Touch            Proprioception                 Coordination:           Finger/Nose   Right:  normal              Left:  normal          Heel-Knee-Shin                Right:  normal              Left:  normal          Rapid Alternating Movements              Right:  normal              Left:  normal          Gait:                       Casual: not walked          Romberg:  normal            Reflexes:             Deep Tendon Reflexes:             Reflexes are 2 +             Plantar response:                Right:  downgoing               Left:  downgoing    Vascular: Cardiac Exam:  normal         Ct Head Wo Contrast    Result Date: 11/13/2018  CT HEAD WO CONTRAST CLINICAL HISTORY:  SPEECH CHANGES (OR APHASIA), NEW OR PROGRESSIVE COMPARISON: November 12, 2018 1 no evidence of axillary silhouette is TECHNIQUE: Multiple unenhanced serial axial images of the brain from the vertex of the skull to the base of the skull were performed. FINDINGS: The ventricles are dilated. This is compensatory to the surrounding moderate generalized parenchymal volume loss. No mass. No midline shift. The cisterns are patent. There are white matter and periventricular changes most likely consistent with chronic small vessel disease. No acute intra-axial or extra-axial findings. The visualized osseous structures are unremarkable. The visualized portion of the paranasal sinuses, and mastoids are unremarkable. NO ACUTE INTRA-AXIAL OR EXTRA-AXIAL FINDINGS. IF SIGNS OR SYMPTOMS PERSIST THEN CONSIDER REPEAT CT SCAN IN 12 TO 24 HOURS OR MRI TO FURTHER EVALUATE IF THERE ARE NO CONTRAINDICATIONS. DR. Erica Cervantes WAS NOTIFIED IMMEDIATELY ABOVE FINDINGS AT [address] [place] [address] ON NOVEMBER 13, 2018 - All CT scans at this facility use dose modulation, iterative reconstruction, and/or weight based dosing when appropriate to reduce radiation dose to as low as reasonably achievable. Ct Head Wo Contrast    Result Date: 11/12/2018  EXAMINATION: CT HEAD WO CONTRAST, 11/12/2018 6:00 PM CLINICAL HISTORY: 25-year-old female, unresponsive COMPARISON: November 12, 2018 10:33 TECHNIQUE: Multiple axial images are obtained from the skull base to the vertex without contrast. Coronal and sagittal reformatted images were obtained from the axial data. All CT scans at this facility use dose modulation, iterative reconstruction, and/or weight based dosing when appropriate to reduce radiation dose to as low as reasonably achievable. FINDINGS: No appreciable acute intracranial abnormality or interval change.  No evidence of hyperdense

## 2018-11-14 NOTE — DISCHARGE INSTR - COC
Problem List   Diagnosis Code    HTN (hypertension) I10    Diabetes mellitus E11.9    SI (stress incontinence), female N39.3    Osteoarthritis M19.90    CKD (chronic kidney disease) N18.9    HLD (hyperlipidemia) E78.5    Vitamin D deficiency E55.9    Eczematous dermatitis L30.9    Chronic low back pain M54.5, G89.29    Lumbar spinal stenosis M48.061    Hypercalcemia D00.23    Diastolic dysfunction T67.0    Valvular heart disease I38    Recurrent UTI (urinary tract infection) N39.0    Vitamin B12 deficiency E53.8    Chest pain R07.9    Nausea & vomiting R11.2    NSTEMI (non-ST elevated myocardial infarction) ([facility]) I21.4       Isolation/Infection:   Isolation          No Isolation            Nurse Assessment:  Last Vital Signs: BP (!) 140/65   Pulse 81   Temp 98.5 °F (36.9 °C) (Oral)   Resp 16   Ht 5' 3\" (1.6 m)   Wt 169 lb 15.6 oz (77.1 kg)   SpO2 100%   BMI 30.11 kg/m²     Last documented pain score (0-10 scale): Pain Level: 0  Last Weight:   Wt Readings from Last 1 Encounters:   11/12/18 169 lb 15.6 oz (77.1 kg)     Mental Status:  oriented, alert, thought processes intact and able to concentrate and follow conversation    IV Access:  - None    Nursing Mobility/ADLs:  Walking   Assisted  Transfer  Assisted  Bathing  Assisted  Dressing  Assisted  Toileting  Assisted  Feeding  Independent  Med Admin  Assisted  Med Delivery   whole    Wound Care Documentation and Therapy:        Elimination:  Continence:   · Bowel: Yes  · Bladder: Yes  Urinary Catheter: None   Colostomy/Ileostomy/Ileal Conduit: No       Date of Last BM: 11-    Intake/Output Summary (Last 24 hours) at 11/14/18 1453  Last data filed at 11/14/18 0515   Gross per 24 hour   Intake              700 ml   Output              300 ml   Net              400 ml     I/O last 3 completed shifts: In: 940 [P.O.:840;  I.V.:100]  Out: 300 [Urine:300]    Safety Concerns:     History of Falls (last 30 days) and At Risk for

## 2018-11-14 NOTE — PLAN OF CARE
Problem: Falls - Risk of:  Goal: Will remain free from falls  Will remain free from falls   Outcome: Ongoing    Goal: Absence of physical injury  Absence of physical injury   Outcome: Ongoing      Problem: Risk for Impaired Skin Integrity  Goal: Tissue integrity - skin and mucous membranes  Structural intactness and normal physiological function of skin and  mucous membranes.    Outcome: Ongoing      Problem: Aspiration:  Goal: Absence of aspiration  Absence of aspiration  Outcome: Ongoing

## 2018-11-14 NOTE — PROGRESS NOTES
deficits. Cranial nerves: II-XII intact, grossly non-focal.  Psychiatric: Alert and oriented, thought content appropriate, normal insight  Capillary Refill: Brisk,< 3 seconds   Peripheral Pulses: +2 palpable, equal bilaterally       Labs:   Recent Labs      11/12/18 1743  11/14/18   0456   WBC  13.4*  9.3   HGB  12.7  10.7*   HCT  38.0  31.1*   PLT  300  226     Recent Labs      11/12/18   1743  11/14/18   0456   NA  134  137   K  3.8  3.8   CL  92*  97*   CO2  25  27   BUN  15  26*   CREATININE  1.06*  1.31*   CALCIUM  9.4  8.6     Recent Labs      11/12/18   1743   AST  40*   ALT  19   BILITOT  0.4   ALKPHOS  65     No results for input(s): INR in the last 72 hours. Recent Labs      11/12/18   1743  11/13/18   0550   TROPONINI  1.680*  2.460*       Urinalysis:    Lab Results   Component Value Date    NITRU Negative 11/12/2018    WBCUA  11/12/2018    BACTERIA Many 11/12/2018    RBCUA 5-10 11/12/2018    BLOODU MODERATE 11/12/2018    SPECGRAV 1.034 11/12/2018    GLUCOSEU 100 11/12/2018       Radiology:  CT HEAD WO CONTRAST   Final Result      NO ACUTE INTRA-AXIAL OR EXTRA-AXIAL FINDINGS. IF SIGNS OR SYMPTOMS PERSIST THEN CONSIDER REPEAT CT SCAN IN 12 TO 24 HOURS OR MRI TO FURTHER EVALUATE IF THERE ARE NO CONTRAINDICATIONS. DR. Lobo Cai WAS NOTIFIED IMMEDIATELY ABOVE FINDINGS AT [address] [place] [address] ON NOVEMBER 13, 2018 -      All CT scans at this facility use dose modulation, iterative reconstruction, and/or weight based dosing when appropriate to reduce radiation dose to as low as reasonably achievable.                MRI BRAIN W WO CONTRAST    (Results Pending)   MRA HEAD WO CONTRAST    (Results Pending)   US CAROTID ARTERY BILATERAL    (Results Pending)           Assessment/Plan:    Active Hospital Problems    Diagnosis Date Noted    NSTEMI (non-ST elevated myocardial infarction) ([facility] [address] [I21.4] 11/12/2018         DVT Prophylaxis: on hold  Diet: DIET CARB CONTROL;  Code Status: DNR-CCA    PT/OT Eval Status: pending    Dispo - Acute embolic CVA- post TPa- all symptoms resolved  NSTEMI- supportive care, management per cardiology   DM- restart PO metformin, follow up with ACCU check and ISS  Multiple UTI- on suppression therapy  Will follow along with primary service       Electronically signed by Penny Blanchard MD on 11/14/2018 at 12:36 PM

## 2018-11-14 NOTE — PROGRESS NOTES
Patient has received IV contrast within the last 48 hours. Metformin has been restarted based on serum creatinine per package insert. Lab Results   Component Value Date    CREATININE 1.06 (H) 11/12/2018      Will restart on 11/14/18 in [address] [place] [address] Street, R. Ph.  11/13/2018  8:34 PM

## 2018-11-14 NOTE — PROGRESS NOTES
Patient was seen and discussed on multidisciplinary critical care rounds today.   She is doing well, no chest pains or shortness breath, neurologic status has improved significantly following TPA, discussed with Dr. Darlene Jeffers, will transfer out of ICU today

## 2018-11-14 NOTE — PROGRESS NOTES
Progress Note  Patient: Prema Cerna  Unit/Bed: WK52/EO15-32  YOB: 1927  MRN: [id number]  Acct: [de-identified]   Admitting Diagnosis: NSTEMI (non-ST elevated myocardial infarction) [facility]) [I21.4]  Admit Date:  11/12/2018  Hospital Day: 2    Chief Complaint: acute CVA, NSTEMI, Dementia    Histories:  Past Medical History:   Diagnosis Date    Arthritis     Chicken pox     Chronic back pain     Chronic low back pain 8/17/2016    Eczematous dermatitis     History of bladder infections     Hyperlipidemia     Hypertension     Measles     Mumps     Osteoarthritis 5/29/2012    SCC (squamous cell carcinoma), arm 2013    right upper arm, 2015 right forarm    SI (stress incontinence), female 5/29/2012    Type II or unspecified type diabetes mellitus without mention of complication, not stated as uncontrolled     Whooping cough      Past Surgical History:   Procedure Laterality Date    ANKLE SURGERY      broken left ankle.  APPENDECTOMY      BREAST BIOPSY      x2 left breast    CATARACT REMOVAL  2004    bilateral    CYSTOCELE REPAIR      EYE SURGERY      bilateral cataract    HYSTERECTOMY      complete at age 39    [address]      as a child     Family History   Problem Relation Age of Onset    Diabetes Mother     Heart Disease Father      Social History     Social History    Marital status:      Spouse name: N/A    Number of children: N/A    Years of education: N/A     Social History Main Topics    Smoking status: Never Smoker    Smokeless tobacco: Never Used    Alcohol use No    Drug use: No    Sexual activity: Not Asked     Other Topics Concern    None     Social History Narrative    None       Subjective/HPI:  Seen ICU. Base line dementia. But awake and alert.   Received tPA yesterday for acute CVA    EKG:        Review of Systems:   Review of Systems  Not reliable     Physical Examination:    BP (!) 121/54   Pulse 81   Temp 98.5 °F (36.9

## 2018-11-14 NOTE — FLOWSHEET NOTE
1300-Assumed care of patient from [address]   1330-Pt to MRI and Ultrasound via bed  1430-Pt back from testing Family at bedside Pt was slightly disoriented as to where she was but was easily reoriented  1530-Pt slightly confused and had legs over side of the bed. Was able to reorient back to bed. Pt was able to state she was in the hospital  1630-Pt sleeping at this time  1700-Report to [address] transfer patient after she eats dinner  1750-Transport request placed  1815-Pt to [address] [place] [address] Ready via bed. Son called and made aware of transfer.  Son also stated that he took her wedding band home with him before her MRI today and staff on [address] made aware

## 2018-11-15 LAB
ANION GAP SERPL CALCULATED.3IONS-SCNC: 10 MEQ/L (ref 7–13)
BUN BLDV-MCNC: 24 MG/DL (ref 8–23)
CALCIUM SERPL-MCNC: 8.6 MG/DL (ref 8.6–10.2)
CHLORIDE BLD-SCNC: 95 MEQ/L (ref 98–107)
CO2: 28 MEQ/L (ref 22–29)
CREAT SERPL-MCNC: 1.16 MG/DL (ref 0.5–0.9)
GFR AFRICAN AMERICAN: 52.9
GFR NON-AFRICAN AMERICAN: 43.7
GLUCOSE BLD-MCNC: 230 MG/DL (ref 60–115)
GLUCOSE BLD-MCNC: 240 MG/DL (ref 60–115)
GLUCOSE BLD-MCNC: 244 MG/DL (ref 74–109)
GLUCOSE BLD-MCNC: 278 MG/DL (ref 60–115)
GLUCOSE BLD-MCNC: 298 MG/DL (ref 60–115)
HCT VFR BLD CALC: 31.4 % (ref 37–47)
HEMOGLOBIN: 10.9 G/DL (ref 12–16)
MCH RBC QN AUTO: 32.2 PG (ref 27–31.3)
MCHC RBC AUTO-ENTMCNC: 34.8 % (ref 33–37)
MCV RBC AUTO: 92.7 FL (ref 82–100)
ORGANISM: ABNORMAL
PDW BLD-RTO: 13.3 % (ref 11.5–14.5)
PERFORMED ON: ABNORMAL
PLATELET # BLD: 249 K/UL (ref 130–400)
POTASSIUM SERPL-SCNC: 3.8 MEQ/L (ref 3.5–5.1)
RBC # BLD: 3.39 M/UL (ref 4.2–5.4)
SODIUM BLD-SCNC: 133 MEQ/L (ref 132–144)
URINE CULTURE, ROUTINE: ABNORMAL
URINE CULTURE, ROUTINE: ABNORMAL
WBC # BLD: 8.5 K/UL (ref 4.8–10.8)

## 2018-11-15 PROCEDURE — G9159 LANG COMP CURRENT STATUS: HCPCS

## 2018-11-15 PROCEDURE — 6370000000 HC RX 637 (ALT 250 FOR IP): Performed by: PSYCHIATRY & NEUROLOGY

## 2018-11-15 PROCEDURE — 99232 SBSQ HOSP IP/OBS MODERATE 35: CPT | Performed by: INTERNAL MEDICINE

## 2018-11-15 PROCEDURE — G8979 MOBILITY GOAL STATUS: HCPCS

## 2018-11-15 PROCEDURE — 97535 SELF CARE MNGMENT TRAINING: CPT

## 2018-11-15 PROCEDURE — G9160 LANG COMP GOAL STATUS: HCPCS

## 2018-11-15 PROCEDURE — G8987 SELF CARE CURRENT STATUS: HCPCS

## 2018-11-15 PROCEDURE — 36415 COLL VENOUS BLD VENIPUNCTURE: CPT

## 2018-11-15 PROCEDURE — 1210000000 HC MED SURG R&B

## 2018-11-15 PROCEDURE — 6370000000 HC RX 637 (ALT 250 FOR IP): Performed by: INTERNAL MEDICINE

## 2018-11-15 PROCEDURE — 6370000000 HC RX 637 (ALT 250 FOR IP): Performed by: PHYSICIAN ASSISTANT

## 2018-11-15 PROCEDURE — 97162 PT EVAL MOD COMPLEX 30 MIN: CPT

## 2018-11-15 PROCEDURE — 2580000003 HC RX 258: Performed by: INTERNAL MEDICINE

## 2018-11-15 PROCEDURE — G8988 SELF CARE GOAL STATUS: HCPCS

## 2018-11-15 PROCEDURE — 6360000002 HC RX W HCPCS: Performed by: PHYSICIAN ASSISTANT

## 2018-11-15 PROCEDURE — G8978 MOBILITY CURRENT STATUS: HCPCS

## 2018-11-15 PROCEDURE — 80048 BASIC METABOLIC PNL TOTAL CA: CPT

## 2018-11-15 PROCEDURE — 97167 OT EVAL HIGH COMPLEX 60 MIN: CPT

## 2018-11-15 PROCEDURE — 85027 COMPLETE CBC AUTOMATED: CPT

## 2018-11-15 PROCEDURE — 92523 SPEECH SOUND LANG COMPREHEN: CPT

## 2018-11-15 PROCEDURE — 2580000003 HC RX 258: Performed by: PHYSICIAN ASSISTANT

## 2018-11-15 RX ORDER — CLOPIDOGREL BISULFATE 75 MG/1
75 TABLET ORAL DAILY
Status: DISCONTINUED | OUTPATIENT
Start: 2018-11-15 | End: 2018-11-16 | Stop reason: HOSPADM

## 2018-11-15 RX ORDER — LEVOFLOXACIN 500 MG/1
500 TABLET, FILM COATED ORAL DAILY
Qty: 3 TABLET | Refills: 0
Start: 2018-11-15 | End: 2018-11-18

## 2018-11-15 RX ADMIN — CEFTRIAXONE SODIUM 1 G: 1 INJECTION, POWDER, FOR SOLUTION INTRAMUSCULAR; INTRAVENOUS at 21:34

## 2018-11-15 RX ADMIN — METOPROLOL TARTRATE 25 MG: 25 TABLET ORAL at 10:04

## 2018-11-15 RX ADMIN — INSULIN LISPRO 3 UNITS: 100 INJECTION, SOLUTION INTRAVENOUS; SUBCUTANEOUS at 12:16

## 2018-11-15 RX ADMIN — ASPIRIN 81 MG: 81 TABLET, CHEWABLE ORAL at 10:04

## 2018-11-15 RX ADMIN — AMLODIPINE BESYLATE 5 MG: 5 TABLET ORAL at 10:04

## 2018-11-15 RX ADMIN — Medication 10 ML: at 21:35

## 2018-11-15 RX ADMIN — Medication 10 ML: at 10:05

## 2018-11-15 RX ADMIN — LISINOPRIL 10 MG: 10 TABLET ORAL at 10:04

## 2018-11-15 RX ADMIN — MAGNESIUM OXIDE TAB 400 MG (241.3 MG ELEMENTAL MG) 400 MG: 400 (241.3 MG) TAB at 10:04

## 2018-11-15 RX ADMIN — INSULIN LISPRO 3 UNITS: 100 INJECTION, SOLUTION INTRAVENOUS; SUBCUTANEOUS at 21:34

## 2018-11-15 RX ADMIN — CLOPIDOGREL BISULFATE 75 MG: 75 TABLET ORAL at 16:35

## 2018-11-15 RX ADMIN — INSULIN LISPRO 2 UNITS: 100 INJECTION, SOLUTION INTRAVENOUS; SUBCUTANEOUS at 10:05

## 2018-11-15 RX ADMIN — METOPROLOL TARTRATE 25 MG: 25 TABLET ORAL at 21:34

## 2018-11-15 RX ADMIN — ATORVASTATIN CALCIUM 10 MG: 10 TABLET, FILM COATED ORAL at 21:34

## 2018-11-15 ASSESSMENT — PAIN SCALES - GENERAL
PAINLEVEL_OUTOF10: 0

## 2018-11-15 NOTE — PROGRESS NOTES
[facility] OCCUPATIONAL THERAPY EVALUATION - ACUTE   Room: N227/N227-01  Date: 11/15/2018  Patient Name: Wale Fraire        MRN: [id number]  Account: [de-identified]   : 6/10/1927  (80 y.o.)    Chart Review:  Diagnosis:  Change in mental status, NSTEMI,CVA with tPA  Past Medical History:   Diagnosis Date    Arthritis     Chicken pox     Chronic back pain     Chronic low back pain 2016    Eczematous dermatitis     History of bladder infections     Hyperlipidemia     Hypertension     Measles     Mumps     Osteoarthritis 2012    SCC (squamous cell carcinoma), arm     right upper arm,  right forarm    SI (stress incontinence), female 2012    Type II or unspecified type diabetes mellitus without mention of complication, not stated as uncontrolled     Whooping cough      Past Surgical History:   Procedure Laterality Date    ANKLE SURGERY      broken left ankle.     APPENDECTOMY      BREAST BIOPSY      x2 left breast    CATARACT REMOVAL      bilateral    CYSTOCELE REPAIR      EYE SURGERY      bilateral cataract    HYSTERECTOMY      complete at age 39    [address]      as a child     Precautions:  falls  Restrictions/Precautions: Fall Risk    Evaluation and Pt. rights have been reviewed: [x]Yes   [] No   If no why not:   Falls safety interventions in place  [x]Yes   [] No    Comments:     Subjective: \"I don't have any pain\"    Prior living arrangement:    Support contact: family  Pt lives: [] Alone   [] With spouse   [x] Other   Comment:  Son  Home: [] Single level   [x]  Two level   []  Split level     []  Apartment:     Entrance:  Stairs: 1 Hand rails 0,    Inside: Stairs: 1 flight up and down not used by pt Hand rails: 1  Bathroom: [] Bath tub   [x] Tub/Shower combo   []  Shower stall    Location: main level    DME: [x] W/W   [] Leatha Cheek   [] Rollator   []  W/C   [x] Serafin Ma   [x] Shower Chair   [] [facility]  [] Dressing  AE  [] Other:      Previous G-codes  Functional Limitation: Self care  Self Care Current Status (): At least 40 percent but less than 60 percent impaired, limited or restricted  Self Care Goal Status ():  At least 1 percent but less than 20 percent impaired, limited or restricted    Time in:  11:10  Time out:  11:34  Total minutes:  24  Timed treatment minutes:  10  Tx:  Pt Min Verb cues with safety with WW use and transfers into bathroom      Electronically signed by:    ALBA Shearer  11/15/2018, 11:53 AM

## 2018-11-15 NOTE — PROGRESS NOTES
Physician Progress Note    11/15/2018   1:16 PM    Name:  An Garsia  MRN:    [id number]      Day: 3     Admit Date: 2018  8:47 PM  PCP: Karthikeyan Teixeira MD    Code Status:  DNR-CCA    Subjective:      no new events. No new complaints. Per RN, pt is more confused today.      Physical Examination:      Vitals:  /67   Pulse 78   Temp 98.6 °F (37 °C) (Oral)   Resp 16   Ht 5' 3\" (1.6 m)   Wt 169 lb 15.6 oz (77.1 kg)   SpO2 96%   BMI 30.11 kg/m²   Temp (24hrs), Av.2 °F (36.8 °C), Min:97.7 °F (36.5 °C), Max:98.8 °F (37.1 °C)      General appearance: alert, cooperative and no distress  Mental Status:AOx2, dheeraj;t know the place   Lungs: clear to auscultation bilaterally, normal effort  Heart: regular rate and rhythm, no murmur  Abdomen: soft, nontender, nondistended, bowel sounds present, no masses  Extremities: no edema, redness, tenderness in the calves  Skin: no gross lesions, rashes    Data:     Labs:  Recent Labs      18   0456  11/15/18   0550   WBC  9.3  8.5   HGB  10.7*  10.9*   PLT  226  249     Recent Labs      18   0456  11/15/18   0550   NA  137  133   K  3.8  3.8   CL  97*  95*   CO2  27  28   BUN  26*  24*   CREATININE  1.31*  1.16*   GLUCOSE  193*  244*     Recent Labs      18   1743   AST  40*   ALT  19   BILITOT  0.4   ALKPHOS  65       Current Facility-Administered Medications   Medication Dose Route Frequency Provider Last Rate Last Dose    melatonin tablet 3 mg  3 mg Oral Nightly PRN Chester Charles MD   3 mg at 18 205    nitrofurantoin (MACRODANTIN) capsule 50 mg  50 mg Oral Daily Carlyn Medeiros MD   Stopped at 18 0816    sodium chloride flush 0.9 % injection 10 mL  10 mL Intravenous 2 times per day Omer Flores MD   10 mL at 11/15/18 1005    sodium chloride flush 0.9 % injection 10 mL  10 mL Intravenous PRN Omer Flores MD        amLODIPine (NORVASC) tablet 5 mg  5 mg Oral Daily Carlyn Medeiros MD   5 mg at 11/15/18 1004    aspirin chewable tablet 81 mg  81 mg Oral Daily Domonique Saravia MD   81 mg at 11/15/18 1004    atorvastatin (LIPITOR) tablet 10 mg  10 mg Oral Nightly Domonique Saravia MD   10 mg at 11/14/18 2054    lisinopril (PRINIVIL;ZESTRIL) tablet 10 mg  10 mg Oral Daily Domonique Saravia MD   10 mg at 11/15/18 1004    magnesium oxide (MAG-OX) tablet 400 mg  400 mg Oral Daily Domonique Saravia MD   400 mg at 11/15/18 1004    metoprolol tartrate (LOPRESSOR) tablet 25 mg  25 mg Oral BID Domonique Saravia MD   25 mg at 11/15/18 1004    sodium chloride flush 0.9 % injection 3 mL  3 mL Intravenous Q8H Domonique Saravia MD   3 mL at 11/14/18 0815    glucose (GLUTOSE) 40 % oral gel 15 g  15 g Oral PRCHARLES Saravia MD        dextrose 50 % solution 12.5 g  12.5 g Intravenous PRN Domonique Saravia MD        glucagon (rDNA) injection 1 mg  1 mg Intramuscular PRN Domonique Saravia MD        dextrose 5 % solution  100 mL/hr Intravenous PRCHARLES Saravia MD        insulin lispro (HUMALOG) injection vial 0-6 Units  0-6 Units Subcutaneous TID WC Domonique Saravia MD   3 Units at 11/15/18 1216    insulin lispro (HUMALOG) injection vial 0-3 Units  0-3 Units Subcutaneous Nightly Domonique Saravia MD   1 Units at 11/14/18 2054    morphine injection 1 mg  1 mg Intravenous Q3H PRN Domonique Saravia MD         Assessment and Plan:          Acute Hospital issues:    # Acute embolic CVA  - post TPa  - all symptoms resolved. Doing well. # NSTEMI  - resume asa, statin, BB, cardiology on board     # DM-   - PO metformin, follow up with ACCU check and ISS    # Multiple UTI  - on suppression therapy.  levaquin for 3 days then resume suppression therapy with macrobid once GFR is better     Will follow along with primary service            [facility] to [facility] medically        Electronically signed by Janelle Osorio DO on 11/15/2018 at 1:16 PM

## 2018-11-15 NOTE — PROGRESS NOTES
Facility/Department: [facility]  Initial Speech/Language/Cognitive Assessment    NAME: Raimundo Briggs  : 6/10/1927   MRN: [id number]  ADMISSION DATE: 2018  ADMITTING DIAGNOSIS: has HTN (hypertension); Diabetes mellitus; SI (stress incontinence), female; Osteoarthritis; CKD (chronic kidney disease); HLD (hyperlipidemia); Vitamin D deficiency; Eczematous dermatitis; Chronic low back pain; Lumbar spinal stenosis; Hypercalcemia; Diastolic dysfunction; Valvular heart disease; Recurrent UTI (urinary tract infection); Vitamin B12 deficiency; Chest pain; Nausea & vomiting; and NSTEMI (non-ST elevated myocardial infarction) [facility]) on her problem list.  DATE ONSET: 2018    Date of Eval: 11/15/2018   Evaluating Therapist: PK Will    Assessment:  Cognitive Diagnosis: Severe cognitive-linguistic impairment characterized by deficits for short term memory, higher level naming, yes/no questions, following multi-step commands, and orientation. Recommendations:  Requires SLP Intervention: Yes  Duration/Frequency of Treatment: 2-3x        Plan:   Goals:  Short-term Goals  Timeframe for Short-term Goals: 1-2 weeks  Goal 1: To address pt's cognitive deficits and promote orientation, pt will state name of facility, time within 1 hour, reason in hospital, current month and year with 100% accuracy with mod assist, with use of external aid. .  Goal 2: Pt will follow 2-3 step directions given orally with 70% accuracy with mod cues to increase the pt's ability to follow directions provided by caregivers for safe follow through with ADLs. Goal 3: Pt will answer yes/no questions with 70% accuracy with mod cues to increase the pt's ability to participate in conversation and express her wants and needs with familiar and unfamiliar communication partners. Goal 4:  To increase safety awareness and judgment for safe completion of ADLs secondary to pt's cognitive deficits, pt will provide reasonable solutions to

## 2018-11-15 NOTE — PROGRESS NOTES
11/15/2018     11/15/2018    MCV 92.7 11/15/2018    MCH 32.2 11/15/2018    MCHC 34.8 11/15/2018    RDW 13.3 11/15/2018    LYMPHOPCT 12.8 11/12/2018    MONOPCT 8.4 11/12/2018    EOSPCT 3.9 02/28/2012    BASOPCT 0.6 11/12/2018    MONOSABS 1.1 11/12/2018    LYMPHSABS 1.7 11/12/2018    EOSABS 0.0 11/12/2018    BASOSABS 0.1 11/12/2018     CMP:    Lab Results   Component Value Date     11/15/2018    K 3.8 11/15/2018    K 4.1 11/11/2018    CL 95 11/15/2018    CO2 28 11/15/2018    BUN 24 11/15/2018    CREATININE 1.16 11/15/2018    GFRAA 52.9 11/15/2018    LABGLOM 43.7 11/15/2018    GLUCOSE 244 11/15/2018    GLUCOSE 154 05/25/2012    PROT 6.8 11/12/2018    LABALBU 3.4 11/12/2018    LABALBU 4.4 05/25/2012    CALCIUM 8.6 11/15/2018    BILITOT 0.4 11/12/2018    ALKPHOS 65 11/12/2018    AST 40 11/12/2018    ALT 19 11/12/2018     BMP:    Lab Results   Component Value Date     11/15/2018    K 3.8 11/15/2018    K 4.1 11/11/2018    CL 95 11/15/2018    CO2 28 11/15/2018    BUN 24 11/15/2018    LABALBU 3.4 11/12/2018    LABALBU 4.4 05/25/2012    CREATININE 1.16 11/15/2018    CALCIUM 8.6 11/15/2018    GFRAA 52.9 11/15/2018    LABGLOM 43.7 11/15/2018    GLUCOSE 244 11/15/2018    GLUCOSE 154 05/25/2012     Magnesium:    Lab Results   Component Value Date    MG 1.7 11/11/2018     Troponin:    Lab Results   Component Value Date    TROPONINI 2.460 11/13/2018         Assessment/Plan:    Active Hospital Problems    Diagnosis Date Noted    [facility] [I21.4] 11/12/2018           Conservative management per patient and family wishes. Seems appropriate to me given age, frailty and dementia. Appreciate consultants help. Electronically signed by Elvira Rock MD [facility] Director of Cardiology Services and Cardiac Catheterization Laboratory  [facility], [place]   on 11/15/2018 at 8:03 AM

## 2018-11-15 NOTE — FLOWSHEET NOTE
2000-Shift assessment completed at this time, Pt A&Ox2 pt oriented to person, date, disoriented to location stating she is at home, denies chest pain/SOB, denies nausea/vomiting, family at bedside, pt stating she is going home tonight and she will not stay here, redirected pt, pt confused and upset, family requesting medication to make pt sleep, [employer] message sent to Dr. Payton Pinzon  [phone]- Pt attempting to get out of bed, pt unsteady on feet stating she is not in her bed and refusing to get back into bed, pt refusing vital signs and getting physically aggressive with this nurse, pt A&Ox2, pt not following commands but moving all extremities, this nurse obtaining AVASYS for patient-KGW  0100- Pt attempting to pull Tele monitor off, redirected patient and re-applied tele monitor, pt requesting to be left alone, bed alarm engaged, AVASYS on-KGW  0110- Pt attempting to get out of bed stating needs to use restroom, requested pt use bedside commode due to being unsteady, pt refused stating she is going to bathroom, this nurse and another RN ambulated pt to bathroom, pt attempting to close door in nurses face stating \"leave me alone\", educated patient on importance of having help due to unsteadiness and patient receiving TPA-KGW  0120- Pt ambulating back to bed and refuses to get in bed, asking what time it is, pt oriented to place and current time, pt sitting in chair refusing to go back to bed, unable to put patient on chair alarm due to patient refusing to get up out of chair, this nurse sitting with patient-KGW  0440- Per pt son he believes her behavior is due to a Elizabeth- Message sent to Dr. Paula Moore regarding pt UA results and change in mentation per family-KGW  0500- Pt refusing vital signs at this time, stating just wants to sleep-KGW    Electronically signed by Manolo Bhandari, RN on 11/15/2018 at 5:53 AM

## 2018-11-15 NOTE — PROGRESS NOTES
midline shift, or extra axial collection is noted. There is global atrophy. Ventricles, sulci, and basal cisterns are symmetric in size and configuration and proportionate to the degree of atrophy. There are periventricular and confluent deep cerebral white matter changes most consistent with chronic small vessel disease. There are atherosclerotic calcifications of the intracranial arteries. There is mucosal thickening of the maxillary sinuses. The frontal sinuses are not pneumatized. The native ocular lenses are absent. Bony structures are intact. There is material in the right external auditory canal, most likely cerumen. 1. No appreciable acute intracranial abnormality or interval change. Note: An acute ischemic event or extension of a chronic ischemic process may not be initially evident on CT. 2. Chronic small vessel ischemic disease. Ct Head Wo Contrast    Result Date: 11/12/2018  EXAMINATION: CT BRAIN WITHOUT CONTRAST CLINICAL HISTORY:  recent fall  R07.9 Chest pain ICD10 COMPARISONS: 1/3/2018 TECHNIQUE: Spiral scans without contrast. Multiplanar 2-D reconstructions. All CT scans at this facility use dose modulation, iterative reconstruction, and/or weight based dosing when appropriate to reduce radiation dose to as low as reasonably achievable. FINDINGS: There is age-consistent enlargement of the ventricles, cisterns, and sulci. There are nonspecific white matter hypodensities, fairly confluent in the corona radiata and centra semiovale,  likely representing chronic microvascular ischemic changes. There is no acute intracranial change from prior examination. There are no fractures or other skull lesions. The mastoids and middle ears are clear. There is mild maxillary sinus polypoid mucoperiosteal thickening. There are no acute paranasal sinus air-fluid levels. There is no acute orbital pathology. There are bilateral intraocular lens replacements. NO ACUTE INTRACRANIAL PATHOLOGY.       Recent

## 2018-11-16 VITALS
RESPIRATION RATE: 16 BRPM | HEART RATE: 84 BPM | HEIGHT: 63 IN | DIASTOLIC BLOOD PRESSURE: 66 MMHG | WEIGHT: 169.97 LBS | TEMPERATURE: 99.3 F | SYSTOLIC BLOOD PRESSURE: 137 MMHG | OXYGEN SATURATION: 97 % | BODY MASS INDEX: 30.12 KG/M2

## 2018-11-16 LAB
ANION GAP SERPL CALCULATED.3IONS-SCNC: 12 MEQ/L (ref 7–13)
BUN BLDV-MCNC: 22 MG/DL (ref 8–23)
CALCIUM SERPL-MCNC: 8.6 MG/DL (ref 8.6–10.2)
CHLORIDE BLD-SCNC: 98 MEQ/L (ref 98–107)
CO2: 25 MEQ/L (ref 22–29)
CREAT SERPL-MCNC: 1.04 MG/DL (ref 0.5–0.9)
GFR AFRICAN AMERICAN: >60
GFR NON-AFRICAN AMERICAN: 49.6
GLUCOSE BLD-MCNC: 196 MG/DL (ref 60–115)
GLUCOSE BLD-MCNC: 230 MG/DL (ref 74–109)
GLUCOSE BLD-MCNC: 318 MG/DL (ref 60–115)
HCT VFR BLD CALC: 30.7 % (ref 37–47)
HEMOGLOBIN: 10.1 G/DL (ref 12–16)
MCH RBC QN AUTO: 30.9 PG (ref 27–31.3)
MCHC RBC AUTO-ENTMCNC: 33.1 % (ref 33–37)
MCV RBC AUTO: 93.4 FL (ref 82–100)
PDW BLD-RTO: 13.2 % (ref 11.5–14.5)
PERFORMED ON: ABNORMAL
PERFORMED ON: ABNORMAL
PLATELET # BLD: 274 K/UL (ref 130–400)
POTASSIUM SERPL-SCNC: 3.8 MEQ/L (ref 3.5–5.1)
RBC # BLD: 3.29 M/UL (ref 4.2–5.4)
SODIUM BLD-SCNC: 135 MEQ/L (ref 132–144)
WBC # BLD: 8.4 K/UL (ref 4.8–10.8)

## 2018-11-16 PROCEDURE — 80048 BASIC METABOLIC PNL TOTAL CA: CPT

## 2018-11-16 PROCEDURE — 2580000003 HC RX 258: Performed by: INTERNAL MEDICINE

## 2018-11-16 PROCEDURE — 85027 COMPLETE CBC AUTOMATED: CPT

## 2018-11-16 PROCEDURE — 6370000000 HC RX 637 (ALT 250 FOR IP): Performed by: INTERNAL MEDICINE

## 2018-11-16 PROCEDURE — 99238 HOSP IP/OBS DSCHRG MGMT 30/<: CPT | Performed by: INTERNAL MEDICINE

## 2018-11-16 PROCEDURE — 97116 GAIT TRAINING THERAPY: CPT

## 2018-11-16 PROCEDURE — 36415 COLL VENOUS BLD VENIPUNCTURE: CPT

## 2018-11-16 PROCEDURE — 6370000000 HC RX 637 (ALT 250 FOR IP): Performed by: PSYCHIATRY & NEUROLOGY

## 2018-11-16 RX ORDER — CLOPIDOGREL BISULFATE 75 MG/1
75 TABLET ORAL DAILY
Qty: 30 TABLET | Refills: 3 | Status: ON HOLD | OUTPATIENT
Start: 2018-11-17 | End: 2019-01-10 | Stop reason: ALTCHOICE

## 2018-11-16 RX ADMIN — AMLODIPINE BESYLATE 5 MG: 5 TABLET ORAL at 09:09

## 2018-11-16 RX ADMIN — METOPROLOL TARTRATE 25 MG: 25 TABLET ORAL at 09:09

## 2018-11-16 RX ADMIN — CLOPIDOGREL BISULFATE 75 MG: 75 TABLET ORAL at 09:09

## 2018-11-16 RX ADMIN — MAGNESIUM OXIDE TAB 400 MG (241.3 MG ELEMENTAL MG) 400 MG: 400 (241.3 MG) TAB at 09:08

## 2018-11-16 RX ADMIN — LISINOPRIL 10 MG: 10 TABLET ORAL at 09:09

## 2018-11-16 RX ADMIN — Medication 10 ML: at 09:09

## 2018-11-16 ASSESSMENT — PAIN SCALES - GENERAL: PAINLEVEL_OUTOF10: 0

## 2018-11-16 NOTE — DISCHARGE INSTR - DIET

## 2018-11-19 ENCOUNTER — OFFICE VISIT (OUTPATIENT)
Dept: GERIATRIC MEDICINE | Age: 83
End: 2018-11-19
Payer: MEDICARE

## 2018-11-19 VITALS
TEMPERATURE: 97.4 F | RESPIRATION RATE: 18 BRPM | OXYGEN SATURATION: 95 % | DIASTOLIC BLOOD PRESSURE: 89 MMHG | SYSTOLIC BLOOD PRESSURE: 137 MMHG | HEART RATE: 92 BPM

## 2018-11-19 DIAGNOSIS — I10 HYPERTENSION, UNSPECIFIED TYPE: ICD-10-CM

## 2018-11-19 DIAGNOSIS — N39.0 RECURRENT UTI (URINARY TRACT INFECTION): ICD-10-CM

## 2018-11-19 DIAGNOSIS — M19.91 PRIMARY OSTEOARTHRITIS, UNSPECIFIED SITE: ICD-10-CM

## 2018-11-19 DIAGNOSIS — I21.4 NSTEMI (NON-ST ELEVATED MYOCARDIAL INFARCTION) (HCC): Primary | ICD-10-CM

## 2018-11-19 DIAGNOSIS — I63.10 CEREBROVASCULAR ACCIDENT (CVA) DUE TO EMBOLISM OF PRECEREBRAL ARTERY (HCC): ICD-10-CM

## 2018-11-19 DIAGNOSIS — I51.89 DIASTOLIC DYSFUNCTION: ICD-10-CM

## 2018-11-19 PROCEDURE — 99305 1ST NF CARE MODERATE MDM 35: CPT | Performed by: FAMILY MEDICINE

## 2018-11-19 PROCEDURE — G8482 FLU IMMUNIZE ORDER/ADMIN: HCPCS | Performed by: FAMILY MEDICINE

## 2018-11-19 PROCEDURE — 1101F PT FALLS ASSESS-DOCD LE1/YR: CPT | Performed by: FAMILY MEDICINE

## 2018-11-19 PROCEDURE — 1123F ACP DISCUSS/DSCN MKR DOCD: CPT | Performed by: FAMILY MEDICINE

## 2018-11-19 ASSESSMENT — ENCOUNTER SYMPTOMS
WHEEZING: 0
DIARRHEA: 0
SORE THROAT: 0
COUGH: 0
CONSTIPATION: 0
RHINORRHEA: 0
SHORTNESS OF BREATH: 0
ABDOMINAL PAIN: 0

## 2018-11-28 ENCOUNTER — OFFICE VISIT (OUTPATIENT)
Dept: GERIATRIC MEDICINE | Age: 83
End: 2018-11-28
Payer: MEDICARE

## 2018-11-28 DIAGNOSIS — Z87.440 HISTORY OF UTI: Primary | ICD-10-CM

## 2018-11-28 PROCEDURE — 1101F PT FALLS ASSESS-DOCD LE1/YR: CPT | Performed by: PHYSICIAN ASSISTANT

## 2018-11-28 PROCEDURE — G8482 FLU IMMUNIZE ORDER/ADMIN: HCPCS | Performed by: PHYSICIAN ASSISTANT

## 2018-11-28 PROCEDURE — 1123F ACP DISCUSS/DSCN MKR DOCD: CPT | Performed by: PHYSICIAN ASSISTANT

## 2018-11-28 PROCEDURE — 99308 SBSQ NF CARE LOW MDM 20: CPT | Performed by: PHYSICIAN ASSISTANT

## 2018-12-05 ENCOUNTER — OFFICE VISIT (OUTPATIENT)
Dept: CARDIOLOGY CLINIC | Age: 83
End: 2018-12-05
Payer: MEDICARE

## 2018-12-05 VITALS
SYSTOLIC BLOOD PRESSURE: 128 MMHG | OXYGEN SATURATION: 98 % | HEIGHT: 63 IN | HEART RATE: 80 BPM | WEIGHT: 168 LBS | DIASTOLIC BLOOD PRESSURE: 70 MMHG | BODY MASS INDEX: 29.77 KG/M2 | RESPIRATION RATE: 16 BRPM

## 2018-12-05 DIAGNOSIS — I10 HYPERTENSION, UNSPECIFIED TYPE: ICD-10-CM

## 2018-12-05 DIAGNOSIS — E78.5 HYPERLIPIDEMIA, UNSPECIFIED HYPERLIPIDEMIA TYPE: Primary | ICD-10-CM

## 2018-12-05 DIAGNOSIS — I38 VALVULAR HEART DISEASE: ICD-10-CM

## 2018-12-05 DIAGNOSIS — R07.9 CHEST PAIN, UNSPECIFIED TYPE: ICD-10-CM

## 2018-12-05 DIAGNOSIS — I63.10 CEREBROVASCULAR ACCIDENT (CVA) DUE TO EMBOLISM OF PRECEREBRAL ARTERY (HCC): ICD-10-CM

## 2018-12-05 DIAGNOSIS — I21.4 NSTEMI (NON-ST ELEVATED MYOCARDIAL INFARCTION) (HCC): ICD-10-CM

## 2018-12-05 PROCEDURE — 99214 OFFICE O/P EST MOD 30 MIN: CPT | Performed by: INTERNAL MEDICINE

## 2018-12-05 PROCEDURE — 1111F DSCHRG MED/CURRENT MED MERGE: CPT | Performed by: INTERNAL MEDICINE

## 2018-12-05 PROCEDURE — 1090F PRES/ABSN URINE INCON ASSESS: CPT | Performed by: INTERNAL MEDICINE

## 2018-12-05 PROCEDURE — 4040F PNEUMOC VAC/ADMIN/RCVD: CPT | Performed by: INTERNAL MEDICINE

## 2018-12-05 PROCEDURE — G8417 CALC BMI ABV UP PARAM F/U: HCPCS | Performed by: INTERNAL MEDICINE

## 2018-12-05 PROCEDURE — G8598 ASA/ANTIPLAT THER USED: HCPCS | Performed by: INTERNAL MEDICINE

## 2018-12-05 PROCEDURE — 1101F PT FALLS ASSESS-DOCD LE1/YR: CPT | Performed by: INTERNAL MEDICINE

## 2018-12-05 PROCEDURE — 1123F ACP DISCUSS/DSCN MKR DOCD: CPT | Performed by: INTERNAL MEDICINE

## 2018-12-05 PROCEDURE — G8427 DOCREV CUR MEDS BY ELIG CLIN: HCPCS | Performed by: INTERNAL MEDICINE

## 2018-12-05 PROCEDURE — G8482 FLU IMMUNIZE ORDER/ADMIN: HCPCS | Performed by: INTERNAL MEDICINE

## 2018-12-05 PROCEDURE — 1036F TOBACCO NON-USER: CPT | Performed by: INTERNAL MEDICINE

## 2018-12-05 RX ORDER — NITROFURANTOIN MACROCRYSTALS 50 MG/1
50 CAPSULE ORAL NIGHTLY
Status: ON HOLD | COMMUNITY
End: 2019-02-05 | Stop reason: HOSPADM

## 2018-12-05 ASSESSMENT — ENCOUNTER SYMPTOMS
CHEST TIGHTNESS: 0
GASTROINTESTINAL NEGATIVE: 1
BLOOD IN STOOL: 0
WHEEZING: 0
RESPIRATORY NEGATIVE: 1
COUGH: 0
STRIDOR: 0
NAUSEA: 0
EYES NEGATIVE: 1
SHORTNESS OF BREATH: 0

## 2018-12-05 NOTE — PROGRESS NOTES
for gait problem. Skin: Negative. Neurological: Positive for weakness. Negative for dizziness, syncope and light-headedness. Hematological: Negative. Psychiatric/Behavioral: Negative. Physical Examination:    /70 (Site: Left Upper Arm, Position: Sitting, Cuff Size: Large Adult)   Pulse 80   Resp 16   Ht 5' 3\" (1.6 m)   Wt 168 lb (76.2 kg)   SpO2 98%   BMI 29.76 kg/m²    Physical Exam   Constitutional: She appears healthy. No distress. HENT:   Normal cephalic and Atraumatic   Eyes: Pupils are equal, round, and reactive to light. Neck: Normal range of motion and thyroid normal. Neck supple. No JVD present. No neck adenopathy. No thyromegaly present. Cardiovascular: Normal rate, regular rhythm, intact distal pulses and normal pulses. Murmur heard. Pulmonary/Chest: Effort normal and breath sounds normal. She has no wheezes. She has no rales. She exhibits no tenderness. Abdominal: Soft. Bowel sounds are normal. There is no tenderness. Musculoskeletal: Normal range of motion. She exhibits no edema or tenderness. Neurological: She is alert and oriented to person, place, and time. Skin: Skin is warm. No cyanosis. Nails show no clubbing.        LABS:  CBC:   Lab Results   Component Value Date    WBC 8.4 11/16/2018    RBC 3.29 11/16/2018    RBC 3.91 02/28/2012    HGB 10.1 11/16/2018    HCT 30.7 11/16/2018    MCV 93.4 11/16/2018    MCH 30.9 11/16/2018    MCHC 33.1 11/16/2018    RDW 13.2 11/16/2018     11/16/2018    MPV 11.6 02/21/2014     Lipids:  Lab Results   Component Value Date    CHOL 228 (H) 09/17/2018    CHOL 143 12/21/2017    CHOL 145 08/17/2016     Lab Results   Component Value Date    TRIG 170 09/17/2018    TRIG 110 12/21/2017    TRIG 184 07/01/2015     Lab Results   Component Value Date    HDL 53 09/17/2018    HDL 60 (H) 12/21/2017    HDL 56 07/01/2015     Lab Results   Component Value Date    LDLCALC 141 (H) 09/17/2018    LDLCALC 61 12/21/2017    LDLCALC 65 07/01/2015     Lab Results   Component Value Date    LABVLDL 34.0 05/01/2013     Lab Results   Component Value Date    CHOLHDLRATIO 3.4 11/28/2012    CHOLHDLRATIO 3.2 05/25/2012     CMP:    Lab Results   Component Value Date     11/16/2018    K 3.8 11/16/2018    K 4.1 11/11/2018    CL 98 11/16/2018    CO2 25 11/16/2018    BUN 22 11/16/2018    CREATININE 1.04 11/16/2018    GFRAA >60.0 11/16/2018    LABGLOM 49.6 11/16/2018    GLUCOSE 230 11/16/2018    GLUCOSE 154 05/25/2012    PROT 6.8 11/12/2018    LABALBU 3.4 11/12/2018    LABALBU 4.4 05/25/2012    CALCIUM 8.6 11/16/2018    BILITOT 0.4 11/12/2018    ALKPHOS 65 11/12/2018    AST 40 11/12/2018    ALT 19 11/12/2018     BMP:    Lab Results   Component Value Date     11/16/2018    K 3.8 11/16/2018    K 4.1 11/11/2018    CL 98 11/16/2018    CO2 25 11/16/2018    BUN 22 11/16/2018    LABALBU 3.4 11/12/2018    LABALBU 4.4 05/25/2012    CREATININE 1.04 11/16/2018    CALCIUM 8.6 11/16/2018    GFRAA >60.0 11/16/2018    LABGLOM 49.6 11/16/2018    GLUCOSE 230 11/16/2018    GLUCOSE 154 05/25/2012     Magnesium:    Lab Results   Component Value Date    MG 1.7 11/11/2018     TSH:  Lab Results   Component Value Date    TSH 1.720 01/11/2018       Patient Active Problem List   Diagnosis    HTN (hypertension)    Diabetes mellitus    SI (stress incontinence), female    Osteoarthritis    CKD (chronic kidney disease)    HLD (hyperlipidemia)    Vitamin D deficiency    Eczematous dermatitis    Chronic low back pain    Lumbar spinal stenosis    Hypercalcemia    Diastolic dysfunction    Valvular heart disease    Recurrent UTI (urinary tract infection)    Vitamin B12 deficiency    Chest pain    Nausea & vomiting    NSTEMI (non-ST elevated myocardial infarction) ([facility]    Cerebrovascular accident (CVA) due to embolism of precerebral artery (HCC)       There are no discontinued medications.     Modified Medications    No medications on file       No orders of the

## 2018-12-13 ENCOUNTER — OFFICE VISIT (OUTPATIENT)
Dept: INTERNAL MEDICINE | Age: 83
End: 2018-12-13
Payer: MEDICARE

## 2018-12-13 VITALS
SYSTOLIC BLOOD PRESSURE: 108 MMHG | BODY MASS INDEX: 30.82 KG/M2 | DIASTOLIC BLOOD PRESSURE: 60 MMHG | OXYGEN SATURATION: 98 % | WEIGHT: 174 LBS | HEART RATE: 74 BPM

## 2018-12-13 DIAGNOSIS — E04.9 GOITER: ICD-10-CM

## 2018-12-13 DIAGNOSIS — I63.10 CEREBROVASCULAR ACCIDENT (CVA) DUE TO EMBOLISM OF PRECEREBRAL ARTERY (HCC): Primary | ICD-10-CM

## 2018-12-13 DIAGNOSIS — I21.4 NSTEMI (NON-ST ELEVATED MYOCARDIAL INFARCTION) (HCC): ICD-10-CM

## 2018-12-13 PROCEDURE — 1111F DSCHRG MED/CURRENT MED MERGE: CPT | Performed by: FAMILY MEDICINE

## 2018-12-13 PROCEDURE — 1101F PT FALLS ASSESS-DOCD LE1/YR: CPT | Performed by: FAMILY MEDICINE

## 2018-12-13 PROCEDURE — G8598 ASA/ANTIPLAT THER USED: HCPCS | Performed by: FAMILY MEDICINE

## 2018-12-13 PROCEDURE — G8482 FLU IMMUNIZE ORDER/ADMIN: HCPCS | Performed by: FAMILY MEDICINE

## 2018-12-13 PROCEDURE — 1036F TOBACCO NON-USER: CPT | Performed by: FAMILY MEDICINE

## 2018-12-13 PROCEDURE — 1123F ACP DISCUSS/DSCN MKR DOCD: CPT | Performed by: FAMILY MEDICINE

## 2018-12-13 PROCEDURE — G8427 DOCREV CUR MEDS BY ELIG CLIN: HCPCS | Performed by: FAMILY MEDICINE

## 2018-12-13 PROCEDURE — 4040F PNEUMOC VAC/ADMIN/RCVD: CPT | Performed by: FAMILY MEDICINE

## 2018-12-13 PROCEDURE — G8417 CALC BMI ABV UP PARAM F/U: HCPCS | Performed by: FAMILY MEDICINE

## 2018-12-13 PROCEDURE — 1090F PRES/ABSN URINE INCON ASSESS: CPT | Performed by: FAMILY MEDICINE

## 2018-12-13 PROCEDURE — 99213 OFFICE O/P EST LOW 20 MIN: CPT | Performed by: FAMILY MEDICINE

## 2018-12-13 RX ORDER — AMLODIPINE BESYLATE 5 MG/1
5 TABLET ORAL DAILY
Qty: 90 TABLET | Refills: 3 | Status: ON HOLD | OUTPATIENT
Start: 2018-12-13 | End: 2019-01-10 | Stop reason: ALTCHOICE

## 2018-12-13 RX ORDER — ATORVASTATIN CALCIUM 10 MG/1
TABLET, FILM COATED ORAL
Qty: 90 TABLET | Refills: 3 | Status: SHIPPED | OUTPATIENT
Start: 2018-12-13 | End: 2020-01-05

## 2018-12-13 NOTE — PROGRESS NOTES
high, 20-30 mmHg 1 each 1     No current facility-administered medications on file prior to visit. Allergies   Allergen Reactions    Metoclopramide     Pantoprazole Other (See Comments)    Pcn [Penicillins]      Tolerates rocephin    Protonix [Pantoprazole Sodium]     Tramadol        Chief Complaint   Patient presents with    Follow-Up from [facility] FAYE RASHID discharge 11/16/18, Marya Smith to [place] for rehab. HPI  Hospital follow up    Patient started feeling bad on Tuesday, was kept at [facility] x 2 days and then was sent home. She was home for a day then because unconscious when her son took her back to [facility]. Diagnosed with heart event, NSTEMI, and then stroke all back to back in the hospital.    She is back on plavix now with ASA. No residual from stroke. Seeing neurology in a week  Has F/U with cardiology    Reviewed imagine, incidental finding of a goiter  Lab Results   Component Value Date    TSH 1.720 01/11/2018       Review of Systems  Constitutional: Negative for fatigue, fever and sweats. HEENT: Negative for eye discharge and vision loss. Negative for ear drainage, hearing loss and nasal drainage. Respiratory: Negative for cough, dyspnea and wheezing. Cardiovascular:  Negative for chest pain, claudication and irregular heartbeat/palpitations. Physical Exam  Vitals:    12/13/18 1122   BP: 108/60   Pulse: 74   SpO2: 98%   Body mass index is 30.82 kg/m². Physical Exam  Constitutional:  Appears well-developed and well-nourished. No distress. HENT:   Head: Normocephalic and atraumatic. Right Ear: External ear normal.   Left Ear: External ear normal.   Nose: Nose normal.   Eyes: Conjunctivae and EOM are normal.  Right eye exhibits no discharge. Left eye exhibits no discharge. No scleral icterus. Neck: Normal range of motion. Cardiovascular: Normal rate, regular rhythm and normal heart sounds.     Pulmonary/Chest: Effort normal and breath sounds normal. No respiratory

## 2018-12-19 ENCOUNTER — TELEPHONE (OUTPATIENT)
Dept: INTERNAL MEDICINE | Age: 83
End: 2018-12-19

## 2018-12-19 NOTE — TELEPHONE ENCOUNTER
Patient has an order for a goiter on thyroid ordered. Son called, they were at Dr Mariana Vidales office yesterday and he suggested they wait since everything Chris Settle went thru. Katina Branch wants to know if this test is important or can they wait 1-2 weeks or whenever/whatever  Please advise.   Patient is doing better FYI  Call Katina Branch please

## 2018-12-22 ENCOUNTER — HOSPITAL ENCOUNTER (EMERGENCY)
Age: 83
Discharge: HOME | End: 2018-12-22
Attending: EMERGENCY MEDICINE
Payer: MEDICARE

## 2018-12-22 ENCOUNTER — APPOINTMENT (OUTPATIENT)
Dept: GENERAL RADIOLOGY | Age: 83
End: 2018-12-22
Payer: MEDICARE

## 2018-12-22 ENCOUNTER — APPOINTMENT (OUTPATIENT)
Dept: CT IMAGING | Age: 83
End: 2018-12-22
Payer: MEDICARE

## 2018-12-22 VITALS
WEIGHT: 169 LBS | SYSTOLIC BLOOD PRESSURE: 178 MMHG | TEMPERATURE: 98.1 F | HEART RATE: 84 BPM | BODY MASS INDEX: 29.95 KG/M2 | DIASTOLIC BLOOD PRESSURE: 80 MMHG | OXYGEN SATURATION: 96 % | HEIGHT: 63 IN | RESPIRATION RATE: 18 BRPM

## 2018-12-22 DIAGNOSIS — N39.0 ACUTE UTI: Primary | ICD-10-CM

## 2018-12-22 LAB
ALBUMIN SERPL-MCNC: 4.3 G/DL (ref 3.9–4.9)
ALP BLD-CCNC: 64 U/L (ref 40–130)
ALT SERPL-CCNC: 10 U/L (ref 0–33)
ANION GAP SERPL CALCULATED.3IONS-SCNC: 14 MEQ/L (ref 7–13)
AST SERPL-CCNC: 10 U/L (ref 0–35)
BACTERIA: ABNORMAL /HPF
BASOPHILS ABSOLUTE: 0 K/UL (ref 0–0.2)
BASOPHILS RELATIVE PERCENT: 0.1 %
BILIRUB SERPL-MCNC: 0.3 MG/DL (ref 0–1.2)
BILIRUBIN URINE: NEGATIVE
BLOOD, URINE: NEGATIVE
BUN BLDV-MCNC: 21 MG/DL (ref 8–23)
CALCIUM SERPL-MCNC: 10.1 MG/DL (ref 8.6–10.2)
CHLORIDE BLD-SCNC: 96 MEQ/L (ref 98–107)
CLARITY: ABNORMAL
CO2: 29 MEQ/L (ref 22–29)
COLOR: ABNORMAL
CREAT SERPL-MCNC: 0.9 MG/DL (ref 0.5–0.9)
EKG ATRIAL RATE: 82 BPM
EKG P AXIS: 15 DEGREES
EKG P-R INTERVAL: 176 MS
EKG Q-T INTERVAL: 420 MS
EKG QRS DURATION: 94 MS
EKG QTC CALCULATION (BAZETT): 490 MS
EKG R AXIS: -32 DEGREES
EKG T AXIS: 168 DEGREES
EKG VENTRICULAR RATE: 82 BPM
EOSINOPHILS ABSOLUTE: 0.2 K/UL (ref 0–0.7)
EOSINOPHILS RELATIVE PERCENT: 2.2 %
EPITHELIAL CELLS, UA: ABNORMAL /HPF
GFR AFRICAN AMERICAN: >60
GFR NON-AFRICAN AMERICAN: 58.6
GLOBULIN: 3.1 G/DL (ref 2.3–3.5)
GLUCOSE BLD-MCNC: 261 MG/DL (ref 74–109)
GLUCOSE URINE: 100 MG/DL
HCT VFR BLD CALC: 37.2 % (ref 37–47)
HEMOGLOBIN: 12.3 G/DL (ref 12–16)
KETONES, URINE: NEGATIVE MG/DL
LACTIC ACID: 2.9 MMOL/L (ref 0.5–2.2)
LACTIC ACID: 3.5 MMOL/L (ref 0.5–2.2)
LEUKOCYTE ESTERASE, URINE: ABNORMAL
LYMPHOCYTES ABSOLUTE: 1.8 K/UL (ref 1–4.8)
LYMPHOCYTES RELATIVE PERCENT: 18.4 %
MAGNESIUM: 1.6 MG/DL (ref 1.7–2.3)
MCH RBC QN AUTO: 29.8 PG (ref 27–31.3)
MCHC RBC AUTO-ENTMCNC: 33 % (ref 33–37)
MCV RBC AUTO: 90.5 FL (ref 82–100)
MONOCYTES ABSOLUTE: 0.5 K/UL (ref 0.2–0.8)
MONOCYTES RELATIVE PERCENT: 4.7 %
NEUTROPHILS ABSOLUTE: 7.3 K/UL (ref 1.4–6.5)
NEUTROPHILS RELATIVE PERCENT: 74.6 %
NITRITE, URINE: NEGATIVE
PDW BLD-RTO: 14.1 % (ref 11.5–14.5)
PH UA: 6.5 (ref 5–9)
PLATELET # BLD: 317 K/UL (ref 130–400)
POTASSIUM SERPL-SCNC: 4.1 MEQ/L (ref 3.5–5.1)
PROTEIN UA: >=300 MG/DL
RAPID INFLUENZA  B AGN: NEGATIVE
RAPID INFLUENZA A AGN: NEGATIVE
RBC # BLD: 4.11 M/UL (ref 4.2–5.4)
RBC UA: ABNORMAL /HPF (ref 0–2)
SODIUM BLD-SCNC: 139 MEQ/L (ref 132–144)
SPECIFIC GRAVITY UA: 1.02 (ref 1–1.03)
TOTAL PROTEIN: 7.4 G/DL (ref 6.4–8.1)
TROPONIN: <0.01 NG/ML (ref 0–0.01)
URINE REFLEX TO CULTURE: YES
URINE TYPE: ABNORMAL
UROBILINOGEN, URINE: 0.2 E.U./DL
WBC # BLD: 9.8 K/UL (ref 4.8–10.8)
WBC UA: ABNORMAL /HPF (ref 0–5)

## 2018-12-22 PROCEDURE — 87149 DNA/RNA DIRECT PROBE: CPT

## 2018-12-22 PROCEDURE — 87086 URINE CULTURE/COLONY COUNT: CPT

## 2018-12-22 PROCEDURE — 87804 INFLUENZA ASSAY W/OPTIC: CPT

## 2018-12-22 PROCEDURE — 87077 CULTURE AEROBIC IDENTIFY: CPT

## 2018-12-22 PROCEDURE — 96367 TX/PROPH/DG ADDL SEQ IV INF: CPT

## 2018-12-22 PROCEDURE — 93005 ELECTROCARDIOGRAM TRACING: CPT

## 2018-12-22 PROCEDURE — 99284 EMERGENCY DEPT VISIT MOD MDM: CPT

## 2018-12-22 PROCEDURE — 83605 ASSAY OF LACTIC ACID: CPT

## 2018-12-22 PROCEDURE — 81001 URINALYSIS AUTO W/SCOPE: CPT

## 2018-12-22 PROCEDURE — 36415 COLL VENOUS BLD VENIPUNCTURE: CPT

## 2018-12-22 PROCEDURE — 71045 X-RAY EXAM CHEST 1 VIEW: CPT

## 2018-12-22 PROCEDURE — 70450 CT HEAD/BRAIN W/O DYE: CPT

## 2018-12-22 PROCEDURE — 87040 BLOOD CULTURE FOR BACTERIA: CPT

## 2018-12-22 PROCEDURE — 84484 ASSAY OF TROPONIN QUANT: CPT

## 2018-12-22 PROCEDURE — 80053 COMPREHEN METABOLIC PANEL: CPT

## 2018-12-22 PROCEDURE — 83735 ASSAY OF MAGNESIUM: CPT

## 2018-12-22 PROCEDURE — 85025 COMPLETE CBC W/AUTO DIFF WBC: CPT

## 2018-12-22 PROCEDURE — 2580000003 HC RX 258: Performed by: EMERGENCY MEDICINE

## 2018-12-22 PROCEDURE — 96365 THER/PROPH/DIAG IV INF INIT: CPT

## 2018-12-22 PROCEDURE — 6360000002 HC RX W HCPCS: Performed by: EMERGENCY MEDICINE

## 2018-12-22 RX ORDER — 0.9 % SODIUM CHLORIDE 0.9 %
1000 INTRAVENOUS SOLUTION INTRAVENOUS ONCE
Status: COMPLETED | OUTPATIENT
Start: 2018-12-22 | End: 2018-12-22

## 2018-12-22 RX ORDER — MAGNESIUM SULFATE 1 G/100ML
1 INJECTION INTRAVENOUS ONCE
Status: COMPLETED | OUTPATIENT
Start: 2018-12-22 | End: 2018-12-22

## 2018-12-22 RX ORDER — CEFDINIR 300 MG/1
300 CAPSULE ORAL 2 TIMES DAILY
Qty: 20 CAPSULE | Refills: 0 | Status: SHIPPED | OUTPATIENT
Start: 2018-12-22 | End: 2019-01-01

## 2018-12-22 RX ADMIN — CEFTRIAXONE 1 G: 1 INJECTION, POWDER, FOR SOLUTION INTRAMUSCULAR; INTRAVENOUS at 14:48

## 2018-12-22 RX ADMIN — SODIUM CHLORIDE 1000 ML: 9 INJECTION, SOLUTION INTRAVENOUS at 13:59

## 2018-12-22 RX ADMIN — MAGNESIUM SULFATE HEPTAHYDRATE 1 G: 1 INJECTION, SOLUTION INTRAVENOUS at 14:48

## 2018-12-22 ASSESSMENT — ENCOUNTER SYMPTOMS
DIARRHEA: 0
COUGH: 0
ABDOMINAL DISTENTION: 0
EYE PAIN: 0
CONSTIPATION: 0
COLOR CHANGE: 0
SINUS PRESSURE: 0
SORE THROAT: 0
PHOTOPHOBIA: 0
ABDOMINAL PAIN: 0
BACK PAIN: 0
NAUSEA: 0
WHEEZING: 0
RHINORRHEA: 0
SHORTNESS OF BREATH: 0
VOMITING: 0
APNEA: 0

## 2018-12-22 NOTE — ED PROVIDER NOTES
[facility] ED  eMERGENCY dEPARTMENT eNCOUnter      Pt Name: Letty Halsted  MRN: [id number]  [place] 6/10/1927  Date of evaluation: 12/22/2018  Provider: Rachele Bravo MD    CHIEF COMPLAINT       Chief Complaint   Patient presents with    Illness     startes she just feels weird - started around 930a       HISTORY OF PRESENT ILLNESS   (Location/Symptom, Timing/Onset,Context/Setting, Quality, Duration, Modifying Factors, Severity)  Note limiting factors. Letty Halsted is a 80 y.o. female who presents to the emergency department with complaint of Generalized malaise and just not feeling well. Patient was just recently discharged from rehab facility following a recent CVA with right-sided weakness. Woke up this morning, tried to walk with her walker and began drifting to the right side. Son is worried she may have suffered another stroke. She otherwise denies chest pain, palpitations, orthopnea, PND, shortness of breath, cough or expectoration, fever or chills, nausea or vomiting. She had her flu vaccine this season. She denies pain or discomfort. HPI    Nursing Notes were reviewed. REVIEW OF SYSTEMS    (2-9 systems for level 4, 10 or more for level 5)     Review of Systems   Constitutional: Positive for fatigue. Negative for activity change, appetite change, chills and fever. HENT: Negative for congestion, ear discharge, ear pain, hearing loss, rhinorrhea, sinus pressure and sore throat. Eyes: Negative for photophobia, pain and visual disturbance. Respiratory: Negative for apnea, cough, shortness of breath and wheezing. Cardiovascular: Negative for chest pain, palpitations and leg swelling. Gastrointestinal: Negative for abdominal distention, abdominal pain, constipation, diarrhea, nausea and vomiting. Endocrine: Negative for cold intolerance, heat intolerance and polyuria. Genitourinary: Negative for dysuria, flank pain, frequency and urgency.    Musculoskeletal: Positive for arthralgias, gait problem and myalgias. Negative for back pain and neck stiffness. Skin: Negative for color change, pallor and rash. Allergic/Immunologic: Negative for food allergies and immunocompromised state. Neurological: Positive for weakness. Negative for dizziness, tremors, syncope, light-headedness and headaches. Psychiatric/Behavioral: Negative for agitation, confusion and hallucinations. All other systems reviewed and are negative. Except as noted above the remainder of the review of systems was reviewed and negative. PAST MEDICAL HISTORY     Past Medical History:   Diagnosis Date    Arthritis     Chicken pox     Chronic back pain     Chronic low back pain 8/17/2016    Eczematous dermatitis     History of bladder infections     Hyperlipidemia     Hypertension     Measles     Mumps     Osteoarthritis 5/29/2012    SCC (squamous cell carcinoma), arm 2013    right upper arm, 2015 right forarm    SI (stress incontinence), female 5/29/2012    Type II or unspecified type diabetes mellitus without mention of complication, not stated as uncontrolled     Whooping cough          SURGICAL HISTORY       Past Surgical History:   Procedure Laterality Date    ANKLE SURGERY      broken left ankle.     APPENDECTOMY      BREAST BIOPSY      x2 left breast    CATARACT REMOVAL  2004    bilateral    CYSTOCELE REPAIR      EYE SURGERY      bilateral cataract    HYSTERECTOMY      complete at age 39    [address]      as a child         [address]       Discharge Medication List as of 12/22/2018  5:32 PM      CONTINUE these medications which have NOT CHANGED    Details   atorvastatin (LIPITOR) 10 MG tablet take 1 tablet by mouth once daily, Disp-90 tablet, R-3Normal      nitrofurantoin (MACRODANTIN) 50 MG capsule Take 50 mg by mouth nightlyHistorical Med      lisinopril (PRINIVIL;ZESTRIL) 10 MG tablet Take 1 tablet by mouth daily, Disp-90 tablet, R-4Phone In      metoprolol tartrate (LOPRESSOR) 25 MG tablet take 1 tablet by mouth twice a day, Disp-180 tablet, R-4Phone In      TRUE METRIX BLOOD GLUCOSE TEST strip TEST two to three times a day, Disp-200 strip, R-5True Metrix Test StripsNormal      metFORMIN (GLUCOPHAGE) 1000 MG tablet take 1 tablet by mouth twice a day with meals, Disp-180 tablet, R-3Normal      magnesium oxide (MAG-OX) 400 MG tablet Take 400 mg by mouth dailyHistorical Med      Blood Glucose Monitoring Suppl (TRUE METRIX AIR GLUCOSE METER) W/DEVICE KIT [place]      Blood Glucose Monitoring Suppl PRUDENCIO Disp-1 Device, R-0, PrintDx: E11.9      aspirin 81 MG tablet Take 81 mg by mouth daily. amLODIPine (NORVASC) 5 MG tablet Take 1 tablet by mouth daily, Disp-90 tablet, R-3Normal      clopidogrel (PLAVIX) 75 MG tablet Take 1 tablet by mouth daily, Disp-30 tablet, R-3Normal      Needles & Syringes MISC DAILY Starting Mon 2/19/2018, Disp-50 each, R-0, Normal      TRUEPLUS LANCETS 28G MISC Disp-200 each, R-3, Normal      Incontinence Supply Disposable (DEPEND UNDERGARMENTS) MISC NIGHTLY Starting 5/1/2017, Until Discontinued, Disp-1 Package, R-11, Print      Compression Stockings MISC Starting 6/24/2015, Until Discontinued, Disp-1 each, R-1, PrintKnee high, 20-30 mmHg             ALLERGIES     Metoclopramide; Pantoprazole; Pcn [penicillins]; Protonix [pantoprazole sodium]; and Tramadol    FAMILY HISTORY       Family History   Problem Relation Age of Onset    Diabetes Mother     Heart Disease Father           SOCIAL HISTORY       Social History     Social History    Marital status:       Spouse name: N/A    Number of children: N/A    Years of education: N/A     Social History Main Topics    Smoking status: Never Smoker    Smokeless tobacco: Never Used    Alcohol use No    Drug use: No    Sexual activity: No     Other Topics Concern    None     Social History Narrative         Lives With: Son    Type of Home: House    Home Layout: Two level, Able to Live on Main level with bedroom/bathroom, Performs ADL's on one level    Home Access: Stairs to enter with rails    Entrance Stairs - Number of Steps: Threshold    Bathroom Shower/Tub: Tub/Shower unit    Bathroom Toilet: Handicap height    Bathroom Equipment: Grab bars in shower, Grab bars around toilet, Hand-held shower, Shower chair    Bathroom Accessibility: Accessible    Home Equipment: Rolling walker, 4 wheeled walker (Usually uses rollator)    [facility]: [facility] Needs assistance (Son manages housework)    Homemaking Responsibilities: No    Ambulation Assistance: Independent (Rollator)    Transfer Assistance: Independent    Active : No    Patient's  Info: Sons                SCREENINGS             PHYSICAL EXAM    (up to 7 for level 4, 8 or more for level 5)     ED Triage Vitals [12/22/18 1325]   BP Temp Temp Source Pulse Resp SpO2 Height Weight   (!) 202/90 98.1 °F (36.7 °C) Oral 91 20 96 % 5' 3\" (1.6 m) 169 lb (76.7 kg)       Physical Exam   Constitutional: She is oriented to person, place, and time. She appears well-developed and well-nourished. No distress. HENT:   Head: Normocephalic and atraumatic. Nose: Nose normal.   Mouth/Throat: Oropharynx is clear and moist. No oropharyngeal exudate. Eyes: Pupils are equal, round, and reactive to light. Conjunctivae and EOM are normal. Right eye exhibits no discharge. Left eye exhibits no discharge. No scleral icterus. Neck: Normal range of motion. Neck supple. No JVD present. No tracheal deviation present. No thyromegaly present. Cardiovascular: Normal rate, regular rhythm, normal heart sounds and intact distal pulses. Exam reveals no gallop and no friction rub. No murmur heard. Pulmonary/Chest: Effort normal and breath sounds normal. No stridor. No respiratory distress. She has no wheezes. She has no rales. She exhibits no tenderness. Abdominal: Soft.  Bowel sounds are normal. She Culture, Blood 2 1 out of 2 blood cultures (*)     Organism Staphylococcus coagulase negative DNA Detected (*)     Organism Staphylococcus coagulase-negative (*)     All other components within normal limits    Narrative:     ORDER#: [id number]                          ORDERED BY: Boris Spence  SOURCE: Blood                              COLLECTED:  12/22/18 13:51  ANTIBIOTICS AT [facility]RE[facility].:                      RECEIVED :  12/22/18 15:47  CALL  Reece  MOHINIER tel. [phone],  Blood culture called & read back by Caitlin Walls, 12/25/2018 10:18, by [address] [place] [address] - Abnormal; Notable for the following:     Chloride 96 (*)     Anion Gap 14 (*)     Glucose 261 (*)     GFR Non- 58.6 (*)     All other components within normal limits    Narrative:     Susan VASQUEZ tel. [phone], called result to [id number] at [phone] 12/22/18  Chemistry results called to and read back byKevyn RN at 1415,12/22/18,  12/22/2018 14:17, by [facility]   CBC WITH AUTO DIFFERENTIAL - Abnormal; Notable for the following:     RBC 4.11 (*)     Neutrophils # 7.3 (*)     All other components within normal limits   LACTIC ACID, PLASMA - Abnormal; Notable for the following:     Lactic Acid 3.5 (*)     All other components within normal limits    Narrative:     Susan Mccormack Havers [phone], called [place] to [address] at [phone] 12/22/18  Chemistry results called to and read back byKevyn RN at 1415,12/22/18,  12/22/2018 14:17, by [facility]   MAGNESIUM - Abnormal; Notable for the following:     Magnesium 1.6 (*)     All other components within normal limits    Narrative:     Issa Etelvina tel. [phone], called [place] to [address] at [phone] 12/22/18  Chemistry results called to and read back byKevyn RN at 1415,12/22/18,  12/22/2018 14:17, by [facility]   URINE RT REFLEX TO CULTURE - Abnormal; Notable for the following:     Clarity, UA SL CLOUDY (*)     Glucose, Ur 100 (*)     Protein, UA >=300 (*)     Leukocyte Esterase, Urine SMALL (*)     All deterioration in the patient's condition which required my urgentintervention. CONSULTS:  None    PROCEDURES:  Unless otherwise noted below, none     Procedures    FINAL IMPRESSION      1.  Acute UTI          DISPOSITION/PLAN   DISPOSITION        PATIENT REFERRED TO:  Fermin Brown MD  [address] [place] [address] [phone]    In 3 days        DISCHARGE MEDICATIONS:  Discharge Medication List as of 12/22/2018  5:32 PM      START taking these medications    Details   cefdinir (OMNICEF) 300 MG capsule Take 1 capsule by mouth 2 times daily for 10 days, Disp-20 capsule, R-0Print                (Please note that portions of this note were completed with a voice recognitionprogram.  Efforts were made to edit the dictations but occasionally words are mis-transcribed.)    Enedina Brock MD (electronically signed)  Attending Emergency Physician         Enedina Brock MD  12/23/18 0129       Enedina Brock MD  12/28/18 MD Dennise  01/06/19 2540

## 2018-12-24 LAB — URINE CULTURE, ROUTINE: NORMAL

## 2018-12-26 ENCOUNTER — TELEPHONE (OUTPATIENT)
Dept: INTERNAL MEDICINE | Age: 83
End: 2018-12-26

## 2018-12-26 DIAGNOSIS — N39.0 RECURRENT UTI (URINARY TRACT INFECTION): Primary | ICD-10-CM

## 2018-12-26 LAB
CULTURE, BLOOD 2: ABNORMAL
ORGANISM: ABNORMAL
ORGANISM: ABNORMAL

## 2018-12-27 LAB — BLOOD CULTURE, ROUTINE: NORMAL

## 2018-12-29 ENCOUNTER — OFFICE VISIT (OUTPATIENT)
Dept: INTERNAL MEDICINE | Age: 83
End: 2018-12-29
Payer: MEDICARE

## 2018-12-29 VITALS
RESPIRATION RATE: 12 BRPM | OXYGEN SATURATION: 96 % | WEIGHT: 175.8 LBS | TEMPERATURE: 97 F | HEART RATE: 69 BPM | SYSTOLIC BLOOD PRESSURE: 142 MMHG | HEIGHT: 63 IN | BODY MASS INDEX: 31.15 KG/M2 | DIASTOLIC BLOOD PRESSURE: 72 MMHG

## 2018-12-29 DIAGNOSIS — J06.9 VIRAL URI WITH COUGH: Primary | ICD-10-CM

## 2018-12-29 DIAGNOSIS — H61.23 IMPACTED CERUMEN OF BOTH EARS: ICD-10-CM

## 2018-12-29 PROCEDURE — G8427 DOCREV CUR MEDS BY ELIG CLIN: HCPCS | Performed by: NURSE PRACTITIONER

## 2018-12-29 PROCEDURE — 1123F ACP DISCUSS/DSCN MKR DOCD: CPT | Performed by: NURSE PRACTITIONER

## 2018-12-29 PROCEDURE — 4040F PNEUMOC VAC/ADMIN/RCVD: CPT | Performed by: NURSE PRACTITIONER

## 2018-12-29 PROCEDURE — 69209 REMOVE IMPACTED EAR WAX UNI: CPT | Performed by: NURSE PRACTITIONER

## 2018-12-29 PROCEDURE — G8417 CALC BMI ABV UP PARAM F/U: HCPCS | Performed by: NURSE PRACTITIONER

## 2018-12-29 PROCEDURE — 1090F PRES/ABSN URINE INCON ASSESS: CPT | Performed by: NURSE PRACTITIONER

## 2018-12-29 PROCEDURE — 99213 OFFICE O/P EST LOW 20 MIN: CPT | Performed by: NURSE PRACTITIONER

## 2018-12-29 PROCEDURE — 1036F TOBACCO NON-USER: CPT | Performed by: NURSE PRACTITIONER

## 2018-12-29 PROCEDURE — G8598 ASA/ANTIPLAT THER USED: HCPCS | Performed by: NURSE PRACTITIONER

## 2018-12-29 PROCEDURE — G8482 FLU IMMUNIZE ORDER/ADMIN: HCPCS | Performed by: NURSE PRACTITIONER

## 2018-12-29 PROCEDURE — 1101F PT FALLS ASSESS-DOCD LE1/YR: CPT | Performed by: NURSE PRACTITIONER

## 2018-12-29 ASSESSMENT — ENCOUNTER SYMPTOMS
COUGH: 1
HEMOPTYSIS: 0
SINUS PAIN: 0
SORE THROAT: 0
RHINORRHEA: 1
SHORTNESS OF BREATH: 0
SINUS PRESSURE: 0
WHEEZING: 0

## 2018-12-29 NOTE — PROGRESS NOTES
External ear normal.   Nose: Mucosal edema and rhinorrhea present. Right sinus exhibits no maxillary sinus tenderness and no frontal sinus tenderness. Left sinus exhibits no maxillary sinus tenderness and no frontal sinus tenderness. Mouth/Throat: Uvula is midline. Posterior oropharyngeal erythema present. No oropharyngeal exudate, posterior oropharyngeal edema or tonsillar abscesses. Unable to assess TM due to impacted cerumen. Eyes: Pupils are equal, round, and reactive to light. Conjunctivae are normal. Right eye exhibits no discharge. Left eye exhibits no discharge. Neck: No tracheal deviation present. Cardiovascular: Normal rate, regular rhythm and normal heart sounds. Pulmonary/Chest: Effort normal and breath sounds normal. No respiratory distress. Lymphadenopathy:     She has no cervical adenopathy. Neurological: She is alert. No cranial nerve deficit. Skin: Skin is warm and dry. No rash noted. She is not diaphoretic. No erythema. No pallor. Vitals reviewed. POC Testing Today:No results found for this visit on 12/29/18. Assessment & Plan    Diagnosis Orders   1. Viral URI with cough     2. Impacted cerumen of both ears  MD REMOVAL IMPACTED CERUMEN IRRIGATION/LVG UNILAT       Orders Placed This Encounter   Procedures    MD REMOVAL IMPACTED CERUMEN IRRIGATION/LVG UNILAT     Ear Cerumen Removal Procedure: The patient had their both ear(s) irrigated to remove impacted cerumen. After irrigation the right ear canal and TM appear normal. The patient tolerated the procedure well. Left ear wax was too hard to remove. Recommended debrox OTC to soften wax. Patient will return to irrigate right ear after wax softens. Continue to take Cefdinir and Bactrim as prescribed. Discussed with Suzette Ernandez and son that this appears to be a viral infection. Treatment includes supportive care with rest, hydration, and medications for symptom management as ordered.   Return if symptoms persist for more than a week. Diana verbalized understanding. Coricidin HBP congestion/cough recommended for symptom management. Debrox for ear wax softener       Return if symptoms worsen or fail to improve, for follow up with your PCP. Side effects and adverse effects of any medication prescribed today, as well as treatment plan/rationale, follow-up care, and result expectations have been discussed with the patient. Expresses understanding and desires to proceed with treatment plan. Discussed signs and symptoms which require immediate follow-up in ED/call to 911. Understanding verbalized. I have reviewed and updated the electronic medical record.     Carmen Goncalves, APRN - CNP

## 2018-12-30 ENCOUNTER — HOSPITAL ENCOUNTER (INPATIENT)
Age: 83
LOS: 4 days | Discharge: NURSING, REHAB OR LONG-TERM CARE | DRG: 247 | End: 2019-01-03
Attending: EMERGENCY MEDICINE | Admitting: INTERNAL MEDICINE
Payer: MEDICARE

## 2018-12-30 ENCOUNTER — APPOINTMENT (OUTPATIENT)
Dept: GENERAL RADIOLOGY | Age: 83
DRG: 247 | End: 2018-12-30
Payer: MEDICARE

## 2018-12-30 DIAGNOSIS — I21.3 ST ELEVATION MYOCARDIAL INFARCTION (STEMI), UNSPECIFIED ARTERY (HCC): Primary | ICD-10-CM

## 2018-12-30 PROBLEM — I21.02 STEMI INVOLVING LEFT ANTERIOR DESCENDING CORONARY ARTERY (HCC): Status: ACTIVE | Noted: 2018-12-30

## 2018-12-30 LAB
ALBUMIN SERPL-MCNC: 3.8 G/DL (ref 3.9–4.9)
ALP BLD-CCNC: 64 U/L (ref 40–130)
ALT SERPL-CCNC: 10 U/L (ref 0–33)
ANION GAP SERPL CALCULATED.3IONS-SCNC: 14 MEQ/L (ref 7–13)
APTT: 27.1 SEC (ref 21.6–35.4)
AST SERPL-CCNC: 10 U/L (ref 0–35)
BASOPHILS ABSOLUTE: 0.1 K/UL (ref 0–0.2)
BASOPHILS RELATIVE PERCENT: 0.5 %
BILIRUB SERPL-MCNC: 0.4 MG/DL (ref 0–1.2)
BUN BLDV-MCNC: 19 MG/DL (ref 8–23)
CALCIUM SERPL-MCNC: 9.5 MG/DL (ref 8.6–10.2)
CHLORIDE BLD-SCNC: 98 MEQ/L (ref 98–107)
CK MB: 2.1 NG/ML (ref 0–3.8)
CO2: 25 MEQ/L (ref 22–29)
CREAT SERPL-MCNC: 1.05 MG/DL (ref 0.5–0.9)
CREATINE KINASE-MB INDEX: 7.2 % (ref 0–3.5)
EOSINOPHILS ABSOLUTE: 0 K/UL (ref 0–0.7)
EOSINOPHILS RELATIVE PERCENT: 0.1 %
GFR AFRICAN AMERICAN: 56
GFR AFRICAN AMERICAN: 59.4
GFR NON-AFRICAN AMERICAN: 46
GFR NON-AFRICAN AMERICAN: 49.1
GLOBULIN: 3 G/DL (ref 2.3–3.5)
GLUCOSE BLD-MCNC: 199 MG/DL (ref 60–115)
GLUCOSE BLD-MCNC: 210 MG/DL (ref 60–115)
GLUCOSE BLD-MCNC: 270 MG/DL (ref 74–109)
GLUCOSE BLD-MCNC: 323 MG/DL (ref 60–115)
HCT VFR BLD CALC: 35 % (ref 37–47)
HEMOGLOBIN: 11.8 G/DL (ref 12–16)
INR BLD: 1
LYMPHOCYTES ABSOLUTE: 0.9 K/UL (ref 1–4.8)
LYMPHOCYTES RELATIVE PERCENT: 8.4 %
MCH RBC QN AUTO: 30.6 PG (ref 27–31.3)
MCHC RBC AUTO-ENTMCNC: 33.7 % (ref 33–37)
MCV RBC AUTO: 90.9 FL (ref 82–100)
MONOCYTES ABSOLUTE: 0.6 K/UL (ref 0.2–0.8)
MONOCYTES RELATIVE PERCENT: 5.1 %
NEUTROPHILS ABSOLUTE: 9.6 K/UL (ref 1.4–6.5)
NEUTROPHILS RELATIVE PERCENT: 85.9 %
PDW BLD-RTO: 13.8 % (ref 11.5–14.5)
PERFORMED ON: ABNORMAL
PLATELET # BLD: 237 K/UL (ref 130–400)
POC ACTIVATED CLOTTING TIME KAOLIN: 246 SEC (ref 82–152)
POC CREATININE: 1.1 MG/DL (ref 0.6–1.2)
POC SAMPLE TYPE: ABNORMAL
POC SAMPLE TYPE: ABNORMAL
POTASSIUM SERPL-SCNC: 3.8 MEQ/L (ref 3.5–5.1)
PROTHROMBIN TIME: 10.4 SEC (ref 9–11.5)
RBC # BLD: 3.85 M/UL (ref 4.2–5.4)
SODIUM BLD-SCNC: 137 MEQ/L (ref 132–144)
TOTAL CK: 29 U/L (ref 0–170)
TOTAL PROTEIN: 6.8 G/DL (ref 6.4–8.1)
TROPONIN: 0.03 NG/ML (ref 0–0.01)
TROPONIN: 0.12 NG/ML (ref 0–0.01)
WBC # BLD: 11.2 K/UL (ref 4.8–10.8)

## 2018-12-30 PROCEDURE — 99285 EMERGENCY DEPT VISIT HI MDM: CPT

## 2018-12-30 PROCEDURE — 85025 COMPLETE CBC W/AUTO DIFF WBC: CPT

## 2018-12-30 PROCEDURE — 2500000003 HC RX 250 WO HCPCS

## 2018-12-30 PROCEDURE — 84484 ASSAY OF TROPONIN QUANT: CPT

## 2018-12-30 PROCEDURE — C1769 GUIDE WIRE: HCPCS

## 2018-12-30 PROCEDURE — 82550 ASSAY OF CK (CPK): CPT

## 2018-12-30 PROCEDURE — 6370000000 HC RX 637 (ALT 250 FOR IP): Performed by: PHYSICIAN ASSISTANT

## 2018-12-30 PROCEDURE — 82553 CREATINE MB FRACTION: CPT

## 2018-12-30 PROCEDURE — 027034Z DILATION OF CORONARY ARTERY, ONE ARTERY WITH DRUG-ELUTING INTRALUMINAL DEVICE, PERCUTANEOUS APPROACH: ICD-10-PCS | Performed by: INTERNAL MEDICINE

## 2018-12-30 PROCEDURE — 2580000003 HC RX 258: Performed by: INTERNAL MEDICINE

## 2018-12-30 PROCEDURE — 85347 COAGULATION TIME ACTIVATED: CPT

## 2018-12-30 PROCEDURE — 93458 L HRT ARTERY/VENTRICLE ANGIO: CPT | Performed by: INTERNAL MEDICINE

## 2018-12-30 PROCEDURE — 6360000002 HC RX W HCPCS: Performed by: INTERNAL MEDICINE

## 2018-12-30 PROCEDURE — C1874 STENT, COATED/COV W/DEL SYS: HCPCS

## 2018-12-30 PROCEDURE — 6360000002 HC RX W HCPCS

## 2018-12-30 PROCEDURE — 93005 ELECTROCARDIOGRAM TRACING: CPT

## 2018-12-30 PROCEDURE — 96366 THER/PROPH/DIAG IV INF ADDON: CPT

## 2018-12-30 PROCEDURE — B211YZZ FLUOROSCOPY OF MULTIPLE CORONARY ARTERIES USING OTHER CONTRAST: ICD-10-PCS | Performed by: INTERNAL MEDICINE

## 2018-12-30 PROCEDURE — 96365 THER/PROPH/DIAG IV INF INIT: CPT

## 2018-12-30 PROCEDURE — 6370000000 HC RX 637 (ALT 250 FOR IP): Performed by: INTERNAL MEDICINE

## 2018-12-30 PROCEDURE — 4A023N7 MEASUREMENT OF CARDIAC SAMPLING AND PRESSURE, LEFT HEART, PERCUTANEOUS APPROACH: ICD-10-PCS | Performed by: INTERNAL MEDICINE

## 2018-12-30 PROCEDURE — C1725 CATH, TRANSLUMIN NON-LASER: HCPCS

## 2018-12-30 PROCEDURE — 6370000000 HC RX 637 (ALT 250 FOR IP): Performed by: EMERGENCY MEDICINE

## 2018-12-30 PROCEDURE — 71045 X-RAY EXAM CHEST 1 VIEW: CPT

## 2018-12-30 PROCEDURE — 36415 COLL VENOUS BLD VENIPUNCTURE: CPT

## 2018-12-30 PROCEDURE — 80053 COMPREHEN METABOLIC PANEL: CPT

## 2018-12-30 PROCEDURE — 2000000000 HC ICU R&B

## 2018-12-30 PROCEDURE — 99232 SBSQ HOSP IP/OBS MODERATE 35: CPT | Performed by: INTERNAL MEDICINE

## 2018-12-30 PROCEDURE — 2580000003 HC RX 258

## 2018-12-30 PROCEDURE — B215YZZ FLUOROSCOPY OF LEFT HEART USING OTHER CONTRAST: ICD-10-PCS | Performed by: INTERNAL MEDICINE

## 2018-12-30 PROCEDURE — 82565 ASSAY OF CREATININE: CPT

## 2018-12-30 PROCEDURE — 2500000003 HC RX 250 WO HCPCS: Performed by: INTERNAL MEDICINE

## 2018-12-30 PROCEDURE — 2709999900 HC NON-CHARGEABLE SUPPLY

## 2018-12-30 PROCEDURE — 85730 THROMBOPLASTIN TIME PARTIAL: CPT

## 2018-12-30 PROCEDURE — C1887 CATHETER, GUIDING: HCPCS

## 2018-12-30 PROCEDURE — 92941 PRQ TRLML REVSC TOT OCCL AMI: CPT | Performed by: INTERNAL MEDICINE

## 2018-12-30 PROCEDURE — 99223 1ST HOSP IP/OBS HIGH 75: CPT | Performed by: INTERNAL MEDICINE

## 2018-12-30 PROCEDURE — 6370000000 HC RX 637 (ALT 250 FOR IP)

## 2018-12-30 PROCEDURE — C1894 INTRO/SHEATH, NON-LASER: HCPCS

## 2018-12-30 PROCEDURE — 85610 PROTHROMBIN TIME: CPT

## 2018-12-30 RX ORDER — NITROGLYCERIN 0.4 MG/1
TABLET SUBLINGUAL DAILY PRN
Status: COMPLETED | OUTPATIENT
Start: 2018-12-30 | End: 2018-12-30

## 2018-12-30 RX ORDER — LISINOPRIL 10 MG/1
10 TABLET ORAL DAILY
Status: DISCONTINUED | OUTPATIENT
Start: 2018-12-30 | End: 2019-01-03 | Stop reason: HOSPADM

## 2018-12-30 RX ORDER — ATORVASTATIN CALCIUM 40 MG/1
40 TABLET, FILM COATED ORAL NIGHTLY
Status: DISCONTINUED | OUTPATIENT
Start: 2018-12-30 | End: 2019-01-03 | Stop reason: HOSPADM

## 2018-12-30 RX ORDER — NICOTINE POLACRILEX 4 MG
15 LOZENGE BUCCAL PRN
Status: DISCONTINUED | OUTPATIENT
Start: 2018-12-30 | End: 2019-01-03 | Stop reason: HOSPADM

## 2018-12-30 RX ORDER — HYDRALAZINE HYDROCHLORIDE 20 MG/ML
10 INJECTION INTRAMUSCULAR; INTRAVENOUS EVERY 10 MIN PRN
Status: COMPLETED | OUTPATIENT
Start: 2018-12-30 | End: 2018-12-30

## 2018-12-30 RX ORDER — CEFDINIR 300 MG/1
300 CAPSULE ORAL 2 TIMES DAILY
Status: DISCONTINUED | OUTPATIENT
Start: 2018-12-30 | End: 2018-12-31

## 2018-12-30 RX ORDER — DEXTROSE MONOHYDRATE 50 MG/ML
100 INJECTION, SOLUTION INTRAVENOUS PRN
Status: DISCONTINUED | OUTPATIENT
Start: 2018-12-30 | End: 2019-01-03 | Stop reason: HOSPADM

## 2018-12-30 RX ORDER — DEXTROSE MONOHYDRATE 25 G/50ML
12.5 INJECTION, SOLUTION INTRAVENOUS PRN
Status: DISCONTINUED | OUTPATIENT
Start: 2018-12-30 | End: 2019-01-03 | Stop reason: HOSPADM

## 2018-12-30 RX ORDER — LORAZEPAM 2 MG/ML
0.5 INJECTION INTRAMUSCULAR EVERY 6 HOURS PRN
Status: DISCONTINUED | OUTPATIENT
Start: 2018-12-30 | End: 2019-01-03 | Stop reason: HOSPADM

## 2018-12-30 RX ORDER — ACETAMINOPHEN 325 MG/1
650 TABLET ORAL EVERY 4 HOURS PRN
Status: DISCONTINUED | OUTPATIENT
Start: 2018-12-30 | End: 2019-01-03 | Stop reason: HOSPADM

## 2018-12-30 RX ORDER — ASPIRIN 81 MG/1
81 TABLET, CHEWABLE ORAL DAILY
Status: DISCONTINUED | OUTPATIENT
Start: 2018-12-30 | End: 2019-01-03 | Stop reason: HOSPADM

## 2018-12-30 RX ORDER — LABETALOL HYDROCHLORIDE 5 MG/ML
10 INJECTION, SOLUTION INTRAVENOUS EVERY 30 MIN PRN
Status: DISCONTINUED | OUTPATIENT
Start: 2018-12-30 | End: 2019-01-03 | Stop reason: HOSPADM

## 2018-12-30 RX ORDER — NITROFURANTOIN MACROCRYSTALS 50 MG/1
50 CAPSULE ORAL NIGHTLY
Status: DISCONTINUED | OUTPATIENT
Start: 2018-12-30 | End: 2019-01-03 | Stop reason: HOSPADM

## 2018-12-30 RX ORDER — SODIUM CHLORIDE 9 MG/ML
INJECTION, SOLUTION INTRAVENOUS CONTINUOUS
Status: ACTIVE | OUTPATIENT
Start: 2018-12-30 | End: 2018-12-30

## 2018-12-30 RX ORDER — ONDANSETRON 2 MG/ML
4 INJECTION INTRAMUSCULAR; INTRAVENOUS EVERY 6 HOURS PRN
Status: DISCONTINUED | OUTPATIENT
Start: 2018-12-30 | End: 2019-01-03 | Stop reason: HOSPADM

## 2018-12-30 RX ORDER — LORAZEPAM 0.5 MG/1
0.5 TABLET ORAL EVERY 4 HOURS PRN
Status: DISCONTINUED | OUTPATIENT
Start: 2018-12-30 | End: 2018-12-31

## 2018-12-30 RX ORDER — ASPIRIN 81 MG/1
TABLET, CHEWABLE ORAL DAILY PRN
Status: COMPLETED | OUTPATIENT
Start: 2018-12-30 | End: 2018-12-30

## 2018-12-30 RX ADMIN — ASPIRIN 324 MG: 81 TABLET, CHEWABLE ORAL at 06:54

## 2018-12-30 RX ADMIN — Medication 400 MG: at 10:03

## 2018-12-30 RX ADMIN — ONDANSETRON 4 MG: 2 INJECTION INTRAMUSCULAR; INTRAVENOUS at 12:27

## 2018-12-30 RX ADMIN — HYDRALAZINE HYDROCHLORIDE 10 MG: 20 INJECTION INTRAMUSCULAR; INTRAVENOUS at 12:31

## 2018-12-30 RX ADMIN — INSULIN LISPRO 4 UNITS: 100 INJECTION, SOLUTION INTRAVENOUS; SUBCUTANEOUS at 17:11

## 2018-12-30 RX ADMIN — INSULIN LISPRO 8 UNITS: 100 INJECTION, SOLUTION INTRAVENOUS; SUBCUTANEOUS at 12:28

## 2018-12-30 RX ADMIN — NITROFURANTOIN MACROCRYSTALS 50 MG: 50 CAPSULE ORAL at 21:29

## 2018-12-30 RX ADMIN — SODIUM CHLORIDE: 9 INJECTION, SOLUTION INTRAVENOUS at 09:52

## 2018-12-30 RX ADMIN — TICAGRELOR 90 MG: 90 TABLET ORAL at 06:55

## 2018-12-30 RX ADMIN — NITROGLYCERIN 0.4 MG: 0.4 TABLET, ORALLY DISINTEGRATING SUBLINGUAL at 06:55

## 2018-12-30 RX ADMIN — Medication 5 DROP: at 21:29

## 2018-12-30 RX ADMIN — LISINOPRIL 10 MG: 10 TABLET ORAL at 14:36

## 2018-12-30 RX ADMIN — ATORVASTATIN CALCIUM 40 MG: 40 TABLET, FILM COATED ORAL at 21:29

## 2018-12-30 RX ADMIN — TICAGRELOR 90 MG: 90 TABLET ORAL at 21:29

## 2018-12-30 RX ADMIN — METOPROLOL TARTRATE 25 MG: 25 TABLET ORAL at 21:29

## 2018-12-30 RX ADMIN — METOPROLOL TARTRATE 25 MG: 25 TABLET ORAL at 10:05

## 2018-12-30 RX ADMIN — HYDRALAZINE HYDROCHLORIDE 10 MG: 20 INJECTION INTRAMUSCULAR; INTRAVENOUS at 09:44

## 2018-12-30 RX ADMIN — CEFDINIR 300 MG: 300 CAPSULE ORAL at 21:29

## 2018-12-30 RX ADMIN — ASPIRIN 81 MG 81 MG: 81 TABLET ORAL at 10:19

## 2018-12-30 ASSESSMENT — ENCOUNTER SYMPTOMS
ALLERGIC/IMMUNOLOGIC NEGATIVE: 1
CHEST TIGHTNESS: 1
RESPIRATORY NEGATIVE: 1
STRIDOR: 0
WHEEZING: 0
NAUSEA: 0
CHEST TIGHTNESS: 0
COUGH: 0
EYES NEGATIVE: 1
SHORTNESS OF BREATH: 0
GASTROINTESTINAL NEGATIVE: 1
VOMITING: 0
BLOOD IN STOOL: 0
ABDOMINAL PAIN: 0

## 2018-12-30 ASSESSMENT — PAIN SCALES - GENERAL
PAINLEVEL_OUTOF10: 0
PAINLEVEL_OUTOF10: 0

## 2018-12-31 LAB
ANION GAP SERPL CALCULATED.3IONS-SCNC: 17 MEQ/L (ref 7–13)
BUN BLDV-MCNC: 18 MG/DL (ref 8–23)
CALCIUM SERPL-MCNC: 8.7 MG/DL (ref 8.6–10.2)
CHLORIDE BLD-SCNC: 101 MEQ/L (ref 98–107)
CO2: 20 MEQ/L (ref 22–29)
CREAT SERPL-MCNC: 1.04 MG/DL (ref 0.5–0.9)
GFR AFRICAN AMERICAN: >60
GFR NON-AFRICAN AMERICAN: 49.6
GLUCOSE BLD-MCNC: 149 MG/DL (ref 60–115)
GLUCOSE BLD-MCNC: 181 MG/DL (ref 74–109)
GLUCOSE BLD-MCNC: 193 MG/DL (ref 60–115)
GLUCOSE BLD-MCNC: 219 MG/DL (ref 60–115)
GLUCOSE BLD-MCNC: 245 MG/DL (ref 60–115)
HCT VFR BLD CALC: 31 % (ref 37–47)
HEMOGLOBIN: 10.2 G/DL (ref 12–16)
LV EF: 28 %
LVEF MODALITY: NORMAL
MCH RBC QN AUTO: 30.2 PG (ref 27–31.3)
MCHC RBC AUTO-ENTMCNC: 33 % (ref 33–37)
MCV RBC AUTO: 91.6 FL (ref 82–100)
PDW BLD-RTO: 13.9 % (ref 11.5–14.5)
PERFORMED ON: ABNORMAL
PLATELET # BLD: 220 K/UL (ref 130–400)
POTASSIUM SERPL-SCNC: 3.5 MEQ/L (ref 3.5–5.1)
RBC # BLD: 3.39 M/UL (ref 4.2–5.4)
SODIUM BLD-SCNC: 138 MEQ/L (ref 132–144)
WBC # BLD: 12.1 K/UL (ref 4.8–10.8)

## 2018-12-31 PROCEDURE — 2500000003 HC RX 250 WO HCPCS

## 2018-12-31 PROCEDURE — 93010 ELECTROCARDIOGRAM REPORT: CPT | Performed by: INTERNAL MEDICINE

## 2018-12-31 PROCEDURE — 6370000000 HC RX 637 (ALT 250 FOR IP): Performed by: INTERNAL MEDICINE

## 2018-12-31 PROCEDURE — G8978 MOBILITY CURRENT STATUS: HCPCS

## 2018-12-31 PROCEDURE — G8987 SELF CARE CURRENT STATUS: HCPCS

## 2018-12-31 PROCEDURE — 2000000000 HC ICU R&B

## 2018-12-31 PROCEDURE — G8979 MOBILITY GOAL STATUS: HCPCS

## 2018-12-31 PROCEDURE — 36415 COLL VENOUS BLD VENIPUNCTURE: CPT

## 2018-12-31 PROCEDURE — 97162 PT EVAL MOD COMPLEX 30 MIN: CPT

## 2018-12-31 PROCEDURE — G8988 SELF CARE GOAL STATUS: HCPCS

## 2018-12-31 PROCEDURE — 93005 ELECTROCARDIOGRAM TRACING: CPT

## 2018-12-31 PROCEDURE — 2700000000 HC OXYGEN THERAPY PER DAY

## 2018-12-31 PROCEDURE — 6370000000 HC RX 637 (ALT 250 FOR IP): Performed by: PHYSICIAN ASSISTANT

## 2018-12-31 PROCEDURE — 99221 1ST HOSP IP/OBS SF/LOW 40: CPT | Performed by: UROLOGY

## 2018-12-31 PROCEDURE — 80048 BASIC METABOLIC PNL TOTAL CA: CPT

## 2018-12-31 PROCEDURE — 99233 SBSQ HOSP IP/OBS HIGH 50: CPT | Performed by: INTERNAL MEDICINE

## 2018-12-31 PROCEDURE — 97166 OT EVAL MOD COMPLEX 45 MIN: CPT

## 2018-12-31 PROCEDURE — 93306 TTE W/DOPPLER COMPLETE: CPT

## 2018-12-31 PROCEDURE — 85027 COMPLETE CBC AUTOMATED: CPT

## 2018-12-31 RX ADMIN — METOPROLOL TARTRATE 25 MG: 25 TABLET ORAL at 21:18

## 2018-12-31 RX ADMIN — TICAGRELOR 90 MG: 90 TABLET ORAL at 09:52

## 2018-12-31 RX ADMIN — INSULIN LISPRO 2 UNITS: 100 INJECTION, SOLUTION INTRAVENOUS; SUBCUTANEOUS at 17:47

## 2018-12-31 RX ADMIN — INSULIN LISPRO 4 UNITS: 100 INJECTION, SOLUTION INTRAVENOUS; SUBCUTANEOUS at 09:52

## 2018-12-31 RX ADMIN — ATORVASTATIN CALCIUM 40 MG: 40 TABLET, FILM COATED ORAL at 21:18

## 2018-12-31 RX ADMIN — ASPIRIN 81 MG 81 MG: 81 TABLET ORAL at 09:52

## 2018-12-31 RX ADMIN — Medication 5 DROP: at 10:01

## 2018-12-31 RX ADMIN — Medication 5 DROP: at 21:18

## 2018-12-31 RX ADMIN — INSULIN LISPRO 4 UNITS: 100 INJECTION, SOLUTION INTRAVENOUS; SUBCUTANEOUS at 12:26

## 2018-12-31 RX ADMIN — METOPROLOL TARTRATE 25 MG: 25 TABLET ORAL at 09:52

## 2018-12-31 RX ADMIN — NITROFURANTOIN MACROCRYSTALS 50 MG: 50 CAPSULE ORAL at 22:18

## 2018-12-31 RX ADMIN — TICAGRELOR 90 MG: 90 TABLET ORAL at 21:18

## 2018-12-31 RX ADMIN — Medication 400 MG: at 09:52

## 2018-12-31 RX ADMIN — LISINOPRIL 10 MG: 10 TABLET ORAL at 09:52

## 2018-12-31 ASSESSMENT — PAIN SCALES - GENERAL
PAINLEVEL_OUTOF10: 0

## 2019-01-01 LAB
ANION GAP SERPL CALCULATED.3IONS-SCNC: 16 MEQ/L (ref 7–13)
BUN BLDV-MCNC: 19 MG/DL (ref 8–23)
CALCIUM SERPL-MCNC: 8.8 MG/DL (ref 8.6–10.2)
CHLORIDE BLD-SCNC: 103 MEQ/L (ref 98–107)
CO2: 21 MEQ/L (ref 22–29)
CREAT SERPL-MCNC: 1 MG/DL (ref 0.5–0.9)
GFR AFRICAN AMERICAN: >60
GFR NON-AFRICAN AMERICAN: 51.9
GLUCOSE BLD-MCNC: 167 MG/DL (ref 74–109)
GLUCOSE BLD-MCNC: 180 MG/DL (ref 60–115)
GLUCOSE BLD-MCNC: 191 MG/DL (ref 60–115)
GLUCOSE BLD-MCNC: 215 MG/DL (ref 60–115)
HCT VFR BLD CALC: 32.2 % (ref 37–47)
HEMOGLOBIN: 10.6 G/DL (ref 12–16)
MCH RBC QN AUTO: 30 PG (ref 27–31.3)
MCHC RBC AUTO-ENTMCNC: 32.9 % (ref 33–37)
MCV RBC AUTO: 91.3 FL (ref 82–100)
PDW BLD-RTO: 14.2 % (ref 11.5–14.5)
PERFORMED ON: ABNORMAL
PLATELET # BLD: 224 K/UL (ref 130–400)
POTASSIUM SERPL-SCNC: 3.5 MEQ/L (ref 3.5–5.1)
RBC # BLD: 3.53 M/UL (ref 4.2–5.4)
SODIUM BLD-SCNC: 140 MEQ/L (ref 132–144)
WBC # BLD: 9.8 K/UL (ref 4.8–10.8)

## 2019-01-01 PROCEDURE — 80048 BASIC METABOLIC PNL TOTAL CA: CPT

## 2019-01-01 PROCEDURE — 36415 COLL VENOUS BLD VENIPUNCTURE: CPT

## 2019-01-01 PROCEDURE — 6370000000 HC RX 637 (ALT 250 FOR IP): Performed by: INTERNAL MEDICINE

## 2019-01-01 PROCEDURE — 99233 SBSQ HOSP IP/OBS HIGH 50: CPT | Performed by: INTERNAL MEDICINE

## 2019-01-01 PROCEDURE — 93005 ELECTROCARDIOGRAM TRACING: CPT

## 2019-01-01 PROCEDURE — 85027 COMPLETE CBC AUTOMATED: CPT

## 2019-01-01 PROCEDURE — 6370000000 HC RX 637 (ALT 250 FOR IP): Performed by: PHYSICIAN ASSISTANT

## 2019-01-01 PROCEDURE — 2700000000 HC OXYGEN THERAPY PER DAY

## 2019-01-01 PROCEDURE — 2060000000 HC ICU INTERMEDIATE R&B

## 2019-01-01 RX ORDER — METOPROLOL TARTRATE 50 MG/1
50 TABLET, FILM COATED ORAL 2 TIMES DAILY
Status: DISCONTINUED | OUTPATIENT
Start: 2019-01-01 | End: 2019-01-03 | Stop reason: HOSPADM

## 2019-01-01 RX ADMIN — INSULIN LISPRO 2 UNITS: 100 INJECTION, SOLUTION INTRAVENOUS; SUBCUTANEOUS at 13:02

## 2019-01-01 RX ADMIN — NITROFURANTOIN MACROCRYSTALS 50 MG: 50 CAPSULE ORAL at 20:00

## 2019-01-01 RX ADMIN — LISINOPRIL 10 MG: 10 TABLET ORAL at 09:11

## 2019-01-01 RX ADMIN — TICAGRELOR 90 MG: 90 TABLET ORAL at 09:11

## 2019-01-01 RX ADMIN — INSULIN LISPRO 2 UNITS: 100 INJECTION, SOLUTION INTRAVENOUS; SUBCUTANEOUS at 09:11

## 2019-01-01 RX ADMIN — TICAGRELOR 90 MG: 90 TABLET ORAL at 20:00

## 2019-01-01 RX ADMIN — ASPIRIN 81 MG 81 MG: 81 TABLET ORAL at 09:11

## 2019-01-01 RX ADMIN — Medication 400 MG: at 09:10

## 2019-01-01 RX ADMIN — INSULIN LISPRO 4 UNITS: 100 INJECTION, SOLUTION INTRAVENOUS; SUBCUTANEOUS at 17:06

## 2019-01-01 RX ADMIN — METOPROLOL TARTRATE 25 MG: 25 TABLET ORAL at 09:11

## 2019-01-01 RX ADMIN — ATORVASTATIN CALCIUM 40 MG: 40 TABLET, FILM COATED ORAL at 20:00

## 2019-01-01 RX ADMIN — METOPROLOL TARTRATE 50 MG: 50 TABLET ORAL at 20:00

## 2019-01-01 ASSESSMENT — ENCOUNTER SYMPTOMS
EYES NEGATIVE: 1
NAUSEA: 0
WHEEZING: 0
COUGH: 0
RESPIRATORY NEGATIVE: 1
GASTROINTESTINAL NEGATIVE: 1
BLOOD IN STOOL: 0
CHEST TIGHTNESS: 0
SHORTNESS OF BREATH: 0
STRIDOR: 0

## 2019-01-01 ASSESSMENT — PAIN SCALES - GENERAL
PAINLEVEL_OUTOF10: 0

## 2019-01-02 PROBLEM — I69.90 LATE EFFECTS OF CVA (CEREBROVASCULAR ACCIDENT): Status: ACTIVE | Noted: 2019-01-02

## 2019-01-02 LAB
GLUCOSE BLD-MCNC: 185 MG/DL (ref 60–115)
GLUCOSE BLD-MCNC: 213 MG/DL (ref 60–115)
GLUCOSE BLD-MCNC: 227 MG/DL (ref 60–115)
GLUCOSE BLD-MCNC: 240 MG/DL (ref 60–115)
PERFORMED ON: ABNORMAL

## 2019-01-02 PROCEDURE — 6370000000 HC RX 637 (ALT 250 FOR IP): Performed by: INTERNAL MEDICINE

## 2019-01-02 PROCEDURE — 6370000000 HC RX 637 (ALT 250 FOR IP): Performed by: PHYSICIAN ASSISTANT

## 2019-01-02 PROCEDURE — 99232 SBSQ HOSP IP/OBS MODERATE 35: CPT | Performed by: INTERNAL MEDICINE

## 2019-01-02 PROCEDURE — 97535 SELF CARE MNGMENT TRAINING: CPT

## 2019-01-02 PROCEDURE — 93005 ELECTROCARDIOGRAM TRACING: CPT

## 2019-01-02 PROCEDURE — 99221 1ST HOSP IP/OBS SF/LOW 40: CPT | Performed by: PHYSICAL MEDICINE & REHABILITATION

## 2019-01-02 PROCEDURE — 93010 ELECTROCARDIOGRAM REPORT: CPT | Performed by: INTERNAL MEDICINE

## 2019-01-02 PROCEDURE — 2060000000 HC ICU INTERMEDIATE R&B

## 2019-01-02 PROCEDURE — 97530 THERAPEUTIC ACTIVITIES: CPT

## 2019-01-02 PROCEDURE — 97116 GAIT TRAINING THERAPY: CPT

## 2019-01-02 RX ORDER — DEXTROSE MONOHYDRATE 50 MG/ML
100 INJECTION, SOLUTION INTRAVENOUS PRN
Status: CANCELLED | OUTPATIENT
Start: 2019-01-02

## 2019-01-02 RX ORDER — LABETALOL HYDROCHLORIDE 5 MG/ML
10 INJECTION, SOLUTION INTRAVENOUS EVERY 30 MIN PRN
Status: CANCELLED | OUTPATIENT
Start: 2019-01-02

## 2019-01-02 RX ORDER — ONDANSETRON 2 MG/ML
4 INJECTION INTRAMUSCULAR; INTRAVENOUS EVERY 6 HOURS PRN
Status: CANCELLED | OUTPATIENT
Start: 2019-01-02

## 2019-01-02 RX ORDER — ATORVASTATIN CALCIUM 40 MG/1
40 TABLET, FILM COATED ORAL NIGHTLY
Status: CANCELLED | OUTPATIENT
Start: 2019-01-02

## 2019-01-02 RX ORDER — DEXTROSE MONOHYDRATE 25 G/50ML
12.5 INJECTION, SOLUTION INTRAVENOUS PRN
Status: CANCELLED | OUTPATIENT
Start: 2019-01-02

## 2019-01-02 RX ORDER — NITROFURANTOIN MACROCRYSTALS 50 MG/1
50 CAPSULE ORAL NIGHTLY
Status: CANCELLED | OUTPATIENT
Start: 2019-01-02

## 2019-01-02 RX ORDER — LORAZEPAM 2 MG/ML
0.5 INJECTION INTRAMUSCULAR EVERY 6 HOURS PRN
Status: CANCELLED | OUTPATIENT
Start: 2019-01-02

## 2019-01-02 RX ORDER — LISINOPRIL 10 MG/1
10 TABLET ORAL DAILY
Status: CANCELLED | OUTPATIENT
Start: 2019-01-03

## 2019-01-02 RX ORDER — ACETAMINOPHEN 325 MG/1
650 TABLET ORAL EVERY 4 HOURS PRN
Status: CANCELLED | OUTPATIENT
Start: 2019-01-02

## 2019-01-02 RX ORDER — NICOTINE POLACRILEX 4 MG
15 LOZENGE BUCCAL PRN
Status: CANCELLED | OUTPATIENT
Start: 2019-01-02

## 2019-01-02 RX ADMIN — INSULIN LISPRO 4 UNITS: 100 INJECTION, SOLUTION INTRAVENOUS; SUBCUTANEOUS at 16:35

## 2019-01-02 RX ADMIN — METOPROLOL TARTRATE 50 MG: 50 TABLET ORAL at 20:16

## 2019-01-02 RX ADMIN — ATORVASTATIN CALCIUM 40 MG: 40 TABLET, FILM COATED ORAL at 20:16

## 2019-01-02 RX ADMIN — METOPROLOL TARTRATE 50 MG: 50 TABLET ORAL at 07:49

## 2019-01-02 RX ADMIN — INSULIN LISPRO 4 UNITS: 100 INJECTION, SOLUTION INTRAVENOUS; SUBCUTANEOUS at 12:11

## 2019-01-02 RX ADMIN — Medication 400 MG: at 07:49

## 2019-01-02 RX ADMIN — Medication 5 DROP: at 20:54

## 2019-01-02 RX ADMIN — TICAGRELOR 90 MG: 90 TABLET ORAL at 20:16

## 2019-01-02 RX ADMIN — ASPIRIN 81 MG 81 MG: 81 TABLET ORAL at 07:49

## 2019-01-02 RX ADMIN — LISINOPRIL 10 MG: 10 TABLET ORAL at 07:49

## 2019-01-02 RX ADMIN — TICAGRELOR 90 MG: 90 TABLET ORAL at 07:49

## 2019-01-02 RX ADMIN — NITROFURANTOIN MACROCRYSTALS 50 MG: 50 CAPSULE ORAL at 20:16

## 2019-01-02 RX ADMIN — INSULIN LISPRO 2 UNITS: 100 INJECTION, SOLUTION INTRAVENOUS; SUBCUTANEOUS at 07:50

## 2019-01-02 ASSESSMENT — ENCOUNTER SYMPTOMS
SHORTNESS OF BREATH: 0
STRIDOR: 0
RESPIRATORY NEGATIVE: 1
ORTHOPNEA: 1
EYES NEGATIVE: 1
CHEST TIGHTNESS: 0
WHEEZING: 0
COUGH: 0
BLOOD IN STOOL: 0
NAUSEA: 0
GASTROINTESTINAL NEGATIVE: 1

## 2019-01-02 ASSESSMENT — PAIN SCALES - GENERAL
PAINLEVEL_OUTOF10: 0

## 2019-01-03 ENCOUNTER — HOSPITAL ENCOUNTER (INPATIENT)
Age: 84
LOS: 8 days | Discharge: HOME | DRG: 281 | End: 2019-01-11
Attending: INTERNAL MEDICINE | Admitting: INTERNAL MEDICINE
Payer: MEDICARE

## 2019-01-03 VITALS
BODY MASS INDEX: 30.48 KG/M2 | OXYGEN SATURATION: 99 % | DIASTOLIC BLOOD PRESSURE: 66 MMHG | HEART RATE: 78 BPM | RESPIRATION RATE: 18 BRPM | WEIGHT: 172 LBS | SYSTOLIC BLOOD PRESSURE: 151 MMHG | TEMPERATURE: 98.4 F | HEIGHT: 63 IN

## 2019-01-03 PROBLEM — I21.3 STEMI (ST ELEVATION MYOCARDIAL INFARCTION) (HCC): Status: ACTIVE | Noted: 2019-01-03

## 2019-01-03 LAB
ANION GAP SERPL CALCULATED.3IONS-SCNC: 17 MEQ/L (ref 7–13)
BUN BLDV-MCNC: 21 MG/DL (ref 8–23)
CALCIUM SERPL-MCNC: 9.3 MG/DL (ref 8.6–10.2)
CHLORIDE BLD-SCNC: 103 MEQ/L (ref 98–107)
CO2: 23 MEQ/L (ref 22–29)
CREAT SERPL-MCNC: 1.04 MG/DL (ref 0.5–0.9)
EKG ATRIAL RATE: 71 BPM
EKG ATRIAL RATE: 78 BPM
EKG ATRIAL RATE: 80 BPM
EKG ATRIAL RATE: 86 BPM
EKG ATRIAL RATE: 96 BPM
EKG P AXIS: 47 DEGREES
EKG P AXIS: 59 DEGREES
EKG P AXIS: 64 DEGREES
EKG P AXIS: 67 DEGREES
EKG P AXIS: 74 DEGREES
EKG P-R INTERVAL: 168 MS
EKG P-R INTERVAL: 180 MS
EKG P-R INTERVAL: 180 MS
EKG P-R INTERVAL: 186 MS
EKG P-R INTERVAL: 186 MS
EKG Q-T INTERVAL: 370 MS
EKG Q-T INTERVAL: 384 MS
EKG Q-T INTERVAL: 412 MS
EKG Q-T INTERVAL: 428 MS
EKG Q-T INTERVAL: 444 MS
EKG QRS DURATION: 100 MS
EKG QRS DURATION: 102 MS
EKG QRS DURATION: 102 MS
EKG QRS DURATION: 94 MS
EKG QRS DURATION: 98 MS
EKG QTC CALCULATION (BAZETT): 442 MS
EKG QTC CALCULATION (BAZETT): 475 MS
EKG QTC CALCULATION (BAZETT): 482 MS
EKG QTC CALCULATION (BAZETT): 485 MS
EKG QTC CALCULATION (BAZETT): 487 MS
EKG R AXIS: -16 DEGREES
EKG R AXIS: -20 DEGREES
EKG R AXIS: -20 DEGREES
EKG R AXIS: -24 DEGREES
EKG R AXIS: -36 DEGREES
EKG T AXIS: 139 DEGREES
EKG T AXIS: 143 DEGREES
EKG T AXIS: 146 DEGREES
EKG T AXIS: 153 DEGREES
EKG T AXIS: 158 DEGREES
EKG VENTRICULAR RATE: 71 BPM
EKG VENTRICULAR RATE: 78 BPM
EKG VENTRICULAR RATE: 80 BPM
EKG VENTRICULAR RATE: 86 BPM
EKG VENTRICULAR RATE: 96 BPM
GFR AFRICAN AMERICAN: >60
GFR NON-AFRICAN AMERICAN: 49.6
GLUCOSE BLD-MCNC: 173 MG/DL (ref 60–115)
GLUCOSE BLD-MCNC: 197 MG/DL (ref 60–115)
GLUCOSE BLD-MCNC: 197 MG/DL (ref 74–109)
GLUCOSE BLD-MCNC: 201 MG/DL (ref 60–115)
GLUCOSE BLD-MCNC: 226 MG/DL (ref 60–115)
GLUCOSE BLD-MCNC: 230 MG/DL (ref 60–115)
HCT VFR BLD CALC: 32.4 % (ref 37–47)
HEMOGLOBIN: 10.8 G/DL (ref 12–16)
MCH RBC QN AUTO: 30.3 PG (ref 27–31.3)
MCHC RBC AUTO-ENTMCNC: 33.5 % (ref 33–37)
MCV RBC AUTO: 90.4 FL (ref 82–100)
PDW BLD-RTO: 13.5 % (ref 11.5–14.5)
PERFORMED ON: ABNORMAL
PLATELET # BLD: 283 K/UL (ref 130–400)
POTASSIUM SERPL-SCNC: 3.7 MEQ/L (ref 3.5–5.1)
RBC # BLD: 3.58 M/UL (ref 4.2–5.4)
SODIUM BLD-SCNC: 143 MEQ/L (ref 132–144)
WBC # BLD: 8.3 K/UL (ref 4.8–10.8)

## 2019-01-03 PROCEDURE — 93005 ELECTROCARDIOGRAM TRACING: CPT

## 2019-01-03 PROCEDURE — 6370000000 HC RX 637 (ALT 250 FOR IP): Performed by: INTERNAL MEDICINE

## 2019-01-03 PROCEDURE — 85027 COMPLETE CBC AUTOMATED: CPT

## 2019-01-03 PROCEDURE — 99239 HOSP IP/OBS DSCHRG MGMT >30: CPT | Performed by: INTERNAL MEDICINE

## 2019-01-03 PROCEDURE — 36415 COLL VENOUS BLD VENIPUNCTURE: CPT

## 2019-01-03 PROCEDURE — 97116 GAIT TRAINING THERAPY: CPT

## 2019-01-03 PROCEDURE — 80048 BASIC METABOLIC PNL TOTAL CA: CPT

## 2019-01-03 PROCEDURE — 1200000000 HC SEMI PRIVATE

## 2019-01-03 RX ORDER — ACETAMINOPHEN 325 MG/1
650 TABLET ORAL EVERY 4 HOURS PRN
Status: DISCONTINUED | OUTPATIENT
Start: 2019-01-03 | End: 2019-01-11 | Stop reason: HOSPADM

## 2019-01-03 RX ORDER — ASPIRIN 81 MG/1
81 TABLET, CHEWABLE ORAL DAILY
Status: DISCONTINUED | OUTPATIENT
Start: 2019-01-04 | End: 2019-01-11 | Stop reason: HOSPADM

## 2019-01-03 RX ORDER — ATORVASTATIN CALCIUM 40 MG/1
40 TABLET, FILM COATED ORAL NIGHTLY
Status: DISCONTINUED | OUTPATIENT
Start: 2019-01-03 | End: 2019-01-11 | Stop reason: HOSPADM

## 2019-01-03 RX ORDER — METOPROLOL TARTRATE 50 MG/1
50 TABLET, FILM COATED ORAL 2 TIMES DAILY
Status: CANCELLED | OUTPATIENT
Start: 2019-01-03

## 2019-01-03 RX ORDER — DEXTROSE MONOHYDRATE 50 MG/ML
100 INJECTION, SOLUTION INTRAVENOUS PRN
Status: DISCONTINUED | OUTPATIENT
Start: 2019-01-03 | End: 2019-01-11 | Stop reason: HOSPADM

## 2019-01-03 RX ORDER — NITROFURANTOIN MACROCRYSTALS 50 MG/1
50 CAPSULE ORAL NIGHTLY
Status: DISCONTINUED | OUTPATIENT
Start: 2019-01-03 | End: 2019-01-11 | Stop reason: HOSPADM

## 2019-01-03 RX ORDER — LISINOPRIL 10 MG/1
10 TABLET ORAL DAILY
Status: DISCONTINUED | OUTPATIENT
Start: 2019-01-04 | End: 2019-01-07

## 2019-01-03 RX ORDER — ONDANSETRON 2 MG/ML
4 INJECTION INTRAMUSCULAR; INTRAVENOUS EVERY 6 HOURS PRN
Status: DISCONTINUED | OUTPATIENT
Start: 2019-01-03 | End: 2019-01-05

## 2019-01-03 RX ORDER — METOPROLOL TARTRATE 50 MG/1
50 TABLET, FILM COATED ORAL 2 TIMES DAILY
Status: DISCONTINUED | OUTPATIENT
Start: 2019-01-03 | End: 2019-01-05

## 2019-01-03 RX ORDER — NICOTINE POLACRILEX 4 MG
15 LOZENGE BUCCAL PRN
Status: DISCONTINUED | OUTPATIENT
Start: 2019-01-03 | End: 2019-01-11 | Stop reason: HOSPADM

## 2019-01-03 RX ORDER — LABETALOL HYDROCHLORIDE 5 MG/ML
10 INJECTION, SOLUTION INTRAVENOUS EVERY 30 MIN PRN
Status: DISCONTINUED | OUTPATIENT
Start: 2019-01-03 | End: 2019-01-05

## 2019-01-03 RX ORDER — ASPIRIN 81 MG/1
81 TABLET, CHEWABLE ORAL DAILY
Status: CANCELLED | OUTPATIENT
Start: 2019-01-03

## 2019-01-03 RX ORDER — DEXTROSE MONOHYDRATE 25 G/50ML
12.5 INJECTION, SOLUTION INTRAVENOUS PRN
Status: DISCONTINUED | OUTPATIENT
Start: 2019-01-03 | End: 2019-01-11 | Stop reason: HOSPADM

## 2019-01-03 RX ORDER — LORAZEPAM 2 MG/ML
0.5 INJECTION INTRAMUSCULAR EVERY 6 HOURS PRN
Status: DISCONTINUED | OUTPATIENT
Start: 2019-01-03 | End: 2019-01-11 | Stop reason: HOSPADM

## 2019-01-03 RX ADMIN — ASPIRIN 81 MG 81 MG: 81 TABLET ORAL at 09:07

## 2019-01-03 RX ADMIN — LISINOPRIL 10 MG: 10 TABLET ORAL at 09:07

## 2019-01-03 RX ADMIN — Medication 5 DROP: at 22:40

## 2019-01-03 RX ADMIN — METOPROLOL TARTRATE 50 MG: 50 TABLET ORAL at 09:07

## 2019-01-03 RX ADMIN — METOPROLOL TARTRATE 50 MG: 50 TABLET ORAL at 20:14

## 2019-01-03 RX ADMIN — TICAGRELOR 90 MG: 90 TABLET ORAL at 20:14

## 2019-01-03 RX ADMIN — TICAGRELOR 90 MG: 90 TABLET ORAL at 09:07

## 2019-01-03 RX ADMIN — INSULIN LISPRO 4 UNITS: 100 INJECTION, SOLUTION INTRAVENOUS; SUBCUTANEOUS at 09:09

## 2019-01-03 RX ADMIN — ATORVASTATIN CALCIUM 40 MG: 40 TABLET, FILM COATED ORAL at 20:14

## 2019-01-03 RX ADMIN — METFORMIN HYDROCHLORIDE 1000 MG: 500 TABLET, FILM COATED ORAL at 20:21

## 2019-01-03 RX ADMIN — Medication 400 MG: at 09:07

## 2019-01-03 RX ADMIN — NITROFURANTOIN MACROCRYSTALS 50 MG: 50 CAPSULE ORAL at 22:40

## 2019-01-03 RX ADMIN — INSULIN LISPRO 4 UNITS: 100 INJECTION, SOLUTION INTRAVENOUS; SUBCUTANEOUS at 12:11

## 2019-01-03 ASSESSMENT — PAIN SCALES - GENERAL
PAINLEVEL_OUTOF10: 0

## 2019-01-04 LAB
EKG ATRIAL RATE: 66 BPM
EKG ATRIAL RATE: 71 BPM
EKG ATRIAL RATE: 76 BPM
EKG ATRIAL RATE: 78 BPM
EKG P AXIS: 17 DEGREES
EKG P AXIS: 49 DEGREES
EKG P AXIS: 53 DEGREES
EKG P AXIS: 59 DEGREES
EKG P-R INTERVAL: 176 MS
EKG P-R INTERVAL: 182 MS
EKG P-R INTERVAL: 184 MS
EKG P-R INTERVAL: 190 MS
EKG Q-T INTERVAL: 424 MS
EKG Q-T INTERVAL: 430 MS
EKG Q-T INTERVAL: 450 MS
EKG Q-T INTERVAL: 458 MS
EKG QRS DURATION: 100 MS
EKG QRS DURATION: 96 MS
EKG QTC CALCULATION (BAZETT): 477 MS
EKG QTC CALCULATION (BAZETT): 480 MS
EKG QTC CALCULATION (BAZETT): 489 MS
EKG QTC CALCULATION (BAZETT): 490 MS
EKG R AXIS: -17 DEGREES
EKG R AXIS: -19 DEGREES
EKG R AXIS: -28 DEGREES
EKG R AXIS: -32 DEGREES
EKG T AXIS: 145 DEGREES
EKG T AXIS: 152 DEGREES
EKG T AXIS: 165 DEGREES
EKG T AXIS: 197 DEGREES
EKG VENTRICULAR RATE: 66 BPM
EKG VENTRICULAR RATE: 71 BPM
EKG VENTRICULAR RATE: 76 BPM
EKG VENTRICULAR RATE: 78 BPM
GLUCOSE BLD-MCNC: 144 MG/DL (ref 60–115)
GLUCOSE BLD-MCNC: 156 MG/DL (ref 60–115)
GLUCOSE BLD-MCNC: 222 MG/DL (ref 60–115)
GLUCOSE BLD-MCNC: 97 MG/DL (ref 60–115)
PERFORMED ON: ABNORMAL
PERFORMED ON: NORMAL

## 2019-01-04 PROCEDURE — 1200000000 HC SEMI PRIVATE

## 2019-01-04 PROCEDURE — 6370000000 HC RX 637 (ALT 250 FOR IP): Performed by: INTERNAL MEDICINE

## 2019-01-04 PROCEDURE — 97162 PT EVAL MOD COMPLEX 30 MIN: CPT

## 2019-01-04 PROCEDURE — 97530 THERAPEUTIC ACTIVITIES: CPT

## 2019-01-04 PROCEDURE — 97116 GAIT TRAINING THERAPY: CPT

## 2019-01-04 PROCEDURE — 6360000002 HC RX W HCPCS: Performed by: INTERNAL MEDICINE

## 2019-01-04 PROCEDURE — 93005 ELECTROCARDIOGRAM TRACING: CPT

## 2019-01-04 PROCEDURE — 97110 THERAPEUTIC EXERCISES: CPT

## 2019-01-04 PROCEDURE — 93010 ELECTROCARDIOGRAM REPORT: CPT | Performed by: INTERNAL MEDICINE

## 2019-01-04 RX ADMIN — METFORMIN HYDROCHLORIDE 1000 MG: 500 TABLET, FILM COATED ORAL at 08:24

## 2019-01-04 RX ADMIN — METFORMIN HYDROCHLORIDE 1000 MG: 500 TABLET, FILM COATED ORAL at 17:50

## 2019-01-04 RX ADMIN — Medication 5 DROP: at 20:53

## 2019-01-04 RX ADMIN — MAGNESIUM OXIDE TAB 400 MG (241.3 MG ELEMENTAL MG) 400 MG: 400 (241.3 MG) TAB at 08:24

## 2019-01-04 RX ADMIN — TICAGRELOR 90 MG: 90 TABLET ORAL at 08:24

## 2019-01-04 RX ADMIN — METOPROLOL TARTRATE 50 MG: 50 TABLET ORAL at 20:50

## 2019-01-04 RX ADMIN — TICAGRELOR 90 MG: 90 TABLET ORAL at 20:50

## 2019-01-04 RX ADMIN — ATORVASTATIN CALCIUM 40 MG: 40 TABLET, FILM COATED ORAL at 20:50

## 2019-01-04 RX ADMIN — ASPIRIN 81 MG 81 MG: 81 TABLET ORAL at 08:24

## 2019-01-04 RX ADMIN — LISINOPRIL 10 MG: 10 TABLET ORAL at 08:25

## 2019-01-04 RX ADMIN — METOPROLOL TARTRATE 50 MG: 50 TABLET ORAL at 08:25

## 2019-01-04 RX ADMIN — ENOXAPARIN SODIUM 30 MG: 30 INJECTION SUBCUTANEOUS at 17:49

## 2019-01-04 RX ADMIN — NITROFURANTOIN MACROCRYSTALS 50 MG: 50 CAPSULE ORAL at 20:59

## 2019-01-04 RX ADMIN — Medication 5 DROP: at 08:23

## 2019-01-04 ASSESSMENT — PAIN SCALES - GENERAL
PAINLEVEL_OUTOF10: 0

## 2019-01-05 LAB
GLUCOSE BLD-MCNC: 147 MG/DL (ref 60–115)
GLUCOSE BLD-MCNC: 184 MG/DL (ref 60–115)
GLUCOSE BLD-MCNC: 216 MG/DL (ref 60–115)
GLUCOSE BLD-MCNC: 232 MG/DL (ref 60–115)
PERFORMED ON: ABNORMAL

## 2019-01-05 PROCEDURE — 97116 GAIT TRAINING THERAPY: CPT

## 2019-01-05 PROCEDURE — 6370000000 HC RX 637 (ALT 250 FOR IP): Performed by: INTERNAL MEDICINE

## 2019-01-05 PROCEDURE — 97110 THERAPEUTIC EXERCISES: CPT

## 2019-01-05 PROCEDURE — 97530 THERAPEUTIC ACTIVITIES: CPT

## 2019-01-05 PROCEDURE — 97166 OT EVAL MOD COMPLEX 45 MIN: CPT

## 2019-01-05 PROCEDURE — 1200000000 HC SEMI PRIVATE

## 2019-01-05 PROCEDURE — 93005 ELECTROCARDIOGRAM TRACING: CPT

## 2019-01-05 PROCEDURE — 6360000002 HC RX W HCPCS: Performed by: INTERNAL MEDICINE

## 2019-01-05 PROCEDURE — 97535 SELF CARE MNGMENT TRAINING: CPT

## 2019-01-05 RX ORDER — ONDANSETRON 4 MG/1
4 TABLET, ORALLY DISINTEGRATING ORAL 4 TIMES DAILY PRN
Status: DISCONTINUED | OUTPATIENT
Start: 2019-01-05 | End: 2019-01-11 | Stop reason: HOSPADM

## 2019-01-05 RX ORDER — METOPROLOL TARTRATE 50 MG/1
100 TABLET, FILM COATED ORAL 2 TIMES DAILY
Status: DISCONTINUED | OUTPATIENT
Start: 2019-01-05 | End: 2019-01-11 | Stop reason: HOSPADM

## 2019-01-05 RX ADMIN — TICAGRELOR 90 MG: 90 TABLET ORAL at 21:17

## 2019-01-05 RX ADMIN — ONDANSETRON 4 MG: 4 TABLET, ORALLY DISINTEGRATING ORAL at 17:28

## 2019-01-05 RX ADMIN — ASPIRIN 81 MG 81 MG: 81 TABLET ORAL at 09:19

## 2019-01-05 RX ADMIN — METOPROLOL TARTRATE 100 MG: 50 TABLET ORAL at 21:17

## 2019-01-05 RX ADMIN — ATORVASTATIN CALCIUM 40 MG: 40 TABLET, FILM COATED ORAL at 21:17

## 2019-01-05 RX ADMIN — Medication 5 DROP: at 09:20

## 2019-01-05 RX ADMIN — METOPROLOL TARTRATE 50 MG: 50 TABLET ORAL at 09:19

## 2019-01-05 RX ADMIN — TICAGRELOR 90 MG: 90 TABLET ORAL at 09:19

## 2019-01-05 RX ADMIN — ENOXAPARIN SODIUM 30 MG: 30 INJECTION SUBCUTANEOUS at 09:20

## 2019-01-05 RX ADMIN — METFORMIN HYDROCHLORIDE 1000 MG: 500 TABLET, FILM COATED ORAL at 09:19

## 2019-01-05 RX ADMIN — LISINOPRIL 10 MG: 10 TABLET ORAL at 09:19

## 2019-01-05 RX ADMIN — NITROFURANTOIN MACROCRYSTALS 50 MG: 50 CAPSULE ORAL at 21:23

## 2019-01-05 RX ADMIN — MAGNESIUM OXIDE TAB 400 MG (241.3 MG ELEMENTAL MG) 400 MG: 400 (241.3 MG) TAB at 09:19

## 2019-01-05 ASSESSMENT — PAIN SCALES - GENERAL
PAINLEVEL_OUTOF10: 0

## 2019-01-06 LAB
GLUCOSE BLD-MCNC: 117 MG/DL (ref 60–115)
GLUCOSE BLD-MCNC: 125 MG/DL (ref 60–115)
GLUCOSE BLD-MCNC: 142 MG/DL (ref 60–115)
GLUCOSE BLD-MCNC: 159 MG/DL (ref 60–115)
PERFORMED ON: ABNORMAL

## 2019-01-06 PROCEDURE — 97530 THERAPEUTIC ACTIVITIES: CPT

## 2019-01-06 PROCEDURE — 97110 THERAPEUTIC EXERCISES: CPT

## 2019-01-06 PROCEDURE — 1200000000 HC SEMI PRIVATE

## 2019-01-06 PROCEDURE — 6360000002 HC RX W HCPCS: Performed by: INTERNAL MEDICINE

## 2019-01-06 PROCEDURE — 6370000000 HC RX 637 (ALT 250 FOR IP): Performed by: INTERNAL MEDICINE

## 2019-01-06 PROCEDURE — 97116 GAIT TRAINING THERAPY: CPT

## 2019-01-06 PROCEDURE — 93005 ELECTROCARDIOGRAM TRACING: CPT

## 2019-01-06 RX ADMIN — METOPROLOL TARTRATE 100 MG: 50 TABLET ORAL at 08:35

## 2019-01-06 RX ADMIN — ASPIRIN 81 MG 81 MG: 81 TABLET ORAL at 08:35

## 2019-01-06 RX ADMIN — TICAGRELOR 90 MG: 90 TABLET ORAL at 08:36

## 2019-01-06 RX ADMIN — MAGNESIUM OXIDE TAB 400 MG (241.3 MG ELEMENTAL MG) 400 MG: 400 (241.3 MG) TAB at 08:35

## 2019-01-06 RX ADMIN — LISINOPRIL 10 MG: 10 TABLET ORAL at 08:36

## 2019-01-06 RX ADMIN — METOPROLOL TARTRATE 100 MG: 50 TABLET ORAL at 21:07

## 2019-01-06 RX ADMIN — METFORMIN HYDROCHLORIDE 1000 MG: 500 TABLET, FILM COATED ORAL at 08:35

## 2019-01-06 RX ADMIN — METFORMIN HYDROCHLORIDE 1000 MG: 500 TABLET, FILM COATED ORAL at 17:38

## 2019-01-06 RX ADMIN — TICAGRELOR 90 MG: 90 TABLET ORAL at 21:07

## 2019-01-06 RX ADMIN — ATORVASTATIN CALCIUM 40 MG: 40 TABLET, FILM COATED ORAL at 21:07

## 2019-01-06 RX ADMIN — NITROFURANTOIN MACROCRYSTALS 50 MG: 50 CAPSULE ORAL at 21:07

## 2019-01-06 RX ADMIN — ENOXAPARIN SODIUM 30 MG: 30 INJECTION SUBCUTANEOUS at 08:36

## 2019-01-06 ASSESSMENT — PAIN SCALES - GENERAL: PAINLEVEL_OUTOF10: 0

## 2019-01-07 LAB
ANION GAP SERPL CALCULATED.3IONS-SCNC: 15 MEQ/L (ref 7–13)
BASOPHILS ABSOLUTE: 0 K/UL (ref 0–0.2)
BASOPHILS RELATIVE PERCENT: 0.2 %
BUN BLDV-MCNC: 25 MG/DL (ref 8–23)
CALCIUM SERPL-MCNC: 9.3 MG/DL (ref 8.6–10.2)
CHLORIDE BLD-SCNC: 104 MEQ/L (ref 98–107)
CO2: 23 MEQ/L (ref 22–29)
CREAT SERPL-MCNC: 1.09 MG/DL (ref 0.5–0.9)
EOSINOPHILS ABSOLUTE: 0.5 K/UL (ref 0–0.7)
EOSINOPHILS RELATIVE PERCENT: 4.7 %
GFR AFRICAN AMERICAN: 56.9
GFR NON-AFRICAN AMERICAN: 47
GLUCOSE BLD-MCNC: 114 MG/DL (ref 60–115)
GLUCOSE BLD-MCNC: 138 MG/DL (ref 60–115)
GLUCOSE BLD-MCNC: 151 MG/DL (ref 60–115)
GLUCOSE BLD-MCNC: 151 MG/DL (ref 74–109)
GLUCOSE BLD-MCNC: 226 MG/DL (ref 60–115)
HCT VFR BLD CALC: 31 % (ref 37–47)
HEMOGLOBIN: 10.2 G/DL (ref 12–16)
LYMPHOCYTES ABSOLUTE: 1.4 K/UL (ref 1–4.8)
LYMPHOCYTES RELATIVE PERCENT: 12.8 %
MCH RBC QN AUTO: 29.6 PG (ref 27–31.3)
MCHC RBC AUTO-ENTMCNC: 32.9 % (ref 33–37)
MCV RBC AUTO: 89.9 FL (ref 82–100)
MONOCYTES ABSOLUTE: 0.7 K/UL (ref 0.2–0.8)
MONOCYTES RELATIVE PERCENT: 6.7 %
NEUTROPHILS ABSOLUTE: 8 K/UL (ref 1.4–6.5)
NEUTROPHILS RELATIVE PERCENT: 75.6 %
PDW BLD-RTO: 13.7 % (ref 11.5–14.5)
PERFORMED ON: ABNORMAL
PERFORMED ON: NORMAL
PLATELET # BLD: 311 K/UL (ref 130–400)
POTASSIUM SERPL-SCNC: 3.8 MEQ/L (ref 3.5–5.1)
RBC # BLD: 3.45 M/UL (ref 4.2–5.4)
SODIUM BLD-SCNC: 142 MEQ/L (ref 132–144)
WBC # BLD: 10.6 K/UL (ref 4.8–10.8)

## 2019-01-07 PROCEDURE — 6370000000 HC RX 637 (ALT 250 FOR IP): Performed by: INTERNAL MEDICINE

## 2019-01-07 PROCEDURE — 93005 ELECTROCARDIOGRAM TRACING: CPT

## 2019-01-07 PROCEDURE — 36415 COLL VENOUS BLD VENIPUNCTURE: CPT

## 2019-01-07 PROCEDURE — 97535 SELF CARE MNGMENT TRAINING: CPT

## 2019-01-07 PROCEDURE — 1200000000 HC SEMI PRIVATE

## 2019-01-07 PROCEDURE — 97110 THERAPEUTIC EXERCISES: CPT

## 2019-01-07 PROCEDURE — 85025 COMPLETE CBC W/AUTO DIFF WBC: CPT

## 2019-01-07 PROCEDURE — 97116 GAIT TRAINING THERAPY: CPT

## 2019-01-07 PROCEDURE — 97530 THERAPEUTIC ACTIVITIES: CPT

## 2019-01-07 PROCEDURE — 6360000002 HC RX W HCPCS: Performed by: INTERNAL MEDICINE

## 2019-01-07 PROCEDURE — 80048 BASIC METABOLIC PNL TOTAL CA: CPT

## 2019-01-07 RX ORDER — LISINOPRIL 20 MG/1
20 TABLET ORAL DAILY
Status: DISCONTINUED | OUTPATIENT
Start: 2019-01-08 | End: 2019-01-08

## 2019-01-07 RX ORDER — LISINOPRIL 10 MG/1
10 TABLET ORAL ONCE
Status: COMPLETED | OUTPATIENT
Start: 2019-01-07 | End: 2019-01-07

## 2019-01-07 RX ADMIN — METFORMIN HYDROCHLORIDE 1000 MG: 500 TABLET, FILM COATED ORAL at 08:49

## 2019-01-07 RX ADMIN — ASPIRIN 81 MG 81 MG: 81 TABLET ORAL at 08:49

## 2019-01-07 RX ADMIN — ACETAMINOPHEN 650 MG: 325 TABLET ORAL at 22:42

## 2019-01-07 RX ADMIN — ATORVASTATIN CALCIUM 40 MG: 40 TABLET, FILM COATED ORAL at 22:42

## 2019-01-07 RX ADMIN — TICAGRELOR 90 MG: 90 TABLET ORAL at 08:50

## 2019-01-07 RX ADMIN — ENOXAPARIN SODIUM 30 MG: 30 INJECTION SUBCUTANEOUS at 08:49

## 2019-01-07 RX ADMIN — METOPROLOL TARTRATE 100 MG: 50 TABLET ORAL at 08:50

## 2019-01-07 RX ADMIN — LISINOPRIL 10 MG: 10 TABLET ORAL at 08:49

## 2019-01-07 RX ADMIN — LISINOPRIL 10 MG: 10 TABLET ORAL at 11:47

## 2019-01-07 RX ADMIN — TICAGRELOR 90 MG: 90 TABLET ORAL at 22:42

## 2019-01-07 RX ADMIN — MAGNESIUM OXIDE TAB 400 MG (241.3 MG ELEMENTAL MG) 400 MG: 400 (241.3 MG) TAB at 08:50

## 2019-01-07 RX ADMIN — METFORMIN HYDROCHLORIDE 1000 MG: 500 TABLET, FILM COATED ORAL at 16:46

## 2019-01-07 RX ADMIN — METOPROLOL TARTRATE 100 MG: 50 TABLET ORAL at 22:42

## 2019-01-07 RX ADMIN — NITROFURANTOIN MACROCRYSTALS 50 MG: 50 CAPSULE ORAL at 22:43

## 2019-01-07 ASSESSMENT — PAIN DESCRIPTION - PAIN TYPE: TYPE: CHRONIC PAIN

## 2019-01-07 ASSESSMENT — PAIN DESCRIPTION - DESCRIPTORS: DESCRIPTORS: ACHING

## 2019-01-07 ASSESSMENT — PAIN SCALES - GENERAL
PAINLEVEL_OUTOF10: 0
PAINLEVEL_OUTOF10: 0
PAINLEVEL_OUTOF10: 1
PAINLEVEL_OUTOF10: 0

## 2019-01-08 LAB
BILIRUBIN URINE: NEGATIVE
BLOOD, URINE: NEGATIVE
CLARITY: CLEAR
COLOR: YELLOW
EPITHELIAL CELLS, UA: NORMAL /HPF
GLUCOSE BLD-MCNC: 115 MG/DL (ref 60–115)
GLUCOSE BLD-MCNC: 141 MG/DL (ref 60–115)
GLUCOSE BLD-MCNC: 155 MG/DL (ref 60–115)
GLUCOSE BLD-MCNC: 171 MG/DL (ref 60–115)
GLUCOSE URINE: NEGATIVE MG/DL
KETONES, URINE: NEGATIVE MG/DL
LEUKOCYTE ESTERASE, URINE: NEGATIVE
NITRITE, URINE: NEGATIVE
PERFORMED ON: ABNORMAL
PERFORMED ON: NORMAL
PH UA: 6.5 (ref 5–9)
PROTEIN UA: 100 MG/DL
RBC UA: NORMAL /HPF (ref 0–2)
SPECIFIC GRAVITY UA: 1.01 (ref 1–1.03)
UROBILINOGEN, URINE: 0.2 E.U./DL
WBC UA: NORMAL /HPF (ref 0–5)

## 2019-01-08 PROCEDURE — 6370000000 HC RX 637 (ALT 250 FOR IP): Performed by: INTERNAL MEDICINE

## 2019-01-08 PROCEDURE — 97530 THERAPEUTIC ACTIVITIES: CPT

## 2019-01-08 PROCEDURE — 97110 THERAPEUTIC EXERCISES: CPT

## 2019-01-08 PROCEDURE — 97116 GAIT TRAINING THERAPY: CPT

## 2019-01-08 PROCEDURE — 81001 URINALYSIS AUTO W/SCOPE: CPT

## 2019-01-08 PROCEDURE — 6360000002 HC RX W HCPCS: Performed by: INTERNAL MEDICINE

## 2019-01-08 PROCEDURE — 1200000000 HC SEMI PRIVATE

## 2019-01-08 RX ORDER — LISINOPRIL 20 MG/1
20 TABLET ORAL 2 TIMES DAILY
Status: DISCONTINUED | OUTPATIENT
Start: 2019-01-08 | End: 2019-01-11 | Stop reason: HOSPADM

## 2019-01-08 RX ADMIN — METOPROLOL TARTRATE 100 MG: 50 TABLET ORAL at 08:03

## 2019-01-08 RX ADMIN — ASPIRIN 81 MG 81 MG: 81 TABLET ORAL at 08:04

## 2019-01-08 RX ADMIN — MAGNESIUM OXIDE TAB 400 MG (241.3 MG ELEMENTAL MG) 400 MG: 400 (241.3 MG) TAB at 08:04

## 2019-01-08 RX ADMIN — METOPROLOL TARTRATE 100 MG: 50 TABLET ORAL at 20:24

## 2019-01-08 RX ADMIN — LISINOPRIL 20 MG: 20 TABLET ORAL at 08:05

## 2019-01-08 RX ADMIN — ENOXAPARIN SODIUM 30 MG: 30 INJECTION SUBCUTANEOUS at 08:01

## 2019-01-08 RX ADMIN — LISINOPRIL 20 MG: 20 TABLET ORAL at 20:23

## 2019-01-08 RX ADMIN — METFORMIN HYDROCHLORIDE 1000 MG: 500 TABLET, FILM COATED ORAL at 08:04

## 2019-01-08 RX ADMIN — TICAGRELOR 90 MG: 90 TABLET ORAL at 08:04

## 2019-01-08 RX ADMIN — ATORVASTATIN CALCIUM 40 MG: 40 TABLET, FILM COATED ORAL at 20:23

## 2019-01-08 RX ADMIN — TICAGRELOR 90 MG: 90 TABLET ORAL at 20:23

## 2019-01-08 RX ADMIN — METFORMIN HYDROCHLORIDE 1000 MG: 500 TABLET, FILM COATED ORAL at 16:50

## 2019-01-08 RX ADMIN — NITROFURANTOIN MACROCRYSTALS 50 MG: 50 CAPSULE ORAL at 20:23

## 2019-01-08 ASSESSMENT — PAIN SCALES - GENERAL: PAINLEVEL_OUTOF10: 0

## 2019-01-09 LAB
GLUCOSE BLD-MCNC: 139 MG/DL (ref 60–115)
GLUCOSE BLD-MCNC: 154 MG/DL (ref 60–115)
GLUCOSE BLD-MCNC: 158 MG/DL (ref 60–115)
GLUCOSE BLD-MCNC: 174 MG/DL (ref 60–115)
PERFORMED ON: ABNORMAL

## 2019-01-09 PROCEDURE — 97110 THERAPEUTIC EXERCISES: CPT

## 2019-01-09 PROCEDURE — 1200000000 HC SEMI PRIVATE

## 2019-01-09 PROCEDURE — 93010 ELECTROCARDIOGRAM REPORT: CPT | Performed by: INTERNAL MEDICINE

## 2019-01-09 PROCEDURE — 97116 GAIT TRAINING THERAPY: CPT

## 2019-01-09 PROCEDURE — 97530 THERAPEUTIC ACTIVITIES: CPT

## 2019-01-09 PROCEDURE — 6370000000 HC RX 637 (ALT 250 FOR IP): Performed by: INTERNAL MEDICINE

## 2019-01-09 PROCEDURE — 97535 SELF CARE MNGMENT TRAINING: CPT

## 2019-01-09 PROCEDURE — 6360000002 HC RX W HCPCS: Performed by: INTERNAL MEDICINE

## 2019-01-09 RX ORDER — HYDRALAZINE HYDROCHLORIDE 25 MG/1
25 TABLET, FILM COATED ORAL EVERY 8 HOURS SCHEDULED
Status: DISCONTINUED | OUTPATIENT
Start: 2019-01-09 | End: 2019-01-10

## 2019-01-09 RX ADMIN — METOPROLOL TARTRATE 100 MG: 50 TABLET ORAL at 21:28

## 2019-01-09 RX ADMIN — METOPROLOL TARTRATE 100 MG: 50 TABLET ORAL at 07:58

## 2019-01-09 RX ADMIN — HYDRALAZINE HYDROCHLORIDE 25 MG: 25 TABLET, FILM COATED ORAL at 21:27

## 2019-01-09 RX ADMIN — ENOXAPARIN SODIUM 30 MG: 30 INJECTION SUBCUTANEOUS at 07:58

## 2019-01-09 RX ADMIN — TICAGRELOR 90 MG: 90 TABLET ORAL at 07:59

## 2019-01-09 RX ADMIN — TICAGRELOR 90 MG: 90 TABLET ORAL at 21:27

## 2019-01-09 RX ADMIN — HYDRALAZINE HYDROCHLORIDE 25 MG: 25 TABLET, FILM COATED ORAL at 15:56

## 2019-01-09 RX ADMIN — NITROFURANTOIN MACROCRYSTALS 50 MG: 50 CAPSULE ORAL at 21:27

## 2019-01-09 RX ADMIN — LISINOPRIL 20 MG: 20 TABLET ORAL at 07:59

## 2019-01-09 RX ADMIN — LISINOPRIL 20 MG: 20 TABLET ORAL at 21:28

## 2019-01-09 RX ADMIN — MAGNESIUM OXIDE TAB 400 MG (241.3 MG ELEMENTAL MG) 400 MG: 400 (241.3 MG) TAB at 07:59

## 2019-01-09 RX ADMIN — ATORVASTATIN CALCIUM 40 MG: 40 TABLET, FILM COATED ORAL at 21:27

## 2019-01-09 RX ADMIN — METFORMIN HYDROCHLORIDE 1000 MG: 500 TABLET, FILM COATED ORAL at 07:59

## 2019-01-09 RX ADMIN — ASPIRIN 81 MG 81 MG: 81 TABLET ORAL at 07:59

## 2019-01-09 RX ADMIN — METFORMIN HYDROCHLORIDE 1000 MG: 500 TABLET, FILM COATED ORAL at 15:56

## 2019-01-09 ASSESSMENT — PAIN SCALES - GENERAL
PAINLEVEL_OUTOF10: 0

## 2019-01-10 ENCOUNTER — OFFICE VISIT (OUTPATIENT)
Dept: UROLOGY | Age: 84
End: 2019-01-10
Payer: MEDICARE

## 2019-01-10 VITALS
WEIGHT: 170 LBS | SYSTOLIC BLOOD PRESSURE: 128 MMHG | BODY MASS INDEX: 30.12 KG/M2 | DIASTOLIC BLOOD PRESSURE: 60 MMHG | HEART RATE: 58 BPM | HEIGHT: 63 IN

## 2019-01-10 DIAGNOSIS — N39.0 RECURRENT UTI: Primary | ICD-10-CM

## 2019-01-10 LAB
ANION GAP SERPL CALCULATED.3IONS-SCNC: 15 MEQ/L (ref 7–13)
BASOPHILS ABSOLUTE: 0 K/UL (ref 0–0.2)
BASOPHILS RELATIVE PERCENT: 0.5 %
BILIRUBIN, POC: NORMAL
BLOOD URINE, POC: NORMAL
BUN BLDV-MCNC: 23 MG/DL (ref 8–23)
CALCIUM SERPL-MCNC: 9.5 MG/DL (ref 8.6–10.2)
CHLORIDE BLD-SCNC: 102 MEQ/L (ref 98–107)
CLARITY, POC: CLEAR
CO2: 23 MEQ/L (ref 22–29)
COLOR, POC: YELLOW
CREAT SERPL-MCNC: 0.93 MG/DL (ref 0.5–0.9)
EOSINOPHILS ABSOLUTE: 0.4 K/UL (ref 0–0.7)
EOSINOPHILS RELATIVE PERCENT: 3.5 %
GFR AFRICAN AMERICAN: >60
GFR NON-AFRICAN AMERICAN: 56.4
GLUCOSE BLD-MCNC: 140 MG/DL (ref 60–115)
GLUCOSE BLD-MCNC: 144 MG/DL (ref 60–115)
GLUCOSE BLD-MCNC: 150 MG/DL (ref 74–109)
GLUCOSE BLD-MCNC: 166 MG/DL (ref 60–115)
GLUCOSE URINE, POC: NORMAL
HCT VFR BLD CALC: 32.6 % (ref 37–47)
HEMOGLOBIN: 10.8 G/DL (ref 12–16)
KETONES, POC: NORMAL
LEUKOCYTE EST, POC: NORMAL
LYMPHOCYTES ABSOLUTE: 1.3 K/UL (ref 1–4.8)
LYMPHOCYTES RELATIVE PERCENT: 13.1 %
MCH RBC QN AUTO: 29.4 PG (ref 27–31.3)
MCHC RBC AUTO-ENTMCNC: 33.2 % (ref 33–37)
MCV RBC AUTO: 88.7 FL (ref 82–100)
MONOCYTES ABSOLUTE: 0.8 K/UL (ref 0.2–0.8)
MONOCYTES RELATIVE PERCENT: 7.8 %
NEUTROPHILS ABSOLUTE: 7.6 K/UL (ref 1.4–6.5)
NEUTROPHILS RELATIVE PERCENT: 75.1 %
NITRITE, POC: NORMAL
PDW BLD-RTO: 13.6 % (ref 11.5–14.5)
PERFORMED ON: ABNORMAL
PH, POC: 7
PLATELET # BLD: 293 K/UL (ref 130–400)
POTASSIUM SERPL-SCNC: 4 MEQ/L (ref 3.5–5.1)
PROTEIN, POC: >300
RBC # BLD: 3.68 M/UL (ref 4.2–5.4)
SODIUM BLD-SCNC: 140 MEQ/L (ref 132–144)
SPECIFIC GRAVITY, POC: 1.01
UROBILINOGEN, POC: 0.2
WBC # BLD: 10.1 K/UL (ref 4.8–10.8)

## 2019-01-10 PROCEDURE — G8417 CALC BMI ABV UP PARAM F/U: HCPCS | Performed by: UROLOGY

## 2019-01-10 PROCEDURE — 1111F DSCHRG MED/CURRENT MED MERGE: CPT | Performed by: UROLOGY

## 2019-01-10 PROCEDURE — 6370000000 HC RX 637 (ALT 250 FOR IP): Performed by: INTERNAL MEDICINE

## 2019-01-10 PROCEDURE — 97110 THERAPEUTIC EXERCISES: CPT

## 2019-01-10 PROCEDURE — 6360000002 HC RX W HCPCS: Performed by: INTERNAL MEDICINE

## 2019-01-10 PROCEDURE — 99213 OFFICE O/P EST LOW 20 MIN: CPT | Performed by: UROLOGY

## 2019-01-10 PROCEDURE — 80048 BASIC METABOLIC PNL TOTAL CA: CPT

## 2019-01-10 PROCEDURE — 1123F ACP DISCUSS/DSCN MKR DOCD: CPT | Performed by: UROLOGY

## 2019-01-10 PROCEDURE — 1036F TOBACCO NON-USER: CPT | Performed by: UROLOGY

## 2019-01-10 PROCEDURE — 4040F PNEUMOC VAC/ADMIN/RCVD: CPT | Performed by: UROLOGY

## 2019-01-10 PROCEDURE — 85025 COMPLETE CBC W/AUTO DIFF WBC: CPT

## 2019-01-10 PROCEDURE — 97116 GAIT TRAINING THERAPY: CPT

## 2019-01-10 PROCEDURE — 97535 SELF CARE MNGMENT TRAINING: CPT

## 2019-01-10 PROCEDURE — 36415 COLL VENOUS BLD VENIPUNCTURE: CPT

## 2019-01-10 PROCEDURE — G8482 FLU IMMUNIZE ORDER/ADMIN: HCPCS | Performed by: UROLOGY

## 2019-01-10 PROCEDURE — 97530 THERAPEUTIC ACTIVITIES: CPT

## 2019-01-10 PROCEDURE — 81003 URINALYSIS AUTO W/O SCOPE: CPT | Performed by: UROLOGY

## 2019-01-10 PROCEDURE — 1200000000 HC SEMI PRIVATE

## 2019-01-10 PROCEDURE — G8598 ASA/ANTIPLAT THER USED: HCPCS | Performed by: UROLOGY

## 2019-01-10 PROCEDURE — 1101F PT FALLS ASSESS-DOCD LE1/YR: CPT | Performed by: UROLOGY

## 2019-01-10 PROCEDURE — 1090F PRES/ABSN URINE INCON ASSESS: CPT | Performed by: UROLOGY

## 2019-01-10 PROCEDURE — G8427 DOCREV CUR MEDS BY ELIG CLIN: HCPCS | Performed by: UROLOGY

## 2019-01-10 RX ORDER — LABETALOL 100 MG/1
100 TABLET, FILM COATED ORAL DAILY PRN
Status: ON HOLD | COMMUNITY
End: 2019-01-11 | Stop reason: HOSPADM

## 2019-01-10 RX ORDER — HYDRALAZINE HYDROCHLORIDE 25 MG/1
50 TABLET, FILM COATED ORAL EVERY 8 HOURS SCHEDULED
Status: DISCONTINUED | OUTPATIENT
Start: 2019-01-10 | End: 2019-01-11 | Stop reason: HOSPADM

## 2019-01-10 RX ORDER — LORAZEPAM 2 MG/ML
0.5 INJECTION INTRAMUSCULAR EVERY 6 HOURS PRN
Status: ON HOLD | COMMUNITY
End: 2019-01-11 | Stop reason: HOSPADM

## 2019-01-10 RX ORDER — HYDRALAZINE HYDROCHLORIDE 25 MG/1
25 TABLET, FILM COATED ORAL 3 TIMES DAILY
Status: ON HOLD | COMMUNITY
End: 2019-01-11

## 2019-01-10 RX ADMIN — TICAGRELOR 90 MG: 90 TABLET ORAL at 08:12

## 2019-01-10 RX ADMIN — METFORMIN HYDROCHLORIDE 1000 MG: 500 TABLET, FILM COATED ORAL at 17:48

## 2019-01-10 RX ADMIN — METFORMIN HYDROCHLORIDE 1000 MG: 500 TABLET, FILM COATED ORAL at 08:12

## 2019-01-10 RX ADMIN — HYDRALAZINE HYDROCHLORIDE 50 MG: 25 TABLET, FILM COATED ORAL at 13:43

## 2019-01-10 RX ADMIN — ATORVASTATIN CALCIUM 40 MG: 40 TABLET, FILM COATED ORAL at 22:03

## 2019-01-10 RX ADMIN — LISINOPRIL 20 MG: 20 TABLET ORAL at 08:11

## 2019-01-10 RX ADMIN — LISINOPRIL 20 MG: 20 TABLET ORAL at 22:04

## 2019-01-10 RX ADMIN — METOPROLOL TARTRATE 100 MG: 50 TABLET ORAL at 08:12

## 2019-01-10 RX ADMIN — HYDRALAZINE HYDROCHLORIDE 50 MG: 25 TABLET, FILM COATED ORAL at 22:03

## 2019-01-10 RX ADMIN — NITROFURANTOIN MACROCRYSTALS 50 MG: 50 CAPSULE ORAL at 22:03

## 2019-01-10 RX ADMIN — TICAGRELOR 90 MG: 90 TABLET ORAL at 22:03

## 2019-01-10 RX ADMIN — HYDRALAZINE HYDROCHLORIDE 25 MG: 25 TABLET, FILM COATED ORAL at 06:11

## 2019-01-10 RX ADMIN — ENOXAPARIN SODIUM 30 MG: 30 INJECTION SUBCUTANEOUS at 08:13

## 2019-01-10 RX ADMIN — METOPROLOL TARTRATE 100 MG: 50 TABLET ORAL at 22:04

## 2019-01-10 RX ADMIN — MAGNESIUM OXIDE TAB 400 MG (241.3 MG ELEMENTAL MG) 400 MG: 400 (241.3 MG) TAB at 08:11

## 2019-01-10 RX ADMIN — ASPIRIN 81 MG 81 MG: 81 TABLET ORAL at 08:11

## 2019-01-10 ASSESSMENT — ENCOUNTER SYMPTOMS
SHORTNESS OF BREATH: 0
ABDOMINAL DISTENTION: 0
RESPIRATORY NEGATIVE: 1
ABDOMINAL PAIN: 0

## 2019-01-10 ASSESSMENT — PAIN SCALES - GENERAL
PAINLEVEL_OUTOF10: 0

## 2019-01-11 ENCOUNTER — TELEPHONE (OUTPATIENT)
Dept: CARDIOLOGY CLINIC | Age: 84
End: 2019-01-11

## 2019-01-11 VITALS
HEIGHT: 63 IN | HEART RATE: 65 BPM | SYSTOLIC BLOOD PRESSURE: 150 MMHG | TEMPERATURE: 98.1 F | DIASTOLIC BLOOD PRESSURE: 80 MMHG | RESPIRATION RATE: 18 BRPM | BODY MASS INDEX: 29.15 KG/M2 | WEIGHT: 164.5 LBS | OXYGEN SATURATION: 96 %

## 2019-01-11 LAB
GLUCOSE BLD-MCNC: 144 MG/DL (ref 60–115)
GLUCOSE BLD-MCNC: 159 MG/DL (ref 60–115)
PERFORMED ON: ABNORMAL
PERFORMED ON: ABNORMAL

## 2019-01-11 PROCEDURE — 97116 GAIT TRAINING THERAPY: CPT

## 2019-01-11 PROCEDURE — 97110 THERAPEUTIC EXERCISES: CPT

## 2019-01-11 PROCEDURE — 6370000000 HC RX 637 (ALT 250 FOR IP): Performed by: INTERNAL MEDICINE

## 2019-01-11 PROCEDURE — 97535 SELF CARE MNGMENT TRAINING: CPT

## 2019-01-11 PROCEDURE — 97530 THERAPEUTIC ACTIVITIES: CPT

## 2019-01-11 PROCEDURE — 6360000002 HC RX W HCPCS: Performed by: INTERNAL MEDICINE

## 2019-01-11 RX ORDER — ONDANSETRON 4 MG/1
4 TABLET, ORALLY DISINTEGRATING ORAL 4 TIMES DAILY PRN
Qty: 20 TABLET | Refills: 0 | Status: SHIPPED | OUTPATIENT
Start: 2019-01-11 | End: 2019-02-15 | Stop reason: ALTCHOICE

## 2019-01-11 RX ORDER — HYDRALAZINE HYDROCHLORIDE 25 MG/1
25 TABLET, FILM COATED ORAL 3 TIMES DAILY
Qty: 90 TABLET | Refills: 0 | Status: SHIPPED | OUTPATIENT
Start: 2019-01-11 | End: 2019-01-18 | Stop reason: DRUGHIGH

## 2019-01-11 RX ORDER — METOPROLOL TARTRATE 100 MG/1
100 TABLET ORAL 2 TIMES DAILY
Qty: 60 TABLET | Refills: 3 | Status: ON HOLD | OUTPATIENT
Start: 2019-01-11 | End: 2019-03-21 | Stop reason: HOSPADM

## 2019-01-11 RX ORDER — LISINOPRIL 40 MG/1
40 TABLET ORAL DAILY
Qty: 30 TABLET | Refills: 0 | Status: SHIPPED | OUTPATIENT
Start: 2019-01-11 | End: 2019-02-07 | Stop reason: SDUPTHER

## 2019-01-11 RX ADMIN — HYDRALAZINE HYDROCHLORIDE 50 MG: 25 TABLET, FILM COATED ORAL at 06:44

## 2019-01-11 RX ADMIN — ASPIRIN 81 MG 81 MG: 81 TABLET ORAL at 08:09

## 2019-01-11 RX ADMIN — ENOXAPARIN SODIUM 30 MG: 30 INJECTION SUBCUTANEOUS at 08:09

## 2019-01-11 RX ADMIN — TICAGRELOR 90 MG: 90 TABLET ORAL at 08:09

## 2019-01-11 RX ADMIN — LISINOPRIL 20 MG: 20 TABLET ORAL at 08:09

## 2019-01-11 RX ADMIN — METOPROLOL TARTRATE 100 MG: 50 TABLET ORAL at 08:09

## 2019-01-11 RX ADMIN — ONDANSETRON 4 MG: 4 TABLET, ORALLY DISINTEGRATING ORAL at 08:36

## 2019-01-11 RX ADMIN — MAGNESIUM OXIDE TAB 400 MG (241.3 MG ELEMENTAL MG) 400 MG: 400 (241.3 MG) TAB at 08:10

## 2019-01-11 RX ADMIN — METFORMIN HYDROCHLORIDE 1000 MG: 500 TABLET, FILM COATED ORAL at 08:09

## 2019-01-11 ASSESSMENT — PAIN SCALES - GENERAL: PAINLEVEL_OUTOF10: 0

## 2019-01-15 ENCOUNTER — TELEPHONE (OUTPATIENT)
Dept: INTERNAL MEDICINE | Age: 84
End: 2019-01-15

## 2019-01-16 ENCOUNTER — OFFICE VISIT (OUTPATIENT)
Dept: INTERNAL MEDICINE | Age: 84
End: 2019-01-16
Payer: MEDICARE

## 2019-01-16 VITALS
WEIGHT: 175 LBS | SYSTOLIC BLOOD PRESSURE: 132 MMHG | DIASTOLIC BLOOD PRESSURE: 78 MMHG | BODY MASS INDEX: 31.01 KG/M2 | HEIGHT: 63 IN | HEART RATE: 68 BPM

## 2019-01-16 DIAGNOSIS — I21.02 ST ELEVATION MYOCARDIAL INFARCTION INVOLVING LEFT ANTERIOR DESCENDING (LAD) CORONARY ARTERY (HCC): Primary | ICD-10-CM

## 2019-01-16 PROCEDURE — 1090F PRES/ABSN URINE INCON ASSESS: CPT | Performed by: FAMILY MEDICINE

## 2019-01-16 PROCEDURE — G8598 ASA/ANTIPLAT THER USED: HCPCS | Performed by: FAMILY MEDICINE

## 2019-01-16 PROCEDURE — 1111F DSCHRG MED/CURRENT MED MERGE: CPT | Performed by: FAMILY MEDICINE

## 2019-01-16 PROCEDURE — 1036F TOBACCO NON-USER: CPT | Performed by: FAMILY MEDICINE

## 2019-01-16 PROCEDURE — 99214 OFFICE O/P EST MOD 30 MIN: CPT | Performed by: FAMILY MEDICINE

## 2019-01-16 PROCEDURE — 4040F PNEUMOC VAC/ADMIN/RCVD: CPT | Performed by: FAMILY MEDICINE

## 2019-01-16 PROCEDURE — G8417 CALC BMI ABV UP PARAM F/U: HCPCS | Performed by: FAMILY MEDICINE

## 2019-01-16 PROCEDURE — 1101F PT FALLS ASSESS-DOCD LE1/YR: CPT | Performed by: FAMILY MEDICINE

## 2019-01-16 PROCEDURE — G8427 DOCREV CUR MEDS BY ELIG CLIN: HCPCS | Performed by: FAMILY MEDICINE

## 2019-01-16 PROCEDURE — 1123F ACP DISCUSS/DSCN MKR DOCD: CPT | Performed by: FAMILY MEDICINE

## 2019-01-16 PROCEDURE — G8482 FLU IMMUNIZE ORDER/ADMIN: HCPCS | Performed by: FAMILY MEDICINE

## 2019-01-16 RX ORDER — CLOPIDOGREL BISULFATE 75 MG/1
75 TABLET ORAL DAILY
COMMUNITY
End: 2019-04-10 | Stop reason: SDUPTHER

## 2019-01-16 ASSESSMENT — ENCOUNTER SYMPTOMS
CHOKING: 0
APNEA: 0
CHEST TIGHTNESS: 0

## 2019-01-18 ENCOUNTER — OFFICE VISIT (OUTPATIENT)
Dept: CARDIOLOGY CLINIC | Age: 84
End: 2019-01-18
Payer: MEDICARE

## 2019-01-18 VITALS
SYSTOLIC BLOOD PRESSURE: 124 MMHG | RESPIRATION RATE: 18 BRPM | DIASTOLIC BLOOD PRESSURE: 86 MMHG | HEART RATE: 57 BPM | OXYGEN SATURATION: 97 %

## 2019-01-18 DIAGNOSIS — Z09 HOSPITAL DISCHARGE FOLLOW-UP: ICD-10-CM

## 2019-01-18 DIAGNOSIS — Z98.61 S/P PTCA (PERCUTANEOUS TRANSLUMINAL CORONARY ANGIOPLASTY): ICD-10-CM

## 2019-01-18 DIAGNOSIS — I38 VALVULAR HEART DISEASE: Primary | ICD-10-CM

## 2019-01-18 DIAGNOSIS — I25.2 HISTORY OF ST ELEVATION MYOCARDIAL INFARCTION (STEMI): ICD-10-CM

## 2019-01-18 PROBLEM — I21.3 STEMI (ST ELEVATION MYOCARDIAL INFARCTION) (HCC): Status: RESOLVED | Noted: 2019-01-03 | Resolved: 2019-01-18

## 2019-01-18 PROBLEM — I21.02 STEMI INVOLVING LEFT ANTERIOR DESCENDING CORONARY ARTERY (HCC): Status: RESOLVED | Noted: 2018-12-30 | Resolved: 2019-01-18

## 2019-01-18 PROBLEM — Z86.73 HISTORY OF CVA (CEREBROVASCULAR ACCIDENT): Status: ACTIVE | Noted: 2018-11-19

## 2019-01-18 PROCEDURE — 1111F DSCHRG MED/CURRENT MED MERGE: CPT | Performed by: INTERNAL MEDICINE

## 2019-01-18 PROCEDURE — 1090F PRES/ABSN URINE INCON ASSESS: CPT | Performed by: INTERNAL MEDICINE

## 2019-01-18 PROCEDURE — 1123F ACP DISCUSS/DSCN MKR DOCD: CPT | Performed by: INTERNAL MEDICINE

## 2019-01-18 PROCEDURE — G8417 CALC BMI ABV UP PARAM F/U: HCPCS | Performed by: INTERNAL MEDICINE

## 2019-01-18 PROCEDURE — 1101F PT FALLS ASSESS-DOCD LE1/YR: CPT | Performed by: INTERNAL MEDICINE

## 2019-01-18 PROCEDURE — 99213 OFFICE O/P EST LOW 20 MIN: CPT | Performed by: INTERNAL MEDICINE

## 2019-01-18 PROCEDURE — G8598 ASA/ANTIPLAT THER USED: HCPCS | Performed by: INTERNAL MEDICINE

## 2019-01-18 PROCEDURE — G8482 FLU IMMUNIZE ORDER/ADMIN: HCPCS | Performed by: INTERNAL MEDICINE

## 2019-01-18 PROCEDURE — 4040F PNEUMOC VAC/ADMIN/RCVD: CPT | Performed by: INTERNAL MEDICINE

## 2019-01-18 PROCEDURE — G8427 DOCREV CUR MEDS BY ELIG CLIN: HCPCS | Performed by: INTERNAL MEDICINE

## 2019-01-18 PROCEDURE — 1036F TOBACCO NON-USER: CPT | Performed by: INTERNAL MEDICINE

## 2019-01-18 RX ORDER — HYDRALAZINE HYDROCHLORIDE 25 MG/1
25 TABLET, FILM COATED ORAL DAILY
COMMUNITY
End: 2019-02-19 | Stop reason: ALTCHOICE

## 2019-01-18 ASSESSMENT — ENCOUNTER SYMPTOMS
VOMITING: 0
BLOOD IN STOOL: 0
DIARRHEA: 0
CHEST TIGHTNESS: 0
NAUSEA: 0
APNEA: 0
SHORTNESS OF BREATH: 0

## 2019-01-21 ASSESSMENT — ENCOUNTER SYMPTOMS: COLOR CHANGE: 0

## 2019-01-23 ENCOUNTER — TELEPHONE (OUTPATIENT)
Dept: INTERNAL MEDICINE | Age: 84
End: 2019-01-23
Payer: MEDICARE

## 2019-01-23 DIAGNOSIS — N39.0 RECURRENT UTI (URINARY TRACT INFECTION): Primary | ICD-10-CM

## 2019-01-23 PROCEDURE — 81002 URINALYSIS NONAUTO W/O SCOPE: CPT | Performed by: FAMILY MEDICINE

## 2019-01-24 DIAGNOSIS — N39.0 RECURRENT UTI (URINARY TRACT INFECTION): ICD-10-CM

## 2019-01-26 LAB — URINE CULTURE, ROUTINE: NORMAL

## 2019-02-01 ENCOUNTER — HOSPITAL ENCOUNTER (EMERGENCY)
Age: 84
Discharge: OTHER INSTITUTION | End: 2019-02-02
Attending: EMERGENCY MEDICINE
Payer: MEDICARE

## 2019-02-01 DIAGNOSIS — N30.00 ACUTE CYSTITIS WITHOUT HEMATURIA: Primary | ICD-10-CM

## 2019-02-01 LAB
ALBUMIN SERPL-MCNC: 3.7 G/DL (ref 3.9–4.9)
ALP BLD-CCNC: 79 U/L (ref 40–130)
ALT SERPL-CCNC: 28 U/L (ref 0–33)
ANION GAP SERPL CALCULATED.3IONS-SCNC: 18 MEQ/L (ref 7–13)
AST SERPL-CCNC: 25 U/L (ref 0–35)
BASOPHILS ABSOLUTE: 0 K/UL (ref 0–0.2)
BASOPHILS RELATIVE PERCENT: 0.1 %
BILIRUB SERPL-MCNC: 0.2 MG/DL (ref 0–1.2)
BUN BLDV-MCNC: 33 MG/DL (ref 8–23)
CALCIUM SERPL-MCNC: 9.9 MG/DL (ref 8.6–10.2)
CHLORIDE BLD-SCNC: 96 MEQ/L (ref 98–107)
CO2: 24 MEQ/L (ref 22–29)
CREAT SERPL-MCNC: 1.2 MG/DL (ref 0.5–0.9)
EKG ATRIAL RATE: 76 BPM
EKG P AXIS: 54 DEGREES
EKG P-R INTERVAL: 190 MS
EKG Q-T INTERVAL: 434 MS
EKG QRS DURATION: 90 MS
EKG QTC CALCULATION (BAZETT): 488 MS
EKG R AXIS: -22 DEGREES
EKG T AXIS: 127 DEGREES
EKG VENTRICULAR RATE: 76 BPM
EOSINOPHILS ABSOLUTE: 1.1 K/UL (ref 0–0.7)
EOSINOPHILS RELATIVE PERCENT: 8.5 %
GFR AFRICAN AMERICAN: 50.9
GFR NON-AFRICAN AMERICAN: 42
GLOBULIN: 3.3 G/DL (ref 2.3–3.5)
GLUCOSE BLD-MCNC: 179 MG/DL (ref 74–109)
HCT VFR BLD CALC: 34.3 % (ref 37–47)
HEMOGLOBIN: 11.1 G/DL (ref 12–16)
LYMPHOCYTES ABSOLUTE: 2.1 K/UL (ref 1–4.8)
LYMPHOCYTES RELATIVE PERCENT: 16.1 %
MAGNESIUM: 1.8 MG/DL (ref 1.7–2.3)
MCH RBC QN AUTO: 28.9 PG (ref 27–31.3)
MCHC RBC AUTO-ENTMCNC: 32.5 % (ref 33–37)
MCV RBC AUTO: 88.9 FL (ref 82–100)
MONOCYTES ABSOLUTE: 0.8 K/UL (ref 0.2–0.8)
MONOCYTES RELATIVE PERCENT: 6.2 %
NEUTROPHILS ABSOLUTE: 9 K/UL (ref 1.4–6.5)
NEUTROPHILS RELATIVE PERCENT: 69.1 %
PDW BLD-RTO: 15 % (ref 11.5–14.5)
PLATELET # BLD: 315 K/UL (ref 130–400)
POTASSIUM SERPL-SCNC: 5.1 MEQ/L (ref 3.5–5.1)
RBC # BLD: 3.86 M/UL (ref 4.2–5.4)
SODIUM BLD-SCNC: 138 MEQ/L (ref 132–144)
TOTAL PROTEIN: 7 G/DL (ref 6.4–8.1)
TROPONIN: 0.01 NG/ML (ref 0–0.01)
WBC # BLD: 13 K/UL (ref 4.8–10.8)

## 2019-02-01 PROCEDURE — 87086 URINE CULTURE/COLONY COUNT: CPT

## 2019-02-01 PROCEDURE — 99285 EMERGENCY DEPT VISIT HI MDM: CPT

## 2019-02-01 PROCEDURE — 36415 COLL VENOUS BLD VENIPUNCTURE: CPT

## 2019-02-01 PROCEDURE — 87077 CULTURE AEROBIC IDENTIFY: CPT

## 2019-02-01 PROCEDURE — 87186 SC STD MICRODIL/AGAR DIL: CPT

## 2019-02-01 PROCEDURE — 93005 ELECTROCARDIOGRAM TRACING: CPT

## 2019-02-01 PROCEDURE — 84484 ASSAY OF TROPONIN QUANT: CPT

## 2019-02-01 PROCEDURE — 83735 ASSAY OF MAGNESIUM: CPT

## 2019-02-01 PROCEDURE — 80053 COMPREHEN METABOLIC PANEL: CPT

## 2019-02-01 PROCEDURE — 85025 COMPLETE CBC W/AUTO DIFF WBC: CPT

## 2019-02-01 RX ORDER — SODIUM CHLORIDE 0.9 % (FLUSH) 0.9 %
3 SYRINGE (ML) INJECTION EVERY 8 HOURS
Status: DISCONTINUED | OUTPATIENT
Start: 2019-02-01 | End: 2019-02-02 | Stop reason: HOSPADM

## 2019-02-01 ASSESSMENT — ENCOUNTER SYMPTOMS
GASTROINTESTINAL NEGATIVE: 1
ALLERGIC/IMMUNOLOGIC NEGATIVE: 1
EYES NEGATIVE: 1
RESPIRATORY NEGATIVE: 1

## 2019-02-02 ENCOUNTER — HOSPITAL ENCOUNTER (INPATIENT)
Age: 84
LOS: 3 days | Discharge: HOME HEALTH | DRG: 689 | End: 2019-02-05
Attending: INTERNAL MEDICINE | Admitting: INTERNAL MEDICINE
Payer: MEDICARE

## 2019-02-02 VITALS
SYSTOLIC BLOOD PRESSURE: 156 MMHG | DIASTOLIC BLOOD PRESSURE: 76 MMHG | WEIGHT: 175 LBS | TEMPERATURE: 98.8 F | HEIGHT: 63 IN | RESPIRATION RATE: 18 BRPM | HEART RATE: 72 BPM | BODY MASS INDEX: 31.01 KG/M2 | OXYGEN SATURATION: 94 %

## 2019-02-02 PROBLEM — N39.0 UTI (URINARY TRACT INFECTION): Status: ACTIVE | Noted: 2019-02-02

## 2019-02-02 LAB
ANION GAP SERPL CALCULATED.3IONS-SCNC: 13 MEQ/L (ref 7–13)
BACTERIA: ABNORMAL /HPF
BILIRUBIN URINE: NEGATIVE
BLOOD, URINE: ABNORMAL
BUN BLDV-MCNC: 32 MG/DL (ref 8–23)
CALCIUM SERPL-MCNC: 9.3 MG/DL (ref 8.6–10.2)
CHLORIDE BLD-SCNC: 101 MEQ/L (ref 98–107)
CLARITY: CLEAR
CO2: 25 MEQ/L (ref 22–29)
COLOR: YELLOW
CREAT SERPL-MCNC: 1.01 MG/DL (ref 0.5–0.9)
CREAT SERPL-MCNC: 1.1 MG/DL (ref 0.5–0.9)
EPITHELIAL CELLS, UA: ABNORMAL /HPF
GFR AFRICAN AMERICAN: 56.3
GFR AFRICAN AMERICAN: >60
GFR NON-AFRICAN AMERICAN: 46.5
GFR NON-AFRICAN AMERICAN: 51.3
GLUCOSE BLD-MCNC: 162 MG/DL (ref 60–115)
GLUCOSE BLD-MCNC: 180 MG/DL (ref 74–109)
GLUCOSE BLD-MCNC: 231 MG/DL (ref 60–115)
GLUCOSE BLD-MCNC: 285 MG/DL (ref 60–115)
GLUCOSE BLD-MCNC: 289 MG/DL (ref 60–115)
GLUCOSE URINE: NEGATIVE MG/DL
HCT VFR BLD CALC: 32.9 % (ref 37–47)
HEMOGLOBIN: 10.9 G/DL (ref 12–16)
KETONES, URINE: ABNORMAL MG/DL
LACTIC ACID: 1.4 MMOL/L (ref 0.5–2.2)
LEUKOCYTE ESTERASE, URINE: ABNORMAL
MCH RBC QN AUTO: 29.6 PG (ref 27–31.3)
MCHC RBC AUTO-ENTMCNC: 33.1 % (ref 33–37)
MCV RBC AUTO: 89.5 FL (ref 82–100)
NITRITE, URINE: POSITIVE
PDW BLD-RTO: 15 % (ref 11.5–14.5)
PERFORMED ON: ABNORMAL
PH UA: 7 (ref 5–9)
PLATELET # BLD: 258 K/UL (ref 130–400)
POTASSIUM REFLEX MAGNESIUM: 4.5 MEQ/L (ref 3.5–5.1)
PROTEIN UA: >=300 MG/DL
RBC # BLD: 3.67 M/UL (ref 4.2–5.4)
RBC UA: ABNORMAL /HPF (ref 0–2)
SODIUM BLD-SCNC: 139 MEQ/L (ref 132–144)
SPECIFIC GRAVITY UA: 1.02 (ref 1–1.03)
URINE REFLEX TO CULTURE: YES
UROBILINOGEN, URINE: 0.2 E.U./DL
WBC # BLD: 10.5 K/UL (ref 4.8–10.8)
WBC UA: ABNORMAL /HPF (ref 0–5)

## 2019-02-02 PROCEDURE — 87040 BLOOD CULTURE FOR BACTERIA: CPT

## 2019-02-02 PROCEDURE — G8979 MOBILITY GOAL STATUS: HCPCS

## 2019-02-02 PROCEDURE — 36415 COLL VENOUS BLD VENIPUNCTURE: CPT

## 2019-02-02 PROCEDURE — 80048 BASIC METABOLIC PNL TOTAL CA: CPT

## 2019-02-02 PROCEDURE — 82565 ASSAY OF CREATININE: CPT

## 2019-02-02 PROCEDURE — 83605 ASSAY OF LACTIC ACID: CPT

## 2019-02-02 PROCEDURE — 2580000003 HC RX 258: Performed by: EMERGENCY MEDICINE

## 2019-02-02 PROCEDURE — 96372 THER/PROPH/DIAG INJ SC/IM: CPT

## 2019-02-02 PROCEDURE — 1210000000 HC MED SURG R&B

## 2019-02-02 PROCEDURE — 6360000002 HC RX W HCPCS: Performed by: NURSE PRACTITIONER

## 2019-02-02 PROCEDURE — 81001 URINALYSIS AUTO W/SCOPE: CPT

## 2019-02-02 PROCEDURE — 85027 COMPLETE CBC AUTOMATED: CPT

## 2019-02-02 PROCEDURE — 6370000000 HC RX 637 (ALT 250 FOR IP): Performed by: INTERNAL MEDICINE

## 2019-02-02 PROCEDURE — G8978 MOBILITY CURRENT STATUS: HCPCS

## 2019-02-02 PROCEDURE — 97162 PT EVAL MOD COMPLEX 30 MIN: CPT

## 2019-02-02 PROCEDURE — 96365 THER/PROPH/DIAG IV INF INIT: CPT

## 2019-02-02 PROCEDURE — 2580000003 HC RX 258: Performed by: NURSE PRACTITIONER

## 2019-02-02 PROCEDURE — 6370000000 HC RX 637 (ALT 250 FOR IP): Performed by: NURSE PRACTITIONER

## 2019-02-02 PROCEDURE — 6360000002 HC RX W HCPCS: Performed by: EMERGENCY MEDICINE

## 2019-02-02 RX ORDER — DEXTROSE MONOHYDRATE 25 G/50ML
12.5 INJECTION, SOLUTION INTRAVENOUS PRN
Status: DISCONTINUED | OUTPATIENT
Start: 2019-02-02 | End: 2019-02-05 | Stop reason: HOSPADM

## 2019-02-02 RX ORDER — HEPARIN SODIUM 5000 [USP'U]/ML
5000 INJECTION, SOLUTION INTRAVENOUS; SUBCUTANEOUS EVERY 12 HOURS
Status: DISCONTINUED | OUTPATIENT
Start: 2019-02-02 | End: 2019-02-05 | Stop reason: HOSPADM

## 2019-02-02 RX ORDER — DEXTROSE MONOHYDRATE 50 MG/ML
100 INJECTION, SOLUTION INTRAVENOUS PRN
Status: DISCONTINUED | OUTPATIENT
Start: 2019-02-02 | End: 2019-02-05 | Stop reason: HOSPADM

## 2019-02-02 RX ORDER — METOPROLOL TARTRATE 50 MG/1
100 TABLET, FILM COATED ORAL 2 TIMES DAILY
Status: DISCONTINUED | OUTPATIENT
Start: 2019-02-02 | End: 2019-02-05 | Stop reason: HOSPADM

## 2019-02-02 RX ORDER — SODIUM CHLORIDE 9 MG/ML
INJECTION, SOLUTION INTRAVENOUS CONTINUOUS
Status: DISPENSED | OUTPATIENT
Start: 2019-02-02 | End: 2019-02-03

## 2019-02-02 RX ORDER — MULTIVIT-MIN/IRON/FOLIC ACID/K 18-600-40
2000 CAPSULE ORAL
COMMUNITY

## 2019-02-02 RX ORDER — NICOTINE POLACRILEX 4 MG
15 LOZENGE BUCCAL PRN
Status: DISCONTINUED | OUTPATIENT
Start: 2019-02-02 | End: 2019-02-05 | Stop reason: HOSPADM

## 2019-02-02 RX ORDER — ONDANSETRON 2 MG/ML
4 INJECTION INTRAMUSCULAR; INTRAVENOUS EVERY 6 HOURS PRN
Status: DISCONTINUED | OUTPATIENT
Start: 2019-02-02 | End: 2019-02-05 | Stop reason: HOSPADM

## 2019-02-02 RX ORDER — SODIUM CHLORIDE 9 MG/ML
INJECTION, SOLUTION INTRAVENOUS CONTINUOUS
Status: DISCONTINUED | OUTPATIENT
Start: 2019-02-02 | End: 2019-02-02 | Stop reason: HOSPADM

## 2019-02-02 RX ORDER — CLOPIDOGREL BISULFATE 75 MG/1
75 TABLET ORAL DAILY
Status: DISCONTINUED | OUTPATIENT
Start: 2019-02-02 | End: 2019-02-05 | Stop reason: HOSPADM

## 2019-02-02 RX ORDER — SODIUM CHLORIDE 0.9 % (FLUSH) 0.9 %
10 SYRINGE (ML) INJECTION PRN
Status: DISCONTINUED | OUTPATIENT
Start: 2019-02-02 | End: 2019-02-05 | Stop reason: HOSPADM

## 2019-02-02 RX ORDER — HYDRALAZINE HYDROCHLORIDE 20 MG/ML
10 INJECTION INTRAMUSCULAR; INTRAVENOUS EVERY 4 HOURS PRN
Status: DISCONTINUED | OUTPATIENT
Start: 2019-02-02 | End: 2019-02-05 | Stop reason: HOSPADM

## 2019-02-02 RX ORDER — ASPIRIN 81 MG/1
81 TABLET ORAL DAILY
Status: DISCONTINUED | OUTPATIENT
Start: 2019-02-02 | End: 2019-02-05 | Stop reason: HOSPADM

## 2019-02-02 RX ORDER — HEPARIN SODIUM 5000 [USP'U]/ML
5000 INJECTION, SOLUTION INTRAVENOUS; SUBCUTANEOUS EVERY 8 HOURS SCHEDULED
Status: DISCONTINUED | OUTPATIENT
Start: 2019-02-02 | End: 2019-02-02

## 2019-02-02 RX ORDER — ATORVASTATIN CALCIUM 10 MG/1
10 TABLET, FILM COATED ORAL NIGHTLY
Status: DISCONTINUED | OUTPATIENT
Start: 2019-02-02 | End: 2019-02-05 | Stop reason: HOSPADM

## 2019-02-02 RX ORDER — SODIUM CHLORIDE 0.9 % (FLUSH) 0.9 %
10 SYRINGE (ML) INJECTION EVERY 12 HOURS SCHEDULED
Status: DISCONTINUED | OUTPATIENT
Start: 2019-02-02 | End: 2019-02-05 | Stop reason: HOSPADM

## 2019-02-02 RX ADMIN — INSULIN LISPRO 3 UNITS: 100 INJECTION, SOLUTION INTRAVENOUS; SUBCUTANEOUS at 18:49

## 2019-02-02 RX ADMIN — DEXTROSE MONOHYDRATE 1 G: 50 INJECTION, SOLUTION INTRAVENOUS at 01:06

## 2019-02-02 RX ADMIN — HEPARIN SODIUM 5000 UNITS: 5000 INJECTION INTRAVENOUS; SUBCUTANEOUS at 20:26

## 2019-02-02 RX ADMIN — INSULIN LISPRO 1 UNITS: 100 INJECTION, SOLUTION INTRAVENOUS; SUBCUTANEOUS at 09:21

## 2019-02-02 RX ADMIN — Medication 3 ML: at 01:53

## 2019-02-02 RX ADMIN — ATORVASTATIN CALCIUM 10 MG: 10 TABLET, FILM COATED ORAL at 20:25

## 2019-02-02 RX ADMIN — INSULIN LISPRO 3 UNITS: 100 INJECTION, SOLUTION INTRAVENOUS; SUBCUTANEOUS at 12:37

## 2019-02-02 RX ADMIN — SODIUM CHLORIDE: 9 INJECTION, SOLUTION INTRAVENOUS at 15:16

## 2019-02-02 RX ADMIN — CLOPIDOGREL BISULFATE 75 MG: 75 TABLET ORAL at 12:35

## 2019-02-02 RX ADMIN — METOPROLOL TARTRATE 100 MG: 50 TABLET ORAL at 20:26

## 2019-02-02 RX ADMIN — SODIUM CHLORIDE 100 ML/HR: 9 INJECTION, SOLUTION INTRAVENOUS at 01:53

## 2019-02-02 RX ADMIN — VITAMIN D, TAB 1000IU (100/BT) 2000 UNITS: 25 TAB at 12:35

## 2019-02-02 RX ADMIN — HEPARIN SODIUM 5000 UNITS: 5000 INJECTION INTRAVENOUS; SUBCUTANEOUS at 09:21

## 2019-02-02 RX ADMIN — ASPIRIN 81 MG: 81 TABLET, COATED ORAL at 12:35

## 2019-02-02 RX ADMIN — METOPROLOL TARTRATE 100 MG: 50 TABLET ORAL at 12:35

## 2019-02-02 ASSESSMENT — PAIN SCALES - GENERAL
PAINLEVEL_OUTOF10: 0

## 2019-02-03 LAB
ANION GAP SERPL CALCULATED.3IONS-SCNC: 13 MEQ/L (ref 7–13)
BASOPHILS ABSOLUTE: 0.1 K/UL (ref 0–0.2)
BASOPHILS RELATIVE PERCENT: 1.1 %
BUN BLDV-MCNC: 22 MG/DL (ref 8–23)
CALCIUM SERPL-MCNC: 8.6 MG/DL (ref 8.6–10.2)
CHLORIDE BLD-SCNC: 99 MEQ/L (ref 98–107)
CO2: 23 MEQ/L (ref 22–29)
CREAT SERPL-MCNC: 0.97 MG/DL (ref 0.5–0.9)
EOSINOPHILS ABSOLUTE: 0.3 K/UL (ref 0–0.7)
EOSINOPHILS RELATIVE PERCENT: 3.5 %
GFR AFRICAN AMERICAN: >60
GFR NON-AFRICAN AMERICAN: 53.8
GLUCOSE BLD-MCNC: 140 MG/DL (ref 60–115)
GLUCOSE BLD-MCNC: 148 MG/DL (ref 60–115)
GLUCOSE BLD-MCNC: 191 MG/DL (ref 60–115)
GLUCOSE BLD-MCNC: 191 MG/DL (ref 60–115)
GLUCOSE BLD-MCNC: 288 MG/DL (ref 74–109)
HCT VFR BLD CALC: 30.2 % (ref 37–47)
HEMOGLOBIN: 9.9 G/DL (ref 12–16)
LYMPHOCYTES ABSOLUTE: 1.1 K/UL (ref 1–4.8)
LYMPHOCYTES RELATIVE PERCENT: 14.4 %
MCH RBC QN AUTO: 29.3 PG (ref 27–31.3)
MCHC RBC AUTO-ENTMCNC: 32.7 % (ref 33–37)
MCV RBC AUTO: 89.7 FL (ref 82–100)
MONOCYTES ABSOLUTE: 0.4 K/UL (ref 0.2–0.8)
MONOCYTES RELATIVE PERCENT: 5.9 %
NEUTROPHILS ABSOLUTE: 5.7 K/UL (ref 1.4–6.5)
NEUTROPHILS RELATIVE PERCENT: 75.1 %
PDW BLD-RTO: 14.9 % (ref 11.5–14.5)
PERFORMED ON: ABNORMAL
PLATELET # BLD: 222 K/UL (ref 130–400)
POTASSIUM SERPL-SCNC: 3.6 MEQ/L (ref 3.5–5.1)
RBC # BLD: 3.36 M/UL (ref 4.2–5.4)
SODIUM BLD-SCNC: 135 MEQ/L (ref 132–144)
WBC # BLD: 7.6 K/UL (ref 4.8–10.8)

## 2019-02-03 PROCEDURE — 1210000000 HC MED SURG R&B

## 2019-02-03 PROCEDURE — 6370000000 HC RX 637 (ALT 250 FOR IP): Performed by: INTERNAL MEDICINE

## 2019-02-03 PROCEDURE — 85025 COMPLETE CBC W/AUTO DIFF WBC: CPT

## 2019-02-03 PROCEDURE — 96372 THER/PROPH/DIAG INJ SC/IM: CPT

## 2019-02-03 PROCEDURE — 96365 THER/PROPH/DIAG IV INF INIT: CPT

## 2019-02-03 PROCEDURE — 80048 BASIC METABOLIC PNL TOTAL CA: CPT

## 2019-02-03 PROCEDURE — 6360000002 HC RX W HCPCS: Performed by: NURSE PRACTITIONER

## 2019-02-03 PROCEDURE — 36415 COLL VENOUS BLD VENIPUNCTURE: CPT

## 2019-02-03 PROCEDURE — 2580000003 HC RX 258: Performed by: NURSE PRACTITIONER

## 2019-02-03 RX ADMIN — HEPARIN SODIUM 5000 UNITS: 5000 INJECTION INTRAVENOUS; SUBCUTANEOUS at 19:56

## 2019-02-03 RX ADMIN — INSULIN LISPRO 1 UNITS: 100 INJECTION, SOLUTION INTRAVENOUS; SUBCUTANEOUS at 18:04

## 2019-02-03 RX ADMIN — INSULIN LISPRO 1 UNITS: 100 INJECTION, SOLUTION INTRAVENOUS; SUBCUTANEOUS at 09:24

## 2019-02-03 RX ADMIN — Medication 10 ML: at 19:55

## 2019-02-03 RX ADMIN — INSULIN LISPRO 1 UNITS: 100 INJECTION, SOLUTION INTRAVENOUS; SUBCUTANEOUS at 12:39

## 2019-02-03 RX ADMIN — METOPROLOL TARTRATE 100 MG: 50 TABLET ORAL at 19:55

## 2019-02-03 RX ADMIN — HEPARIN SODIUM 5000 UNITS: 5000 INJECTION INTRAVENOUS; SUBCUTANEOUS at 09:24

## 2019-02-03 RX ADMIN — ATORVASTATIN CALCIUM 10 MG: 10 TABLET, FILM COATED ORAL at 19:55

## 2019-02-03 RX ADMIN — ASPIRIN 81 MG: 81 TABLET, COATED ORAL at 09:24

## 2019-02-03 RX ADMIN — CEFTRIAXONE SODIUM 1 G: 1 INJECTION, POWDER, FOR SOLUTION INTRAMUSCULAR; INTRAVENOUS at 00:24

## 2019-02-03 RX ADMIN — CLOPIDOGREL BISULFATE 75 MG: 75 TABLET ORAL at 09:24

## 2019-02-03 RX ADMIN — VITAMIN D, TAB 1000IU (100/BT) 2000 UNITS: 25 TAB at 09:24

## 2019-02-03 RX ADMIN — METOPROLOL TARTRATE 100 MG: 50 TABLET ORAL at 09:24

## 2019-02-03 ASSESSMENT — PAIN SCALES - GENERAL: PAINLEVEL_OUTOF10: 0

## 2019-02-04 LAB
GLUCOSE BLD-MCNC: 172 MG/DL (ref 60–115)
GLUCOSE BLD-MCNC: 230 MG/DL (ref 60–115)
GLUCOSE BLD-MCNC: 257 MG/DL (ref 60–115)
GLUCOSE BLD-MCNC: 264 MG/DL (ref 60–115)
ORGANISM: ABNORMAL
PERFORMED ON: ABNORMAL
URINE CULTURE, ROUTINE: ABNORMAL
URINE CULTURE, ROUTINE: ABNORMAL

## 2019-02-04 PROCEDURE — G8987 SELF CARE CURRENT STATUS: HCPCS

## 2019-02-04 PROCEDURE — 1210000000 HC MED SURG R&B

## 2019-02-04 PROCEDURE — 6370000000 HC RX 637 (ALT 250 FOR IP): Performed by: INTERNAL MEDICINE

## 2019-02-04 PROCEDURE — 6360000002 HC RX W HCPCS: Performed by: NURSE PRACTITIONER

## 2019-02-04 PROCEDURE — 96372 THER/PROPH/DIAG INJ SC/IM: CPT

## 2019-02-04 PROCEDURE — 97116 GAIT TRAINING THERAPY: CPT

## 2019-02-04 PROCEDURE — 96366 THER/PROPH/DIAG IV INF ADDON: CPT

## 2019-02-04 PROCEDURE — 6360000002 HC RX W HCPCS: Performed by: INTERNAL MEDICINE

## 2019-02-04 PROCEDURE — 96375 TX/PRO/DX INJ NEW DRUG ADDON: CPT

## 2019-02-04 PROCEDURE — 99221 1ST HOSP IP/OBS SF/LOW 40: CPT | Performed by: UROLOGY

## 2019-02-04 PROCEDURE — G8988 SELF CARE GOAL STATUS: HCPCS

## 2019-02-04 PROCEDURE — 2580000003 HC RX 258: Performed by: NURSE PRACTITIONER

## 2019-02-04 PROCEDURE — 97166 OT EVAL MOD COMPLEX 45 MIN: CPT

## 2019-02-04 PROCEDURE — 96367 TX/PROPH/DG ADDL SEQ IV INF: CPT

## 2019-02-04 PROCEDURE — 97535 SELF CARE MNGMENT TRAINING: CPT

## 2019-02-04 RX ORDER — CIPROFLOXACIN 500 MG/1
500 TABLET, FILM COATED ORAL EVERY 12 HOURS SCHEDULED
Status: CANCELLED | OUTPATIENT
Start: 2019-02-04 | End: 2019-02-07

## 2019-02-04 RX ORDER — SODIUM CHLORIDE 0.9 % (FLUSH) 0.9 %
10 SYRINGE (ML) INJECTION PRN
Status: CANCELLED | OUTPATIENT
Start: 2019-02-04

## 2019-02-04 RX ORDER — ASPIRIN 81 MG/1
81 TABLET ORAL DAILY
Status: CANCELLED | OUTPATIENT
Start: 2019-02-05

## 2019-02-04 RX ORDER — HYDRALAZINE HYDROCHLORIDE 20 MG/ML
10 INJECTION INTRAMUSCULAR; INTRAVENOUS EVERY 4 HOURS PRN
Status: CANCELLED | OUTPATIENT
Start: 2019-02-04

## 2019-02-04 RX ORDER — CIPROFLOXACIN 2 MG/ML
400 INJECTION, SOLUTION INTRAVENOUS EVERY 12 HOURS
Status: CANCELLED | OUTPATIENT
Start: 2019-02-04

## 2019-02-04 RX ORDER — DEXTROSE MONOHYDRATE 25 G/50ML
12.5 INJECTION, SOLUTION INTRAVENOUS PRN
Status: CANCELLED | OUTPATIENT
Start: 2019-02-04

## 2019-02-04 RX ORDER — METOPROLOL TARTRATE 50 MG/1
100 TABLET, FILM COATED ORAL 2 TIMES DAILY
Status: CANCELLED | OUTPATIENT
Start: 2019-02-04

## 2019-02-04 RX ORDER — HEPARIN SODIUM 5000 [USP'U]/ML
5000 INJECTION, SOLUTION INTRAVENOUS; SUBCUTANEOUS EVERY 12 HOURS
Status: CANCELLED | OUTPATIENT
Start: 2019-02-04

## 2019-02-04 RX ORDER — DEXTROSE MONOHYDRATE 50 MG/ML
100 INJECTION, SOLUTION INTRAVENOUS PRN
Status: CANCELLED | OUTPATIENT
Start: 2019-02-04

## 2019-02-04 RX ORDER — CIPROFLOXACIN 2 MG/ML
400 INJECTION, SOLUTION INTRAVENOUS EVERY 12 HOURS
Status: DISCONTINUED | OUTPATIENT
Start: 2019-02-04 | End: 2019-02-05 | Stop reason: HOSPADM

## 2019-02-04 RX ORDER — CLOPIDOGREL BISULFATE 75 MG/1
75 TABLET ORAL DAILY
Status: CANCELLED | OUTPATIENT
Start: 2019-02-05

## 2019-02-04 RX ORDER — NICOTINE POLACRILEX 4 MG
15 LOZENGE BUCCAL PRN
Status: CANCELLED | OUTPATIENT
Start: 2019-02-04

## 2019-02-04 RX ORDER — SODIUM CHLORIDE 0.9 % (FLUSH) 0.9 %
10 SYRINGE (ML) INJECTION EVERY 12 HOURS SCHEDULED
Status: CANCELLED | OUTPATIENT
Start: 2019-02-04

## 2019-02-04 RX ORDER — CIPROFLOXACIN 250 MG/1
125 TABLET, FILM COATED ORAL
Status: CANCELLED | OUTPATIENT
Start: 2019-02-08

## 2019-02-04 RX ORDER — ONDANSETRON 2 MG/ML
4 INJECTION INTRAMUSCULAR; INTRAVENOUS EVERY 6 HOURS PRN
Status: CANCELLED | OUTPATIENT
Start: 2019-02-04

## 2019-02-04 RX ORDER — ATORVASTATIN CALCIUM 10 MG/1
10 TABLET, FILM COATED ORAL NIGHTLY
Status: CANCELLED | OUTPATIENT
Start: 2019-02-04

## 2019-02-04 RX ADMIN — HYDRALAZINE HYDROCHLORIDE 10 MG: 20 INJECTION INTRAMUSCULAR; INTRAVENOUS at 08:06

## 2019-02-04 RX ADMIN — INSULIN LISPRO 1 UNITS: 100 INJECTION, SOLUTION INTRAVENOUS; SUBCUTANEOUS at 08:11

## 2019-02-04 RX ADMIN — CIPROFLOXACIN 400 MG: 2 INJECTION, SOLUTION INTRAVENOUS at 08:14

## 2019-02-04 RX ADMIN — ASPIRIN 81 MG: 81 TABLET, COATED ORAL at 08:07

## 2019-02-04 RX ADMIN — Medication 10 ML: at 08:07

## 2019-02-04 RX ADMIN — CLOPIDOGREL BISULFATE 75 MG: 75 TABLET ORAL at 08:07

## 2019-02-04 RX ADMIN — HEPARIN SODIUM 5000 UNITS: 5000 INJECTION INTRAVENOUS; SUBCUTANEOUS at 21:24

## 2019-02-04 RX ADMIN — VITAMIN D, TAB 1000IU (100/BT) 2000 UNITS: 25 TAB at 08:07

## 2019-02-04 RX ADMIN — METOPROLOL TARTRATE 100 MG: 50 TABLET ORAL at 20:23

## 2019-02-04 RX ADMIN — Medication 10 ML: at 20:22

## 2019-02-04 RX ADMIN — METOPROLOL TARTRATE 100 MG: 50 TABLET ORAL at 08:07

## 2019-02-04 RX ADMIN — HEPARIN SODIUM 5000 UNITS: 5000 INJECTION INTRAVENOUS; SUBCUTANEOUS at 08:10

## 2019-02-04 RX ADMIN — INSULIN LISPRO 2 UNITS: 100 INJECTION, SOLUTION INTRAVENOUS; SUBCUTANEOUS at 17:11

## 2019-02-04 RX ADMIN — ATORVASTATIN CALCIUM 10 MG: 10 TABLET, FILM COATED ORAL at 20:22

## 2019-02-04 RX ADMIN — CIPROFLOXACIN 400 MG: 2 INJECTION, SOLUTION INTRAVENOUS at 20:22

## 2019-02-04 RX ADMIN — INSULIN LISPRO 3 UNITS: 100 INJECTION, SOLUTION INTRAVENOUS; SUBCUTANEOUS at 12:54

## 2019-02-04 RX ADMIN — CEFTRIAXONE SODIUM 1 G: 1 INJECTION, POWDER, FOR SOLUTION INTRAMUSCULAR; INTRAVENOUS at 00:30

## 2019-02-04 ASSESSMENT — PAIN SCALES - GENERAL
PAINLEVEL_OUTOF10: 0

## 2019-02-05 VITALS
SYSTOLIC BLOOD PRESSURE: 156 MMHG | DIASTOLIC BLOOD PRESSURE: 83 MMHG | TEMPERATURE: 98 F | HEART RATE: 88 BPM | RESPIRATION RATE: 20 BRPM | OXYGEN SATURATION: 95 %

## 2019-02-05 LAB
GLUCOSE BLD-MCNC: 187 MG/DL (ref 60–115)
GLUCOSE BLD-MCNC: 223 MG/DL (ref 60–115)
PERFORMED ON: ABNORMAL
PERFORMED ON: ABNORMAL

## 2019-02-05 PROCEDURE — 6370000000 HC RX 637 (ALT 250 FOR IP): Performed by: INTERNAL MEDICINE

## 2019-02-05 PROCEDURE — 96366 THER/PROPH/DIAG IV INF ADDON: CPT

## 2019-02-05 PROCEDURE — 6360000002 HC RX W HCPCS: Performed by: NURSE PRACTITIONER

## 2019-02-05 PROCEDURE — 2580000003 HC RX 258: Performed by: NURSE PRACTITIONER

## 2019-02-05 PROCEDURE — 97116 GAIT TRAINING THERAPY: CPT

## 2019-02-05 PROCEDURE — 6360000002 HC RX W HCPCS: Performed by: INTERNAL MEDICINE

## 2019-02-05 PROCEDURE — 96372 THER/PROPH/DIAG INJ SC/IM: CPT

## 2019-02-05 RX ORDER — LISINOPRIL 20 MG/1
40 TABLET ORAL DAILY
Status: DISCONTINUED | OUTPATIENT
Start: 2019-02-05 | End: 2019-02-05 | Stop reason: HOSPADM

## 2019-02-05 RX ORDER — CIPROFLOXACIN 500 MG/1
500 TABLET, FILM COATED ORAL 2 TIMES DAILY
Qty: 4 TABLET | Refills: 0 | Status: SHIPPED | OUTPATIENT
Start: 2019-02-05 | End: 2019-02-07

## 2019-02-05 RX ORDER — LISINOPRIL 20 MG/1
40 TABLET ORAL DAILY
Status: CANCELLED | OUTPATIENT
Start: 2019-02-06

## 2019-02-05 RX ORDER — CIPROFLOXACIN 250 MG/1
125 TABLET, FILM COATED ORAL DAILY
Qty: 15 TABLET | Refills: 0 | Status: SHIPPED | OUTPATIENT
Start: 2019-02-08 | End: 2019-02-25

## 2019-02-05 RX ADMIN — METOPROLOL TARTRATE 100 MG: 50 TABLET ORAL at 08:13

## 2019-02-05 RX ADMIN — CLOPIDOGREL BISULFATE 75 MG: 75 TABLET ORAL at 08:14

## 2019-02-05 RX ADMIN — Medication 10 ML: at 08:18

## 2019-02-05 RX ADMIN — ASPIRIN 81 MG: 81 TABLET, COATED ORAL at 08:14

## 2019-02-05 RX ADMIN — INSULIN LISPRO 1 UNITS: 100 INJECTION, SOLUTION INTRAVENOUS; SUBCUTANEOUS at 08:21

## 2019-02-05 RX ADMIN — CIPROFLOXACIN 400 MG: 2 INJECTION, SOLUTION INTRAVENOUS at 08:18

## 2019-02-05 RX ADMIN — LISINOPRIL 40 MG: 20 TABLET ORAL at 08:13

## 2019-02-05 RX ADMIN — HEPARIN SODIUM 5000 UNITS: 5000 INJECTION INTRAVENOUS; SUBCUTANEOUS at 08:21

## 2019-02-05 RX ADMIN — VITAMIN D, TAB 1000IU (100/BT) 2000 UNITS: 25 TAB at 08:13

## 2019-02-05 RX ADMIN — METFORMIN HYDROCHLORIDE 1000 MG: 500 TABLET ORAL at 08:13

## 2019-02-05 RX ADMIN — INSULIN LISPRO 2 UNITS: 100 INJECTION, SOLUTION INTRAVENOUS; SUBCUTANEOUS at 12:48

## 2019-02-05 ASSESSMENT — PAIN SCALES - GENERAL: PAINLEVEL_OUTOF10: 0

## 2019-02-07 ENCOUNTER — TELEPHONE (OUTPATIENT)
Dept: INTERNAL MEDICINE | Age: 84
End: 2019-02-07

## 2019-02-07 ENCOUNTER — OFFICE VISIT (OUTPATIENT)
Dept: INTERNAL MEDICINE | Age: 84
End: 2019-02-07
Payer: MEDICARE

## 2019-02-07 VITALS
DIASTOLIC BLOOD PRESSURE: 80 MMHG | BODY MASS INDEX: 30.51 KG/M2 | WEIGHT: 172.2 LBS | SYSTOLIC BLOOD PRESSURE: 124 MMHG | OXYGEN SATURATION: 94 % | HEIGHT: 63 IN | HEART RATE: 69 BPM

## 2019-02-07 DIAGNOSIS — N39.0 RECURRENT UTI (URINARY TRACT INFECTION): Primary | ICD-10-CM

## 2019-02-07 DIAGNOSIS — I10 HYPERTENSION, UNSPECIFIED TYPE: ICD-10-CM

## 2019-02-07 LAB
BLOOD CULTURE, ROUTINE: NORMAL
CULTURE, BLOOD 2: NORMAL

## 2019-02-07 PROCEDURE — 1101F PT FALLS ASSESS-DOCD LE1/YR: CPT | Performed by: FAMILY MEDICINE

## 2019-02-07 PROCEDURE — 1090F PRES/ABSN URINE INCON ASSESS: CPT | Performed by: FAMILY MEDICINE

## 2019-02-07 PROCEDURE — 1123F ACP DISCUSS/DSCN MKR DOCD: CPT | Performed by: FAMILY MEDICINE

## 2019-02-07 PROCEDURE — 1036F TOBACCO NON-USER: CPT | Performed by: FAMILY MEDICINE

## 2019-02-07 PROCEDURE — 1111F DSCHRG MED/CURRENT MED MERGE: CPT | Performed by: FAMILY MEDICINE

## 2019-02-07 PROCEDURE — G8598 ASA/ANTIPLAT THER USED: HCPCS | Performed by: FAMILY MEDICINE

## 2019-02-07 PROCEDURE — G8417 CALC BMI ABV UP PARAM F/U: HCPCS | Performed by: FAMILY MEDICINE

## 2019-02-07 PROCEDURE — 4040F PNEUMOC VAC/ADMIN/RCVD: CPT | Performed by: FAMILY MEDICINE

## 2019-02-07 PROCEDURE — G8427 DOCREV CUR MEDS BY ELIG CLIN: HCPCS | Performed by: FAMILY MEDICINE

## 2019-02-07 PROCEDURE — 99214 OFFICE O/P EST MOD 30 MIN: CPT | Performed by: FAMILY MEDICINE

## 2019-02-07 PROCEDURE — G8482 FLU IMMUNIZE ORDER/ADMIN: HCPCS | Performed by: FAMILY MEDICINE

## 2019-02-07 RX ORDER — LISINOPRIL 40 MG/1
40 TABLET ORAL DAILY
Qty: 90 TABLET | Refills: 3 | Status: SHIPPED | OUTPATIENT
Start: 2019-02-07 | End: 2019-04-03 | Stop reason: ALTCHOICE

## 2019-02-07 ASSESSMENT — PATIENT HEALTH QUESTIONNAIRE - PHQ9
2. FEELING DOWN, DEPRESSED OR HOPELESS: 0
SUM OF ALL RESPONSES TO PHQ QUESTIONS 1-9: 0
SUM OF ALL RESPONSES TO PHQ QUESTIONS 1-9: 0
1. LITTLE INTEREST OR PLEASURE IN DOING THINGS: 0
SUM OF ALL RESPONSES TO PHQ9 QUESTIONS 1 & 2: 0

## 2019-02-07 ASSESSMENT — ENCOUNTER SYMPTOMS
EYE PAIN: 0
EYE DISCHARGE: 0
EYE ITCHING: 0

## 2019-02-15 ENCOUNTER — OFFICE VISIT (OUTPATIENT)
Dept: UROLOGY | Age: 84
End: 2019-02-15
Payer: MEDICARE

## 2019-02-15 ENCOUNTER — TELEPHONE (OUTPATIENT)
Dept: UROLOGY | Age: 84
End: 2019-02-15

## 2019-02-15 VITALS
HEIGHT: 63 IN | WEIGHT: 172 LBS | SYSTOLIC BLOOD PRESSURE: 136 MMHG | DIASTOLIC BLOOD PRESSURE: 84 MMHG | BODY MASS INDEX: 30.48 KG/M2 | HEART RATE: 77 BPM

## 2019-02-15 DIAGNOSIS — R35.0 FREQUENCY OF MICTURITION: ICD-10-CM

## 2019-02-15 DIAGNOSIS — N39.0 RECURRENT UTI: Primary | ICD-10-CM

## 2019-02-15 DIAGNOSIS — N39.0 URINARY TRACT INFECTION WITHOUT HEMATURIA, SITE UNSPECIFIED: Primary | ICD-10-CM

## 2019-02-15 LAB
BACTERIA: ABNORMAL /HPF
BILIRUBIN URINE: NEGATIVE
BILIRUBIN, POC: ABNORMAL
BLOOD URINE, POC: ABNORMAL
BLOOD, URINE: NEGATIVE
CLARITY, POC: ABNORMAL
CLARITY: CLEAR
COLOR, POC: YELLOW
COLOR: YELLOW
EPITHELIAL CELLS, UA: ABNORMAL /HPF (ref 0–5)
GLUCOSE URINE, POC: ABNORMAL
GLUCOSE URINE: NEGATIVE MG/DL
HYALINE CASTS: ABNORMAL /HPF (ref 0–5)
KETONES, POC: ABNORMAL
KETONES, URINE: NEGATIVE MG/DL
LEUKOCYTE EST, POC: ABNORMAL
LEUKOCYTE ESTERASE, URINE: ABNORMAL
NITRITE, POC: ABNORMAL
NITRITE, URINE: NEGATIVE
PH UA: 6 (ref 5–9)
PH, POC: 6
PROTEIN UA: >=300 MG/DL
PROTEIN, POC: >300
RBC UA: ABNORMAL /HPF (ref 0–5)
SPECIFIC GRAVITY UA: 1.02 (ref 1–1.03)
SPECIFIC GRAVITY, POC: 1.02
UROBILINOGEN, POC: 0.2
UROBILINOGEN, URINE: 0.2 E.U./DL
WBC UA: >100 /HPF (ref 0–5)

## 2019-02-15 PROCEDURE — 1111F DSCHRG MED/CURRENT MED MERGE: CPT | Performed by: UROLOGY

## 2019-02-15 PROCEDURE — G8482 FLU IMMUNIZE ORDER/ADMIN: HCPCS | Performed by: UROLOGY

## 2019-02-15 PROCEDURE — 99213 OFFICE O/P EST LOW 20 MIN: CPT | Performed by: UROLOGY

## 2019-02-15 PROCEDURE — 51798 US URINE CAPACITY MEASURE: CPT | Performed by: UROLOGY

## 2019-02-15 PROCEDURE — 4040F PNEUMOC VAC/ADMIN/RCVD: CPT | Performed by: UROLOGY

## 2019-02-15 PROCEDURE — G8427 DOCREV CUR MEDS BY ELIG CLIN: HCPCS | Performed by: UROLOGY

## 2019-02-15 PROCEDURE — 1101F PT FALLS ASSESS-DOCD LE1/YR: CPT | Performed by: UROLOGY

## 2019-02-15 PROCEDURE — G8598 ASA/ANTIPLAT THER USED: HCPCS | Performed by: UROLOGY

## 2019-02-15 PROCEDURE — 1123F ACP DISCUSS/DSCN MKR DOCD: CPT | Performed by: UROLOGY

## 2019-02-15 PROCEDURE — 1090F PRES/ABSN URINE INCON ASSESS: CPT | Performed by: UROLOGY

## 2019-02-15 PROCEDURE — G8417 CALC BMI ABV UP PARAM F/U: HCPCS | Performed by: UROLOGY

## 2019-02-15 PROCEDURE — 1036F TOBACCO NON-USER: CPT | Performed by: UROLOGY

## 2019-02-15 PROCEDURE — 81003 URINALYSIS AUTO W/O SCOPE: CPT | Performed by: UROLOGY

## 2019-02-15 ASSESSMENT — ENCOUNTER SYMPTOMS: ABDOMINAL PAIN: 0

## 2019-02-18 LAB
ORGANISM: ABNORMAL
URINE CULTURE, ROUTINE: ABNORMAL
URINE CULTURE, ROUTINE: ABNORMAL

## 2019-02-19 ENCOUNTER — OFFICE VISIT (OUTPATIENT)
Dept: CARDIOLOGY CLINIC | Age: 84
End: 2019-02-19
Payer: MEDICARE

## 2019-02-19 VITALS
HEART RATE: 68 BPM | RESPIRATION RATE: 12 BRPM | WEIGHT: 172 LBS | DIASTOLIC BLOOD PRESSURE: 80 MMHG | HEIGHT: 63 IN | BODY MASS INDEX: 30.48 KG/M2 | SYSTOLIC BLOOD PRESSURE: 124 MMHG

## 2019-02-19 DIAGNOSIS — Z86.73 HISTORY OF CVA (CEREBROVASCULAR ACCIDENT): ICD-10-CM

## 2019-02-19 DIAGNOSIS — I38 VALVULAR HEART DISEASE: Primary | ICD-10-CM

## 2019-02-19 DIAGNOSIS — I51.89 DIASTOLIC DYSFUNCTION: ICD-10-CM

## 2019-02-19 DIAGNOSIS — Z98.61 S/P PTCA (PERCUTANEOUS TRANSLUMINAL CORONARY ANGIOPLASTY): ICD-10-CM

## 2019-02-19 DIAGNOSIS — N18.30 STAGE 3 CHRONIC KIDNEY DISEASE (HCC): ICD-10-CM

## 2019-02-19 PROCEDURE — 1111F DSCHRG MED/CURRENT MED MERGE: CPT | Performed by: INTERNAL MEDICINE

## 2019-02-19 PROCEDURE — 1101F PT FALLS ASSESS-DOCD LE1/YR: CPT | Performed by: INTERNAL MEDICINE

## 2019-02-19 PROCEDURE — 1090F PRES/ABSN URINE INCON ASSESS: CPT | Performed by: INTERNAL MEDICINE

## 2019-02-19 PROCEDURE — G8482 FLU IMMUNIZE ORDER/ADMIN: HCPCS | Performed by: INTERNAL MEDICINE

## 2019-02-19 PROCEDURE — G8427 DOCREV CUR MEDS BY ELIG CLIN: HCPCS | Performed by: INTERNAL MEDICINE

## 2019-02-19 PROCEDURE — 1036F TOBACCO NON-USER: CPT | Performed by: INTERNAL MEDICINE

## 2019-02-19 PROCEDURE — 1123F ACP DISCUSS/DSCN MKR DOCD: CPT | Performed by: INTERNAL MEDICINE

## 2019-02-19 PROCEDURE — G8598 ASA/ANTIPLAT THER USED: HCPCS | Performed by: INTERNAL MEDICINE

## 2019-02-19 PROCEDURE — 4040F PNEUMOC VAC/ADMIN/RCVD: CPT | Performed by: INTERNAL MEDICINE

## 2019-02-19 PROCEDURE — 99214 OFFICE O/P EST MOD 30 MIN: CPT | Performed by: INTERNAL MEDICINE

## 2019-02-19 PROCEDURE — G8417 CALC BMI ABV UP PARAM F/U: HCPCS | Performed by: INTERNAL MEDICINE

## 2019-02-19 ASSESSMENT — ENCOUNTER SYMPTOMS
SHORTNESS OF BREATH: 0
ABDOMINAL DISTENTION: 0
WHEEZING: 0
APNEA: 0
COLOR CHANGE: 0
NAUSEA: 0
VOMITING: 0
COUGH: 0
VOICE CHANGE: 0
ABDOMINAL PAIN: 0
FACIAL SWELLING: 0
TROUBLE SWALLOWING: 0
BLOOD IN STOOL: 0
ANAL BLEEDING: 0
CHEST TIGHTNESS: 0
DIARRHEA: 0

## 2019-02-21 ENCOUNTER — OFFICE VISIT (OUTPATIENT)
Dept: INFECTIOUS DISEASES | Age: 84
End: 2019-02-21
Payer: MEDICARE

## 2019-02-21 VITALS
SYSTOLIC BLOOD PRESSURE: 152 MMHG | WEIGHT: 174.6 LBS | HEART RATE: 64 BPM | BODY MASS INDEX: 30.94 KG/M2 | RESPIRATION RATE: 22 BRPM | TEMPERATURE: 98.3 F | DIASTOLIC BLOOD PRESSURE: 90 MMHG | HEIGHT: 63 IN

## 2019-02-21 DIAGNOSIS — N39.0 RECURRENT UTI (URINARY TRACT INFECTION): Primary | ICD-10-CM

## 2019-02-21 PROCEDURE — 99203 OFFICE O/P NEW LOW 30 MIN: CPT | Performed by: INTERNAL MEDICINE

## 2019-02-21 PROCEDURE — G8598 ASA/ANTIPLAT THER USED: HCPCS | Performed by: INTERNAL MEDICINE

## 2019-02-21 PROCEDURE — 4040F PNEUMOC VAC/ADMIN/RCVD: CPT | Performed by: INTERNAL MEDICINE

## 2019-02-21 PROCEDURE — G8482 FLU IMMUNIZE ORDER/ADMIN: HCPCS | Performed by: INTERNAL MEDICINE

## 2019-02-21 PROCEDURE — 1111F DSCHRG MED/CURRENT MED MERGE: CPT | Performed by: INTERNAL MEDICINE

## 2019-02-21 PROCEDURE — 1101F PT FALLS ASSESS-DOCD LE1/YR: CPT | Performed by: INTERNAL MEDICINE

## 2019-02-21 PROCEDURE — 1123F ACP DISCUSS/DSCN MKR DOCD: CPT | Performed by: INTERNAL MEDICINE

## 2019-02-21 PROCEDURE — G8417 CALC BMI ABV UP PARAM F/U: HCPCS | Performed by: INTERNAL MEDICINE

## 2019-02-21 PROCEDURE — G8427 DOCREV CUR MEDS BY ELIG CLIN: HCPCS | Performed by: INTERNAL MEDICINE

## 2019-02-21 PROCEDURE — 1036F TOBACCO NON-USER: CPT | Performed by: INTERNAL MEDICINE

## 2019-02-21 PROCEDURE — 1090F PRES/ABSN URINE INCON ASSESS: CPT | Performed by: INTERNAL MEDICINE

## 2019-02-21 RX ORDER — NITROFURANTOIN MACROCRYSTALS 100 MG/1
100 CAPSULE ORAL 4 TIMES DAILY
Qty: 120 CAPSULE | Refills: 5 | Status: SHIPPED | OUTPATIENT
Start: 2019-02-21 | End: 2019-02-26 | Stop reason: SDUPTHER

## 2019-02-21 ASSESSMENT — ENCOUNTER SYMPTOMS
RESPIRATORY NEGATIVE: 1
GASTROINTESTINAL NEGATIVE: 1

## 2019-02-26 ENCOUNTER — TELEPHONE (OUTPATIENT)
Dept: INFECTIOUS DISEASES | Age: 84
End: 2019-02-26

## 2019-02-26 RX ORDER — NITROFURANTOIN MACROCRYSTALS 100 MG/1
100 CAPSULE ORAL DAILY
Qty: 30 CAPSULE | Refills: 5 | Status: SHIPPED | OUTPATIENT
Start: 2019-02-26 | End: 2019-03-28

## 2019-03-04 PROBLEM — N39.0 UTI (URINARY TRACT INFECTION): Status: RESOLVED | Noted: 2019-02-02 | Resolved: 2019-03-04

## 2019-03-14 ENCOUNTER — APPOINTMENT (OUTPATIENT)
Dept: MRI IMAGING | Age: 84
DRG: 069 | End: 2019-03-14
Payer: MEDICARE

## 2019-03-14 ENCOUNTER — HOSPITAL ENCOUNTER (INPATIENT)
Age: 84
LOS: 7 days | Discharge: HOME | DRG: 069 | End: 2019-03-21
Attending: EMERGENCY MEDICINE | Admitting: INTERNAL MEDICINE
Payer: MEDICARE

## 2019-03-14 ENCOUNTER — APPOINTMENT (OUTPATIENT)
Dept: GENERAL RADIOLOGY | Age: 84
DRG: 069 | End: 2019-03-14
Payer: MEDICARE

## 2019-03-14 ENCOUNTER — APPOINTMENT (OUTPATIENT)
Dept: CT IMAGING | Age: 84
DRG: 069 | End: 2019-03-14
Payer: MEDICARE

## 2019-03-14 DIAGNOSIS — G45.9 TRANSIENT CEREBRAL ISCHEMIA, UNSPECIFIED TYPE: Primary | ICD-10-CM

## 2019-03-14 LAB
ALBUMIN SERPL-MCNC: 4 G/DL (ref 3.5–4.6)
ALP BLD-CCNC: 64 U/L (ref 40–130)
ALT SERPL-CCNC: 28 U/L (ref 0–33)
ANION GAP SERPL CALCULATED.3IONS-SCNC: 13 MEQ/L (ref 9–15)
APTT: 32 SEC (ref 21.6–35.4)
AST SERPL-CCNC: 25 U/L (ref 0–35)
BACTERIA: NEGATIVE /HPF
BASOPHILS ABSOLUTE: 0.1 K/UL (ref 0–0.2)
BASOPHILS RELATIVE PERCENT: 0.7 %
BILIRUB SERPL-MCNC: 0.3 MG/DL (ref 0.2–0.7)
BILIRUBIN URINE: NEGATIVE
BLOOD, URINE: NEGATIVE
BUN BLDV-MCNC: 23 MG/DL (ref 8–23)
CALCIUM SERPL-MCNC: 9.4 MG/DL (ref 8.5–9.9)
CHLORIDE BLD-SCNC: 95 MEQ/L (ref 95–107)
CLARITY: CLEAR
CO2: 23 MEQ/L (ref 20–31)
COLOR: YELLOW
CREAT SERPL-MCNC: 0.65 MG/DL (ref 0.5–0.9)
EKG ATRIAL RATE: 53 BPM
EKG P AXIS: -7 DEGREES
EKG P-R INTERVAL: 172 MS
EKG Q-T INTERVAL: 466 MS
EKG QRS DURATION: 92 MS
EKG QTC CALCULATION (BAZETT): 437 MS
EKG R AXIS: -33 DEGREES
EKG T AXIS: 141 DEGREES
EKG VENTRICULAR RATE: 53 BPM
EOSINOPHILS ABSOLUTE: 0.3 K/UL (ref 0–0.7)
EOSINOPHILS RELATIVE PERCENT: 3.4 %
EPITHELIAL CELLS, UA: ABNORMAL /HPF (ref 0–5)
GFR AFRICAN AMERICAN: >60
GFR NON-AFRICAN AMERICAN: >60
GLOBULIN: 3 G/DL (ref 2.3–3.5)
GLUCOSE BLD-MCNC: 191 MG/DL (ref 60–115)
GLUCOSE BLD-MCNC: 194 MG/DL (ref 70–99)
GLUCOSE BLD-MCNC: 242 MG/DL (ref 60–115)
GLUCOSE URINE: NEGATIVE MG/DL
HCT VFR BLD CALC: 38.5 % (ref 37–47)
HEMOGLOBIN: 12.3 G/DL (ref 12–16)
HYALINE CASTS: ABNORMAL /HPF (ref 0–5)
INR BLD: 1
KETONES, URINE: NEGATIVE MG/DL
LEUKOCYTE ESTERASE, URINE: NEGATIVE
LYMPHOCYTES ABSOLUTE: 2.2 K/UL (ref 1–4.8)
LYMPHOCYTES RELATIVE PERCENT: 21.5 %
MCH RBC QN AUTO: 28.3 PG (ref 27–31.3)
MCHC RBC AUTO-ENTMCNC: 31.8 % (ref 33–37)
MCV RBC AUTO: 88.9 FL (ref 82–100)
MONOCYTES ABSOLUTE: 0.7 K/UL (ref 0.2–0.8)
MONOCYTES RELATIVE PERCENT: 7.2 %
NEUTROPHILS ABSOLUTE: 6.8 K/UL (ref 1.4–6.5)
NEUTROPHILS RELATIVE PERCENT: 67.2 %
NITRITE, URINE: NEGATIVE
PDW BLD-RTO: 15.7 % (ref 11.5–14.5)
PERFORMED ON: ABNORMAL
PERFORMED ON: ABNORMAL
PH UA: 7 (ref 5–9)
PLATELET # BLD: 270 K/UL (ref 130–400)
POTASSIUM SERPL-SCNC: 4.5 MEQ/L (ref 3.4–4.9)
PROTEIN UA: 100 MG/DL
PROTHROMBIN TIME: 10.4 SEC (ref 9–11.5)
RBC # BLD: 4.33 M/UL (ref 4.2–5.4)
RBC UA: ABNORMAL /HPF (ref 0–5)
SODIUM BLD-SCNC: 131 MEQ/L (ref 135–144)
SPECIFIC GRAVITY UA: 1.01 (ref 1–1.03)
TOTAL PROTEIN: 7 G/DL (ref 6.3–8)
TROPONIN: <0.01 NG/ML (ref 0–0.01)
URINE REFLEX TO CULTURE: YES
UROBILINOGEN, URINE: 0.2 E.U./DL
WBC # BLD: 10.1 K/UL (ref 4.8–10.8)
WBC UA: ABNORMAL /HPF (ref 0–5)

## 2019-03-14 PROCEDURE — 70450 CT HEAD/BRAIN W/O DYE: CPT

## 2019-03-14 PROCEDURE — 84484 ASSAY OF TROPONIN QUANT: CPT

## 2019-03-14 PROCEDURE — 99285 EMERGENCY DEPT VISIT HI MDM: CPT

## 2019-03-14 PROCEDURE — 80053 COMPREHEN METABOLIC PANEL: CPT

## 2019-03-14 PROCEDURE — 70544 MR ANGIOGRAPHY HEAD W/O DYE: CPT

## 2019-03-14 PROCEDURE — 85610 PROTHROMBIN TIME: CPT

## 2019-03-14 PROCEDURE — 6370000000 HC RX 637 (ALT 250 FOR IP): Performed by: NURSE PRACTITIONER

## 2019-03-14 PROCEDURE — 36415 COLL VENOUS BLD VENIPUNCTURE: CPT

## 2019-03-14 PROCEDURE — 93005 ELECTROCARDIOGRAM TRACING: CPT

## 2019-03-14 PROCEDURE — 85730 THROMBOPLASTIN TIME PARTIAL: CPT

## 2019-03-14 PROCEDURE — 6360000002 HC RX W HCPCS: Performed by: EMERGENCY MEDICINE

## 2019-03-14 PROCEDURE — 87086 URINE CULTURE/COLONY COUNT: CPT

## 2019-03-14 PROCEDURE — 1210000000 HC MED SURG R&B

## 2019-03-14 PROCEDURE — 70551 MRI BRAIN STEM W/O DYE: CPT

## 2019-03-14 PROCEDURE — 71045 X-RAY EXAM CHEST 1 VIEW: CPT

## 2019-03-14 PROCEDURE — 81001 URINALYSIS AUTO W/SCOPE: CPT

## 2019-03-14 PROCEDURE — 85025 COMPLETE CBC W/AUTO DIFF WBC: CPT

## 2019-03-14 RX ORDER — SODIUM CHLORIDE 0.9 % (FLUSH) 0.9 %
10 SYRINGE (ML) INJECTION PRN
Status: DISCONTINUED | OUTPATIENT
Start: 2019-03-14 | End: 2019-03-22 | Stop reason: HOSPADM

## 2019-03-14 RX ORDER — NICOTINE POLACRILEX 4 MG
15 LOZENGE BUCCAL PRN
Status: DISCONTINUED | OUTPATIENT
Start: 2019-03-14 | End: 2019-03-22 | Stop reason: HOSPADM

## 2019-03-14 RX ORDER — ONDANSETRON 2 MG/ML
4 INJECTION INTRAMUSCULAR; INTRAVENOUS EVERY 6 HOURS PRN
Status: DISCONTINUED | OUTPATIENT
Start: 2019-03-14 | End: 2019-03-22 | Stop reason: HOSPADM

## 2019-03-14 RX ORDER — ATORVASTATIN CALCIUM 10 MG/1
10 TABLET, FILM COATED ORAL DAILY
Status: DISCONTINUED | OUTPATIENT
Start: 2019-03-14 | End: 2019-03-22 | Stop reason: HOSPADM

## 2019-03-14 RX ORDER — METOPROLOL TARTRATE 50 MG/1
100 TABLET, FILM COATED ORAL 2 TIMES DAILY
Status: DISCONTINUED | OUTPATIENT
Start: 2019-03-14 | End: 2019-03-16

## 2019-03-14 RX ORDER — HEPARIN SODIUM 1000 [USP'U]/ML
30 INJECTION, SOLUTION INTRAVENOUS; SUBCUTANEOUS PRN
Status: DISCONTINUED | OUTPATIENT
Start: 2019-03-14 | End: 2019-03-14

## 2019-03-14 RX ORDER — HEPARIN SODIUM 10000 [USP'U]/100ML
12 INJECTION, SOLUTION INTRAVENOUS CONTINUOUS
Status: DISCONTINUED | OUTPATIENT
Start: 2019-03-14 | End: 2019-03-18

## 2019-03-14 RX ORDER — DEXTROSE MONOHYDRATE 50 MG/ML
100 INJECTION, SOLUTION INTRAVENOUS PRN
Status: DISCONTINUED | OUTPATIENT
Start: 2019-03-14 | End: 2019-03-22 | Stop reason: HOSPADM

## 2019-03-14 RX ORDER — DEXTROSE MONOHYDRATE 25 G/50ML
12.5 INJECTION, SOLUTION INTRAVENOUS PRN
Status: DISCONTINUED | OUTPATIENT
Start: 2019-03-14 | End: 2019-03-22 | Stop reason: HOSPADM

## 2019-03-14 RX ORDER — LISINOPRIL 20 MG/1
40 TABLET ORAL DAILY
Status: DISCONTINUED | OUTPATIENT
Start: 2019-03-15 | End: 2019-03-16

## 2019-03-14 RX ORDER — NITROFURANTOIN MACROCRYSTALS 50 MG/1
100 CAPSULE ORAL DAILY
Status: DISCONTINUED | OUTPATIENT
Start: 2019-03-14 | End: 2019-03-15 | Stop reason: DRUGHIGH

## 2019-03-14 RX ORDER — SODIUM CHLORIDE 0.9 % (FLUSH) 0.9 %
10 SYRINGE (ML) INJECTION EVERY 12 HOURS SCHEDULED
Status: DISCONTINUED | OUTPATIENT
Start: 2019-03-14 | End: 2019-03-22 | Stop reason: HOSPADM

## 2019-03-14 RX ORDER — HEPARIN SODIUM 1000 [USP'U]/ML
60 INJECTION, SOLUTION INTRAVENOUS; SUBCUTANEOUS ONCE
Status: DISCONTINUED | OUTPATIENT
Start: 2019-03-14 | End: 2019-03-14

## 2019-03-14 RX ORDER — ASPIRIN 81 MG/1
81 TABLET ORAL DAILY
Status: DISCONTINUED | OUTPATIENT
Start: 2019-03-14 | End: 2019-03-19

## 2019-03-14 RX ADMIN — NITROFURANTOIN MACROCRYSTALS 100 MG: 50 CAPSULE ORAL at 21:58

## 2019-03-14 RX ADMIN — ATORVASTATIN CALCIUM 10 MG: 10 TABLET, FILM COATED ORAL at 19:10

## 2019-03-14 RX ADMIN — HEPARIN SODIUM AND DEXTROSE 12 UNITS/KG/HR: 10000; 5 INJECTION INTRAVENOUS at 14:49

## 2019-03-14 RX ADMIN — VITAMIN D, TAB 1000IU (100/BT) 2000 UNITS: 25 TAB at 19:10

## 2019-03-14 RX ADMIN — ASPIRIN 81 MG: 81 TABLET, COATED ORAL at 19:10

## 2019-03-14 RX ADMIN — METOPROLOL TARTRATE 100 MG: 50 TABLET ORAL at 21:22

## 2019-03-14 ASSESSMENT — PAIN SCALES - GENERAL
PAINLEVEL_OUTOF10: 0

## 2019-03-14 ASSESSMENT — ENCOUNTER SYMPTOMS
SHORTNESS OF BREATH: 0
EYE PAIN: 0
CHEST TIGHTNESS: 0
NAUSEA: 0
SORE THROAT: 0
VOMITING: 0
ABDOMINAL PAIN: 0

## 2019-03-15 LAB
ANION GAP SERPL CALCULATED.3IONS-SCNC: 15 MEQ/L (ref 9–15)
APTT: 37.1 SEC (ref 21.6–35.4)
APTT: 43.9 SEC (ref 21.6–35.4)
APTT: 45.7 SEC (ref 21.6–35.4)
BUN BLDV-MCNC: 18 MG/DL (ref 8–23)
CALCIUM SERPL-MCNC: 9.4 MG/DL (ref 8.5–9.9)
CHLORIDE BLD-SCNC: 98 MEQ/L (ref 95–107)
CHOLESTEROL, TOTAL: 122 MG/DL (ref 0–199)
CO2: 25 MEQ/L (ref 20–31)
CREAT SERPL-MCNC: 0.81 MG/DL (ref 0.5–0.9)
FOLATE: 13.4 NG/ML (ref 7.3–26.1)
GFR AFRICAN AMERICAN: >60
GFR NON-AFRICAN AMERICAN: >60
GLUCOSE BLD-MCNC: 170 MG/DL (ref 60–115)
GLUCOSE BLD-MCNC: 173 MG/DL (ref 70–99)
GLUCOSE BLD-MCNC: 180 MG/DL (ref 60–115)
GLUCOSE BLD-MCNC: 195 MG/DL (ref 60–115)
GLUCOSE BLD-MCNC: 223 MG/DL (ref 60–115)
HCT VFR BLD CALC: 33.3 % (ref 37–47)
HDLC SERPL-MCNC: 48 MG/DL (ref 40–59)
HEMOGLOBIN: 11 G/DL (ref 12–16)
LDL CHOLESTEROL CALCULATED: 54 MG/DL (ref 0–129)
LV EF: 20 %
LVEF MODALITY: NORMAL
MAGNESIUM: 1.5 MG/DL (ref 1.7–2.4)
MCH RBC QN AUTO: 28.9 PG (ref 27–31.3)
MCHC RBC AUTO-ENTMCNC: 33.1 % (ref 33–37)
MCV RBC AUTO: 87.3 FL (ref 82–100)
PDW BLD-RTO: 15.6 % (ref 11.5–14.5)
PERFORMED ON: ABNORMAL
PLATELET # BLD: 227 K/UL (ref 130–400)
POTASSIUM REFLEX MAGNESIUM: 3.3 MEQ/L (ref 3.4–4.9)
RBC # BLD: 3.81 M/UL (ref 4.2–5.4)
SODIUM BLD-SCNC: 138 MEQ/L (ref 135–144)
TRIGL SERPL-MCNC: 98 MG/DL (ref 0–150)
TSH SERPL DL<=0.05 MIU/L-ACNC: 1.81 UIU/ML (ref 0.44–3.86)
VITAMIN B-12: 541 PG/ML (ref 232–1245)
VITAMIN D 25-HYDROXY: 26.1 NG/ML (ref 30–100)
WBC # BLD: 8.1 K/UL (ref 4.8–10.8)

## 2019-03-15 PROCEDURE — G8988 SELF CARE GOAL STATUS: HCPCS

## 2019-03-15 PROCEDURE — 85027 COMPLETE CBC AUTOMATED: CPT

## 2019-03-15 PROCEDURE — 6360000002 HC RX W HCPCS: Performed by: INTERNAL MEDICINE

## 2019-03-15 PROCEDURE — G8979 MOBILITY GOAL STATUS: HCPCS

## 2019-03-15 PROCEDURE — 97162 PT EVAL MOD COMPLEX 30 MIN: CPT

## 2019-03-15 PROCEDURE — 1210000000 HC MED SURG R&B

## 2019-03-15 PROCEDURE — 80048 BASIC METABOLIC PNL TOTAL CA: CPT

## 2019-03-15 PROCEDURE — 93306 TTE W/DOPPLER COMPLETE: CPT

## 2019-03-15 PROCEDURE — 84443 ASSAY THYROID STIM HORMONE: CPT

## 2019-03-15 PROCEDURE — 83735 ASSAY OF MAGNESIUM: CPT

## 2019-03-15 PROCEDURE — G8987 SELF CARE CURRENT STATUS: HCPCS

## 2019-03-15 PROCEDURE — 82746 ASSAY OF FOLIC ACID SERUM: CPT

## 2019-03-15 PROCEDURE — 2580000003 HC RX 258: Performed by: NURSE PRACTITIONER

## 2019-03-15 PROCEDURE — 6370000000 HC RX 637 (ALT 250 FOR IP): Performed by: NURSE PRACTITIONER

## 2019-03-15 PROCEDURE — 36415 COLL VENOUS BLD VENIPUNCTURE: CPT

## 2019-03-15 PROCEDURE — 6360000002 HC RX W HCPCS: Performed by: NURSE PRACTITIONER

## 2019-03-15 PROCEDURE — 6370000000 HC RX 637 (ALT 250 FOR IP): Performed by: INTERNAL MEDICINE

## 2019-03-15 PROCEDURE — 80061 LIPID PANEL: CPT

## 2019-03-15 PROCEDURE — G8978 MOBILITY CURRENT STATUS: HCPCS

## 2019-03-15 PROCEDURE — 93010 ELECTROCARDIOGRAM REPORT: CPT | Performed by: INTERNAL MEDICINE

## 2019-03-15 PROCEDURE — 97166 OT EVAL MOD COMPLEX 45 MIN: CPT

## 2019-03-15 PROCEDURE — 82607 VITAMIN B-12: CPT

## 2019-03-15 PROCEDURE — 85730 THROMBOPLASTIN TIME PARTIAL: CPT

## 2019-03-15 PROCEDURE — 82306 VITAMIN D 25 HYDROXY: CPT

## 2019-03-15 PROCEDURE — 99222 1ST HOSP IP/OBS MODERATE 55: CPT | Performed by: INTERNAL MEDICINE

## 2019-03-15 PROCEDURE — 95816 EEG AWAKE AND DROWSY: CPT

## 2019-03-15 RX ORDER — NITROFURANTOIN 25; 75 MG/1; MG/1
100 CAPSULE ORAL DAILY
Status: DISCONTINUED | OUTPATIENT
Start: 2019-03-15 | End: 2019-03-22 | Stop reason: HOSPADM

## 2019-03-15 RX ORDER — POTASSIUM CHLORIDE 20 MEQ/1
40 TABLET, EXTENDED RELEASE ORAL ONCE
Status: COMPLETED | OUTPATIENT
Start: 2019-03-15 | End: 2019-03-15

## 2019-03-15 RX ORDER — HYDRALAZINE HYDROCHLORIDE 20 MG/ML
10 INJECTION INTRAMUSCULAR; INTRAVENOUS EVERY 4 HOURS PRN
Status: DISCONTINUED | OUTPATIENT
Start: 2019-03-15 | End: 2019-03-22 | Stop reason: HOSPADM

## 2019-03-15 RX ADMIN — NITROFURANTOIN MACROCRYSTALS 100 MG: 50 CAPSULE ORAL at 12:56

## 2019-03-15 RX ADMIN — METOPROLOL TARTRATE 100 MG: 50 TABLET ORAL at 09:42

## 2019-03-15 RX ADMIN — Medication 10 ML: at 20:15

## 2019-03-15 RX ADMIN — NITROFURANTOIN MONOHYDRATE/MACROCRYSTALLINE 100 MG: 25; 75 CAPSULE ORAL at 20:15

## 2019-03-15 RX ADMIN — HEPARIN SODIUM AND DEXTROSE 14.01 UNITS/KG/HR: 10000; 5 INJECTION INTRAVENOUS at 16:26

## 2019-03-15 RX ADMIN — INSULIN LISPRO 2 UNITS: 100 INJECTION, SOLUTION INTRAVENOUS; SUBCUTANEOUS at 09:56

## 2019-03-15 RX ADMIN — INSULIN LISPRO 4 UNITS: 100 INJECTION, SOLUTION INTRAVENOUS; SUBCUTANEOUS at 13:36

## 2019-03-15 RX ADMIN — ASPIRIN 81 MG: 81 TABLET, COATED ORAL at 09:42

## 2019-03-15 RX ADMIN — METOPROLOL TARTRATE 100 MG: 50 TABLET ORAL at 20:15

## 2019-03-15 RX ADMIN — POTASSIUM CHLORIDE 40 MEQ: 1500 TABLET, EXTENDED RELEASE ORAL at 16:27

## 2019-03-15 RX ADMIN — HYDRALAZINE HYDROCHLORIDE 10 MG: 20 INJECTION INTRAMUSCULAR; INTRAVENOUS at 05:14

## 2019-03-15 RX ADMIN — METFORMIN HYDROCHLORIDE 1000 MG: 500 TABLET ORAL at 18:23

## 2019-03-15 RX ADMIN — VITAMIN D, TAB 1000IU (100/BT) 2000 UNITS: 25 TAB at 09:42

## 2019-03-15 RX ADMIN — LISINOPRIL 40 MG: 20 TABLET ORAL at 09:42

## 2019-03-15 RX ADMIN — INSULIN LISPRO 2 UNITS: 100 INJECTION, SOLUTION INTRAVENOUS; SUBCUTANEOUS at 18:22

## 2019-03-15 RX ADMIN — ATORVASTATIN CALCIUM 10 MG: 10 TABLET, FILM COATED ORAL at 09:42

## 2019-03-15 ASSESSMENT — PAIN SCALES - GENERAL
PAINLEVEL_OUTOF10: 0

## 2019-03-16 LAB
ANION GAP SERPL CALCULATED.3IONS-SCNC: 15 MEQ/L (ref 9–15)
APTT: 50.6 SEC (ref 21.6–35.4)
BUN BLDV-MCNC: 16 MG/DL (ref 8–23)
CALCIUM SERPL-MCNC: 9 MG/DL (ref 8.5–9.9)
CHLORIDE BLD-SCNC: 104 MEQ/L (ref 95–107)
CO2: 23 MEQ/L (ref 20–31)
CREAT SERPL-MCNC: 0.87 MG/DL (ref 0.5–0.9)
GFR AFRICAN AMERICAN: >60
GFR NON-AFRICAN AMERICAN: >60
GLUCOSE BLD-MCNC: 142 MG/DL (ref 60–115)
GLUCOSE BLD-MCNC: 171 MG/DL (ref 70–99)
GLUCOSE BLD-MCNC: 192 MG/DL (ref 60–115)
GLUCOSE BLD-MCNC: 212 MG/DL (ref 60–115)
GLUCOSE BLD-MCNC: 263 MG/DL (ref 60–115)
HCT VFR BLD CALC: 34.7 % (ref 37–47)
HEMOGLOBIN: 11.4 G/DL (ref 12–16)
MAGNESIUM: 1.5 MG/DL (ref 1.7–2.4)
MCH RBC QN AUTO: 29 PG (ref 27–31.3)
MCHC RBC AUTO-ENTMCNC: 32.8 % (ref 33–37)
MCV RBC AUTO: 88.5 FL (ref 82–100)
PDW BLD-RTO: 15.7 % (ref 11.5–14.5)
PERFORMED ON: ABNORMAL
PLATELET # BLD: 224 K/UL (ref 130–400)
POTASSIUM REFLEX MAGNESIUM: 3.5 MEQ/L (ref 3.4–4.9)
RBC # BLD: 3.92 M/UL (ref 4.2–5.4)
SODIUM BLD-SCNC: 142 MEQ/L (ref 135–144)
URINE CULTURE, ROUTINE: NORMAL
WBC # BLD: 9.4 K/UL (ref 4.8–10.8)

## 2019-03-16 PROCEDURE — 1210000000 HC MED SURG R&B

## 2019-03-16 PROCEDURE — 6370000000 HC RX 637 (ALT 250 FOR IP): Performed by: NURSE PRACTITIONER

## 2019-03-16 PROCEDURE — 85730 THROMBOPLASTIN TIME PARTIAL: CPT

## 2019-03-16 PROCEDURE — 99232 SBSQ HOSP IP/OBS MODERATE 35: CPT | Performed by: INTERNAL MEDICINE

## 2019-03-16 PROCEDURE — 6370000000 HC RX 637 (ALT 250 FOR IP): Performed by: INTERNAL MEDICINE

## 2019-03-16 PROCEDURE — 36415 COLL VENOUS BLD VENIPUNCTURE: CPT

## 2019-03-16 PROCEDURE — 6360000002 HC RX W HCPCS: Performed by: INTERNAL MEDICINE

## 2019-03-16 PROCEDURE — 6360000002 HC RX W HCPCS: Performed by: NURSE PRACTITIONER

## 2019-03-16 PROCEDURE — 85027 COMPLETE CBC AUTOMATED: CPT

## 2019-03-16 PROCEDURE — 83735 ASSAY OF MAGNESIUM: CPT

## 2019-03-16 PROCEDURE — 80048 BASIC METABOLIC PNL TOTAL CA: CPT

## 2019-03-16 PROCEDURE — APPNB30 APP NON BILLABLE TIME 0-30 MINS: Performed by: NURSE PRACTITIONER

## 2019-03-16 PROCEDURE — 2580000003 HC RX 258: Performed by: NURSE PRACTITIONER

## 2019-03-16 RX ORDER — CARVEDILOL 12.5 MG/1
12.5 TABLET ORAL 2 TIMES DAILY WITH MEALS
Status: DISCONTINUED | OUTPATIENT
Start: 2019-03-16 | End: 2019-03-22 | Stop reason: HOSPADM

## 2019-03-16 RX ORDER — MAGNESIUM SULFATE IN WATER 40 MG/ML
2 INJECTION, SOLUTION INTRAVENOUS ONCE
Status: COMPLETED | OUTPATIENT
Start: 2019-03-16 | End: 2019-03-17

## 2019-03-16 RX ORDER — HYDRALAZINE HYDROCHLORIDE 25 MG/1
25 TABLET, FILM COATED ORAL EVERY 8 HOURS SCHEDULED
Status: DISCONTINUED | OUTPATIENT
Start: 2019-03-16 | End: 2019-03-19

## 2019-03-16 RX ORDER — POTASSIUM CHLORIDE 20 MEQ/1
20 TABLET, EXTENDED RELEASE ORAL ONCE
Status: COMPLETED | OUTPATIENT
Start: 2019-03-16 | End: 2019-03-16

## 2019-03-16 RX ORDER — CLOPIDOGREL BISULFATE 75 MG/1
75 TABLET ORAL DAILY
Status: DISCONTINUED | OUTPATIENT
Start: 2019-03-16 | End: 2019-03-22 | Stop reason: HOSPADM

## 2019-03-16 RX ORDER — SPIRONOLACTONE 25 MG/1
25 TABLET ORAL DAILY
Status: DISCONTINUED | OUTPATIENT
Start: 2019-03-16 | End: 2019-03-17

## 2019-03-16 RX ORDER — AMLODIPINE BESYLATE 10 MG/1
10 TABLET ORAL DAILY
Status: DISCONTINUED | OUTPATIENT
Start: 2019-03-16 | End: 2019-03-16

## 2019-03-16 RX ADMIN — ATORVASTATIN CALCIUM 10 MG: 10 TABLET, FILM COATED ORAL at 09:50

## 2019-03-16 RX ADMIN — ASPIRIN 81 MG: 81 TABLET, COATED ORAL at 09:50

## 2019-03-16 RX ADMIN — INSULIN LISPRO 2 UNITS: 100 INJECTION, SOLUTION INTRAVENOUS; SUBCUTANEOUS at 09:54

## 2019-03-16 RX ADMIN — CARVEDILOL 12.5 MG: 12.5 TABLET, FILM COATED ORAL at 17:25

## 2019-03-16 RX ADMIN — HYDRALAZINE HYDROCHLORIDE 10 MG: 20 INJECTION INTRAMUSCULAR; INTRAVENOUS at 03:41

## 2019-03-16 RX ADMIN — MAGNESIUM SULFATE HEPTAHYDRATE 2 G: 40 INJECTION, SOLUTION INTRAVENOUS at 23:00

## 2019-03-16 RX ADMIN — CLOPIDOGREL BISULFATE 75 MG: 75 TABLET, FILM COATED ORAL at 17:24

## 2019-03-16 RX ADMIN — VITAMIN D, TAB 1000IU (100/BT) 2000 UNITS: 25 TAB at 09:50

## 2019-03-16 RX ADMIN — HYDRALAZINE HYDROCHLORIDE 25 MG: 25 TABLET, FILM COATED ORAL at 23:00

## 2019-03-16 RX ADMIN — Medication 10 ML: at 23:01

## 2019-03-16 RX ADMIN — LISINOPRIL 40 MG: 20 TABLET ORAL at 09:50

## 2019-03-16 RX ADMIN — METOPROLOL TARTRATE 100 MG: 50 TABLET ORAL at 09:50

## 2019-03-16 RX ADMIN — POTASSIUM CHLORIDE 20 MEQ: 1500 TABLET, EXTENDED RELEASE ORAL at 17:25

## 2019-03-16 RX ADMIN — MAGNESIUM OXIDE TAB 400 MG (241.3 MG ELEMENTAL MG) 400 MG: 400 (241.3 MG) TAB at 17:24

## 2019-03-16 RX ADMIN — HEPARIN SODIUM AND DEXTROSE 10.8 ML/HR: 10000; 5 INJECTION INTRAVENOUS at 21:47

## 2019-03-16 RX ADMIN — SPIRONOLACTONE 25 MG: 25 TABLET ORAL at 17:25

## 2019-03-16 RX ADMIN — METFORMIN HYDROCHLORIDE 1000 MG: 500 TABLET ORAL at 17:24

## 2019-03-16 RX ADMIN — INSULIN LISPRO 4 UNITS: 100 INJECTION, SOLUTION INTRAVENOUS; SUBCUTANEOUS at 17:32

## 2019-03-16 ASSESSMENT — ENCOUNTER SYMPTOMS: SHORTNESS OF BREATH: 0

## 2019-03-17 LAB
ANION GAP SERPL CALCULATED.3IONS-SCNC: 14 MEQ/L (ref 9–15)
APTT: 51.6 SEC (ref 21.6–35.4)
BUN BLDV-MCNC: 19 MG/DL (ref 8–23)
CALCIUM SERPL-MCNC: 8.7 MG/DL (ref 8.5–9.9)
CHLORIDE BLD-SCNC: 103 MEQ/L (ref 95–107)
CO2: 22 MEQ/L (ref 20–31)
CREAT SERPL-MCNC: 1.18 MG/DL (ref 0.5–0.9)
GFR AFRICAN AMERICAN: 51.9
GFR NON-AFRICAN AMERICAN: 42.9
GLUCOSE BLD-MCNC: 160 MG/DL (ref 70–99)
GLUCOSE BLD-MCNC: 175 MG/DL (ref 60–115)
GLUCOSE BLD-MCNC: 178 MG/DL (ref 60–115)
GLUCOSE BLD-MCNC: 200 MG/DL (ref 60–115)
GLUCOSE BLD-MCNC: 261 MG/DL (ref 60–115)
HCT VFR BLD CALC: 32.9 % (ref 37–47)
HEMOGLOBIN: 10.8 G/DL (ref 12–16)
MCH RBC QN AUTO: 29 PG (ref 27–31.3)
MCHC RBC AUTO-ENTMCNC: 32.8 % (ref 33–37)
MCV RBC AUTO: 88.4 FL (ref 82–100)
PDW BLD-RTO: 15.8 % (ref 11.5–14.5)
PERFORMED ON: ABNORMAL
PLATELET # BLD: 203 K/UL (ref 130–400)
POTASSIUM REFLEX MAGNESIUM: 4.2 MEQ/L (ref 3.4–4.9)
RBC # BLD: 3.72 M/UL (ref 4.2–5.4)
SODIUM BLD-SCNC: 139 MEQ/L (ref 135–144)
WBC # BLD: 8.3 K/UL (ref 4.8–10.8)

## 2019-03-17 PROCEDURE — 80048 BASIC METABOLIC PNL TOTAL CA: CPT

## 2019-03-17 PROCEDURE — 36415 COLL VENOUS BLD VENIPUNCTURE: CPT

## 2019-03-17 PROCEDURE — 85730 THROMBOPLASTIN TIME PARTIAL: CPT

## 2019-03-17 PROCEDURE — 6370000000 HC RX 637 (ALT 250 FOR IP): Performed by: NURSE PRACTITIONER

## 2019-03-17 PROCEDURE — 99232 SBSQ HOSP IP/OBS MODERATE 35: CPT | Performed by: INTERNAL MEDICINE

## 2019-03-17 PROCEDURE — 85027 COMPLETE CBC AUTOMATED: CPT

## 2019-03-17 PROCEDURE — 1210000000 HC MED SURG R&B

## 2019-03-17 PROCEDURE — 6360000002 HC RX W HCPCS: Performed by: NURSE PRACTITIONER

## 2019-03-17 PROCEDURE — 6370000000 HC RX 637 (ALT 250 FOR IP): Performed by: INTERNAL MEDICINE

## 2019-03-17 RX ORDER — SODIUM CHLORIDE 9 MG/ML
INJECTION, SOLUTION INTRAVENOUS CONTINUOUS
Status: ACTIVE | OUTPATIENT
Start: 2019-03-17 | End: 2019-03-17

## 2019-03-17 RX ADMIN — METFORMIN HYDROCHLORIDE 1000 MG: 500 TABLET ORAL at 08:21

## 2019-03-17 RX ADMIN — HYDRALAZINE HYDROCHLORIDE 25 MG: 25 TABLET, FILM COATED ORAL at 18:39

## 2019-03-17 RX ADMIN — HYDRALAZINE HYDROCHLORIDE 25 MG: 25 TABLET, FILM COATED ORAL at 06:49

## 2019-03-17 RX ADMIN — VITAMIN D, TAB 1000IU (100/BT) 2000 UNITS: 25 TAB at 08:20

## 2019-03-17 RX ADMIN — ATORVASTATIN CALCIUM 10 MG: 10 TABLET, FILM COATED ORAL at 08:20

## 2019-03-17 RX ADMIN — NITROFURANTOIN MONOHYDRATE/MACROCRYSTALLINE 100 MG: 25; 75 CAPSULE ORAL at 08:30

## 2019-03-17 RX ADMIN — CARVEDILOL 12.5 MG: 12.5 TABLET, FILM COATED ORAL at 18:39

## 2019-03-17 RX ADMIN — MAGNESIUM OXIDE TAB 400 MG (241.3 MG ELEMENTAL MG) 400 MG: 400 (241.3 MG) TAB at 08:20

## 2019-03-17 RX ADMIN — METFORMIN HYDROCHLORIDE 1000 MG: 500 TABLET ORAL at 18:39

## 2019-03-17 RX ADMIN — CLOPIDOGREL BISULFATE 75 MG: 75 TABLET, FILM COATED ORAL at 08:20

## 2019-03-17 RX ADMIN — INSULIN LISPRO 2 UNITS: 100 INJECTION, SOLUTION INTRAVENOUS; SUBCUTANEOUS at 08:30

## 2019-03-17 RX ADMIN — HEPARIN SODIUM AND DEXTROSE 14.01 UNITS/KG/HR: 10000; 5 INJECTION INTRAVENOUS at 21:33

## 2019-03-17 RX ADMIN — MAGNESIUM OXIDE TAB 400 MG (241.3 MG ELEMENTAL MG) 400 MG: 400 (241.3 MG) TAB at 21:32

## 2019-03-17 RX ADMIN — INSULIN LISPRO 2 UNITS: 100 INJECTION, SOLUTION INTRAVENOUS; SUBCUTANEOUS at 18:38

## 2019-03-17 RX ADMIN — CARVEDILOL 12.5 MG: 12.5 TABLET, FILM COATED ORAL at 08:20

## 2019-03-17 ASSESSMENT — ENCOUNTER SYMPTOMS: SHORTNESS OF BREATH: 0

## 2019-03-18 ENCOUNTER — APPOINTMENT (OUTPATIENT)
Dept: CARDIAC CATH/INVASIVE PROCEDURES | Age: 84
DRG: 069 | End: 2019-03-18
Payer: MEDICARE

## 2019-03-18 LAB
ANION GAP SERPL CALCULATED.3IONS-SCNC: 11 MEQ/L (ref 9–15)
APTT: 53.1 SEC (ref 21.6–35.4)
APTT: 55 SEC (ref 21.6–35.4)
BUN BLDV-MCNC: 21 MG/DL (ref 8–23)
CALCIUM SERPL-MCNC: 8.3 MG/DL (ref 8.5–9.9)
CHLORIDE BLD-SCNC: 103 MEQ/L (ref 95–107)
CO2: 25 MEQ/L (ref 20–31)
CREAT SERPL-MCNC: 1.08 MG/DL (ref 0.5–0.9)
GFR AFRICAN AMERICAN: 57.4
GFR NON-AFRICAN AMERICAN: 47.5
GLUCOSE BLD-MCNC: 146 MG/DL (ref 70–99)
GLUCOSE BLD-MCNC: 161 MG/DL (ref 60–115)
GLUCOSE BLD-MCNC: 169 MG/DL (ref 60–115)
GLUCOSE BLD-MCNC: 176 MG/DL (ref 60–115)
GLUCOSE BLD-MCNC: 211 MG/DL (ref 60–115)
HCT VFR BLD CALC: 31.5 % (ref 37–47)
HEMOGLOBIN: 10.3 G/DL (ref 12–16)
LV EF: 25 %
LVEF MODALITY: NORMAL
MAGNESIUM: 2 MG/DL (ref 1.7–2.4)
MCH RBC QN AUTO: 28.8 PG (ref 27–31.3)
MCHC RBC AUTO-ENTMCNC: 32.7 % (ref 33–37)
MCV RBC AUTO: 87.9 FL (ref 82–100)
PDW BLD-RTO: 15.8 % (ref 11.5–14.5)
PERFORMED ON: ABNORMAL
PLATELET # BLD: 193 K/UL (ref 130–400)
POTASSIUM REFLEX MAGNESIUM: 4 MEQ/L (ref 3.4–4.9)
RBC # BLD: 3.59 M/UL (ref 4.2–5.4)
SODIUM BLD-SCNC: 139 MEQ/L (ref 135–144)
WBC # BLD: 8.4 K/UL (ref 4.8–10.8)

## 2019-03-18 PROCEDURE — 2580000003 HC RX 258: Performed by: NURSE PRACTITIONER

## 2019-03-18 PROCEDURE — 80048 BASIC METABOLIC PNL TOTAL CA: CPT

## 2019-03-18 PROCEDURE — 85730 THROMBOPLASTIN TIME PARTIAL: CPT

## 2019-03-18 PROCEDURE — 6360000002 HC RX W HCPCS

## 2019-03-18 PROCEDURE — 36415 COLL VENOUS BLD VENIPUNCTURE: CPT

## 2019-03-18 PROCEDURE — 85027 COMPLETE CBC AUTOMATED: CPT

## 2019-03-18 PROCEDURE — 93312 ECHO TRANSESOPHAGEAL: CPT

## 2019-03-18 PROCEDURE — 6370000000 HC RX 637 (ALT 250 FOR IP): Performed by: NURSE PRACTITIONER

## 2019-03-18 PROCEDURE — 93325 DOPPLER ECHO COLOR FLOW MAPG: CPT

## 2019-03-18 PROCEDURE — 1210000000 HC MED SURG R&B

## 2019-03-18 PROCEDURE — B24BZZ4 ULTRASONOGRAPHY OF HEART WITH AORTA, TRANSESOPHAGEAL: ICD-10-PCS | Performed by: INTERNAL MEDICINE

## 2019-03-18 PROCEDURE — 93312 ECHO TRANSESOPHAGEAL: CPT | Performed by: INTERNAL MEDICINE

## 2019-03-18 PROCEDURE — 93321 DOPPLER ECHO F-UP/LMTD STD: CPT

## 2019-03-18 PROCEDURE — 6370000000 HC RX 637 (ALT 250 FOR IP): Performed by: SPECIALIST

## 2019-03-18 PROCEDURE — 6370000000 HC RX 637 (ALT 250 FOR IP): Performed by: INTERNAL MEDICINE

## 2019-03-18 PROCEDURE — 83735 ASSAY OF MAGNESIUM: CPT

## 2019-03-18 PROCEDURE — 6360000002 HC RX W HCPCS: Performed by: SPECIALIST

## 2019-03-18 RX ORDER — WARFARIN SODIUM 5 MG/1
5 TABLET ORAL DAILY
Status: DISCONTINUED | OUTPATIENT
Start: 2019-03-20 | End: 2019-03-20

## 2019-03-18 RX ORDER — HEPARIN SODIUM 10000 [USP'U]/100ML
12 INJECTION, SOLUTION INTRAVENOUS CONTINUOUS
Status: DISCONTINUED | OUTPATIENT
Start: 2019-03-18 | End: 2019-03-21

## 2019-03-18 RX ORDER — SODIUM CHLORIDE 9 MG/ML
INJECTION, SOLUTION INTRAVENOUS CONTINUOUS
Status: DISCONTINUED | OUTPATIENT
Start: 2019-03-18 | End: 2019-03-22 | Stop reason: HOSPADM

## 2019-03-18 RX ORDER — MIDAZOLAM HYDROCHLORIDE 1 MG/ML
2 INJECTION INTRAMUSCULAR; INTRAVENOUS ONCE
Status: DISCONTINUED | OUTPATIENT
Start: 2019-03-18 | End: 2019-03-18

## 2019-03-18 RX ORDER — FENTANYL CITRATE 50 UG/ML
100 INJECTION, SOLUTION INTRAMUSCULAR; INTRAVENOUS ONCE
Status: DISCONTINUED | OUTPATIENT
Start: 2019-03-18 | End: 2019-03-18

## 2019-03-18 RX ORDER — SODIUM CHLORIDE 0.9 % (FLUSH) 0.9 %
10 SYRINGE (ML) INJECTION EVERY 12 HOURS SCHEDULED
Status: DISCONTINUED | OUTPATIENT
Start: 2019-03-18 | End: 2019-03-22 | Stop reason: HOSPADM

## 2019-03-18 RX ORDER — SODIUM CHLORIDE 0.9 % (FLUSH) 0.9 %
10 SYRINGE (ML) INJECTION PRN
Status: DISCONTINUED | OUTPATIENT
Start: 2019-03-18 | End: 2019-03-22 | Stop reason: HOSPADM

## 2019-03-18 RX ADMIN — HYDRALAZINE HYDROCHLORIDE 25 MG: 25 TABLET, FILM COATED ORAL at 17:50

## 2019-03-18 RX ADMIN — CARVEDILOL 12.5 MG: 12.5 TABLET, FILM COATED ORAL at 12:06

## 2019-03-18 RX ADMIN — MAGNESIUM OXIDE TAB 400 MG (241.3 MG ELEMENTAL MG) 400 MG: 400 (241.3 MG) TAB at 12:06

## 2019-03-18 RX ADMIN — MAGNESIUM OXIDE TAB 400 MG (241.3 MG ELEMENTAL MG) 400 MG: 400 (241.3 MG) TAB at 20:48

## 2019-03-18 RX ADMIN — VITAMIN D, TAB 1000IU (100/BT) 2000 UNITS: 25 TAB at 12:05

## 2019-03-18 RX ADMIN — METFORMIN HYDROCHLORIDE 1000 MG: 500 TABLET ORAL at 17:50

## 2019-03-18 RX ADMIN — CARVEDILOL 12.5 MG: 12.5 TABLET, FILM COATED ORAL at 17:50

## 2019-03-18 RX ADMIN — METFORMIN HYDROCHLORIDE 1000 MG: 500 TABLET ORAL at 12:04

## 2019-03-18 RX ADMIN — CLOPIDOGREL BISULFATE 75 MG: 75 TABLET, FILM COATED ORAL at 12:06

## 2019-03-18 RX ADMIN — NITROFURANTOIN MONOHYDRATE/MACROCRYSTALLINE 100 MG: 25; 75 CAPSULE ORAL at 12:06

## 2019-03-18 RX ADMIN — WARFARIN SODIUM 7.5 MG: 2.5 TABLET ORAL at 20:48

## 2019-03-18 RX ADMIN — ATORVASTATIN CALCIUM 10 MG: 10 TABLET, FILM COATED ORAL at 12:06

## 2019-03-18 RX ADMIN — SACUBITRIL AND VALSARTAN 1 TABLET: 24; 26 TABLET, FILM COATED ORAL at 12:06

## 2019-03-18 RX ADMIN — SODIUM CHLORIDE: 9 INJECTION, SOLUTION INTRAVENOUS at 20:57

## 2019-03-18 RX ADMIN — SODIUM CHLORIDE: 9 INJECTION, SOLUTION INTRAVENOUS at 08:33

## 2019-03-18 RX ADMIN — HYDRALAZINE HYDROCHLORIDE 25 MG: 25 TABLET, FILM COATED ORAL at 12:06

## 2019-03-18 RX ADMIN — HEPARIN SODIUM AND DEXTROSE 14.01 UNITS/KG/HR: 10000; 5 INJECTION INTRAVENOUS at 20:43

## 2019-03-18 RX ADMIN — ASPIRIN 81 MG: 81 TABLET, COATED ORAL at 12:06

## 2019-03-18 NOTE — DISCHARGE SUMMARY
significant interval change when compared to CT of the brain from February 1, 2019. All CT scans at this facility use dose modulation, iterative reconstruction, and/or weight based dosing when appropriate to reduce radiation dose to as low as reasonably achievable. Mra Head W/o Contrast    Result Date: 3/15/2019  EXAM: MRI of the brain without contrast MRA of the brain without contrast History: TIA. Dysarthria and left-sided weakness. Technique: Multiplanar multisequence MRI of the brain was performed without contrast. Axial 3-D time-of-flight images of the brain were obtained without contrast. 3-D volume rendered images were performed for evaluation of the cerebral vasculature. Comparison: MRI of the brain from November 14, 2018 and CT brain from March 14, 2019 Findings: MRI brain: Confluent periventricular hyperintense T2/FLAIR signal as well as multiple small foci of hyperintensity/FLAIR signal within the bilateral supratentorial white matter are nonspecific, as is patchy hyperintense T2/FLAIR signal within the jana. Findings are  most compatible with chronic small vessel ischemic changes in a patient of this age. These findings are not significantly changed from prior MRI of the brain. Prominence of the sulci and ventricles compatible with moderate generalized parenchymal volume loss. No edema, hemorrhage, mass, mass effect, midline shift, or abnormal extra-axial fluid collection. Midline structures are within normal limits. The posterior fossa is within normal limits. There is no diffusion restriction. No susceptibility artifact is identified on the gradient echo sequence. Cranial nerves 7/8 complexes appear grossly unremarkable. The visualized paranasal sinuses and bilateral mastoid air cells are clear. MRA brain: The bilateral distal cervical internal carotid arteries through the skull base are patent. The bilateral middle cerebral arteries through the trifurcation and opercular branches are patent. Value Ref Range    aPTT 45.7 (H) 21.6 - 35.4 sec   POCT Glucose    Collection Time: 03/15/19  4:36 PM   Result Value Ref Range    POC Glucose 170 (H) 60 - 115 mg/dl    Performed on ACCU-CHEK    POCT Glucose    Collection Time: 03/15/19  7:49 PM   Result Value Ref Range    POC Glucose 180 (H) 60 - 115 mg/dl    Performed on ACCU-CHEK    Basic Metabolic Panel w/ Reflex to MG    Collection Time: 03/16/19  5:36 AM   Result Value Ref Range    Sodium 142 135 - 144 mEq/L    Potassium reflex Magnesium 3.5 3.4 - 4.9 mEq/L    Chloride 104 95 - 107 mEq/L    CO2 23 20 - 31 mEq/L    Anion Gap 15 9 - 15 mEq/L    Glucose 171 (H) 70 - 99 mg/dL    BUN 16 8 - 23 mg/dL    CREATININE 0.87 0.50 - 0.90 mg/dL    GFR Non-African American >60.0 >60    GFR  >60.0 >60    Calcium 9.0 8.5 - 9.9 mg/dL   CBC    Collection Time: 03/16/19  5:36 AM   Result Value Ref Range    WBC 9.4 4.8 - 10.8 K/uL    RBC 3.92 (L) 4.20 - 5.40 M/uL    Hemoglobin 11.4 (L) 12.0 - 16.0 g/dL    Hematocrit 34.7 (L) 37.0 - 47.0 %    MCV 88.5 82.0 - 100.0 fL    MCH 29.0 27.0 - 31.3 pg    MCHC 32.8 (L) 33.0 - 37.0 %    RDW 15.7 (H) 11.5 - 14.5 %    Platelets 606 702 - 467 K/uL   APTT    Collection Time: 03/16/19  5:36 AM   Result Value Ref Range    aPTT 50.6 (H) 21.6 - 35.4 sec   Magnesium    Collection Time: 03/16/19  5:36 AM   Result Value Ref Range    Magnesium 1.5 (L) 1.7 - 2.4 mg/dL   POCT Glucose    Collection Time: 03/16/19  8:26 AM   Result Value Ref Range    POC Glucose 192 (H) 60 - 115 mg/dl    Performed on ACCU-CHEK    POCT Glucose    Collection Time: 03/16/19 12:26 PM   Result Value Ref Range    POC Glucose 263 (H) 60 - 115 mg/dl    Performed on ACCU-CHEK    POCT Glucose    Collection Time: 03/16/19  4:32 PM   Result Value Ref Range    POC Glucose 212 (H) 60 - 115 mg/dl    Performed on ACCU-CHEK    POCT Glucose    Collection Time: 03/16/19 10:41 PM   Result Value Ref Range    POC Glucose 142 (H) 60 - 115 mg/dl    Performed on ACCU-CHEK    Basic

## 2019-03-19 LAB
ANION GAP SERPL CALCULATED.3IONS-SCNC: 10 MEQ/L (ref 9–15)
APTT: 59.6 SEC (ref 21.6–35.4)
APTT: 63.4 SEC (ref 21.6–35.4)
APTT: 64.4 SEC (ref 21.6–35.4)
BUN BLDV-MCNC: 16 MG/DL (ref 8–23)
CALCIUM SERPL-MCNC: 8.6 MG/DL (ref 8.5–9.9)
CHLORIDE BLD-SCNC: 107 MEQ/L (ref 95–107)
CO2: 24 MEQ/L (ref 20–31)
CREAT SERPL-MCNC: 0.8 MG/DL (ref 0.5–0.9)
GFR AFRICAN AMERICAN: >60
GFR NON-AFRICAN AMERICAN: >60
GLUCOSE BLD-MCNC: 127 MG/DL (ref 60–115)
GLUCOSE BLD-MCNC: 148 MG/DL (ref 70–99)
GLUCOSE BLD-MCNC: 163 MG/DL (ref 60–115)
GLUCOSE BLD-MCNC: 169 MG/DL (ref 60–115)
GLUCOSE BLD-MCNC: 192 MG/DL (ref 60–115)
HCT VFR BLD CALC: 29.4 % (ref 37–47)
HEMOGLOBIN: 9.7 G/DL (ref 12–16)
INR BLD: 1.1
MAGNESIUM: 1.9 MG/DL (ref 1.7–2.4)
MCH RBC QN AUTO: 29.1 PG (ref 27–31.3)
MCHC RBC AUTO-ENTMCNC: 32.9 % (ref 33–37)
MCV RBC AUTO: 88.4 FL (ref 82–100)
PDW BLD-RTO: 15.4 % (ref 11.5–14.5)
PERFORMED ON: ABNORMAL
PLATELET # BLD: 178 K/UL (ref 130–400)
POTASSIUM REFLEX MAGNESIUM: 4.2 MEQ/L (ref 3.4–4.9)
PROTHROMBIN TIME: 10.8 SEC (ref 9–11.5)
RBC # BLD: 3.33 M/UL (ref 4.2–5.4)
SODIUM BLD-SCNC: 141 MEQ/L (ref 135–144)
WBC # BLD: 6.9 K/UL (ref 4.8–10.8)

## 2019-03-19 PROCEDURE — 97116 GAIT TRAINING THERAPY: CPT

## 2019-03-19 PROCEDURE — 85610 PROTHROMBIN TIME: CPT

## 2019-03-19 PROCEDURE — 80048 BASIC METABOLIC PNL TOTAL CA: CPT

## 2019-03-19 PROCEDURE — 6370000000 HC RX 637 (ALT 250 FOR IP): Performed by: INTERNAL MEDICINE

## 2019-03-19 PROCEDURE — 85730 THROMBOPLASTIN TIME PARTIAL: CPT

## 2019-03-19 PROCEDURE — 99233 SBSQ HOSP IP/OBS HIGH 50: CPT | Performed by: INTERNAL MEDICINE

## 2019-03-19 PROCEDURE — 83735 ASSAY OF MAGNESIUM: CPT

## 2019-03-19 PROCEDURE — 1210000000 HC MED SURG R&B

## 2019-03-19 PROCEDURE — 2580000003 HC RX 258: Performed by: NURSE PRACTITIONER

## 2019-03-19 PROCEDURE — 36415 COLL VENOUS BLD VENIPUNCTURE: CPT

## 2019-03-19 PROCEDURE — 6370000000 HC RX 637 (ALT 250 FOR IP): Performed by: NURSE PRACTITIONER

## 2019-03-19 PROCEDURE — 85027 COMPLETE CBC AUTOMATED: CPT

## 2019-03-19 PROCEDURE — 6360000002 HC RX W HCPCS: Performed by: SPECIALIST

## 2019-03-19 PROCEDURE — 6370000000 HC RX 637 (ALT 250 FOR IP): Performed by: SPECIALIST

## 2019-03-19 RX ORDER — HYDRALAZINE HYDROCHLORIDE 50 MG/1
50 TABLET, FILM COATED ORAL EVERY 8 HOURS SCHEDULED
Status: DISCONTINUED | OUTPATIENT
Start: 2019-03-19 | End: 2019-03-22 | Stop reason: HOSPADM

## 2019-03-19 RX ORDER — NICOTINE POLACRILEX 4 MG/1
40 GUM, CHEWING ORAL DAILY
Status: DISCONTINUED | OUTPATIENT
Start: 2019-03-19 | End: 2019-03-22 | Stop reason: HOSPADM

## 2019-03-19 RX ORDER — NICOTINE POLACRILEX 4 MG/1
40 GUM, CHEWING ORAL DAILY
Status: DISCONTINUED | OUTPATIENT
Start: 2019-03-19 | End: 2019-03-19 | Stop reason: CLARIF

## 2019-03-19 RX ADMIN — METFORMIN HYDROCHLORIDE 1000 MG: 500 TABLET ORAL at 18:17

## 2019-03-19 RX ADMIN — HEPARIN SODIUM AND DEXTROSE 12 UNITS/KG/HR: 10000; 5 INJECTION INTRAVENOUS at 19:42

## 2019-03-19 RX ADMIN — ATORVASTATIN CALCIUM 10 MG: 10 TABLET, FILM COATED ORAL at 08:54

## 2019-03-19 RX ADMIN — HYDRALAZINE HYDROCHLORIDE 25 MG: 25 TABLET, FILM COATED ORAL at 08:59

## 2019-03-19 RX ADMIN — MAGNESIUM OXIDE TAB 400 MG (241.3 MG ELEMENTAL MG) 400 MG: 400 (241.3 MG) TAB at 08:55

## 2019-03-19 RX ADMIN — INSULIN LISPRO 2 UNITS: 100 INJECTION, SOLUTION INTRAVENOUS; SUBCUTANEOUS at 13:09

## 2019-03-19 RX ADMIN — MAGNESIUM OXIDE TAB 400 MG (241.3 MG ELEMENTAL MG) 400 MG: 400 (241.3 MG) TAB at 19:41

## 2019-03-19 RX ADMIN — INSULIN LISPRO 2 UNITS: 100 INJECTION, SOLUTION INTRAVENOUS; SUBCUTANEOUS at 08:56

## 2019-03-19 RX ADMIN — HYDRALAZINE HYDROCHLORIDE 50 MG: 50 TABLET, FILM COATED ORAL at 18:18

## 2019-03-19 RX ADMIN — METFORMIN HYDROCHLORIDE 1000 MG: 500 TABLET ORAL at 08:54

## 2019-03-19 RX ADMIN — CARVEDILOL 12.5 MG: 12.5 TABLET, FILM COATED ORAL at 18:18

## 2019-03-19 RX ADMIN — Medication 10 ML: at 08:54

## 2019-03-19 RX ADMIN — SACUBITRIL AND VALSARTAN 1 TABLET: 24; 26 TABLET, FILM COATED ORAL at 19:41

## 2019-03-19 RX ADMIN — SACUBITRIL AND VALSARTAN 1 TABLET: 24; 26 TABLET, FILM COATED ORAL at 08:54

## 2019-03-19 RX ADMIN — VITAMIN D, TAB 1000IU (100/BT) 2000 UNITS: 25 TAB at 08:54

## 2019-03-19 RX ADMIN — ASPIRIN 81 MG: 81 TABLET, COATED ORAL at 08:55

## 2019-03-19 RX ADMIN — CARVEDILOL 12.5 MG: 12.5 TABLET, FILM COATED ORAL at 08:55

## 2019-03-19 RX ADMIN — WARFARIN SODIUM 7.5 MG: 2.5 TABLET ORAL at 18:18

## 2019-03-19 RX ADMIN — CLOPIDOGREL BISULFATE 75 MG: 75 TABLET, FILM COATED ORAL at 08:54

## 2019-03-19 RX ADMIN — NITROFURANTOIN MONOHYDRATE/MACROCRYSTALLINE 100 MG: 25; 75 CAPSULE ORAL at 08:54

## 2019-03-19 ASSESSMENT — PAIN SCALES - GENERAL: PAINLEVEL_OUTOF10: 0

## 2019-03-19 ASSESSMENT — ENCOUNTER SYMPTOMS
COUGH: 0
SHORTNESS OF BREATH: 0
NAUSEA: 0
EYES NEGATIVE: 1
RESPIRATORY NEGATIVE: 1
STRIDOR: 0
CHEST TIGHTNESS: 0
GASTROINTESTINAL NEGATIVE: 1
BLOOD IN STOOL: 0
WHEEZING: 0

## 2019-03-20 LAB
ANION GAP SERPL CALCULATED.3IONS-SCNC: 11 MEQ/L (ref 9–15)
APTT: 58.5 SEC (ref 21.6–35.4)
BUN BLDV-MCNC: 16 MG/DL (ref 8–23)
CALCIUM SERPL-MCNC: 8.1 MG/DL (ref 8.5–9.9)
CHLORIDE BLD-SCNC: 108 MEQ/L (ref 95–107)
CO2: 22 MEQ/L (ref 20–31)
CREAT SERPL-MCNC: 0.79 MG/DL (ref 0.5–0.9)
GFR AFRICAN AMERICAN: >60
GFR NON-AFRICAN AMERICAN: >60
GLUCOSE BLD-MCNC: 160 MG/DL (ref 60–115)
GLUCOSE BLD-MCNC: 171 MG/DL (ref 70–99)
GLUCOSE BLD-MCNC: 260 MG/DL (ref 60–115)
GLUCOSE BLD-MCNC: 80 MG/DL (ref 60–115)
HCT VFR BLD CALC: 30.9 % (ref 37–47)
HEMOGLOBIN: 10.2 G/DL (ref 12–16)
INR BLD: 1.1
MAGNESIUM: 1.8 MG/DL (ref 1.7–2.4)
MCH RBC QN AUTO: 29.2 PG (ref 27–31.3)
MCHC RBC AUTO-ENTMCNC: 32.9 % (ref 33–37)
MCV RBC AUTO: 88.8 FL (ref 82–100)
PDW BLD-RTO: 15.4 % (ref 11.5–14.5)
PERFORMED ON: ABNORMAL
PERFORMED ON: ABNORMAL
PERFORMED ON: NORMAL
PLATELET # BLD: 190 K/UL (ref 130–400)
POTASSIUM REFLEX MAGNESIUM: 4.2 MEQ/L (ref 3.4–4.9)
PROTHROMBIN TIME: 11.5 SEC (ref 9–11.5)
RBC # BLD: 3.48 M/UL (ref 4.2–5.4)
SODIUM BLD-SCNC: 141 MEQ/L (ref 135–144)
WBC # BLD: 8 K/UL (ref 4.8–10.8)

## 2019-03-20 PROCEDURE — 97116 GAIT TRAINING THERAPY: CPT

## 2019-03-20 PROCEDURE — 36415 COLL VENOUS BLD VENIPUNCTURE: CPT

## 2019-03-20 PROCEDURE — 97535 SELF CARE MNGMENT TRAINING: CPT

## 2019-03-20 PROCEDURE — 80048 BASIC METABOLIC PNL TOTAL CA: CPT

## 2019-03-20 PROCEDURE — 6370000000 HC RX 637 (ALT 250 FOR IP): Performed by: INTERNAL MEDICINE

## 2019-03-20 PROCEDURE — 85730 THROMBOPLASTIN TIME PARTIAL: CPT

## 2019-03-20 PROCEDURE — 83735 ASSAY OF MAGNESIUM: CPT

## 2019-03-20 PROCEDURE — 85027 COMPLETE CBC AUTOMATED: CPT

## 2019-03-20 PROCEDURE — 6360000002 HC RX W HCPCS: Performed by: SPECIALIST

## 2019-03-20 PROCEDURE — 85610 PROTHROMBIN TIME: CPT

## 2019-03-20 PROCEDURE — 2580000003 HC RX 258: Performed by: NURSE PRACTITIONER

## 2019-03-20 PROCEDURE — 6370000000 HC RX 637 (ALT 250 FOR IP): Performed by: NURSE PRACTITIONER

## 2019-03-20 PROCEDURE — 1210000000 HC MED SURG R&B

## 2019-03-20 PROCEDURE — 99232 SBSQ HOSP IP/OBS MODERATE 35: CPT | Performed by: INTERNAL MEDICINE

## 2019-03-20 RX ORDER — WARFARIN SODIUM 5 MG/1
5 TABLET ORAL
Status: DISCONTINUED | OUTPATIENT
Start: 2019-03-20 | End: 2019-03-20

## 2019-03-20 RX ORDER — FUROSEMIDE 40 MG/1
40 TABLET ORAL DAILY
Status: DISCONTINUED | OUTPATIENT
Start: 2019-03-20 | End: 2019-03-22 | Stop reason: HOSPADM

## 2019-03-20 RX ADMIN — NITROFURANTOIN MONOHYDRATE/MACROCRYSTALLINE 100 MG: 25; 75 CAPSULE ORAL at 10:24

## 2019-03-20 RX ADMIN — VITAMIN D, TAB 1000IU (100/BT) 2000 UNITS: 25 TAB at 10:16

## 2019-03-20 RX ADMIN — HYDRALAZINE HYDROCHLORIDE 50 MG: 50 TABLET, FILM COATED ORAL at 10:17

## 2019-03-20 RX ADMIN — SODIUM CHLORIDE: 9 INJECTION, SOLUTION INTRAVENOUS at 23:11

## 2019-03-20 RX ADMIN — MAGNESIUM OXIDE TAB 400 MG (241.3 MG ELEMENTAL MG) 400 MG: 400 (241.3 MG) TAB at 10:17

## 2019-03-20 RX ADMIN — CARVEDILOL 12.5 MG: 12.5 TABLET, FILM COATED ORAL at 10:16

## 2019-03-20 RX ADMIN — CLOPIDOGREL BISULFATE 75 MG: 75 TABLET, FILM COATED ORAL at 10:17

## 2019-03-20 RX ADMIN — WARFARIN SODIUM 12.5 MG: 2.5 TABLET ORAL at 10:17

## 2019-03-20 RX ADMIN — METFORMIN HYDROCHLORIDE 1000 MG: 500 TABLET ORAL at 10:16

## 2019-03-20 RX ADMIN — FUROSEMIDE 40 MG: 40 TABLET ORAL at 10:17

## 2019-03-20 RX ADMIN — HYDRALAZINE HYDROCHLORIDE 50 MG: 50 TABLET, FILM COATED ORAL at 02:09

## 2019-03-20 RX ADMIN — SODIUM CHLORIDE: 9 INJECTION, SOLUTION INTRAVENOUS at 23:50

## 2019-03-20 RX ADMIN — CARVEDILOL 12.5 MG: 12.5 TABLET, FILM COATED ORAL at 18:01

## 2019-03-20 RX ADMIN — INSULIN LISPRO 6 UNITS: 100 INJECTION, SOLUTION INTRAVENOUS; SUBCUTANEOUS at 12:17

## 2019-03-20 RX ADMIN — HYDRALAZINE HYDROCHLORIDE 50 MG: 50 TABLET, FILM COATED ORAL at 18:02

## 2019-03-20 RX ADMIN — ATORVASTATIN CALCIUM 10 MG: 10 TABLET, FILM COATED ORAL at 10:17

## 2019-03-20 RX ADMIN — METFORMIN HYDROCHLORIDE 1000 MG: 500 TABLET ORAL at 18:01

## 2019-03-20 RX ADMIN — INSULIN LISPRO 2 UNITS: 100 INJECTION, SOLUTION INTRAVENOUS; SUBCUTANEOUS at 10:19

## 2019-03-20 RX ADMIN — SACUBITRIL AND VALSARTAN 1 TABLET: 24; 26 TABLET, FILM COATED ORAL at 10:16

## 2019-03-20 RX ADMIN — HEPARIN SODIUM AND DEXTROSE 10.8 ML/HR: 10000; 5 INJECTION INTRAVENOUS at 23:50

## 2019-03-20 ASSESSMENT — PAIN SCALES - GENERAL
PAINLEVEL_OUTOF10: 0
PAINLEVEL_OUTOF10: 0

## 2019-03-20 ASSESSMENT — ENCOUNTER SYMPTOMS
GASTROINTESTINAL NEGATIVE: 1
NAUSEA: 0
BLOOD IN STOOL: 0
RESPIRATORY NEGATIVE: 1
CHEST TIGHTNESS: 0
SHORTNESS OF BREATH: 0
WHEEZING: 0
STRIDOR: 0
EYES NEGATIVE: 1
COUGH: 0

## 2019-03-21 VITALS
BODY MASS INDEX: 30.2 KG/M2 | HEART RATE: 110 BPM | TEMPERATURE: 98.1 F | OXYGEN SATURATION: 98 % | SYSTOLIC BLOOD PRESSURE: 155 MMHG | WEIGHT: 170.42 LBS | RESPIRATION RATE: 18 BRPM | HEIGHT: 63 IN | DIASTOLIC BLOOD PRESSURE: 58 MMHG

## 2019-03-21 LAB
ANION GAP SERPL CALCULATED.3IONS-SCNC: 12 MEQ/L (ref 9–15)
APTT: 50.5 SEC (ref 21.6–35.4)
BUN BLDV-MCNC: 14 MG/DL (ref 8–23)
CALCIUM SERPL-MCNC: 8.3 MG/DL (ref 8.5–9.9)
CHLORIDE BLD-SCNC: 106 MEQ/L (ref 95–107)
CO2: 21 MEQ/L (ref 20–31)
CREAT SERPL-MCNC: 0.91 MG/DL (ref 0.5–0.9)
GFR AFRICAN AMERICAN: >60
GFR NON-AFRICAN AMERICAN: 57.8
GLUCOSE BLD-MCNC: 135 MG/DL (ref 60–115)
GLUCOSE BLD-MCNC: 146 MG/DL (ref 70–99)
GLUCOSE BLD-MCNC: 166 MG/DL (ref 60–115)
GLUCOSE BLD-MCNC: 221 MG/DL (ref 60–115)
HCT VFR BLD CALC: 31 % (ref 37–47)
HEMOGLOBIN: 10.2 G/DL (ref 12–16)
INR BLD: 2.1
MAGNESIUM: 1.6 MG/DL (ref 1.7–2.4)
MCH RBC QN AUTO: 29.1 PG (ref 27–31.3)
MCHC RBC AUTO-ENTMCNC: 32.8 % (ref 33–37)
MCV RBC AUTO: 88.6 FL (ref 82–100)
PDW BLD-RTO: 15.9 % (ref 11.5–14.5)
PERFORMED ON: ABNORMAL
PLATELET # BLD: 205 K/UL (ref 130–400)
POTASSIUM REFLEX MAGNESIUM: 4.1 MEQ/L (ref 3.4–4.9)
PROTHROMBIN TIME: 20.6 SEC (ref 9–11.5)
RBC # BLD: 3.5 M/UL (ref 4.2–5.4)
SODIUM BLD-SCNC: 139 MEQ/L (ref 135–144)
WBC # BLD: 7.1 K/UL (ref 4.8–10.8)

## 2019-03-21 PROCEDURE — 85730 THROMBOPLASTIN TIME PARTIAL: CPT

## 2019-03-21 PROCEDURE — 99232 SBSQ HOSP IP/OBS MODERATE 35: CPT | Performed by: INTERNAL MEDICINE

## 2019-03-21 PROCEDURE — 85027 COMPLETE CBC AUTOMATED: CPT

## 2019-03-21 PROCEDURE — 36415 COLL VENOUS BLD VENIPUNCTURE: CPT

## 2019-03-21 PROCEDURE — 6370000000 HC RX 637 (ALT 250 FOR IP): Performed by: INTERNAL MEDICINE

## 2019-03-21 PROCEDURE — 85610 PROTHROMBIN TIME: CPT

## 2019-03-21 PROCEDURE — 6370000000 HC RX 637 (ALT 250 FOR IP): Performed by: NURSE PRACTITIONER

## 2019-03-21 PROCEDURE — 83735 ASSAY OF MAGNESIUM: CPT

## 2019-03-21 PROCEDURE — 80048 BASIC METABOLIC PNL TOTAL CA: CPT

## 2019-03-21 RX ORDER — CARVEDILOL 12.5 MG/1
12.5 TABLET ORAL 2 TIMES DAILY WITH MEALS
Qty: 60 TABLET | Refills: 3 | Status: SHIPPED | OUTPATIENT
Start: 2019-03-21 | End: 2019-07-11 | Stop reason: SDUPTHER

## 2019-03-21 RX ORDER — WARFARIN SODIUM 5 MG/1
5 TABLET ORAL DAILY
Status: DISCONTINUED | OUTPATIENT
Start: 2019-03-21 | End: 2019-03-22 | Stop reason: HOSPADM

## 2019-03-21 RX ORDER — WARFARIN SODIUM 5 MG/1
TABLET ORAL
Qty: 30 TABLET | Refills: 3 | Status: SHIPPED | OUTPATIENT
Start: 2019-03-21 | End: 2019-04-10 | Stop reason: SDUPTHER

## 2019-03-21 RX ORDER — FUROSEMIDE 40 MG/1
40 TABLET ORAL DAILY
Qty: 60 TABLET | Refills: 3 | Status: SHIPPED | OUTPATIENT
Start: 2019-03-22 | End: 2019-07-11

## 2019-03-21 RX ORDER — HYDRALAZINE HYDROCHLORIDE 50 MG/1
50 TABLET, FILM COATED ORAL EVERY 8 HOURS SCHEDULED
Qty: 90 TABLET | Refills: 3 | Status: SHIPPED | OUTPATIENT
Start: 2019-03-21 | End: 2019-04-03 | Stop reason: DRUGHIGH

## 2019-03-21 RX ADMIN — NITROFURANTOIN MONOHYDRATE/MACROCRYSTALLINE 100 MG: 25; 75 CAPSULE ORAL at 11:08

## 2019-03-21 RX ADMIN — INSULIN LISPRO 4 UNITS: 100 INJECTION, SOLUTION INTRAVENOUS; SUBCUTANEOUS at 13:32

## 2019-03-21 RX ADMIN — INSULIN LISPRO 2 UNITS: 100 INJECTION, SOLUTION INTRAVENOUS; SUBCUTANEOUS at 10:57

## 2019-03-21 RX ADMIN — CARVEDILOL 12.5 MG: 12.5 TABLET, FILM COATED ORAL at 17:33

## 2019-03-21 RX ADMIN — ATORVASTATIN CALCIUM 10 MG: 10 TABLET, FILM COATED ORAL at 10:58

## 2019-03-21 RX ADMIN — MAGNESIUM OXIDE TAB 400 MG (241.3 MG ELEMENTAL MG) 400 MG: 400 (241.3 MG) TAB at 10:58

## 2019-03-21 RX ADMIN — FUROSEMIDE 40 MG: 40 TABLET ORAL at 10:58

## 2019-03-21 RX ADMIN — HYDRALAZINE HYDROCHLORIDE 50 MG: 50 TABLET, FILM COATED ORAL at 17:33

## 2019-03-21 RX ADMIN — HYDRALAZINE HYDROCHLORIDE 50 MG: 50 TABLET, FILM COATED ORAL at 10:57

## 2019-03-21 RX ADMIN — METFORMIN HYDROCHLORIDE 1000 MG: 500 TABLET ORAL at 10:58

## 2019-03-21 RX ADMIN — METFORMIN HYDROCHLORIDE 1000 MG: 500 TABLET ORAL at 17:33

## 2019-03-21 RX ADMIN — SACUBITRIL AND VALSARTAN 1 TABLET: 24; 26 TABLET, FILM COATED ORAL at 10:57

## 2019-03-21 RX ADMIN — CLOPIDOGREL BISULFATE 75 MG: 75 TABLET, FILM COATED ORAL at 11:08

## 2019-03-21 RX ADMIN — VITAMIN D, TAB 1000IU (100/BT) 2000 UNITS: 25 TAB at 10:57

## 2019-03-21 RX ADMIN — CARVEDILOL 12.5 MG: 12.5 TABLET, FILM COATED ORAL at 10:58

## 2019-03-21 RX ADMIN — WARFARIN SODIUM 5 MG: 5 TABLET ORAL at 17:33

## 2019-03-21 ASSESSMENT — ENCOUNTER SYMPTOMS
STRIDOR: 0
NAUSEA: 0
WHEEZING: 0
EYES NEGATIVE: 1
SHORTNESS OF BREATH: 0
CHEST TIGHTNESS: 0
GASTROINTESTINAL NEGATIVE: 1
RESPIRATORY NEGATIVE: 1
COUGH: 0
BLOOD IN STOOL: 0

## 2019-03-22 ENCOUNTER — TELEPHONE (OUTPATIENT)
Dept: CARDIOLOGY CLINIC | Age: 84
End: 2019-03-22

## 2019-03-22 DIAGNOSIS — I38 VALVULAR HEART DISEASE: Primary | ICD-10-CM

## 2019-03-22 DIAGNOSIS — Z98.61 S/P PTCA (PERCUTANEOUS TRANSLUMINAL CORONARY ANGIOPLASTY): ICD-10-CM

## 2019-03-23 ENCOUNTER — HOSPITAL ENCOUNTER (OUTPATIENT)
Age: 84
Setting detail: SPECIMEN
Discharge: HOME | End: 2019-03-23
Payer: MEDICARE

## 2019-03-23 LAB
INR BLD: 2.7
PROTHROMBIN TIME: 25.7 SEC (ref 9–11.5)

## 2019-03-23 PROCEDURE — 85610 PROTHROMBIN TIME: CPT

## 2019-03-25 DIAGNOSIS — Z98.61 S/P PTCA (PERCUTANEOUS TRANSLUMINAL CORONARY ANGIOPLASTY): ICD-10-CM

## 2019-03-25 DIAGNOSIS — I38 VALVULAR HEART DISEASE: Primary | ICD-10-CM

## 2019-03-26 ENCOUNTER — ANTI-COAG VISIT (OUTPATIENT)
Dept: CARDIOLOGY CLINIC | Age: 84
End: 2019-03-26

## 2019-03-26 DIAGNOSIS — I38 VALVULAR HEART DISEASE: Primary | ICD-10-CM

## 2019-03-27 ENCOUNTER — TELEPHONE (OUTPATIENT)
Dept: CARDIOLOGY CLINIC | Age: 84
End: 2019-03-27

## 2019-03-27 NOTE — TELEPHONE ENCOUNTER
Artur Wheeler from [facility] AT [facility] calling on behalf of son and pt. Would like to know if PA has been started for [facility] that was requested on 3/22/19.  Please call [address] @ [phone]    Also needs INR orders/instructions faxed to [facility] @ [phone]

## 2019-03-28 ENCOUNTER — OFFICE VISIT (OUTPATIENT)
Dept: INTERNAL MEDICINE | Age: 84
End: 2019-03-28
Payer: MEDICARE

## 2019-03-28 VITALS
BODY MASS INDEX: 28.22 KG/M2 | DIASTOLIC BLOOD PRESSURE: 60 MMHG | WEIGHT: 169.4 LBS | SYSTOLIC BLOOD PRESSURE: 132 MMHG | HEIGHT: 65 IN | OXYGEN SATURATION: 99 % | HEART RATE: 66 BPM | TEMPERATURE: 98.3 F

## 2019-03-28 DIAGNOSIS — G45.9 TIA (TRANSIENT ISCHEMIC ATTACK): Primary | ICD-10-CM

## 2019-03-28 DIAGNOSIS — K59.01 SLOW TRANSIT CONSTIPATION: ICD-10-CM

## 2019-03-28 DIAGNOSIS — Z09 HOSPITAL DISCHARGE FOLLOW-UP: ICD-10-CM

## 2019-03-28 PROCEDURE — 4040F PNEUMOC VAC/ADMIN/RCVD: CPT | Performed by: PHYSICIAN ASSISTANT

## 2019-03-28 PROCEDURE — G8598 ASA/ANTIPLAT THER USED: HCPCS | Performed by: PHYSICIAN ASSISTANT

## 2019-03-28 PROCEDURE — 99214 OFFICE O/P EST MOD 30 MIN: CPT | Performed by: PHYSICIAN ASSISTANT

## 2019-03-28 PROCEDURE — 1123F ACP DISCUSS/DSCN MKR DOCD: CPT | Performed by: PHYSICIAN ASSISTANT

## 2019-03-28 PROCEDURE — 1036F TOBACCO NON-USER: CPT | Performed by: PHYSICIAN ASSISTANT

## 2019-03-28 PROCEDURE — G8417 CALC BMI ABV UP PARAM F/U: HCPCS | Performed by: PHYSICIAN ASSISTANT

## 2019-03-28 PROCEDURE — 1090F PRES/ABSN URINE INCON ASSESS: CPT | Performed by: PHYSICIAN ASSISTANT

## 2019-03-28 PROCEDURE — G8427 DOCREV CUR MEDS BY ELIG CLIN: HCPCS | Performed by: PHYSICIAN ASSISTANT

## 2019-03-28 PROCEDURE — G8482 FLU IMMUNIZE ORDER/ADMIN: HCPCS | Performed by: PHYSICIAN ASSISTANT

## 2019-03-28 PROCEDURE — 1111F DSCHRG MED/CURRENT MED MERGE: CPT | Performed by: PHYSICIAN ASSISTANT

## 2019-03-28 RX ORDER — NITROFURANTOIN MACROCRYSTALS 100 MG/1
100 CAPSULE ORAL DAILY
Qty: 30 CAPSULE | Refills: 5 | Status: CANCELLED | OUTPATIENT
Start: 2019-03-28 | End: 2019-04-27

## 2019-03-28 ASSESSMENT — ENCOUNTER SYMPTOMS
COUGH: 0
SHORTNESS OF BREATH: 0

## 2019-04-03 ENCOUNTER — OFFICE VISIT (OUTPATIENT)
Dept: CARDIOLOGY CLINIC | Age: 84
End: 2019-04-03
Payer: MEDICARE

## 2019-04-03 VITALS
RESPIRATION RATE: 20 BRPM | DIASTOLIC BLOOD PRESSURE: 78 MMHG | HEIGHT: 65 IN | HEART RATE: 68 BPM | BODY MASS INDEX: 28.12 KG/M2 | SYSTOLIC BLOOD PRESSURE: 120 MMHG | OXYGEN SATURATION: 98 % | WEIGHT: 168.8 LBS

## 2019-04-03 DIAGNOSIS — N18.30 STAGE 3 CHRONIC KIDNEY DISEASE (HCC): ICD-10-CM

## 2019-04-03 DIAGNOSIS — I51.89 DIASTOLIC DYSFUNCTION: ICD-10-CM

## 2019-04-03 DIAGNOSIS — Z86.73 HISTORY OF CVA (CEREBROVASCULAR ACCIDENT): ICD-10-CM

## 2019-04-03 DIAGNOSIS — I25.10 CAD S/P PERCUTANEOUS CORONARY ANGIOPLASTY: Primary | ICD-10-CM

## 2019-04-03 DIAGNOSIS — I42.9 CARDIOMYOPATHY, UNSPECIFIED TYPE (HCC): ICD-10-CM

## 2019-04-03 DIAGNOSIS — Z98.61 CAD S/P PERCUTANEOUS CORONARY ANGIOPLASTY: Primary | ICD-10-CM

## 2019-04-03 DIAGNOSIS — I25.2 HISTORY OF NON-ST ELEVATION MYOCARDIAL INFARCTION (NSTEMI): ICD-10-CM

## 2019-04-03 PROCEDURE — 1036F TOBACCO NON-USER: CPT | Performed by: INTERNAL MEDICINE

## 2019-04-03 PROCEDURE — G8417 CALC BMI ABV UP PARAM F/U: HCPCS | Performed by: INTERNAL MEDICINE

## 2019-04-03 PROCEDURE — 1090F PRES/ABSN URINE INCON ASSESS: CPT | Performed by: INTERNAL MEDICINE

## 2019-04-03 PROCEDURE — G8427 DOCREV CUR MEDS BY ELIG CLIN: HCPCS | Performed by: INTERNAL MEDICINE

## 2019-04-03 PROCEDURE — 99214 OFFICE O/P EST MOD 30 MIN: CPT | Performed by: INTERNAL MEDICINE

## 2019-04-03 PROCEDURE — G8598 ASA/ANTIPLAT THER USED: HCPCS | Performed by: INTERNAL MEDICINE

## 2019-04-03 PROCEDURE — 4040F PNEUMOC VAC/ADMIN/RCVD: CPT | Performed by: INTERNAL MEDICINE

## 2019-04-03 PROCEDURE — 1123F ACP DISCUSS/DSCN MKR DOCD: CPT | Performed by: INTERNAL MEDICINE

## 2019-04-03 PROCEDURE — 1111F DSCHRG MED/CURRENT MED MERGE: CPT | Performed by: INTERNAL MEDICINE

## 2019-04-03 RX ORDER — NITROFURANTOIN MACROCRYSTALS 100 MG/1
100 CAPSULE ORAL NIGHTLY
Refills: 0 | COMMUNITY
Start: 2019-03-29 | End: 2019-08-27 | Stop reason: SDUPTHER

## 2019-04-03 RX ORDER — HYDRALAZINE HYDROCHLORIDE 50 MG/1
50 TABLET, FILM COATED ORAL 2 TIMES DAILY
COMMUNITY
End: 2019-05-01 | Stop reason: DRUGHIGH

## 2019-04-03 ASSESSMENT — ENCOUNTER SYMPTOMS
APNEA: 0
VOMITING: 0
ABDOMINAL DISTENTION: 0
WHEEZING: 0
TROUBLE SWALLOWING: 0
VOICE CHANGE: 0
SHORTNESS OF BREATH: 0
NAUSEA: 0
COLOR CHANGE: 0
DIARRHEA: 0
ANAL BLEEDING: 0
FACIAL SWELLING: 0
BLOOD IN STOOL: 0
CHEST TIGHTNESS: 0

## 2019-04-03 NOTE — PROGRESS NOTES
[facility] CARDIOLOGY OFFICE FOLLOW-UP      Patient: Donnie Singh  YOB: 1927  MRN: [id number]    Chief Complaint:  Chief Complaint   Patient presents with    Follow-Up from [facility] ER - Cath/OMAR done         Subjective/HPI:  4/3/19: Patient presents today for follow-up of recent admission for confusion. She is accompanied by her son. A OMAR was done. There was no thrombus. Ejection fraction was 25%. We will cut down the hydralazine to twice a day. When she is done with magnesium, noting unit. She supposed for some labs done by Dr. Kerrie Baca soon. PT/INR will be done by visiting nurse on Saturday. She'll see me in a month. 2/19/19: Patient presents today for Follow-up of coronary artery disease. Stent to the proximal LAD. Has mild to 50% disease in the mid LAD. Ejection fraction was diminished. We are going to check another echo. See what LV ejection fraction is. She was recently admitted to [facility] with UTI. Was started on hydralazine. This was 3 times a day and I cut it down to 1 a day. Patient remains stable. I will stop it altogether. See me in 3 months with an echo     1/18/19: Patient presents today for Follow-up of recent hospitalization at [facility] for acute MI. Had angioplasty stent to proximal LAD mid LAD at 50% stenosis ejection fraction is 25%. On lisinopril and metoprolol. Month in the [place] office. He reported repeat EF evaluation at some point     8/21/18: Patient presents today for follow-up of hypertension. Much better. Only on 5 mg of Norvasc. Blood sugars are better to more alert. Dizziness is improved. Accompanied by a son. Mild chronic kidney disease is stable. See me in 6 months.     5/15/18: Patient presents today for follow-up of hypertension. The dizziness is much better after we stopped few of her medication. Blood pressure is holding very well I think we could probably stop the Norvasc but she is quite content and so is a son with the present medications. She has mild chronic kidney disease. Hyperlipidemia that stable. See me in 3 months     4/11/18: Patient presents today for Follow-up of hypertension. She gets dizzy. I think she is on too much medications. We will discontinue hydralazine clonidine and Lasix. His mild chronic kidney disease. Also has hyperlipidemia that stable. I will see him in 3 weeks. Off these medications     3/28/2018: Patient presents today for evaluation of recent hospitalization for acute renal insufficiency. Echocardiogram was done which showed preserved LV ejection fraction. Moderately elevated right systolic pressure 60 mm smoker. Last BUN/creatinine are getting better. GFR is in the 40s. She lives with one of her other sons. She note of hypertension. Used to be on lisinopril 20 mg a day and hydrochlorothiazide. During this hospitalization she was discharged home on lisinopril 2010 a day no hydrochlorothiazide. Lasix 20 minutes, day. Norvasc 10 mg a day. Clonidine 0.2 3 times a day hydralazine 10 3 times a day. I like blood pressures have been running stable in the 120s. Physical therapy goes to her house. She denies any chest pain. She started to drink more water. Does not drive. Does minor chores around the house. I would like to simplify his regimen. Take clonidine 0.2 in a.m. and hydralazine 10 mg at night. Other medication which include Lopressor 25 mg by mouth twice a day Lasix 20 mg a day Norvasc and resume a day to continue.  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 5/29/2012    S/P PTCA (percutaneous transluminal coronary angioplasty) 1/18/2019    SCC (squamous cell carcinoma), arm 2013    right upper arm, 2015 right forarm    SI (stress incontinence), female 5/29/2012    Type II or unspecified type diabetes mellitus without mention of complication, not stated as uncontrolled     Whooping cough        Past Surgical History:   Procedure Laterality Date    ANKLE SURGERY      broken left ankle.  APPENDECTOMY      BREAST BIOPSY      x2 left breast    CATARACT REMOVAL  2004    bilateral    CORONARY ANGIOPLASTY WITH STENT PLACEMENT  01/2019    CYSTOCELE REPAIR      EYE SURGERY      bilateral cataract    HYSTERECTOMY      complete at age 39    [address]      as a child       Family History   Problem Relation Age of Onset    Diabetes Mother     Heart Disease Father        Social History     Socioeconomic History    Marital status:       Spouse name: None    Number of children: None    Years of education: None    Highest education level: None   Occupational History    None   Social Needs    Financial resource strain: None    Food insecurity:     Worry: None     Inability: None    Transportation needs:     Medical: None     Non-medical: None   Tobacco Use    Smoking status: Never Smoker    Smokeless tobacco: Never Used   Substance and Sexual Activity    Alcohol use: No     Alcohol/week: 0.0 oz    Drug use: No    Sexual activity: Never   Lifestyle    Physical activity:     Days per week: None     Minutes per session: None    Stress: None   Relationships    Social connections:     Talks on phone: None     Gets together: None     Attends Baptism service: None     Active member of club or organization: None     Attends meetings of clubs or organizations: None     Relationship status: None    Intimate partner violence:     Fear of current or ex partner: None     Emotionally abused: None     Physically mouth twice a day with meals 180 tablet 3    magnesium oxide (MAG-OX) 400 MG tablet Take 400 mg by mouth daily      Needles & Syringes MISC 1 each by Does not apply route daily 50 each 0    TRUEPLUS LANCETS 28G MISC TEST TWO TIMES DAILY 200 each 3    Incontinence Supply Disposable (DEPEND UNDERGARMENTS) MISC 1 each by Does not apply route nightly 1 Package 11    Blood Glucose Monitoring Suppl (TRUE METRIX AIR GLUCOSE METER) W/DEVICE KIT       Blood Glucose Monitoring Suppl PRUDENCIO Dx: E11.9 1 Device 0    Compression Stockings MISC by Does not apply route Knee high, 20-30 mmHg 1 each 1    aspirin 81 MG tablet Take 81 mg by mouth daily.  hydrALAZINE (APRESOLINE) 50 MG tablet Take 1 tablet by mouth every 8 hours 90 tablet 3    lisinopril (PRINIVIL;ZESTRIL) 40 MG tablet Take 1 tablet by mouth daily 90 tablet 3     No current facility-administered medications for this visit. Review of Systems:   Review of Systems   Constitutional: Negative for activity change, appetite change, diaphoresis, fatigue and unexpected weight change. HENT: Negative for facial swelling, nosebleeds, trouble swallowing and voice change. Respiratory: Negative for apnea, chest tightness, shortness of breath and wheezing. Cardiovascular: Negative for chest pain, palpitations and leg swelling. Gastrointestinal: Negative for abdominal distention, anal bleeding, blood in stool, diarrhea, nausea and vomiting. Genitourinary: Negative for decreased urine volume and dysuria. Musculoskeletal: Negative for gait problem, myalgias, neck pain and neck stiffness. Skin: Negative for color change, pallor, rash and wound. Neurological: Positive for light-headedness. Negative for dizziness, seizures, syncope, facial asymmetry, weakness, numbness and headaches. Hematological: Does not bruise/bleed easily. Psychiatric/Behavioral: Negative for agitation, behavioral problems, confusion, hallucinations and suicidal ideas.  The patient is not nervous/anxious. All other systems reviewed and are negative. Review of System is negative except for as mentioned above. Physical Examination:    /78 (Site: Left Upper Arm, Position: Sitting, Cuff Size: Medium Adult)   Pulse 68   Resp 20   Ht 5' 5\" (1.651 m)   Wt 168 lb 12.8 oz (76.6 kg)   LMP  (LMP Unknown)   SpO2 98%   BMI 28.09 kg/m²    Physical Exam   Constitutional: She appears healthy. No distress. HENT:   Nose: Nose normal.   Mouth/Throat: Dentition is normal. Oropharynx is clear. Eyes: Pupils are equal, round, and reactive to light. Conjunctivae are normal.   Neck: Normal range of motion and thyroid normal. Neck supple. Cardiovascular: Regular rhythm, S1 normal, S2 normal, normal heart sounds, intact distal pulses and normal pulses. PMI is not displaced. No murmur heard. Pulmonary/Chest: She has no wheezes. She has no rales. She exhibits no tenderness. Abdominal: Soft. Bowel sounds are normal. She exhibits no distension and no mass. There is no splenomegaly or hepatomegaly. There is no tenderness. No hernia. Neurological: She is alert and oriented to person, place, and time. She has normal motor skills. Gait normal.   Skin: Skin is warm and dry. No cyanosis. No jaundice. Nails show no clubbing.        LABS:  CBC:   Lab Results   Component Value Date    WBC 7.1 03/21/2019    RBC 3.50 03/21/2019    RBC 3.91 02/28/2012    HGB 10.2 03/21/2019    HCT 31.0 03/21/2019    MCV 88.6 03/21/2019    MCH 29.1 03/21/2019    MCHC 32.8 03/21/2019    RDW 15.9 03/21/2019     03/21/2019    MPV 11.6 02/21/2014     Lipids:  Lab Results   Component Value Date    CHOL 122 03/15/2019    CHOL 228 (H) 09/17/2018    CHOL 143 12/21/2017     Lab Results   Component Value Date    TRIG 98 03/15/2019    TRIG 170 09/17/2018    TRIG 110 12/21/2017     Lab Results   Component Value Date    HDL 48 03/15/2019    HDL 53 09/17/2018    HDL 60 (H) 12/21/2017     Lab Results   Component Value Date    LDLCALC 54 03/15/2019    LDLCALC 141 (H) 09/17/2018    LDLCALC 61 12/21/2017     Lab Results   Component Value Date    LABVLDL 34.0 05/01/2013     Lab Results   Component Value Date    CHOLHDLRATIO 3.4 11/28/2012    CHOLHDLRATIO 3.2 05/25/2012     CMP:    Lab Results   Component Value Date     03/21/2019    K 4.1 03/21/2019     03/21/2019    CO2 21 03/21/2019    BUN 14 03/21/2019    CREATININE 0.91 03/21/2019    GFRAA >60.0 03/21/2019    LABGLOM 57.8 03/21/2019    GLUCOSE 146 03/21/2019    GLUCOSE 154 05/25/2012    PROT 7.0 03/14/2019    LABALBU 4.0 03/14/2019    LABALBU 4.4 05/25/2012    CALCIUM 8.3 03/21/2019    BILITOT 0.3 03/14/2019    ALKPHOS 64 03/14/2019    AST 25 03/14/2019    ALT 28 03/14/2019     BMP:    Lab Results   Component Value Date     03/21/2019    K 4.1 03/21/2019     03/21/2019    CO2 21 03/21/2019    BUN 14 03/21/2019    LABALBU 4.0 03/14/2019    LABALBU 4.4 05/25/2012    CREATININE 0.91 03/21/2019    CALCIUM 8.3 03/21/2019    GFRAA >60.0 03/21/2019    LABGLOM 57.8 03/21/2019    GLUCOSE 146 03/21/2019    GLUCOSE 154 05/25/2012     Magnesium:    Lab Results   Component Value Date    MG 1.6 03/21/2019     TSH:  Lab Results   Component Value Date    TSH 1.810 03/15/2019       Patient Active Problem List   Diagnosis    HTN (hypertension)    Diabetes mellitus    SI (stress incontinence), female    Osteoarthritis    CKD (chronic kidney disease)    HLD (hyperlipidemia)    Vitamin D deficiency    Eczematous dermatitis    Chronic low back pain    Lumbar spinal stenosis    Hypercalcemia    Diastolic dysfunction    Valvular heart disease    Recurrent UTI (urinary tract infection)    Vitamin B12 deficiency    Chest pain    Nausea & vomiting    History of non-ST elevation myocardial infarction (NSTEMI)    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

## 2019-04-06 LAB
INR BLD: 1.6
PROTHROMBIN TIME: 15.9 SEC (ref 9–11.5)

## 2019-04-09 ENCOUNTER — HOSPITAL ENCOUNTER (OUTPATIENT)
Age: 84
Setting detail: SPECIMEN
Discharge: HOME | End: 2019-04-09
Payer: MEDICARE

## 2019-04-09 ENCOUNTER — TELEPHONE (OUTPATIENT)
Dept: CARDIOLOGY CLINIC | Age: 84
End: 2019-04-09

## 2019-04-09 LAB
INR BLD: 1.4
PROTHROMBIN TIME: 14.4 SEC (ref 9–11.5)

## 2019-04-09 PROCEDURE — 85610 PROTHROMBIN TIME: CPT

## 2019-04-10 ENCOUNTER — OFFICE VISIT (OUTPATIENT)
Dept: INTERNAL MEDICINE | Age: 84
End: 2019-04-10
Payer: MEDICARE

## 2019-04-10 VITALS
WEIGHT: 173.2 LBS | HEIGHT: 65 IN | HEART RATE: 52 BPM | OXYGEN SATURATION: 92 % | DIASTOLIC BLOOD PRESSURE: 80 MMHG | BODY MASS INDEX: 28.86 KG/M2 | SYSTOLIC BLOOD PRESSURE: 134 MMHG

## 2019-04-10 DIAGNOSIS — N18.30 STAGE 3 CHRONIC KIDNEY DISEASE (HCC): ICD-10-CM

## 2019-04-10 DIAGNOSIS — G45.9 TRANSIENT CEREBRAL ISCHEMIA, UNSPECIFIED TYPE: ICD-10-CM

## 2019-04-10 DIAGNOSIS — E78.00 PURE HYPERCHOLESTEROLEMIA: ICD-10-CM

## 2019-04-10 DIAGNOSIS — I25.2 HISTORY OF ST ELEVATION MYOCARDIAL INFARCTION (STEMI): ICD-10-CM

## 2019-04-10 DIAGNOSIS — I69.90 LATE EFFECTS OF CVA (CEREBROVASCULAR ACCIDENT): ICD-10-CM

## 2019-04-10 DIAGNOSIS — E11.8 TYPE 2 DIABETES MELLITUS WITH COMPLICATION, WITHOUT LONG-TERM CURRENT USE OF INSULIN (HCC): Primary | ICD-10-CM

## 2019-04-10 DIAGNOSIS — M48.061 SPINAL STENOSIS OF LUMBAR REGION WITHOUT NEUROGENIC CLAUDICATION: ICD-10-CM

## 2019-04-10 DIAGNOSIS — I51.89 DIASTOLIC DYSFUNCTION: ICD-10-CM

## 2019-04-10 DIAGNOSIS — I10 ESSENTIAL HYPERTENSION: ICD-10-CM

## 2019-04-10 DIAGNOSIS — E53.8 VITAMIN B12 DEFICIENCY: ICD-10-CM

## 2019-04-10 DIAGNOSIS — E83.52 HYPERCALCEMIA: ICD-10-CM

## 2019-04-10 DIAGNOSIS — Z98.61 S/P PTCA (PERCUTANEOUS TRANSLUMINAL CORONARY ANGIOPLASTY): ICD-10-CM

## 2019-04-10 DIAGNOSIS — I38 VALVULAR HEART DISEASE: ICD-10-CM

## 2019-04-10 DIAGNOSIS — Z86.73 HISTORY OF CVA (CEREBROVASCULAR ACCIDENT): ICD-10-CM

## 2019-04-10 DIAGNOSIS — N39.0 RECURRENT UTI (URINARY TRACT INFECTION): ICD-10-CM

## 2019-04-10 LAB
ALBUMIN SERPL-MCNC: 4.1 G/DL (ref 3.5–4.6)
ALP BLD-CCNC: 59 U/L (ref 40–130)
ALT SERPL-CCNC: 9 U/L (ref 0–33)
ANION GAP SERPL CALCULATED.3IONS-SCNC: 16 MEQ/L (ref 9–15)
AST SERPL-CCNC: 16 U/L (ref 0–35)
BASOPHILS ABSOLUTE: 0 K/UL (ref 0–0.2)
BASOPHILS RELATIVE PERCENT: 0.5 %
BILIRUB SERPL-MCNC: <0.2 MG/DL (ref 0.2–0.7)
BUN BLDV-MCNC: 38 MG/DL (ref 8–23)
CALCIUM SERPL-MCNC: 9.6 MG/DL (ref 8.5–9.9)
CHLORIDE BLD-SCNC: 97 MEQ/L (ref 95–107)
CHOLESTEROL, TOTAL: 135 MG/DL (ref 0–199)
CO2: 24 MEQ/L (ref 20–31)
CREAT SERPL-MCNC: 1.06 MG/DL (ref 0.5–0.9)
EOSINOPHILS ABSOLUTE: 0.6 K/UL (ref 0–0.7)
EOSINOPHILS RELATIVE PERCENT: 8.5 %
GFR AFRICAN AMERICAN: 58.7
GFR NON-AFRICAN AMERICAN: 48.5
GLOBULIN: 2.5 G/DL (ref 2.3–3.5)
GLUCOSE BLD-MCNC: 169 MG/DL (ref 70–99)
HBA1C MFR BLD: 7.2 %
HCT VFR BLD CALC: 30.6 % (ref 37–47)
HDLC SERPL-MCNC: 52 MG/DL (ref 40–59)
HEMOGLOBIN: 9.9 G/DL (ref 12–16)
LDL CHOLESTEROL CALCULATED: 68 MG/DL (ref 0–129)
LYMPHOCYTES ABSOLUTE: 1.2 K/UL (ref 1–4.8)
LYMPHOCYTES RELATIVE PERCENT: 15.5 %
MCH RBC QN AUTO: 28.5 PG (ref 27–31.3)
MCHC RBC AUTO-ENTMCNC: 32.4 % (ref 33–37)
MCV RBC AUTO: 88.2 FL (ref 82–100)
MONOCYTES ABSOLUTE: 0.5 K/UL (ref 0.2–0.8)
MONOCYTES RELATIVE PERCENT: 7.1 %
NEUTROPHILS ABSOLUTE: 5.2 K/UL (ref 1.4–6.5)
NEUTROPHILS RELATIVE PERCENT: 68.4 %
PARATHYROID HORMONE INTACT: 40.9 PG/ML (ref 15–65)
PDW BLD-RTO: 16.1 % (ref 11.5–14.5)
PHOSPHORUS: 3.6 MG/DL (ref 2.3–4.8)
PLATELET # BLD: 249 K/UL (ref 130–400)
POTASSIUM SERPL-SCNC: 5.1 MEQ/L (ref 3.4–4.9)
RBC # BLD: 3.47 M/UL (ref 4.2–5.4)
SODIUM BLD-SCNC: 137 MEQ/L (ref 135–144)
TOTAL PROTEIN: 6.6 G/DL (ref 6.3–8)
TRIGL SERPL-MCNC: 74 MG/DL (ref 0–150)
WBC # BLD: 7.6 K/UL (ref 4.8–10.8)

## 2019-04-10 PROCEDURE — 1111F DSCHRG MED/CURRENT MED MERGE: CPT | Performed by: FAMILY MEDICINE

## 2019-04-10 PROCEDURE — 99215 OFFICE O/P EST HI 40 MIN: CPT | Performed by: FAMILY MEDICINE

## 2019-04-10 PROCEDURE — 1090F PRES/ABSN URINE INCON ASSESS: CPT | Performed by: FAMILY MEDICINE

## 2019-04-10 PROCEDURE — G8598 ASA/ANTIPLAT THER USED: HCPCS | Performed by: FAMILY MEDICINE

## 2019-04-10 PROCEDURE — 4040F PNEUMOC VAC/ADMIN/RCVD: CPT | Performed by: FAMILY MEDICINE

## 2019-04-10 PROCEDURE — 83036 HEMOGLOBIN GLYCOSYLATED A1C: CPT | Performed by: FAMILY MEDICINE

## 2019-04-10 PROCEDURE — G8417 CALC BMI ABV UP PARAM F/U: HCPCS | Performed by: FAMILY MEDICINE

## 2019-04-10 PROCEDURE — G8427 DOCREV CUR MEDS BY ELIG CLIN: HCPCS | Performed by: FAMILY MEDICINE

## 2019-04-10 PROCEDURE — 1036F TOBACCO NON-USER: CPT | Performed by: FAMILY MEDICINE

## 2019-04-10 PROCEDURE — 1123F ACP DISCUSS/DSCN MKR DOCD: CPT | Performed by: FAMILY MEDICINE

## 2019-04-10 RX ORDER — CLOPIDOGREL BISULFATE 75 MG/1
75 TABLET ORAL DAILY
Qty: 90 TABLET | Refills: 3 | Status: SHIPPED | OUTPATIENT
Start: 2019-04-10 | End: 2020-03-30 | Stop reason: SDUPTHER

## 2019-04-10 RX ORDER — CLOPIDOGREL BISULFATE 75 MG/1
75 TABLET ORAL DAILY
Qty: 90 TABLET | Refills: 3 | Status: SHIPPED | OUTPATIENT
Start: 2019-04-10 | End: 2019-04-10 | Stop reason: SDUPTHER

## 2019-04-10 RX ORDER — GLUCOSAM/CHON-MSM1/C/MANG/BOSW 500-416.6
TABLET ORAL
Qty: 200 EACH | Refills: 3 | Status: SHIPPED | OUTPATIENT
Start: 2019-04-10 | End: 2019-04-10 | Stop reason: SDUPTHER

## 2019-04-10 RX ORDER — GLUCOSAM/CHON-MSM1/C/MANG/BOSW 500-416.6
TABLET ORAL
Qty: 200 EACH | Refills: 3 | Status: SHIPPED | OUTPATIENT
Start: 2019-04-10

## 2019-04-10 RX ORDER — WARFARIN SODIUM 1 MG/1
TABLET ORAL
Qty: 90 TABLET | Refills: 3 | Status: ON HOLD | OUTPATIENT
Start: 2019-04-10 | End: 2020-07-08 | Stop reason: HOSPADM

## 2019-04-10 ASSESSMENT — ENCOUNTER SYMPTOMS
EYE PAIN: 0
BACK PAIN: 0
EYE DISCHARGE: 0
EYE ITCHING: 0

## 2019-04-10 NOTE — PROGRESS NOTES
Patient: Diana Davila    YOB: 1927    Date: 4/10/19       Patient Active Problem List    Diagnosis Date Noted    History of ST elevation myocardial infarction (STEMI)      Priority: High    CKD (chronic kidney disease) 03/18/2015     Priority: High     Overview Note:     Stage G3a A2      HLD (hyperlipidemia) 03/18/2015     Priority: High    HTN (hypertension) 11/29/2011     Priority: High    Diabetes mellitus 11/29/2011     Priority: High    Diastolic dysfunction 08/29/6410     Priority: Medium    Valvular heart disease 03/07/2018     Priority: Medium    Hypercalcemia 01/15/2018     Priority: Medium     Overview Note:     kami Tripathi      Lumbar spinal stenosis 12/22/2016     Priority: Medium    Chronic low back pain 08/17/2016     Priority: Medium    Eczematous dermatitis      Priority: Low    Vitamin D deficiency 03/18/2015     Priority: Low    SI (stress incontinence), female 05/29/2012     Priority: Low    Osteoarthritis 05/29/2012     Priority: Low    Transient cerebral ischemia 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/2019    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/2018    History of non-ST elevation myocardial infarction (NSTEMI) 11/12/2018    Nausea & vomiting 11/11/2018    Chest pain 11/10/2018    Vitamin B12 deficiency 09/17/2018     Overview Note:     Started 2/2018      Recurrent UTI (urinary tract infection) 08/06/2018     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    History of non-ST elevation myocardial infarction (NSTEMI) 11/12/2018    History of ST elevation myocardial Intimate partner violence:     Fear of current or ex partner: Not on file     Emotionally abused: Not on file     Physically abused: Not on file     Forced sexual activity: Not on file   Other Topics Concern    Not on file   Social History Narrative         Lives With: Son    Type of Home: House    Home Layout: Two level, Able to Live on Main level with bedroom/bathroom, Performs ADL's on one level    Home Access: Stairs to enter with rails    Entrance Stairs - Number of Steps: Threshold    Bathroom Shower/Tub: Tub/Shower unit    Bathroom Toilet: Handicap height    Bathroom Equipment: Grab bars in shower, Grab bars around toilet, Hand-held shower, Shower chair    Bathroom Accessibility: Accessible    Home Equipment: Rolling walker, 4 wheeled walker (Usually uses rollator)    ADL Assistance: [facility]: Needs assistance (Son manages housework)    Homemaking Responsibilities: No    Ambulation Assistance: Independent (Rollator)    Transfer Assistance: Independent    Active : No    Patient's  Info:  Sons          Family History   Problem Relation Age of Onset    Diabetes Mother     Heart Disease Father      Current Outpatient Medications on File Prior to Visit   Medication Sig Dispense Refill    nitrofurantoin (MACRODANTIN) 100 MG capsule Take 100 mg by mouth nightly   0    hydrALAZINE (APRESOLINE) 50 MG tablet Take 50 mg by mouth 2 times daily      sacubitril-valsartan (ENTRESTO) 24-26 MG per tablet Take 1 tablet by mouth 2 times daily 56 tablet 0    carvedilol (COREG) 12.5 MG tablet Take 1 tablet by mouth 2 times daily (with meals) 60 tablet 3    furosemide (LASIX) 40 MG tablet Take 1 tablet by mouth daily 60 tablet 3    Cholecalciferol (VITAMIN D) 2000 units CAPS capsule Take 2,000 Units by mouth      atorvastatin (LIPITOR) 10 MG tablet take 1 tablet by mouth once daily 90 tablet 3    TRUE METRIX BLOOD GLUCOSE TEST strip TEST two to three times a day 200 strip 5    none.     Hyperlipidemia:  No new myalgias or GI upset on atorvastatin (Lipitor). Lab Results   Component Value Date    LABA1C 7.2 04/10/2019    LABA1C 7.6 (H) 11/10/2018    LABA1C 7.7 09/17/2018     Lab Results   Component Value Date    LABMICR 5.80 (H) 12/21/2017    CREATININE 0.91 (H) 03/21/2019     Lab Results   Component Value Date    ALT 28 03/14/2019    AST 25 03/14/2019     Lab Results   Component Value Date    CHOL 122 03/15/2019    TRIG 98 03/15/2019    HDL 48 03/15/2019    LDLCALC 54 03/15/2019    LDLDIRECT 64 08/17/2016        Diabetes and Hypertension Visit Information    BP Readings from Last 3 Encounters:   04/10/19 134/80   04/03/19 120/78   03/28/19 132/60          Have you seen any other physician or provider since your last visit? No  Have you had any other diagnostic tests since your last visit? No  Have you been seen in the emergency room and/or had an admission to a hospital since we last saw you?  No    Patient Care Team:  Mila Osborne MD as PCP - General (Family Medicine)  Anika New MD (Internal Medicine)           Health Maintenance   Topic Date Due    Shingles Vaccine (1 of 2) 09/17/2019 (Originally 6/10/1977)    Potassium monitoring  03/21/2020    Creatinine monitoring  03/21/2020    DTaP/Tdap/Td vaccine (2 - Td) 02/11/2027    Flu vaccine  Completed    Pneumococcal 65+ years Vaccine  Completed         Recurrent UTI's  Started on macrobid for UTI prophylaxis on 8/6/17  Has been taking Abx as prescribed, no med SE's  One-2 UTI's since starting medication  Has been seen by urology and ID, agreed to continue macrodantin 100 mg daily and increased dose to 100 mg from 50 mg    Hypercalcemia  No longer seeing   Calcium trending down   Lab Results   Component Value Date    PTH 33.3 09/17/2018    CALCIUM 8.3 (L) 03/21/2019    PHOS 3.2 95/53/4950          Diastolic dysfunction, valvular disease, CAD/STENT, MI 11/18  Sees cardiology  Left heart cath done with STENT placement. 99% proximal LAD, mild disease of CX and RCA, EF of 25-30% December 2018  angioplasty stent to proximal LAD mid LAD at 50% stenosis ejection fraction is 25%      CVA 11/18, long term anticoagulation was started   Seeing neurology   Currently on coumadin, ASA, and Plavix! Currently on coumadin 5 mg daily  INR 1.4 yesterday    Hypersensitivity eruption with eczema  She does not Follow anymore with  in [place]   itchy scaly skin lesions on her arms and legs - doing ok currently  She has seen derm  multiple times   She has been tried on [place] in the past but did not tolerate it  Has tried topical steroid therapies and oral atarax - Not much change noted  Was oral doxy the rash gets better but she has significant GI side effects so that has been d/c'd  Her glipizide was stopped in the past since it was initially suspected to be causing her symptoms  She is currently on a cream which has been helping     CKD:  Has had increased Cr/GFR for >6 months  Has been stable  No symptoms from it  No nephrology referral in the past  CKD work up 6/2015 was WNL      Back Pain:  Previous MRI reviewed  Had back injection with pain management in 2015 - has not seen them since  Did not tolerate Neurontin  Currently being controlled with tylenol      Anemia  stable       Review of Systems   Constitutional: Negative for activity change, appetite change and chills. HENT: Negative for congestion, dental problem and drooling. Eyes: Negative for pain, discharge and itching. Musculoskeletal: Positive for gait problem. Negative for arthralgias and back pain. Physical Exam  Vitals:    04/10/19 0858   BP: 134/80   Pulse: 52   SpO2: 92%   Body mass index is 28.82 kg/m². Physical Exam  Constitutional:  Appears well-developed and well-nourished. No distress. HENT:   Head: Normocephalic and atraumatic.    Right Ear: External ear normal.   Left Ear: External ear normal. Nose: Nose normal.   Eyes: Conjunctivae and EOM are normal.  Right eye exhibits no discharge. Left eye exhibits no discharge. No scleral icterus. Neck: Normal range of motion. Cardiovascular: Normal rate, regular rhythm and normal heart sounds. No murmur heard. Pulmonary/Chest: Effort normal and breath sounds normal. No respiratory distress. no wheezes. no rales. no tenderness. Musculoskeletal: Normal range of motion. Neurological: alert. Skin: not diaphoretic. Psychiatric: normal mood and affect. behavior is normal. Judgment and thought content normal.   Nursing note and vitals reviewed. Assessment:  Alton Rivers was seen today for diabetes, hypertension, hyperlipidemia and other. Diagnoses and all orders for this visit:    Type 2 diabetes mellitus with complication, without long-term current use of insulin ([facility])  -     POCT glycosylated hemoglobin (Hb A1C)  -     Discontinue: TRUEPLUS LANCETS 28G MISC; TEST TWO TIMES DAILY  -     TRUEPLUS LANCETS 28G MISC; TEST TWO TIMES DAILY  Stable, controlled  Plan: continue current medications    Essential hypertension  -     Comprehensive Metabolic Panel; Future  Stable, controlled  Plan: continue current medications    Pure hypercholesterolemia  -     Lipid Panel; Future  Stable, controlled  Plan: continue current medications    Stage 3 chronic kidney disease (HCC)  -     PTH, Intact; Future  -     Phosphorus;  Future  Stable    History of ST elevation myocardial infarction (STEMI)  No chest pain  Has had recent MI  Recent STENT placed  Cont plavix    Valvular heart disease  Diastolic dysfunction  Stable    Hypercalcemia  Stable    Spinal stenosis of lumbar region without neurogenic claudication  Doing well, not a concern currently    Vitamin B12 deficiency  -     CBC Auto Differential; Future  Check levels    S/P PTCA (percutaneous transluminal coronary angioplasty)  As above    Transient cerebral ischemia, unspecified type  On ASA AND Plavix  Advised to discussed D/C of ASA, since she is on 3 different blood thinners    Recurrent UTI (urinary tract infection)  Stable, controlled  Plan: continue current medications    Late effects of CVA (cerebrovascular accident)  Unchanged    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  Unchanged    Other orders  -     Discontinue: clopidogrel (PLAVIX) 75 MG tablet; Take 1 tablet by mouth daily  -     clopidogrel (PLAVIX) 75 MG tablet; Take 1 tablet by mouth daily  -     warfarin (COUMADIN) 1 MG tablet; Take daily    Orders Placed This Encounter   Procedures    CBC Auto Differential     Standing Status:   Future     Number of Occurrences:   1     Standing Expiration Date:   7/10/2019    Comprehensive Metabolic Panel     Standing Status:   Future     Number of Occurrences:   1     Standing Expiration Date:   7/10/2019    Lipid Panel     Standing Status:   Future     Number of Occurrences:   1     Standing Expiration Date:   4/10/2020     Order Specific Question:   Is Patient Fasting?/# of Hours     Answer:   no    PTH, Intact     Standing Status:   Future     Number of Occurrences:   1     Standing Expiration Date:   4/10/2020    Phosphorus     Standing Status:   Future     Number of Occurrences:   1     Standing Expiration Date:   4/10/2020    POCT glycosylated hemoglobin (Hb A1C)      I personally reviewed all recent labs and imaging pertaining to conditions mentioned above in assessment/plan in [facility] and care everywhere, discussed results with patient in office    Diabetes Counseling   Patient was counseled regarding disease risks and adopting healthy behaviors. Patient was provided education materials to assist with self management. Patient was provided log (or received log during previous visit) to record blood pressure, food intake and/or blood sugar. Patient was instructed to keep log up-to-date and to always bring log to all office visits.     Reviewed the following:  - Morbidity from diabetes involves both macrovascular (atherosclerosis) and microvascular disease (retinopathy, nephropathy, and neuropathy). - Monitoring recommendations for patients with diabetes were discussed  1. Smoking cessation counseling (Every visit)   2. Blood pressure (Every visit) Goal <140/80   3. Dilated eye examination (Annually, more than annually if significant retinopathy)   4. Foot examination (Annually, every visit if peripheral vascular disease or neuropathy)   5. Laboratory studies:    - Fasting serum lipid profile (Annually)  cholesterol (goal LDL less than 100  mg/dL    - A1C (Every three to six months) Goal <7 percent   - Urinary albumin-to-creatinine ratio (Annually, protein excretion and serum  creatinine should also be monitored if persistent albuminuria is present)    - Serum creatinine, initially as indicated  6. ASA (75 to 162 mg/day) in patients with or at high risk for cardiovascular disease  7. Vaccinations:   - Pneumococcus, One time Patients over age 72 need a second dose if vaccine  was received ? 5 years previously and age was <65 at time of vaccination    - Influenza, Annually    - Hepatitis B, They should have had the three dose series   8. Education, self management review Annually      Return in about 3 months (around 7/10/2019) for Diabetes, Hypertension, Hyperlipidemia.

## 2019-04-10 NOTE — PATIENT INSTRUCTIONS
Change coumadin dose to 6 mg Monday, Wednesday, Friday. 5 mg rest of the week  Recheck INR in 7 days       Hypertension / Hyperlipidemia Management    Blood Pressure Log      Tips to manage your condition    1. Keep your blood pressure below 130/80   Make sure your blood pressure is measured at every office visit    2. If you take antihypertensive drug therapy return for follow-up monthly until B/P goal is reached. 3. Follow-up with your physician to have your potassium and creatinine measured every 6 months. 4. Measure your blood pressure at home (use log to track your progress). 5. Keep your LDL (bad) cholesterol level below 130   Make sure your lipids are measured once a year     6. If you take LDL-lowering drug therapy return for follow-up every 6 months    Tips for healthy living    1. Start your day with breakfast  2. Exercise and slowly progress to (brisk walking, bike riding, etc) 30 minutes 3 to 5 days a week. 3. Snack in moderation (limit eating sugary or salty foods to no more than 3 times a week). 4. Eat more grains and vegetables (have no more than 3 servings of fruit daily). 5. Avoid tobacco use. 6. Drink alcohol in moderation (no more than 1 serving daily for woman and no more than 2 servings daily for men)  7. Obtain annual flu shot  8. Refer to patient education handouts given to you today. [facility] Address Phone Website   Patrice Ortiz [facility]  [address] [facility], [address] [place], [place] [phone] [email or web address]       Weight Management Community Resources   Organization Address Phone Website   [facility]) [address] [facility], [address] [facility] [phone] n/a   Weight Wat[employer] Multiple meeting locations throughout [address] [place], [place] [address] [email or web address]     [place]ops n/a n/a [email or web address] [address]  Georgie Eugenie Jc, [facility] [address] [phone] [email or web address]    [facility] [address] [place] [address] Joann Bae [address] [phone] Pam. [address] Joann MIKE 79 [phone]      Patient Education        Consistent Vitamin K Diet: Care Instructions  Your Care Instructions    Your body needs vitamin K to clot blood and keep your bones strong. It's found in leafy green vegetables such as kale and spinach. If you take the blood thinner warfarin (Coumadin), you need to be careful about how much vitamin K you get. Vitamin K can keep your warfarin from working as it should. Most people who take warfarin can eat normally. The important thing is to get about the same amount of vitamin K each day. Don't suddenly start eating foods with a lot more or a lot less vitamin K. You can choose how much vitamin K you eat. For example, if you already eat a lot of leafy green vegetables, that's fine. Just keep it about the same amount each day. Follow-up care is a key part of your treatment and safety. Be sure to make and go to all appointments, and call your doctor if you are having problems. It's also a good idea to know your test results and keep a list of the medicines you take. How can you care for yourself at home? You don't need to stop eating food high in vitamin K. But you do need to know what foods contain vitamin K. Then you can try to eat about the same amount of vitamin K each day. · You might limit foods that are high in vitamin K to about 1 serving a day. These foods have about 250 to 500 micrograms (mcg) of vitamin K in each serving. They include:  ? Cooked leafy green vegetables. Examples are kale, spinach, turnip greens, latanya greens, Swiss chard, and mustard greens. One serving is ½ cup. · You might limit foods that are medium-high in vitamin K to about 3 servings a day. These foods have about 50 to 150 mcg of vitamin K in each serving.  These include:  ? Cooked brussels sprouts, broccoli, cabbage, and asparagus. One serving is ½ cup.  ? Raw leafy green vegetables. Examples are spinach, green leaf lettuce, rachid lettuce, and endive. One serving is 1 cup. · Vitamin K also is found in many multivitamins. You don't need to stop taking your multivitamin if it has vitamin K. But you do need to take it every day. · Check with your doctor before you start or stop taking any supplements or herbal products. Some of these may contain vitamin K. Where can you learn more? Go to [email or web address] [email or web address] org and sign in to your [employer] account. Enter X461 in the [facility] box to learn more about \"Consistent Vitamin K Diet: Care Instructions. \"     If you do not have an account, please click on the \"Sign Up Now\" link. Current as of: July 22, 2018  Content Version: 11.9  © 4603-1995 [employer]. Care instructions adapted under license by [facility] ([facility]). If you have questions about a medical condition or this instruction, always ask your healthcare professional. [place] [address] any warranty or liability for your use of this information.

## 2019-04-10 NOTE — Clinical Note
I am afraid for this patient, she has been in and out of the hospital. She is now on THREE different blood thinners, is this necessary? On coumadin, ASA, and Plavix. Plavix for STENT, and then coumadin for recent CVA. Maybe we can D/C ASA? Thank you! Marlowe Lundborg, MD

## 2019-04-16 DIAGNOSIS — Z86.73 HISTORY OF CVA (CEREBROVASCULAR ACCIDENT): Primary | ICD-10-CM

## 2019-04-16 LAB
INR BLD: 1.5
PROTHROMBIN TIME: 14.9 SEC (ref 9–11.5)

## 2019-04-17 ENCOUNTER — ANTI-COAG VISIT (OUTPATIENT)
Dept: INTERNAL MEDICINE | Age: 84
End: 2019-04-17

## 2019-04-17 DIAGNOSIS — I38 VALVULAR HEART DISEASE: ICD-10-CM

## 2019-04-23 DIAGNOSIS — I38 VALVULAR HEART DISEASE: ICD-10-CM

## 2019-04-23 DIAGNOSIS — Z98.61 S/P PTCA (PERCUTANEOUS TRANSLUMINAL CORONARY ANGIOPLASTY): ICD-10-CM

## 2019-04-23 DIAGNOSIS — Z86.73 HISTORY OF CVA (CEREBROVASCULAR ACCIDENT): ICD-10-CM

## 2019-04-23 LAB
INR BLD: 1.5
PROTHROMBIN TIME: 15.2 SEC (ref 9–11.5)

## 2019-04-24 ENCOUNTER — ANTI-COAG VISIT (OUTPATIENT)
Dept: INTERNAL MEDICINE | Age: 84
End: 2019-04-24

## 2019-04-24 DIAGNOSIS — I38 VALVULAR HEART DISEASE: ICD-10-CM

## 2019-04-29 ENCOUNTER — TELEPHONE (OUTPATIENT)
Dept: CARDIOLOGY CLINIC | Age: 84
End: 2019-04-29

## 2019-04-29 NOTE — TELEPHONE ENCOUNTER
PT'S SON CALLING. REQUESTING SAMPLES OF ENTRESTO.  WOULD LIKE TO  AT HER APPT IN [place] OFFICE ON 5/1/19.

## 2019-05-01 ENCOUNTER — OFFICE VISIT (OUTPATIENT)
Dept: CARDIOLOGY CLINIC | Age: 84
End: 2019-05-01
Payer: MEDICARE

## 2019-05-01 VITALS
HEART RATE: 61 BPM | RESPIRATION RATE: 20 BRPM | DIASTOLIC BLOOD PRESSURE: 82 MMHG | HEIGHT: 65 IN | WEIGHT: 170.4 LBS | OXYGEN SATURATION: 97 % | SYSTOLIC BLOOD PRESSURE: 126 MMHG | BODY MASS INDEX: 28.39 KG/M2

## 2019-05-01 DIAGNOSIS — I25.10 CAD S/P PERCUTANEOUS CORONARY ANGIOPLASTY: Primary | ICD-10-CM

## 2019-05-01 DIAGNOSIS — I25.2 HISTORY OF ST ELEVATION MYOCARDIAL INFARCTION (STEMI): ICD-10-CM

## 2019-05-01 DIAGNOSIS — N18.30 STAGE 3 CHRONIC KIDNEY DISEASE (HCC): ICD-10-CM

## 2019-05-01 DIAGNOSIS — I51.89 DIASTOLIC DYSFUNCTION: ICD-10-CM

## 2019-05-01 DIAGNOSIS — Z98.61 CAD S/P PERCUTANEOUS CORONARY ANGIOPLASTY: Primary | ICD-10-CM

## 2019-05-01 DIAGNOSIS — E11.8 TYPE 2 DIABETES MELLITUS WITH COMPLICATION, WITHOUT LONG-TERM CURRENT USE OF INSULIN (HCC): ICD-10-CM

## 2019-05-01 DIAGNOSIS — I42.9 CARDIOMYOPATHY, UNSPECIFIED TYPE (HCC): ICD-10-CM

## 2019-05-01 DIAGNOSIS — Z86.73 HISTORY OF CVA (CEREBROVASCULAR ACCIDENT): ICD-10-CM

## 2019-05-01 PROCEDURE — 1123F ACP DISCUSS/DSCN MKR DOCD: CPT | Performed by: INTERNAL MEDICINE

## 2019-05-01 PROCEDURE — G8427 DOCREV CUR MEDS BY ELIG CLIN: HCPCS | Performed by: INTERNAL MEDICINE

## 2019-05-01 PROCEDURE — 99214 OFFICE O/P EST MOD 30 MIN: CPT | Performed by: INTERNAL MEDICINE

## 2019-05-01 PROCEDURE — 4040F PNEUMOC VAC/ADMIN/RCVD: CPT | Performed by: INTERNAL MEDICINE

## 2019-05-01 PROCEDURE — 1036F TOBACCO NON-USER: CPT | Performed by: INTERNAL MEDICINE

## 2019-05-01 PROCEDURE — 1090F PRES/ABSN URINE INCON ASSESS: CPT | Performed by: INTERNAL MEDICINE

## 2019-05-01 PROCEDURE — G8598 ASA/ANTIPLAT THER USED: HCPCS | Performed by: INTERNAL MEDICINE

## 2019-05-01 PROCEDURE — G8417 CALC BMI ABV UP PARAM F/U: HCPCS | Performed by: INTERNAL MEDICINE

## 2019-05-01 RX ORDER — HYDRALAZINE HYDROCHLORIDE 50 MG/1
50 TABLET, FILM COATED ORAL DAILY
COMMUNITY
End: 2020-01-07 | Stop reason: SDUPTHER

## 2019-05-01 RX ORDER — WARFARIN SODIUM 5 MG/1
TABLET ORAL
Refills: 0 | COMMUNITY
Start: 2019-04-15 | End: 2019-07-08 | Stop reason: SDUPTHER

## 2019-05-01 ASSESSMENT — ENCOUNTER SYMPTOMS
VOICE CHANGE: 0
COLOR CHANGE: 0
WHEEZING: 0
ANAL BLEEDING: 0
SHORTNESS OF BREATH: 0
TROUBLE SWALLOWING: 0
VOMITING: 0
FACIAL SWELLING: 0
NAUSEA: 0
BLOOD IN STOOL: 0
APNEA: 0
ABDOMINAL DISTENTION: 0
DIARRHEA: 0
CHEST TIGHTNESS: 0

## 2019-05-01 NOTE — PROGRESS NOTES
[facility] CARDIOLOGY OFFICE FOLLOW-UP      Patient: Ame Hoff  YOB: 1927  MRN: [id number]    Chief Complaint:  Chief Complaint   Patient presents with    1 Month Follow-Up    Coronary Artery Disease    Medication Check     Apresoline 50MG BID         Subjective/HPI:  5/1/19: Patient presents today for for evaluation of congestive heart failure. Has ejection fraction 25%. Had a OMAR done at [facility] when she admitted with confusion. There was no thrombus. Blood pressure remains stable. I will cut down the hydralazine to only one a day. Don't want to overmedicate her. See me in 2 months     4/3/19: Patient presents today for follow-up of recent admission for confusion. She is accompanied by her son. A OMAR was done. There was no thrombus. Ejection fraction was 25%. We will cut down the hydralazine to twice a day. When she is done with magnesium, noting unit. She supposed for some labs done by Dr. Matt Barboza soon. PT/INR will be done by visiting nurse on Saturday. She'll see me in a month. 2/19/19: Patient presents today for Follow-up of coronary artery disease. Stent to the proximal LAD. Has mild to 50% disease in the mid LAD. Ejection fraction was diminished. We are going to check another echo. See what LV ejection fraction is. She was recently admitted to [facility] with UTI. Was started on hydralazine. This was 3 times a day and I cut it down to 1 a day. Patient remains stable. I will stop it altogether. See me in 3 months with an echo     1/18/19: Patient presents today for Follow-up of recent hospitalization at [facility] for acute MI. Had angioplasty stent to proximal LAD mid LAD at 50% stenosis ejection fraction is 25%. On lisinopril and metoprolol. Month in the [place] office. He reported repeat EF evaluation at some point     8/21/18: Patient presents today for follow-up of hypertension. Much better. Only on 5 mg of Norvasc. Blood sugars are better to more alert. Dizziness is improved. Accompanied by a son. Mild chronic kidney disease is stable. See me in 6 months.     5/15/18: Patient presents today for follow-up of hypertension. The dizziness is much better after we stopped few of her medication. Blood pressure is holding very well I think we could probably stop the Norvasc but she is quite content and so is a son with the present medications. She has mild chronic kidney disease. Hyperlipidemia that stable. See me in 3 months     4/11/18: Patient presents today for Follow-up of hypertension. She gets dizzy. I think she is on too much medications. We will discontinue hydralazine clonidine and Lasix. His mild chronic kidney disease. Also has hyperlipidemia that stable. I will see him in 3 weeks. Off these medications     3/28/2018: Patient presents today for evaluation of recent hospitalization for acute renal insufficiency. Echocardiogram was done which showed preserved LV ejection fraction. Moderately elevated right systolic pressure 60 mm smoker. Last BUN/creatinine are getting better. GFR is in the 40s. She lives with one of her other sons. She note of hypertension. Used to be on lisinopril 20 mg a day and hydrochlorothiazide. During this hospitalization she was discharged home on lisinopril 2010 a day no hydrochlorothiazide. Lasix 20 minutes, day. Norvasc 10 mg a day. Clonidine 0.2 3 times a day hydralazine 10 3 times a day. I like blood pressures have been running stable in the 120s. Physical therapy goes to her house. She denies any chest pain. She started to drink more water. Does not drive. Does minor chores around the house. I would like to simplify his regimen. Take clonidine 0.2 in a.m. and hydralazine 10 mg at night. Other medication which include Lopressor 25 mg by mouth twice a day Lasix 20 mg a day Norvasc and resume a day to continue.  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 5/29/2012    S/P PTCA (percutaneous transluminal coronary angioplasty) 1/18/2019    SCC (squamous cell carcinoma), arm 2013    right upper arm, 2015 right forarm    SI (stress incontinence), female 5/29/2012    Type II or unspecified type diabetes mellitus without mention of complication, not stated as uncontrolled     Whooping cough        Past Surgical History:   Procedure Laterality Date    ANKLE SURGERY      broken left ankle.  APPENDECTOMY      BREAST BIOPSY      x2 left breast    CATARACT REMOVAL  2004    bilateral    CORONARY ANGIOPLASTY WITH STENT PLACEMENT  01/2019    CYSTOCELE REPAIR      EYE SURGERY      bilateral cataract    HYSTERECTOMY      complete at age 39    [address]      as a child       Family History   Problem Relation Age of Onset    Diabetes Mother     Heart Disease Father        Social History     Socioeconomic History    Marital status:       Spouse name: None    Number of children: None    Years of education: None    Highest education level: None   Occupational History    None   Social Needs    Financial resource strain: None    Food insecurity:     Worry: None     Inability: None    Transportation needs:     Medical: None     Non-medical: None   Tobacco Use    Smoking status: Never Smoker    Smokeless tobacco: Never Used   Substance and Sexual Activity    Alcohol use: No     Alcohol/week: 0.0 oz    Drug use: No    Sexual activity: Never   Lifestyle    Physical activity:     Days per week: None     Minutes per session: None    Stress: None Relationships    Social connections:     Talks on phone: None     Gets together: None     Attends Oriental orthodox service: None     Active member of club or organization: None     Attends meetings of clubs or organizations: None     Relationship status: None    Intimate partner violence:     Fear of current or ex partner: None     Emotionally abused: None     Physically abused: None     Forced sexual activity: None   Other Topics Concern    None   Social History Narrative         Lives With: Son    Type of Home: House    Home Layout: Two level, Able to Live on Main level with bedroom/bathroom, Performs ADL's on one level    Home Access: Stairs to enter with rails    Entrance Stairs - Number of Steps: Threshold    Bathroom Shower/Tub: Tub/Shower unit    Bathroom Toilet: Handicap height    Bathroom Equipment: Grab bars in shower, Grab bars around toilet, Hand-held shower, Shower chair    Bathroom Accessibility: Accessible    Home Equipment: Rolling walker, 4 wheeled walker (Usually uses rollator)    ADL Assistance: [address] [place] [address] Avenue: Needs assistance (Son manages housework)    Homemaking Responsibilities: No    Ambulation Assistance: Independent (Rollator)    Transfer Assistance: Independent    Active : No    Patient's  Info:  Sons            Allergies   Allergen Reactions    Metoclopramide     Pantoprazole Other (See Comments)    Pcn [Penicillins]      Tolerates rocephin    Protonix [Pantoprazole Sodium]     Tramadol        Current Outpatient Medications   Medication Sig Dispense Refill    warfarin (COUMADIN) 5 MG tablet   0    hydrALAZINE (APRESOLINE) 50 MG tablet Take 50 mg by mouth daily      clopidogrel (PLAVIX) 75 MG tablet Take 1 tablet by mouth daily 90 tablet 3    warfarin (COUMADIN) 1 MG tablet Take daily 90 tablet 3    TRUEPLUS LANCETS 28G MISC TEST TWO TIMES DAILY 200 each 3    nitrofurantoin (MACRODANTIN) 100 MG capsule Take 100 mg by mouth nightly   0    sacubitril-valsartan (ENTRESTO) 24-26 MG per tablet Take 1 tablet by mouth 2 times daily 56 tablet 0    carvedilol (COREG) 12.5 MG tablet Take 1 tablet by mouth 2 times daily (with meals) 60 tablet 3    furosemide (LASIX) 40 MG tablet Take 1 tablet by mouth daily 60 tablet 3    Cholecalciferol (VITAMIN D) 2000 units CAPS capsule Take 2,000 Units by mouth      atorvastatin (LIPITOR) 10 MG tablet take 1 tablet by mouth once daily 90 tablet 3    TRUE METRIX BLOOD GLUCOSE TEST strip TEST two to three times a day 200 strip 5    metFORMIN (GLUCOPHAGE) 1000 MG tablet take 1 tablet by mouth twice a day with meals 180 tablet 3    magnesium oxide (MAG-OX) 400 MG tablet Take 400 mg by mouth daily      Needles & Syringes MISC 1 each by Does not apply route daily 50 each 0    Incontinence Supply Disposable (DEPEND UNDERGARMENTS) MISC 1 each by Does not apply route nightly 1 Package 11    Blood Glucose Monitoring Suppl (TRUE METRIX AIR GLUCOSE METER) W/DEVICE KIT       Blood Glucose Monitoring Suppl PRUDENCIO Dx: E11.9 1 Device 0    Compression Stockings MISC by Does not apply route Knee high, 20-30 mmHg 1 each 1    aspirin 81 MG tablet Take 81 mg by mouth daily. No current facility-administered medications for this visit. Review of Systems:   Review of Systems   Constitutional: Negative for activity change, appetite change, diaphoresis, fatigue and unexpected weight change. HENT: Negative for facial swelling, nosebleeds, trouble swallowing and voice change. Respiratory: Negative for apnea, chest tightness, shortness of breath and wheezing. Cardiovascular: Negative for chest pain, palpitations and leg swelling. Gastrointestinal: Negative for abdominal distention, anal bleeding, blood in stool, diarrhea, nausea and vomiting. Genitourinary: Negative for decreased urine volume and dysuria. Musculoskeletal: Negative for gait problem, myalgias, neck pain and neck stiffness.    Skin: Negative for color Value Date    CHOL 135 04/10/2019    CHOL 122 03/15/2019    CHOL 228 (H) 09/17/2018     Lab Results   Component Value Date    TRIG 74 04/10/2019    TRIG 98 03/15/2019    TRIG 170 09/17/2018     Lab Results   Component Value Date    HDL 52 04/10/2019    HDL 48 03/15/2019    HDL 53 09/17/2018     Lab Results   Component Value Date    LDLCALC 68 04/10/2019    LDLCALC 54 03/15/2019    LDLCALC 141 (H) 09/17/2018     Lab Results   Component Value Date    LABVLDL 34.0 05/01/2013     Lab Results   Component Value Date    CHOLHDLRATIO 3.4 11/28/2012    CHOLHDLRATIO 3.2 05/25/2012     CMP:    Lab Results   Component Value Date     04/10/2019    K 5.1 04/10/2019    K 4.1 03/21/2019    CL 97 04/10/2019    CO2 24 04/10/2019    BUN 38 04/10/2019    CREATININE 1.06 04/10/2019    GFRAA 58.7 04/10/2019    LABGLOM 48.5 04/10/2019    GLUCOSE 169 04/10/2019    GLUCOSE 154 05/25/2012    PROT 6.6 04/10/2019    LABALBU 4.1 04/10/2019    LABALBU 4.4 05/25/2012    CALCIUM 9.6 04/10/2019    BILITOT <0.2 04/10/2019    ALKPHOS 59 04/10/2019    AST 16 04/10/2019    ALT 9 04/10/2019     BMP:    Lab Results   Component Value Date     04/10/2019    K 5.1 04/10/2019    K 4.1 03/21/2019    CL 97 04/10/2019    CO2 24 04/10/2019    BUN 38 04/10/2019    LABALBU 4.1 04/10/2019    LABALBU 4.4 05/25/2012    CREATININE 1.06 04/10/2019    CALCIUM 9.6 04/10/2019    GFRAA 58.7 04/10/2019    LABGLOM 48.5 04/10/2019    GLUCOSE 169 04/10/2019    GLUCOSE 154 05/25/2012     Magnesium:    Lab Results   Component Value Date    MG 1.6 03/21/2019     TSH:  Lab Results   Component Value Date    TSH 1.810 03/15/2019       Patient Active Problem List   Diagnosis    HTN (hypertension)    Diabetes mellitus    SI (stress incontinence), female    Osteoarthritis    CKD (chronic kidney disease)    HLD (hyperlipidemia)    Vitamin D deficiency    Eczematous dermatitis    Chronic low back pain    Lumbar spinal stenosis    Hypercalcemia    Diastolic dysfunction    Valvular heart disease    Recurrent UTI (urinary tract infection)    Vitamin B12 deficiency    Chest pain    Nausea & vomiting    History of non-ST elevation myocardial infarction (NSTEMI)    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 Assessment:    1. CAD S/P percutaneous coronary angioplasty    2. Stage 3 chronic kidney disease ([facility] [address]    3. Diastolic dysfunction    4. Cardiomyopathy, unspecified type ([facility] [address]    5. Type 2 diabetes mellitus with complication, without long-term current use of insulin ([facility] [address]    6. 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 History of ST elevation myocardial infarction (STEMI)       Plan:   Stay on same medications. See me in 2 months. This note was partially generated using Dragon voice recognition system, and there may be some incorrect words, spellings, punctuation that were not noticed in checking the note before saving.         Electronically signed by Amanda Torres MD on 5/3/2019 at 8:08 AM

## 2019-05-03 DIAGNOSIS — I38 VALVULAR HEART DISEASE: ICD-10-CM

## 2019-05-03 DIAGNOSIS — Z98.61 S/P PTCA (PERCUTANEOUS TRANSLUMINAL CORONARY ANGIOPLASTY): ICD-10-CM

## 2019-05-03 LAB
INR BLD: 1.7
PROTHROMBIN TIME: 16.9 SEC (ref 9–11.5)

## 2019-05-06 ENCOUNTER — ANTI-COAG VISIT (OUTPATIENT)
Dept: INTERNAL MEDICINE | Age: 84
End: 2019-05-06

## 2019-05-06 DIAGNOSIS — I38 VALVULAR HEART DISEASE: ICD-10-CM

## 2019-05-09 ENCOUNTER — OFFICE VISIT (OUTPATIENT)
Dept: INTERNAL MEDICINE | Age: 84
End: 2019-05-09
Payer: MEDICARE

## 2019-05-09 VITALS
SYSTOLIC BLOOD PRESSURE: 136 MMHG | HEIGHT: 65 IN | HEART RATE: 63 BPM | WEIGHT: 172 LBS | OXYGEN SATURATION: 96 % | TEMPERATURE: 98.1 F | BODY MASS INDEX: 28.66 KG/M2 | DIASTOLIC BLOOD PRESSURE: 68 MMHG

## 2019-05-09 DIAGNOSIS — Z86.73 HISTORY OF CVA (CEREBROVASCULAR ACCIDENT): ICD-10-CM

## 2019-05-09 DIAGNOSIS — Z87.440 HISTORY OF FREQUENT URINARY TRACT INFECTIONS: ICD-10-CM

## 2019-05-09 DIAGNOSIS — Z79.01 CURRENT USE OF LONG TERM ANTICOAGULATION: ICD-10-CM

## 2019-05-09 DIAGNOSIS — R51.9 LEFT-SIDED HEADACHE: Primary | ICD-10-CM

## 2019-05-09 LAB
BACTERIA: NEGATIVE /HPF
BILIRUBIN URINE: NEGATIVE
BLOOD, URINE: NEGATIVE
CLARITY: CLEAR
COLOR: YELLOW
EPITHELIAL CELLS, UA: NORMAL /HPF (ref 0–5)
GLUCOSE URINE: NEGATIVE MG/DL
HYALINE CASTS: NORMAL /HPF (ref 0–5)
INR BLD: 1.4
KETONES, URINE: NEGATIVE MG/DL
LEUKOCYTE ESTERASE, URINE: NEGATIVE
NITRITE, URINE: NEGATIVE
PH UA: 6 (ref 5–9)
PROTEIN UA: 100 MG/DL
PROTHROMBIN TIME: 13.9 SEC (ref 9–11.5)
RBC UA: NORMAL /HPF (ref 0–5)
SPECIFIC GRAVITY UA: 1.01 (ref 1–1.03)
UROBILINOGEN, URINE: 0.2 E.U./DL
WBC UA: NORMAL /HPF (ref 0–5)

## 2019-05-09 PROCEDURE — G8417 CALC BMI ABV UP PARAM F/U: HCPCS | Performed by: PHYSICIAN ASSISTANT

## 2019-05-09 PROCEDURE — G8598 ASA/ANTIPLAT THER USED: HCPCS | Performed by: PHYSICIAN ASSISTANT

## 2019-05-09 PROCEDURE — 99214 OFFICE O/P EST MOD 30 MIN: CPT | Performed by: PHYSICIAN ASSISTANT

## 2019-05-09 PROCEDURE — G8427 DOCREV CUR MEDS BY ELIG CLIN: HCPCS | Performed by: PHYSICIAN ASSISTANT

## 2019-05-09 PROCEDURE — 4040F PNEUMOC VAC/ADMIN/RCVD: CPT | Performed by: PHYSICIAN ASSISTANT

## 2019-05-09 PROCEDURE — 1036F TOBACCO NON-USER: CPT | Performed by: PHYSICIAN ASSISTANT

## 2019-05-09 PROCEDURE — 1123F ACP DISCUSS/DSCN MKR DOCD: CPT | Performed by: PHYSICIAN ASSISTANT

## 2019-05-09 PROCEDURE — 36415 COLL VENOUS BLD VENIPUNCTURE: CPT | Performed by: PHYSICIAN ASSISTANT

## 2019-05-09 PROCEDURE — 1090F PRES/ABSN URINE INCON ASSESS: CPT | Performed by: PHYSICIAN ASSISTANT

## 2019-05-09 NOTE — PROGRESS NOTES
2019     Iwona Hodgson (:  6/10/1927) is a 80 y.o. female, here for evaluation of the following medical concerns:    Chief Complaint   Patient presents with    Headache     left sided h/a, pt is wondering if it is related to the stroke, or a possible ear infection x's few days    Blood Work     Pt was supposed to get INR done tomorrow, wants to do it this afternoon       HPI      Left sided HA  Pt has recent h/o CVA and TIA's  She is concerned HA may be related to stroke  She denies facial droop, new weakness or gait issues   No neuro deficients, no new memory issues, no vision changes   Has some ear pain on the left    Daughter is also concerned that she is on aspirin, plavix and coumadin   They spoke with Dr Merlinda Bevels about it, but did not understand whatt they were to do  Pt has hematuria in the past with too many blood thinners     H/o frequent UTI  No symptoms today, but wants her urine checked     Review of Systems   Constitutional: Negative for chills, diaphoresis, fatigue and fever. HENT: Positive for ear pain. Negative for congestion, ear discharge, facial swelling and hearing loss. Respiratory: Negative for cough and shortness of breath. Cardiovascular: Negative for chest pain, palpitations and leg swelling. Gastrointestinal: Negative for abdominal pain. Genitourinary: Negative for dysuria, hematuria and pelvic pain. Neurological: Positive for headaches. Negative for dizziness, tremors, seizures, syncope, facial asymmetry, speech difficulty, weakness, light-headedness and numbness. Prior to Visit Medications    Medication Sig Taking?  Authorizing Provider   warfarin (COUMADIN) 5 MG tablet  Yes Historical Provider, MD   hydrALAZINE (APRESOLINE) 50 MG tablet Take 50 mg by mouth daily Yes Historical Provider, MD   clopidogrel (PLAVIX) 75 MG tablet Take 1 tablet by mouth daily Yes Ruchi Prado MD   warfarin (COUMADIN) 1 MG tablet Take daily Yes MD Deuce Tucker [facility] LANCETS 28G MISC TEST TWO TIMES DAILY Yes Reggie Payton MD   nitrofurantoin (MACRODANTIN) 100 MG capsule Take 100 mg by mouth nightly  Yes Historical Provider, MD   sacubitril-valsartan (ENTRESTO) 24-26 MG per tablet Take 1 tablet by mouth 2 times daily Yes Ricky Hargrove MD   carvedilol (COREG) 12.5 MG tablet Take 1 tablet by mouth 2 times daily (with meals) Yes Bart Muñiz MD   furosemide (LASIX) 40 MG tablet Take 1 tablet by mouth daily Yes Bart Muñiz MD   Cholecalciferol (VITAMIN D) 2000 units CAPS capsule Take 2,000 Units by mouth Yes Historical Provider, MD   atorvastatin (LIPITOR) 10 MG tablet take 1 tablet by mouth once daily Yes Reggie Payton MD   TRUE METRIX BLOOD GLUCOSE TEST strip TEST two to three times a day Yes Reggie Payton MD   magnesium oxide (MAG-OX) 400 MG tablet Take 400 mg by mouth daily Yes Historical Provider, MD   Needles & Syringes MISC 1 each by Does not apply route daily Yes Reggie Payton MD   Incontinence Supply Disposable (DEPEND UNDERGARMENTS) MISC 1 each by Does not apply route nightly Yes Reggie Payton MD   Blood Glucose Monitoring Suppl (TRUE METRIX AIR GLUCOSE METER) W/DEVICE KIT  Yes Historical Provider, MD   Blood Glucose Monitoring Suppl PRUDENCIO Dx: E11.9 Yes Darcella Double, DO   Compression Stockings MISC by Does not apply route Knee high, 20-30 mmHg Yes Reggie Payton MD   metFORMIN (GLUCOPHAGE) 1000 MG tablet take 1 tablet by mouth twice a day with meals  Reggie Payton MD        Social History     Tobacco Use    Smoking status: Never Smoker    Smokeless tobacco: Never Used   Substance Use Topics    Alcohol use: No     Alcohol/week: 0.0 oz        Vitals:    05/09/19 1452   BP: 136/68   Site: Right Upper Arm   Position: Sitting   Cuff Size: Large Adult   Pulse: 63   Temp: 98.1 °F (36.7 °C)   TempSrc: Temporal   SpO2: 96%   Weight: 172 lb (78 kg)   Height: 5' 5\" (1.651 m)     Estimated body mass index is 28.62 kg/m² as calculated from the following: Height as of this encounter: 5' 5\" (1.651 m). Weight as of this encounter: 172 lb (78 kg). Physical Exam   Constitutional: She is oriented to person, place, and time. She appears well-developed and well-nourished. HENT:   Head: Normocephalic. Right Ear: Tympanic membrane normal.   Left Ear: Tympanic membrane normal.   Eyes: Pupils are equal, round, and reactive to light. Conjunctivae and EOM are normal.   Neck: Normal range of motion. Neck supple. Cardiovascular: Normal rate. Pulmonary/Chest: Breath sounds normal.   Abdominal: Soft. Bowel sounds are normal.   Neurological: She is alert and oriented to person, place, and time. No cranial nerve deficit or sensory deficit (grib strength symmetric ). She exhibits normal muscle tone. Vitals reviewed. ASSESSMENT/PLAN:  1. Left-sided headache  2. Current use of long term anticoagulation  3. 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 History of frequent urinary tract infections  - Urinalysis; Future  - sending UA      No follow-ups on file. An electronic signature was used to authenticate this note.     --PEDRO LUIS Hernadez on 6/2/2019 at 3:55 PM

## 2019-05-10 ENCOUNTER — ANTI-COAG VISIT (OUTPATIENT)
Dept: INTERNAL MEDICINE | Age: 84
End: 2019-05-10

## 2019-05-10 DIAGNOSIS — I38 VALVULAR HEART DISEASE: ICD-10-CM

## 2019-05-10 NOTE — PROGRESS NOTES
Goal to keep her at low 2's , not close to 3  INR decreased, after increase in the dose  Will continue same dose, and repeat in on week   Pt is on Plavix, asa and coumadin

## 2019-05-16 DIAGNOSIS — Z86.73 HISTORY OF CVA (CEREBROVASCULAR ACCIDENT): ICD-10-CM

## 2019-05-16 LAB
INR BLD: 1.6
PROTHROMBIN TIME: 15.5 SEC (ref 9–11.5)

## 2019-05-17 ENCOUNTER — ANTI-COAG VISIT (OUTPATIENT)
Dept: INTERNAL MEDICINE | Age: 84
End: 2019-05-17

## 2019-05-17 DIAGNOSIS — I38 VALVULAR HEART DISEASE: ICD-10-CM

## 2019-05-17 NOTE — PROGRESS NOTES
Increase dose to 7 mg on Fridays and Mondays  Repeat INR weekly   Dr Anmol Morris said that she can stop aspirin, since on coumadin and Plavix

## 2019-05-23 DIAGNOSIS — Z86.73 HISTORY OF CVA (CEREBROVASCULAR ACCIDENT): ICD-10-CM

## 2019-05-23 LAB
INR BLD: 1.5
PROTHROMBIN TIME: 15.4 SEC (ref 9–11.5)

## 2019-05-24 ENCOUNTER — ANTI-COAG VISIT (OUTPATIENT)
Dept: INTERNAL MEDICINE | Age: 84
End: 2019-05-24

## 2019-05-24 DIAGNOSIS — I38 VALVULAR HEART DISEASE: ICD-10-CM

## 2019-05-24 NOTE — PROGRESS NOTES
Increase to 6 mg on Sun and Wed, so she will only be taking 6mg on Tues, Thur and Sat   Recheck INR next [employer]

## 2019-05-25 DIAGNOSIS — E11.8 TYPE 2 DIABETES MELLITUS WITH COMPLICATION, WITHOUT LONG-TERM CURRENT USE OF INSULIN (HCC): ICD-10-CM

## 2019-05-29 DIAGNOSIS — Z86.73 HISTORY OF CVA (CEREBROVASCULAR ACCIDENT): ICD-10-CM

## 2019-05-29 LAB
INR BLD: 1.7
PROTHROMBIN TIME: 17.2 SEC (ref 9–11.5)

## 2019-05-30 ENCOUNTER — ANTI-COAG VISIT (OUTPATIENT)
Dept: INTERNAL MEDICINE | Age: 84
End: 2019-05-30

## 2019-05-30 DIAGNOSIS — I38 VALVULAR HEART DISEASE: ICD-10-CM

## 2019-06-02 ASSESSMENT — ENCOUNTER SYMPTOMS
ABDOMINAL PAIN: 0
SHORTNESS OF BREATH: 0
COUGH: 0
FACIAL SWELLING: 0

## 2019-06-06 DIAGNOSIS — Z86.73 HISTORY OF CVA (CEREBROVASCULAR ACCIDENT): ICD-10-CM

## 2019-06-06 LAB
INR BLD: 2.2
PROTHROMBIN TIME: 21.4 SEC (ref 9–11.5)

## 2019-06-07 ENCOUNTER — ANTI-COAG VISIT (OUTPATIENT)
Dept: INTERNAL MEDICINE | Age: 84
End: 2019-06-07

## 2019-06-07 DIAGNOSIS — I38 VALVULAR HEART DISEASE: ICD-10-CM

## 2019-06-07 NOTE — PROGRESS NOTES
Zulma Gonzalez is aware to keep pt on same dose and recheck in one week to make sure she stays therapeutic.

## 2019-06-12 ENCOUNTER — ANTI-COAG VISIT (OUTPATIENT)
Dept: INTERNAL MEDICINE | Age: 84
End: 2019-06-12

## 2019-06-12 DIAGNOSIS — I38 VALVULAR HEART DISEASE: ICD-10-CM

## 2019-06-12 DIAGNOSIS — Z86.73 HISTORY OF CVA (CEREBROVASCULAR ACCIDENT): ICD-10-CM

## 2019-06-12 LAB
INR BLD: 2.6
PROTHROMBIN TIME: 25.7 SEC (ref 9–11.5)

## 2019-07-03 ENCOUNTER — OFFICE VISIT (OUTPATIENT)
Dept: CARDIOLOGY CLINIC | Age: 84
End: 2019-07-03
Payer: MEDICARE

## 2019-07-03 VITALS
HEART RATE: 57 BPM | SYSTOLIC BLOOD PRESSURE: 124 MMHG | BODY MASS INDEX: 28.99 KG/M2 | RESPIRATION RATE: 20 BRPM | WEIGHT: 174 LBS | DIASTOLIC BLOOD PRESSURE: 84 MMHG | HEIGHT: 65 IN | OXYGEN SATURATION: 99 %

## 2019-07-03 DIAGNOSIS — I51.89 DIASTOLIC DYSFUNCTION: ICD-10-CM

## 2019-07-03 DIAGNOSIS — I25.2 HISTORY OF ST ELEVATION MYOCARDIAL INFARCTION (STEMI): ICD-10-CM

## 2019-07-03 DIAGNOSIS — Z98.61 CAD S/P PERCUTANEOUS CORONARY ANGIOPLASTY: Primary | ICD-10-CM

## 2019-07-03 DIAGNOSIS — Z86.73 HISTORY OF CVA (CEREBROVASCULAR ACCIDENT): ICD-10-CM

## 2019-07-03 DIAGNOSIS — I25.2 HISTORY OF NON-ST ELEVATION MYOCARDIAL INFARCTION (NSTEMI): ICD-10-CM

## 2019-07-03 DIAGNOSIS — I25.10 CAD S/P PERCUTANEOUS CORONARY ANGIOPLASTY: Primary | ICD-10-CM

## 2019-07-03 DIAGNOSIS — I42.9 CARDIOMYOPATHY, UNSPECIFIED TYPE (HCC): ICD-10-CM

## 2019-07-03 PROCEDURE — 99214 OFFICE O/P EST MOD 30 MIN: CPT | Performed by: INTERNAL MEDICINE

## 2019-07-03 PROCEDURE — 1036F TOBACCO NON-USER: CPT | Performed by: INTERNAL MEDICINE

## 2019-07-03 PROCEDURE — 1123F ACP DISCUSS/DSCN MKR DOCD: CPT | Performed by: INTERNAL MEDICINE

## 2019-07-03 PROCEDURE — G8428 CUR MEDS NOT DOCUMENT: HCPCS | Performed by: INTERNAL MEDICINE

## 2019-07-03 PROCEDURE — 4040F PNEUMOC VAC/ADMIN/RCVD: CPT | Performed by: INTERNAL MEDICINE

## 2019-07-03 PROCEDURE — G8598 ASA/ANTIPLAT THER USED: HCPCS | Performed by: INTERNAL MEDICINE

## 2019-07-03 PROCEDURE — G8417 CALC BMI ABV UP PARAM F/U: HCPCS | Performed by: INTERNAL MEDICINE

## 2019-07-03 PROCEDURE — 1090F PRES/ABSN URINE INCON ASSESS: CPT | Performed by: INTERNAL MEDICINE

## 2019-07-03 ASSESSMENT — ENCOUNTER SYMPTOMS
NAUSEA: 0
APNEA: 0
BLOOD IN STOOL: 0
ABDOMINAL DISTENTION: 0
VOICE CHANGE: 0
WHEEZING: 0
CHEST TIGHTNESS: 0
ANAL BLEEDING: 0
TROUBLE SWALLOWING: 0
FACIAL SWELLING: 0
COLOR CHANGE: 0
SHORTNESS OF BREATH: 0
DIARRHEA: 0
VOMITING: 0

## 2019-07-03 NOTE — PROGRESS NOTES
[facility] CARDIOLOGY OFFICE FOLLOW-UP      Patient: Jasmin Gamboa  YOB: 1927  MRN: [id number]    Chief Complaint:  Chief Complaint   Patient presents with    Follow-up     2 month f/u    Coronary Artery Disease         Subjective/HPI:  7/3/19: Patient presents today for follow-up of congestive heart failure. We have been able to reduce her blood pressure medications significantly. She was overmedicated. We cut down hydralazine from 3-2 and now will stop it. She is on Coumadin. On Plavix for angioplasty which was done by Dr. bradley in December of last year. She is accompanied by her son. Doing well otherwise. Dyslipidemia is under control. See me in 3 months     5/1/19: Patient presents today for for evaluation of congestive heart failure. Has ejection fraction 25%. Had a OMAR done at [facility] [place] when she admitted with confusion. There was no thrombus. Blood pressure remains stable. I will cut down the hydralazine to only one a day. Don't want to overmedicate her. See me in 2 months     4/3/19: Patient presents today for follow-up of recent admission for confusion. She is accompanied by her son. A OMAR was done. There was no thrombus. Ejection fraction was 25%. We will cut down the hydralazine to twice a day. When she is done with magnesium, noting unit. She supposed for some labs done by Dr. Zoila Thomas soon. PT/INR will be done by visiting nurse on Saturday. She'll see me in a month.     2/19/19: Patient presents today for Follow-up of coronary artery disease. Stent to the proximal LAD. Has mild to 50% disease in the mid LAD. Ejection fraction was diminished. We are going to check another echo. See what LV ejection fraction is. She was recently admitted to [facility] with UTI. Was started on hydralazine. This was 3 times a day and I cut it down to 1 a day. Patient remains stable. I will stop it altogether.  See me in 3 months with an echo     1/18/19: Patient presents today for Follow-up of recent palpitations and leg swelling. Gastrointestinal: Negative for abdominal distention, anal bleeding, blood in stool, diarrhea, nausea and vomiting. Genitourinary: Negative for decreased urine volume and dysuria. Musculoskeletal: Negative for gait problem, myalgias, neck pain and neck stiffness. Skin: Negative for color change, pallor, rash and wound. Neurological: Negative for dizziness, seizures, syncope, facial asymmetry, weakness, light-headedness, numbness and headaches. Hematological: Does not bruise/bleed easily. Psychiatric/Behavioral: Negative for agitation, behavioral problems, confusion, hallucinations and suicidal ideas. The patient is not nervous/anxious. All other systems reviewed and are negative. Review of System is negative except for as mentioned above. Physical Examination:    /84 (Site: Left Upper Arm, Position: Sitting, Cuff Size: Medium Adult)   Pulse 57   Resp 20   Ht 5' 5\" (1.651 m)   Wt 174 lb (78.9 kg)   LMP  (LMP Unknown)   SpO2 99%   BMI 28.96 kg/m²    Physical Exam   Constitutional: She appears healthy. No distress. HENT:   Nose: Nose normal.   Mouth/Throat: Dentition is normal. Oropharynx is clear. Eyes: Pupils are equal, round, and reactive to light. Conjunctivae are normal.   Neck: Normal range of motion and thyroid normal. Neck supple. Cardiovascular: Regular rhythm, S1 normal, S2 normal, normal heart sounds, intact distal pulses and normal pulses. PMI is not displaced. No murmur heard. Pulmonary/Chest: She has no wheezes. She has no rales. She exhibits no tenderness. Abdominal: Soft. Bowel sounds are normal. She exhibits no distension and no mass. There is no splenomegaly or hepatomegaly. There is no tenderness. No hernia. Neurological: She is alert and oriented to person, place, and time. She has normal motor skills. Gait normal.   Skin: Skin is warm and dry. No cyanosis. No jaundice. Nails show no clubbing.            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 No orders of the defined types were placed in this encounter. Assessment:    1. CAD S/P percutaneous coronary angioplasty    2. Diastolic dysfunction    3. Cardiomyopathy, unspecified type ([address]    4. History of non-ST elevation myocardial infarction (NSTEMI)    5. History of ST elevation myocardial infarction (STEMI)    6. 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 See me in 3 months. This note was partially generated using Dragon voice recognition system, and there may be some incorrect words, spellings, punctuation that were not noticed in checking the note before saving.         Electronically signed by Fady Soares MD on 7/3/2019 at 10:07 AM

## 2019-07-09 RX ORDER — WARFARIN SODIUM 5 MG/1
TABLET ORAL
Qty: 30 TABLET | Refills: 11 | Status: ON HOLD | OUTPATIENT
Start: 2019-07-09 | End: 2020-06-26

## 2019-07-10 ENCOUNTER — TELEPHONE (OUTPATIENT)
Dept: INTERNAL MEDICINE | Age: 84
End: 2019-07-10

## 2019-07-10 VITALS — DIASTOLIC BLOOD PRESSURE: 60 MMHG | SYSTOLIC BLOOD PRESSURE: 136 MMHG

## 2019-07-10 DIAGNOSIS — Z86.73 HISTORY OF CVA (CEREBROVASCULAR ACCIDENT): ICD-10-CM

## 2019-07-10 LAB
INR BLD: 2
PROTHROMBIN TIME: 19.9 SEC (ref 9–11.5)

## 2019-07-11 ENCOUNTER — ANTI-COAG VISIT (OUTPATIENT)
Dept: INTERNAL MEDICINE | Age: 84
End: 2019-07-11

## 2019-07-11 ENCOUNTER — OFFICE VISIT (OUTPATIENT)
Dept: INTERNAL MEDICINE | Age: 84
End: 2019-07-11
Payer: MEDICARE

## 2019-07-11 VITALS
OXYGEN SATURATION: 97 % | HEART RATE: 57 BPM | SYSTOLIC BLOOD PRESSURE: 122 MMHG | BODY MASS INDEX: 28.79 KG/M2 | WEIGHT: 173 LBS | DIASTOLIC BLOOD PRESSURE: 50 MMHG

## 2019-07-11 DIAGNOSIS — Z98.61 S/P PTCA (PERCUTANEOUS TRANSLUMINAL CORONARY ANGIOPLASTY): ICD-10-CM

## 2019-07-11 DIAGNOSIS — E83.52 HYPERCALCEMIA: ICD-10-CM

## 2019-07-11 DIAGNOSIS — I51.89 DIASTOLIC DYSFUNCTION: ICD-10-CM

## 2019-07-11 DIAGNOSIS — E78.5 HYPERLIPIDEMIA, UNSPECIFIED HYPERLIPIDEMIA TYPE: ICD-10-CM

## 2019-07-11 DIAGNOSIS — M48.061 SPINAL STENOSIS OF LUMBAR REGION, UNSPECIFIED WHETHER NEUROGENIC CLAUDICATION PRESENT: ICD-10-CM

## 2019-07-11 DIAGNOSIS — E11.8 TYPE 2 DIABETES MELLITUS WITH COMPLICATION, WITHOUT LONG-TERM CURRENT USE OF INSULIN (HCC): Primary | ICD-10-CM

## 2019-07-11 DIAGNOSIS — G89.29 CHRONIC LOW BACK PAIN, UNSPECIFIED BACK PAIN LATERALITY, WITH SCIATICA PRESENCE UNSPECIFIED: ICD-10-CM

## 2019-07-11 DIAGNOSIS — I25.2 HISTORY OF ST ELEVATION MYOCARDIAL INFARCTION (STEMI): ICD-10-CM

## 2019-07-11 DIAGNOSIS — N18.30 STAGE 3 CHRONIC KIDNEY DISEASE (HCC): ICD-10-CM

## 2019-07-11 DIAGNOSIS — I10 HYPERTENSION, UNSPECIFIED TYPE: ICD-10-CM

## 2019-07-11 DIAGNOSIS — M54.5 CHRONIC LOW BACK PAIN, UNSPECIFIED BACK PAIN LATERALITY, WITH SCIATICA PRESENCE UNSPECIFIED: ICD-10-CM

## 2019-07-11 DIAGNOSIS — I25.2 HISTORY OF NON-ST ELEVATION MYOCARDIAL INFARCTION (NSTEMI): ICD-10-CM

## 2019-07-11 DIAGNOSIS — N39.0 RECURRENT UTI (URINARY TRACT INFECTION): ICD-10-CM

## 2019-07-11 DIAGNOSIS — Z86.73 HISTORY OF CVA (CEREBROVASCULAR ACCIDENT): ICD-10-CM

## 2019-07-11 DIAGNOSIS — L30.9 ECZEMA, UNSPECIFIED TYPE: ICD-10-CM

## 2019-07-11 DIAGNOSIS — I38 VALVULAR HEART DISEASE: ICD-10-CM

## 2019-07-11 LAB — HBA1C MFR BLD: 7.9 %

## 2019-07-11 PROCEDURE — 1036F TOBACCO NON-USER: CPT | Performed by: FAMILY MEDICINE

## 2019-07-11 PROCEDURE — G8417 CALC BMI ABV UP PARAM F/U: HCPCS | Performed by: FAMILY MEDICINE

## 2019-07-11 PROCEDURE — G8598 ASA/ANTIPLAT THER USED: HCPCS | Performed by: FAMILY MEDICINE

## 2019-07-11 PROCEDURE — 1090F PRES/ABSN URINE INCON ASSESS: CPT | Performed by: FAMILY MEDICINE

## 2019-07-11 PROCEDURE — 99214 OFFICE O/P EST MOD 30 MIN: CPT | Performed by: FAMILY MEDICINE

## 2019-07-11 PROCEDURE — 83036 HEMOGLOBIN GLYCOSYLATED A1C: CPT | Performed by: FAMILY MEDICINE

## 2019-07-11 PROCEDURE — 4040F PNEUMOC VAC/ADMIN/RCVD: CPT | Performed by: FAMILY MEDICINE

## 2019-07-11 PROCEDURE — 1123F ACP DISCUSS/DSCN MKR DOCD: CPT | Performed by: FAMILY MEDICINE

## 2019-07-11 PROCEDURE — G8427 DOCREV CUR MEDS BY ELIG CLIN: HCPCS | Performed by: FAMILY MEDICINE

## 2019-07-11 RX ORDER — FUROSEMIDE 20 MG/1
20 TABLET ORAL 2 TIMES DAILY
COMMUNITY
End: 2019-10-04 | Stop reason: SDUPTHER

## 2019-07-11 ASSESSMENT — ENCOUNTER SYMPTOMS
EYE ITCHING: 0
BACK PAIN: 0
EYE DISCHARGE: 0
EYE PAIN: 0

## 2019-07-11 NOTE — PROGRESS NOTES
units CAPS capsule Take 2,000 Units by mouth      atorvastatin (LIPITOR) 10 MG tablet take 1 tablet by mouth once daily 90 tablet 3    magnesium oxide (MAG-OX) 400 MG tablet Take 400 mg by mouth daily      TRUEPLUS LANCETS 28G MISC TEST TWO TIMES DAILY 200 each 3    TRUE METRIX BLOOD GLUCOSE TEST strip TEST two to three times a day 200 strip 5    Needles & Syringes MISC 1 each by Does not apply route daily 50 each 0    Incontinence Supply Disposable (DEPEND UNDERGARMENTS) MISC 1 each by Does not apply route nightly 1 Package 11    Blood Glucose Monitoring Suppl (TRUE METRIX AIR GLUCOSE METER) W/DEVICE KIT       Blood Glucose Monitoring Suppl PRUDENCIO Dx: E11.9 1 Device 0    Compression Stockings MISC by Does not apply route Knee high, 20-30 mmHg 1 each 1     No current facility-administered medications on file prior to visit. Allergies   Allergen Reactions    Metoclopramide     Pantoprazole Other (See Comments)    Pcn [Penicillins]      Tolerates rocephin    Protonix [Pantoprazole Sodium]     Tramadol        Chief Complaint   Patient presents with    Diabetes     follow up    Hypertension    Hyperlipidemia    Other     now taking 20mg lasix instead of 40mg       HPI:  Treatment Adherence:   Medication compliance:  compliant all of the time  Diet compliance: most of the time  Current exercise: no regular exercise    Diabetes Mellitus Type 2: Current symptoms/problems include none. Home blood sugar records:  Fasting 120-160  Any episodes of hypoglycemia? no  Tobacco history: She  reports that she has never smoked. She has never used smokeless tobacco.   Daily Aspirin? Yes  Have you had yourannual diabetic retinal (eye) exam? Yes    Hypertension:  Home blood pressure monitoring: Yes. Sheis adherent to a low sodium diet. Patient denies chest pain and shortness of breath. Antihypertensive medication side effects: no medication side effects noted and nomedication side effects noted.   Use of Neurological: alert. Skin: not diaphoretic. Psychiatric: normal mood and affect. behavior is normal. Judgment and thought content normal.   Nursing note and vitals reviewed. Assessment:    Capri Michaud was seen today for diabetes, hypertension, hyperlipidemia and other. Diagnoses and all orders for this visit:    Type 2 diabetes mellitus with complication, without long-term current use of insulin (HCC)  -     POCT glycosylated hemoglobin (Hb A1C)  Stable, controlled  Plan: continue current medications    Hypertension, unspecified type  Stable, controlled  Plan: continue current medications    Hyperlipidemia, unspecified hyperlipidemia type  Stable, controlled  Plan: continue current medications    Stage 3 chronic kidney disease (HCC)  Stable    History of ST elevation myocardial infarction (STEMI)  Diastolic dysfunction  -     carvedilol (COREG) 12.5 MG tablet; Take 1 tablet by mouth 2 times daily (with meals)  Valvular heart disease  S/P PTCA (percutaneous transluminal coronary angioplasty)  -     carvedilol (COREG) 12.5 MG tablet; Take 1 tablet by mouth 2 times daily (with meals)  History of non-ST elevation myocardial infarction (NSTEMI)  -     carvedilol (COREG) 12.5 MG tablet;  Take 1 tablet by mouth 2 times daily (with meals)  Cont f/u cardiology  plavix x 1 year then d/c    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

## 2019-07-11 NOTE — PATIENT INSTRUCTIONS
Hypertension / Hyperlipidemia Management    Blood Pressure Log      Tips to manage your condition    1. Keep your blood pressure below 130/80   Make sure your blood pressure is measured at every office visit    2. If you take antihypertensive drug therapy return for follow-up monthly until B/P goal is reached. 3. Follow-up with your physician to have your potassium and creatinine measured every 6 months. 4. Measure your blood pressure at home (use log to track your progress). 5. Keep your LDL (bad) cholesterol level below 130   Make sure your lipids are measured once a year     6. If you take LDL-lowering drug therapy return for follow-up every 6 months    Tips for healthy living    1. Start your day with breakfast  2. Exercise and slowly progress to (brisk walking, bike riding, etc) 30 minutes 3 to 5 days a week. 3. Snack in moderation (limit eating sugary or salty foods to no more than 3 times a week). 4. Eat more grains and vegetables (have no more than 3 servings of fruit daily). 5. Avoid tobacco use. 6. Drink alcohol in moderation (no more than 1 serving daily for woman and no more than 2 servings daily for men)  7. Obtain annual flu shot  8. Refer to patient education handouts given to you today. [facility] Address Phone Website   Patricecarlos Ortiz [facility]  [address] [phone] [email or web address]       Weight Management Community Resources   Organization Address Phone Website   [facility] ([facility]) [address] [facility], [address] [facility] [phone] n/a   Weight Watchers Multiple meeting locations throughout [address] [place], [place] [address]ner [facility] [email or web address]     Tops n/a n/a www. [employer]. [address] [place]  [place], [facility] [phone] [email or web address]    [facility] [address] [place] [address] [facility] [phone] [facility]    [address] [facility] [address] [phone]      Diabetes Management    Blood Sugar Log  Day Breakfast Lunch Dinner    Before 2 hrs After Before 2 hrs After Before 2 hrs After    Time Number Time  Number Time Number Time Number Time  Number Time Number   Sun               Mon               Tues               Wed               Thurs               Fri               Sat                 2. Keep your blood sugar level (A1c) below 7.0%   If your blood sugar is ABOVE 7.0%, get an A1c test every 3 months.  If your blood sugar is BELOW 7.0%, get an A1c test every 6 months. 3. Keep your blood pressure below 130/80   Make sure your blood pressure is measured at every office visit    4. Obtain a foot exam once a year during your doctors visit; also, do foot exam on yourself every week or have someone do it for you     5. Keep your LDL (bad) cholesterol level below 100   Make sure your lipids are measured once a year     6. Obtain a urine test (microalbumin) once a year    7. Obtain an eye exam once a year    8. Learn to stay healthy living with Diabetes   Attend Diabetes Education course if ordered by your physician    9. Exercise and slowly progress to (brisk walking, bike riding, etc) 30 minutes 3 to 5 days a week. 10. Obtain annual flu shot    11. Obtain pneumonia shot if you have not done so  12. Refer to patient education handouts given to you today    Diabetes Management Community Resources   Organization Address Program Phone / [address] Diabetes Self-Management  [phone]   [facility] [address], [place] Diabetes Self-Management  [address] [place], [place]  Diabetes Self-Management  [address] [place] [address] Help with Food [phone]   Partnership for Prescription Assistance n/a Medications

## 2019-07-14 RX ORDER — CARVEDILOL 12.5 MG/1
12.5 TABLET ORAL 2 TIMES DAILY WITH MEALS
Qty: 180 TABLET | Refills: 3 | Status: ON HOLD | OUTPATIENT
Start: 2019-07-14 | End: 2020-06-26

## 2019-08-07 ENCOUNTER — ANTI-COAG VISIT (OUTPATIENT)
Dept: INTERNAL MEDICINE | Age: 84
End: 2019-08-07

## 2019-08-07 DIAGNOSIS — Z86.73 HISTORY OF CVA (CEREBROVASCULAR ACCIDENT): ICD-10-CM

## 2019-08-07 DIAGNOSIS — I38 VALVULAR HEART DISEASE: ICD-10-CM

## 2019-08-07 LAB
INR BLD: 2.8
PROTHROMBIN TIME: 30.3 SEC (ref 12.3–14.9)

## 2019-08-12 RX ORDER — WARFARIN SODIUM 1 MG/1
TABLET ORAL
Qty: 90 TABLET | Refills: 3 | Status: SHIPPED | OUTPATIENT
Start: 2019-08-12 | End: 2020-03-28

## 2019-08-12 RX ORDER — WARFARIN SODIUM 7.5 MG/1
7.5 TABLET ORAL DAILY
Qty: 30 TABLET | Refills: 3 | Status: ON HOLD | OUTPATIENT
Start: 2019-08-12 | End: 2020-11-29 | Stop reason: ALTCHOICE

## 2019-08-27 RX ORDER — NITROFURANTOIN MACROCRYSTALS 100 MG/1
100 CAPSULE ORAL NIGHTLY
Qty: 90 CAPSULE | Refills: 2 | Status: SHIPPED | OUTPATIENT
Start: 2019-08-27 | End: 2020-05-19

## 2019-08-27 NOTE — TELEPHONE ENCOUNTER
Requesting medication refill.  Please approve or deny this request.    Rx requested:  Requested Prescriptions     Pending Prescriptions Disp Refills    nitrofurantoin (MACRODANTIN) 100 MG capsule  0     Sig: Take 1 capsule by mouth nightly       Last Office Visit:   7/11/2019    Last Labs:      Next Visit Date:  Future Appointments   Date Time Provider José Manuel Adamson   10/8/2019 10:15 AM Renetta Montana MD [address]   1/14/2020 10:15 AM Juanjo Schuster MD [facility]

## 2019-09-04 DIAGNOSIS — Z86.73 HISTORY OF CVA (CEREBROVASCULAR ACCIDENT): ICD-10-CM

## 2019-09-04 LAB
INR BLD: 2.5
PROTHROMBIN TIME: 27.7 SEC (ref 12.3–14.9)

## 2019-09-05 ENCOUNTER — ANTI-COAG VISIT (OUTPATIENT)
Dept: INTERNAL MEDICINE | Age: 84
End: 2019-09-05

## 2019-09-05 DIAGNOSIS — I38 VALVULAR HEART DISEASE: ICD-10-CM

## 2019-09-06 ENCOUNTER — TELEPHONE (OUTPATIENT)
Dept: CARDIOLOGY CLINIC | Age: 84
End: 2019-09-06

## 2019-09-30 DIAGNOSIS — I51.89 DIASTOLIC DYSFUNCTION: Primary | ICD-10-CM

## 2019-10-02 ENCOUNTER — NURSE ONLY (OUTPATIENT)
Dept: INTERNAL MEDICINE | Age: 84
End: 2019-10-02
Payer: MEDICARE

## 2019-10-02 DIAGNOSIS — Z23 NEED FOR INFLUENZA VACCINATION: Primary | ICD-10-CM

## 2019-10-02 DIAGNOSIS — Z86.73 HISTORY OF CVA (CEREBROVASCULAR ACCIDENT): ICD-10-CM

## 2019-10-02 LAB
INR BLD: 3.2
PROTHROMBIN TIME: 34.1 SEC (ref 12.3–14.9)

## 2019-10-02 PROCEDURE — 90653 IIV ADJUVANT VACCINE IM: CPT | Performed by: FAMILY MEDICINE

## 2019-10-02 PROCEDURE — G0008 ADMIN INFLUENZA VIRUS VAC: HCPCS | Performed by: FAMILY MEDICINE

## 2019-10-03 ENCOUNTER — ANTI-COAG VISIT (OUTPATIENT)
Dept: INTERNAL MEDICINE | Age: 84
End: 2019-10-03

## 2019-10-03 DIAGNOSIS — I38 VALVULAR HEART DISEASE: ICD-10-CM

## 2019-10-04 DIAGNOSIS — I51.89 DIASTOLIC DYSFUNCTION: ICD-10-CM

## 2019-10-07 RX ORDER — FUROSEMIDE 20 MG/1
20 TABLET ORAL 2 TIMES DAILY
Qty: 180 TABLET | Refills: 2 | Status: SHIPPED | OUTPATIENT
Start: 2019-10-07 | End: 2020-10-12

## 2019-10-08 ENCOUNTER — OFFICE VISIT (OUTPATIENT)
Dept: CARDIOLOGY CLINIC | Age: 84
End: 2019-10-08
Payer: MEDICARE

## 2019-10-08 VITALS
OXYGEN SATURATION: 98 % | SYSTOLIC BLOOD PRESSURE: 122 MMHG | BODY MASS INDEX: 29.85 KG/M2 | HEART RATE: 58 BPM | RESPIRATION RATE: 18 BRPM | DIASTOLIC BLOOD PRESSURE: 78 MMHG | WEIGHT: 179.2 LBS | HEIGHT: 65 IN

## 2019-10-08 DIAGNOSIS — I25.2 HISTORY OF NON-ST ELEVATION MYOCARDIAL INFARCTION (NSTEMI): ICD-10-CM

## 2019-10-08 DIAGNOSIS — I25.2 HISTORY OF ST ELEVATION MYOCARDIAL INFARCTION (STEMI): ICD-10-CM

## 2019-10-08 DIAGNOSIS — Z98.61 CAD S/P PERCUTANEOUS CORONARY ANGIOPLASTY: Primary | ICD-10-CM

## 2019-10-08 DIAGNOSIS — Z86.73 HISTORY OF CVA (CEREBROVASCULAR ACCIDENT): ICD-10-CM

## 2019-10-08 DIAGNOSIS — I25.10 CAD S/P PERCUTANEOUS CORONARY ANGIOPLASTY: Primary | ICD-10-CM

## 2019-10-08 DIAGNOSIS — I51.89 DIASTOLIC DYSFUNCTION: ICD-10-CM

## 2019-10-08 DIAGNOSIS — I42.9 CARDIOMYOPATHY, UNSPECIFIED TYPE (HCC): ICD-10-CM

## 2019-10-08 DIAGNOSIS — I38 VALVULAR HEART DISEASE: ICD-10-CM

## 2019-10-08 PROCEDURE — 99213 OFFICE O/P EST LOW 20 MIN: CPT | Performed by: INTERNAL MEDICINE

## 2019-10-08 PROCEDURE — 1036F TOBACCO NON-USER: CPT | Performed by: INTERNAL MEDICINE

## 2019-10-08 PROCEDURE — G8427 DOCREV CUR MEDS BY ELIG CLIN: HCPCS | Performed by: INTERNAL MEDICINE

## 2019-10-08 PROCEDURE — 1090F PRES/ABSN URINE INCON ASSESS: CPT | Performed by: INTERNAL MEDICINE

## 2019-10-08 PROCEDURE — 1123F ACP DISCUSS/DSCN MKR DOCD: CPT | Performed by: INTERNAL MEDICINE

## 2019-10-08 PROCEDURE — 4040F PNEUMOC VAC/ADMIN/RCVD: CPT | Performed by: INTERNAL MEDICINE

## 2019-10-08 PROCEDURE — G8417 CALC BMI ABV UP PARAM F/U: HCPCS | Performed by: INTERNAL MEDICINE

## 2019-10-08 PROCEDURE — G8482 FLU IMMUNIZE ORDER/ADMIN: HCPCS | Performed by: INTERNAL MEDICINE

## 2019-10-08 PROCEDURE — G8598 ASA/ANTIPLAT THER USED: HCPCS | Performed by: INTERNAL MEDICINE

## 2019-10-08 ASSESSMENT — ENCOUNTER SYMPTOMS
VOMITING: 0
COLOR CHANGE: 0
BLOOD IN STOOL: 0
SHORTNESS OF BREATH: 0
NAUSEA: 0
CHEST TIGHTNESS: 0
DIARRHEA: 0
APNEA: 0

## 2019-10-10 DIAGNOSIS — Z86.73 HISTORY OF CVA (CEREBROVASCULAR ACCIDENT): ICD-10-CM

## 2019-10-10 LAB
INR BLD: 3.5
PROTHROMBIN TIME: 36.4 SEC (ref 12.3–14.9)

## 2019-10-11 ENCOUNTER — ANTI-COAG VISIT (OUTPATIENT)
Dept: INTERNAL MEDICINE | Age: 84
End: 2019-10-11
Payer: MEDICARE

## 2019-10-11 DIAGNOSIS — Z86.73 HISTORY OF CVA (CEREBROVASCULAR ACCIDENT): ICD-10-CM

## 2019-10-11 DIAGNOSIS — G45.9 TRANSIENT CEREBRAL ISCHEMIA, UNSPECIFIED TYPE: ICD-10-CM

## 2019-10-11 PROCEDURE — 93793 ANTICOAG MGMT PT WARFARIN: CPT | Performed by: FAMILY MEDICINE

## 2019-10-16 DIAGNOSIS — Z86.73 HISTORY OF CVA (CEREBROVASCULAR ACCIDENT): ICD-10-CM

## 2019-10-16 LAB
INR BLD: 3.2
PROTHROMBIN TIME: 34.3 SEC (ref 12.3–14.9)

## 2019-10-17 ENCOUNTER — ANTI-COAG VISIT (OUTPATIENT)
Dept: INTERNAL MEDICINE | Age: 84
End: 2019-10-17
Payer: MEDICARE

## 2019-10-17 DIAGNOSIS — I38 VALVULAR HEART DISEASE: ICD-10-CM

## 2019-10-17 DIAGNOSIS — Z86.73 HISTORY OF CVA (CEREBROVASCULAR ACCIDENT): ICD-10-CM

## 2019-10-17 DIAGNOSIS — I69.90 LATE EFFECTS OF CVA (CEREBROVASCULAR ACCIDENT): ICD-10-CM

## 2019-10-17 PROCEDURE — 93793 ANTICOAG MGMT PT WARFARIN: CPT | Performed by: FAMILY MEDICINE

## 2019-10-21 DIAGNOSIS — Z86.73 HISTORY OF CVA (CEREBROVASCULAR ACCIDENT): ICD-10-CM

## 2019-10-21 LAB
INR BLD: 2.5
PROTHROMBIN TIME: 28.1 SEC (ref 12.3–14.9)

## 2019-10-22 ENCOUNTER — ANTI-COAG VISIT (OUTPATIENT)
Dept: INTERNAL MEDICINE | Age: 84
End: 2019-10-22
Payer: MEDICARE

## 2019-10-22 DIAGNOSIS — I38 VALVULAR HEART DISEASE: ICD-10-CM

## 2019-10-22 DIAGNOSIS — Z79.01 CURRENT USE OF ANTICOAGULANT THERAPY: ICD-10-CM

## 2019-10-22 DIAGNOSIS — Z86.73 HISTORY OF CVA (CEREBROVASCULAR ACCIDENT): ICD-10-CM

## 2019-10-22 PROCEDURE — 93793 ANTICOAG MGMT PT WARFARIN: CPT | Performed by: FAMILY MEDICINE

## 2019-11-04 ENCOUNTER — ANTI-COAG VISIT (OUTPATIENT)
Dept: INTERNAL MEDICINE | Age: 84
End: 2019-11-04
Payer: MEDICARE

## 2019-11-04 DIAGNOSIS — Z86.73 HISTORY OF CVA (CEREBROVASCULAR ACCIDENT): ICD-10-CM

## 2019-11-04 DIAGNOSIS — Z79.01 MONITORING FOR LONG-TERM ANTICOAGULANT USE: ICD-10-CM

## 2019-11-04 DIAGNOSIS — Z79.01 CURRENT USE OF ANTICOAGULANT THERAPY: ICD-10-CM

## 2019-11-04 DIAGNOSIS — Z51.81 MONITORING FOR LONG-TERM ANTICOAGULANT USE: ICD-10-CM

## 2019-11-04 DIAGNOSIS — I38 VALVULAR HEART DISEASE: ICD-10-CM

## 2019-11-04 LAB
INR BLD: 2.8
PROTHROMBIN TIME: 30.5 SEC (ref 12.3–14.9)

## 2019-11-04 PROCEDURE — 93793 ANTICOAG MGMT PT WARFARIN: CPT | Performed by: FAMILY MEDICINE

## 2019-11-05 PROBLEM — Z51.81 MONITORING FOR LONG-TERM ANTICOAGULANT USE: Status: ACTIVE | Noted: 2019-11-05

## 2019-11-05 PROBLEM — Z79.01 MONITORING FOR LONG-TERM ANTICOAGULANT USE: Status: ACTIVE | Noted: 2019-11-05

## 2019-11-06 DIAGNOSIS — I51.89 DIASTOLIC DYSFUNCTION: ICD-10-CM

## 2019-11-06 RX ORDER — GLUCOSAMINE HCL/CHONDROITIN SU 500-400 MG
CAPSULE ORAL
Qty: 200 STRIP | Refills: 3 | Status: SHIPPED | OUTPATIENT
Start: 2019-11-06 | End: 2021-06-13

## 2019-11-06 RX ORDER — LANCETS 30 GAUGE
1 EACH MISCELLANEOUS 2 TIMES DAILY
Qty: 100 EACH | Refills: 5 | Status: SHIPPED | OUTPATIENT
Start: 2019-11-06 | End: 2021-03-10

## 2019-11-25 RX ORDER — LISINOPRIL 10 MG/1
TABLET ORAL
Qty: 90 TABLET | Refills: 1 | OUTPATIENT
Start: 2019-11-25

## 2019-11-27 ENCOUNTER — TELEPHONE (OUTPATIENT)
Dept: CARDIOLOGY CLINIC | Age: 84
End: 2019-11-27

## 2019-11-27 DIAGNOSIS — I51.89 DIASTOLIC DYSFUNCTION: ICD-10-CM

## 2019-12-02 PROBLEM — I50.42 CHRONIC COMBINED SYSTOLIC AND DIASTOLIC CONGESTIVE HEART FAILURE (HCC): Status: ACTIVE | Noted: 2019-12-02

## 2019-12-03 DIAGNOSIS — Z86.73 HISTORY OF CVA (CEREBROVASCULAR ACCIDENT): ICD-10-CM

## 2019-12-03 LAB
INR BLD: 3.1
PROTHROMBIN TIME: 33.3 SEC (ref 12.3–14.9)

## 2019-12-04 ENCOUNTER — ANTI-COAG VISIT (OUTPATIENT)
Dept: INTERNAL MEDICINE | Age: 84
End: 2019-12-04
Payer: MEDICARE

## 2019-12-04 DIAGNOSIS — I38 VALVULAR HEART DISEASE: ICD-10-CM

## 2019-12-04 DIAGNOSIS — Z79.01 LONG TERM (CURRENT) USE OF ANTICOAGULANTS: ICD-10-CM

## 2019-12-04 DIAGNOSIS — I69.90 LATE EFFECTS OF CVA (CEREBROVASCULAR ACCIDENT): ICD-10-CM

## 2019-12-04 PROCEDURE — 93793 ANTICOAG MGMT PT WARFARIN: CPT | Performed by: FAMILY MEDICINE

## 2019-12-10 ENCOUNTER — ANTI-COAG VISIT (OUTPATIENT)
Dept: INTERNAL MEDICINE | Age: 84
End: 2019-12-10
Payer: MEDICARE

## 2019-12-10 DIAGNOSIS — I38 VALVULAR HEART DISEASE: ICD-10-CM

## 2019-12-10 DIAGNOSIS — G45.9 TRANSIENT CEREBRAL ISCHEMIA, UNSPECIFIED TYPE: ICD-10-CM

## 2019-12-10 DIAGNOSIS — Z86.73 HISTORY OF CVA (CEREBROVASCULAR ACCIDENT): ICD-10-CM

## 2019-12-10 LAB
INR BLD: 2.5
PROTHROMBIN TIME: 28.1 SEC (ref 12.3–14.9)

## 2019-12-10 PROCEDURE — 93793 ANTICOAG MGMT PT WARFARIN: CPT | Performed by: FAMILY MEDICINE

## 2019-12-16 ENCOUNTER — OFFICE VISIT (OUTPATIENT)
Dept: NEUROLOGY | Age: 84
End: 2019-12-16
Payer: MEDICARE

## 2019-12-16 VITALS
WEIGHT: 181 LBS | DIASTOLIC BLOOD PRESSURE: 61 MMHG | HEIGHT: 63 IN | SYSTOLIC BLOOD PRESSURE: 162 MMHG | HEART RATE: 56 BPM | BODY MASS INDEX: 32.07 KG/M2

## 2019-12-16 DIAGNOSIS — G45.8 OTHER TRANSIENT CEREBRAL ISCHEMIC ATTACKS AND RELATED SYNDROMES: ICD-10-CM

## 2019-12-16 DIAGNOSIS — I67.89 OTHER CEREBROVASCULAR DISEASE: ICD-10-CM

## 2019-12-16 PROCEDURE — 99214 OFFICE O/P EST MOD 30 MIN: CPT | Performed by: PSYCHIATRY & NEUROLOGY

## 2019-12-16 ASSESSMENT — ENCOUNTER SYMPTOMS
TROUBLE SWALLOWING: 0
SHORTNESS OF BREATH: 0
BACK PAIN: 0
VOMITING: 0
NAUSEA: 0
CHOKING: 0
PHOTOPHOBIA: 0

## 2019-12-24 DIAGNOSIS — Z86.73 HISTORY OF CVA (CEREBROVASCULAR ACCIDENT): ICD-10-CM

## 2019-12-24 LAB
INR BLD: 2.6
PROTHROMBIN TIME: 29 SEC (ref 12.3–14.9)

## 2019-12-26 ENCOUNTER — ANTI-COAG VISIT (OUTPATIENT)
Dept: INTERNAL MEDICINE | Age: 84
End: 2019-12-26
Payer: MEDICARE

## 2019-12-26 DIAGNOSIS — Z79.01 CURRENT USE OF LONG TERM ANTICOAGULATION: ICD-10-CM

## 2019-12-26 DIAGNOSIS — I38 VALVULAR HEART DISEASE: ICD-10-CM

## 2019-12-26 DIAGNOSIS — G45.9 TRANSIENT CEREBRAL ISCHEMIA, UNSPECIFIED TYPE: ICD-10-CM

## 2019-12-26 PROCEDURE — 93793 ANTICOAG MGMT PT WARFARIN: CPT | Performed by: FAMILY MEDICINE

## 2020-01-06 RX ORDER — HYDRALAZINE HYDROCHLORIDE 50 MG/1
50 TABLET, FILM COATED ORAL DAILY
Qty: 90 TABLET | Refills: 1 | Status: CANCELLED | OUTPATIENT
Start: 2020-01-06

## 2020-01-06 RX ORDER — ATORVASTATIN CALCIUM 10 MG/1
TABLET, FILM COATED ORAL
Qty: 90 TABLET | Refills: 3 | Status: ON HOLD | OUTPATIENT
Start: 2020-01-06 | End: 2020-07-07 | Stop reason: HOSPADM

## 2020-01-07 ENCOUNTER — OFFICE VISIT (OUTPATIENT)
Dept: CARDIOLOGY CLINIC | Age: 85
End: 2020-01-07
Payer: MEDICARE

## 2020-01-07 VITALS
HEART RATE: 57 BPM | WEIGHT: 183 LBS | BODY MASS INDEX: 32.43 KG/M2 | RESPIRATION RATE: 12 BRPM | HEIGHT: 63 IN | DIASTOLIC BLOOD PRESSURE: 60 MMHG | SYSTOLIC BLOOD PRESSURE: 160 MMHG

## 2020-01-07 PROCEDURE — G8482 FLU IMMUNIZE ORDER/ADMIN: HCPCS | Performed by: INTERNAL MEDICINE

## 2020-01-07 PROCEDURE — 99214 OFFICE O/P EST MOD 30 MIN: CPT | Performed by: INTERNAL MEDICINE

## 2020-01-07 PROCEDURE — G8417 CALC BMI ABV UP PARAM F/U: HCPCS | Performed by: INTERNAL MEDICINE

## 2020-01-07 PROCEDURE — 1123F ACP DISCUSS/DSCN MKR DOCD: CPT | Performed by: INTERNAL MEDICINE

## 2020-01-07 PROCEDURE — G8427 DOCREV CUR MEDS BY ELIG CLIN: HCPCS | Performed by: INTERNAL MEDICINE

## 2020-01-07 PROCEDURE — 1036F TOBACCO NON-USER: CPT | Performed by: INTERNAL MEDICINE

## 2020-01-07 PROCEDURE — 1090F PRES/ABSN URINE INCON ASSESS: CPT | Performed by: INTERNAL MEDICINE

## 2020-01-07 PROCEDURE — 4040F PNEUMOC VAC/ADMIN/RCVD: CPT | Performed by: INTERNAL MEDICINE

## 2020-01-07 RX ORDER — HYDRALAZINE HYDROCHLORIDE 50 MG/1
50 TABLET, FILM COATED ORAL DAILY
Qty: 90 TABLET | Refills: 3 | Status: ON HOLD | OUTPATIENT
Start: 2020-01-07 | End: 2020-07-07 | Stop reason: HOSPADM

## 2020-01-07 ASSESSMENT — ENCOUNTER SYMPTOMS
BLOOD IN STOOL: 0
NAUSEA: 1
CHEST TIGHTNESS: 0
COUGH: 0
ANAL BLEEDING: 0
COLOR CHANGE: 0
APNEA: 0
SHORTNESS OF BREATH: 0
DIARRHEA: 0
VOMITING: 0
ABDOMINAL PAIN: 0
ABDOMINAL DISTENTION: 0

## 2020-01-07 NOTE — PROGRESS NOTES
She'll see me in a month.     2/19/19: Patient presents today for Follow-up of coronary artery disease. Stent to the proximal LAD. Has mild to 50% disease in the mid LAD. Ejection fraction was diminished. We are going to check another echo. See what LV ejection fraction is. She was recently admitted to [facility] with UTI. Was started on hydralazine. This was 3 times a day and I cut it down to 1 a day. Patient remains stable. I will stop it altogether. See me in 3 months with an echo     1/18/19: Patient presents today for Follow-up of recent hospitalization at [facility] for acute MI. Had angioplasty stent to proximal LAD mid LAD at 50% stenosis ejection fraction is 25%. On lisinopril and metoprolol. Month in the [place] office. He reported repeat EF evaluation at some point     8/21/18: Patient presents today for follow-up of hypertension. Much better. Only on 5 mg of Norvasc. Blood sugars are better to more alert. Dizziness is improved. Accompanied by a son. Mild chronic kidney disease is stable. See me in 6 months.     5/15/18: Patient presents today for follow-up of hypertension. The dizziness is much better after we stopped few of her medication. Blood pressure is holding very well I think we could probably stop the Norvasc but she is quite content and so is a son with the present medications. She has mild chronic kidney disease. Hyperlipidemia that stable. See me in 3 months     4/11/18: Patient presents today for Follow-up of hypertension. She gets dizzy. I think she is on too much medications. We will discontinue hydralazine clonidine and Lasix. His mild chronic kidney disease. Also has hyperlipidemia that stable. I will see him in 3 weeks. Off these medications     3/28/2018: Patient presents today for evaluation of recent hospitalization for acute renal insufficiency. Echocardiogram was done which showed preserved LV ejection fraction. Moderately elevated right systolic pressure 60 mm smoker.  Last BUN/creatinine are getting better. GFR is in the 40s. She lives with one of her other sons. She note of hypertension. Used to be on lisinopril 20 mg a day and hydrochlorothiazide. During this hospitalization she was discharged home on lisinopril 2010 a day no hydrochlorothiazide. Lasix 20 minutes, day. Norvasc 10 mg a day. Clonidine 0.2 3 times a day hydralazine 10 3 times a day. I like blood pressures have been running stable in the 120s. Physical therapy goes to her house. She denies any chest pain. She started to drink more water. Does not drive. Does minor chores around the house. I would like to simplify his regimen. Take clonidine 0.2 in a.m. and hydralazine 10 mg at night. Other medication which include Lopressor 25 mg by mouth twice a day Lasix 20 mg a day Norvasc and resume a day to continue. See me in 2 weeks. 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 SCC (squamous cell carcinoma), arm 2013    right upper arm, 2015 right forarm    SI (stress incontinence), female 5/29/2012    Type II or unspecified type diabetes mellitus without mention of complication, not stated as uncontrolled     Whooping cough        Past Surgical History:   Procedure Laterality Date    ANKLE SURGERY      broken left ankle.     APPENDECTOMY      BREAST BIOPSY      x2 left breast    CATARACT REMOVAL  2004 meals) 180 tablet 3    warfarin (COUMADIN) 5 MG tablet take 1 tablet by mouth once daily 30 tablet 11    metFORMIN (GLUCOPHAGE) 1000 MG tablet take 1 tablet by mouth twice a day with meals 180 tablet 3    clopidogrel (PLAVIX) 75 MG tablet Take 1 tablet by mouth daily 90 tablet 3    warfarin (COUMADIN) 1 MG tablet Take daily 90 tablet 3    TRUEPLUS LANCETS 28G MISC TEST TWO TIMES DAILY 200 each 3    Cholecalciferol (VITAMIN D) 2000 units CAPS capsule Take 2,000 Units by mouth      TRUE METRIX BLOOD GLUCOSE TEST strip TEST two to three times a day 200 strip 5    magnesium oxide (MAG-OX) 400 MG tablet Take 400 mg by mouth daily      Needles & Syringes MISC 1 each by Does not apply route daily 50 each 0    Incontinence Supply Disposable (DEPEND UNDERGARMENTS) MISC 1 each by Does not apply route nightly 1 Package 11    Blood Glucose Monitoring Suppl (TRUE METRIX AIR GLUCOSE METER) W/DEVICE KIT       Blood Glucose Monitoring Suppl PRUDENCIO Dx: E11.9 1 Device 0    Compression Stockings MISC by Does not apply route Knee high, 20-30 mmHg 1 each 1     No current facility-administered medications for this visit. Review of Systems:   Review of Systems   Constitutional: Negative for activity change, appetite change, diaphoresis, fatigue and unexpected weight change. HENT: Negative for facial swelling, nosebleeds, trouble swallowing and voice change. Respiratory: Negative for apnea, cough, chest tightness, shortness of breath and wheezing. Cardiovascular: Negative for chest pain, palpitations and leg swelling. Gastrointestinal: Positive for nausea. Negative for abdominal distention, abdominal pain, anal bleeding, blood in stool, diarrhea and vomiting. Genitourinary: Negative for decreased urine volume and dysuria. Musculoskeletal: Negative for gait problem and myalgias. Skin: Negative for color change, pallor, rash and wound.    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 Orders Placed This Encounter   Medications    hydrALAZINE (APRESOLINE) 50 MG tablet     Sig: Take 1 tablet by mouth daily     Dispense:  90 tablet     Refill:  3         Assessment:    1. CAD S/P percutaneous coronary angioplasty    2. Diastolic dysfunction    3. Cardiomyopathy, unspecified type ([facility]    4. Valvular heart disease    5. 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 History of non-ST elevation myocardial infarction (NSTEMI)       Plan:   Stay on same medications. See me in 3 months. This note was partially generated using Dragon voice recognition system, and there may be some incorrect words, spellings, punctuation that were not noticed in checking the note before saving.         Electronically signed by Mena Bullock MD on 1/8/2020 at 8:57 AM

## 2020-01-09 ASSESSMENT — ENCOUNTER SYMPTOMS
VOICE CHANGE: 0
TROUBLE SWALLOWING: 0
FACIAL SWELLING: 0
WHEEZING: 0

## 2020-01-14 ENCOUNTER — OFFICE VISIT (OUTPATIENT)
Dept: INTERNAL MEDICINE | Age: 85
End: 2020-01-14
Payer: MEDICARE

## 2020-01-14 VITALS
BODY MASS INDEX: 32.43 KG/M2 | DIASTOLIC BLOOD PRESSURE: 78 MMHG | OXYGEN SATURATION: 98 % | WEIGHT: 183 LBS | HEART RATE: 66 BPM | SYSTOLIC BLOOD PRESSURE: 130 MMHG | HEIGHT: 63 IN

## 2020-01-14 DIAGNOSIS — E78.5 HYPERLIPIDEMIA, UNSPECIFIED HYPERLIPIDEMIA TYPE: ICD-10-CM

## 2020-01-14 DIAGNOSIS — I51.89 DIASTOLIC DYSFUNCTION: ICD-10-CM

## 2020-01-14 DIAGNOSIS — I10 HYPERTENSION, UNSPECIFIED TYPE: ICD-10-CM

## 2020-01-14 DIAGNOSIS — N18.30 STAGE 3 CHRONIC KIDNEY DISEASE (HCC): ICD-10-CM

## 2020-01-14 DIAGNOSIS — Z79.01 CURRENT USE OF LONG TERM ANTICOAGULATION: ICD-10-CM

## 2020-01-14 DIAGNOSIS — Z13.89 SCREENING FOR NEPHROPATHY: ICD-10-CM

## 2020-01-14 LAB
ALBUMIN SERPL-MCNC: 4.1 G/DL (ref 3.5–4.6)
ALP BLD-CCNC: 58 U/L (ref 40–130)
ALT SERPL-CCNC: 7 U/L (ref 0–33)
ANION GAP SERPL CALCULATED.3IONS-SCNC: 14 MEQ/L (ref 9–15)
AST SERPL-CCNC: 11 U/L (ref 0–35)
BASOPHILS ABSOLUTE: 0 K/UL (ref 0–0.2)
BASOPHILS RELATIVE PERCENT: 0.4 %
BILIRUB SERPL-MCNC: <0.2 MG/DL (ref 0.2–0.7)
BUN BLDV-MCNC: 36 MG/DL (ref 8–23)
CALCIUM SERPL-MCNC: 9.2 MG/DL (ref 8.5–9.9)
CHLORIDE BLD-SCNC: 99 MEQ/L (ref 95–107)
CHOLESTEROL, TOTAL: 137 MG/DL (ref 0–199)
CO2: 25 MEQ/L (ref 20–31)
CREAT SERPL-MCNC: 1 MG/DL (ref 0.5–0.9)
EOSINOPHILS ABSOLUTE: 0.3 K/UL (ref 0–0.7)
EOSINOPHILS RELATIVE PERCENT: 3 %
GFR AFRICAN AMERICAN: >60
GFR NON-AFRICAN AMERICAN: 51.8
GLOBULIN: 2.7 G/DL (ref 2.3–3.5)
GLUCOSE BLD-MCNC: 160 MG/DL (ref 70–99)
HBA1C MFR BLD: 7.8 %
HCT VFR BLD CALC: 28.7 % (ref 37–47)
HEMOGLOBIN: 9.3 G/DL (ref 12–16)
INR BLD: 3
LDL CHOLESTEROL DIRECT: 66 MG/DL (ref 0–129)
LYMPHOCYTES ABSOLUTE: 1.7 K/UL (ref 1–4.8)
LYMPHOCYTES RELATIVE PERCENT: 20.1 %
MCH RBC QN AUTO: 26.9 PG (ref 27–31.3)
MCHC RBC AUTO-ENTMCNC: 32.3 % (ref 33–37)
MCV RBC AUTO: 83.5 FL (ref 82–100)
MONOCYTES ABSOLUTE: 0.7 K/UL (ref 0.2–0.8)
MONOCYTES RELATIVE PERCENT: 8.3 %
NEUTROPHILS ABSOLUTE: 5.9 K/UL (ref 1.4–6.5)
NEUTROPHILS RELATIVE PERCENT: 68.2 %
PARATHYROID HORMONE INTACT: 50.1 PG/ML (ref 15–65)
PDW BLD-RTO: 16.3 % (ref 11.5–14.5)
PHOSPHORUS: 3.4 MG/DL (ref 2.3–4.8)
PLATELET # BLD: 283 K/UL (ref 130–400)
POTASSIUM SERPL-SCNC: 4.9 MEQ/L (ref 3.4–4.9)
PROTHROMBIN TIME: 32.3 SEC (ref 12.3–14.9)
RBC # BLD: 3.44 M/UL (ref 4.2–5.4)
SODIUM BLD-SCNC: 138 MEQ/L (ref 135–144)
TOTAL PROTEIN: 6.8 G/DL (ref 6.3–8)
VITAMIN D 25-HYDROXY: 31.5 NG/ML (ref 30–100)
WBC # BLD: 8.6 K/UL (ref 4.8–10.8)

## 2020-01-14 PROCEDURE — 4040F PNEUMOC VAC/ADMIN/RCVD: CPT | Performed by: FAMILY MEDICINE

## 2020-01-14 PROCEDURE — 99214 OFFICE O/P EST MOD 30 MIN: CPT | Performed by: FAMILY MEDICINE

## 2020-01-14 PROCEDURE — 1036F TOBACCO NON-USER: CPT | Performed by: FAMILY MEDICINE

## 2020-01-14 PROCEDURE — 83036 HEMOGLOBIN GLYCOSYLATED A1C: CPT | Performed by: FAMILY MEDICINE

## 2020-01-14 PROCEDURE — G8427 DOCREV CUR MEDS BY ELIG CLIN: HCPCS | Performed by: FAMILY MEDICINE

## 2020-01-14 PROCEDURE — 1090F PRES/ABSN URINE INCON ASSESS: CPT | Performed by: FAMILY MEDICINE

## 2020-01-14 PROCEDURE — G8482 FLU IMMUNIZE ORDER/ADMIN: HCPCS | Performed by: FAMILY MEDICINE

## 2020-01-14 PROCEDURE — G8417 CALC BMI ABV UP PARAM F/U: HCPCS | Performed by: FAMILY MEDICINE

## 2020-01-14 PROCEDURE — 1123F ACP DISCUSS/DSCN MKR DOCD: CPT | Performed by: FAMILY MEDICINE

## 2020-01-14 ASSESSMENT — PATIENT HEALTH QUESTIONNAIRE - PHQ9
SUM OF ALL RESPONSES TO PHQ QUESTIONS 1-9: 0
SUM OF ALL RESPONSES TO PHQ QUESTIONS 1-9: 0
SUM OF ALL RESPONSES TO PHQ9 QUESTIONS 1 & 2: 0
1. LITTLE INTEREST OR PLEASURE IN DOING THINGS: 0
2. FEELING DOWN, DEPRESSED OR HOPELESS: 0

## 2020-01-14 ASSESSMENT — ENCOUNTER SYMPTOMS
APNEA: 0
CHEST TIGHTNESS: 0
BACK PAIN: 0
CHOKING: 0

## 2020-01-14 NOTE — PROGRESS NOTES
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 SCC (squamous cell carcinoma), arm 2013    right upper arm, 2015 right forarm    SI (stress incontinence), female 5/29/2012    Type II or unspecified type diabetes mellitus without mention of complication, not stated as uncontrolled     Whooping cough      Past Surgical History:   Procedure Laterality Date    ANKLE SURGERY      broken left ankle.  APPENDECTOMY      BREAST BIOPSY      x2 left breast    CATARACT REMOVAL  2004    bilateral    CORONARY ANGIOPLASTY WITH STENT PLACEMENT  01/2019    CYSTOCELE REPAIR      EYE SURGERY      bilateral cataract    HYSTERECTOMY      complete at age 39    [address]      as a child     Social History     Socioeconomic History    Marital status:       Spouse name: Not on file    Number of children: Not on file    Years of education: Not on file    Highest education level: Not on file   Occupational History    Not on file   Social Needs    Financial resource strain: Not on file    Food insecurity:     Worry: Not on file     Inability: Not on file    Transportation needs:     Medical: Not on file     Non-medical: Not on file   Tobacco Use    Smoking status: Never Smoker    Smokeless tobacco: Never Used   Substance and Sexual Activity    Alcohol use: No     Alcohol/week: 0.0 standard drinks    Drug use: No    Sexual activity: Never   Lifestyle glucose monitor kit and supplies Test 2 times a day & as needed for symptoms of irregular blood glucose. Freestyle Freedom Lite 1 kit 0    Lancets MISC 1 each by Does not apply route 2 times daily Test 2 times a day & as needed for symptoms of irregular blood glucose. Freestyle Freedom Lite 100 each 5    blood glucose monitor strips Test 2 times a day & as needed for symptoms of irregular blood glucose. Freestyle Freedom Lite 200 strip 3    furosemide (LASIX) 20 MG tablet Take 1 tablet by mouth 2 times daily 180 tablet 2    nitrofurantoin (MACRODANTIN) 100 MG capsule Take 1 capsule by mouth nightly 90 capsule 2    warfarin (COUMADIN) 7.5 MG tablet Take 1 tablet by mouth daily 30 tablet 3    warfarin (COUMADIN) 1 MG tablet Three tablets daily 90 tablet 3    carvedilol (COREG) 12.5 MG tablet Take 1 tablet by mouth 2 times daily (with meals) 180 tablet 3    warfarin (COUMADIN) 5 MG tablet take 1 tablet by mouth once daily 30 tablet 11    metFORMIN (GLUCOPHAGE) 1000 MG tablet take 1 tablet by mouth twice a day with meals 180 tablet 3    clopidogrel (PLAVIX) 75 MG tablet Take 1 tablet by mouth daily 90 tablet 3    warfarin (COUMADIN) 1 MG tablet Take daily 90 tablet 3    TRUEPLUS LANCETS 28G MISC TEST TWO TIMES DAILY 200 each 3    Cholecalciferol (VITAMIN D) 2000 units CAPS capsule Take 2,000 Units by mouth      TRUE METRIX BLOOD GLUCOSE TEST strip TEST two to three times a day 200 strip 5    Needles & Syringes MISC 1 each by Does not apply route daily 50 each 0    Incontinence Supply Disposable (DEPEND UNDERGARMENTS) MISC 1 each by Does not apply route nightly 1 Package 11    Blood Glucose Monitoring Suppl (TRUE METRIX AIR GLUCOSE METER) W/DEVICE KIT       Blood Glucose Monitoring Suppl PRUDENCIO Dx: E11.9 1 Device 0    Compression Stockings MISC by Does not apply route Knee high, 20-30 mmHg 1 each 1     No current facility-administered medications on file prior to visit.       Allergies   Allergen Reactions    Metoclopramide     Pantoprazole Other (See Comments)    Pcn [Penicillins]      Tolerates rocephin    Protonix [Pantoprazole Sodium]     Tramadol        Chief Complaint   Patient presents with    Diabetes     6 month follow up     Hypertension    Hyperlipidemia       HPI:  Treatment Adherence:   Medication compliance:  compliant all of the time  Diet compliance: most of the time  Current exercise: no regular exercise    Diabetes Mellitus Type 2: Current symptoms/problems include none. Home blood sugar records:  Fasting 120-160  Any episodes of hypoglycemia? no  Tobacco history: She  reports that she has never smoked. She has never used smokeless tobacco.   Daily Aspirin? Yes  Have you had yourannual diabetic retinal (eye) exam? Yes    Hypertension:  Home blood pressure monitoring: Yes. Sheis adherent to a low sodium diet. Patient denies chest pain and shortness of breath. Antihypertensive medication side effects: no medication side effects noted and nomedication side effects noted. Use of agents associated with hypertension: none. Hyperlipidemia:  No new myalgias or GI upset on atorvastatin (Lipitor). Lab Results   Component Value Date    LABA1C 7.8 01/14/2020    LABA1C 7.9 07/11/2019    LABA1C 7.2 04/10/2019     Lab Results   Component Value Date    LABMICR 5.80 (H) 12/21/2017    CREATININE 1.06 (H) 04/10/2019     Lab Results   Component Value Date    ALT 9 04/10/2019    AST 16 04/10/2019     Lab Results   Component Value Date    CHOL 135 04/10/2019    TRIG 74 04/10/2019    HDL 52 04/10/2019    LDLCALC 68 04/10/2019    LDLDIRECT 64 08/17/2016        Diabetes and Hypertension Visit Information    BP Readings from Last 3 Encounters:   01/14/20 130/78   01/07/20 (!) 160/60   12/16/19 (!) 162/61          Have you seen any other physician or provider since your last visit? Yes  Have you had any other diagnostic tests since your last visit?  No  Have you been seen in the emergency with tylenol      Anemia  stable       Review of Systems   Constitutional: Negative for activity change, appetite change and chills. HENT: Negative for congestion, dental problem and drooling. Respiratory: Negative for apnea, choking and chest tightness. Musculoskeletal: Positive for gait problem. Negative for back pain and joint swelling. Physical Exam  Vitals:    01/14/20 1019   BP: 130/78   Pulse: 66   SpO2: 98%   Body mass index is 32.42 kg/m². Physical Exam  Constitutional:  Appears well-developed and well-nourished. No distress. HENT:   Head: Normocephalic and atraumatic. Right Ear: External ear normal.   Left Ear: External ear normal.   Nose: Nose normal.   Eyes: Conjunctivae and EOM are normal.  Right eye exhibits no discharge. Left eye exhibits no discharge. No scleral icterus. Neck: Normal range of motion. Cardiovascular: Normal rate, regular rhythm and normal heart sounds. No murmur heard. Pulmonary/Chest: Effort normal and breath sounds normal. No respiratory distress. no wheezes. no rales. no tenderness. Musculoskeletal: Normal range of motion. Neurological: alert. Skin: not diaphoretic. Psychiatric: normal mood and affect. behavior is normal. Judgment and thought content normal.   Nursing note and vitals reviewed. Assessment:  Billy Khan was seen today for diabetes, hypertension and hyperlipidemia. Diagnoses and all orders for this visit:    Type 2 diabetes mellitus with complication, without long-term current use of insulin (HCC)  -     POCT glycosylated hemoglobin (Hb A1C)  Stable, controlled  Plan: continue current medications    Hypertension, unspecified type  -     Comprehensive Metabolic Panel; Future  Stable, controlled  Plan: continue current medications    Hyperlipidemia, unspecified hyperlipidemia type  -     Cholesterol, Total; Future  -     LDL Cholesterol, Direct;  Future  Stable, controlled  Plan: continue current medications    Stage 3 chronic kidney disease ([facility] [address]  -     Phosphorus; Future  -     PTH, Intact; Future  -     Vitamin D 25 Hydroxy; Future  Stable    Valvular heart disease  History of ST elevation myocardial infarction (STEMI)  Diastolic dysfunction  -     CBC Auto Differential; Future  Current use of long term anticoagulation  -     Protime-Inr; Future  Stable, controlled  Plan: continue current medications  Cont f/u cardiology  CV (cardiovascular) risk reduction discussed  - BP control  - sugar control  - cardio/exercise  - meds: statin/antiplatelet/beta blocker use  - no tobacco use  - cut alcohol use    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  Stable  Cont f/u neurology    Recurrent UTI (urinary tract infection)  Stable  Cont macrobid    Spinal stenosis of lumbar region, unspecified whether neurogenic claudication present  No pain issues    Screening for nephropathy  -     Microalbumin / Creatinine Urine Ratio; Future        Orders Placed This Encounter   Procedures    Comprehensive Metabolic Panel     Standing Status:   Future     Number of Occurrences:   1     Standing Expiration Date:   4/14/2020    CBC Auto Differential     Standing Status:   Future     Number of Occurrences:   1     Standing Expiration Date:   4/14/2020    Phosphorus     Standing Status:   Future     Number of Occurrences:   1     Standing Expiration Date:   1/14/2021    Cholesterol, Total     Standing Status:   Future     Number of Occurrences:   1     Standing Expiration Date:   1/14/2021     Order Specific Question:   Is Patient Fasting? Answer:   no     Order Specific Question:   No of Hours? Answer:   n/a    Protime-Inr     Standing Status:   Future     Number of Occurrences:   1     Standing Expiration Date:   1/14/2021     Order Specific Question:   Daily Coumadin Dose?      Answer:   6 mg mon/wed/thurs/fri, 7 mg sat/sund/tues    LDL Cholesterol, Direct     Standing Status:   Future     Number of Occurrences:   1 Standing Expiration Date:   1/14/2021    PTH, Intact     Standing Status:   Future     Number of Occurrences:   1     Standing Expiration Date:   1/14/2021    Vitamin D 25 Hydroxy     Standing Status:   Future     Number of Occurrences:   1     Standing Expiration Date:   4/14/2020    Microalbumin / Creatinine Urine Ratio     Standing Status:   Future     Number of Occurrences:   1     Standing Expiration Date:   5/14/2020    POCT glycosylated hemoglobin (Hb A1C)      I personally reviewed all recent labs and imaging pertaining to conditions mentioned above in assessment/plan in [facility] and care everywhere, discussed results with patient in office    Diabetes Counseling   Patient was counseled regarding disease risks and adopting healthy behaviors. Patient was provided education materials to assist with self management. Patient was provided log (or received log during previous visit) to record blood pressure, food intake and/or blood sugar. Patient was instructed to keep log up-to-date and to always bring log to all office visits. Reviewed the following:  - Morbidity from diabetes involves both macrovascular (atherosclerosis) and microvascular disease (retinopathy, nephropathy, and neuropathy). - Monitoring recommendations for patients with diabetes were discussed  1. Smoking cessation counseling (Every visit)   2. Blood pressure (Every visit) Goal <140/80   3. Dilated eye examination (Annually, more than annually if significant retinopathy)   4. Foot examination (Annually, every visit if peripheral vascular disease or neuropathy)   5. Laboratory studies:    - Fasting serum lipid profile (Annually)  cholesterol (goal LDL less than 100  mg/dL    - A1C (Every three to six months) Goal <7 percent   - Urinary albumin-to-creatinine ratio (Annually, protein excretion and serum  creatinine should also be monitored if persistent albuminuria is present)    - Serum creatinine, initially as indicated  6.  ASA (75 to 162 mg/day) in patients with or at high risk for cardiovascular disease  7. Vaccinations:   - Pneumococcus, One time Patients over age 72 need a second dose if vaccine  was received ? 5 years previously and age was <65 at time of vaccination    - Influenza, Annually    - Hepatitis B, They should have had the three dose series   8. Education, self management review Annually      Return in about 6 months (around 7/14/2020) for Diabetes, Hypertension, Hyperlipidemia.

## 2020-01-15 ENCOUNTER — ANTI-COAG VISIT (OUTPATIENT)
Dept: INTERNAL MEDICINE | Age: 85
End: 2020-01-15
Payer: MEDICARE

## 2020-01-15 LAB
IRON SATURATION: 6 % (ref 11–46)
IRON: 23 UG/DL (ref 37–145)
TOTAL IRON BINDING CAPACITY: 392 UG/DL (ref 178–450)

## 2020-01-15 PROCEDURE — 93793 ANTICOAG MGMT PT WARFARIN: CPT | Performed by: FAMILY MEDICINE

## 2020-01-16 LAB
CREATININE URINE: 45.1 MG/DL
MICROALBUMIN UR-MCNC: 33.1 MG/DL
MICROALBUMIN/CREAT UR-RTO: 733.9 MG/G (ref 0–30)

## 2020-02-11 DIAGNOSIS — Z86.73 HISTORY OF CVA (CEREBROVASCULAR ACCIDENT): ICD-10-CM

## 2020-02-11 LAB
INR BLD: 2.4
PROTHROMBIN TIME: 27.2 SEC (ref 12.3–14.9)

## 2020-02-12 ENCOUNTER — ANTI-COAG VISIT (OUTPATIENT)
Dept: INTERNAL MEDICINE | Age: 85
End: 2020-02-12

## 2020-03-10 DIAGNOSIS — Z86.73 HISTORY OF CVA (CEREBROVASCULAR ACCIDENT): ICD-10-CM

## 2020-03-10 LAB
INR BLD: 2
PROTHROMBIN TIME: 23.7 SEC (ref 12.3–14.9)

## 2020-03-11 ENCOUNTER — ANTI-COAG VISIT (OUTPATIENT)
Dept: INTERNAL MEDICINE | Age: 85
End: 2020-03-11
Payer: MEDICARE

## 2020-03-11 PROCEDURE — 93793 ANTICOAG MGMT PT WARFARIN: CPT | Performed by: FAMILY MEDICINE

## 2020-03-30 RX ORDER — WARFARIN SODIUM 1 MG/1
TABLET ORAL
Qty: 90 TABLET | Refills: 1 | Status: SHIPPED | OUTPATIENT
Start: 2020-03-30 | End: 2020-05-26

## 2020-03-30 RX ORDER — CLOPIDOGREL BISULFATE 75 MG/1
75 TABLET ORAL DAILY
Qty: 90 TABLET | Refills: 3 | Status: SHIPPED | OUTPATIENT
Start: 2020-03-30 | End: 2021-03-29

## 2020-03-30 NOTE — TELEPHONE ENCOUNTER
Rx requested:  Requested Prescriptions     Pending Prescriptions Disp Refills    clopidogrel (PLAVIX) 75 MG tablet 90 tablet 3     Sig: Take 1 tablet by mouth daily       Last Office Visit:   1/14/2020      Last filled:  04/2019    Next Visit Date:  Future Appointments   Date Time Provider José Manuel Adamson   12/14/2020  1:15 PM Michelle Rice MD [facility]

## 2020-03-31 ENCOUNTER — TELEPHONE (OUTPATIENT)
Dept: INTERNAL MEDICINE | Age: 85
End: 2020-03-31

## 2020-05-04 ENCOUNTER — TELEPHONE (OUTPATIENT)
Dept: FAMILY MEDICINE CLINIC | Age: 85
End: 2020-05-04

## 2020-05-05 ENCOUNTER — HOSPITAL ENCOUNTER (OUTPATIENT)
Dept: LAB | Age: 85
Discharge: HOME | End: 2020-05-05
Payer: MEDICARE

## 2020-05-05 LAB
INR BLD: 2.2
PROTHROMBIN TIME: 24.7 SEC (ref 12.3–14.9)

## 2020-05-05 PROCEDURE — 36415 COLL VENOUS BLD VENIPUNCTURE: CPT

## 2020-05-05 PROCEDURE — 85610 PROTHROMBIN TIME: CPT

## 2020-05-08 ENCOUNTER — TELEPHONE (OUTPATIENT)
Dept: INTERNAL MEDICINE | Age: 85
End: 2020-05-08

## 2020-05-08 NOTE — TELEPHONE ENCOUNTER
Received call from Pt's , Néstor Atwoodita. Néstor Christy was requesting results from INR they had completed at [facility] on Tuesday, 5/5/2020. Please advise.

## 2020-05-19 RX ORDER — NITROFURANTOIN MACROCRYSTALS 100 MG/1
CAPSULE ORAL
Qty: 90 CAPSULE | Refills: 1 | Status: ON HOLD | OUTPATIENT
Start: 2020-05-19 | End: 2020-07-08 | Stop reason: HOSPADM

## 2020-06-03 DIAGNOSIS — Z79.01 CURRENT USE OF LONG TERM ANTICOAGULATION: ICD-10-CM

## 2020-06-03 DIAGNOSIS — Z86.73 HISTORY OF CVA (CEREBROVASCULAR ACCIDENT): ICD-10-CM

## 2020-06-03 DIAGNOSIS — D64.9 ANEMIA, UNSPECIFIED TYPE: ICD-10-CM

## 2020-06-03 LAB
INR BLD: 2.7
IRON SATURATION: 14 % (ref 11–46)
IRON: 48 UG/DL (ref 37–145)
PROTHROMBIN TIME: 28.8 SEC (ref 12.3–14.9)
TOTAL IRON BINDING CAPACITY: 352 UG/DL (ref 178–450)

## 2020-06-05 ENCOUNTER — ANTI-COAG VISIT (OUTPATIENT)
Dept: INTERNAL MEDICINE | Age: 85
End: 2020-06-05

## 2020-06-10 ENCOUNTER — TELEPHONE (OUTPATIENT)
Dept: CARDIOLOGY CLINIC | Age: 85
End: 2020-06-10

## 2020-06-18 ENCOUNTER — APPOINTMENT (OUTPATIENT)
Dept: CT IMAGING | Age: 85
DRG: 149 | End: 2020-06-18
Payer: MEDICARE

## 2020-06-18 ENCOUNTER — OFFICE VISIT (OUTPATIENT)
Dept: INTERNAL MEDICINE | Age: 85
End: 2020-06-18
Payer: MEDICARE

## 2020-06-18 ENCOUNTER — HOSPITAL ENCOUNTER (EMERGENCY)
Age: 85
Discharge: HOME | DRG: 149 | End: 2020-06-18
Payer: MEDICARE

## 2020-06-18 ENCOUNTER — APPOINTMENT (OUTPATIENT)
Dept: GENERAL RADIOLOGY | Age: 85
DRG: 149 | End: 2020-06-18
Payer: MEDICARE

## 2020-06-18 VITALS
DIASTOLIC BLOOD PRESSURE: 80 MMHG | SYSTOLIC BLOOD PRESSURE: 138 MMHG | HEIGHT: 63 IN | OXYGEN SATURATION: 97 % | WEIGHT: 184 LBS | BODY MASS INDEX: 32.6 KG/M2 | HEART RATE: 64 BPM

## 2020-06-18 VITALS
DIASTOLIC BLOOD PRESSURE: 54 MMHG | OXYGEN SATURATION: 98 % | HEART RATE: 56 BPM | BODY MASS INDEX: 32.6 KG/M2 | WEIGHT: 184 LBS | TEMPERATURE: 98 F | RESPIRATION RATE: 14 BRPM | SYSTOLIC BLOOD PRESSURE: 179 MMHG | HEIGHT: 63 IN

## 2020-06-18 LAB
ALBUMIN SERPL-MCNC: 3.8 G/DL (ref 3.5–4.6)
ALP BLD-CCNC: 54 U/L (ref 40–130)
ALT SERPL-CCNC: 15 U/L (ref 0–33)
ANION GAP SERPL CALCULATED.3IONS-SCNC: 14 MEQ/L (ref 9–15)
APTT: 31.3 SEC (ref 24.4–36.8)
AST SERPL-CCNC: 17 U/L (ref 0–35)
BASOPHILS ABSOLUTE: 0 K/UL (ref 0–0.2)
BASOPHILS RELATIVE PERCENT: 0.6 %
BILIRUB SERPL-MCNC: <0.2 MG/DL (ref 0.2–0.7)
BUN BLDV-MCNC: 37 MG/DL (ref 8–23)
CALCIUM SERPL-MCNC: 9.3 MG/DL (ref 8.5–9.9)
CHLORIDE BLD-SCNC: 100 MEQ/L (ref 95–107)
CO2: 24 MEQ/L (ref 20–31)
CREAT SERPL-MCNC: 1.26 MG/DL (ref 0.5–0.9)
EKG ATRIAL RATE: 64 BPM
EKG P AXIS: 43 DEGREES
EKG P-R INTERVAL: 196 MS
EKG Q-T INTERVAL: 412 MS
EKG QRS DURATION: 92 MS
EKG QTC CALCULATION (BAZETT): 425 MS
EKG R AXIS: -1 DEGREES
EKG T AXIS: 143 DEGREES
EKG VENTRICULAR RATE: 64 BPM
EOSINOPHILS ABSOLUTE: 0.2 K/UL (ref 0–0.7)
EOSINOPHILS RELATIVE PERCENT: 3.1 %
GFR AFRICAN AMERICAN: 47.9
GFR NON-AFRICAN AMERICAN: 39.6
GLOBULIN: 2.9 G/DL (ref 2.3–3.5)
GLUCOSE BLD-MCNC: 273 MG/DL (ref 70–99)
HCT VFR BLD CALC: 34.8 % (ref 37–47)
HEMOGLOBIN: 11.3 G/DL (ref 12–16)
INR BLD: 1.8
LYMPHOCYTES ABSOLUTE: 1.4 K/UL (ref 1–4.8)
LYMPHOCYTES RELATIVE PERCENT: 19.4 %
MAGNESIUM: 1.5 MG/DL (ref 1.7–2.4)
MCH RBC QN AUTO: 29.9 PG (ref 27–31.3)
MCHC RBC AUTO-ENTMCNC: 32.5 % (ref 33–37)
MCV RBC AUTO: 92.1 FL (ref 82–100)
MONOCYTES ABSOLUTE: 0.6 K/UL (ref 0.2–0.8)
MONOCYTES RELATIVE PERCENT: 9.1 %
NEUTROPHILS ABSOLUTE: 4.7 K/UL (ref 1.4–6.5)
NEUTROPHILS RELATIVE PERCENT: 67.8 %
PDW BLD-RTO: 14.7 % (ref 11.5–14.5)
PLATELET # BLD: 221 K/UL (ref 130–400)
POTASSIUM SERPL-SCNC: 4.7 MEQ/L (ref 3.4–4.9)
PROTHROMBIN TIME: 21.2 SEC (ref 12.3–14.9)
RBC # BLD: 3.77 M/UL (ref 4.2–5.4)
SODIUM BLD-SCNC: 138 MEQ/L (ref 135–144)
TOTAL PROTEIN: 6.7 G/DL (ref 6.3–8)
TROPONIN: <0.01 NG/ML (ref 0–0.01)
TSH REFLEX: 2.31 UIU/ML (ref 0.44–3.86)
WBC # BLD: 7 K/UL (ref 4.8–10.8)

## 2020-06-18 PROCEDURE — 85025 COMPLETE CBC W/AUTO DIFF WBC: CPT

## 2020-06-18 PROCEDURE — 1123F ACP DISCUSS/DSCN MKR DOCD: CPT | Performed by: FAMILY MEDICINE

## 2020-06-18 PROCEDURE — 96375 TX/PRO/DX INJ NEW DRUG ADDON: CPT

## 2020-06-18 PROCEDURE — 6360000002 HC RX W HCPCS: Performed by: NURSE PRACTITIONER

## 2020-06-18 PROCEDURE — 36415 COLL VENOUS BLD VENIPUNCTURE: CPT

## 2020-06-18 PROCEDURE — G0438 PPPS, INITIAL VISIT: HCPCS | Performed by: FAMILY MEDICINE

## 2020-06-18 PROCEDURE — 83735 ASSAY OF MAGNESIUM: CPT

## 2020-06-18 PROCEDURE — 84443 ASSAY THYROID STIM HORMONE: CPT

## 2020-06-18 PROCEDURE — 85610 PROTHROMBIN TIME: CPT

## 2020-06-18 PROCEDURE — 2500000003 HC RX 250 WO HCPCS: Performed by: NURSE PRACTITIONER

## 2020-06-18 PROCEDURE — 70450 CT HEAD/BRAIN W/O DYE: CPT

## 2020-06-18 PROCEDURE — 93005 ELECTROCARDIOGRAM TRACING: CPT | Performed by: EMERGENCY MEDICINE

## 2020-06-18 PROCEDURE — 96374 THER/PROPH/DIAG INJ IV PUSH: CPT

## 2020-06-18 PROCEDURE — 71045 X-RAY EXAM CHEST 1 VIEW: CPT

## 2020-06-18 PROCEDURE — 4040F PNEUMOC VAC/ADMIN/RCVD: CPT | Performed by: FAMILY MEDICINE

## 2020-06-18 PROCEDURE — 85730 THROMBOPLASTIN TIME PARTIAL: CPT

## 2020-06-18 PROCEDURE — 84484 ASSAY OF TROPONIN QUANT: CPT

## 2020-06-18 PROCEDURE — 80053 COMPREHEN METABOLIC PANEL: CPT

## 2020-06-18 PROCEDURE — 99284 EMERGENCY DEPT VISIT MOD MDM: CPT

## 2020-06-18 RX ORDER — ONDANSETRON 2 MG/ML
4 INJECTION INTRAMUSCULAR; INTRAVENOUS EVERY 10 MIN PRN
Status: DISCONTINUED | OUTPATIENT
Start: 2020-06-18 | End: 2020-06-18 | Stop reason: HOSPADM

## 2020-06-18 RX ORDER — LABETALOL HYDROCHLORIDE 5 MG/ML
20 INJECTION, SOLUTION INTRAVENOUS ONCE
Status: COMPLETED | OUTPATIENT
Start: 2020-06-18 | End: 2020-06-18

## 2020-06-18 RX ADMIN — LABETALOL 20 MG/4 ML (5 MG/ML) INTRAVENOUS SYRINGE 20 MG: at 20:01

## 2020-06-18 RX ADMIN — ONDANSETRON 4 MG: 2 INJECTION INTRAMUSCULAR; INTRAVENOUS at 21:27

## 2020-06-18 ASSESSMENT — ENCOUNTER SYMPTOMS
DIARRHEA: 0
COUGH: 0
PHOTOPHOBIA: 0
ABDOMINAL PAIN: 0
RHINORRHEA: 0
BACK PAIN: 0
SORE THROAT: 0
VOMITING: 0
NAUSEA: 0
EYE PAIN: 0
SHORTNESS OF BREATH: 0

## 2020-06-18 ASSESSMENT — LIFESTYLE VARIABLES: HOW OFTEN DO YOU HAVE A DRINK CONTAINING ALCOHOL: 0

## 2020-06-18 ASSESSMENT — PATIENT HEALTH QUESTIONNAIRE - PHQ9
SUM OF ALL RESPONSES TO PHQ QUESTIONS 1-9: 0
SUM OF ALL RESPONSES TO PHQ QUESTIONS 1-9: 0

## 2020-06-18 NOTE — PROGRESS NOTES
monitor kit and supplies Test 2 times a day & as needed for symptoms of irregular blood glucose. Carly Hutchinson MD   Lancets MISC 1 each by Does not apply route 2 times daily Test 2 times a day & as needed for symptoms of irregular blood glucose. Marlena Devries MD   blood glucose monitor strips Test 2 times a day & as needed for symptoms of irregular blood glucose.   Carly Hutchinson MD   warfarin (COUMADIN) 7.5 MG tablet Take 1 tablet by mouth daily  [facility]   warfarin (COUMADIN) 1 MG tablet Take daily  Simran Hutchinson MD   TRUEPLUS LANCETS 28G MISC TEST TWO TIMES DAILY  Simran Hutchinson MD   TRUE METRIX BLOOD GLUCOSE TEST strip TEST two to three times a day  Simran Hutchinson MD   Needles & Syringes MISC 1 each by Does not apply route daily  Simran Hutchinson MD   Incontinence Supply Disposable (DEPEND UNDERGARMENTS) MISC 1 each by Does not apply route nightly  Simran Hutchinson MD   Blood Glucose Monitoring Suppl (TRUE METRIX AIR GLUCOSE METER) W/DEVICE KIT   Historical Provider, MD   Blood Glucose Monitoring Suppl PRUDENCIO Dx: E11.9  Kareem Goes, DO   Compression Stockings MISC by Does not apply route Knee high, 20-30 mmHg  Simran Hutchinson MD       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

## 2020-06-18 NOTE — ED NOTES
Waiting for labetalol med from pharmacy, 0 available in er at this time.      Jameson Salomon RN  06/18/20 [id number]

## 2020-06-18 NOTE — ED TRIAGE NOTES
Patient saw her PCP today and her BP was normal. She began having \"pressure in her ears\" and called EMS. Blood pressure was 200/100, labetalol was given by EMS, and the said her BP improved to 170/90. Patient states she has \"very little\" pressure in her ears.

## 2020-06-19 ENCOUNTER — APPOINTMENT (OUTPATIENT)
Dept: MRI IMAGING | Age: 85
DRG: 149 | End: 2020-06-19
Payer: MEDICARE

## 2020-06-19 ENCOUNTER — APPOINTMENT (OUTPATIENT)
Dept: CT IMAGING | Age: 85
DRG: 149 | End: 2020-06-19
Payer: MEDICARE

## 2020-06-19 ENCOUNTER — APPOINTMENT (OUTPATIENT)
Dept: GENERAL RADIOLOGY | Age: 85
DRG: 149 | End: 2020-06-19
Payer: MEDICARE

## 2020-06-19 ENCOUNTER — HOSPITAL ENCOUNTER (INPATIENT)
Age: 85
LOS: 5 days | Discharge: NURSING, REHAB OR LONG-TERM CARE | DRG: 149 | End: 2020-06-24
Attending: INTERNAL MEDICINE | Admitting: INTERNAL MEDICINE
Payer: MEDICARE

## 2020-06-19 ENCOUNTER — CARE COORDINATION (OUTPATIENT)
Dept: CARE COORDINATION | Age: 85
End: 2020-06-19

## 2020-06-19 ENCOUNTER — APPOINTMENT (OUTPATIENT)
Dept: ULTRASOUND IMAGING | Age: 85
DRG: 149 | End: 2020-06-19
Payer: MEDICARE

## 2020-06-19 PROBLEM — R55 SYNCOPE: Status: ACTIVE | Noted: 2020-06-19

## 2020-06-19 LAB
ALBUMIN SERPL-MCNC: 4 G/DL (ref 3.5–4.6)
ALP BLD-CCNC: 51 U/L (ref 40–130)
ALT SERPL-CCNC: 14 U/L (ref 0–33)
AMYLASE: 173 U/L (ref 22–93)
ANION GAP SERPL CALCULATED.3IONS-SCNC: 10 MEQ/L (ref 9–15)
AST SERPL-CCNC: 11 U/L (ref 0–35)
BACTERIA: NEGATIVE /HPF
BASOPHILS ABSOLUTE: 0 K/UL (ref 0–0.2)
BASOPHILS RELATIVE PERCENT: 0.4 %
BILIRUB SERPL-MCNC: 0.3 MG/DL (ref 0.2–0.7)
BILIRUBIN URINE: NEGATIVE
BLOOD, URINE: NEGATIVE
BUN BLDV-MCNC: 31 MG/DL (ref 8–23)
CALCIUM SERPL-MCNC: 9.4 MG/DL (ref 8.5–9.9)
CHLORIDE BLD-SCNC: 103 MEQ/L (ref 95–107)
CLARITY: CLEAR
CO2: 26 MEQ/L (ref 20–31)
COLOR: YELLOW
CREAT SERPL-MCNC: 1.04 MG/DL (ref 0.5–0.9)
EKG ATRIAL RATE: 51 BPM
EKG P AXIS: 11 DEGREES
EKG P-R INTERVAL: 198 MS
EKG Q-T INTERVAL: 486 MS
EKG QRS DURATION: 92 MS
EKG QTC CALCULATION (BAZETT): 447 MS
EKG R AXIS: -1 DEGREES
EKG T AXIS: 148 DEGREES
EKG VENTRICULAR RATE: 51 BPM
EOSINOPHILS ABSOLUTE: 0.1 K/UL (ref 0–0.7)
EOSINOPHILS RELATIVE PERCENT: 1.2 %
EPITHELIAL CELLS, UA: NORMAL /HPF (ref 0–5)
GFR AFRICAN AMERICAN: 51
GFR AFRICAN AMERICAN: 59.8
GFR NON-AFRICAN AMERICAN: 42
GFR NON-AFRICAN AMERICAN: 49.4
GLOBULIN: 2.5 G/DL (ref 2.3–3.5)
GLUCOSE BLD-MCNC: 251 MG/DL (ref 70–99)
GLUCOSE BLD-MCNC: 254 MG/DL (ref 60–115)
GLUCOSE URINE: 250 MG/DL
HCT VFR BLD CALC: 35.6 % (ref 37–47)
HEMOGLOBIN: 11.6 G/DL (ref 12–16)
HYALINE CASTS: NORMAL /HPF (ref 0–5)
KETONES, URINE: NEGATIVE MG/DL
LACTIC ACID: 1.4 MMOL/L (ref 0.5–2.2)
LEUKOCYTE ESTERASE, URINE: NEGATIVE
LIPASE: 192 U/L (ref 12–95)
LIPASE: 195 U/L (ref 12–95)
LYMPHOCYTES ABSOLUTE: 1 K/UL (ref 1–4.8)
LYMPHOCYTES RELATIVE PERCENT: 10.3 %
MCH RBC QN AUTO: 30 PG (ref 27–31.3)
MCHC RBC AUTO-ENTMCNC: 32.4 % (ref 33–37)
MCV RBC AUTO: 92.5 FL (ref 82–100)
MONOCYTES ABSOLUTE: 0.4 K/UL (ref 0.2–0.8)
MONOCYTES RELATIVE PERCENT: 4.7 %
NEUTROPHILS ABSOLUTE: 7.7 K/UL (ref 1.4–6.5)
NEUTROPHILS RELATIVE PERCENT: 83.4 %
NITRITE, URINE: NEGATIVE
PDW BLD-RTO: 14.6 % (ref 11.5–14.5)
PERFORMED ON: ABNORMAL
PERFORMED ON: ABNORMAL
PH UA: 6.5 (ref 5–9)
PLATELET # BLD: 209 K/UL (ref 130–400)
POC CREATININE WHOLE BLOOD: 1.2
POC CREATININE: 1.2 MG/DL (ref 0.6–1.2)
POC SAMPLE TYPE: ABNORMAL
POTASSIUM SERPL-SCNC: 4.2 MEQ/L (ref 3.4–4.9)
PROTEIN UA: 100 MG/DL
RBC # BLD: 3.85 M/UL (ref 4.2–5.4)
RBC UA: NORMAL /HPF (ref 0–5)
SODIUM BLD-SCNC: 139 MEQ/L (ref 135–144)
SPECIFIC GRAVITY UA: 1.01 (ref 1–1.03)
TOTAL CK: 48 U/L (ref 0–170)
TOTAL PROTEIN: 6.5 G/DL (ref 6.3–8)
TROPONIN: <0.01 NG/ML (ref 0–0.01)
URINE REFLEX TO CULTURE: ABNORMAL
UROBILINOGEN, URINE: 0.2 E.U./DL
WBC # BLD: 9.2 K/UL (ref 4.8–10.8)
WBC UA: NORMAL /HPF (ref 0–5)

## 2020-06-19 PROCEDURE — 85025 COMPLETE CBC W/AUTO DIFF WBC: CPT

## 2020-06-19 PROCEDURE — 36415 COLL VENOUS BLD VENIPUNCTURE: CPT

## 2020-06-19 PROCEDURE — 70551 MRI BRAIN STEM W/O DYE: CPT

## 2020-06-19 PROCEDURE — 83690 ASSAY OF LIPASE: CPT

## 2020-06-19 PROCEDURE — 84484 ASSAY OF TROPONIN QUANT: CPT

## 2020-06-19 PROCEDURE — 99285 EMERGENCY DEPT VISIT HI MDM: CPT

## 2020-06-19 PROCEDURE — 82150 ASSAY OF AMYLASE: CPT

## 2020-06-19 PROCEDURE — 96376 TX/PRO/DX INJ SAME DRUG ADON: CPT

## 2020-06-19 PROCEDURE — 96374 THER/PROPH/DIAG INJ IV PUSH: CPT

## 2020-06-19 PROCEDURE — 1210000000 HC MED SURG R&B

## 2020-06-19 PROCEDURE — 80053 COMPREHEN METABOLIC PANEL: CPT

## 2020-06-19 PROCEDURE — 81001 URINALYSIS AUTO W/SCOPE: CPT

## 2020-06-19 PROCEDURE — 96375 TX/PRO/DX INJ NEW DRUG ADDON: CPT

## 2020-06-19 PROCEDURE — 70450 CT HEAD/BRAIN W/O DYE: CPT

## 2020-06-19 PROCEDURE — 2580000003 HC RX 258: Performed by: PHYSICIAN ASSISTANT

## 2020-06-19 PROCEDURE — 82550 ASSAY OF CK (CPK): CPT

## 2020-06-19 PROCEDURE — 6360000002 HC RX W HCPCS: Performed by: PHYSICIAN ASSISTANT

## 2020-06-19 PROCEDURE — 2580000003 HC RX 258: Performed by: NURSE PRACTITIONER

## 2020-06-19 PROCEDURE — 6370000000 HC RX 637 (ALT 250 FOR IP): Performed by: NURSE PRACTITIONER

## 2020-06-19 PROCEDURE — 99222 1ST HOSP IP/OBS MODERATE 55: CPT | Performed by: PSYCHIATRY & NEUROLOGY

## 2020-06-19 PROCEDURE — 93880 EXTRACRANIAL BILAT STUDY: CPT

## 2020-06-19 PROCEDURE — 51702 INSERT TEMP BLADDER CATH: CPT

## 2020-06-19 PROCEDURE — 74177 CT ABD & PELVIS W/CONTRAST: CPT

## 2020-06-19 PROCEDURE — 6360000004 HC RX CONTRAST MEDICATION: Performed by: PHYSICIAN ASSISTANT

## 2020-06-19 PROCEDURE — 83605 ASSAY OF LACTIC ACID: CPT

## 2020-06-19 PROCEDURE — 71045 X-RAY EXAM CHEST 1 VIEW: CPT

## 2020-06-19 PROCEDURE — 93005 ELECTROCARDIOGRAM TRACING: CPT | Performed by: PHYSICIAN ASSISTANT

## 2020-06-19 RX ORDER — ONDANSETRON 2 MG/ML
4 INJECTION INTRAMUSCULAR; INTRAVENOUS ONCE
Status: COMPLETED | OUTPATIENT
Start: 2020-06-19 | End: 2020-06-19

## 2020-06-19 RX ORDER — MECLIZINE HCL 12.5 MG/1
12.5 TABLET ORAL 3 TIMES DAILY PRN
Status: DISCONTINUED | OUTPATIENT
Start: 2020-06-19 | End: 2020-06-24 | Stop reason: HOSPADM

## 2020-06-19 RX ORDER — ONDANSETRON 4 MG/1
4 TABLET, ORALLY DISINTEGRATING ORAL EVERY 8 HOURS PRN
Status: DISCONTINUED | OUTPATIENT
Start: 2020-06-19 | End: 2020-06-24 | Stop reason: HOSPADM

## 2020-06-19 RX ORDER — ACETAMINOPHEN 650 MG/1
650 SUPPOSITORY RECTAL EVERY 6 HOURS PRN
Status: DISCONTINUED | OUTPATIENT
Start: 2020-06-19 | End: 2020-06-21

## 2020-06-19 RX ORDER — HYDRALAZINE HYDROCHLORIDE 20 MG/ML
10 INJECTION INTRAMUSCULAR; INTRAVENOUS EVERY 4 HOURS PRN
Status: DISCONTINUED | OUTPATIENT
Start: 2020-06-19 | End: 2020-06-24 | Stop reason: HOSPADM

## 2020-06-19 RX ORDER — DEXTROSE MONOHYDRATE 50 MG/ML
100 INJECTION, SOLUTION INTRAVENOUS PRN
Status: DISCONTINUED | OUTPATIENT
Start: 2020-06-19 | End: 2020-06-24 | Stop reason: HOSPADM

## 2020-06-19 RX ORDER — DEXTROSE MONOHYDRATE 25 G/50ML
12.5 INJECTION, SOLUTION INTRAVENOUS PRN
Status: DISCONTINUED | OUTPATIENT
Start: 2020-06-19 | End: 2020-06-24 | Stop reason: HOSPADM

## 2020-06-19 RX ORDER — 0.9 % SODIUM CHLORIDE 0.9 %
1000 INTRAVENOUS SOLUTION INTRAVENOUS ONCE
Status: COMPLETED | OUTPATIENT
Start: 2020-06-19 | End: 2020-06-19

## 2020-06-19 RX ORDER — HYDRALAZINE HYDROCHLORIDE 50 MG/1
50 TABLET, FILM COATED ORAL DAILY
Status: DISCONTINUED | OUTPATIENT
Start: 2020-06-20 | End: 2020-06-24 | Stop reason: HOSPADM

## 2020-06-19 RX ORDER — MORPHINE SULFATE 2 MG/ML
4 INJECTION, SOLUTION INTRAMUSCULAR; INTRAVENOUS ONCE
Status: COMPLETED | OUTPATIENT
Start: 2020-06-19 | End: 2020-06-19

## 2020-06-19 RX ORDER — ATORVASTATIN CALCIUM 10 MG/1
10 TABLET, FILM COATED ORAL NIGHTLY
Status: DISCONTINUED | OUTPATIENT
Start: 2020-06-19 | End: 2020-06-24 | Stop reason: HOSPADM

## 2020-06-19 RX ORDER — ONDANSETRON 2 MG/ML
4 INJECTION INTRAMUSCULAR; INTRAVENOUS EVERY 6 HOURS PRN
Status: DISCONTINUED | OUTPATIENT
Start: 2020-06-19 | End: 2020-06-24 | Stop reason: HOSPADM

## 2020-06-19 RX ORDER — HYDRALAZINE HYDROCHLORIDE 20 MG/ML
10 INJECTION INTRAMUSCULAR; INTRAVENOUS ONCE
Status: COMPLETED | OUTPATIENT
Start: 2020-06-19 | End: 2020-06-19

## 2020-06-19 RX ORDER — SODIUM CHLORIDE 0.9 % (FLUSH) 0.9 %
10 SYRINGE (ML) INJECTION EVERY 12 HOURS SCHEDULED
Status: DISCONTINUED | OUTPATIENT
Start: 2020-06-19 | End: 2020-06-24 | Stop reason: HOSPADM

## 2020-06-19 RX ORDER — CARVEDILOL 12.5 MG/1
12.5 TABLET ORAL 2 TIMES DAILY WITH MEALS
Status: DISCONTINUED | OUTPATIENT
Start: 2020-06-19 | End: 2020-06-24 | Stop reason: HOSPADM

## 2020-06-19 RX ORDER — SODIUM CHLORIDE 0.9 % (FLUSH) 0.9 %
10 SYRINGE (ML) INJECTION PRN
Status: DISCONTINUED | OUTPATIENT
Start: 2020-06-19 | End: 2020-06-24 | Stop reason: HOSPADM

## 2020-06-19 RX ORDER — NICOTINE POLACRILEX 4 MG
15 LOZENGE BUCCAL PRN
Status: DISCONTINUED | OUTPATIENT
Start: 2020-06-19 | End: 2020-06-24 | Stop reason: HOSPADM

## 2020-06-19 RX ORDER — ACETAMINOPHEN 325 MG/1
650 TABLET ORAL EVERY 6 HOURS PRN
Status: DISCONTINUED | OUTPATIENT
Start: 2020-06-19 | End: 2020-06-21

## 2020-06-19 RX ADMIN — ONDANSETRON 4 MG: 2 INJECTION INTRAMUSCULAR; INTRAVENOUS at 10:54

## 2020-06-19 RX ADMIN — ONDANSETRON 4 MG: 2 INJECTION INTRAMUSCULAR; INTRAVENOUS at 08:36

## 2020-06-19 RX ADMIN — IOPAMIDOL 100 ML: 612 INJECTION, SOLUTION INTRAVENOUS at 09:49

## 2020-06-19 RX ADMIN — SODIUM CHLORIDE 1000 ML: 9 INJECTION, SOLUTION INTRAVENOUS at 08:36

## 2020-06-19 RX ADMIN — Medication 10 ML: at 20:54

## 2020-06-19 RX ADMIN — ONDANSETRON 4 MG: 4 TABLET, ORALLY DISINTEGRATING ORAL at 20:53

## 2020-06-19 RX ADMIN — SACUBITRIL AND VALSARTAN 1 TABLET: 24; 26 TABLET, FILM COATED ORAL at 20:53

## 2020-06-19 RX ADMIN — ONDANSETRON 4 MG: 2 INJECTION INTRAMUSCULAR; INTRAVENOUS at 13:09

## 2020-06-19 RX ADMIN — MORPHINE SULFATE 4 MG: 2 INJECTION, SOLUTION INTRAMUSCULAR; INTRAVENOUS at 10:54

## 2020-06-19 RX ADMIN — CARVEDILOL 12.5 MG: 12.5 TABLET, FILM COATED ORAL at 20:53

## 2020-06-19 RX ADMIN — HYDRALAZINE HYDROCHLORIDE 10 MG: 20 INJECTION INTRAMUSCULAR; INTRAVENOUS at 09:31

## 2020-06-19 RX ADMIN — ATORVASTATIN CALCIUM 10 MG: 10 TABLET, FILM COATED ORAL at 20:53

## 2020-06-19 ASSESSMENT — ENCOUNTER SYMPTOMS
SHORTNESS OF BREATH: 0
COLOR CHANGE: 0
VOMITING: 1
CONSTIPATION: 0
SORE THROAT: 0
RHINORRHEA: 0
ABDOMINAL PAIN: 1
NAUSEA: 1
ABDOMINAL DISTENTION: 0
EYE DISCHARGE: 0

## 2020-06-19 ASSESSMENT — PAIN SCALES - GENERAL
PAINLEVEL_OUTOF10: 8
PAINLEVEL_OUTOF10: 0

## 2020-06-19 NOTE — CONSULTS
CATARACT REMOVAL  2004    bilateral    CORONARY ANGIOPLASTY WITH STENT PLACEMENT  01/2019    CYSTOCELE REPAIR      EYE SURGERY      bilateral cataract    HYSTERECTOMY      complete at age 39    [address]      as a child     Social History     Socioeconomic History    Marital status:      Spouse name: Not on file    Number of children: Not on file    Years of education: Not on file    Highest education level: Not on file   Occupational History    Not on file   Social Needs    Financial resource strain: Not on file    Food insecurity     Worry: Not on file     Inability: Not on file    Transportation needs     Medical: Not on file     Non-medical: Not on file   Tobacco Use    Smoking status: Never Smoker    Smokeless tobacco: Never Used   Substance and Sexual Activity    Alcohol use: No     Alcohol/week: 0.0 standard drinks    Drug use: No    Sexual activity: Not on file   Lifestyle    Physical activity     Days per week: Not on file     Minutes per session: Not on file    Stress: Not on file   Relationships    Social connections     Talks on phone: Not on file     Gets together: Not on file     Attends Islam service: Not on file     Active member of club or organization: Not on file     Attends meetings of clubs or organizations: Not on file     Relationship status: Not on file    Intimate partner violence     Fear of current or ex partner: Not on file     Emotionally abused: Not on file     Physically abused: Not on file     Forced sexual activity: Not on file   Other Topics Concern    Not on file   Social History Narrative         Lives With: Son    Type of Home: House    Home Layout: Two level, Able to Live on Main level with bedroom/bathroom, Performs ADL's on one level    Home Access: Stairs to enter with rails    Entrance Stairs - Number of Steps:  Threshold    Bathroom Shower/Tub: Tub/Shower unit    Bathroom Toilet: Handicap height    Bathroom Component Value Date    TSH 1.810 03/15/2019    [id number] 541 03/15/2019    FOLATE 13.4 03/15/2019    FERRITIN 99.1 12/21/2017    IRON 48 06/03/2020    TIBC 352 06/03/2020     Lab Results   Component Value Date    TRIG 74 04/10/2019    HDL 52 04/10/2019    LDLCALC 68 04/10/2019    LDLDIRECT 66 01/14/2020    LABVLDL 34.0 05/01/2013     No results found for: Tennille Ramus, LABBENZ, CANNAB, COCAINESCRN, LABMETH, OPIATESCREENURINE, PHENCYCLIDINESCREENURINE, PPXUR, ETOH  No results found for: LITHIUM, DILFRTOT, VALPROATE    Assessment:   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 Patient is notably more head and is nauseous. The other alternative diagnosis could be a brainstem stroke. This cannot be isolated. As noted patient is cerebrovascular event in the past was treated TPA with almost complete resolution of symptoms her second admission did not show anything significant. She has multiple risk factors of vascular disease continues on Coumadin. She was on a combination of Coumadin and Plavix for last seen she is no longer antiplatelet agents. In the event that she has a new stroke she may require additional antiplatelet agents if she is therapeutic on her INR. Since last INR is 1.8 though she is not able to keep things down today I am not quite sure if she will be able to take her Coumadin will repeat her INR again tomorrow    MRI of brain is pending    Elijah Keating MD, Paula Geller, American Board of Psychiatry & Neurology  Board Certified in Vascular Neurology  Board Certified in Neuromuscular Medicine  Certified in [facility]:

## 2020-06-19 NOTE — FLOWSHEET NOTE
1345  Patient arrived to the floor via cart with POA at bedside. Patient is rolled on her side. Son said that she does not ever lay on her back due to pain. Son and RN went over medications list and code status. Dr. Dariel Corrales was at bedside and new orders placed. Patient to go down for an MRI. Patient is extremely nauseous at this time. 1455  Patient heading down to MRI via cart at this time. Dr. Zenia Moe to see once back.

## 2020-06-19 NOTE — PROGRESS NOTES
prior to subsequent doses. Patient INR 1.8 sub therapeutic for goal 2-3. Will continue home dose of warfarin 6mg x today d/t pt current status and likely dehydration d/t N/V. Daily PT/INR until stable within therapeutic range. Thank you for the consult.  Will continue to monitor   Keila Li PharmD

## 2020-06-19 NOTE — ED PROVIDER NOTES
review of systems was reviewed and negative. 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 SCC (squamous cell carcinoma), arm 2013    right upper arm, 2015 right forarm    SI (stress incontinence), female 5/29/2012    Type II or unspecified type diabetes mellitus without mention of complication, not stated as uncontrolled     Whooping cough      Past Surgical History:   Procedure Laterality Date    ANKLE SURGERY      broken left ankle.  APPENDECTOMY      BREAST BIOPSY      x2 left breast    CATARACT REMOVAL  2004    bilateral    CORONARY ANGIOPLASTY WITH STENT PLACEMENT  01/2019    CYSTOCELE REPAIR      EYE SURGERY      bilateral cataract    HYSTERECTOMY      complete at age 39    [address]      as a child     Social History     Socioeconomic History    Marital status:       Spouse name: Not on file    Number of children: Not on file    Years of education: Not on file    Highest education level: Not on file   Occupational History    Not on file   Social Needs    Financial resource strain: Not on file    Food insecurity     Worry: Not on file     Inability: Not on file    Transportation needs     Medical: Not on file     Non-medical: Not on file   Tobacco Use    Smoking status: Never Smoker    Pending)         ED BEDSIDE ULTRASOUND:   Performed by ED Physician - none    LABS:  Labs Reviewed   CBC WITH AUTO DIFFERENTIAL - Abnormal; Notable for the following components:       Result Value    RBC 3.77 (*)     Hemoglobin 11.3 (*)     Hematocrit 34.8 (*)     MCHC 32.5 (*)     RDW 14.7 (*)     All other components within normal limits   COMPREHENSIVE METABOLIC PANEL - Abnormal; Notable for the following components:    Glucose 273 (*)     BUN 37 (*)     CREATININE 1.26 (*)     GFR Non- 39.6 (*)     GFR  47.9 (*)     All other components within normal limits   MAGNESIUM - Abnormal; Notable for the following components:    Magnesium 1.5 (*)     All other components within normal limits   PROTIME-INR - Abnormal; Notable for the following components:    Protime 21.2 (*)     All other components within normal limits   TROPONIN   APTT   TSH WITH REFLEX       All other labs were within normal range or not returned as of this dictation. EMERGENCY DEPARTMENT COURSE and DIFFERENTIAL DIAGNOSIS/MDM:   Vitals:    Vitals:    06/18/20 2023 06/18/20 2030 06/18/20 2041 06/18/20 2045   BP: (!) 171/54 (!) 174/55 (!) 177/52 (!) 179/54   Pulse: 52 58 57 56   Resp: 13 14 17 14   Temp:       SpO2: 97% 98% 98% 98%   Weight:       Height:                MDM patient is reexamined. She continues to be asymptomatic and has no longer any ear pressure or fullness. No headache. Work-up demonstrates a mild renal insufficiency. Her glucose is elevated at 273, this has been more normal for her per patient and family at the bedside. She is attributing the blood pressure to a highly salty/fatty meal following primary care visit today. She is asymptomatic. Her work-up was really unremarkable. We discussed plan of care, patient really wants to go home. This is appropriate. She will return to the ED should she experience any other symptoms.   Have provided extended discharge instructions to the patient including signs, symptoms and red flags of which to return to the emergency department. they express good understanding of these instructions. Standard anticipatory guidance given to patient upon discharge. Have given them a specific time frame in which to follow-up and who to follow-up with. I have also advised them that they should return to the emergency department if they get worse, or not getting better or develop any new or concerning symptoms. Patient demonstrates understanding and all questions were answered. CRITICAL CARE TIME       CONSULTS:  None    PROCEDURES:  Unless otherwise noted below, none     Procedures    FINAL IMPRESSION      1.  Essential hypertension          DISPOSITION/PLAN   DISPOSITION Decision To Discharge 06/18/2020 08:55:40 PM      PATIENT REFERRED TO:  Hitesh Martinez MD  [address] [place] [address] [phone]    Call in 1 day  For continued evaluation and management    Christian Oconnor MD  [address] [place] [address] [phone]    Call in 1 day  For continued evaluation and management      DISCHARGE MEDICATIONS:  Discharge Medication List as of 6/18/2020  8:56 PM             (Please notethat portions of this note were completed with a voice recognition program.  Efforts were made to edit the dictations but occasionally words are mis-transcribed.)    SYLVIE Escalante CNP (electronically signed)  Attending Emergency Physician          SYLVIE Escalante CNP  06/18/20 [id number]

## 2020-06-19 NOTE — ED NOTES
Warm blankets placed on pt  Pt resting with eyes closed  abd sounds present in all quadrants  Son at bedside  Call light within reach  Will continue to monitor      [address], RN  06/19/20 5895

## 2020-06-19 NOTE — ED NOTES
Pt stable, a&ox4, skin w/d/pink, 0 c/o, 0 distress, will monitor.      Khurram Valente RN  06/18/20 2024

## 2020-06-19 NOTE — ED NOTES
Bed: 15  Expected date: 6/19/20  Expected time:   Means of arrival:   Comments:  80 female vomiting, weakness. Dizzy.   Seen last night and discharged     [address] Iggy Levy RN  06/19/20 5075

## 2020-06-19 NOTE — H&P
unspecified type diabetes mellitus without mention of complication, not stated as uncontrolled     Whooping cough        Past Surgical History:          Procedure Laterality Date    ANKLE SURGERY      broken left ankle.  APPENDECTOMY      BREAST BIOPSY      x2 left breast    CATARACT REMOVAL  2004    bilateral    CORONARY ANGIOPLASTY WITH STENT PLACEMENT  01/2019    CYSTOCELE REPAIR      EYE SURGERY      bilateral cataract    HYSTERECTOMY      complete at age 39    [address]      as a child       Medications Prior to Admission:      Prior to Admission medications    Medication Sig Start Date End Date Taking? Authorizing Provider   sacubitril-valsartan (ENTRESTO) 24-26 MG per tablet Take 1 tablet by mouth 2 times daily 6/12/20   Zakiya Granados MD   metFORMIN (GLUCOPHAGE) 1000 MG tablet take 1 tablet by mouth twice a day with meals 5/29/20   Ximena Stanton MD   warfarin (COUMADIN) 1 MG tablet take 3 tablets by mouth once daily 5/26/20   Ximena Stanton MD   nitrofurantoin (MACRODANTIN) 100 MG capsule take 1 capsule by mouth every evening 5/19/20   Ximena Stanton MD   clopidogrel (PLAVIX) 75 MG tablet Take 1 tablet by mouth daily 3/30/20   Ximena Stanton MD   hydrALAZINE (APRESOLINE) 50 MG tablet Take 1 tablet by mouth daily 1/7/20   Zakiya Granados MD   atorvastatin (LIPITOR) 10 MG tablet take 1 tablet by mouth once daily 1/6/20   Ximena Stanton MD   blood glucose monitor kit and supplies Test 2 times a day & as needed for symptoms of irregular blood glucose. Freestyle [place] Lite 11/6/19   Ximena Stanton MD   Lancets MISC 1 each by Does not apply route 2 times daily Test 2 times a day & as needed for symptoms of irregular blood glucose. Freestyle [place] Lite 11/6/19   Ximena Stanton MD   blood glucose monitor strips Test 2 times a day & as needed for symptoms of irregular blood glucose.   Freestyle [place] Lite 11/6/19   Ximena Stanton MD   furosemide (LASIX) 20 MG Pulse 62   Temp 97.7 °F (36.5 °C) (Oral)   Resp 18   Ht 5' 3\" (1.6 m)   Wt 184 lb (83.5 kg)   LMP  (LMP Unknown)   SpO2 100%   BMI 32.59 kg/m²     General appearance:  No apparent distress, appears stated age and cooperative. HEENT:  Normal cephalic, atraumatic without obvious deformity. Pupils equal, round, and reactive to light. Extra ocular muscles intact. Conjunctivae/corneas clear. Neck: Supple, with full range of motion. No jugular venous distention. Trachea midline. Respiratory:  Normal respiratory effort. Clear to auscultation, bilaterally without Rales/Wheezes/Rhonchi. Cardiovascular:  Regular rate and rhythm with normal S1/S2 without murmurs, rubs or gallops. Abdomen: Soft, non-tender, non-distended with normal bowel sounds. Musculoskeletal:  No clubbing, cyanosis or edema bilaterally. Skin: Skin color, texture, turgor normal.  No rashes or lesions.   Neurologic:  Drowsy but responsive, has geenrlaized weakness   Capillary Refill: Brisk,< 3 seconds   Peripheral Pulses: +2 palpable, equal bilaterally       Labs:     Recent Labs     06/18/20 1915 06/19/20  0800   WBC 7.0 9.2   HGB 11.3* 11.6*   HCT 34.8* 35.6*    209     Recent Labs     06/18/20 1915 06/19/20  0800    139   K 4.7 4.2    103   CO2 24 26   BUN 37* 31*   CREATININE 1.26* 1.04*   CALCIUM 9.3 9.4     Recent Labs     06/18/20 1915 06/19/20  0800   AST 17 11   ALT 15 14   BILITOT <0.2 0.3   ALKPHOS 54 51     Recent Labs     06/18/20 1915   INR 1.8     Recent Labs     06/18/20 1915 06/19/20  0800   CKTOTAL  --  50   TROPONINI <0.010 <0.010       Urinalysis:      Lab Results   Component Value Date    NITRU Negative 06/19/2020    WBCUA 0-2 06/19/2020    BACTERIA Negative 06/19/2020    RBCUA 0-2 06/19/2020    BLOODU Negative 06/19/2020    SPECGRAV 1.014 06/19/2020    GLUCOSEU 250 06/19/2020       Radiology:     CXR: I have reviewed the CXR with the following interpretation:  EKG:  I have reviewed the EKG with the following interpretation:     CT ABDOMEN PELVIS W IV CONTRAST Additional Contrast? None   Final Result   BORDERLINE PELVIECTASIS IN LEFT KIDNEY. NO ACUTE PATHOLOGY IN THE ABDOMEN OR PELVIS. XR CHEST PORTABLE   Final Result   NO ACUTE CARDIOPULMONARY DISEASE. CT Head WO Contrast    (Results Pending)       ASSESSMENT/plan    Dizziness; partly d/t uncontrolled B/p , will however r/o any possible CVA given pts hx, f/u MRI  Head result, obtain carotid US and consult neurology, obtain orthostatic V.s and start PRN meclizine, neuro checks ,   CT head yesterday was negative  Hypertensive urgency:improved after given a dose of hydralazine in  the ED, resume home antihypertensives and titrate to attain BP<140/80, add PRN hydralazine    DM II with hyperglycemia: start POCT glucose with SSI     Nausea/Vomitting: d/t all above, antiemetics as needed , liquid diet for now , will advance as pt tolerates     Weakness: PT/OT eval and treat     Elevated lipase: will recheck level in am for any further elevation     CHF/ hx MI: with 20% LVEF , resume cardiachome meds     HX CVA: resume statin and coumadin with daily INR monitoring and pharmacy to dose coumadin  I saw and evaluated the pt. I personally obtained the key and critical portions of the history and physical exam. I reviewed the mild level documentation and agree with assessment and plan that we come up together    DVT Prophylaxis: pt on coumadin     Diet: liquid  Code Status:DNRCCA per son    Dispo -inpatient      I saw and evaluated the pt. I personally obtained the key and critical portions of the history and physical exam. I reviewed the mild level documentation and agree with assessment and plan that we come up together  Electronically signed by Alberto Bourne MD on 6/19/20 at 1:43 PM EDT       SYLVIE Monet - CNP    Thank you Tabitha Brantley MD for the opportunity to be involved in this patient's care.  If you have any questions or concerns

## 2020-06-19 NOTE — ED PROVIDER NOTES
Delfina [facility]  eMERGENCY dEPARTMENT eNCOUnter      Pt Name: Quinn Rodriguez  MRN: [id number]  [place] 6/10/1927  Date of evaluation: 6/19/2020  Provider: Anne Cervantes PA-C    CHIEF COMPLAINT       Chief Complaint   Patient presents with    Nausea     n&v as well as weakness. was here yesterday evening. not feeling any better         HISTORY OF PRESENT ILLNESS   (Location/Symptom, Timing/Onset,Context/Setting, Quality, Duration, Modifying Factors, Severity)  Note limiting factors. Quinn Rodriguez is a 80 y.o. female who presents to the emergency department with complaint of nausea vomiting abdominal pain generalized weakness, and difficulty with ambulation which patient states her last evening for her. She was seen in the emerge department yesterday for hypertension, and hearing changes. She was treated in the emerge department and sent home. She states since returning home she is feeling worse, she states she feels dizzy and weak when she tries ambulates, she is requiring assistance at home. She is also complaining of some abdominal pain distention, nausea and vomiting which is new for her since her previous visit. She denies any fevers, no cough, no shortness of breath, no chest pain. Denies any pain at this time, 0 out of 10    HPI    NursingNotes were reviewed. REVIEW OF SYSTEMS    (2-9 systems for level 4, 10 or more for level 5)     Review of Systems   Constitutional: Negative for activity change and appetite change. HENT: Negative for congestion, ear discharge, ear pain, nosebleeds, rhinorrhea and sore throat. Eyes: Negative for discharge. Respiratory: Negative for shortness of breath. Cardiovascular: Negative for chest pain, palpitations and leg swelling. Gastrointestinal: Positive for abdominal pain, nausea and vomiting. Negative for abdominal distention and constipation. Genitourinary: Negative for difficulty urinating and dysuria.    Musculoskeletal: Negative for per tablet Take 1 tablet by mouth 2 times daily  Qty: 60 tablet, Refills: 0    Comments: LOT: [id number]  EXP: 07/22    1 BOTTLE OF 28 TABLETS  Associated Diagnoses: Diastolic dysfunction      metFORMIN (GLUCOPHAGE) 1000 MG tablet take 1 tablet by mouth twice a day with meals  Qty: 180 tablet, Refills: 1    Associated Diagnoses: Type 2 diabetes mellitus with complication, without long-term current use of insulin ([facility] [address]      ! ! warfarin (COUMADIN) 1 MG tablet take 3 tablets by mouth once daily  Qty: 180 tablet, Refills: 1      nitrofurantoin (MACRODANTIN) 100 MG capsule take 1 capsule by mouth every evening  Qty: 90 capsule, Refills: 1      clopidogrel (PLAVIX) 75 MG tablet Take 1 tablet by mouth daily  Qty: 90 tablet, Refills: 3      hydrALAZINE (APRESOLINE) 50 MG tablet Take 1 tablet by mouth daily  Qty: 90 tablet, Refills: 3      atorvastatin (LIPITOR) 10 MG tablet take 1 tablet by mouth once daily  Qty: 90 tablet, Refills: 3      !! blood glucose monitor kit and supplies Test 2 times a day & as needed for symptoms of irregular blood glucose. Freestyle [place] Lite  Qty: 1 kit, Refills: 0    Comments: Brand per patient preference. May round up to next available package size. !! Lancets MISC 1 each by Does not apply route 2 times daily Test 2 times a day & as needed for symptoms of irregular blood glucose. Freestyle [place] Lite  Qty: 100 each, Refills: 5      !! blood glucose monitor strips Test 2 times a day & as needed for symptoms of irregular blood glucose.   Freestyle [place] Lite  Qty: 200 strip, Refills: 3      furosemide (LASIX) 20 MG tablet Take 1 tablet by mouth 2 times daily  Qty: 180 tablet, Refills: 2    Associated Diagnoses: Diastolic dysfunction      !! warfarin (COUMADIN) 7.5 MG tablet Take 1 tablet by mouth daily  Qty: 30 tablet, Refills: 3      carvedilol (COREG) 12.5 MG tablet Take 1 tablet by mouth 2 times daily (with meals)  Qty: 180 tablet, Refills: 3    Associated Diagnoses: Diastolic distention, no pain on palpation no guarding mass or rebound, bowel sounds are quiet in all 4 quadrants. Musculoskeletal: Normal range of motion. General: No deformity. Comments: Is moving all extremities well   Skin:     General: Skin is warm and dry. Capillary Refill: Capillary refill takes less than 2 seconds. Coloration: Skin is not jaundiced. Neurological:      General: No focal deficit present. Mental Status: She is alert and oriented to person, place, and time. Mental status is at baseline. Cranial Nerves: No cranial nerve deficit. Sensory: No sensory deficit. Motor: No weakness. Coordination: Coordination normal.      Comments: Is alert and oriented, she is moving all extremities well, no slurred speech, no facial droop, no drift upper or lower extremities. Psychiatric:         Mood and Affect: Mood normal.         DIAGNOSTIC RESULTS     EKG: All EKG's are interpreted by the Emergency Department Physician who either signs or Co-signsthis chart in the absence of a cardiologist.    EKG shows sinus bradycardia with premature atrial complexes at 51 bpm there is T wave inversions in leads I to aVL V2 V5 and V6 no ventricular ectopy QTC is 447 ms. RADIOLOGY:   Non-plain filmimages such as CT, Ultrasound and MRI are read by the radiologist. Plain radiographic images are visualized and preliminarily interpreted by the emergency physician with the below findings:    X-ray shows no acute pulmonary process    Interpretation per the Radiologist below, if available at the time ofthis note:    CT Head WO Contrast   Final Result      NO ACUTE INTRACRANIAL PROCESS OR SIGNIFICANT CHANGE FROM YESTERDAY IDENTIFIED. CT ABDOMEN PELVIS W IV CONTRAST Additional Contrast? None   Final Result   BORDERLINE PELVIECTASIS IN LEFT KIDNEY. NO ACUTE PATHOLOGY IN THE ABDOMEN OR PELVIS. XR CHEST PORTABLE   Final Result   NO ACUTE CARDIOPULMONARY DISEASE. US CAROTID ARTERY BILATERAL    (Results Pending)   MRI BRAIN WO CONTRAST    (Results Pending)         ED BEDSIDE ULTRASOUND:   Performed by ED Physician - none    LABS:  Labs Reviewed   COMPREHENSIVE METABOLIC PANEL - Abnormal; Notable for the following components:       Result Value    Glucose 251 (*)     BUN 31 (*)     CREATININE 1.04 (*)     GFR Non- 49.4 (*)     GFR  59.8 (*)     All other components within normal limits   CBC WITH AUTO DIFFERENTIAL - Abnormal; Notable for the following components:    RBC 3.85 (*)     Hemoglobin 11.6 (*)     Hematocrit 35.6 (*)     MCHC 32.4 (*)     RDW 14.6 (*)     Neutrophils Absolute 7.7 (*)     All other components within normal limits   URINE RT REFLEX TO CULTURE - Abnormal; Notable for the following components:    Glucose, Ur 250 (*)     Protein,  (*)     All other components within normal limits   LIPASE - Abnormal; Notable for the following components:    Lipase 195 (*)     All other components within normal limits   POCT CREATININE - URINE - Normal   TROPONIN   CK   LACTIC ACID, PLASMA   MICROSCOPIC URINALYSIS   LIPASE   AMYLASE   POCT GLUCOSE   POCT GLUCOSE       All other labs were within normal range or not returned as of this dictation. EMERGENCY DEPARTMENT COURSE and DIFFERENTIAL DIAGNOSIS/MDM:   Vitals:    Vitals:    06/19/20 0931 06/19/20 1014 06/19/20 1030 06/19/20 1139   BP: (!) 172/57 (!) 155/57 (!) 162/60 (!) 156/79   Pulse:  70 68 62   Resp:  18  18   Temp:       TempSrc:       SpO2:  100% 100% 100%   Weight:       Height:              MDM  Number of Diagnoses or Management Options  General weakness:   Non-intractable vomiting with nausea, unspecified vomiting type:   Unable to ambulate:   Diagnosis management comments: CT scan of the brain was completed yesterday during her visit, showed no acute intracranial process.   Patient displays no acute neurological deficits on arrival, therefore initially a repeat CT scan of the brain was not completed. During patient's evaluation she remained alert and oriented she is moving all extremities well, she does have some mild pain to the abdomen but has a fairly distended abdomen, Montero catheter was placed and approximately 1200 cc of urine was drained. This does not show to be infected at this time. Remaining labs are fairly unimpressive, but with any movement patient has increasing nausea which is not resolving. Son states that after patient returned home last evening he said any movement at all causes increasing nausea for her and that she was weak and she was unable to stand or ambulate upon returning home he states this is new for her. He states this morning when they try to get her up from bed she she was too weak to stand but was moving all extremities well, there was no facial droop or slurred speech or weakness while in position. Remaining labs are fairly unimpressive at this time. I did speak with the [facility] hospitalist Dr. Rony Camacho, he will accept admission this patient for further evaluation management for nausea vomiting, generalized global weakness. Inability to ambulate. As patient was preparing for admission to the hospital son became concerned he sat down with any movement at all patient increasing nausea, he was requesting at this time a CT scan of her brain be completed even though she had 1 completed yesterday. She displays no specific neurological deficits at that time. CT scan of the brain was ordered. CRITICAL CARE TIME   Total Critical Care time was 0 minutes, excluding separately reportableprocedures. There was a high probability of clinicallysignificant/life threatening deterioration in the patient's condition which required my urgent intervention.       CONSULTS:  IP CONSULT TO NEUROLOGY  PHARMACY TO DOSE WARFARIN  IP CONSULT TO NEUROLOGY    PROCEDURES:  Unless otherwise noted below, none     Procedures    FINAL IMPRESSION      1.

## 2020-06-20 ENCOUNTER — APPOINTMENT (OUTPATIENT)
Dept: MRI IMAGING | Age: 85
DRG: 149 | End: 2020-06-20
Payer: MEDICARE

## 2020-06-20 LAB
ANION GAP SERPL CALCULATED.3IONS-SCNC: 10 MEQ/L (ref 9–15)
BUN BLDV-MCNC: 27 MG/DL (ref 8–23)
C-REACTIVE PROTEIN, HIGH SENSITIVITY: 1.1 MG/L (ref 0–5)
CALCIUM SERPL-MCNC: 8.8 MG/DL (ref 8.5–9.9)
CHLORIDE BLD-SCNC: 104 MEQ/L (ref 95–107)
CO2: 24 MEQ/L (ref 20–31)
CREAT SERPL-MCNC: 1.16 MG/DL (ref 0.5–0.9)
GFR AFRICAN AMERICAN: 52.7
GFR NON-AFRICAN AMERICAN: 43.6
GLUCOSE BLD-MCNC: 142 MG/DL (ref 60–115)
GLUCOSE BLD-MCNC: 154 MG/DL (ref 60–115)
GLUCOSE BLD-MCNC: 158 MG/DL (ref 70–99)
GLUCOSE BLD-MCNC: 196 MG/DL (ref 60–115)
GLUCOSE BLD-MCNC: 304 MG/DL (ref 60–115)
HBA1C MFR BLD: 8.2 % (ref 4.8–5.9)
HCT VFR BLD CALC: 32.6 % (ref 37–47)
HEMOGLOBIN: 10.7 G/DL (ref 12–16)
INR BLD: 2.1
MCH RBC QN AUTO: 31.1 PG (ref 27–31.3)
MCHC RBC AUTO-ENTMCNC: 32.8 % (ref 33–37)
MCV RBC AUTO: 94.8 FL (ref 82–100)
PDW BLD-RTO: 15.1 % (ref 11.5–14.5)
PERFORMED ON: ABNORMAL
PLATELET # BLD: 218 K/UL (ref 130–400)
POTASSIUM REFLEX MAGNESIUM: 4.5 MEQ/L (ref 3.4–4.9)
PROCALCITONIN: 0.05 NG/ML (ref 0–0.15)
PROTHROMBIN TIME: 23.5 SEC (ref 12.3–14.9)
RBC # BLD: 3.43 M/UL (ref 4.2–5.4)
SEDIMENTATION RATE, ERYTHROCYTE: 18 MM (ref 0–30)
SODIUM BLD-SCNC: 138 MEQ/L (ref 135–144)
WBC # BLD: 8.5 K/UL (ref 4.8–10.8)

## 2020-06-20 PROCEDURE — 72146 MRI CHEST SPINE W/O DYE: CPT

## 2020-06-20 PROCEDURE — 80048 BASIC METABOLIC PNL TOTAL CA: CPT

## 2020-06-20 PROCEDURE — 99233 SBSQ HOSP IP/OBS HIGH 50: CPT | Performed by: PSYCHIATRY & NEUROLOGY

## 2020-06-20 PROCEDURE — 2580000003 HC RX 258: Performed by: NURSE PRACTITIONER

## 2020-06-20 PROCEDURE — 1210000000 HC MED SURG R&B

## 2020-06-20 PROCEDURE — 6370000000 HC RX 637 (ALT 250 FOR IP): Performed by: INTERNAL MEDICINE

## 2020-06-20 PROCEDURE — 85652 RBC SED RATE AUTOMATED: CPT

## 2020-06-20 PROCEDURE — 72141 MRI NECK SPINE W/O DYE: CPT

## 2020-06-20 PROCEDURE — 6370000000 HC RX 637 (ALT 250 FOR IP): Performed by: NURSE PRACTITIONER

## 2020-06-20 PROCEDURE — 86141 C-REACTIVE PROTEIN HS: CPT

## 2020-06-20 PROCEDURE — 72148 MRI LUMBAR SPINE W/O DYE: CPT

## 2020-06-20 PROCEDURE — 84145 PROCALCITONIN (PCT): CPT

## 2020-06-20 PROCEDURE — 36415 COLL VENOUS BLD VENIPUNCTURE: CPT

## 2020-06-20 PROCEDURE — 85027 COMPLETE CBC AUTOMATED: CPT

## 2020-06-20 PROCEDURE — 6360000002 HC RX W HCPCS: Performed by: INTERNAL MEDICINE

## 2020-06-20 PROCEDURE — 6360000002 HC RX W HCPCS: Performed by: NURSE PRACTITIONER

## 2020-06-20 PROCEDURE — 85610 PROTHROMBIN TIME: CPT

## 2020-06-20 PROCEDURE — 83036 HEMOGLOBIN GLYCOSYLATED A1C: CPT

## 2020-06-20 RX ORDER — CLOPIDOGREL BISULFATE 75 MG/1
75 TABLET ORAL DAILY
Status: DISCONTINUED | OUTPATIENT
Start: 2020-06-20 | End: 2020-06-24 | Stop reason: HOSPADM

## 2020-06-20 RX ORDER — LORAZEPAM 2 MG/ML
1 INJECTION INTRAMUSCULAR
Status: ACTIVE | OUTPATIENT
Start: 2020-06-20 | End: 2020-06-20

## 2020-06-20 RX ORDER — AMLODIPINE BESYLATE 5 MG/1
5 TABLET ORAL DAILY
Status: DISCONTINUED | OUTPATIENT
Start: 2020-06-20 | End: 2020-06-24 | Stop reason: HOSPADM

## 2020-06-20 RX ORDER — TIZANIDINE 4 MG/1
2 TABLET ORAL 3 TIMES DAILY
Status: DISCONTINUED | OUTPATIENT
Start: 2020-06-20 | End: 2020-06-21

## 2020-06-20 RX ADMIN — HYDRALAZINE HYDROCHLORIDE 50 MG: 50 TABLET, FILM COATED ORAL at 08:39

## 2020-06-20 RX ADMIN — TRIMETHOBENZAMIDE HYDROCHLORIDE 200 MG: 100 INJECTION INTRAMUSCULAR at 22:35

## 2020-06-20 RX ADMIN — TIZANIDINE 2 MG: 4 TABLET ORAL at 22:35

## 2020-06-20 RX ADMIN — CLOPIDOGREL BISULFATE 75 MG: 75 TABLET ORAL at 10:28

## 2020-06-20 RX ADMIN — CARVEDILOL 12.5 MG: 12.5 TABLET, FILM COATED ORAL at 08:39

## 2020-06-20 RX ADMIN — TRIMETHOBENZAMIDE HYDROCHLORIDE 200 MG: 100 INJECTION INTRAMUSCULAR at 10:28

## 2020-06-20 RX ADMIN — Medication 10 ML: at 08:43

## 2020-06-20 RX ADMIN — INSULIN LISPRO 1 UNITS: 100 INJECTION, SOLUTION INTRAVENOUS; SUBCUTANEOUS at 22:36

## 2020-06-20 RX ADMIN — ATORVASTATIN CALCIUM 10 MG: 10 TABLET, FILM COATED ORAL at 22:35

## 2020-06-20 RX ADMIN — TIZANIDINE 2 MG: 4 TABLET ORAL at 16:06

## 2020-06-20 RX ADMIN — SACUBITRIL AND VALSARTAN 1 TABLET: 24; 26 TABLET, FILM COATED ORAL at 22:35

## 2020-06-20 RX ADMIN — ONDANSETRON 4 MG: 2 INJECTION INTRAMUSCULAR; INTRAVENOUS at 10:13

## 2020-06-20 RX ADMIN — SACUBITRIL AND VALSARTAN 1 TABLET: 24; 26 TABLET, FILM COATED ORAL at 10:13

## 2020-06-20 RX ADMIN — MECLIZINE 12.5 MG: 12.5 TABLET ORAL at 16:05

## 2020-06-20 RX ADMIN — HYDROMORPHONE HYDROCHLORIDE 0.5 MG: 1 INJECTION, SOLUTION INTRAMUSCULAR; INTRAVENOUS; SUBCUTANEOUS at 11:09

## 2020-06-20 RX ADMIN — CARVEDILOL 12.5 MG: 12.5 TABLET, FILM COATED ORAL at 16:05

## 2020-06-20 RX ADMIN — WARFARIN SODIUM 7 MG: 5 TABLET ORAL at 16:05

## 2020-06-20 RX ADMIN — HYDROMORPHONE HYDROCHLORIDE 0.5 MG: 1 INJECTION, SOLUTION INTRAMUSCULAR; INTRAVENOUS; SUBCUTANEOUS at 22:36

## 2020-06-20 RX ADMIN — Medication 10 ML: at 22:36

## 2020-06-20 RX ADMIN — HYDRALAZINE HYDROCHLORIDE 10 MG: 20 INJECTION INTRAMUSCULAR; INTRAVENOUS at 08:41

## 2020-06-20 RX ADMIN — TIZANIDINE 2 MG: 4 TABLET ORAL at 10:41

## 2020-06-20 RX ADMIN — AMLODIPINE BESYLATE 5 MG: 5 TABLET ORAL at 10:28

## 2020-06-20 RX ADMIN — MECLIZINE 12.5 MG: 12.5 TABLET ORAL at 10:13

## 2020-06-20 ASSESSMENT — PAIN SCALES - GENERAL
PAINLEVEL_OUTOF10: 0
PAINLEVEL_OUTOF10: 0
PAINLEVEL_OUTOF10: 8
PAINLEVEL_OUTOF10: 0
PAINLEVEL_OUTOF10: 10
PAINLEVEL_OUTOF10: 0

## 2020-06-20 ASSESSMENT — ENCOUNTER SYMPTOMS
SHORTNESS OF BREATH: 0
COUGH: 0
DIARRHEA: 0
NAUSEA: 1
VOMITING: 1

## 2020-06-20 NOTE — PROGRESS NOTES
Subjective:      Patient ID: Bobbi Mckenna is a 80 y.o. female who presents today for dizziness    HPI 80year-old right-handed female was admitted sudden dizziness. Patient has dizziness of similar nature in the past she has had a stroke when this occurs and therefore she was brought in here she is actually quite nauseous and does not want to move her head. She is very hard of hearing therefore review of systems somewhat inadequate. Patient was seen by us in March with similar symptoms and did not have a stroke. He was admitted with transient ischemic event. 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 Patient was on Plavix and that we changed to Coumadin. Last seen she was on a combination of Coumadin and Plavix. Now is only on Coumadin. Today patient is more awake and I was actually able to talk to her and her son who is at the bedside. He does report that there was some changes in her in terms of pain in the right side of her neck going into her ear but she complains of pain on the entire right side including the arm. The other symptom she has is difficulty walking and leg weakness she does have large knees but her son tells me she was quite ambulatory prior to this. She is able to move her legs though somewhat weaker. Is very hard of hearing    Review of Systems : Complains of neck discomfort neck pain dizziness and weakness in the legs but she denies any numbness or tingling or any thoracic pain. She does have back pain which is her baseline and she does have stenosis which has not been recently evaluated. She denies any chest pain shortness of breath ongoing bleeding bruising choking drooling she is not any major confusional episodes.       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 SCC (squamous cell carcinoma), arm 2013    right upper arm, 2015 right forarm    SI (stress incontinence), female 5/29/2012    Type II or unspecified type diabetes mellitus without mention of complication, not stated as uncontrolled     Whooping cough      Past Surgical History:   Procedure Laterality Date    ANKLE SURGERY      broken left ankle.  APPENDECTOMY      BREAST BIOPSY      x2 left breast    CATARACT REMOVAL  2004    bilateral    CORONARY ANGIOPLASTY WITH STENT PLACEMENT  01/2019    CYSTOCELE REPAIR      EYE SURGERY      bilateral cataract    HYSTERECTOMY      complete at age 39    [address]      as a child     Social History     Socioeconomic History    Marital status:       Spouse name: Not on file    Number of children: Not on file    Years of education: Not on file    Highest education level: Not on file   Occupational History    Not on file   Social Needs    Financial resource strain: Not on file    Food insecurity     Worry: Not on file     Inability: Not on file    Transportation needs     Medical: Not on file     Non-medical: Not on file   Tobacco Use    Smoking status: Never Smoker    Smokeless tobacco: Never Used   Substance and Sexual Activity    Alcohol use: No     Alcohol/week: 0.0 standard drinks    Drug use: No    Sexual activity: Not on file   Lifestyle    Physical activity     Days per week: Not on file     Minutes per session: Not on file    Stress: Not on file   Relationships    Social connections     Talks on phone: Not on file     Gets together: Not on file     Attends Restorationism service: Not on file     Active member of club or organization: Not on file     Attends meetings of clubs or organizations: Not on file     Relationship status: Not on file    Intimate partner violence     Fear of current or ex partner: Not on file     Emotionally abused: Not on file     Physically abused: Not on file     Forced sexual activity: Not on file   Other Topics Concern    Not on file   Social History Narrative         Lives With: Son    Type of Home: House    Home Layout: Two level, Able to Live on Main level with bedroom/bathroom, Performs ADL's on one level    Home Access: Stairs to enter with rails    Entrance Stairs - Number of Steps: Threshold    Bathroom Shower/Tub: Tub/Shower unit    Bathroom Toilet: Handicap height    Bathroom Equipment: Grab bars in shower, Grab bars around toilet, Hand-held shower, Shower chair    Bathroom Accessibility: Accessible    Home Equipment: Rolling walker, 4 wheeled walker (Usually uses rollator)    ADL Assistance: [address] [place] [address] Avenue: Needs assistance (Son manages housework)    Homemaking Responsibilities: No    Ambulation Assistance: Independent (Rollator)    Transfer Assistance: Independent    Active : No    Patient's  Info:  Sons          Family History   Problem Relation Age of Onset    Diabetes Mother     Heart Disease Father      Allergies   Allergen Reactions    Metoclopramide     Pantoprazole Other (See Comments)    Pcn [Penicillins]      Tolerates rocephin    Protonix [Pantoprazole Sodium]     Tramadol      Current Facility-Administered Medications   Medication Dose Route Frequency Provider Last Rate Last Dose    sodium chloride flush 0.9 % injection 10 mL  10 mL Intravenous 2 times per day SYLVIE Ba CNP        sodium chloride flush 0.9 % injection 10 mL  10 mL Intravenous PRN SYLVIE Ba CNP        acetaminophen (TYLENOL) tablet 650 mg  650 mg Oral Q6H PRN Chaparro Waite, SYLVIE - CNP        Or    acetaminophen (TYLENOL) suppository 650 mg  650 mg Rectal Q6H PRN Chaparro Waite, SYLVIE - CNP        magnesium hydroxide (MILK OF MAGNESIA) 400 MG/5ML suspension 30 mL  30 mL Oral Daily PRN SYLVIE Herrear CNP        ondansetron (ZOFRAN-ODT) disintegrating tablet 4 mg  4 mg Oral Q8H PRN SYLVIE Herrera - TAMERA        Or    ondansetron (ZOFRAN) injection 4 mg  4 mg Intravenous Q6H PRN SYLVIE Junior CNP        glucose (GLUTOSE) 40 % oral gel 15 g  15 g Oral PRN SYLVIE Junior - CNP        dextrose 50 % IV solution  12.5 g Intravenous PRN SYLVIE Herrera - CNP        glucagon (rDNA) injection 1 mg  1 mg Intramuscular PRN SYLVIE Junior CNP        dextrose 5 % solution  100 mL/hr Intravenous PRN SYLVIE Junior - CNP        insulin lispro (HUMALOG) injection vial 0-12 Units  0-12 Units Subcutaneous TID  SYLVIE Junior - CNP        insulin lispro (HUMALOG) injection vial 0-6 Units  0-6 Units Subcutaneous Nightly SYLVIE Herrera - CNP        meclizine (ANTIVERT) tablet 12.5 mg  12.5 mg Oral TID PRN SYLVIE Herrera CNP        hydrALAZINE (APRESOLINE) injection 10 mg  10 mg Intravenous Q4H PRN SYLVIE Herrera - TAMERA        atorvastatin (LIPITOR) tablet 10 mg  10 mg Oral Nightly SYLVIE Junior CNP        [START ON 6/20/2020] hydrALAZINE (APRESOLINE) tablet 50 mg  50 mg Oral Daily SYLVIE Junior CNP        sacubitril-valsartan (ENTRESTO) 24-26 MG per tablet 1 tablet  1 tablet Oral BID SYLVIE Junior CNP        carvedilol (COREG) tablet 12.5 mg  12.5 mg Oral BID  SYLVIE Herrera CNP        warfarin (COUMADIN) daily dosing (placeholder)   Other RX Placeholder Brian Jenkins MD        warfarin (COUMADIN) tablet 6 mg  6 mg Oral Once Brian Jenkins MD            Objective:   BP (!) 156/79   Pulse 62   Temp 97.7 °F

## 2020-06-20 NOTE — PROGRESS NOTES
Subjective:      Patient ID: Santy Law is a 80 y.o. female who presents today for dizziness    HPI 80year-old right-handed female was admitted sudden dizziness. Patient has dizziness of similar nature in the past she has had a stroke when this occurs and therefore she was brought in here she is actually quite nauseous and does not want to move her head. She is very hard of hearing therefore review of systems somewhat inadequate. Patient was seen by us in March with similar symptoms and did not have a stroke. He was admitted with transient ischemic event. 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 Patient was on Plavix and that we changed to Coumadin. Last seen she was on a combination of Coumadin and Plavix. Now is only on Coumadin. Today patient is more awake and I was actually able to talk to her and her son who is at the bedside. He does report that there was some changes in her in terms of pain in the right side of her neck going into her ear but she complains of pain on the entire right side including the arm. The other symptom she has is difficulty walking and leg weakness she does have large knees but her son tells me she was quite ambulatory prior to this. She is able to move her legs though somewhat weaker. Is very hard of hearing    Review of Systems : Complains of neck discomfort neck pain dizziness and weakness in the legs but she denies any numbness or tingling or any thoracic pain. She does have back pain which is her baseline and she does have stenosis which has not been recently evaluated. She denies any chest pain shortness of breath ongoing bleeding bruising choking drooling she is not any major confusional episodes.       Past Medical History:   Diagnosis Date    Arthritis     Chicken pox     Chronic back pain     Chronic low back pain 8/17/2016    Eczematous dermatitis     History of bladder infections     History of CVA (cerebraovascular accident) due to acetaminophen (TYLENOL) tablet 650 mg  650 mg Oral Q6H PRN Mearl Cho, APRN - CNP        Or    acetaminophen (TYLENOL) suppository 650 mg  650 mg Rectal Q6H PRN Mearl Cho, APRN - CNP        magnesium hydroxide (MILK OF MAGNESIA) 400 MG/5ML suspension 30 mL  30 mL Oral Daily PRN Mearl Cho, APRN - CNP        ondansetron (ZOFRAN-ODT) disintegrating tablet 4 mg  4 mg Oral Q8H PRN Mearl Cho, APRN - CNP        Or    ondansetron (ZOFRAN) injection 4 mg  4 mg Intravenous Q6H PRN Kafilat Harish, APRN - CNP        glucose (GLUTOSE) 40 % oral gel 15 g  15 g Oral PRN Hifilat Harish, APRN - CNP        dextrose 50 % IV solution  12.5 g Intravenous PRN Mearl Cho, APRN - CNP        glucagon (rDNA) injection 1 mg  1 mg Intramuscular PRN Cassi Moreira, APRN - CNP        dextrose 5 % solution  100 mL/hr Intravenous PRN Farooqat Harish, APRN - CNP        insulin lispro (HUMALOG) injection vial 0-12 Units  0-12 Units Subcutaneous TID WC Hifilat Harish, APRN - CNP        insulin lispro (HUMALOG) injection vial 0-6 Units  0-6 Units Subcutaneous Nightly Mearl Cho, APRN - CNP        meclizine (ANTIVERT) tablet 12.5 mg  12.5 mg Oral TID PRN Mearl Cho, APRN - CNP        hydrALAZINE (APRESOLINE) injection 10 mg  10 mg Intravenous Q4H PRN Mearl Cho, APRN - CNP        atorvastatin (LIPITOR) tablet 10 mg  10 mg Oral Nightly SYLVIE Junior - CNP        [START ON 6/20/2020] hydrALAZINE (APRESOLINE) tablet 50 mg  50 mg Oral Daily SYLVIE Junior - CNP        sacubitril-valsartan (ENTRESTO) 24-26 MG per tablet 1 tablet  1 tablet Oral BID SYLVIE Junior - TAMERA        carvedilol (COREG) tablet 12.5 mg  12.5 mg Oral BID WC Mearl Cho, APRN - CNP        warfarin (COUMADIN) daily dosing (placeholder)   Other RX Placeholder Leyda Willis MD        warfarin (COUMADIN) tablet 6 mg  6 mg Oral Once Leyda Willis MD            Objective:   BP (!) 156/79   Pulse 62   Temp 97.7 °F (36.5 °C) (Oral)   Resp 18   Ht 5' 3\" (1.6 m)   Wt 184 lb (83.5 kg)   LMP  (LMP Unknown)   SpO2 100%   BMI 32.59 kg/m²     Physical Exam  Vitals signs reviewed. Eyes:      Pupils: Pupils are equal, round, and reactive to light. Neck:      Musculoskeletal: Normal range of motion. Cardiovascular:      Rate and Rhythm: Normal rate and regular rhythm. Heart sounds: No murmur. Pulmonary:      Effort: Pulmonary effort is normal.      Breath sounds: Normal breath sounds. Abdominal:      General: Bowel sounds are normal.   Musculoskeletal: Normal range of motion. Skin:     General: Skin is warm. Neurological:      Mental Status: She is alert and oriented to person, place, and time. Cranial Nerves: No cranial nerve deficit. Sensory: No sensory deficit. Motor: No abnormal muscle tone. Coordination: Coordination normal.      Deep Tendon Reflexes: Reflexes are normal and symmetric. Babinski sign absent on the right side. Babinski sign absent on the left side. Psychiatric:         Mood and Affect: Mood normal.     Patient's examination normal though I could not move her much that she is nauseous and almost throwing up. This likely represents vestibular dysfunction. We were not able to move her walker. Ct Head Wo Contrast    Result Date: 6/19/2020  CT HEAD WO CONTRAST : 6/19/2020 CLINICAL HISTORY:  weakness, nausea with movement . COMPARISON: 6/18/2020. TECHNIQUE: Spiral unenhanced images were obtained of the head, with routine multiplanar reconstructions performed. Intravenous contrast is noted from a CT abdomen and pelvis from earlier 6/19/2020. All CT scans at this facility use dose modulation, iterative reconstruction, and/or weight based dosing when appropriate to reduce radiation dose to as low as reasonably achievable.  FINDINGS: There is no intracranial hemorrhage, mass effect, midline shift, extra-axial collection, evidence of hydrocephalus, recent ischemic infarct, or skull fracture identified. Moderate age-related atrophic changes have not significantly changed from the yesterday's study. NO ACUTE INTRACRANIAL PROCESS OR SIGNIFICANT CHANGE FROM YESTERDAY IDENTIFIED. Ct Abdomen Pelvis W Iv Contrast Additional Contrast? None    Result Date: 6/19/2020  EXAMINATION: CT ABDOMEN PELVIS W IV CONTRAST DATE AND TIME:6/19/2020 8:00 AM CLINICAL HISTORY: Acute abdominal pain. Epigastric pain. abd pain, vomiting  COMPARISON: July 23, 2016 TECHNIQUE: Contiguous axial CT sections of the abdomen and pelvis. 100 cc's of IV contrast given. .  All CT scans at this facility use dose modulation, iterative reconstruction, and/or weight based dosing when appropriate to reduce radiation dose to as low as reasonably achievable. FINDINGS    Liver: Negative     Spleen: Negative    Pancreas: Negative   Gallbladder: No calcified gallstones. Normal gallbladder wall. No pericholecystic fluid. Kidney: Right parapelvic cysts. Borderline pelviectasis in the left collecting system. Small bilateral cortical cysts some are too small to characterize with accuracy. Adrenal glands are negative. Bowel: The bowel is not dilated. There is no evidence of diverticulitis or colitis. Appendix: There  is no CT evidence for appendicitis. Nodes: No lymphadenopathy. Aorta: No aneurysm    Peritoneum: No free fluid or free air. The abdominal wall is intact. Pelvis: No abnormal soft tissue mass. The bladder is normal.   Bones: Levoscoliosis of the lumbar spine. BORDERLINE PELVIECTASIS IN LEFT KIDNEY. NO ACUTE PATHOLOGY IN THE ABDOMEN OR PELVIS. Xr Chest Portable    Result Date: 6/19/2020  EXAMINATION: XR CHEST PORTABLE CLINICAL HISTORY: WEAKNESS, VOMITING, NAUSEA COMPARISONS: JUNE 18, 2020 FINDINGS: Osseous structures intact. Cardiopericardial silhouette normal. Pulmonary vasculature normal. Lungs clear. NO ACUTE CARDIOPULMONARY DISEASE.       Lab Results   Component Value Date    WBC and may be a suspect of previous thalamic infarct causing a thalamic syndrome. Her MRI though does not reveal this. I further recommended a complete spine evaluation given her lower extremity weakness with a stat MRI of the cervical thoracic and lumbosacral spine. Patient is on Coumadin and small hemorrhagic leak with meningeal infiltration can cause symptoms of similar nature. These are usually venous with mesencephalic bleeds. MRI of the brain does not show anything significant except for old infarcts. She has multiple risk factors of vascular disease continues on Coumadin. She was on a combination of Coumadin and Plavix for last seen she is no longer antiplatelet agents. In the event that she has a new stroke she may require additional antiplatelet agents if she is therapeutic on her INR. Since last INR is 1.8 though she is not able to keep things down today I am not quite sure if she will be able to take her Coumadin will repeat her INR again tomorrow        Elijah Brock MD, [id number], American Board of Psychiatry & Neurology  Board Certified in Vascular Neurology  Board Certified in Neuromuscular Medicine  Certified in [facility]:

## 2020-06-20 NOTE — PROGRESS NOTES
Physical Therapy Missed Treatment   Facility/Department: [facility] MED SURG [id number]/[id number]    NAME: John Schaefer    : 6/10/1927 (80 y.o.)  MRN: [id number]    Account: [de-identified]  Gender: female      PT evaluation and treatment orders received. Chart reviewed. PT evaluation attempted. Pt off floor for MRI. Nursing staff aware. Will follow and attempt PT evaluation again at earliest availability.        Asmita Leonard, PT, 20 at 2:50 PM

## 2020-06-20 NOTE — PROGRESS NOTES
SON HERE SPEAKS TO DR GRIFFITHS AND DR LOJA PT HAS NAUSEA AND PAIN TO RIGHT SIDE OF FACE EAR AND LEG

## 2020-06-20 NOTE — PROGRESS NOTES
Idelia Plume, APRN - CNP   2 Units at 06/20/20 1014    insulin lispro (HUMALOG) injection vial 0-6 Units  0-6 Units Subcutaneous Nightly Idelia Plume, APRN - CNP        meclizine (ANTIVERT) tablet 12.5 mg  12.5 mg Oral TID PRN Idelia Plume, APRN - CNP   12.5 mg at 06/20/20 1013    hydrALAZINE (APRESOLINE) injection 10 mg  10 mg Intravenous Q4H PRN Idelia Plume, APRN - CNP   10 mg at 06/20/20 0841    atorvastatin (LIPITOR) tablet 10 mg  10 mg Oral Nightly Idelia Plume, APRN - CNP   10 mg at 06/19/20 2053    hydrALAZINE (APRESOLINE) tablet 50 mg  50 mg Oral Daily Farooqat Harish, APRN - CNP   50 mg at 06/20/20 0839    sacubitril-valsartan (ENTRESTO) 24-26 MG per tablet 1 tablet  1 tablet Oral BID Idelia Plume, APRN - CNP   1 tablet at 06/20/20 1013    carvedilol (COREG) tablet 12.5 mg  12.5 mg Oral BID WC Idelia Plume, APRN - CNP   12.5 mg at 06/20/20 1311    warfarin (COUMADIN) daily dosing (placeholder)   Other RX Placeholder Alena Gallagher MD         Allergies   Allergen Reactions    Metoclopramide     Pantoprazole Other (See Comments)    Pcn [Penicillins]      Tolerates rocephin    Protonix [Pantoprazole Sodium]     Tramadol      Active Problems:    Syncope  Resolved Problems:    * No resolved hospital problems. *    Blood pressure (!) 208/62, pulse 75, temperature 98.2 °F (36.8 °C), resp. rate 16, height 5' 3\" (1.6 m), weight 184 lb (83.5 kg), SpO2 95 %, not currently breastfeeding. Subjective:  Symptoms:  She reports malaise and weakness. No shortness of breath, cough, chest pain, headache, chest pressure, anorexia, diarrhea or anxiety. Diet:  She reports  nausea and vomiting. Objective:  General Appearance:  Comfortable, well-appearing and in no acute distress. Vital signs: (most recent): Blood pressure (!) 208/62, pulse 75, temperature 98.2 °F (36.8 °C), resp. rate 16, height 5' 3\" (1.6 m), weight 184 lb (83.5 kg), SpO2 95 %, not currently breastfeeding.     HEENT: Normal

## 2020-06-21 PROBLEM — M79.10 GENERALIZED MUSCLE ACHE: Status: ACTIVE | Noted: 2020-06-21

## 2020-06-21 PROBLEM — M48.02 SPINAL STENOSIS IN CERVICAL REGION: Status: ACTIVE | Noted: 2020-06-21

## 2020-06-21 PROBLEM — M15.9 GENERALIZED OSTEOARTHROSIS, INVOLVING MULTIPLE SITES: Status: ACTIVE | Noted: 2020-06-21

## 2020-06-21 PROBLEM — M51.36 LUMBAR DEGENERATIVE DISC DISEASE: Status: ACTIVE | Noted: 2020-06-21

## 2020-06-21 PROBLEM — M48.062 SPINAL STENOSIS, LUMBAR REGION, WITH NEUROGENIC CLAUDICATION: Status: ACTIVE | Noted: 2020-06-21

## 2020-06-21 PROBLEM — R26.9 ABNORMALITY OF GAIT AND MOBILITY: Status: ACTIVE | Noted: 2020-06-21

## 2020-06-21 LAB
ANION GAP SERPL CALCULATED.3IONS-SCNC: 10 MEQ/L (ref 9–15)
BUN BLDV-MCNC: 25 MG/DL (ref 8–23)
CALCIUM SERPL-MCNC: 8.9 MG/DL (ref 8.5–9.9)
CHLORIDE BLD-SCNC: 102 MEQ/L (ref 95–107)
CO2: 24 MEQ/L (ref 20–31)
CREAT SERPL-MCNC: 1.18 MG/DL (ref 0.5–0.9)
GFR AFRICAN AMERICAN: 51.7
GFR NON-AFRICAN AMERICAN: 42.7
GLUCOSE BLD-MCNC: 134 MG/DL (ref 70–99)
GLUCOSE BLD-MCNC: 154 MG/DL (ref 60–115)
GLUCOSE BLD-MCNC: 276 MG/DL (ref 60–115)
GLUCOSE BLD-MCNC: 306 MG/DL (ref 60–115)
GLUCOSE BLD-MCNC: 339 MG/DL (ref 60–115)
HCT VFR BLD CALC: 34.9 % (ref 37–47)
HEMOGLOBIN: 11.4 G/DL (ref 12–16)
INR BLD: 2.3
MCH RBC QN AUTO: 30.5 PG (ref 27–31.3)
MCHC RBC AUTO-ENTMCNC: 32.7 % (ref 33–37)
MCV RBC AUTO: 93.3 FL (ref 82–100)
PDW BLD-RTO: 14.6 % (ref 11.5–14.5)
PERFORMED ON: ABNORMAL
PLATELET # BLD: 227 K/UL (ref 130–400)
POTASSIUM REFLEX MAGNESIUM: 4.2 MEQ/L (ref 3.4–4.9)
PROTHROMBIN TIME: 25.2 SEC (ref 12.3–14.9)
RBC # BLD: 3.74 M/UL (ref 4.2–5.4)
SODIUM BLD-SCNC: 136 MEQ/L (ref 135–144)
WBC # BLD: 7.7 K/UL (ref 4.8–10.8)

## 2020-06-21 PROCEDURE — 99233 SBSQ HOSP IP/OBS HIGH 50: CPT | Performed by: PSYCHIATRY & NEUROLOGY

## 2020-06-21 PROCEDURE — 85610 PROTHROMBIN TIME: CPT

## 2020-06-21 PROCEDURE — 2700000000 HC OXYGEN THERAPY PER DAY

## 2020-06-21 PROCEDURE — 80048 BASIC METABOLIC PNL TOTAL CA: CPT

## 2020-06-21 PROCEDURE — 6360000002 HC RX W HCPCS: Performed by: INTERNAL MEDICINE

## 2020-06-21 PROCEDURE — 93010 ELECTROCARDIOGRAM REPORT: CPT | Performed by: INTERNAL MEDICINE

## 2020-06-21 PROCEDURE — 1210000000 HC MED SURG R&B

## 2020-06-21 PROCEDURE — 36415 COLL VENOUS BLD VENIPUNCTURE: CPT

## 2020-06-21 PROCEDURE — 6370000000 HC RX 637 (ALT 250 FOR IP): Performed by: ANESTHESIOLOGY

## 2020-06-21 PROCEDURE — 2580000003 HC RX 258: Performed by: NURSE PRACTITIONER

## 2020-06-21 PROCEDURE — 6370000000 HC RX 637 (ALT 250 FOR IP): Performed by: NURSE PRACTITIONER

## 2020-06-21 PROCEDURE — 85027 COMPLETE CBC AUTOMATED: CPT

## 2020-06-21 PROCEDURE — 6370000000 HC RX 637 (ALT 250 FOR IP): Performed by: INTERNAL MEDICINE

## 2020-06-21 PROCEDURE — 97162 PT EVAL MOD COMPLEX 30 MIN: CPT

## 2020-06-21 RX ORDER — NITROFURANTOIN 25; 75 MG/1; MG/1
100 CAPSULE ORAL NIGHTLY
Status: DISCONTINUED | OUTPATIENT
Start: 2020-06-21 | End: 2020-06-24 | Stop reason: HOSPADM

## 2020-06-21 RX ORDER — GABAPENTIN 100 MG/1
100 CAPSULE ORAL NIGHTLY
Status: DISCONTINUED | OUTPATIENT
Start: 2020-06-21 | End: 2020-06-24 | Stop reason: HOSPADM

## 2020-06-21 RX ORDER — ANALGESIC BALM 1.74; 4.06 G/29G; G/29G
OINTMENT TOPICAL 4 TIMES DAILY
Status: DISCONTINUED | OUTPATIENT
Start: 2020-06-21 | End: 2020-06-24 | Stop reason: HOSPADM

## 2020-06-21 RX ORDER — METAXALONE 800 MG/1
400 TABLET ORAL 3 TIMES DAILY
Status: DISCONTINUED | OUTPATIENT
Start: 2020-06-21 | End: 2020-06-24 | Stop reason: HOSPADM

## 2020-06-21 RX ORDER — PREDNISONE 10 MG/1
10 TABLET ORAL DAILY
Status: DISCONTINUED | OUTPATIENT
Start: 2020-06-27 | End: 2020-06-24 | Stop reason: HOSPADM

## 2020-06-21 RX ORDER — PREDNISONE 1 MG/1
5 TABLET ORAL DAILY
Status: DISCONTINUED | OUTPATIENT
Start: 2020-06-30 | End: 2020-06-24 | Stop reason: HOSPADM

## 2020-06-21 RX ORDER — LIDOCAINE 4 G/G
3 PATCH TOPICAL DAILY
Status: DISCONTINUED | OUTPATIENT
Start: 2020-06-21 | End: 2020-06-24 | Stop reason: HOSPADM

## 2020-06-21 RX ORDER — ACETAMINOPHEN 325 MG/1
325 TABLET ORAL 3 TIMES DAILY PRN
Status: DISCONTINUED | OUTPATIENT
Start: 2020-06-21 | End: 2020-06-24 | Stop reason: HOSPADM

## 2020-06-21 RX ORDER — ACETAMINOPHEN 500 MG
500 TABLET ORAL EVERY 6 HOURS SCHEDULED
Status: DISCONTINUED | OUTPATIENT
Start: 2020-06-21 | End: 2020-06-24 | Stop reason: HOSPADM

## 2020-06-21 RX ORDER — HYDROCODONE BITARTRATE AND ACETAMINOPHEN 5; 325 MG/1; MG/1
0.5 TABLET ORAL EVERY 4 HOURS PRN
Status: DISCONTINUED | OUTPATIENT
Start: 2020-06-21 | End: 2020-06-24 | Stop reason: HOSPADM

## 2020-06-21 RX ORDER — PREDNISONE 20 MG/1
20 TABLET ORAL DAILY
Status: DISCONTINUED | OUTPATIENT
Start: 2020-06-21 | End: 2020-06-23

## 2020-06-21 RX ADMIN — CLOPIDOGREL BISULFATE 75 MG: 75 TABLET ORAL at 08:32

## 2020-06-21 RX ADMIN — MENTHOL AND METHYL SALICYLATE: 7.6; 29 OINTMENT TOPICAL at 18:02

## 2020-06-21 RX ADMIN — GABAPENTIN 100 MG: 100 CAPSULE ORAL at 22:19

## 2020-06-21 RX ADMIN — TRIMETHOBENZAMIDE HYDROCHLORIDE 200 MG: 100 INJECTION INTRAMUSCULAR at 08:33

## 2020-06-21 RX ADMIN — METAXALONE 400 MG: 800 TABLET ORAL at 11:30

## 2020-06-21 RX ADMIN — NITROFURANTOIN (MONOHYDRATE/MACROCRYSTALS) 100 MG: 75; 25 CAPSULE ORAL at 22:19

## 2020-06-21 RX ADMIN — Medication 10 ML: at 22:21

## 2020-06-21 RX ADMIN — CARVEDILOL 12.5 MG: 12.5 TABLET, FILM COATED ORAL at 18:00

## 2020-06-21 RX ADMIN — PREDNISONE 20 MG: 20 TABLET ORAL at 11:30

## 2020-06-21 RX ADMIN — MENTHOL AND METHYL SALICYLATE: 7.6; 29 OINTMENT TOPICAL at 22:21

## 2020-06-21 RX ADMIN — TRIMETHOBENZAMIDE HYDROCHLORIDE 200 MG: 100 INJECTION INTRAMUSCULAR at 22:20

## 2020-06-21 RX ADMIN — ACETAMINOPHEN 500 MG: 325 TABLET, FILM COATED ORAL at 17:59

## 2020-06-21 RX ADMIN — TIZANIDINE 2 MG: 4 TABLET ORAL at 08:32

## 2020-06-21 RX ADMIN — Medication 10 ML: at 08:34

## 2020-06-21 RX ADMIN — SACUBITRIL AND VALSARTAN 1 TABLET: 24; 26 TABLET, FILM COATED ORAL at 22:20

## 2020-06-21 RX ADMIN — ACETAMINOPHEN 500 MG: 325 TABLET, FILM COATED ORAL at 13:15

## 2020-06-21 RX ADMIN — INSULIN LISPRO 4 UNITS: 100 INJECTION, SOLUTION INTRAVENOUS; SUBCUTANEOUS at 22:20

## 2020-06-21 RX ADMIN — ATORVASTATIN CALCIUM 10 MG: 10 TABLET, FILM COATED ORAL at 22:19

## 2020-06-21 RX ADMIN — WARFARIN SODIUM 7 MG: 2 TABLET ORAL at 18:00

## 2020-06-21 RX ADMIN — SACUBITRIL AND VALSARTAN 1 TABLET: 24; 26 TABLET, FILM COATED ORAL at 08:32

## 2020-06-21 RX ADMIN — HYDRALAZINE HYDROCHLORIDE 50 MG: 50 TABLET, FILM COATED ORAL at 08:33

## 2020-06-21 RX ADMIN — METAXALONE 400 MG: 800 TABLET ORAL at 22:20

## 2020-06-21 RX ADMIN — AMLODIPINE BESYLATE 5 MG: 5 TABLET ORAL at 08:32

## 2020-06-21 RX ADMIN — CARVEDILOL 12.5 MG: 12.5 TABLET, FILM COATED ORAL at 08:32

## 2020-06-21 RX ADMIN — MENTHOL AND METHYL SALICYLATE: 7.6; 29 OINTMENT TOPICAL at 13:14

## 2020-06-21 ASSESSMENT — ENCOUNTER SYMPTOMS
SORE THROAT: 0
VOICE CHANGE: 0
BACK PAIN: 1
SINUS PRESSURE: 0
EYES NEGATIVE: 1
RHINORRHEA: 0
GASTROINTESTINAL NEGATIVE: 1
ALLERGIC/IMMUNOLOGIC NEGATIVE: 1
TROUBLE SWALLOWING: 0
SINUS PAIN: 0
FACIAL SWELLING: 0
RESPIRATORY NEGATIVE: 1

## 2020-06-21 ASSESSMENT — PAIN DESCRIPTION - DESCRIPTORS: DESCRIPTORS: ACHING

## 2020-06-21 ASSESSMENT — PAIN SCALES - GENERAL
PAINLEVEL_OUTOF10: 5
PAINLEVEL_OUTOF10: 6
PAINLEVEL_OUTOF10: 10
PAINLEVEL_OUTOF10: 3

## 2020-06-21 ASSESSMENT — PAIN DESCRIPTION - LOCATION: LOCATION: BACK

## 2020-06-21 NOTE — PROGRESS NOTES
Clinical Pharmacy Note    Warfarin consult follow-up    Recent Labs     06/21/20  0546   INR 2.3     Recent Labs     06/19/20  0800 06/20/20  0527 06/21/20  0546   HGB 11.6* 10.7* 11.4*   HCT 35.6* 32.6* 34.9*    218 227       Significant drug:drug interactions:  APAP, Norco, Plavix    Notes:  Date INR Warfarin Dose   6-19-20 1.8 6 mg   6-20-20 2.1 7 mg    06/21/20 2.3   7 mg                                     INR is therapeutic at 2.3. Will give normal home dose of 7 mg today to keep INR in goal range of 2-3. Daily PT/INR until stable within therapeutic range.     Jay Hsu, PharmD  6/21/2020 11:26 AM

## 2020-06-21 NOTE — CONSULTS
neural foraminal stenosis. There is no evidence of central canal stenosis. There is no foraminal stenosis. C6/C7:  Moderate discussed by complex with effacement of the anterior CSF column and cord deformity. No abnormal cord signal intensity. Findings resulting in moderate to severe central canal stenosis. C7-T1:  Minimal disc osteophyte complex without central canal or significant foraminal narrowing     Severe central canal stenosis at C5-6. Moderate to severe central canal stenosis at C4-5. No abnormal cord signal intensity. Mri Thoracic Spine Wo Contrast    Result Date: 6/20/2020  EXAMINATION: MRI THORACIC SPINE WO CONTRAST DATE AND TIME:6/20/2020 10:45 AM CLINICAL HISTORY: Severe thoracic spine pain. may do just sagittal and see if cord compression then do details/  r/o cord compression  COMPARISON: If available previous films were reviewed for comparison. Technique: Multiplanar multisequence MRI scans of the thoracic spine. Findings:     Bones: The thoracic vertebrae are normally aligned with no evidence of fracture. There is normal marrow signal throughout the thoracic spine. Disc space: There is no focal disc herniation or evidence for spinal canal stenosis. Disc spaces are age-appropriate. Spinal cord: The thoracic cord is normal in configuration and signal intensity. Soft tissues: There is no paraspinal soft tissue mass or fluid collection. Negative MRI of the thoracic spine. No signs of cord compression. Mri Lumbar Spine Wo Contrast    Result Date: 6/20/2020  EXAMINATION: MRI LUMBAR SPINE WO CONTRAST DATE AND TIME:6/20/2020 10:45 AM CLINICAL HISTORY: Lower back pain. lumbar stenosis/ cord compression  COMPARISON: None TECHNIQUE:  Multi-sequence multiplanar imaging of the lumbar spine was performed. No gadolinium contrast administered. VOLUME: None   MR CONTRAST: None  FINDINGS:  Vertebrae: No acute fracture. Marrow signal changes are within normal limits. Conus:  The conus medullaris ends at L1 level and is unremarkable. T12/L1: There is no evidence of disc bulging or disc herniation. No significant facet hypertrophy or thickening of ligamenta flava. There is no central spinal canal stenosis. There is no neural foraminal stenosis. L1/2:  There is no evidence of disc bulging or disc herniation. No significant facet hypertrophy or thickening of ligamenta flava. There is no central spinal canal stenosis. There is no neural foraminal stenosis. L2/3:  Minimal disc bulging with no central canal narrowing or significant foraminal stenosis     L3/4:  Broad-based disc bulging with minimal ventral ridging. Levoscoliosis with moderate to severe right foraminal stenosis. L4/5:  Broad-based disc bulging and ventral ridging. Levoscoliosis with facet hypertrophy and severe left foraminal narrowing and moderate to severe right foraminal narrowing  L5/S1: Disc bulging without central canal narrowing. Severe bilateral foraminal stenosis   Retroperitoneum: No abnormality of the visualized Aorta or retroperitoneum. Levorotoscoliosis with advanced multilevel degenerative changes. Severe foraminal narrowing bilaterally at L4-5. There may be mild transverse canal narrowing at L3-4 and L4-5 but there is no definite central canal stenosis     Ct Abdomen Pelvis W Iv Contrast Additional Contrast? None    Result Date: 6/19/2020  EXAMINATION: CT ABDOMEN PELVIS W IV CONTRAST DATE AND TIME:6/19/2020 8:00 AM CLINICAL HISTORY: Acute abdominal pain. Epigastric pain. abd pain, vomiting  COMPARISON: July 23, 2016 TECHNIQUE: Contiguous axial CT sections of the abdomen and pelvis. 100 cc's of IV contrast given. .  All CT scans at this facility use dose modulation, iterative reconstruction, and/or weight based dosing when appropriate to reduce radiation dose to as low as reasonably achievable. FINDINGS    Liver: Negative     Spleen: Negative    Pancreas: Negative   Gallbladder: No calcified gallstones. Peak Systolic/End Diastolic                                               Prox CCA:  73.3/14.1 cm/s             Mid CCA:  87.5/13.5 cm/s              Dist CCA:  85.6/14.8 cm/s              Prox ICA:  103/25.6 cm/s               Mid ICA:  183/32.9 cm/s            Dist ICA:  99.6/25.1 cm/s             Prox ECA:  147/20.4 cm/s             Prox VERT:  64.4/19.2 cm/s              ICA/CCA    2.1, which indicates 50-69% narrowing by velocity criteria. LEFT PS Prox CCA:  114/28.0 cm/s Mid CCA:  96.9/26.7 cm/s Dist CCA:  98.8/23.6 cm/s Prox ICA:  112/32.9 cm/s Mid ICA:  201/47.2 cm/s Dist ICA:  144/30.4 cm/s Prox ECA :  114/12.4 cm/s Prox VERT:  93.3/20.4 cm/s ICA/CCA     2.1, which indicates 50-69% narrowing by velocity criteria. 50-69% NARROWING PREDICTED OF BOTH INTERNAL CAROTID ARTERIES, NOT SIGNIFICANTLY CHANGED FROM 11/14/2018.               Labs:     labs reviewed and discussed with patient and staff    Lab Results   Component Value Date    POCGLU 339 06/21/2020    POCGLU 276 06/21/2020    POCGLU 154 06/21/2020    POCGLU 142 06/20/2020    POCGLU 154 06/20/2020     Lab Results   Component Value Date     06/21/2020    K 4.2 06/21/2020     06/21/2020    CO2 24 06/21/2020    BUN 25 06/21/2020    CREATININE 1.18 06/21/2020    CALCIUM 8.9 06/21/2020    LABALBU 4.0 06/19/2020    LABALBU 4.4 05/25/2012    BILITOT 0.3 06/19/2020    ALKPHOS 51 06/19/2020    AST 11 06/19/2020    ALT 14 06/19/2020     Lab Results   Component Value Date    WBC 7.7 06/21/2020    RBC 3.74 06/21/2020    RBC 3.91 02/28/2012    HGB 11.4 06/21/2020    HCT 34.9 06/21/2020    MCV 93.3 06/21/2020    MCH 30.5 06/21/2020    MCHC 32.7 06/21/2020    RDW 14.6 06/21/2020     06/21/2020    MPV 11.6 02/21/2014     Lab Results   Component Value Date    VITD25 31.5 01/14/2020     Lab Results   Component Value Date    COLORU Yellow 06/19/2020    NITRU Negative 06/19/2020    GLUCOSEU 250 06/19/2020    KETUA Negative 06/19/2020    UROBILINOGEN 0.2 flush 0.9 % injection 10 mL, 10 mL, Intravenous, PRN  magnesium hydroxide (MILK OF MAGNESIA) 400 MG/5ML suspension 30 mL, 30 mL, Oral, Daily PRN  ondansetron (ZOFRAN-ODT) disintegrating tablet 4 mg, 4 mg, Oral, Q8H PRN **OR** ondansetron (ZOFRAN) injection 4 mg, 4 mg, Intravenous, Q6H PRN  glucose (GLUTOSE) 40 % oral gel 15 g, 15 g, Oral, PRN  dextrose 50 % IV solution, 12.5 g, Intravenous, PRN  glucagon (rDNA) injection 1 mg, 1 mg, Intramuscular, PRN  dextrose 5 % solution, 100 mL/hr, Intravenous, PRN  insulin lispro (HUMALOG) injection vial 0-12 Units, 0-12 Units, Subcutaneous, TID WC  insulin lispro (HUMALOG) injection vial 0-6 Units, 0-6 Units, Subcutaneous, Nightly  meclizine (ANTIVERT) tablet 12.5 mg, 12.5 mg, Oral, TID PRN  hydrALAZINE (APRESOLINE) injection 10 mg, 10 mg, Intravenous, Q4H PRN  atorvastatin (LIPITOR) tablet 10 mg, 10 mg, Oral, Nightly  hydrALAZINE (APRESOLINE) tablet 50 mg, 50 mg, Oral, Daily  sacubitril-valsartan (ENTRESTO) 24-26 MG per tablet 1 tablet, 1 tablet, Oral, BID  carvedilol (COREG) tablet 12.5 mg, 12.5 mg, Oral, BID WC  warfarin (COUMADIN) daily dosing (placeholder), , Other, RX Placeholder      Social History:    Social History     Socioeconomic History    Marital status:       Spouse name: Not on file    Number of children: Not on file    Years of education: Not on file    Highest education level: Not on file   Occupational History    Not on file   Social Needs    Financial resource strain: Not on file    Food insecurity     Worry: Not on file     Inability: Not on file    Transportation needs     Medical: Not on file     Non-medical: Not on file   Tobacco Use    Smoking status: Never Smoker    Smokeless tobacco: Never Used   Substance and Sexual Activity    Alcohol use: No     Alcohol/week: 0.0 standard drinks    Drug use: No    Sexual activity: Not on file   Lifestyle    Physical activity     Days per week: Not on file     Minutes per session: Not on file 60 - 115 mg/dl    Performed on ACCU-CHEK    Basic Metabolic Panel w/ Reflex to MG    Collection Time: 06/21/20  5:46 AM   Result Value Ref Range    Sodium 136 135 - 144 mEq/L    Potassium reflex Magnesium 4.2 3.4 - 4.9 mEq/L    Chloride 102 95 - 107 mEq/L    CO2 24 20 - 31 mEq/L    Anion Gap 10 9 - 15 mEq/L    Glucose 134 (H) 70 - 99 mg/dL    BUN 25 (H) 8 - 23 mg/dL    CREATININE 1.18 (H) 0.50 - 0.90 mg/dL    GFR Non-African American 42.7 (L) >60    GFR  51.7 (L) >60    Calcium 8.9 8.5 - 9.9 mg/dL   Protime-INR    Collection Time: 06/21/20  5:46 AM   Result Value Ref Range    Protime 25.2 (H) 12.3 - 14.9 sec    INR 2.3    CBC    Collection Time: 06/21/20  5:46 AM   Result Value Ref Range    WBC 7.7 4.8 - 10.8 K/uL    RBC 3.74 (L) 4.20 - 5.40 M/uL    Hemoglobin 11.4 (L) 12.0 - 16.0 g/dL    Hematocrit 34.9 (L) 37.0 - 47.0 %    MCV 93.3 82.0 - 100.0 fL    MCH 30.5 27.0 - 31.3 pg    MCHC 32.7 (L) 33.0 - 37.0 %    RDW 14.6 (H) 11.5 - 14.5 %    Platelets 352 083 - 312 K/uL   POCT Glucose    Collection Time: 06/21/20  8:04 AM   Result Value Ref Range    POC Glucose 154 (H) 60 - 115 mg/dl    Performed on ACCU-CHEK    POCT Glucose    Collection Time: 06/21/20 12:20 PM   Result Value Ref Range    POC Glucose 276 (H) 60 - 115 mg/dl    Performed on ACCU-CHEK    POCT Glucose    Collection Time: 06/21/20  5:32 PM   Result Value Ref Range    POC Glucose 339 (H) 60 - 115 mg/dl    Performed on ACCU-CHEK               Impression:    1. Impaired mobility and ADLs due to vestibular neurontitis acute in nature affecting function  2. Fatigue and Malaise      Complex Active General Medical Issues thatcomplicate care Assess & Plan:     1.  Active Problems:    CKD (chronic kidney disease)    Osteoarthritis    Vitamin D deficiency    Other cerebrovascular disease    Syncope    Spinal stenosis in cervical region    Lumbar degenerative disc disease    Spinal stenosis, lumbar region, with neurogenic claudication    Abnormality of

## 2020-06-21 NOTE — CONSULTS
INITIAL CONSULT -PAIN MANAGEMENT     SERVICE DATE:  6/21/2020   SERVICE TIME:  8:38 AM  Admission date 6/19/2020   REASON FORCONSULT:  Severe widespread pain and weakness, difficulty walking due to the pain. REQUESTING PHYSICIAN:  MD Maryanne Dempsey MD    Chief complaint: Severe generalized pain involving all joints, all spine causing difficulty walking and lack of sleep. Admission chief complaint:   Chief Complaint   Patient presents with    Nausea     n&v as well as weakness. was here yesterday evening. not feeling any better         HISTORY OF PRESENTILLNESS:  Ms. Carlos Spencer is a 80 y.o. female who presents for initial admission to hospital with nausea, vomiting, dizziness, generalized weakness. This 80years old female has extensive past medical history of hypertension, hyperlipidemia, CVA, diabetes mellitus type 2, she brought by her son who caregiver due to frequent emesis complaining of a lot of dizziness, possible dehydration increasing generalized pain throughout the whole body was difficulty to move and complaining from pain. Patient was drowsy but very responsive on admission she is possibly weak dizzy, she has high blood pressure on admission. Patient was admitted on 6/19/2020, received medical treatment, she is feeling better from medical standpoint except some abnormality in her lab including renal function. I was consulted by primary team to help managing patient pain, she is not pointing to the pain to any specific area but describing pain throughout the whole body, she was seen by our neurology colleagues will order MRI of the whole spine as well as vascular imaging. MRI noted for severe spinal stenosis cervical region however patient does not have any motor loss or weakness at this time specific to the upper extremity.   MRI thoracic is normal  MRI lumbar showed lumbar L4-5 spinal stenosis which possibly due to extensive degenerative changes throughout the years. Do not see any acute findings    Imaging of the brain has significant for vascular disease but no acute embolism or bleeding. 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 SCC (squamous cell carcinoma), arm 2013    right upper arm, 2015 right forarm    SI (stress incontinence), female 5/29/2012    Type II or unspecified type diabetes mellitus without mention of complication, not stated as uncontrolled     Whooping cough      PAST SURGICAL HISTORY:    Past Surgical History:   Procedure Laterality Date    ANKLE SURGERY      broken left ankle.     APPENDECTOMY      BREAST BIOPSY      x2 left breast    CATARACT REMOVAL  2004    bilateral    CORONARY ANGIOPLASTY WITH STENT PLACEMENT  01/2019    CYSTOCELE REPAIR      EYE SURGERY      bilateral cataract    HYSTERECTOMY      complete at age 39    [address]      as a child     FAMILY HISTORY:    Family History   Problem Relation Age of Onset    Diabetes Mother     Heart Disease Father      SOCIALHISTORY:    Social History OBJECTIVE  PHYSICAL EXAM:  BP (!) 150/53   Pulse 62   Temp 98.1 °F (36.7 °C) (Oral)   Resp 18   Ht 5' 3\" (1.6 m)   Wt 184 lb (83.5 kg)   LMP  (LMP Unknown)   SpO2 99%   BMI 32.59 kg/m²   Body mass index is 32.59 kg/m². CONSTITUTIONAL: Elderly, mildly frail, alert oriented cooperative answering question however she is hard of hearing. She was radiocarpal having moderate distress due to pain all over the body. EYES:  vision intact  ENT:  normocepalic, without obvious abnormality, atraumatic  NECK: Moderately restricted range of motion at 45 degrees bilateral however she has negative Spurling sign, no radiculopathy noted on exam, no weakness, motor and sensory appears to be intact, no spinous process tenderness. BACK: Lumbar spine exam showed no spinous process tenderness, she has mild paraspinal tenderness across the sacroiliac and paraspinal lower lumbar region otherwise unremarkable, positive bilateral facet tenderness. LUNGS:  no increased work of breathing and good air exchange  CARDIOVASCULAR:  regular rate and rhythm  ABDOMEN: Soft nontender nondistended  MUSCULOSKELETAL: Moderate swelling across both knees, both ankles are normal, range of motion is fairly symmetrical both upper lower extremity however she has mild to moderate pain with knee flexion and hip flexion bilateral.  NEUROLOGIC: Mental status exam seem to be otherwise unremarkable in terms of motor and sensory seem to be symmetrical upper lower extremities, appropriate for age, gait is difficulty without support  SKIN:  no bruising or bleeding    DATA:   Diagnostic tests reviewed for today's visit:    All labs and imaging results reviewed.      Lab Results   Component Value Date    WBC 7.7 06/21/2020    HGB 11.4 06/21/2020    HCT 34.9 06/21/2020    MCV 93.3 06/21/2020     06/21/2020     06/21/2020    K 4.2 06/21/2020     06/21/2020    CO2 24 06/21/2020    BUN 25 06/21/2020    CREATININE 1.18 06/21/2020    CALCIUM central canal stenosis at C5-6. Moderate to severe central canal stenosis at C4-5. No abnormal cord signal intensity. Mri Thoracic Spine Wo Contrast    Result Date: 6/20/2020  EXAMINATION: MRI THORACIC SPINE WO CONTRAST DATE AND TIME:6/20/2020 10:45 AM CLINICAL HISTORY: Severe thoracic spine pain. may do just sagittal and see if cord compression then do details/  r/o cord compression  COMPARISON: If available previous films were reviewed for comparison. Technique: Multiplanar multisequence MRI scans of the thoracic spine. Findings:     Bones: The thoracic vertebrae are normally aligned with no evidence of fracture. There is normal marrow signal throughout the thoracic spine. Disc space: There is no focal disc herniation or evidence for spinal canal stenosis. Disc spaces are age-appropriate. Spinal cord: The thoracic cord is normal in configuration and signal intensity. Soft tissues: There is no paraspinal soft tissue mass or fluid collection. Negative MRI of the thoracic spine. No signs of cord compression. Mri Lumbar Spine Wo Contrast    Result Date: 6/20/2020  EXAMINATION: MRI LUMBAR SPINE WO CONTRAST DATE AND TIME:6/20/2020 10:45 AM CLINICAL HISTORY: Lower back pain. lumbar stenosis/ cord compression  COMPARISON: None TECHNIQUE:  Multi-sequence multiplanar imaging of the lumbar spine was performed. No gadolinium contrast administered. VOLUME: None   MR CONTRAST: None  FINDINGS:  Vertebrae: No acute fracture. Marrow signal changes are within normal limits. Conus: The conus medullaris ends at L1 level and is unremarkable. T12/L1: There is no evidence of disc bulging or disc herniation. No significant facet hypertrophy or thickening of ligamenta flava. There is no central spinal canal stenosis. There is no neural foraminal stenosis. L1/2:  There is no evidence of disc bulging or disc herniation. No significant facet hypertrophy or thickening of ligamenta flava. There is no central

## 2020-06-21 NOTE — CARE COORDINATION
PT WILL MOST LIKELY NOT GET APPROVAL FROM HER INSURANCE FOR [facility]. THE SECOND PLAN IS FOR HER TO GO HOME WITH [facility] SKILLED, PT AND OT. THE PHYSICAL THERAPIST SAID THAT PT MAY GET MUCH STRONGER IF SHE COULD STAY AT LEAST A COUPLE MORE DAYS GETTING THERAPY BEFORE SHE GOES HOME WITH [facility]. THE SON REALLY WANTS THIS AS WELL. HE TOLD ME THIS EVENING THAT HE ACTUALLY FAVORS JUST HAVING HER GO HOME WITH [facility]

## 2020-06-21 NOTE — CARE COORDINATION
[facility] Case Management Initial Discharge Assessment    Met with Family and son stefany to discuss discharge plan. PCP: Tabitha Brantley MD           Date of Last Visit: 4 days ago    If no PCP, list provided? N/A    Discharge Planning    Living Arrangements: at home dependent on family care    Who do you live with? Younger son with stefany living next door    Who helps you with your care:  family    If lives at home:     Do you have any barriers navigating in your home? yes - USES WALKER, ABLE TO USE HE STAIRS    Patient can perform ADL? Yes WITH MINIMAL HELP    Current Services (outpatient and in home) :  None    Dialysis: No    Is transportation available to get to your appointments? Yes    DME Equipment:  yes - WALKER, SHOWER CHAIR,     Respiratory equipment: None    Respiratory provider:  no     Pharmacy:  yes - [address] with Medication Assistance Program?  No      Patient agreeable to [address]? Yes, [address] 1980    Patient agreeable to SNF/Rehab? Yes, [address]    Other discharge needs identified? Other HOME HEALTH CARE    Freedom of choice list provided with basic dialogue that supports the patient's individualized plan of care/goals and shares the quality data associated with the providers. Yes    Does Patient Have a High-Risk for Readmission Diagnosis (CHF, PN, MI, COPD)? No      The plan for Transition of Care is related to the following treatment goals:[facility] [address]    Initial Discharge Plan? (Note: please see concurrent daily documentation for any updates after initial note). REHAB 1ST OR HOME WITH [facility] SKILLED. FAMILY REALLY JUST WANTS HER TO COME HOME. The Patient and/or patient representative:  was provided with choice of any post-acute providers for care and equipment and agrees with discharge plan  Yes    Electronically signed by Maria Cruz.  SANJEEV Rosas on 6/21/2020 at 4:26 PM     .

## 2020-06-21 NOTE — PROGRESS NOTES
Physical Therapy Med Surg Initial Assessment  Fac[facility]/Department: [facility]  [facility]       NAME: Juanito Contreras  : 6/10/1927 (80 y.o.)  MRN: [id number]  CODE STATUS: DNR-CCA    Date of Service: 2020    Patient Diagnosis(es): Syncope, unspecified syncope type [R55]   Chief Complaint   Patient presents with    Nausea     n&v as well as weakness. was here yesterday evening.  not feeling any better     Patient Active Problem List    Diagnosis Date Noted    History of ST elevation myocardial infarction (STEMI)      Priority: High    CKD (chronic kidney disease) 2015     Priority: High    HLD (hyperlipidemia) 2015     Priority: High    HTN (hypertension) 2011     Priority: High    Diabetes mellitus 2011     Priority: High    Vitamin D deficiency 2015     Priority: Low    SI (stress incontinence), female 2012     Priority: Low    Osteoarthritis 2012     Priority: Low    Spinal stenosis in cervical region 2020    Lumbar degenerative disc disease 2020    Spinal stenosis, lumbar region, with neurogenic claudication 2020    Abnormality of gait and mobility 2020    Generalized muscle ache 2020    Generalized osteoarthrosis, involving multiple sites 2020    Syncope 2020    Current use of long term anticoagulation 2019    Other transient cerebral ischemic attacks and related syndromes     Other cerebrovascular disease     Chronic combined systolic and diastolic congestive heart failure (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    History of non-ST elevation myocardial infarction (NSTEMI) 11/12/2018    Nausea & vomiting 11/11/2018    Chest pain 11/10/2018    Vitamin B12 deficiency 09/17/2018    Recurrent UTI (urinary tract infection) 15/47/5506    Diastolic dysfunction 06/14/3375    Valvular heart disease 03/07/2018    Hypercalcemia 01/15/2018    Lumbar spinal stenosis 12/22/2016    Chronic low back pain 08/17/2016    Eczematous dermatitis         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 SCC (squamous cell carcinoma), arm 2013    right upper arm, 2015 right forarm    SI (stress incontinence), female 5/29/2012    Type II or unspecified type diabetes mellitus without mention of complication, not stated as uncontrolled     Whooping cough      Past Surgical History:   Procedure Laterality Date    ANKLE SURGERY      broken left ankle.  APPENDECTOMY      BREAST BIOPSY      x2 left breast    CATARACT REMOVAL  2004    bilateral    CORONARY ANGIOPLASTY WITH STENT PLACEMENT  01/2019    CYSTOCELE REPAIR      EYE SURGERY      bilateral cataract    HYSTERECTOMY      complete at age 39    [address]      as a child       Chart Reviewed: Yes  Patient assessed for rehabilitation services?: Yes  General Comment  Comments: Pt agreeable to PT evaluation.      Restrictions:  Restrictions/Precautions: Fall Risk     SUBJECTIVE: Subjective: \"I have back pain. \"    Pain     Post Treatment Pain Screening:   Pain Screening  Patient Currently in Pain: Yes  Pain Assessment  Pain Assessment: 0-10  Pain Level: 10  Pain Location: Back  Pain Descriptors: Aching    Prior Level of Function:  Social/Functional History  Lives With: Son  Type of Home: House  Home Layout: One level  Home Access: Stairs to enter without rails  Entrance Stairs - Number of Steps: 1  Home Equipment: 4 wheeled walker  ADL Assistance: Independent  Ambulation Assistance: Independent(using rollator)  Transfer Assistance: Independent    OBJECTIVE:   Vision: Within Functional Limits  H[facility]: [facility] to [facility]  Hearing Exceptions: Hard of hearing/hearing concerns    Cognition:  Overall Orientation Status: Within Functional Limits  Follows Commands: Within Functional Limits    Observation/Palpation  Posture: Fair(rounded shoulders)    ROM:  RLE AROM: WFL  LLE AROM : WFL    Strength:  Strength RLE  Comment: grossly >/= 3/5  Strength LLE  Comment: grossly >/= 3/5  Strength Other  Other: Decreased core strength based off functional mobility. Neuro:  Balance  Sitting - Static: Good  Sitting - Dynamic: Good;-  Standing - Static: Fair  Standing - Dynamic: Fair;-        Sensation  Overall Sensation Status: WFL    Bed mobility  Supine to Sit: Moderate assistance  Sit to Supine: Moderate assistance    Transfers  Sit to Stand: Minimal Assistance  Stand to sit: Minimal Assistance    Ambulation  Ambulation?: Yes  Ambulation 1  Surface: level tile  Device: Rolling Walker  Assistance: Minimal assistance  Quality of Gait: antalgic gait pattern  Gait Deviations: Slow Shanelle;Decreased step length;Decreased step height  Distance: 1 ft        ASSESSMENT:   Body structures, Functions, Activity limitations: Decreased functional mobility ; Decreased strength;Decreased endurance;Decreased balance; Increased pain;Decreased posture  Decision Making: Medium Complexity  History: high  Exam: high  Clinical Presentation: evolving    Prognosis: Good    DISCHARGE RECOMMENDATIONS:  Discharge Recommendations: Continue to assess pending progress    Assessment: Patient demonstrates decline in functional mobility and balance making her high risk for falls. Further physical therapy recommended to improve mobility, strength and balance prior to D/C home. REQUIRES PT FOLLOW UP: Yes      PLAN OF CARE:  Plan  Times per week: 3-6  Current Treatment Recommendations: Strengthening, Balance Training, Functional Mobility Training, Transfer Training, Endurance Training, Gait Training, Stair training, Neuromuscular Re-education, Pain Management, Home Exercise Program, Safety Education & Training, Patient/Caregiver Education & Training  Safety Devices  Type of devices: All fall risk precautions in place, Bed alarm in place, Call light within reach    Goals:  Long term goals  Long term goal 1: Patient will be independent with bed mobility. Long term goal 2: Patient will be supervision with transfers. Long term goal 3: Patient will be SBA with 150ft of gait using LRD. [facility] (6 CLICK) BASIC MOBILITY  AM-PAC Inpatient Mobility Raw Score : 14     Therapy Time:   Individual   Time In [address]   Time Out 0914   Minutes [address], [place], 06/21/20 at 9:15 AM         Definitions for assistance levels  Independent = pt does not require any physical supervision or assistance from another person for activity completion. Device may be needed.   Stand by assistance = pt requires verbal cues or instructions from another person, close to but not touching, to perform the activity  Minimal assistance= pt performs 75% or more of the activity; assistance is required to complete the activity  Moderate assistance= pt performs 50% of the activity; assistance is required to complete the activity  Maximal assistance = pt performs 25% of the activity; assistance is required to complete the activity  Dependent = pt requires total physical assistance to accomplish the task

## 2020-06-21 NOTE — DISCHARGE SUMMARY
Physician Discharge Summary     Patient ID:  Jessi Ramsay  [id number]  56 y.o.  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 SCC (squamous cell carcinoma), arm 2013    right upper arm, 2015 right forarm    SI (stress incontinence), female 5/29/2012    Type II or unspecified type diabetes mellitus without mention of complication, not stated as uncontrolled     Whooping cough        Admission Condition: fair    Discharged Condition: fair    Indication for Admission: nasuea and vomiting and dizziness  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

## 2020-06-21 NOTE — PROGRESS NOTES
connections     Talks on phone: Not on file     Gets together: Not on file     Attends [employer] service: Not on file     Active member of club or organization: Not on file     Attends meetings of clubs or organizations: Not on file     Relationship status: Not on file    Intimate partner violence     Fear of current or ex partner: Not on file     Emotionally abused: Not on file     Physically abused: Not on file     Forced sexual activity: Not on file   Other Topics Concern    Not on file   Social History Narrative         Lives With: Son    Type of Home: House    Home Layout: Two level, Able to Live on Main level with bedroom/bathroom, Performs ADL's on one level    Home Access: Stairs to enter with rails    Entrance Stairs - Number of Steps: Threshold    Bathroom Shower/Tub: Tub/Shower unit    Bathroom Toilet: Handicap height    Bathroom Equipment: Grab bars in shower, Grab bars around toilet, Hand-held shower, Shower chair    Bathroom Accessibility: Accessible    Home Equipment: Rolling walker, 4 wheeled walker (Usually uses rollator)    ADL Assistance: [address] [place] [address] Avenue: Needs assistance (Son manages housework)    Homemaking Responsibilities: No    Ambulation Assistance: Independent (Rollator)    Transfer Assistance: Independent    Active : No    Patient's  Info:  Sons          Family History   Problem Relation Age of Onset    Diabetes Mother     Heart Disease Father      Allergies   Allergen Reactions    Metoclopramide     Pantoprazole Other (See Comments)    Pcn [Penicillins]      Tolerates rocephin    Protonix [Pantoprazole Sodium]     Tramadol      Current Facility-Administered Medications   Medication Dose Route Frequency Provider Last Rate Last Dose    sodium chloride flush 0.9 % injection 10 mL  10 mL Intravenous 2 times per day SYLVIE Banegas CNP        sodium chloride flush 0.9 % injection 10 mL  10 mL Intravenous PRN SYLVIE Banegas CNP        skull fracture identified. Moderate age-related atrophic changes have not significantly changed from the yesterday's study. NO ACUTE INTRACRANIAL PROCESS OR SIGNIFICANT CHANGE FROM YESTERDAY IDENTIFIED. Ct Abdomen Pelvis W Iv Contrast Additional Contrast? None    Result Date: 6/19/2020  EXAMINATION: CT ABDOMEN PELVIS W IV CONTRAST DATE AND TIME:6/19/2020 8:00 AM CLINICAL HISTORY: Acute abdominal pain. Epigastric pain. abd pain, vomiting  COMPARISON: July 23, 2016 TECHNIQUE: Contiguous axial CT sections of the abdomen and pelvis. 100 cc's of IV contrast given. .  All CT scans at this facility use dose modulation, iterative reconstruction, and/or weight based dosing when appropriate to reduce radiation dose to as low as reasonably achievable. FINDINGS    Liver: Negative     Spleen: Negative    Pancreas: Negative   Gallbladder: No calcified gallstones. Normal gallbladder wall. No pericholecystic fluid. Kidney: Right parapelvic cysts. Borderline pelviectasis in the left collecting system. Small bilateral cortical cysts some are too small to characterize with accuracy. Adrenal glands are negative. Bowel: The bowel is not dilated. There is no evidence of diverticulitis or colitis. Appendix: There  is no CT evidence for appendicitis. Nodes: No lymphadenopathy. Aorta: No aneurysm    Peritoneum: No free fluid or free air. The abdominal wall is intact. Pelvis: No abnormal soft tissue mass. The bladder is normal.   Bones: Levoscoliosis of the lumbar spine. BORDERLINE PELVIECTASIS IN LEFT KIDNEY. NO ACUTE PATHOLOGY IN THE ABDOMEN OR PELVIS. Xr Chest Portable    Result Date: 6/19/2020  EXAMINATION: XR CHEST PORTABLE CLINICAL HISTORY: WEAKNESS, VOMITING, NAUSEA COMPARISONS: JUNE 18, 2020 FINDINGS: Osseous structures intact. Cardiopericardial silhouette normal. Pulmonary vasculature normal. Lungs clear. NO ACUTE CARDIOPULMONARY DISEASE.       Lab Results   Component Value Date    WBC 9.2 06/19/2020    RBC 3.85 06/19/2020    RBC 3.91 02/28/2012    HGB 11.6 06/19/2020    HCT 35.6 06/19/2020    MCV 92.5 06/19/2020    MCH 30.0 06/19/2020    MCHC 32.4 06/19/2020    RDW 14.6 06/19/2020     06/19/2020    MPV 11.6 02/21/2014     Lab Results   Component Value Date     06/19/2020    K 4.2 06/19/2020    K 4.1 03/21/2019     06/19/2020    CO2 26 06/19/2020    BUN 31 06/19/2020    CREATININE 1.04 06/19/2020    GFRAA 59.8 06/19/2020    LABGLOM 49.4 06/19/2020    GLUCOSE 251 06/19/2020    GLUCOSE 154 05/25/2012    PROT 6.5 06/19/2020    LABALBU 4.0 06/19/2020    LABALBU 4.4 05/25/2012    CALCIUM 9.4 06/19/2020    BILITOT 0.3 06/19/2020    ALKPHOS 51 06/19/2020    AST 11 06/19/2020    ALT 14 06/19/2020     Lab Results   Component Value Date    PROTIME 21.2 06/18/2020    INR 1.8 06/18/2020     Lab Results   Component Value Date    TSH 1.810 03/15/2019    JQSZRFLT14 541 03/15/2019    FOLATE 13.4 03/15/2019    FERRITIN 99.1 12/21/2017    IRON 48 06/03/2020    TIBC 352 06/03/2020     Lab Results   Component Value Date    TRIG 74 04/10/2019    HDL 52 04/10/2019    LDLCALC 68 04/10/2019    LDLDIRECT 66 01/14/2020    LABVLDL 34.0 05/01/2013     No results found for: LABAMPH, BARBSCNU, LABBENZ, CANNAB, COCAINESCRN, LABMETH, OPIATESCREENURINE, PHENCYCLIDINESCREENURINE, PPXUR, ETOH  No results found for: LITHIUM, DILFRTOT, VALPROATE    Assessment:   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 Patient is notably more head and is nauseous. The other alternative diagnosis could be a brainstem stroke. This cannot be isolated. As noted patient is cerebrovascular event in the past was treated TPA with almost complete resolution of symptoms her second admission did not show anything significant. She is somewhat better with the Antivert. Her main symptoms appears to be new pain in the right neck going down into her ear and some on the right side.   The etiology of this is unclear to take her Coumadin will repeat her INR again tomorrow        Elijah Meeks MD, [id number], American Board of Psychiatry & Neurology  Board Certified in Vascular Neurology  Board Certified in Neuromuscular Medicine  Certified in [facility]:

## 2020-06-21 NOTE — PROGRESS NOTES
[facility]    POC Glucose 339 (H) 60 - 115 mg/dl    Performed on ACCU-CHEK        Ct Head Wo Contrast    Result Date: 6/19/2020  CT HEAD WO CONTRAST : 6/19/2020 CLINICAL HISTORY:  weakness, nausea with movement . COMPARISON: 6/18/2020. TECHNIQUE: Spiral unenhanced images were obtained of the head, with routine multiplanar reconstructions performed. Intravenous contrast is noted from a CT abdomen and pelvis from earlier 6/19/2020. All CT scans at this facility use dose modulation, iterative reconstruction, and/or weight based dosing when appropriate to reduce radiation dose to as low as reasonably achievable. FINDINGS: There is no intracranial hemorrhage, mass effect, midline shift, extra-axial collection, evidence of hydrocephalus, recent ischemic infarct, or skull fracture identified. Moderate age-related atrophic changes have not significantly changed from the yesterday's study. NO ACUTE INTRACRANIAL PROCESS OR SIGNIFICANT CHANGE FROM YESTERDAY IDENTIFIED. Ct Head Wo Contrast    Result Date: 6/18/2020  EXAMINATION: CT of the brain without contrast HISTORY: Pain. Hypertension. COMPARISON: CT brain from March 14, 2019 TECHNIQUE: Multiple contiguous axial images were obtained of the brain from the skull base through the vertex. Multiplanar reformats were obtained. FINDINGS: Prominence of the sulci and ventricles compatible with mild generalized parenchymal volume loss. Gray-white matter differentiation is preserved. Areas of bilateral supratentorial white matter hypoattenuation are nonspecific but most likely related to chronic small vessel ischemic changes in a patient of this age. No acute hemorrhage or abnormal extra-axial fluid collection. No mass effect or midline shift. Mild mucosal thickening of the maxillary sinuses. The mastoid air cells are clear. Calvarium is intact. No acute intracranial process or significant interval change.  All CT scans at this facility use dose modulation, iterative reconstruction, and/or weight based dosing when appropriate to reduce radiation dose to as low as reasonably achievable. Mri Cervical Spine Wo Contrast    Result Date: 6/20/2020  EXAMINATION: MRI CERVICAL SPINE WO CONTRAST DATE AND TIME:6/20/2020 10:45 AM CLINICAL HISTORY: Severe neck pain  stenosis/  may do a sagittal screen for spine, include thorasic and lumbar sagiatl and then do deteils if we see anything  COMPARISON: None TECHNIQUE: Multiplanar, multi-sequence MRI scans FINDINGS Craniocervical junction: Craniocervical junction is within normal limits. Bone marrow: No sign of acute fracture. No abnormality of the marrow signal to suggest any metastatic or diffuse bone disease. Soft tissues: Cervical soft tissues are within normal limits. Cervical cord: The cervical cord has normal morphology and signal. There is no sign of any extramedullary hemorrhage or fluid collection. C1/2: The odontoid and the C1-2 articulation are normal. C2/C3:  There is no neural foraminal stenosis. There is no evidence of central canal stenosis. There is no foraminal stenosis. C3/C4:  Minimal disc osteophyte complex with ventral ridging. No central canal stenosis. No significant foraminal narrowing. C4/C5:  Prominent disc osteophyte complex with effacement of the anterior CSF column in cord deformity. No abnormal cord signal intensity. Findings resulting in moderate central canal stenosis. C5/C6:  There is no neural foraminal stenosis. There is no evidence of central canal stenosis. There is no foraminal stenosis. C6/C7:  Moderate discussed by complex with effacement of the anterior CSF column and cord deformity. No abnormal cord signal intensity. Findings resulting in moderate to severe central canal stenosis. C7-T1:  Minimal disc osteophyte complex without central canal or significant foraminal narrowing     Severe central canal stenosis at C5-6. Moderate to severe central canal stenosis at C4-5.  No abnormal cord central canal narrowing or significant foraminal stenosis     L3/4:  Broad-based disc bulging with minimal ventral ridging. Levoscoliosis with moderate to severe right foraminal stenosis. L4/5:  Broad-based disc bulging and ventral ridging. Levoscoliosis with facet hypertrophy and severe left foraminal narrowing and moderate to severe right foraminal narrowing  L5/S1: Disc bulging without central canal narrowing. Severe bilateral foraminal stenosis   Retroperitoneum: No abnormality of the visualized Aorta or retroperitoneum. Levorotoscoliosis with advanced multilevel degenerative changes. Severe foraminal narrowing bilaterally at L4-5. There may be mild transverse canal narrowing at L3-4 and L4-5 but there is no definite central canal stenosis     Ct Abdomen Pelvis W Iv Contrast Additional Contrast? None    Result Date: 6/19/2020  EXAMINATION: CT ABDOMEN PELVIS W IV CONTRAST DATE AND TIME:6/19/2020 8:00 AM CLINICAL HISTORY: Acute abdominal pain. Epigastric pain. abd pain, vomiting  COMPARISON: July 23, 2016 TECHNIQUE: Contiguous axial CT sections of the abdomen and pelvis. 100 cc's of IV contrast given. .  All CT scans at this facility use dose modulation, iterative reconstruction, and/or weight based dosing when appropriate to reduce radiation dose to as low as reasonably achievable. FINDINGS    Liver: Negative     Spleen: Negative    Pancreas: Negative   Gallbladder: No calcified gallstones. Normal gallbladder wall. No pericholecystic fluid. Kidney: Right parapelvic cysts. Borderline pelviectasis in the left collecting system. Small bilateral cortical cysts some are too small to characterize with accuracy. Adrenal glands are negative. Bowel: The bowel is not dilated. There is no evidence of diverticulitis or colitis. Appendix: There  is no CT evidence for appendicitis. Nodes: No lymphadenopathy. Aorta: No aneurysm    Peritoneum: No free fluid or free air. The abdominal wall is intact. Pelvis: No abnormal soft tissue mass. The bladder is normal.   Bones: Levoscoliosis of the lumbar spine. BORDERLINE PELVIECTASIS IN LEFT KIDNEY. NO ACUTE PATHOLOGY IN THE ABDOMEN OR PELVIS. Xr Chest Portable    Result Date: 6/19/2020  EXAMINATION: XR CHEST PORTABLE CLINICAL HISTORY: WEAKNESS, VOMITING, NAUSEA COMPARISONS: JUNE 18, 2020 FINDINGS: Osseous structures intact. Cardiopericardial silhouette normal. Pulmonary vasculature normal. Lungs clear. NO ACUTE CARDIOPULMONARY DISEASE. Xr Chest Portable    Result Date: 6/19/2020  EXAMINATION: XR CHEST PORTABLE CLINICAL HISTORY: CHEST PAIN COMPARISONS: MARCH 14, 2019 FINDINGS: Osseous structures intact. Cardiopericardial silhouette normal. Pulmonary vasculature normal. Lungs clear. NO ACUTE CARDIOPULMONARY DISEASE. Mri Brain Wo Contrast    Result Date: 6/19/2020  MRI BRAIN WITHOUT CONTRAST: CLINICAL HISTORY:  r/o CVA , dizziness, nausea, generalized weakness COMPARISONS:  3/14/2019 TECHNIQUE: Multiplanar, multi-sequence MRI was performed on a 1.5Tesla closed magnet. FINDINGS:  There are no abnormal sites of restricted diffusion. The ventricles are normal in position. There is no evidence for mass effect. There is no shift of the midline structures. There are multiple  extensive foci of increased signal on FLAIR and T2, in the periventricular deep white matter and corona radiata, which may be secondary to small vessel ischemic changes, demyelination, or aging. There appear similar prior exam. There is moderate dilatation of the cerebral sulci and ventricles, unchanged. Flow-voids in the major intracranial blood vessels are identified and are patent by spin-echo criteria. There are few small foci of decreased signal on gradient echo sequences within the left frontal and both parietal lobes. They may be secondary to small remote hemorrhages. There is mucosal thickening within both maxillary sinuses and both ethmoid sinuses.   Mastoid air cells are clear. The seventh and eighth nerve complexes and optic nerves are symmetrical.  No evidence for mass in the cerebellopontine angle. There is generalized thinning of the corpus callosum. The cerebellar tonsils are not ectopic. The pituitary gland is unremarkable. Optic nerves are symmetrical. The calvarium and dura are unremarkable. There is hypertrophy of nasal turbinates, consistent with  rhinitis. NO EVIDENCE OF ACUTE CVA. GENERALIZED PARENCHYMAL VOLUME LOSS AND NONSPECIFIC WHITE MATTER CHANGES, MOST LIKELY SECONDARY TO CHRONIC SMALL VESSEL ISCHEMIC CHANGES. MUCOSAL THICKENING IN ETHMOID AND MAXILLARY SINUSES. A FEW SMALL NONSPECIFIC FOCI ON GRADIENT ECHO SEQUENCES WITHIN THE WHITE MATTER BILATERALLY. Us Carotid Artery Bilateral    Result Date: 6/19/2020  US CAROTID ARTERY BILATERAL: 6/19/2020 CLINICAL HISTORY:  dizziness . COMPARISON: 11/14/2018. Grayscale, color and waveform Doppler analysis of the cervical carotid and vertebral arteries was performed. Validated velocity measurements with angiographic measurements, velocity criteria are extrapolated from diameter data as defined by the Society of Radiologist in [address] [place] [address] Radiology 2003; [id number]. FINDINGS: There is moderate somewhat irregular atherosclerotic plaquing of the carotid bulbs, similarly to the prior study. There is antegrade flow in both vertebral arteries. ARTERIAL BLOOD FLOW VELOCITY RIGHT Peak Systolic/End Diastolic                                               Prox CCA:  73.3/14.1 cm/s             Mid CCA:  87.5/13.5 cm/s              Dist CCA:  85.6/14.8 cm/s              Prox ICA:  103/25.6 cm/s               Mid ICA:  183/32.9 cm/s            Dist ICA:  99.6/25.1 cm/s             Prox ECA:  147/20.4 cm/s             Prox VERT:  64.4/19.2 cm/s              ICA/CCA    2.1, which indicates 50-69% narrowing by velocity criteria.  LEFT PS Prox CCA:  114/28.0 cm/s Mid CCA:  96.9/26.7 cm/s Dist CCA:  98.8/23.6 congestive heart failure (HCC)    Other transient cerebral ischemic attacks and related syndromes    Other cerebrovascular disease    Current use of long term anticoagulation    Syncope    Spinal stenosis in cervical region    Lumbar degenerative disc disease    Spinal stenosis, lumbar region, with neurogenic claudication    Abnormality of gait and mobility    Generalized muscle ache    Generalized osteoarthrosis, involving multiple sites   1. awailting work up, recommend acute rehab  Cody Capps D.O., PM&R     Attending    [address]

## 2020-06-22 PROBLEM — H81.23 VESTIBULAR NEURONITIS OF BOTH EARS: Status: ACTIVE | Noted: 2020-06-22

## 2020-06-22 LAB
ANION GAP SERPL CALCULATED.3IONS-SCNC: 10 MEQ/L (ref 9–15)
BUN BLDV-MCNC: 23 MG/DL (ref 8–23)
CALCIUM SERPL-MCNC: 8.9 MG/DL (ref 8.5–9.9)
CHLORIDE BLD-SCNC: 105 MEQ/L (ref 95–107)
CO2: 24 MEQ/L (ref 20–31)
CREAT SERPL-MCNC: 1.09 MG/DL (ref 0.5–0.9)
GFR AFRICAN AMERICAN: 56.7
GFR NON-AFRICAN AMERICAN: 46.8
GLUCOSE BLD-MCNC: 140 MG/DL (ref 70–99)
GLUCOSE BLD-MCNC: 151 MG/DL (ref 60–115)
GLUCOSE BLD-MCNC: 267 MG/DL (ref 60–115)
GLUCOSE BLD-MCNC: 342 MG/DL (ref 60–115)
GLUCOSE BLD-MCNC: 412 MG/DL (ref 60–115)
HCT VFR BLD CALC: 32.9 % (ref 37–47)
HEMOGLOBIN: 10.8 G/DL (ref 12–16)
INR BLD: 3.3
MCH RBC QN AUTO: 30.3 PG (ref 27–31.3)
MCHC RBC AUTO-ENTMCNC: 32.7 % (ref 33–37)
MCV RBC AUTO: 92.5 FL (ref 82–100)
PDW BLD-RTO: 14.7 % (ref 11.5–14.5)
PERFORMED ON: ABNORMAL
PLATELET # BLD: 217 K/UL (ref 130–400)
POTASSIUM REFLEX MAGNESIUM: 4.4 MEQ/L (ref 3.4–4.9)
PROTHROMBIN TIME: 33 SEC (ref 12.3–14.9)
RBC # BLD: 3.56 M/UL (ref 4.2–5.4)
SODIUM BLD-SCNC: 139 MEQ/L (ref 135–144)
WBC # BLD: 7.4 K/UL (ref 4.8–10.8)

## 2020-06-22 PROCEDURE — 2580000003 HC RX 258: Performed by: NURSE PRACTITIONER

## 2020-06-22 PROCEDURE — 36415 COLL VENOUS BLD VENIPUNCTURE: CPT

## 2020-06-22 PROCEDURE — 6360000002 HC RX W HCPCS: Performed by: INTERNAL MEDICINE

## 2020-06-22 PROCEDURE — 99231 SBSQ HOSP IP/OBS SF/LOW 25: CPT | Performed by: PHYSICAL MEDICINE & REHABILITATION

## 2020-06-22 PROCEDURE — 1210000000 HC MED SURG R&B

## 2020-06-22 PROCEDURE — APPSS30 APP SPLIT SHARED TIME 16-30 MINUTES: Performed by: NURSE PRACTITIONER

## 2020-06-22 PROCEDURE — 97116 GAIT TRAINING THERAPY: CPT

## 2020-06-22 PROCEDURE — 85610 PROTHROMBIN TIME: CPT

## 2020-06-22 PROCEDURE — 6370000000 HC RX 637 (ALT 250 FOR IP): Performed by: ANESTHESIOLOGY

## 2020-06-22 PROCEDURE — 85027 COMPLETE CBC AUTOMATED: CPT

## 2020-06-22 PROCEDURE — 6370000000 HC RX 637 (ALT 250 FOR IP): Performed by: INTERNAL MEDICINE

## 2020-06-22 PROCEDURE — 2700000000 HC OXYGEN THERAPY PER DAY

## 2020-06-22 PROCEDURE — 97166 OT EVAL MOD COMPLEX 45 MIN: CPT

## 2020-06-22 PROCEDURE — 80048 BASIC METABOLIC PNL TOTAL CA: CPT

## 2020-06-22 PROCEDURE — 99233 SBSQ HOSP IP/OBS HIGH 50: CPT | Performed by: PSYCHIATRY & NEUROLOGY

## 2020-06-22 PROCEDURE — 6370000000 HC RX 637 (ALT 250 FOR IP): Performed by: NURSE PRACTITIONER

## 2020-06-22 RX ORDER — ONDANSETRON 2 MG/ML
4 INJECTION INTRAMUSCULAR; INTRAVENOUS EVERY 6 HOURS PRN
Status: CANCELLED | OUTPATIENT
Start: 2020-06-22

## 2020-06-22 RX ORDER — HYDRALAZINE HYDROCHLORIDE 20 MG/ML
10 INJECTION INTRAMUSCULAR; INTRAVENOUS EVERY 4 HOURS PRN
Status: CANCELLED | OUTPATIENT
Start: 2020-06-22

## 2020-06-22 RX ORDER — ACETAMINOPHEN 500 MG
500 TABLET ORAL EVERY 6 HOURS SCHEDULED
Status: CANCELLED | OUTPATIENT
Start: 2020-06-22

## 2020-06-22 RX ORDER — SODIUM CHLORIDE 0.9 % (FLUSH) 0.9 %
10 SYRINGE (ML) INJECTION PRN
Status: CANCELLED | OUTPATIENT
Start: 2020-06-22

## 2020-06-22 RX ORDER — PREDNISONE 20 MG/1
20 TABLET ORAL DAILY
Status: CANCELLED | OUTPATIENT
Start: 2020-06-23 | End: 2020-06-24

## 2020-06-22 RX ORDER — ACETAMINOPHEN 325 MG/1
325 TABLET ORAL 3 TIMES DAILY PRN
Status: CANCELLED | OUTPATIENT
Start: 2020-06-22

## 2020-06-22 RX ORDER — METAXALONE 800 MG/1
400 TABLET ORAL 3 TIMES DAILY
Status: CANCELLED | OUTPATIENT
Start: 2020-06-22

## 2020-06-22 RX ORDER — ATORVASTATIN CALCIUM 10 MG/1
10 TABLET, FILM COATED ORAL NIGHTLY
Status: CANCELLED | OUTPATIENT
Start: 2020-06-22

## 2020-06-22 RX ORDER — DEXTROSE MONOHYDRATE 50 MG/ML
100 INJECTION, SOLUTION INTRAVENOUS PRN
Status: CANCELLED | OUTPATIENT
Start: 2020-06-22

## 2020-06-22 RX ORDER — PREDNISONE 1 MG/1
5 TABLET ORAL DAILY
Status: CANCELLED | OUTPATIENT
Start: 2020-06-30 | End: 2020-07-03

## 2020-06-22 RX ORDER — NITROFURANTOIN 25; 75 MG/1; MG/1
100 CAPSULE ORAL NIGHTLY
Status: CANCELLED | OUTPATIENT
Start: 2020-06-22

## 2020-06-22 RX ORDER — HYDRALAZINE HYDROCHLORIDE 50 MG/1
50 TABLET, FILM COATED ORAL DAILY
Status: CANCELLED | OUTPATIENT
Start: 2020-06-23

## 2020-06-22 RX ORDER — ANALGESIC BALM 1.74; 4.06 G/29G; G/29G
OINTMENT TOPICAL 4 TIMES DAILY
Status: CANCELLED | OUTPATIENT
Start: 2020-06-22

## 2020-06-22 RX ORDER — ONDANSETRON 4 MG/1
4 TABLET, ORALLY DISINTEGRATING ORAL EVERY 8 HOURS PRN
Status: CANCELLED | OUTPATIENT
Start: 2020-06-22

## 2020-06-22 RX ORDER — CLOPIDOGREL BISULFATE 75 MG/1
75 TABLET ORAL DAILY
Status: CANCELLED | OUTPATIENT
Start: 2020-06-23

## 2020-06-22 RX ORDER — NICOTINE POLACRILEX 4 MG
15 LOZENGE BUCCAL PRN
Status: CANCELLED | OUTPATIENT
Start: 2020-06-22

## 2020-06-22 RX ORDER — LIDOCAINE 4 G/G
3 PATCH TOPICAL DAILY
Status: CANCELLED | OUTPATIENT
Start: 2020-06-23

## 2020-06-22 RX ORDER — SODIUM CHLORIDE 0.9 % (FLUSH) 0.9 %
10 SYRINGE (ML) INJECTION EVERY 12 HOURS SCHEDULED
Status: CANCELLED | OUTPATIENT
Start: 2020-06-22

## 2020-06-22 RX ORDER — AMLODIPINE BESYLATE 5 MG/1
5 TABLET ORAL DAILY
Status: CANCELLED | OUTPATIENT
Start: 2020-06-23

## 2020-06-22 RX ORDER — GABAPENTIN 100 MG/1
100 CAPSULE ORAL NIGHTLY
Status: CANCELLED | OUTPATIENT
Start: 2020-06-22

## 2020-06-22 RX ORDER — CARVEDILOL 12.5 MG/1
12.5 TABLET ORAL 2 TIMES DAILY WITH MEALS
Status: CANCELLED | OUTPATIENT
Start: 2020-06-22

## 2020-06-22 RX ORDER — DEXTROSE MONOHYDRATE 25 G/50ML
12.5 INJECTION, SOLUTION INTRAVENOUS PRN
Status: CANCELLED | OUTPATIENT
Start: 2020-06-22

## 2020-06-22 RX ORDER — MECLIZINE HCL 12.5 MG/1
12.5 TABLET ORAL 3 TIMES DAILY PRN
Status: CANCELLED | OUTPATIENT
Start: 2020-06-22

## 2020-06-22 RX ORDER — PREDNISONE 10 MG/1
10 TABLET ORAL DAILY
Status: CANCELLED | OUTPATIENT
Start: 2020-06-27 | End: 2020-06-30

## 2020-06-22 RX ORDER — HYDROCODONE BITARTRATE AND ACETAMINOPHEN 5; 325 MG/1; MG/1
0.5 TABLET ORAL EVERY 4 HOURS PRN
Status: CANCELLED | OUTPATIENT
Start: 2020-06-22

## 2020-06-22 RX ADMIN — METAXALONE 400 MG: 800 TABLET ORAL at 21:16

## 2020-06-22 RX ADMIN — TRIMETHOBENZAMIDE HYDROCHLORIDE 200 MG: 100 INJECTION INTRAMUSCULAR at 10:35

## 2020-06-22 RX ADMIN — SACUBITRIL AND VALSARTAN 1 TABLET: 24; 26 TABLET, FILM COATED ORAL at 21:17

## 2020-06-22 RX ADMIN — AMLODIPINE BESYLATE 5 MG: 5 TABLET ORAL at 10:32

## 2020-06-22 RX ADMIN — INSULIN LISPRO 6 UNITS: 100 INJECTION, SOLUTION INTRAVENOUS; SUBCUTANEOUS at 21:19

## 2020-06-22 RX ADMIN — CARVEDILOL 12.5 MG: 12.5 TABLET, FILM COATED ORAL at 18:23

## 2020-06-22 RX ADMIN — ACETAMINOPHEN 500 MG: 325 TABLET, FILM COATED ORAL at 15:32

## 2020-06-22 RX ADMIN — PREDNISONE 20 MG: 20 TABLET ORAL at 10:30

## 2020-06-22 RX ADMIN — MENTHOL AND METHYL SALICYLATE: 7.6; 29 OINTMENT TOPICAL at 15:36

## 2020-06-22 RX ADMIN — CARVEDILOL 12.5 MG: 12.5 TABLET, FILM COATED ORAL at 11:00

## 2020-06-22 RX ADMIN — Medication 10 ML: at 10:34

## 2020-06-22 RX ADMIN — MENTHOL AND METHYL SALICYLATE: 7.6; 29 OINTMENT TOPICAL at 21:18

## 2020-06-22 RX ADMIN — TRIMETHOBENZAMIDE HYDROCHLORIDE 200 MG: 100 INJECTION INTRAMUSCULAR at 21:18

## 2020-06-22 RX ADMIN — GABAPENTIN 100 MG: 100 CAPSULE ORAL at 21:30

## 2020-06-22 RX ADMIN — Medication 10 ML: at 21:31

## 2020-06-22 RX ADMIN — NITROFURANTOIN (MONOHYDRATE/MACROCRYSTALS) 100 MG: 75; 25 CAPSULE ORAL at 21:17

## 2020-06-22 RX ADMIN — MENTHOL AND METHYL SALICYLATE: 7.6; 29 OINTMENT TOPICAL at 10:37

## 2020-06-22 RX ADMIN — METAXALONE 400 MG: 800 TABLET ORAL at 10:30

## 2020-06-22 RX ADMIN — SACUBITRIL AND VALSARTAN 1 TABLET: 24; 26 TABLET, FILM COATED ORAL at 10:31

## 2020-06-22 RX ADMIN — ATORVASTATIN CALCIUM 10 MG: 10 TABLET, FILM COATED ORAL at 21:17

## 2020-06-22 RX ADMIN — ACETAMINOPHEN 500 MG: 325 TABLET, FILM COATED ORAL at 06:37

## 2020-06-22 RX ADMIN — HYDRALAZINE HYDROCHLORIDE 50 MG: 50 TABLET, FILM COATED ORAL at 10:32

## 2020-06-22 RX ADMIN — CLOPIDOGREL BISULFATE 75 MG: 75 TABLET ORAL at 10:31

## 2020-06-22 RX ADMIN — ACETAMINOPHEN 500 MG: 325 TABLET, FILM COATED ORAL at 21:18

## 2020-06-22 RX ADMIN — METAXALONE 400 MG: 800 TABLET ORAL at 15:33

## 2020-06-22 ASSESSMENT — ENCOUNTER SYMPTOMS
VOMITING: 0
NAUSEA: 0
COUGH: 0
WHEEZING: 0
TROUBLE SWALLOWING: 0
COLOR CHANGE: 0
SHORTNESS OF BREATH: 0
CHEST TIGHTNESS: 0

## 2020-06-22 ASSESSMENT — PAIN SCALES - GENERAL
PAINLEVEL_OUTOF10: 0
PAINLEVEL_OUTOF10: 0
PAINLEVEL_OUTOF10: 5
PAINLEVEL_OUTOF10: 0
PAINLEVEL_OUTOF10: 0

## 2020-06-22 NOTE — PROGRESS NOTES
does not require any physical supervision or assistance from another person for activity completion. Device may be needed.   Stand by assistance = pt requires verbal cues or instructions from another person, close to but not touching, to perform the activity  Minimal assistance= pt performs 75% or more of the activity; assistance is required to complete the activity  Moderate assistance= pt performs 50% of the activity; assistance is required to complete the activity  Maximal assistance = pt performs 25% of the activity; assistance is required to complete the activity  Dependent = pt requires total physical assistance to accomplish the task

## 2020-06-22 NOTE — DISCHARGE SUMMARY
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   She was started on steroid Dosepak and will be transferred to inpatient rehabilitation for further rehab. Patient was seen by the following consultants while admitted to [facility]:   Consults:  [place]  IP CONSULT TO PHARMACY  IP CONSULT TO PAIN MANAGEMENT  IP CONSULT TO REHAB/TCU ADMISSION COORDINATOR  IP CONSULT TO REHAB/TCU ADMISSION COORDINATOR  PHARMACY TO DOSE WARFARIN  IP CONSULT TO NEUROLOGY  IP CONSULT TO NEUROLOGY  IP CONSULT TO PHARMACY  IP CONSULT TO REHAB/TCU ADMISSION COORDINATOR  IP CONSULT TO REHAB/TCU ADMISSION COORDINATOR    Significant Diagnostic Studies:    Ct Head Wo Contrast    Result Date: 6/19/2020  CT HEAD WO CONTRAST : 6/19/2020 CLINICAL HISTORY:  weakness, nausea with movement . COMPARISON: 6/18/2020. TECHNIQUE: Spiral unenhanced images were obtained of the head, with routine multiplanar reconstructions performed. Intravenous contrast is noted from a CT abdomen and pelvis from earlier 6/19/2020. All CT scans at this facility use dose modulation, iterative reconstruction, and/or weight based dosing when appropriate to reduce radiation dose to as low as reasonably achievable. FINDINGS: There is no intracranial hemorrhage, mass effect, midline shift, extra-axial collection, evidence of hydrocephalus, recent ischemic infarct, or skull fracture identified. Moderate age-related atrophic changes have not significantly changed from the yesterday's study. NO ACUTE INTRACRANIAL PROCESS OR SIGNIFICANT CHANGE FROM YESTERDAY IDENTIFIED.      Mri Cervical Spine Wo Contrast    Result Date: 6/20/2020  EXAMINATION: MRI CERVICAL SPINE WO CONTRAST DATE AND TIME:6/20/2020 10:45 AM CLINICAL HISTORY: Severe neck pain  stenosis/  may do a sagittal screen for spine, include thorasic and lumbar sagiatl and then do deteils if we see narrowing. Severe bilateral foraminal stenosis   Retroperitoneum: No abnormality of the visualized Aorta or retroperitoneum. Levorotoscoliosis with advanced multilevel degenerative changes. Severe foraminal narrowing bilaterally at L4-5. There may be mild transverse canal narrowing at L3-4 and L4-5 but there is no definite central canal stenosis     Ct Abdomen Pelvis W Iv Contrast Additional Contrast? None    Result Date: 6/19/2020  EXAMINATION: CT ABDOMEN PELVIS W IV CONTRAST DATE AND TIME:6/19/2020 8:00 AM CLINICAL HISTORY: Acute abdominal pain. Epigastric pain. abd pain, vomiting  COMPARISON: July 23, 2016 TECHNIQUE: Contiguous axial CT sections of the abdomen and pelvis. 100 cc's of IV contrast given. .  All CT scans at this facility use dose modulation, iterative reconstruction, and/or weight based dosing when appropriate to reduce radiation dose to as low as reasonably achievable. FINDINGS    Liver: Negative     Spleen: Negative    Pancreas: Negative   Gallbladder: No calcified gallstones. Normal gallbladder wall. No pericholecystic fluid. Kidney: Right parapelvic cysts. Borderline pelviectasis in the left collecting system. Small bilateral cortical cysts some are too small to characterize with accuracy. Adrenal glands are negative. Bowel: The bowel is not dilated. There is no evidence of diverticulitis or colitis. Appendix: There  is no CT evidence for appendicitis. Nodes: No lymphadenopathy. Aorta: No aneurysm    Peritoneum: No free fluid or free air. The abdominal wall is intact. Pelvis: No abnormal soft tissue mass. The bladder is normal.   Bones: Levoscoliosis of the lumbar spine. BORDERLINE PELVIECTASIS IN LEFT KIDNEY. NO ACUTE PATHOLOGY IN THE ABDOMEN OR PELVIS. Xr Chest Portable    Result Date: 6/19/2020  EXAMINATION: XR CHEST PORTABLE CLINICAL HISTORY: WEAKNESS, VOMITING, NAUSEA COMPARISONS: JUNE 18, 2020 FINDINGS: Osseous structures intact.  Cardiopericardial THE WHITE MATTER BILATERALLY. Us Carotid Artery Bilateral    Result Date: 6/19/2020  US CAROTID ARTERY BILATERAL: 6/19/2020 CLINICAL HISTORY:  dizziness . COMPARISON: 11/14/2018. Grayscale, color and waveform Doppler analysis of the cervical carotid and vertebral arteries was performed. Validated velocity measurements with angiographic measurements, velocity criteria are extrapolated from diameter data as defined by the Society of Radiologist in [address] [place] [address] Drive Radiology 2003; [id number];[id number]. FINDINGS: There is moderate somewhat irregular atherosclerotic plaquing of the carotid bulbs, similarly to the prior study. There is antegrade flow in both vertebral arteries. ARTERIAL BLOOD FLOW VELOCITY RIGHT Peak Systolic/End Diastolic                                               Prox CCA:  73.3/14.1 cm/s             Mid CCA:  87.5/13.5 cm/s              Dist CCA:  85.6/14.8 cm/s              Prox ICA:  103/25.6 cm/s               Mid ICA:  183/32.9 cm/s            Dist ICA:  99.6/25.1 cm/s             Prox ECA:  147/20.4 cm/s             Prox VERT:  64.4/19.2 cm/s              ICA/CCA    2.1, which indicates 50-69% narrowing by velocity criteria. LEFT PS Prox CCA:  114/28.0 cm/s Mid CCA:  96.9/26.7 cm/s Dist CCA:  98.8/23.6 cm/s Prox ICA:  112/32.9 cm/s Mid ICA:  201/47.2 cm/s Dist ICA:  144/30.4 cm/s Prox ECA :  114/12.4 cm/s Prox VERT:  93.3/20.4 cm/s ICA/CCA     2.1, which indicates 50-69% narrowing by velocity criteria. 50-69% NARROWING PREDICTED OF BOTH INTERNAL CAROTID ARTERIES, NOT SIGNIFICANTLY CHANGED FROM 11/14/2018. Discharge Medications:       David Tobias   Home Medication Instructions [id number]    Printed on:06/22/20 1422   Medication Information                      atorvastatin (LIPITOR) 10 MG tablet  take 1 tablet by mouth once daily             blood glucose monitor kit and supplies  Test 2 times a day & as needed for symptoms of irregular blood glucose.   Freestyle taking, dosages or how often you must take them may have changed. For medication questions, contact your retail pharmacy and your PCP, Rickie Andersno MD .     ** I STRONGLY RECOMMEND that you follow up with Rickie Anderson MD within 3 to 5 days for a post hospitalization evaluation. This specific office visit is covered by your insurance, and is not the same as your annual doctor visit/ check up. This office visit is important, as it may prevent need for repeat and/or future hospitalizations. **    Your medical team at [phone] appreciates the opportunity to work with you to get well!     Sincerely,  Carlos Che

## 2020-06-22 NOTE — PROGRESS NOTES
Clinical Pharmacy Note    Warfarin consult follow-up    Recent Labs     06/22/20  0543   INR 3.3     Recent Labs     06/20/20  0527 06/21/20  0546 06/22/20  0543   HGB 10.7* 11.4* 10.8*   HCT 32.6* 34.9* 32.9*    227 217       Significant drug:drug interactions:  New warfarin drug-drug interactions: Prednisone  Discontinued drug-drug interactions: None  Current warfarin-drug interactions: APAP, Plavix, Norco, prednisone     Notes:  Date INR Warfarin Dose    06/19/20 1.8  6 mg    06/20/20 2.1  7 mg    06/21/20 2.3   7 mg    06/22/20 3.3  HOLD                              INR is supratherapeutic at 3.3. HOLD warfarin today to reach INR goal range of 2-3 for valvular disease and history of CVA. Increase in INR may be due to new start of prednisone therapy. Daily PT/INR until stable within therapeutic range.     Berhane Herrera PharmD   6/22/2020 9:33 AM

## 2020-06-22 NOTE — PROGRESS NOTES
[facility] Neurology Daily Progress Note  Name: Shanna Burger  Age: 80 y.o. Gender: female  CodeStatus: DNR-CCA  Allergies: Metoclopramide  Pantoprazole  Pcn [Penicillins]  Protonix [Pantoprazole Sodium]  Tramadol    Chief Complaint:Nausea (n&v as well as weakness. was here yesterday evening. not feeling any better)    Primary Care Provider: Cody Quinonez MD  InpatientTreatment Team: Treatment Team: Attending Provider: Holly Duke DO; Consulting Physician: Jaimie Khan MD; Consulting Physician: Pta Acosta MD; Patient Care Tech: Chris Gómez; Utilization Reviewer: Virgilio López RN; Patient Care Tech: Ericka Crisostomo; Registered Nurse: Tonja Vilchis RN; Registered Nurse: Ber Flores RN  Admission Date: 6/19/2020      HPI   Pt seen and examined for neuro follow up for vertigo and lower extremity weakness. Patient very hard of hearing. Son at bedside. Currently alert and oriented x3. Dizziness improved. Patient ambulated in room with therapy today. No focal deficits appreciated. No seizure activity reported. No new or acute neuro complaints or concerns. Vitals:    06/22/20 1038   BP: (!) 202/64   Pulse: 66   Resp:    Temp:    SpO2: 99%      Review of Systems   Constitutional: Negative for appetite change, chills, fatigue and fever. HENT: Positive for hearing loss. Negative for trouble swallowing. Eyes: Negative for visual disturbance. Respiratory: Negative for cough, chest tightness, shortness of breath and wheezing. Cardiovascular: Negative for chest pain, palpitations and leg swelling. Gastrointestinal: Negative for nausea and vomiting. Genitourinary: Positive for difficulty urinating. Musculoskeletal: Positive for gait problem. Skin: Negative for color change and rash. Neurological: Positive for dizziness and weakness (  Generalized). Negative for tremors, seizures, syncope, facial asymmetry, speech difficulty, light-headedness, numbness and headaches. Intravenous 2 times per day    insulin lispro  0-12 Units Subcutaneous TID     insulin lispro  0-6 Units Subcutaneous Nightly    atorvastatin  10 mg Oral Nightly    hydrALAZINE  50 mg Oral Daily    sacubitril-valsartan  1 tablet Oral BID    carvedilol  12.5 mg Oral BID     warfarin (COUMADIN) daily dosing (placeholder)   Other RX Placeholder     PRN Meds: HYDROcodone 5 mg - acetaminophen, acetaminophen, sodium chloride flush, magnesium hydroxide, ondansetron **OR** ondansetron, glucose, dextrose, glucagon (rDNA), dextrose, meclizine, hydrALAZINE    Labs:   Recent Labs     06/20/20  0527 06/21/20  0546 06/22/20  0543   WBC 8.5 7.7 7.4   HGB 10.7* 11.4* 10.8*   HCT 32.6* 34.9* 32.9*    227 217     Recent Labs     06/20/20  0527 06/21/20  0546 06/22/20  0543    136 139   K 4.5 4.2 4.4    102 105   CO2 24 24 24   BUN 27* 25* 23   CREATININE 1.16* 1.18* 1.09*   CALCIUM 8.8 8.9 8.9     No results for input(s): AST, ALT, BILIDIR, BILITOT, ALKPHOS in the last 72 hours. Recent Labs     06/20/20  0527 06/21/20  0546 06/22/20  0543   INR 2.1 2.3 3.3     No results for input(s): CKTOTAL, TROPONINI in the last 72 hours. Urinalysis:   Lab Results   Component Value Date    NITRU Negative 06/19/2020    WBCUA 0-2 06/19/2020    BACTERIA Negative 06/19/2020    RBCUA 0-2 06/19/2020    BLOODU Negative 06/19/2020    SPECGRAV 1.014 06/19/2020    GLUCOSEU 250 06/19/2020       Radiology:   Most recent    EEG No procedure found. MRI of Brain No results found for this or any previous visit. Results for orders placed during the hospital encounter of 06/19/20   MRI BRAIN WO CONTRAST    Narrative MRI BRAIN WITHOUT CONTRAST:    CLINICAL HISTORY:  r/o CVA , dizziness, nausea, generalized weakness    COMPARISONS:  3/14/2019    TECHNIQUE: Multiplanar, multi-sequence MRI was performed on a 1.5Tesla closed magnet. FINDINGS:  There are no abnormal sites of restricted diffusion.  The ventricles are normal in weakness. Technique: Multiplanar multisequence MRI of the brain was performed without contrast. Axial 3-D time-of-flight images of the brain were obtained without contrast. 3-D volume rendered images were performed for evaluation of the cerebral vasculature. Comparison: MRI of the brain from November 14, 2018 and CT brain from March 14, 2019    Findings:    MRI brain:    Confluent periventricular hyperintense T2/FLAIR signal as well as multiple small foci of hyperintensity/FLAIR signal within the bilateral supratentorial white matter are nonspecific, as is patchy hyperintense T2/FLAIR signal within the jana. Findings are   most compatible with chronic small vessel ischemic changes in a patient of this age. These findings are not significantly changed from prior MRI of the brain. Prominence of the sulci and ventricles compatible with moderate generalized parenchymal volume loss. No edema, hemorrhage, mass, mass effect, midline shift, or abnormal extra-axial fluid collection. Midline structures are within normal limits. The posterior fossa is within normal limits. There is no diffusion restriction. No susceptibility artifact is identified on the gradient echo sequence. Cranial nerves 7/8 complexes appear grossly unremarkable. The visualized paranasal sinuses and bilateral mastoid air cells are clear. MRA brain:    The bilateral distal cervical internal carotid arteries through the skull base are patent. The bilateral middle cerebral arteries through the trifurcation and opercular branches are patent. The vertebrobasilar system including the superior cerebellar and posterior cerebral arteries are patent. Note is made of a fetal origin of the left posterior cerebral artery. The right posterior communicating artery is not identified. The bilateral anterior cerebral arteries and anterior communicating artery are patent.     No aneurysm or high-grade stenosis of the visualized cerebral vasculature. Impression MRI brain:    No acute ischemia. Generalized parenchymal volume loss and nonspecific white matter findings most compatible with chronic small vessel ischemic changes in a patient of this age. MRA brain:    No aneurysm or high-grade stenosis of the visualized cerebral vasculature. CT of the Head:   Results for orders placed during the hospital encounter of 06/19/20   CT Head WO Contrast    Narrative CT HEAD WO CONTRAST : 6/19/2020    CLINICAL HISTORY:  weakness, nausea with movement . COMPARISON: 6/18/2020. TECHNIQUE: Spiral unenhanced images were obtained of the head, with routine multiplanar reconstructions performed. Intravenous contrast is noted from a CT abdomen and pelvis from earlier 6/19/2020. All CT scans at this facility use dose modulation, iterative reconstruction, and/or weight based dosing when appropriate to reduce radiation dose to as low as reasonably achievable. FINDINGS:    There is no intracranial hemorrhage, mass effect, midline shift, extra-axial collection, evidence of hydrocephalus, recent ischemic infarct, or skull fracture identified. Moderate age-related atrophic changes have not significantly changed from the yesterday's study. Impression NO ACUTE INTRACRANIAL PROCESS OR SIGNIFICANT CHANGE FROM YESTERDAY IDENTIFIED. No results found for this or any previous visit. No results found for this or any previous visit. Carotid duplex: No results found for this or any previous visit. No results found for this or any previous visit. Results for orders placed during the hospital encounter of 06/19/20   US CAROTID ARTERY BILATERAL    Narrative US CAROTID ARTERY BILATERAL: 6/19/2020    CLINICAL HISTORY:  dizziness . COMPARISON: 11/14/2018. Grayscale, color and waveform Doppler analysis of the cervical carotid and vertebral arteries was performed.      Validated velocity measurements with angiographic measurements, velocity criteria are extrapolated from diameter data as defined by the Society of Radiologist in [address] [place] [address] Drive Radiology 2003; 650;[phone]. FINDINGS:    There is moderate somewhat irregular atherosclerotic plaquing of the carotid bulbs, similarly to the prior study. There is antegrade flow in both vertebral arteries. ARTERIAL BLOOD FLOW VELOCITY    RIGHT Peak Systolic/End Diastolic                                                   Prox CCA:  73.3/14.1 cm/s               Mid CCA:  87.5/13.5 cm/s                Dist CCA:  85.6/14.8 cm/s                Prox ICA:  103/25.6 cm/s                 Mid ICA:  183/32.9 cm/s              Dist ICA:  99.6/25.1 cm/s               Prox ECA:  147/20.4 cm/s               Prox VERT:  64.4/19.2 cm/s                  ICA/CCA    2.1, which indicates 50-69% narrowing by velocity criteria. LEFT PS    Prox CCA:  114/28.0 cm/s  Mid CCA:  96.9/26.7 cm/s  Dist CCA:  98.8/23.6 cm/s  Prox ICA:  112/32.9 cm/s  Mid ICA:  201/47.2 cm/s  Dist ICA:  144/30.4 cm/s  Prox ECA :  114/12.4 cm/s  Prox VERT:  93.3/20.4 cm/s    ICA/CCA     2.1, which indicates 50-69% narrowing by velocity criteria. Impression 50-69% NARROWING PREDICTED OF BOTH INTERNAL CAROTID ARTERIES, NOT SIGNIFICANTLY CHANGED FROM 11/14/2018. Echo No results found for this or any previous visit.           Assessment/Plan:  Vestibular neuronitis  Improved with Antivert  MRI brain no acute findings  Carotid duplex with 50 to 69% narrowing bilateral, continue antiplatelet and statin therapy  Lower extremity weakness  MRI cervical spine shows severe central canal stenosis at C5-C6 and moderate to severe central canal stenosis at C4-C5  MRI thoracic spine negative  MRI lumbar spine shows advanced multilevel degenerative changes with severe foraminal narrowing bilaterally at L4-L5 with possible mild transverse canal narrowing at L3-4 and L4-5  MRI findings of

## 2020-06-22 NOTE — CARE COORDINATION
Clinicals sent to [facility] for precert for the acute rehab.  Electronically signed by Krzysztof Lucero RN on 6/22/20 at 4:22 PM EDT

## 2020-06-22 NOTE — PROGRESS NOTES
Patient seen and examined at bedside. Ongoing evaluation for inpatient rehab. Anticipate steroid-induced hyperglycemia and insulin is adjusted. Patient also changed to carb controlled diet. Okay for discharge to inpatient rehab.      Carlos Pina DO

## 2020-06-22 NOTE — PROGRESS NOTES
levels -   Independent = Pt. is able to perform task with no assistance but may require a device   Stand by assistance = Pt. does not perform task at an independent level but does not need physical assistance, requires verbal cues  Minimal, Moderate, Maximal Assistance = Pt. requires physical assistance (25%, 50%, 75% assist from helper) for task but is able to actively participate in task   Dependent = Pt. requires total assistance with task and is not able to actively participate with task completion     Functional Mobility:     Transfers  Sit to stand: Contact guard assistance  Stand to sit: Contact guard assistance    Bed Mobility  Bed mobility  Supine to Sit: Minimal assistance    Seated and Standing Balance:  Balance  Sitting Balance: Supervision  Standing Balance: Contact guard assistance    Functional Endurance:  Activity Tolerance  Activity Tolerance: Patient limited by fatigue  Activity Tolerance: poor+    D/C Recommendations:  OT D/C RECOMMENDATIONS  REQUIRES OT FOLLOW UP: Yes    Equipment Recommendations:       OT Education:        OT Follow Up:  OT D/C RECOMMENDATIONS  REQUIRES OT FOLLOW UP: Yes       Assessment/Discharge Disposition:     Performance deficits / Impairments: Decreased functional mobility , Decreased balance, Decreased ADL status, Decreased high-level IADLs, Decreased endurance, Decreased strength  Prognosis: Good  Discharge Recommendations: Continue to assess pending progress  Decision Making: Medium Complexity  History: multiple  Exam: 6 deficits  Assistance / Modification: Min A    Six Click Score :   How much help for putting on and taking off regular lower body clothing?: A Little  How much help for Bathing?: A Little  How much help for Toileting?: None  How much help for putting on and taking off regular upper body clothing?: A Little  How much help for taking care of personal grooming?: None  How much help for eating meals?: None  AM-PAC Inpatient Daily Activity Raw Score:

## 2020-06-22 NOTE — PROGRESS NOTES
Subjective: The patient complains of severe  acute dizziness due toVestibular neuronitis which appears to be significant and relieved by rest, medication adjustments, Coumadin, PT, OT and exacerbated by dehydration and exertion. 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 I am also concerned about her elevated blood sugar in the 300s and no diabetic restrictions. She states that she feels very woozy when she gets up. ROS x10: The patient also complains of severely impaired mobility and activities of daily living. Otherwise no new problems with vision, hearing, nose, mouth, throat, dermal, cardiovascular, GI, , pulmonary, musculoskeletal, psychiatric or neurological. See Rehab consult on Rehab chart . Vital signs:  BP (!) 178/53   Pulse 58   Temp 98.2 °F (36.8 °C) (Oral)   Resp 18   Ht 5' 3\" (1.6 m)   Wt 184 lb (83.5 kg)   LMP  (LMP Unknown)   SpO2 94%   BMI 32.59 kg/m²   I/O:   PO/Intake:    fair PO intake, clear liquid diet    Bowel/Bladder:  continent, Montero catheter  General:  Patient is well developed, adequately nourished, non-obese and     well kempt. HEENT:    PERRLA, she is very hard of hearing, hearing intact to loud voice, external inspection of ear     and nose benign. Inspection of lips, tongue and gums benign  Musculoskeletal: No significant change in strength or tone. All joints stable. Inspection and palpation of digits and nails show no clubbing,       cyanosis or inflammatory conditions. Neuro/Psychiatric: Affect: flat but pleasant. Alert and oriented to person, place and     situation. No significant change in deep tendon reflexes or     sensation  Lungs:  Diminished, CTA-B. Respiration effort is normal at rest.     Heart:   S1 = S2,  RRR. No loud murmurs. Abdomen:  Soft, non-tender, no enlargement of liver or spleen.   Extremities:  No significant lower extremity edema or

## 2020-06-23 LAB
ANION GAP SERPL CALCULATED.3IONS-SCNC: 9 MEQ/L (ref 9–15)
BUN BLDV-MCNC: 24 MG/DL (ref 8–23)
CALCIUM SERPL-MCNC: 8.5 MG/DL (ref 8.5–9.9)
CHLORIDE BLD-SCNC: 101 MEQ/L (ref 95–107)
CO2: 23 MEQ/L (ref 20–31)
CREAT SERPL-MCNC: 1.09 MG/DL (ref 0.5–0.9)
GFR AFRICAN AMERICAN: 56.7
GFR NON-AFRICAN AMERICAN: 46.8
GLUCOSE BLD-MCNC: 150 MG/DL (ref 70–99)
GLUCOSE BLD-MCNC: 151 MG/DL (ref 60–115)
GLUCOSE BLD-MCNC: 307 MG/DL (ref 60–115)
GLUCOSE BLD-MCNC: 363 MG/DL (ref 60–115)
GLUCOSE BLD-MCNC: 388 MG/DL (ref 60–115)
HCT VFR BLD CALC: 35.2 % (ref 37–47)
HEMOGLOBIN: 11.5 G/DL (ref 12–16)
INR BLD: 3.6
MCH RBC QN AUTO: 30.1 PG (ref 27–31.3)
MCHC RBC AUTO-ENTMCNC: 32.7 % (ref 33–37)
MCV RBC AUTO: 92.2 FL (ref 82–100)
PDW BLD-RTO: 14.5 % (ref 11.5–14.5)
PERFORMED ON: ABNORMAL
PLATELET # BLD: 212 K/UL (ref 130–400)
POTASSIUM REFLEX MAGNESIUM: 4.7 MEQ/L (ref 3.4–4.9)
PROTHROMBIN TIME: 35.6 SEC (ref 12.3–14.9)
RBC # BLD: 3.81 M/UL (ref 4.2–5.4)
SODIUM BLD-SCNC: 133 MEQ/L (ref 135–144)
WBC # BLD: 7.7 K/UL (ref 4.8–10.8)

## 2020-06-23 PROCEDURE — 1210000000 HC MED SURG R&B

## 2020-06-23 PROCEDURE — 36415 COLL VENOUS BLD VENIPUNCTURE: CPT

## 2020-06-23 PROCEDURE — 99233 SBSQ HOSP IP/OBS HIGH 50: CPT | Performed by: PSYCHIATRY & NEUROLOGY

## 2020-06-23 PROCEDURE — 6370000000 HC RX 637 (ALT 250 FOR IP): Performed by: ANESTHESIOLOGY

## 2020-06-23 PROCEDURE — 85027 COMPLETE CBC AUTOMATED: CPT

## 2020-06-23 PROCEDURE — 6370000000 HC RX 637 (ALT 250 FOR IP): Performed by: INTERNAL MEDICINE

## 2020-06-23 PROCEDURE — 6370000000 HC RX 637 (ALT 250 FOR IP): Performed by: NURSE PRACTITIONER

## 2020-06-23 PROCEDURE — 85610 PROTHROMBIN TIME: CPT

## 2020-06-23 PROCEDURE — 2580000003 HC RX 258: Performed by: NURSE PRACTITIONER

## 2020-06-23 PROCEDURE — 80048 BASIC METABOLIC PNL TOTAL CA: CPT

## 2020-06-23 PROCEDURE — 6360000002 HC RX W HCPCS: Performed by: INTERNAL MEDICINE

## 2020-06-23 PROCEDURE — 97535 SELF CARE MNGMENT TRAINING: CPT

## 2020-06-23 PROCEDURE — 99232 SBSQ HOSP IP/OBS MODERATE 35: CPT | Performed by: PHYSICAL MEDICINE & REHABILITATION

## 2020-06-23 RX ORDER — LIDOCAINE 4 G/G
3 PATCH TOPICAL DAILY
Qty: 90 PATCH | Refills: 5 | Status: ON HOLD | DISCHARGE
Start: 2020-06-24 | End: 2020-07-07 | Stop reason: HOSPADM

## 2020-06-23 RX ORDER — ANALGESIC BALM 1.74; 4.06 G/29G; G/29G
2 OINTMENT TOPICAL 4 TIMES DAILY
Qty: 2 TUBE | Refills: 5 | DISCHARGE
Start: 2020-06-23 | End: 2020-07-16

## 2020-06-23 RX ORDER — HYDROCODONE BITARTRATE AND ACETAMINOPHEN 5; 325 MG/1; MG/1
0.5 TABLET ORAL EVERY 6 HOURS PRN
Qty: 6 TABLET | Refills: 0 | Status: ON HOLD | OUTPATIENT
Start: 2020-06-23 | End: 2020-07-07 | Stop reason: HOSPADM

## 2020-06-23 RX ORDER — METAXALONE 400 MG/1
400 TABLET ORAL 3 TIMES DAILY PRN
Qty: 30 TABLET | Refills: 0 | Status: ON HOLD | DISCHARGE
Start: 2020-06-23 | End: 2020-07-07

## 2020-06-23 RX ADMIN — Medication 10 ML: at 21:41

## 2020-06-23 RX ADMIN — METFORMIN HYDROCHLORIDE 1000 MG: 500 TABLET ORAL at 12:52

## 2020-06-23 RX ADMIN — SACUBITRIL AND VALSARTAN 1 TABLET: 24; 26 TABLET, FILM COATED ORAL at 09:05

## 2020-06-23 RX ADMIN — ATORVASTATIN CALCIUM 10 MG: 10 TABLET, FILM COATED ORAL at 21:50

## 2020-06-23 RX ADMIN — CARVEDILOL 12.5 MG: 12.5 TABLET, FILM COATED ORAL at 09:05

## 2020-06-23 RX ADMIN — METFORMIN HYDROCHLORIDE 1000 MG: 500 TABLET ORAL at 16:56

## 2020-06-23 RX ADMIN — METAXALONE 400 MG: 800 TABLET ORAL at 09:05

## 2020-06-23 RX ADMIN — TRIMETHOBENZAMIDE HYDROCHLORIDE 200 MG: 100 INJECTION INTRAMUSCULAR at 21:40

## 2020-06-23 RX ADMIN — CLOPIDOGREL BISULFATE 75 MG: 75 TABLET ORAL at 09:05

## 2020-06-23 RX ADMIN — MENTHOL AND METHYL SALICYLATE: 7.6; 29 OINTMENT TOPICAL at 21:39

## 2020-06-23 RX ADMIN — TRIMETHOBENZAMIDE HYDROCHLORIDE 200 MG: 100 INJECTION INTRAMUSCULAR at 09:06

## 2020-06-23 RX ADMIN — MENTHOL AND METHYL SALICYLATE: 7.6; 29 OINTMENT TOPICAL at 09:06

## 2020-06-23 RX ADMIN — INSULIN LISPRO 5 UNITS: 100 INJECTION, SOLUTION INTRAVENOUS; SUBCUTANEOUS at 21:39

## 2020-06-23 RX ADMIN — CARVEDILOL 12.5 MG: 12.5 TABLET, FILM COATED ORAL at 16:56

## 2020-06-23 RX ADMIN — ACETAMINOPHEN 500 MG: 325 TABLET, FILM COATED ORAL at 19:16

## 2020-06-23 RX ADMIN — AMLODIPINE BESYLATE 5 MG: 5 TABLET ORAL at 09:06

## 2020-06-23 RX ADMIN — ACETAMINOPHEN 500 MG: 325 TABLET, FILM COATED ORAL at 12:10

## 2020-06-23 RX ADMIN — METAXALONE 400 MG: 800 TABLET ORAL at 12:10

## 2020-06-23 RX ADMIN — SACUBITRIL AND VALSARTAN 1 TABLET: 24; 26 TABLET, FILM COATED ORAL at 21:41

## 2020-06-23 RX ADMIN — GABAPENTIN 100 MG: 100 CAPSULE ORAL at 21:42

## 2020-06-23 RX ADMIN — NITROFURANTOIN (MONOHYDRATE/MACROCRYSTALS) 100 MG: 75; 25 CAPSULE ORAL at 21:50

## 2020-06-23 RX ADMIN — ACETAMINOPHEN 500 MG: 325 TABLET, FILM COATED ORAL at 00:27

## 2020-06-23 RX ADMIN — HYDRALAZINE HYDROCHLORIDE 50 MG: 50 TABLET, FILM COATED ORAL at 09:06

## 2020-06-23 RX ADMIN — ACETAMINOPHEN 500 MG: 325 TABLET, FILM COATED ORAL at 05:35

## 2020-06-23 RX ADMIN — METAXALONE 400 MG: 800 TABLET ORAL at 21:41

## 2020-06-23 RX ADMIN — PREDNISONE 20 MG: 20 TABLET ORAL at 09:31

## 2020-06-23 ASSESSMENT — PAIN SCALES - GENERAL
PAINLEVEL_OUTOF10: 0
PAINLEVEL_OUTOF10: 2
PAINLEVEL_OUTOF10: 0
PAINLEVEL_OUTOF10: 5
PAINLEVEL_OUTOF10: 0
PAINLEVEL_OUTOF10: 5

## 2020-06-23 ASSESSMENT — PAIN DESCRIPTION - ORIENTATION: ORIENTATION: RIGHT;UPPER;PROXIMAL

## 2020-06-23 ASSESSMENT — PAIN DESCRIPTION - LOCATION: LOCATION: ARM

## 2020-06-23 ASSESSMENT — PAIN DESCRIPTION - FREQUENCY: FREQUENCY: CONTINUOUS

## 2020-06-23 ASSESSMENT — PAIN DESCRIPTION - DESCRIPTORS: DESCRIPTORS: ACHING

## 2020-06-23 ASSESSMENT — PAIN DESCRIPTION - PAIN TYPE: TYPE: ACUTE PAIN

## 2020-06-23 NOTE — PROGRESS NOTES
PROGRESS NOTE -PAIN MANAGEMENT     SERVICE DATE:  6/23/2020   SERVICE TIME:  7:36 PM    CHIEFCOMPLAINT:    Chief Complaint   Patient presents with    Nausea     n&v as well as weakness. was here yesterday evening. not feeling any better       Patient has severe generalized joint stiffness and back pain    SUBJECTIVE:  Ms. Alicia Urias is a 80 y.o. female who presented for genera;llized weakness and wide spread pain. Patient was seen and started on multimodal analgesic regimen and is feeling much better. MRI cervical spine shows severe central canal stenosis at C5-C6 and moderate to severe central canal stenosis at C4-C5  MRI thoracic spine negative  MRI lumbar spine shows advanced multilevel degenerative changes with severe foraminal narrowing bilaterally at L4-L5 with possible mild transverse canal narrowing at L3-4 and L4-5      She has no surgical concerns or recommendation.      Patient states  pain is well controlled     PAIN  ASSESSMENT:    intermittent    aching    pain is perceived as moderate (4-6 pain scale)      MEDICATIONS:    Current Facility-Administered Medications   Medication Dose Route Frequency Provider Last Rate Last Dose    metFORMIN (GLUCOPHAGE) tablet 1,000 mg  1,000 mg Oral BID  Paola Chavez DO   1,000 mg at 06/23/20 1656    acetaminophen (TYLENOL) tablet 500 mg  500 mg Oral 4 times per day Pham Sneed MD   500 mg at 06/23/20 1916    HYDROcodone-acetaminophen (NORCO) 5-325 MG per tablet 0.5 tablet  0.5 tablet Oral Q4H PRN Pham Sneed MD        acetaminophen (TYLENOL) tablet 325 mg  325 mg Oral TID PRN Pham Sneed MD        lidocaine 4 % external patch 3 patch  3 patch Transdermal Daily Pham Sneed MD   3 patch at 06/23/20 0906    metaxalone (SKELAXIN) tablet 400 mg  400 mg Oral TID Pham Sneed MD   400 mg at 06/23/20 1210    [START ON 6/24/2020] predniSONE (DELTASONE) tablet 15 mg  15 mg Oral Daily Pham Sneed MD        Followed by   Tiffany Nelson ON 6/27/2020] 06/23/2020    MCV 92.2 06/23/2020     06/23/2020     06/23/2020    K 4.7 06/23/2020     06/23/2020    CO2 23 06/23/2020    BUN 24 06/23/2020    CREATININE 1.09 06/23/2020    CALCIUM 8.5 06/23/2020    PHOS 3.4 01/14/2020    ALKPHOS 51 06/19/2020    ALT 14 06/19/2020    AST 11 06/19/2020    BILITOT 0.3 06/19/2020    LABALBU 4.0 06/19/2020    LABALBU 4.4 05/25/2012   LABS  Recent Labs     06/21/20  0546 06/22/20  0543 06/23/20  0520   WBC 7.7 7.4 7.7   RBC 3.74* 3.56* 3.81*   HGB 11.4* 10.8* 11.5*   HCT 34.9* 32.9* 35.2*   MCV 93.3 92.5 92.2   MCH 30.5 30.3 30.1   MCHC 32.7* 32.7* 32.7*   RDW 14.6* 14.7* 14.5    217 212       Recent Labs     06/21/20  0546 06/22/20  0543 06/23/20  0520    139 133*   K 4.2 4.4 4.7    105 101   CO2 24 24 23   BUN 25* 23 24*   CREATININE 1.18* 1.09* 1.09*   GLUCOSE 134* 140* 150*   CALCIUM 8.9 8.9 8.5       No results for input(s): MG in the last 72 hours. Ct Head Wo Contrast    Result Date: 6/19/2020  CT HEAD WO CONTRAST : 6/19/2020 CLINICAL HISTORY:  weakness, nausea with movement . COMPARISON: 6/18/2020. TECHNIQUE: Spiral unenhanced images were obtained of the head, with routine multiplanar reconstructions performed. Intravenous contrast is noted from a CT abdomen and pelvis from earlier 6/19/2020. All CT scans at this facility use dose modulation, iterative reconstruction, and/or weight based dosing when appropriate to reduce radiation dose to as low as reasonably achievable. FINDINGS: There is no intracranial hemorrhage, mass effect, midline shift, extra-axial collection, evidence of hydrocephalus, recent ischemic infarct, or skull fracture identified. Moderate age-related atrophic changes have not significantly changed from the yesterday's study. NO ACUTE INTRACRANIAL PROCESS OR SIGNIFICANT CHANGE FROM YESTERDAY IDENTIFIED.      Mri Cervical Spine Wo Contrast    Result Date: 6/20/2020  EXAMINATION: MRI CERVICAL SPINE WO CONTRAST DATE Date: 6/19/2020  EXAMINATION: XR CHEST PORTABLE CLINICAL HISTORY: WEAKNESS, VOMITING, NAUSEA COMPARISONS: JUNE 18, 2020 FINDINGS: Osseous structures intact. Cardiopericardial silhouette normal. Pulmonary vasculature normal. Lungs clear. NO ACUTE CARDIOPULMONARY DISEASE. Mri Brain Wo Contrast    Result Date: 6/19/2020  MRI BRAIN WITHOUT CONTRAST: CLINICAL HISTORY:  r/o CVA , dizziness, nausea, generalized weakness COMPARISONS:  3/14/2019 TECHNIQUE: Multiplanar, multi-sequence MRI was performed on a 1.5Tesla closed magnet. FINDINGS:  There are no abnormal sites of restricted diffusion. The ventricles are normal in position. There is no evidence for mass effect. There is no shift of the midline structures. There are multiple  extensive foci of increased signal on FLAIR and T2, in the periventricular deep white matter and corona radiata, which may be secondary to small vessel ischemic changes, demyelination, or aging. There appear similar prior exam. There is moderate dilatation of the cerebral sulci and ventricles, unchanged. Flow-voids in the major intracranial blood vessels are identified and are patent by spin-echo criteria. There are few small foci of decreased signal on gradient echo sequences within the left frontal and both parietal lobes. They may be secondary to small remote hemorrhages. There is mucosal thickening within both maxillary sinuses and both ethmoid sinuses. Mastoid air cells are clear. The seventh and eighth nerve complexes and optic nerves are symmetrical.  No evidence for mass in the cerebellopontine angle. There is generalized thinning of the corpus callosum. The cerebellar tonsils are not ectopic. The pituitary gland is unremarkable. Optic nerves are symmetrical. The calvarium and dura are unremarkable. There is hypertrophy of nasal turbinates, consistent with  rhinitis. NO EVIDENCE OF ACUTE CVA.  GENERALIZED PARENCHYMAL VOLUME LOSS AND NONSPECIFIC WHITE MATTER CHANGES, MOST

## 2020-06-23 NOTE — CARE COORDINATION
Pt was denied by insurance for acute rehab per Merlene Mariee. Pt and family made aware. Son is agreeable to trying [facility]. Referral called. Precert needed.

## 2020-06-23 NOTE — PROGRESS NOTES
any physical supervision or assistance from another person for activity completion. Device may be needed.   Stand by assistance = pt requires verbal cues or instructions from another person, close to but not touching, to perform the activity  Minimal assistance= pt performs 75% or more of the activity; assistance is required to complete the activity  Moderate assistance= pt performs 50% of the activity; assistance is required to complete the activity  Maximal assistance = pt performs 25% of the activity; assistance is required to complete the activity  Dependent = pt requires total physical assistance to accomplish the task

## 2020-06-23 NOTE — PROGRESS NOTES
Subjective: The patient complains of severe  acute dizziness due toVestibular neuronitis which appears to be significant and relieved by rest, medication adjustments, Coumadin, PT, OT and exacerbated by dehydration and exertion. Her generalized osteoarthritis flareup seems to be improving somewhat with Norco as opposed to the Tylenol. 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 I am also concerned about her elevated blood sugar in the 300s and no diabetic restrictions. She states that she feels very woozy when she gets up. Which indicated, \"1265  Family to bedside and was updated on patient condition and plan for the day. Patient is awake, alert, and oriented times 4 and is a standby assist to the bathroom. Patient denies any chest pain, shortness of breath, dizziness, weakness, abdominal pain, nausea, or vomiting at this time. Family brought up glasses and case for the patient this morning. Patient is in the high fowlers position in the bed and is up eating breakfast at this time. \"    Concerned about her central cord findings at the C-spine including   MRI cervical spine shows severe central canal stenosis at C5-C6 and moderate to severe central canal stenosis at C4-C5  MRI thoracic spine negative  MRI lumbar spine shows advanced multilevel degenerative changes with severe foraminal narrowing bilaterally at L4-L5 with possible mild transverse canal narrowing at L3-4 and L4-5    Per case management treatment note [facility] has been sent her paperwork and we are awaiting their decision regarding rehab. ROS x10: The patient also complains of severely impaired mobility and activities of daily living. Otherwise no new problems with vision, hearing, nose, mouth, throat, dermal, cardiovascular, GI, , pulmonary, musculoskeletal, psychiatric or neurological. See Rehab consult on Rehab chart .        Vital signs:  BP (!) 157/48 deficiency  4. Other cerebrovascular disease  5. Syncope  6. Spinal stenosis in cervical region  7. Lumbar degenerative disc disease  8. Spinal stenosis, lumbar region, with neurogenic claudication  9. Abnormality of gait and mobility  10. Generalized muscle ache  11.    Generalized osteoarthrosis, involving multiple sites           Sejal Nguyen D.O., PM&R     Attending    [address]

## 2020-06-23 NOTE — FLOWSHEET NOTE
9962  Family to bedside and was updated on patient condition and plan for the day. Patient is awake, alert, and oriented times 4 and is a standby assist to the bathroom. Patient denies any chest pain, shortness of breath, dizziness, weakness, abdominal pain, nausea, or vomiting at this time. Family brought up glasses and case for the patient this morning. Patient is in the high fowlers position in the bed and is up eating breakfast at this time.

## 2020-06-23 NOTE — PROGRESS NOTES
Followed by   Qing Drake ON 6/30/2020] predniSONE  5 mg Oral Daily    analgesic ointment   Topical 4x daily    gabapentin  100 mg Oral Nightly    nitrofurantoin (macrocrystal-monohydrate)  100 mg Oral Nightly    amLODIPine  5 mg Oral Daily    trimethobenzamide  200 mg Intramuscular BID    clopidogrel  75 mg Oral Daily    sodium chloride flush  10 mL Intravenous 2 times per day    insulin lispro  0-12 Units Subcutaneous TID WC    insulin lispro  0-6 Units Subcutaneous Nightly    atorvastatin  10 mg Oral Nightly    hydrALAZINE  50 mg Oral Daily    sacubitril-valsartan  1 tablet Oral BID    carvedilol  12.5 mg Oral BID WC    warfarin (COUMADIN) daily dosing (placeholder)   Other RX Placeholder     PRN Meds: HYDROcodone 5 mg - acetaminophen, acetaminophen, sodium chloride flush, magnesium hydroxide, ondansetron **OR** ondansetron, glucose, dextrose, glucagon (rDNA), dextrose, meclizine, hydrALAZINE    Labs:   Recent Labs     06/20/20  0527 06/21/20  0546 06/22/20  0543   WBC 8.5 7.7 7.4   HGB 10.7* 11.4* 10.8*   HCT 32.6* 34.9* 32.9*    227 217     Recent Labs     06/20/20  0527 06/21/20  0546 06/22/20  0543    136 139   K 4.5 4.2 4.4    102 105   CO2 24 24 24   BUN 27* 25* 23   CREATININE 1.16* 1.18* 1.09*   CALCIUM 8.8 8.9 8.9     No results for input(s): AST, ALT, BILIDIR, BILITOT, ALKPHOS in the last 72 hours. Recent Labs     06/20/20  0527 06/21/20  0546 06/22/20  0543   INR 2.1 2.3 3.3     No results for input(s): CKTOTAL, TROPONINI in the last 72 hours. Urinalysis:   Lab Results   Component Value Date    NITRU Negative 06/19/2020    WBCUA 0-2 06/19/2020    BACTERIA Negative 06/19/2020    RBCUA 0-2 06/19/2020    BLOODU Negative 06/19/2020    SPECGRAV 1.014 06/19/2020    GLUCOSEU 250 06/19/2020       Radiology:   Most recent    EEG No procedure found. MRI of Brain No results found for this or any previous visit.   Results for orders placed during the hospital encounter of 06/19/20   MRI BRAIN WO CONTRAST    Narrative MRI BRAIN WITHOUT CONTRAST:    CLINICAL HISTORY:  r/o CVA , dizziness, nausea, generalized weakness    COMPARISONS:  3/14/2019    TECHNIQUE: Multiplanar, multi-sequence MRI was performed on a 1.5Tesla closed magnet. FINDINGS:  There are no abnormal sites of restricted diffusion. The ventricles are normal in position. There is no evidence for mass effect. There is no shift of the midline structures. There are multiple  extensive foci of increased signal on FLAIR   and T2, in the periventricular deep white matter and corona radiata, which may be secondary to small vessel ischemic changes, demyelination, or aging. There appear similar prior exam. There is moderate dilatation of the cerebral sulci and ventricles,   unchanged. Flow-voids in the major intracranial blood vessels are identified and are patent by spin-echo criteria. There are few small foci of decreased signal on gradient echo sequences within the left frontal and both parietal lobes. They may be   secondary to small remote hemorrhages. There is mucosal thickening within both maxillary sinuses and both ethmoid sinuses. Mastoid air cells are clear. The seventh and eighth nerve complexes and optic nerves are symmetrical.  No evidence for mass in the cerebellopontine angle. There is   generalized thinning of the corpus callosum. The cerebellar tonsils are not ectopic. The pituitary gland is unremarkable. Optic nerves are symmetrical. The calvarium and dura are unremarkable. There is hypertrophy of nasal turbinates, consistent with   rhinitis. Impression NO EVIDENCE OF ACUTE CVA. GENERALIZED PARENCHYMAL VOLUME LOSS AND NONSPECIFIC WHITE MATTER CHANGES, MOST LIKELY SECONDARY TO CHRONIC SMALL VESSEL ISCHEMIC CHANGES. MUCOSAL THICKENING IN ETHMOID AND MAXILLARY SINUSES. A FEW SMALL NONSPECIFIC FOCI ON GRADIENT ECHO SEQUENCES WITHIN THE WHITE MATTER BILATERALLY.

## 2020-06-24 ENCOUNTER — HOSPITAL ENCOUNTER (INPATIENT)
Age: 85
LOS: 14 days | Discharge: HOME HEALTH | DRG: 556 | End: 2020-07-08
Attending: PHYSICAL MEDICINE & REHABILITATION | Admitting: PHYSICAL MEDICINE & REHABILITATION
Payer: MEDICARE

## 2020-06-24 VITALS
BODY MASS INDEX: 32.6 KG/M2 | HEART RATE: 55 BPM | TEMPERATURE: 97.5 F | OXYGEN SATURATION: 98 % | SYSTOLIC BLOOD PRESSURE: 157 MMHG | WEIGHT: 184 LBS | DIASTOLIC BLOOD PRESSURE: 48 MMHG | HEIGHT: 63 IN | RESPIRATION RATE: 20 BRPM

## 2020-06-24 PROBLEM — Z98.61 HISTORY OF PTCA: Status: ACTIVE | Noted: 2020-06-24

## 2020-06-24 PROBLEM — M47.816 DJD (DEGENERATIVE JOINT DISEASE), LUMBAR: Status: ACTIVE | Noted: 2020-06-24

## 2020-06-24 LAB
ANION GAP SERPL CALCULATED.3IONS-SCNC: 12 MEQ/L (ref 9–15)
BUN BLDV-MCNC: 35 MG/DL (ref 8–23)
CALCIUM SERPL-MCNC: 8.9 MG/DL (ref 8.5–9.9)
CHLORIDE BLD-SCNC: 104 MEQ/L (ref 95–107)
CO2: 21 MEQ/L (ref 20–31)
CREAT SERPL-MCNC: 1.13 MG/DL (ref 0.5–0.9)
GFR AFRICAN AMERICAN: 54.4
GFR NON-AFRICAN AMERICAN: 44.9
GLUCOSE BLD-MCNC: 164 MG/DL (ref 60–115)
GLUCOSE BLD-MCNC: 178 MG/DL (ref 70–99)
GLUCOSE BLD-MCNC: 230 MG/DL (ref 60–115)
GLUCOSE BLD-MCNC: 288 MG/DL (ref 60–115)
GLUCOSE BLD-MCNC: 300 MG/DL (ref 60–115)
HCT VFR BLD CALC: 35.8 % (ref 37–47)
HEMOGLOBIN: 11.7 G/DL (ref 12–16)
INR BLD: 1.6
INR BLD: 2
MCH RBC QN AUTO: 30.1 PG (ref 27–31.3)
MCHC RBC AUTO-ENTMCNC: 32.6 % (ref 33–37)
MCV RBC AUTO: 92.4 FL (ref 82–100)
PDW BLD-RTO: 14.7 % (ref 11.5–14.5)
PERFORMED ON: ABNORMAL
PLATELET # BLD: 232 K/UL (ref 130–400)
POTASSIUM REFLEX MAGNESIUM: 4.4 MEQ/L (ref 3.4–4.9)
PROTHROMBIN TIME: 18.7 SEC (ref 12.3–14.9)
PROTHROMBIN TIME: 22.3 SEC (ref 12.3–14.9)
RBC # BLD: 3.88 M/UL (ref 4.2–5.4)
SODIUM BLD-SCNC: 137 MEQ/L (ref 135–144)
WBC # BLD: 9.5 K/UL (ref 4.8–10.8)

## 2020-06-24 PROCEDURE — APPSS15 APP SPLIT SHARED TIME 0-15 MINUTES: Performed by: NURSE PRACTITIONER

## 2020-06-24 PROCEDURE — 6370000000 HC RX 637 (ALT 250 FOR IP): Performed by: INTERNAL MEDICINE

## 2020-06-24 PROCEDURE — 99232 SBSQ HOSP IP/OBS MODERATE 35: CPT | Performed by: PHYSICAL MEDICINE & REHABILITATION

## 2020-06-24 PROCEDURE — 36415 COLL VENOUS BLD VENIPUNCTURE: CPT

## 2020-06-24 PROCEDURE — 6360000002 HC RX W HCPCS: Performed by: INTERNAL MEDICINE

## 2020-06-24 PROCEDURE — 97116 GAIT TRAINING THERAPY: CPT

## 2020-06-24 PROCEDURE — 97110 THERAPEUTIC EXERCISES: CPT

## 2020-06-24 PROCEDURE — 85027 COMPLETE CBC AUTOMATED: CPT

## 2020-06-24 PROCEDURE — 99233 SBSQ HOSP IP/OBS HIGH 50: CPT | Performed by: PSYCHIATRY & NEUROLOGY

## 2020-06-24 PROCEDURE — 6370000000 HC RX 637 (ALT 250 FOR IP): Performed by: ANESTHESIOLOGY

## 2020-06-24 PROCEDURE — 6370000000 HC RX 637 (ALT 250 FOR IP): Performed by: NURSE PRACTITIONER

## 2020-06-24 PROCEDURE — 80048 BASIC METABOLIC PNL TOTAL CA: CPT

## 2020-06-24 PROCEDURE — 1180000000 HC REHAB R&B

## 2020-06-24 PROCEDURE — 85610 PROTHROMBIN TIME: CPT

## 2020-06-24 RX ORDER — SODIUM CHLORIDE 0.9 % (FLUSH) 0.9 %
10 SYRINGE (ML) INJECTION PRN
Status: DISCONTINUED | OUTPATIENT
Start: 2020-06-24 | End: 2020-07-08 | Stop reason: HOSPADM

## 2020-06-24 RX ORDER — ONDANSETRON 4 MG/1
4 TABLET, ORALLY DISINTEGRATING ORAL EVERY 8 HOURS PRN
Status: DISCONTINUED | OUTPATIENT
Start: 2020-06-24 | End: 2020-07-08 | Stop reason: HOSPADM

## 2020-06-24 RX ORDER — NITROFURANTOIN 25; 75 MG/1; MG/1
100 CAPSULE ORAL NIGHTLY
Status: DISCONTINUED | OUTPATIENT
Start: 2020-06-24 | End: 2020-07-06

## 2020-06-24 RX ORDER — CLOPIDOGREL BISULFATE 75 MG/1
75 TABLET ORAL DAILY
Status: DISCONTINUED | OUTPATIENT
Start: 2020-06-25 | End: 2020-07-08 | Stop reason: HOSPADM

## 2020-06-24 RX ORDER — HYDRALAZINE HYDROCHLORIDE 20 MG/ML
10 INJECTION INTRAMUSCULAR; INTRAVENOUS EVERY 4 HOURS PRN
Status: DISCONTINUED | OUTPATIENT
Start: 2020-06-24 | End: 2020-07-08 | Stop reason: HOSPADM

## 2020-06-24 RX ORDER — AMLODIPINE BESYLATE 5 MG/1
5 TABLET ORAL DAILY
Status: DISCONTINUED | OUTPATIENT
Start: 2020-06-25 | End: 2020-06-29

## 2020-06-24 RX ORDER — PREDNISONE 10 MG/1
10 TABLET ORAL DAILY
Status: COMPLETED | OUTPATIENT
Start: 2020-06-28 | End: 2020-06-30

## 2020-06-24 RX ORDER — METAXALONE 800 MG/1
400 TABLET ORAL 3 TIMES DAILY
Status: DISCONTINUED | OUTPATIENT
Start: 2020-06-24 | End: 2020-06-25

## 2020-06-24 RX ORDER — ACETAMINOPHEN 325 MG/1
325 TABLET ORAL 3 TIMES DAILY PRN
Status: DISCONTINUED | OUTPATIENT
Start: 2020-06-24 | End: 2020-06-25

## 2020-06-24 RX ORDER — ACETAMINOPHEN 500 MG
500 TABLET ORAL EVERY 6 HOURS SCHEDULED
Status: DISCONTINUED | OUTPATIENT
Start: 2020-06-24 | End: 2020-06-25

## 2020-06-24 RX ORDER — GABAPENTIN 100 MG/1
100 CAPSULE ORAL NIGHTLY
Status: DISCONTINUED | OUTPATIENT
Start: 2020-06-24 | End: 2020-07-08 | Stop reason: HOSPADM

## 2020-06-24 RX ORDER — CARVEDILOL 12.5 MG/1
12.5 TABLET ORAL 2 TIMES DAILY WITH MEALS
Status: DISCONTINUED | OUTPATIENT
Start: 2020-06-24 | End: 2020-07-08 | Stop reason: HOSPADM

## 2020-06-24 RX ORDER — SODIUM CHLORIDE 0.9 % (FLUSH) 0.9 %
10 SYRINGE (ML) INJECTION EVERY 12 HOURS SCHEDULED
Status: DISCONTINUED | OUTPATIENT
Start: 2020-06-24 | End: 2020-07-01 | Stop reason: CLARIF

## 2020-06-24 RX ORDER — ONDANSETRON 2 MG/ML
4 INJECTION INTRAMUSCULAR; INTRAVENOUS EVERY 6 HOURS PRN
Status: DISCONTINUED | OUTPATIENT
Start: 2020-06-24 | End: 2020-07-07

## 2020-06-24 RX ORDER — HYDROCODONE BITARTRATE AND ACETAMINOPHEN 5; 325 MG/1; MG/1
0.5 TABLET ORAL EVERY 4 HOURS PRN
Status: DISCONTINUED | OUTPATIENT
Start: 2020-06-24 | End: 2020-07-01

## 2020-06-24 RX ORDER — PREDNISONE 1 MG/1
5 TABLET ORAL DAILY
Status: COMPLETED | OUTPATIENT
Start: 2020-07-01 | End: 2020-07-03

## 2020-06-24 RX ORDER — DEXTROSE MONOHYDRATE 25 G/50ML
12.5 INJECTION, SOLUTION INTRAVENOUS PRN
Status: DISCONTINUED | OUTPATIENT
Start: 2020-06-24 | End: 2020-07-08 | Stop reason: HOSPADM

## 2020-06-24 RX ORDER — MECLIZINE HYDROCHLORIDE 25 MG/1
12.5 TABLET ORAL 3 TIMES DAILY PRN
Status: DISCONTINUED | OUTPATIENT
Start: 2020-06-24 | End: 2020-07-08

## 2020-06-24 RX ORDER — DEXTROSE MONOHYDRATE 50 MG/ML
100 INJECTION, SOLUTION INTRAVENOUS PRN
Status: DISCONTINUED | OUTPATIENT
Start: 2020-06-24 | End: 2020-07-08 | Stop reason: HOSPADM

## 2020-06-24 RX ORDER — HYDRALAZINE HYDROCHLORIDE 50 MG/1
50 TABLET, FILM COATED ORAL DAILY
Status: DISCONTINUED | OUTPATIENT
Start: 2020-06-25 | End: 2020-06-26

## 2020-06-24 RX ORDER — LIDOCAINE 4 G/G
3 PATCH TOPICAL DAILY
Status: DISCONTINUED | OUTPATIENT
Start: 2020-06-25 | End: 2020-06-28

## 2020-06-24 RX ORDER — ANALGESIC BALM 1.74; 4.06 G/29G; G/29G
OINTMENT TOPICAL 4 TIMES DAILY
Status: DISCONTINUED | OUTPATIENT
Start: 2020-06-24 | End: 2020-06-25

## 2020-06-24 RX ORDER — ATORVASTATIN CALCIUM 10 MG/1
10 TABLET, FILM COATED ORAL NIGHTLY
Status: DISCONTINUED | OUTPATIENT
Start: 2020-06-24 | End: 2020-07-07

## 2020-06-24 RX ORDER — PREDNISONE 20 MG/1
20 TABLET ORAL DAILY
Status: COMPLETED | OUTPATIENT
Start: 2020-06-24 | End: 2020-06-24

## 2020-06-24 RX ORDER — NICOTINE POLACRILEX 4 MG
15 LOZENGE BUCCAL PRN
Status: DISCONTINUED | OUTPATIENT
Start: 2020-06-24 | End: 2020-07-08 | Stop reason: HOSPADM

## 2020-06-24 RX ADMIN — CLOPIDOGREL BISULFATE 75 MG: 75 TABLET ORAL at 09:33

## 2020-06-24 RX ADMIN — WARFARIN SODIUM 7 MG: 5 TABLET ORAL at 17:56

## 2020-06-24 RX ADMIN — ATORVASTATIN CALCIUM 10 MG: 10 TABLET, FILM COATED ORAL at 20:57

## 2020-06-24 RX ADMIN — ACETAMINOPHEN 500 MG: 500 TABLET ORAL at 17:56

## 2020-06-24 RX ADMIN — HYDRALAZINE HYDROCHLORIDE 50 MG: 50 TABLET, FILM COATED ORAL at 09:33

## 2020-06-24 RX ADMIN — PREDNISONE 15 MG: 5 TABLET ORAL at 09:33

## 2020-06-24 RX ADMIN — METAXALONE 400 MG: 800 TABLET ORAL at 09:33

## 2020-06-24 RX ADMIN — AMLODIPINE BESYLATE 5 MG: 5 TABLET ORAL at 09:33

## 2020-06-24 RX ADMIN — METFORMIN HYDROCHLORIDE 1000 MG: 500 TABLET ORAL at 09:33

## 2020-06-24 RX ADMIN — METAXALONE 400 MG: 800 TABLET ORAL at 17:55

## 2020-06-24 RX ADMIN — MENTHOL AND METHYL SALICYLATE: 7.6; 29 OINTMENT TOPICAL at 20:59

## 2020-06-24 RX ADMIN — ACETAMINOPHEN 500 MG: 325 TABLET, FILM COATED ORAL at 00:11

## 2020-06-24 RX ADMIN — CARVEDILOL 12.5 MG: 12.5 TABLET, FILM COATED ORAL at 09:33

## 2020-06-24 RX ADMIN — SACUBITRIL AND VALSARTAN 1 TABLET: 24; 26 TABLET, FILM COATED ORAL at 09:33

## 2020-06-24 RX ADMIN — SACUBITRIL AND VALSARTAN 1 TABLET: 24; 26 TABLET, FILM COATED ORAL at 20:57

## 2020-06-24 RX ADMIN — ACETAMINOPHEN 500 MG: 325 TABLET, FILM COATED ORAL at 05:54

## 2020-06-24 RX ADMIN — CARVEDILOL 12.5 MG: 12.5 TABLET, FILM COATED ORAL at 17:57

## 2020-06-24 RX ADMIN — METAXALONE 400 MG: 800 TABLET ORAL at 20:58

## 2020-06-24 RX ADMIN — ACETAMINOPHEN 500 MG: 325 TABLET, FILM COATED ORAL at 12:15

## 2020-06-24 RX ADMIN — TRIMETHOBENZAMIDE HYDROCHLORIDE 200 MG: 100 INJECTION INTRAMUSCULAR at 09:33

## 2020-06-24 RX ADMIN — METAXALONE 400 MG: 800 TABLET ORAL at 12:15

## 2020-06-24 RX ADMIN — GABAPENTIN 100 MG: 100 CAPSULE ORAL at 20:58

## 2020-06-24 RX ADMIN — MENTHOL AND METHYL SALICYLATE: 7.6; 29 OINTMENT TOPICAL at 09:47

## 2020-06-24 RX ADMIN — PREDNISONE 20 MG: 20 TABLET ORAL at 17:57

## 2020-06-24 RX ADMIN — NITROFURANTOIN (MONOHYDRATE/MACROCRYSTALS) 100 MG: 75; 25 CAPSULE ORAL at 20:58

## 2020-06-24 SDOH — SOCIAL STABILITY: SOCIAL INSECURITY: WITHIN THE LAST YEAR, HAVE YOU BEEN HUMILIATED OR EMOTIONALLY ABUSED IN OTHER WAYS BY YOUR PARTNER OR EX-PARTNER?: NO

## 2020-06-24 SDOH — ECONOMIC STABILITY: INCOME INSECURITY: HOW HARD IS IT FOR YOU TO PAY FOR THE VERY BASICS LIKE FOOD, HOUSING, MEDICAL CARE, AND HEATING?: NOT VERY HARD

## 2020-06-24 SDOH — ECONOMIC STABILITY: TRANSPORTATION INSECURITY
IN THE PAST 12 MONTHS, HAS LACK OF TRANSPORTATION KEPT YOU FROM MEETINGS, WORK, OR FROM GETTING THINGS NEEDED FOR DAILY LIVING?: NO

## 2020-06-24 SDOH — ECONOMIC STABILITY: FOOD INSECURITY: WITHIN THE PAST 12 MONTHS, YOU WORRIED THAT YOUR FOOD WOULD RUN OUT BEFORE YOU GOT MONEY TO BUY MORE.: NEVER TRUE

## 2020-06-24 SDOH — SOCIAL STABILITY: SOCIAL NETWORK: HOW OFTEN DO YOU ATTENT MEETINGS OF THE CLUB OR ORGANIZATION YOU BELONG TO?: NEVER

## 2020-06-24 SDOH — SOCIAL STABILITY: SOCIAL INSECURITY
WITHIN THE LAST YEAR, HAVE TO BEEN RAPED OR FORCED TO HAVE ANY KIND OF SEXUAL ACTIVITY BY YOUR PARTNER OR EX-PARTNER?: NO

## 2020-06-24 SDOH — SOCIAL STABILITY: SOCIAL NETWORK: HOW OFTEN DO YOU GET TOGETHER WITH FRIENDS OR RELATIVES?: MORE THAN THREE TIMES A WEEK

## 2020-06-24 SDOH — SOCIAL STABILITY: SOCIAL NETWORK: HOW OFTEN DO YOU ATTEND CHURCH OR RELIGIOUS SERVICES?: 1 TO 4 TIMES PER YEAR

## 2020-06-24 SDOH — ECONOMIC STABILITY: FOOD INSECURITY: WITHIN THE PAST 12 MONTHS, THE FOOD YOU BOUGHT JUST DIDN'T LAST AND YOU DIDN'T HAVE MONEY TO GET MORE.: NEVER TRUE

## 2020-06-24 SDOH — ECONOMIC STABILITY: TRANSPORTATION INSECURITY
IN THE PAST 12 MONTHS, HAS THE LACK OF TRANSPORTATION KEPT YOU FROM MEDICAL APPOINTMENTS OR FROM GETTING MEDICATIONS?: NO

## 2020-06-24 SDOH — SOCIAL STABILITY: SOCIAL INSECURITY
WITHIN THE LAST YEAR, HAVE YOU BEEN KICKED, HIT, SLAPPED, OR OTHERWISE PHYSICALLY HURT BY YOUR PARTNER OR EX-PARTNER?: NO

## 2020-06-24 SDOH — SOCIAL STABILITY: SOCIAL NETWORK: ARE YOU MARRIED, WIDOWED, DIVORCED, SEPARATED, NEVER MARRIED, OR LIVING WITH A PARTNER?: WIDOWED

## 2020-06-24 SDOH — SOCIAL STABILITY: SOCIAL NETWORK
DO YOU BELONG TO ANY CLUBS OR ORGANIZATIONS SUCH AS CHURCH GROUPS UNIONS, FRATERNAL OR ATHLETIC GROUPS, OR SCHOOL GROUPS?: NO

## 2020-06-24 SDOH — SOCIAL STABILITY: SOCIAL INSECURITY: WITHIN THE LAST YEAR, HAVE YOU BEEN AFRAID OF YOUR PARTNER OR EX-PARTNER?: NO

## 2020-06-24 SDOH — SOCIAL STABILITY: SOCIAL NETWORK
IN A TYPICAL WEEK, HOW MANY TIMES DO YOU TALK ON THE PHONE WITH FAMILY, FRIENDS, OR NEIGHBORS?: MORE THAN THREE TIMES A WEEK

## 2020-06-24 ASSESSMENT — PAIN DESCRIPTION - ORIENTATION: ORIENTATION: UPPER;RIGHT

## 2020-06-24 ASSESSMENT — ENCOUNTER SYMPTOMS
TROUBLE SWALLOWING: 0
WHEEZING: 0
ABDOMINAL PAIN: 0
VOMITING: 0
COUGH: 0
COLOR CHANGE: 0
CHEST TIGHTNESS: 0
NAUSEA: 0
SHORTNESS OF BREATH: 0

## 2020-06-24 ASSESSMENT — PAIN SCALES - GENERAL
PAINLEVEL_OUTOF10: 0
PAINLEVEL_OUTOF10: 7
PAINLEVEL_OUTOF10: 10
PAINLEVEL_OUTOF10: 0
PAINLEVEL_OUTOF10: 2

## 2020-06-24 ASSESSMENT — PAIN DESCRIPTION - PAIN TYPE: TYPE: ACUTE PAIN

## 2020-06-24 ASSESSMENT — PAIN DESCRIPTION - LOCATION: LOCATION: ARM

## 2020-06-24 ASSESSMENT — PAIN DESCRIPTION - DESCRIPTORS: DESCRIPTORS: ACHING

## 2020-06-24 NOTE — PROGRESS NOTES
Hospitalist Progress Note      PCP: Heathre Barney MD    Date of Admission: 6/19/2020    Chief Complaint:      Subjective: :  Patient seen and examined at bedside. No acute changes. Awaiting pre-CERT. Medications:  Reviewed    Infusion Medications    dextrose       Scheduled Medications    warfarin  7 mg Oral Once    metFORMIN  1,000 mg Oral BID WC    acetaminophen  500 mg Oral 4 times per day    lidocaine  3 patch Transdermal Daily    metaxalone  400 mg Oral TID    predniSONE  15 mg Oral Daily    Followed by   [facility] ON 6/27/2020] predniSONE  10 mg Oral Daily    Followed by   [facility] ON 6/30/2020] predniSONE  5 mg Oral Daily    analgesic ointment   Topical 4x daily    gabapentin  100 mg Oral Nightly    nitrofurantoin (macrocrystal-monohydrate)  100 mg Oral Nightly    amLODIPine  5 mg Oral Daily    trimethobenzamide  200 mg Intramuscular BID    clopidogrel  75 mg Oral Daily    sodium chloride flush  10 mL Intravenous 2 times per day    insulin lispro  0-12 Units Subcutaneous TID WC    insulin lispro  0-6 Units Subcutaneous Nightly    atorvastatin  10 mg Oral Nightly    hydrALAZINE  50 mg Oral Daily    sacubitril-valsartan  1 tablet Oral BID    carvedilol  12.5 mg Oral BID WC    warfarin (COUMADIN) daily dosing (placeholder)   Other RX Placeholder     PRN Meds: HYDROcodone 5 mg - acetaminophen, acetaminophen, sodium chloride flush, magnesium hydroxide, ondansetron **OR** ondansetron, glucose, dextrose, glucagon (rDNA), dextrose, meclizine, hydrALAZINE      Intake/Output Summary (Last 24 hours) at 6/24/2020 1415  Last data filed at 6/24/2020 1354  Gross per 24 hour   Intake 480 ml   Output --   Net 480 ml       Exam:    BP (!) 157/48   Pulse 55   Temp 97.5 °F (36.4 °C) (Oral)   Resp 20   Ht 5' 3\" (1.6 m)   Wt 184 lb (83.5 kg)   LMP  (LMP Unknown)   SpO2 98%   BMI 32.59 kg/m²     GEN: . NAD, resting comfortably prior to entering the room  HEENT: Atraumatic.  DEONTE, Neck supple. Resp: CTA b/l no wheezing or rhonchi. No respiratory distress  Cardio: RRR. Abd: Soft, nondistended. NTTP. BS present  Ext: No erythema or edema. LE NTTP        Labs:   Recent Labs     06/22/20  0543 06/23/20  0520 06/24/20  0537   WBC 7.4 7.7 9.5   HGB 10.8* 11.5* 11.7*   HCT 32.9* 35.2* 35.8*    212 232     Recent Labs     06/22/20  0543 06/23/20  0520 06/24/20  0537    133* 137   K 4.4 4.7 4.4    101 104   CO2 24 23 21   BUN 23 24* 35*   CREATININE 1.09* 1.09* 1.13*   CALCIUM 8.9 8.5 8.9     No results for input(s): AST, ALT, BILIDIR, BILITOT, ALKPHOS in the last 72 hours. Recent Labs     06/22/20  0543 06/23/20  0520 06/24/20  0537   INR 3.3 3.6 2.0     No results for input(s): [place] in the last 72 hours. Urinalysis:      Lab Results   Component Value Date    NITRU Negative 06/19/2020    WBCUA 0-2 06/19/2020    BACTERIA Negative 06/19/2020    RBCUA 0-2 06/19/2020    BLOODU Negative 06/19/2020    SPECGRAV 1.014 06/19/2020    GLUCOSEU 250 06/19/2020       Radiology:  RADIOLOGY REPORT   Final Result      MRI CERVICAL SPINE WO CONTRAST   Final Result      Severe central canal stenosis at C5-6. Moderate to severe central canal stenosis at C4-5. No abnormal cord signal intensity. MRI THORACIC SPINE WO CONTRAST   Final Result   Negative MRI of the thoracic spine. No signs of cord compression. MRI LUMBAR SPINE WO CONTRAST   Final Result   Levorotoscoliosis with advanced multilevel degenerative changes. Severe foraminal narrowing bilaterally at L4-5. There may be mild transverse canal narrowing at L3-4 and L4-5 but there is no definite central canal stenosis         US CAROTID ARTERY BILATERAL   Final Result      50-69% NARROWING PREDICTED OF BOTH INTERNAL CAROTID ARTERIES, NOT SIGNIFICANTLY CHANGED FROM 11/14/2018. MRI BRAIN WO CONTRAST   Final Result      NO EVIDENCE OF ACUTE CVA.       GENERALIZED PARENCHYMAL VOLUME LOSS AND NONSPECIFIC WHITE MATTER CHANGES, MOST LIKELY SECONDARY TO CHRONIC SMALL VESSEL ISCHEMIC CHANGES. MUCOSAL THICKENING IN ETHMOID AND MAXILLARY SINUSES. A FEW SMALL NONSPECIFIC FOCI ON GRADIENT ECHO SEQUENCES WITHIN THE WHITE MATTER BILATERALLY. CT Head WO Contrast   Final Result      NO ACUTE INTRACRANIAL PROCESS OR SIGNIFICANT CHANGE FROM YESTERDAY IDENTIFIED. CT ABDOMEN PELVIS W IV CONTRAST Additional Contrast? None   Final Result   BORDERLINE PELVIECTASIS IN LEFT KIDNEY. NO ACUTE PATHOLOGY IN THE ABDOMEN OR PELVIS. XR CHEST PORTABLE   Final Result   NO ACUTE CARDIOPULMONARY DISEASE. 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 9) [E66.9] 2013    [place] (hard of hearing) [H91.90] 2013        Vestibular neuritis: Awaiting discharge to rehab. Plan for [id number] with inpatient rehab provider today possible discharge to inpatient rehab. Type 2 diabetes:   Coronary artery disease  Chronic systolic CHF  Hypertension    Continue home medications for chronic conditions. Norvasc was added this admission for improved blood pressure control. Will defer further titration of BP medications to patient's primary care physician. Additional work up or/and treatment plan may be added today or then after based on clinical progression. I am managing a portion of pt care. Some medical issues are handled by other specialists. Additional work up and treatment should be done in out pt setting by pt PCP and other out pt providers. In addition to examining and evaluating pt, I spent additional time explaining care, normal and abnormal findings, and treatment plan. All of pt questions were answered. Counseling, diet and education were  provided. Case will be discussed with nursing staff when appropriate. Family will be updated if and when appropriate.       Diet: DIET CARB CONTROL; Carb Control: 4 carb choices (60 gms)/meal; Low Sodium (2 GM)    Code Status: DNR-CCA              Electronically signed by Pat Hicks DO on 6/24/2020 at 2:15 PM

## 2020-06-24 NOTE — FLOWSHEET NOTE
Patient is alert and oriented x 4, but very [place]. RN got a pocket talker, the left ear works well for her. We may have to switch out headphones. She has no skin issues, but slight bruising to BUE with age spots. She can be incontinent of bowel and bladder, wears a depends at home. She lives with one of her son's. Another son Phyllis Bailey ) lives next door and takes care of her medical needs. She has 24 hour care at home. She has a cane, walker and rollator , also a higher toilet .

## 2020-06-24 NOTE — PROGRESS NOTES
therapeutic at 2.0. Give warfarin 7 mg today to maintain INR within goal range of 2-3 for valvular disease/history of CVA. This is an increase in dose from the patient's usual home dosage regimen of warfarin 5 mg on  Wednesdays. Held warfarin doses over the last two days will most likely continue to affect INR over the next few days    Daily PT/INR until stable within therapeutic range. Thank you for the consult.     Noble Libman, PharmD  6/24/2020 4:57 PM

## 2020-06-24 NOTE — PROGRESS NOTES
Physical Therapy Med Surg Daily Treatment Note  Facility/Department: [facility]  Room: E379/L157-81       NAME: Justin Laughlin  : 6/10/1927 (80 y.o.)  MRN: [id number]  CODE STATUS: DNR-CCA    Date of Service: 2020    Patient Diagnosis(es): Syncope, unspecified syncope type [R55]   Chief Complaint   Patient presents with    Nausea     n&v as well as weakness. was here yesterday evening.  not feeling any better     Patient Active Problem List    Diagnosis Date Noted    History of ST elevation myocardial infarction (STEMI)      Priority: High    CKD (chronic kidney disease) 2015     Priority: High    HLD (hyperlipidemia) 2015     Priority: High    HTN (hypertension) 2011     Priority: High    Diabetes mellitus 2011     Priority: High    Vitamin D deficiency 2015     Priority: Low    SI (stress incontinence), female 2012     Priority: Low    Osteoarthritis 2012     Priority: Low    Vestibular neuronitis of both ears 2020    Dizziness     Spinal stenosis in cervical region 2020    Lumbar degenerative disc disease 2020    Spinal stenosis, lumbar region, with neurogenic claudication 2020    Abnormality of gait and mobility due to  NTSCI with Impaired Mobility and ADL's secondary to Cervical Myelopathy and Vestibular neuronitis with rehab admission 20. 2020    Generalized muscle ache 2020    Generalized osteoarthrosis, involving multiple sites 2020    Syncope 2020    Current use of long term anticoagulation 2019    Other transient cerebral ischemic attacks and related syndromes     Cerebrovascular accident (CVA) ([address]     Chronic combined systolic and diastolic congestive heart failure ([address] 2019    Monitoring for long-term anticoagulant use 2019    Transient cerebral ischemia 2019    S/P PTCA (percutaneous transluminal coronary angioplasty) 2019    Late effects

## 2020-06-24 NOTE — CARE COORDINATION
Denial reversed after Dr. Roney Forrester did the P>P with [facility]. Called Anita Rush pt. to go into room 235 on rehab.  Electronically signed by Esmer Roca RN on 6/24/20 at 12:54 PM EDT

## 2020-06-24 NOTE — TELEPHONE ENCOUNTER
Requesting medication refill.  Please approve or deny this request.    Rx requested:  Requested Prescriptions     Pending Prescriptions Disp Refills    warfarin (COUMADIN) 5 MG tablet [Pharmacy Med Name: WARFARIN SODIUM 5 MG TABLET] 90 tablet      Sig: take 1 tablet by mouth once daily    carvedilol (COREG) 12.5 MG tablet [Pharmacy Med Name: CARVEDILOL 12.5 MG TABLET] 180 tablet 3     Sig: take 1 tablet by mouth twice a day with meals       Last Office Visit:   6/18/2020    Last Filled:  6-19-20    Last Labs:      Next Visit Date:  Future Appointments   Date Time Provider José Manuel Snow   10/26/2020 10:45 AM Veronica Rangel MD [address] [place]   12/14/2020  1:15 PM [place]   6/30/2021 11:00 AM Veronica Rangel MD [address] [facility]

## 2020-06-24 NOTE — PROGRESS NOTES
Subjective: The patient complains of severe  acute dizziness due toVestibular neuronitis which appears to be significant and relieved by rest, medication adjustments, Coumadin, PT, OT and exacerbated by dehydration and exertion. Her generalized osteoarthritis flareup seems to be improving somewhat with Norco as opposed to the Tylenol. 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 I am also concerned about her elevated blood sugar in the 300s and no diabetic restrictions. She states that she feels very woozy when she gets up. Which indicated, \"1910  Family to bedside and was updated on patient condition and plan for the day. Patient is awake, alert, and oriented times 4 and is a standby assist to the bathroom. Patient denies any chest pain, shortness of breath, dizziness, weakness, abdominal pain, nausea, or vomiting at this time. Family brought up glasses and case for the patient this morning. Patient is in the high fowlers position in the bed and is up eating breakfast at this time.  \"    Concerned about her central cord findings at the C-spine including   MRI cervical spine shows severe central canal stenosis at C5-C6 and moderate to severe central canal stenosis at C4-C5  MRI thoracic spine negative  MRI lumbar spine shows advanced multilevel degenerative changes with severe foraminal narrowing bilaterally at L4-L5 with possible mild transverse canal narrowing at L3-4 and L4-5    I reviewed neurology's notes and agree with them as well, \"Vestibular neuronitis  Improved with Antivert  MRI brain no acute findings  Carotid duplex with 50 to 69% narrowing bilateral, continue antiplatelet and statin therapy  Lower extremity weakness  MRI cervical spine shows severe central canal stenosis at C5-C6 and moderate to severe central canal stenosis at C4-C5  MRI thoracic spine negative  MRI lumbar spine shows advanced multilevel degenerative changes with severe foraminal narrowing bilaterally at L4-L5 with possible mild transverse canal narrowing at L3-4 and L4-5  MRI findings of spine were discussed with family and it was decided that we would proceed with conservative management given patient's age and risk factors and surgical risk. Patient is not a candidate for Decadron given her underlying diabetes. Pain management following. Patient started on steroid taper. Patient does complain of right arm quivering though this appears to be coming from the cervical spine. We did discuss further that the only option to treat this would be surgical though we will wait for physical therapy for now and will consider neurosurgical evaluation if he worsens and does not have any improvement in her walking. Patient with history of MCA CVA currently on Coumadin and Plavix  Rehab pre-CERT pending  Findings discussed with her son. Patient actually ambulated about 10 feet twice yesterday\"    I will discuss these assessments with her [facility] [employer] insurance during the peer to peer review to this morning. Per case management treatment note [facility] has been sent her paperwork and we are awaiting their decision regarding rehab. ROS x10: The patient also complains of severely impaired mobility and activities of daily living. Otherwise no new problems with vision, hearing, nose, mouth, throat, dermal, cardiovascular, GI, , pulmonary, musculoskeletal, psychiatric or neurological. See Rehab consult on Rehab chart . Vital signs:  BP (!) 157/48   Pulse 55   Temp 97.5 °F (36.4 °C) (Oral)   Resp 20   Ht 5' 3\" (1.6 m)   Wt 184 lb (83.5 kg)   LMP  (LMP Unknown)   SpO2 98%   BMI 32.59 kg/m²   I/O:   PO/Intake:    fair PO intake, clear liquid diet    Bowel/Bladder:   Atonic bowel with constipation, Montero catheter neurogenic bladder  General:  Patient is well developed, adequately nourished, non-obese and     well kempt.      HEENT:    PERRLA, she is very hard of hearing, hearing intact to loud voice, external inspection of ear     and nose benign. Inspection of lips, tongue and gums benign  Musculoskeletal: No significant change in strength or tone. All joints stable. Inspection and palpation of digits and nails show no clubbing,       cyanosis or inflammatory conditions. Neuro/Psychiatric: Affect: flat but pleasant. Alert and oriented to person, place and     situation. No significant change in deep tendon reflexes or     Sensation-quadriparesis  Lungs:  Diminished, CTA-B. Respiration effort is normal at rest.     Heart:   S1 = S2,  RRR. No loud murmurs. Abdomen:  Soft, non-tender, no enlargement of liver or spleen. Extremities:  No significant lower extremity edema or tenderness. Skin:    BUE bruises dt blood draws, no visualized or palpated problems. Rehabilitation:  Physical therapy: FIMS:  Bed Mobility:      Transfers: Sit to Stand: Minimal Assistance, Contact guard assistance, 2 Person Assistance  Stand to sit: Minimal Assistance, Contact guard assistance, 2 Person Assistance, Ambulation 1  Surface: level tile  Device: Rolling Walker  Assistance: Minimal assistance, 2 Person assistance  Quality of Gait: wbos, increased wb through upper extremities, bilateral hip instability  Gait Deviations: Slow Shanelle, Increased JUAN, Decreased step length  Distance: 8 steps bed to chair  Comments: fearful that her hips will give out.,      FIMS:  ,  , Assessment: Good participation. Pt was ableto increase ambulatory distance but with increased fatigue and hip instability.      Occupational therapy: FIMS:   ,  ,      Speech therapy: FIMS:        Lab/X-ray studies reviewed, analyzed and discussed with patient and staff:   Recent Results (from the past 24 hour(s))   POCT Glucose    Collection Time: 06/23/20 12:04 PM   Result Value Ref Range    POC Glucose 307 (H) 60 - 115 mg/dl    Performed on ACCU-CHEK    POCT Glucose    Collection Time: 06/23/20  4:40 PM Result Value Ref Range    POC Glucose 388 (H) 60 - 115 mg/dl    Performed on ACCU-CHEK    POCT Glucose    Collection Time: 06/23/20  8:25 PM   Result Value Ref Range    POC Glucose 363 (H) 60 - 115 mg/dl    Performed on ACCU-CHEK    Basic Metabolic Panel w/ Reflex to MG    Collection Time: 06/24/20  5:37 AM   Result Value Ref Range    Sodium 137 135 - 144 mEq/L    Potassium reflex Magnesium 4.4 3.4 - 4.9 mEq/L    Chloride 104 95 - 107 mEq/L    CO2 21 20 - 31 mEq/L    Anion Gap 12 9 - 15 mEq/L    Glucose 178 (H) 70 - 99 mg/dL    BUN 35 (H) 8 - 23 mg/dL    CREATININE 1.13 (H) 0.50 - 0.90 mg/dL    GFR Non-African American 44.9 (L) >60    GFR  54.4 (L) >60    Calcium 8.9 8.5 - 9.9 mg/dL   Protime-INR    Collection Time: 06/24/20  5:37 AM   Result Value Ref Range    Protime 22.3 (H) 12.3 - 14.9 sec    INR 2.0    CBC    Collection Time: 06/24/20  5:37 AM   Result Value Ref Range    WBC 9.5 4.8 - 10.8 K/uL    RBC 3.88 (L) 4.20 - 5.40 M/uL    Hemoglobin 11.7 (L) 12.0 - 16.0 g/dL    Hematocrit 35.8 (L) 37.0 - 47.0 %    MCV 92.4 82.0 - 100.0 fL    MCH 30.1 27.0 - 31.3 pg    MCHC 32.6 (L) 33.0 - 37.0 %    RDW 14.7 (H) 11.5 - 14.5 %    Platelets 147 888 - 913 K/uL   POCT Glucose    Collection Time: 06/24/20  8:18 AM   Result Value Ref Range    POC Glucose 164 (H) 60 - 115 mg/dl    Performed on ACCU-CHEK        Previous extensive, complex labs, notes and diagnostics reviewed and analyzed. ALLERGIES:    Allergies as of 06/19/2020 - Review Complete 06/19/2020   Allergen Reaction Noted    Metoclopramide  11/29/2011    Pantoprazole Other (See Comments) 12/08/2015    Pcn [penicillins]  11/29/2011    Protonix [pantoprazole sodium]  11/29/2011    Tramadol  11/29/2011      (please also verify by checking [facility])     Complex Physical Medicine & Rehab Issues Assess & Plan:   1.  Severe abnormality of gait and mobility and impaired self-care and ADL's secondary to progressive cervical myelopathy and vestibular

## 2020-06-24 NOTE — PROGRESS NOTES
Daily    sacubitril-valsartan  1 tablet Oral BID    carvedilol  12.5 mg Oral BID WC    warfarin (COUMADIN) daily dosing (placeholder)   Other RX Placeholder     PRN Meds: HYDROcodone 5 mg - acetaminophen, acetaminophen, sodium chloride flush, magnesium hydroxide, ondansetron **OR** ondansetron, glucose, dextrose, glucagon (rDNA), dextrose, meclizine, hydrALAZINE    Labs:   Recent Labs     06/22/20  0543 06/23/20  0520 06/24/20  0537   WBC 7.4 7.7 9.5   HGB 10.8* 11.5* 11.7*   HCT 32.9* 35.2* 35.8*    212 232     Recent Labs     06/22/20  0543 06/23/20  0520 06/24/20  0537    133* 137   K 4.4 4.7 4.4    101 104   CO2 24 23 21   BUN 23 24* 35*   CREATININE 1.09* 1.09* 1.13*   CALCIUM 8.9 8.5 8.9     No results for input(s): AST, ALT, BILIDIR, BILITOT, ALKPHOS in the last 72 hours. Recent Labs     06/22/20  0543 06/23/20  0520 06/24/20  0537   INR 3.3 3.6 2.0     No results for input(s): Benita Jury in the last 72 hours. Urinalysis:   Lab Results   Component Value Date    NITRU Negative 06/19/2020    WBCUA 0-2 06/19/2020    BACTERIA Negative 06/19/2020    RBCUA 0-2 06/19/2020    BLOODU Negative 06/19/2020    SPECGRAV 1.014 06/19/2020    GLUCOSEU 250 06/19/2020       Radiology:   Most recent    EEG No procedure found. MRI of Brain No results found for this or any previous visit. Results for orders placed during the hospital encounter of 06/19/20   MRI BRAIN WO CONTRAST    Narrative MRI BRAIN WITHOUT CONTRAST:    CLINICAL HISTORY:  r/o CVA , dizziness, nausea, generalized weakness    COMPARISONS:  3/14/2019    TECHNIQUE: Multiplanar, multi-sequence MRI was performed on a 1.5Tesla closed magnet. FINDINGS:  There are no abnormal sites of restricted diffusion. The ventricles are normal in position. There is no evidence for mass effect. There is no shift of the midline structures.   There are multiple  extensive foci of increased signal on FLAIR   and T2, in the periventricular deep

## 2020-06-24 NOTE — PROGRESS NOTES
1910-Bedside report received. Pt is a+o x 4. Pt denies pain of any kind and is resting comfortably in bed. Pt noted to be SB 54 on tele at this time. Pt instructed to call for assistance if she should wish to get out of bed and pt verbalized compliance. Bed alarm applied for pt safety. Call bell and items are with in reach of pt. Will continue to monitor.       2150-PM medication given according to [facility]. Assessment completed, see Epic charting. Pt denies pain and further needs. Pt instructed to call for assistance if she should wish to get out of bed and pt verbalized compliance. Bed alarm applied for pt safety. Call bell and items are with in reach of pt. Will continue to monitor.

## 2020-06-24 NOTE — PROGRESS NOTES
Clinical Pharmacy Note    Warfarin consult follow-up    Recent Labs     06/24/20  0537   INR 2.0     Recent Labs     06/22/20  0543 06/23/20  0520 06/24/20  0537   HGB 10.8* 11.5* 11.7*   HCT 32.9* 35.2* 35.8*    212 232       Significant drug:drug interactions:  New warfarin drug-drug interactions: None  Discontinued drug-drug interactions: None  Current warfarin-drug interactions: APAP, Plavix, Norco, prednisone    Notes:  Date INR Warfarin Dose    06/19/20 1.8  6 mg    06/20/20 2.1  7 mg    06/21/20 2.3   7 mg    06/22/20 3.3  HOLD    06/23/20 3.6   HOLD      06/24/20 2.0  7 mg             INR has dropped to the low end of therapeutic at 2.0. Give warfarin 7 mg today to maintain INR within goal range of 2-3 for valvular disease/history of CVA. This is an increase in dose from the patient's usual home dosage regimen of warfarin 5 mg on  Wednesdays. Held warfarin doses over the last two days will most likely continue to affect INR over the next few days. Daily PT/INR until stable within therapeutic range.     Whit Ontiveros, PharmD   6/24/2020 9:40 AM

## 2020-06-25 LAB
ANION GAP SERPL CALCULATED.3IONS-SCNC: 12 MEQ/L (ref 9–15)
BUN BLDV-MCNC: 47 MG/DL (ref 8–23)
CALCIUM SERPL-MCNC: 8.8 MG/DL (ref 8.5–9.9)
CHLORIDE BLD-SCNC: 102 MEQ/L (ref 95–107)
CO2: 17 MEQ/L (ref 20–31)
CREAT SERPL-MCNC: 1.2 MG/DL (ref 0.5–0.9)
GFR AFRICAN AMERICAN: 50.7
GFR NON-AFRICAN AMERICAN: 41.9
GLUCOSE BLD-MCNC: 244 MG/DL (ref 60–115)
GLUCOSE BLD-MCNC: 247 MG/DL (ref 70–99)
GLUCOSE BLD-MCNC: 314 MG/DL (ref 60–115)
GLUCOSE BLD-MCNC: 369 MG/DL (ref 60–115)
GLUCOSE BLD-MCNC: 381 MG/DL (ref 60–115)
HCT VFR BLD CALC: 34.4 % (ref 37–47)
HEMOGLOBIN: 11 G/DL (ref 12–16)
INR BLD: 1.4
MCH RBC QN AUTO: 30.2 PG (ref 27–31.3)
MCHC RBC AUTO-ENTMCNC: 32 % (ref 33–37)
MCV RBC AUTO: 94.5 FL (ref 82–100)
PDW BLD-RTO: 14.7 % (ref 11.5–14.5)
PERFORMED ON: ABNORMAL
PLATELET # BLD: 156 K/UL (ref 130–400)
POTASSIUM REFLEX MAGNESIUM: 5 MEQ/L (ref 3.4–4.9)
PROTHROMBIN TIME: 17.1 SEC (ref 12.3–14.9)
RBC # BLD: 3.64 M/UL (ref 4.2–5.4)
SODIUM BLD-SCNC: 131 MEQ/L (ref 135–144)
WBC # BLD: 8.9 K/UL (ref 4.8–10.8)

## 2020-06-25 PROCEDURE — 97110 THERAPEUTIC EXERCISES: CPT

## 2020-06-25 PROCEDURE — 6370000000 HC RX 637 (ALT 250 FOR IP): Performed by: INTERNAL MEDICINE

## 2020-06-25 PROCEDURE — 92610 EVALUATE SWALLOWING FUNCTION: CPT

## 2020-06-25 PROCEDURE — 97166 OT EVAL MOD COMPLEX 45 MIN: CPT

## 2020-06-25 PROCEDURE — 97129 THER IVNTJ 1ST 15 MIN: CPT

## 2020-06-25 PROCEDURE — 99223 1ST HOSP IP/OBS HIGH 75: CPT | Performed by: PHYSICAL MEDICINE & REHABILITATION

## 2020-06-25 PROCEDURE — 97112 NEUROMUSCULAR REEDUCATION: CPT

## 2020-06-25 PROCEDURE — 85027 COMPLETE CBC AUTOMATED: CPT

## 2020-06-25 PROCEDURE — 97116 GAIT TRAINING THERAPY: CPT

## 2020-06-25 PROCEDURE — 6360000002 HC RX W HCPCS: Performed by: INTERNAL MEDICINE

## 2020-06-25 PROCEDURE — 1180000000 HC REHAB R&B

## 2020-06-25 PROCEDURE — 97162 PT EVAL MOD COMPLEX 30 MIN: CPT

## 2020-06-25 PROCEDURE — 80048 BASIC METABOLIC PNL TOTAL CA: CPT

## 2020-06-25 PROCEDURE — 36415 COLL VENOUS BLD VENIPUNCTURE: CPT

## 2020-06-25 PROCEDURE — 2580000003 HC RX 258: Performed by: INTERNAL MEDICINE

## 2020-06-25 PROCEDURE — 2580000003 HC RX 258: Performed by: NURSE PRACTITIONER

## 2020-06-25 PROCEDURE — 85610 PROTHROMBIN TIME: CPT

## 2020-06-25 PROCEDURE — 97530 THERAPEUTIC ACTIVITIES: CPT

## 2020-06-25 RX ORDER — SODIUM PHOSPHATE, DIBASIC AND SODIUM PHOSPHATE, MONOBASIC 7; 19 G/133ML; G/133ML
1 ENEMA RECTAL DAILY PRN
Status: DISCONTINUED | OUTPATIENT
Start: 2020-06-25 | End: 2020-07-08 | Stop reason: HOSPADM

## 2020-06-25 RX ORDER — ACETAMINOPHEN 325 MG/1
650 TABLET ORAL EVERY 4 HOURS PRN
Status: DISCONTINUED | OUTPATIENT
Start: 2020-06-25 | End: 2020-06-25

## 2020-06-25 RX ORDER — ANALGESIC BALM 1.74; 4.06 G/29G; G/29G
OINTMENT TOPICAL PRN
Status: DISCONTINUED | OUTPATIENT
Start: 2020-06-25 | End: 2020-07-08 | Stop reason: HOSPADM

## 2020-06-25 RX ORDER — WARFARIN SODIUM 4 MG/1
8 TABLET ORAL
Status: COMPLETED | OUTPATIENT
Start: 2020-06-25 | End: 2020-06-25

## 2020-06-25 RX ORDER — METAXALONE 800 MG/1
400 TABLET ORAL 3 TIMES DAILY PRN
Status: DISCONTINUED | OUTPATIENT
Start: 2020-06-25 | End: 2020-07-07

## 2020-06-25 RX ORDER — ACETAMINOPHEN 500 MG
500 TABLET ORAL EVERY 8 HOURS PRN
Status: DISCONTINUED | OUTPATIENT
Start: 2020-06-25 | End: 2020-07-08 | Stop reason: HOSPADM

## 2020-06-25 RX ORDER — SODIUM CHLORIDE 9 MG/ML
INJECTION, SOLUTION INTRAVENOUS CONTINUOUS
Status: DISPENSED | OUTPATIENT
Start: 2020-06-25 | End: 2020-06-25

## 2020-06-25 RX ADMIN — SACUBITRIL AND VALSARTAN 1 TABLET: 24; 26 TABLET, FILM COATED ORAL at 10:48

## 2020-06-25 RX ADMIN — ATORVASTATIN CALCIUM 10 MG: 10 TABLET, FILM COATED ORAL at 21:44

## 2020-06-25 RX ADMIN — SACUBITRIL AND VALSARTAN 1 TABLET: 24; 26 TABLET, FILM COATED ORAL at 21:44

## 2020-06-25 RX ADMIN — AMLODIPINE BESYLATE 5 MG: 5 TABLET ORAL at 10:48

## 2020-06-25 RX ADMIN — WARFARIN SODIUM 8 MG: 4 TABLET ORAL at 16:45

## 2020-06-25 RX ADMIN — HYDRALAZINE HYDROCHLORIDE 10 MG: 20 INJECTION INTRAMUSCULAR; INTRAVENOUS at 21:44

## 2020-06-25 RX ADMIN — GABAPENTIN 100 MG: 100 CAPSULE ORAL at 21:44

## 2020-06-25 RX ADMIN — Medication 10 ML: at 21:45

## 2020-06-25 RX ADMIN — METAXALONE 400 MG: 800 TABLET ORAL at 10:47

## 2020-06-25 RX ADMIN — NITROFURANTOIN (MONOHYDRATE/MACROCRYSTALS) 100 MG: 75; 25 CAPSULE ORAL at 21:44

## 2020-06-25 RX ADMIN — CARVEDILOL 12.5 MG: 12.5 TABLET, FILM COATED ORAL at 10:48

## 2020-06-25 RX ADMIN — SODIUM CHLORIDE: 9 INJECTION, SOLUTION INTRAVENOUS at 21:41

## 2020-06-25 RX ADMIN — CARVEDILOL 12.5 MG: 12.5 TABLET, FILM COATED ORAL at 16:45

## 2020-06-25 RX ADMIN — HYDRALAZINE HYDROCHLORIDE 50 MG: 50 TABLET, FILM COATED ORAL at 10:47

## 2020-06-25 RX ADMIN — PREDNISONE 15 MG: 5 TABLET ORAL at 10:48

## 2020-06-25 RX ADMIN — CLOPIDOGREL BISULFATE 75 MG: 75 TABLET ORAL at 10:47

## 2020-06-25 ASSESSMENT — ENCOUNTER SYMPTOMS
BLOOD IN STOOL: 0
DIARRHEA: 0
COUGH: 0
EYE REDNESS: 0
CONSTIPATION: 1
SORE THROAT: 0
NAUSEA: 0
ABDOMINAL PAIN: 0
EYE PAIN: 0
STRIDOR: 0
WHEEZING: 0
SHORTNESS OF BREATH: 1
BACK PAIN: 1
VOMITING: 0
PHOTOPHOBIA: 0

## 2020-06-25 ASSESSMENT — PAIN SCALES - GENERAL: PAINLEVEL_OUTOF10: 0

## 2020-06-25 NOTE — PROGRESS NOTES
history that includes Appendectomy; Rectocele repair; Cystocele repair; Ankle surgery; Breast biopsy; Cataract removal (2004); eye surgery; Tonsillectomy; Hysterectomy; and Coronary angioplasty with stent (01/2019). 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° cervical myelopathy and vestibular neuronitis  Pain Assessment  Pain Assessment: 0-10  Pain Level: 0  Pre Treatment Pain Screening  Pain at present: 0  Scale Used: Numeric Score  Intervention List: Patient able to continue with treatment;Patient declined any intervention  Vital Signs  Patient Currently in Pain: Denies   Orientation  Orientation  Overall Orientation Status: Within Functional Limits  Objective         The patient completed the [address] [place] [address] (UMS) Examination on this date, which is a useful screening tool for detecting mild cognitive impairment and signs of dementia. Range of impairments based on score are indicated in the chart below:      High school education  Scoring Less than High School Education   27-30 Normal 25-30   21-26 Mild Neurocognitive disorder 20-24   1-20 Dementia 1-19     Patient's level of education: high school  Patient scored 14/30 on this date, which indicates a possible dementia or other significant neurocognitive disorder. Pt. with cognitive deficits which will be addressed in OT    Results of SLUMS assessment will be shared in team meeting to assess need for further action.         While seated at tabletop, pt placed small colored pegs into peg board using B UEs following pattern that therapist created to improve fine motor coordination during self care tasks. Pt with mod errors and requires verbal cues to detect errors. Pt states she is not colorblind. Pt with difficulty distinguishing between blue and purple. Pt able to fill all 100 holes with min fair coordination. Pt dropped 0 pegs while completing task.          Plan   Plan  Times per week: 5-7 times per week  Plan weeks: 2 weeks  Current Treatment Recommendations: Strengthening, Endurance Training, Neuromuscular Re-education, Self-Care / ADL, Balance Training, Functional Mobility Training, Safety Education & Training, Patient/Caregiver Education & Training  Plan Comment: Continue per OT POC  G-Code     OutComes Score                                                  AM-PAC Score             Goals  Patient Goals   Patient goals : \"to at least do things in my home\"       Therapy Time   Individual Concurrent Group Co-treatment   Time In 1330         Time Out 1400         Minutes 30           Therapeutic activities: 10 minutes  Cognitive Retrainin minutes     Fernanda Peguero OT   Electronically signed by Fernanda Peguero OT on 2020 at 2:06 PM

## 2020-06-25 NOTE — CARE COORDINATION
Social/Functional Status:  Social/Functional History  Lives With: Son  Type of Home: House  Home Layout: Two level, Performs ADL's on one level, Able to Live on Main level with bedroom/bathroom  Home Access: Stairs to enter without rails  Entrance Stairs - Number of Steps: 1  Bathroom Shower/Tub: Tub/Shower unit, Curtain  Bathroom Equipment: Hand-held shower, Shower chair  Home Equipment: 4 wheeled walker, Rolling walker(Son reported that patient uses a rollator in the house and a ww in the community)  ADL Assistance: Independent  Homemaking Assistance: (son does all homemaking)  Ambulation Assistance: Independent  Transfer Assistance: Independent  Active : No  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 Patients son  questions answered appropriately. Explained discharge process. Patients son stated understanding. Patient lives with son, other son next door. Patient does have 24 care and family manages finances, medication, cooking, and cleaning. Patient helps as she can per son. Patient has a rollator, ww, and shower chair. Patient was independent with ambulation prior to admission. If needed, [facility] has been requested at discharge.   Electronically signed by Erica Quiros RN on 6/25/2020 at 4:55 PM

## 2020-06-25 NOTE — CONSULTS
Hospitalist Progress Note  6/25/2020 3:40 PM    Assessment and Plan:   1. Generalized weakness, Gait instability and Decreased Functional Status secondary to severe central canal stenosis at C4-C6 , also narrowing at L4-L5: fall precautions. PT OT to evaluate. Maximize nutrition status. Assessing if needs DME at home. SW on board. 2. Dizziness secondary to vestibular neuritis: Continue steroid taper. Neurology on board   3. Dehydration with hyponatremia: IVFs, Repeat BMP in AM. Pain control. PRN Zofran   4. Acute cystitis: continue nitrofurantoin  5. CKD stage III: Stable. Monitor urine output. Check BMP in AM. NephroCaps. Check Vit D level and supplement if deficient. 6. Microcytic anemia, likely due to iron deficiency or anemia of chronic disease: Checking Iron panel and TIBC   7. DMII with hyperglycemia: ISS, hypoglycemia protocol POCT Glucose TIDAC & QHS  8. Bowel Regimen and GI PPx: stool softners PRN ordered with hold parameters for loose stools or diarrhea. On antiacid  9. Diet: DIET CARB CONTROL; Low Sodium (2 GM)  10. Advance Directive: DNR-CCA   11. Nutrition status: Supplemental Vitamins ordered. Dietitian assessment  12. Vaccinations: Immunization records reviewed. If has not received appropriate vaccinations, will order to be given prior to discharge. 13. DVT prophylaxis: On warfarin  14. Discharge planning: GAMA on board. Will discharge once medically stable and cleared by all consultants. 15. High Risk Readmission Screening Tool Score Noted. Additionally, the following hospital problems were addressed:  Principal Problem:    Abnormality of gait and mobility due to  NTSCI with Impaired Mobility and ADL's secondary to Cervical Myelopathy and Vestibular neuronitis with rehab admission 06/24/20.   Active Problems:    HTN (hypertension)    CKD (chronic kidney disease)    HLD (hyperlipidemia)    History of ST elevation myocardial infarction (STEMI)    Osteoarthritis    Vitamin D deficiency Eczematous dermatitis    Lumbar spinal stenosis    Valvular heart disease    Vitamin B12 deficiency    Chronic combined systolic and diastolic congestive heart failure (HCC)    Spinal stenosis in cervical region    Vestibular neuronitis of both ears    SCC (squamous cell carcinoma), arm    Type 2 diabetes mellitus (HCC)    Obesity (BMI 30-39. 9)    [place] (hard of hearing)    DJD (degenerative joint disease), lumbar    History of PTCA  Resolved Problems:    * No resolved hospital problems. *      ** Total time spent reviewing medical records, evaluating patient, speaking with RN's and consultants where I was focused exclusively on this patient: 35 minutes. This time is excluding time spent performing procedures or significant events occurring earlier or later in the day requiring my attention and focus. Subjective:   Admit Date: 6/24/2020  PCP: Brad Carrero MD    No acute events overnight. Afebrile  Telemetry reviewed. No new complaints. Pt denies chest pain, SOB, N/V, fevers or chills. Objective:     Vitals:    06/24/20 1837 06/25/20 0635 06/25/20 0640 06/25/20 0645   BP:  (!) 205/81 (!) 180/78 (!) 157/68   Pulse:  60     Resp:  15     Temp:  98 °F (36.7 °C)     TempSrc:       SpO2:  94%     Weight: 192 lb 14.4 oz (87.5 kg)      Height: 5' 3\" (1.6 m)        General appearance:  No distress, A + O x 3. No conversational dyspnea noted. Dentition intact. Answers questions appropriately  Lungs: CTAB no exp wheezes, No rales No retractions; No use of accessory muscles  Heart:  S1, S2 normal, RRR, no MRG appreciated  Abdomen: (+) BS, soft, non-tender; non distended no guarding or rigidity. Extremities:  no cyanosis, no edema bilat lower exts, no calf tenderness bilaterally.  Dry skin noted       Medications:      sodium chloride      dextrose        warfarin  8 mg Oral Once    sodium chloride flush  10 mL Intravenous 2 times per day    amLODIPine  5 mg Oral Daily    atorvastatin  10 mg Oral Nightly   

## 2020-06-25 NOTE — H&P
HISTORY & PHYSICAL       DATE OF ADMISSION:  6/24/2020    DATE OF SERVICE:  6/25/20    Subjective:    Stacy Gould, 80 y.o. female presents today with:     CHIEF COMPLAINT:   80-year old female who presented to the ER with complaints of weakness, nausea, vomiting, and dizziness. Work-up included CT Head 06/18/20 with no acute intracranial process. The dizziness came on suddenly the night prior to admission and had persisted. In the ER her blood pressure was in the 200's and her blood glucose was elevated at 250. Blood pressure responded with hydralazine. Repeat CT Head 06/19/20 with no change. CT Abdomen/Pelvis 06/19/20 shows borderline pelviectasis in left kidney. No acute pathology. Carotid Duplex 06/19/20 shows 50-69% Right ICA stenosis and 50-69% Left ICA stenosis. 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 Because of persisted neck and lower back pain further imaging was done. MRI of Brain showed no acute findings, MRI Cervical Spine revealed severe central canal stenosis at several levels and MRI of Lumbar Spine showed multilevel degenerative changes and mild canal narrowing but no definite central canal stenosis. She was diagnosed with central cord syndrome of the cervical spine. MRI Thoracic Spine 06/20/20 negative MRI. Family opted for conservative management at this time. The patient has been found to have severe abnormality of gait and mobility with impaired self care due to NTSCI with Impaired Mobility and ADL's secondary to Cervical Myelopathy and Vestibular Neronitis and is admitted to the acute inpatient rehab program.     Transcribed from pre-admission information sheet completed by Mylene Guardado RN/mdl as directed by Dr. Clarence Burnham. Neurologic Problem   The patient's primary symptoms include focal sensory loss, focal weakness, a loss of balance and weakness.  The patient's pertinent myalgias, neck pain, numbness, urinary symptoms and weakness. Pertinent negatives include no abdominal pain, chest pain, chills, congestion, coughing, diaphoresis, fever, headaches, nausea, rash, sore throat, visual change or vomiting. The symptoms are aggravated by walking. She has tried rest for the symptoms. The treatment provided mild relief. The patient has stabilized medically andis able to participate at acute level rehab but is too medically complex for SNF due to need for therapy at the acute level with at least 15 hours a week of PT OT and cognitive and recreational therapy at an acute level with daily medical monitoring. Imaging:    Imaging and other studies reviewed and discussed with patient and staff    Ct Head   6/19/2020   . FINDINGS: There is no intracranial hemorrhage, mass effect, midline shift, extra-axial collection, evidence of hydrocephalus, recent ischemic infarct, or skull fracture identified. Moderate age-related atrophic changes have not significantly changed from the yesterday's study. NO ACUTE INTRACRANIAL PROCESS OR SIGNIFICANT CHANGE FROM YESTERDAY IDENTIFIED. Ct Head Wo Contrast    Result Date: 6/18/2020    Prominence of the sulci and ventricles compatible with mild generalized parenchymal volume loss. Gray-white matter differentiation is preserved. Areas of bilateral supratentorial white matter hypoattenuation are nonspecific but most likely related to chronic small vessel ischemic changes in a patient of this age. No acute hemorrhage or abnormal extra-axial fluid collection. No mass effect or midline shift. Mild mucosal thickening of the maxillary sinuses. The mastoid air cells are clear. Calvarium is intact. No acute intracranial process or significant interval change.           Mri Cervical Spine : 6/20/2020  EXAMINATION: MRI CERVICAL SPINE WO CONTRAST DATE AND TIME:6/20/2020 10:45 AM CLINICAL HISTORY: Severe neck pain  stenosis/  may do a sagittal screen for spine, include thorasic and lumbar sagiatl and then do deteils if we see anything  COMPARISON: None TECHNIQUE: Multiplanar, multi-sequence MRI scans FINDINGS Craniocervical junction: Craniocervical junction is within normal limits. Bone marrow: No sign of acute fracture. No abnormality of the marrow signal to suggest any metastatic or diffuse bone disease. Soft tissues: Cervical soft tissues are within normal limits. Cervical cord: The cervical cord has normal morphology and signal. There is no sign of any extramedullary hemorrhage or fluid collection. C1/2: The odontoid and the C1-2 articulation are normal. C2/C3:  There is no neural foraminal stenosis. There is no evidence of central canal stenosis. There is no foraminal stenosis. C3/C4:  Minimal disc osteophyte complex with ventral ridging. No central canal stenosis. No significant foraminal narrowing. C4/C5:  Prominent disc osteophyte complex with effacement of the anterior CSF column in cord deformity. No abnormal cord signal intensity. Findings resulting in moderate central canal stenosis. C5/C6:  There is no neural foraminal stenosis. There is no evidence of central canal stenosis. There is no foraminal stenosis. C6/C7:  Moderate discussed by complex with effacement of the anterior CSF column and cord deformity. No abnormal cord signal intensity. Findings resulting in moderate to severe central canal stenosis. C7-T1:  Minimal disc osteophyte complex without central canal or significant foraminal narrowing     Severe central canal stenosis at C5-6. Moderate to severe central canal stenosis at C4-5. No abnormal cord signal intensity. Mri Thoracic Spine  : 6/20/2020  EXAMINATION: MRI THORACIC SPINE WO CONTRAST DATE AND TIME:6/20/2020 10:45 AM CLINICAL HISTORY: Severe thoracic spine pain.    may do just sagittal and see if cord compression then do details/  r/o cord compression  COMPARISON: If available previous films were L5/S1: Disc bulging without central canal narrowing. Severe bilateral foraminal stenosis   Retroperitoneum: No abnormality of the visualized Aorta or retroperitoneum. Levorotoscoliosis with advanced multilevel degenerative changes. Severe foraminal narrowing bilaterally at L4-5. There may be mild transverse canal narrowing at L3-4 and L4-5 but there is no definite central canal stenosis     Ct Abdomen Pelvis   6/19/2020  EXAMINATION: CT ABDOMEN PELVIS W IV CONTRAST DATE AND TIME:6/19/2020 8:00 AM CLINICAL HISTORY: Acute abdominal pain. Epigastric pain. abd pain, vomiting  COMPARISON: July 23, 2016 TECHNIQUE: Contiguous axial CT sections of the abdomen and pelvis. 100 cc's of IV contrast given. .  All CT scans at this facility use dose modulation, iterative reconstruction, and/or weight based dosing when appropriate to reduce radiation dose to as low as reasonably achievable. FINDINGS    Liver: Negative     Spleen: Negative    Pancreas: Negative   Gallbladder: No calcified gallstones. Normal gallbladder wall. No pericholecystic fluid. Kidney: Right parapelvic cysts. Borderline pelviectasis in the left collecting system. Small bilateral cortical cysts some are too small to characterize with accuracy. Adrenal glands are negative. Bowel: The bowel is not dilated. There is no evidence of diverticulitis or colitis. Appendix: There  is no CT evidence for appendicitis. Nodes: No lymphadenopathy. Aorta: No aneurysm    Peritoneum: No free fluid or free air. The abdominal wall is intact. Pelvis: No abnormal soft tissue mass. The bladder is normal.   Bones: Levoscoliosis of the lumbar spine. BORDERLINE PELVIECTASIS IN LEFT KIDNEY. NO ACUTE PATHOLOGY IN THE ABDOMEN OR PELVIS. Xr Chest  : 6/19/2020  EXAMINATION: XR CHEST PORTABLE CLINICAL HISTORY: WEAKNESS, VOMITING, NAUSEA COMPARISONS: JUNE 18, 2020 FINDINGS: Osseous structures intact.  Cardiopericardial silhouette normal. Pulmonary vasculature normal. Lungs clear. NO ACUTE CARDIOPULMONARY DISEASE. Xr Chest   6/19/2020  EXAMINATION: XR CHEST PORTABLE CLINICAL HISTORY: CHEST PAIN COMPARISONS: MARCH 14, 2019 FINDINGS: Osseous structures intact. Cardiopericardial silhouette normal. Pulmonary vasculature normal. Lungs clear. NO ACUTE CARDIOPULMONARY DISEASE. Mri Brain   6/19/2020  MRI BRAIN WITHOUT CONTRAST: CLINICAL HISTORY:  r/o CVA , dizziness, nausea, generalized weakness COMPARISONS:  3/14/2019 TECHNIQUE: Multiplanar, multi-sequence MRI was performed on a 1.5Tesla closed magnet. FINDINGS:  There are no abnormal sites of restricted diffusion. The ventricles are normal in position. There is no evidence for mass effect. There is no shift of the midline structures. There are multiple  extensive foci of increased signal on FLAIR and T2, in the periventricular deep white matter and corona radiata, which may be secondary to small vessel ischemic changes, demyelination, or aging. There appear similar prior exam. There is moderate dilatation of the cerebral sulci and ventricles, unchanged. Flow-voids in the major intracranial blood vessels are identified and are patent by spin-echo criteria. There are few small foci of decreased signal on gradient echo sequences within the left frontal and both parietal lobes. They may be secondary to small remote hemorrhages. There is mucosal thickening within both maxillary sinuses and both ethmoid sinuses. Mastoid air cells are clear. The seventh and eighth nerve complexes and optic nerves are symmetrical.  No evidence for mass in the cerebellopontine angle. There is generalized thinning of the corpus callosum. The cerebellar tonsils are not ectopic. The pituitary gland is unremarkable. Optic nerves are symmetrical. The calvarium and dura are unremarkable. There is hypertrophy of nasal turbinates, consistent with  rhinitis. NO EVIDENCE OF ACUTE CVA.  GENERALIZED PARENCHYMAL VOLUME LOSS AND NONSPECIFIC WHITE MATTER CHANGES, MOST LIKELY SECONDARY TO CHRONIC SMALL VESSEL ISCHEMIC CHANGES. MUCOSAL THICKENING IN ETHMOID AND MAXILLARY SINUSES. A FEW SMALL NONSPECIFIC FOCI ON GRADIENT ECHO SEQUENCES WITHIN THE WHITE MATTER BILATERALLY. Us Carotid Artery Bilat 6/19/2020  US CAROTID ARTERY BILATERAL: 6/19/2020 CLINICAL HISTORY:  dizziness . COMPARISON: 11/14/2018. Grayscale, color and waveform Doppler analysis of the cervical carotid and vertebral arteries was performed. Validated velocity measurements with angiographic measurements, velocity criteria are extrapolated from diameter data as defined by the Society of Radiologist in [address] [place] [address] Drive Radiology 2003; [phone]. FINDINGS: There is moderate somewhat irregular atherosclerotic plaquing of the carotid bulbs, similarly to the prior study. There is antegrade flow in both vertebral arteries. ARTERIAL BLOOD FLOW VELOCITY RIGHT Peak Systolic/End Diastolic                                               Prox CCA:  73.3/14.1 cm/s             Mid CCA:  87.5/13.5 cm/s              Dist CCA:  85.6/14.8 cm/s              Prox ICA:  103/25.6 cm/s               Mid ICA:  183/32.9 cm/s            Dist ICA:  99.6/25.1 cm/s             Prox ECA:  147/20.4 cm/s             Prox VERT:  64.4/19.2 cm/s              ICA/CCA    2.1, which indicates 50-69% narrowing by velocity criteria. LEFT PS Prox CCA:  114/28.0 cm/s Mid CCA:  96.9/26.7 cm/s Dist CCA:  98.8/23.6 cm/s Prox ICA:  112/32.9 cm/s Mid ICA:  201/47.2 cm/s Dist ICA:  144/30.4 cm/s Prox ECA :  114/12.4 cm/s Prox VERT:  93.3/20.4 cm/s ICA/CCA     2.1, which indicates 50-69% narrowing by velocity criteria. 50-69% NARROWING PREDICTED OF BOTH INTERNAL CAROTID ARTERIES, NOT SIGNIFICANTLY CHANGED FROM 11/14/2018.               Labs:     labs reviewed and discussed with patient and staff    Lab Results   Component Value Date    POCGLU 154 06/26/2020    POCGLU 369 06/25/2020    POCGLU 381 06/25/2020 FIMS:  Bed Mobility:      Transfers:Sit to Stand: Contact guard assistance, Stand by assistance  Stand to sit: Contact guard assistance  Bed to Chair: Minimal assistance(pivot and with ww), Ambulation 1  Surface: carpet  Device: Rollator  Assistance: Minimal assistance, Contact guard assistance  Quality of Gait: Narrow JUAN, reduced step length and height throughout gait, poor pelvic stability and forward trunk. Gait Deviations: Slow Shanelle, Increased JUAN, Decreased step length  Distance: [de-identified]'  Comments: Cues for rollator safety and stand to sit technique,      FIMS:  , , Assessment: Pt had difficulty with clearing hips off table when performing mat exercises and needed VC for edge of table exercises. Pt required VC to maintain upright posture during standing exercises and while gait training. Occupational therapy: FIMS:   ,  , Assessment: Patient is a 80 year female with an new onset of coordination problems, dizziness and weakness. Patient presents with the above stated problem areas and will benefit from OT to address the issues and increase independence    OCCUPATIONAL THERAPY  Hand Dominance: Right  ADL  Feeding: Setup (06/25/20 1129)  Grooming: Setup (06/25/20 1129)  UE Bathing: Stand by assistance (06/25/20 1129)  LE Bathing: Moderate assistance(for B lower legs and her rear yeyo area for thoroughness) (06/25/20 1129)  UE Dressing: Setup; Increased time to complete (06/25/20 1129)  LE Dressing: Minimal assistance(Patient did not want to wear socks and reported that she usually does not wear them) (06/25/20 1129)  Toileting:  Moderate assistance(Patient's undergarment was saturated with uring but then she was continent of bowel ) (06/25/20 1129)  Toilet Transfers  Toilet - Technique: Ambulating (06/25/20 1143)  Equipment Used: Grab bars (06/25/20 1143)  Toilet Transfer: Minimal assistance (06/25/20 1143)  Tub Transfers  Tub Transfers: Not tested (06/25/20 1143)  Shower Transfers  Shower - Transfer From: [facility] (06/25/20 1143)  Shower - Transfer Type: To and From (06/25/20 1143)  Shower - Transfer To: Shower seat with back (06/25/20 1143)  Shower - Technique: Ambulating (06/25/20 1143)  Shower Transfers: Moderate assistance (06/25/20 1143)    Speech therapy: FIMS:       Prior to admission patient was independent with all ADLs and mobilityand did not require any outside services. 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 SCC (squamous cell carcinoma), arm 2013    right upper arm, 2015 right forarm    SI (stress incontinence), female 5/29/2012    Type II or unspecified type diabetes mellitus without mention of complication, not stated as uncontrolled     Whooping cough        Past Surgical History:   Procedure Laterality Date    ANKLE SURGERY      broken left ankle.     APPENDECTOMY      BREAST BIOPSY      x2 left breast    CATARACT REMOVAL  2004    bilateral    CORONARY ANGIOPLASTY WITH STENT PLACEMENT  01/2019    CYSTOCELE REPAIR      EYE SURGERY      bilateral cataract    HYSTERECTOMY      complete at age 39    [address]      as a child       Current Facility-Administered Medications   Medication Dose Route Frequency Provider Last Rate Last Dose    fleet rectal enema 1 enema  1 enema Rectal Daily PRN Ledon Score, DO        acetaminophen (TYLENOL) tablet 500 mg  500 mg Oral Q8H PRN Belenda Jakes, APRN - CNP        analgesic ointment ointment   Topical PRN Belenda Jakes, APRN - CNP        metaxalone (SKELAXIN) tablet 400 mg  400 mg Oral TID PRN Belenda Jakes, APRN - CNP        magnesium hydroxide (MILK OF MAGNESIA) 400 MG/5ML suspension 30 mL  30 mL Oral Daily PRN Darien Spells, DO        ondansetron (ZOFRAN-ODT) disintegrating tablet 4 mg  4 mg Oral Q8H PRN Darien Spells, DO        Or    ondansetron (ZOFRAN) injection 4 mg  4 mg Intravenous Q6H PRN Darien Spells, DO        sodium chloride flush 0.9 % injection 10 mL  10 mL Intravenous 2 times per day Darien Spells, DO   10 mL at 06/25/20 2145    sodium chloride flush 0.9 % injection 10 mL  10 mL Intravenous PRN Darien Spells, DO        amLODIPine (NORVASC) tablet 5 mg  5 mg Oral Daily Darien Spells, DO   5 mg at 06/25/20 1048    atorvastatin (LIPITOR) tablet 10 mg  10 mg Oral Nightly Darien Spells, DO   10 mg at 06/25/20 2144    carvedilol (COREG) tablet 12.5 mg  12.5 mg Oral BID WC Darien Spells, DO   12.5 mg at 06/25/20 1645    clopidogrel (PLAVIX) tablet 75 mg  75 mg Oral Daily Paola Scott, DO   75 mg at 06/25/20 1047    dextrose 5 % solution  100 mL/hr Intravenous PRN Darien Spells, DO        dextrose 50 % IV solution  12.5 g Intravenous PRN Darien Spells, DO        gabapentin (NEURONTIN) capsule 100 mg  100 mg Oral Nightly Paola Scott, DO   100 mg at 06/25/20 2144    glucagon (rDNA) injection 1 mg  1 mg Intramuscular PRN Darien Spells, DO        glucose (GLUTOSE) 40 % oral gel 15 g  15 g Oral PRN Darien Spells, DO        hydrALAZINE (APRESOLINE) injection 10 mg  10 mg Intravenous Q4H PRN Darien Spells, DO   10 mg at 06/26/20 0700    hydrALAZINE (APRESOLINE) tablet 50 mg  50 mg Oral Daily Paola Chavez DO   50 mg at 06/25/20 1047    HYDROcodone-acetaminophen (NORCO) 5-325 MG per tablet 0.5 tablet  0.5 tablet Oral Q4H PRN Darien Clarke DO        insulin lispro (HUMALOG) injection vial 0-12 activity: Not on file   Lifestyle    Physical activity     Days per week: Not on file     Minutes per session: Not on file    Stress: Not on file   Relationships    Social connections     Talks on phone: More than three times a week     Gets together: More than three times a week     Attends Confucianist service: 1 to 4 times per year     Active member of club or organization: No     Attends meetings of clubs or organizations: Never     Relationship status:     Intimate partner violence     Fear of current or ex partner: No     Emotionally abused: No     Physically abused: No     Forced sexual activity: No   Other Topics Concern    Not on file   Social History Narrative         Lives With: Son    [id number] [address]: Two level, Able to Live on Main level with bedroom/bathroom, Performs ADL's on one level    Home Access: Stairs to enter with rails    Entrance Stairs - Number of Steps: Threshold    Bathroom Shower/Tub: Tub/Shower unit    Bathroom Toilet: Handicap height    Bathroom Equipment: Grab bars in shower, Grab bars around toilet, Hand-held shower, Shower chair    Bathroom Accessibility: Accessible    Home Equipment: Rolling walker, 4 wheeled walker (Usually uses rollator)    ADL Assistance: [address]: Needs assistance (Son manages housework)    Homemaking Responsibilities: No    Ambulation Assistance: Independent (Rollator)    Transfer Assistance: Independent    Active : No    Patient's  Info: Sons                      FAMILY HISTORY:  Does not pertain tochief complaint. Review of Systems   Constitutional: Positive for activity change and fatigue. Negative for chills, diaphoresis, fever and weight loss. HENT: Positive for trouble swallowing. Negative for congestion, ear discharge, ear pain, hearing loss, nosebleeds, sore throat and tinnitus. Eyes: Negative for photophobia, pain and redness.    Respiratory: Positive for shortness of breath. Negative for cough, wheezing and stridor. Shortness of breath on exertion   Cardiovascular: Negative for chest pain, palpitations, leg swelling and near-syncope. Gastrointestinal: Positive for anorexia and constipation. Negative for abdominal pain, blood in stool, diarrhea, nausea and vomiting. Endocrine: Negative for polydipsia. Genitourinary: Positive for bladder incontinence. Negative for dysuria, flank pain, frequency, hematuria and urgency. Musculoskeletal: Positive for arthralgias, back pain, gait problem, myalgias and neck pain. Skin: Negative for rash. Allergic/Immunologic: Positive for immunocompromised state. Negative for environmental allergies. Neurological: Positive for focal weakness, weakness, light-headedness, numbness and loss of balance. Negative for dizziness, tremors, seizures and headaches. Hematological: Does not bruise/bleed easily. Psychiatric/Behavioral: Negative for hallucinations, memory loss and suicidal ideas. The patient is not nervous/anxious. Objective  BP (!) 203/74   Pulse 61   Temp 98 °F (36.7 °C)   Resp 15   Ht 5' 3\" (1.6 m)   Wt 192 lb 14.4 oz (87.5 kg)   LMP  (LMP Unknown)   SpO2 97%   BMI 34.17 kg/m² *    Physical Exam  Vitals signs reviewed. Constitutional:       General: She is not in acute distress. Appearance: She is well-developed. She is not ill-appearing, toxic-appearing or diaphoretic. Comments:         HENT:      Head: Normocephalic and atraumatic. Right Ear: Decreased hearing noted. Left Ear: Decreased hearing noted. Nose: Nose normal.      Mouth/Throat:      Mouth: No oral lesions. Dentition: Normal dentition. Pharynx: No oropharyngeal exudate. Eyes:      General: No scleral icterus. Right eye: No discharge. Left eye: No discharge. Conjunctiva/sclera: Conjunctivae normal.      Right eye: No chemosis or exudate.      Left eye: No chemosis or exudate. Pupils: Pupils are equal, round, and reactive to light. Neck:      Musculoskeletal: Normal range of motion and neck supple. No edema or neck rigidity. Thyroid: No thyromegaly. Vascular: No JVD. Trachea: No tracheal deviation. Cardiovascular:      Pulses: No decreased pulses. Pulmonary:      Effort: Pulmonary effort is normal. No tachypnea, bradypnea, accessory muscle usage or respiratory distress. Breath sounds: Decreased breath sounds present. No wheezing or rales. Chest:      Chest wall: No tenderness. Abdominal:      General: Bowel sounds are normal. There is no distension. Palpations: Abdomen is soft. There is no mass. Tenderness: There is no abdominal tenderness. There is no guarding or rebound. Musculoskeletal:         General: Tenderness present. Right shoulder: Normal.      Left shoulder: Normal.      Right elbow: Normal.     Left elbow: Normal.      Right wrist: Normal.      Left wrist: Normal.      Right hip: Normal.      Left hip: Normal.      Right knee: Normal.      Left knee: Normal.      Right ankle: Normal. Achilles tendon normal.      Left ankle: Normal. Achilles tendon normal.      Cervical back: She exhibits decreased range of motion, tenderness and bony tenderness. Thoracic back: She exhibits decreased range of motion, tenderness and bony tenderness. Lumbar back: She exhibits decreased range of motion, tenderness, bony tenderness and pain. She exhibits no swelling, no edema, no deformity, no laceration and normal pulse.       Right upper arm: Normal.      Left upper arm: Normal.      Right forearm: Normal.      Left forearm: Normal.      Right hand: Normal.      Left hand: Normal.      Right upper leg: Normal.      Left upper leg: Normal.      Right lower leg: Normal.      Left lower leg: Normal.      Right foot: Normal.      Left foot: Normal.      Comments: Tender areas are indicated by numbered spot Lymphadenopathy:      Cervical: No cervical adenopathy. Skin:     General: Skin is warm and dry. Coloration: Skin is not pale. Findings: No abrasion, bruising, ecchymosis, erythema, laceration, petechiae or rash. Rash is not macular, pustular or urticarial.      Nails: There is no clubbing. Neurological:      Mental Status: She is alert and oriented to person, place, and time. Cranial Nerves: No cranial nerve deficit. Sensory: Sensory deficit present. Motor: No tremor, atrophy or abnormal muscle tone. Coordination: Coordination normal. Finger-Nose-Finger Test abnormal, Heel to [employer] abnormal and Romberg Test abnormal.      Gait: Gait abnormal and tandem walk abnormal.      Deep Tendon Reflexes: Reflexes abnormal. Babinski sign absent on the right side. Babinski sign absent on the left side. Reflex Scores:       Tricep reflexes are 0 on the right side and 0 on the left side. Bicep reflexes are 1+ on the right side and 1+ on the left side. Brachioradialis reflexes are 1+ on the right side and 1+ on the left side. Patellar reflexes are 1+ on the right side and 1+ on the left side. Achilles reflexes are 0 on the right side and 0 on the left side. Psychiatric:         Attention and Perception: She is attentive. Mood and Affect: Mood is not anxious or depressed. Affect is not labile, blunt, angry or inappropriate. Speech: She is communicative. Speech is not rapid and pressured, delayed, slurred or tangential.         Behavior: Behavior normal. Behavior is not agitated, slowed, aggressive, withdrawn, hyperactive or combative. Thought Content: Thought content normal. Thought content is not paranoid or delusional. Thought content does not include homicidal or suicidal ideation. Thought content does not include homicidal or suicidal plan. Cognition and Memory: Memory is not impaired.  She does not exhibit impaired recent memory or impaired remote memory. Judgment: Judgment normal. Judgment is not impulsive or inappropriate. Back Exam     Muscle Strength   Right Quadriceps:  4/5   Left Quadriceps:  4/5   Right Hamstrings:  4/5   Left Hamstrings:  4/5           Neurologic Exam     Mental Status   Oriented to person, place, and time. Speech: not slurred   Level of consciousness: alert  Knowledge: good. Cranial Nerves     CN III, IV, VI   Pupils are equal, round, and reactive to light.     Motor Exam   Muscle bulk: decreased  Right arm tone: decreased  Left arm tone: decreased  Right leg tone: decreased  Left leg tone: decreased    Strength   Right neck flexion: 4/5  Left neck flexion: 4/5  Right neck extension: 4/5  Left neck extension: 2/5  Right deltoid: 2/5  Left deltoid: 2/5  Right biceps: 2/5  Left biceps: 2/5  Right triceps: 2/5  Left triceps: 2/5  Right wrist flexion: 2/5  Left wrist flexion: 2/5  Right wrist extension: 2/5  Left wrist extension: 2/5  Right interossei: 2/5  Left interossei: 2/5  Right abdominals: 4/5  Left abdominals: 4/5  Right iliopsoas: 4/5  Left iliopsoas: 4/5  Right quadriceps: 4/5  Left quadriceps: 4/5  Right hamstrin/5  Left hamstrin/5  Right glutei: 4/5  Left glutei: 4/5  Right anterior tibial: 4/5  Left anterior tibial: 4/5  Right posterior tibial: 4/5  Left posterior tibial: 4/5  Right peroneal: 4/5  Left peroneal: 4/5  Right gastroc: 4/5  Left gastroc: 4/5    Sensory Exam   Right arm light touch: decreased from fingers  Left arm light touch: decreased from fingers  Right leg light touch: decreased from knee  Left leg light touch: decreased from knee    Gait, Coordination, and Reflexes     Gait  Gait: wide-based    Coordination   Romberg: positive  Finger to nose coordination: abnormal  Heel to shin coordination: abnormal  Tandem walking coordination: abnormal    Reflexes   Right brachioradialis: 1+  Left brachioradialis: 1+  Right biceps: 1+  Left biceps: 1+  Right triceps: 0  Left triceps: 0  Right patellar: 1+  Left patellar: 1+  Right achilles: 0  Left achilles: 0      After extensive review of the records and above physical exam, I have formulated the followingdiagnoses and plan:      DIAGNOSES:    1. The patient was admitted to the acute rehabilitation unit with the primary rehab diagnoses being severe abnormality of gait and mobility andimpaired self care and ADL's due to central cord syndrome with the cervical spine    Compared to Pre-Admission Assessment, patients medical and functional status remain challengingly complex and patient continues to requireintensive therapeutic intervention from multiple therapies, therefore, initiate acute intensive comprehensive inpatient rehabilitation program including PT/OT to improve balance, ambulation, ADLs, and to improve the P/AROM. Functional and medical status reassessed regarding patients ability to participate in therapies and patient found to be able to participate in acute intensivecomprehensive inpatient rehabilitation program.  Therapeutic modifications regarding activities in therapies, place, amount of time per day and intensity of therapy made daily. Enroll in acute course of therapy program to include 1 1/2 hours per day of PT 5 to 7days per week and 1 1/2 hours per day of OT 5 to 7 days per week, and  SLP and Rec T 1/2 hour per day 3-5 days per week. The patient is stable medically and physically on previous exam.       This patient present with significant new onset decreased mobility andinability to perform activities of daily living skills independently and is at significant risk for prolonged disability  For this reason they have been admitted to [facility]. Thepatient's current functional and medical status are highly complex but the patient is able to participate in intensive rehabilitation. A comprehensive inpatient rehabilitation program is appropriate.   The patient Angelica Causey initial evaluation by the rehabilitation team and be discussed at regular treatment team meetings to assess progress, mobility, self care, mood and discharge issues. Physical therapy will be consulted for mobilityand endurance issues and should be performed 1 to 2 times per day, 7 days per week for the length of stay. Occupational therapy will be consulted for activities of daily living and should be performed 1 to 2 times per day,7 days per week for the length of stay. Their capacity to participate at an acute level, decision to be treated in the gym, room or on the unit, their activity goals for the day and the number of minutes of activeparticipation will be reassessed and re-prescribed daily. Because this patient is medically complex, I will check a CBC, BMP, UA and orthostatic blood pressures. They will be reassessed daily regarding their ability toparticipate in an acute level rehabilitation program.  Recreational Therapy will be consulted for community re-entry and adjustment to disability. Communication, cognitive and emotional issues will also be addressed duringthis rehabilitation stay by rehabilitation psychologist or speech therapist as appropriate. I reviewed the patient's old and current charts and discussed other rehabilitation options with the rehabilitation teamincluding the rehab RN and the admission team as well as the patient. I feel that the patient's functional recovery would be best served at an acute inpatient rehabilitation program because the patient needs intense therapythree hours per day, direct RN supervision and daily monitoring by a physician for medical status. This cannot be sufficiently provided by home health care, a skilled nursing facility or in an outpatient setting. I furtherfeel that the patient has the potential to improve functional abilities in an acute intensive rehabilitation program.    Old records were reviewed and summarized.     2. Other diagnoses which complicate rehabilitation stay include:     Principal Problem:    Abnormality of gait and mobility due to  NTSCI with Impaired Mobility and ADL's secondary to Cervical Myelopathy and Vestibular neuronitis with rehab admission 06/24/20. Active Problems:  1. HTN (hypertension),  HLD (hyperlipidemia), CAD,   Chronic combined systolic and diastolic congestive heart failure,  History of ST elevation myocardial infarction (STEMI),   History of PTCA,  Valvular heart disease-vital signs every shift dose and titrate cardiac medication to include Norvasc, Lipitor, Coreg, Apresoline both intravenous and oral, Entresto consult hospitalist for backup medical  2. CKD (chronic kidney disease)-control blood sugars control blood pressure recheck BMP monitor UA and renal output, push clear liquids  3. Osteoarthritis,  Lumbar spinal stenosis, DJD (degenerative joint disease), lumbar-add Lidoderm BenGay and heat lowest effective dose of opiates  4. SCC (squamous cell carcinoma), arm, Eczematous dermatitis-topical steroids add co-Q10 vitamin D  5. Vitamin D and B12 deficiency-high-dose vitamin D and B12  6. Spinal stenosis in cervical region with cervical myelopathy-inoperable due to moderate multiple medical comorbidities-PT and OT are her greatest hope for recovery her blood sugars are elevated wean steroids  7. Type 2 diabetes mellitus uncontrolled with hyperlipidemia and, Obesity (BMI 30-39. 9)-fingerstick blood sugars q. before meals and at bedtime dose and titrate insulin and carbohydrate insulin intake add diabetic add and dietary Ed  8. Severely [place] (hard of hearing),   Vestibular neuronitis of both ears  9. Nausea, anxiety and elevated blood sugars due to steroids for cervical myelopathy-steroid taper and titrate benzodiazepines if needed titrate Zofran and Antivert  10.  Neurogenic bowel and bladder-cath if no void check postvoid residuals teach Crede method recheck stat UA             I am especially concerned about multifactorial cause of her weakness    The patient's high risk medication use includes Coumadin and Norco.    The patient is high risk for urinary tract infection, an admission urinalysis has been ordered. I will have the nurses check post void residual bladder volumes and place acatheter if excessive urine is retained in the bladder after voiding. The patient is risk for deep venous thromosis,complex deep venous thrombosis protocol prophylaxis has been ordered Coumadin. The patient is high risk for orthostasis and a hydration program and orthostatic blood pressure screening have been ordered. I will attempt to get old records from the patient's previous hospital stay. Care everywhere on [facility] wasutilized. 3.  Current and previous medications were reviewed and summarized and compared to old medication lists from home and from the acute floor. 4.  Complex labs and x-rays were reviewed. I will reviewpatient's old EKG and place it on the chart. 5.  Will provide emotional support for this patient regarding adjustment to their disability. Cognition and mood will be screened daily and addressed by rehabilitationpsychology and/or speech therapy as appropriate. I have encouraged the patient to attend the Rehab Adjustment to Disability Support Group and recreational therapy. 6.  Estimated length of stay is 2-4 weeks. Discharge to home with help from family and home health PT, OT, RN, and aide. Patient should be independent at discharge. 7.  The patient's medical and rehab prognosis are good. [address] regarding the patient's back up to general medical needs. A welcome letter was presented with an explanation of my services, my specialty and what to expect during the rehabilitation process.   Aswell as introducing myself, I also wrote my name on their bedside marker board with their name as well as the names of the other physicians with an explanation of our individual roles in their care, as well as the rehab process.             Cole Ratliff D.O., LAURA.[email or web address].&ANDREI

## 2020-06-25 NOTE — PROGRESS NOTES
Facility/Department: [facility] Initial Assessment: Physical Therapy  Room: R235/R235-01    NAME: Ifeoma Flores  : 6/10/1927  MRN: [id number]    Date of Service: 2020    Rehab Diagnosis(es):Abnormality of gait and mobility due to NTSCI with Impaired Mobility and ADL's secondary to Cervical Myelopathy and Vestibular neuronitis   Patient Active Problem List    Diagnosis Date Noted    History of ST elevation myocardial infarction (STEMI)      Priority: High    CKD (chronic kidney disease) 2015     Priority: High    HLD (hyperlipidemia) 2015     Priority: High    HTN (hypertension) 2011     Priority: High    Diabetes mellitus 2011     Priority: High    Vitamin D deficiency 2015     Priority: Low    SI (stress incontinence), female 2012     Priority: Low    Osteoarthritis 2012     Priority: Low    DJD (degenerative joint disease), lumbar 2020    History of PTCA 2020    Vestibular neuronitis of both ears 2020    Dizziness     Spinal stenosis in cervical region 2020    Lumbar degenerative disc disease 2020    Spinal stenosis, lumbar region, with neurogenic claudication 2020    Abnormality of gait and mobility due to  NTSCI with Impaired Mobility and ADL's secondary to Cervical Myelopathy and Vestibular neuronitis with rehab admission 20. 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/2019    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/2018    History of non-ST elevation myocardial infarction (NSTEMI) 11/12/2018    Nausea & vomiting 11/11/2018    Chest pain 11/10/2018    Vitamin B12 deficiency 09/17/2018    Recurrent UTI (urinary tract infection) 37/08/7733    Diastolic dysfunction 71/74/1851    Valvular heart disease 03/07/2018    Hypercalcemia 01/15/2018    Lumbar spinal stenosis 12/22/2016    Chronic low back pain 08/17/2016    Eczematous dermatitis     SCC (squamous cell carcinoma), arm 2013    Type 2 diabetes mellitus ([address] 2013    Obesity (BMI 30-39.9) 2013    [place] (hard of hearing) 2013       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 SCC (squamous cell carcinoma), arm 2013    right upper arm, 2015 right forarm    SI (stress incontinence), female 5/29/2012    Type II or unspecified type diabetes mellitus without mention of complication, not stated as uncontrolled     Whooping cough      Past Surgical History:   Procedure Laterality Date    ANKLE SURGERY      broken left ankle.     APPENDECTOMY      BREAST BIOPSY      x2 left breast    CATARACT REMOVAL  2004    bilateral    CORONARY ANGIOPLASTY WITH STENT PLACEMENT  01/2019    CYSTOCELE REPAIR      EYE SURGERY      bilateral cataract    HYSTERECTOMY      complete at age 39    [address]      as a child       Chart Reviewed: Yes  Diagnosis: Abnormality of gait and mobility due to NTSCI with Impaired Mobility and ADL's secondary to Cervical Myelopathy and Vestibular neuronitis     Restrictions:  Restrictions/Precautions: Fall Risk  Position Activity Restriction  Other position/activity restrictions: DNRCCA       SUBJECTIVE:           Pre and post Treatment Pain Screenin/10       Prior Level of Function:  Social/Functional History  Lives With: Son  Type of Home: House  Home Layout: One level  Home Access: Stairs to enter without rails  Entrance Stairs - Number of Steps: 1  Home Equipment: Rolling walker(and rollator)  ADL Assistance: Independent  Ambulation Assistance: Independent(using rollator)  Transfer Assistance: Independent  Active : No  Additional Comments: Per patient's son she has 24 hour supervision from multiple family members; pt uses rollator in the home and ww when outside the home    OBJECTIVE:   Vision/Hearing:  Vision: Impaired  Vision Exceptions: Wears glasses at all times  Hearing: Exceptions to [facility]  Hearing Exceptions: Hard of hearing/hearing concerns    Cognition:  Overall Orientation Status: Within Functional Limits       ROM:  RLE AROM: WFL  LLE AROM : WFL    Strength:  Strength RLE  Comment: 4-/5 excep 3/5 hip  Strength LLE  Comment: 4-/5 except 3/5 hip    Neuro:        Balance  Sitting - Static: Good  Sitting - Dynamic: Good  Standing - Static: Fair;-(pt unable to stand without UE support.  With UE supportshe is able to stand 60 sec with no sway or LOB)  Standing - Dynamic: Fair;-(mild LOB in gait with rollator utilized)       Bed mobility  Rolling to Left: Modified independent  Rolling to Right: Modified independent  Supine to Sit: Stand by assistance  Sit to Supine: Stand by assistance  Comment: increased effort required    Transfers  Sit to Stand: Contact guard assistance(cues for safe techinque also required)  Stand to sit: Contact guard assistance  Bed to Chair: Minimal assistance(pivot and with ww)    Ambulation 1  Surface: carpet  Device: Rollator  Assistance: Minimal assistance; Moderate assistance  Quality of Gait: narrow JUAN, decreased step length, poor pelvic stability,downward gaze with mild LOB with scanning environment with assist for recoery required  Distance: 35 feet  Comments: cues for rollator safety and technique for returning to chair required    Stairs/Curb  Stairs?: No(not safe to test)    Wheelchair Activities  Propulsion: No(pt to be ambulatory)    Activity Tolerance  Activity Tolerance: Patient Tolerated treatment well          Quality Indicators (IRF-YOMI):  Rolling L and R: Independent - 6  Sit>Supine: Supervision or Touching Assistance - 4  Supine>Sit: Supervision or Touching Assistance - 4  Sit>Stand: Supervision or Touching Assistance - 4  Chair/Bed>Chair Transfer: Supervision or Touching Assistance - 4  Car Transfers: Not attempted due to Medical Condition or Safety Concerns (I.e. unsafe or physician orders) - 80  Walk 10 ft: Partial/Moderate Assistance (helper does <50%) - 3  Walk 50 ft with two 90 degree turns: Not attempted due to Medical Condition or Safety Concerns (I.e. unsafe or physician orders) - 80  Walk 150 ft in [address]: Not attempted due to Medical Condition or Safety Concerns (I.e. unsafe or physician orders) - 80  Walking 10 ft on Unlevel Surface: Not attempted due to Medical Condition or Safety Concerns (I.e. unsafe or physician orders) - 80  Picking up Objects from Standing Position: Not attempted due to Medical Condition or Safety Concerns (I.e. unsafe or physician orders) - 80  Stairs: No Not attempted due to medical condition or safety concerns (i.e. unsafe or physician order) - 88  WC Mobility: No Not Applicable (pt did not complete item prior to admission) - 9      ASSESSMENT:  Body structures, Functions, Activity limitations: Decreased functional mobility ; Decreased strength;Decreased endurance;Decreased balance; Increased pain;Decreased posture  Decision Making: Medium Complexity  History: high  Exam: med  Clinical Presentation: med    PT Education: Goals;Plan of Care;General Safety;PT Role  Patient Education: fair follow thru of instructions    CLINICAL IMPRESSION: Pt demonstrates deficits as listed. She is requiring assistance with all mobility at this time. She was previously at an indep level of function. Pt is in need of PT at this level of care prior to safe return to home    PLAN OF CARE:  Frequency: 1-2 treatment sessions per day, 5-7 days per week     Current Treatment Recommendations: Strengthening, Balance Training, Functional Mobility Training, Transfer Training, Endurance Training, Gait Training, Stair training, Neuromuscular Re-education, Pain Management, Home Exercise Program, Safety Education & Training, Patient/Caregiver Education & Training, Manual Therapy - Soft Tissue Mobilization    Patient's Goal:  to be able to walk without assistance with the rollator    GOALS:  Long term goals  Long term goal 1: Patient will be independent with bed mobility. Long term goal 2: Patient will be indep with bed and car transfers.   Long term goal 3: Patient will be SBA with 150ft of gait with rollator in busy area and indep for household distance up to 50 feet in familiar environment  Long term goal 4: SBA 4 stairs with rails    ELOS:   Plan weeks: 2    Therapy Time:    Individual   Time In 1100   Time Out 1135   Minutes [facility], [address] [place] [address], 06/25/20 at 11:43 AM

## 2020-06-25 NOTE — PROGRESS NOTES
Clinical Pharmacy Note    Warfarin consult follow-up    Recent Labs     06/25/20  0502   INR 1.4     Recent Labs     06/23/20  0520 06/24/20  0537 06/25/20  0502   HGB 11.5* 11.7* 11.0*   HCT 35.2* 35.8* 34.4*    232 156       Significant drug:drug interactions:  New warfarin drug-drug interactions: None  Discontinued drug-drug interactions: None  Current warfarin-drug interactions: APAP, Plavix, Norco, prednisone     Notes:  Date INR Warfarin Dose    06/19/20 1.8  6 mg    06/20/20 2.1  7 mg    06/21/20 2.3   7 mg    06/22/20 3.3  HOLD    06/23/20 3.6   HOLD      06/24/20 1.6  7 mg    06/25/20 1.4  8 mg     INR is subtherapeutic at 1.4. Boost with warfarin 8 mg today to reach INR goal of 2-3 for valvular heart disease/history of CVA. This is an increase in dose from the patient's usual home dosage regimen of warfarin 6 mg on Thursdays. Daily PT/INR until stable within therapeutic range.     Dora Russell, PharmD   6/25/2020 9:53 AM

## 2020-06-25 NOTE — PROGRESS NOTES
Physical Therapy Rehab Treatment Note  [facility]  [facility]       NAME: Manuel Atkins  : 6/10/1927 (80 y.o.)  MRN: [id number]  CODE STATUS: DNR-CCA    Date of Service: 2020  Chart Reviewed: Yes  Family / Caregiver Present: No    Restrictions:  Restrictions/Precautions: Fall Risk  Position Activity Restriction  Other position/activity restrictions: DNRCCA       SUBJECTIVE:    Pain Screening  Patient Currently in Pain: No  Pre Treatment Pain Screening  Pain at present: 0  Scale Used: Numeric Score  Intervention List: Patient able to continue with treatment    Post Treatment Pain Screenin/10       OBJECTIVE:   Overall Orientation Status: Within Functional Limits           Neuromuscular Education  PNF: Supine bridges, walkouts and marching with core activation to improve core strength x 10  NDT Treatment: Standing  Neuromuscular Comments: Standing weightshifts and marching to facilitate hip strengthening and improve balance. x 1 min ea Standing forward and retro walking to facilitate hip rotation and weight bearing through single leg x 20    Bed mobility  Rolling to Right: Modified independent  Supine to Sit: Minimal assistance  Sit to Supine: Minimal assistance    Transfers  Sit to Stand: Contact guard assistance;Stand by assistance  Stand to sit: Contact guard assistance    Ambulation  Ambulation?: Yes  Ambulation 1  Surface: carpet  Device: Rollator  Assistance: Minimal assistance;Contact guard assistance  Quality of Gait: Narrow JUAN, reduced step length and height throughout gait, poor pelvic stability and forward trunk. Gait Deviations: Slow Shanlele; Increased JUAN; Decreased step length  Distance: [de-identified]'  Comments: Cues for rollator safety and stand to sit technique                   Exercises  Hip Abduction: x 20 ADD x 20 (manual resistance)  Neurodevelopmental Techniques: Standing Tandem balance x :30 ea  Other exercises  Other exercises 1: STS x 5 ASSESSMENT/COMMENTS:  Body structures, Functions, Activity limitations: Decreased functional mobility ; Decreased strength;Decreased endurance;Decreased balance; Increased pain;Decreased posture  Assessment: Pt had difficulty with clearing hips off table when performing mat exercises and needed VC for edge of table exercises. Pt required VC to maintain upright posture during standing exercises and while gait training. PLAN OF CARE/Safety:   Safety Devices  Type of devices: Chair alarm in place; Left in chair      Therapy Time:   Individual   Time In 1430   Time Out 1530   Minutes 60     Minutes:60      Transfer/Bed mobility trainin      Gait training: 15      Neuro re education: 30     Therapeutic ex: Derrick Her Sers 20 at 4:51 PM     Electronically signed by Linda Zheng PTA on 2020 at 4:52 PM

## 2020-06-25 NOTE — PROGRESS NOTES
[facility]   Facility/Department: [facility]  Speech Language Pathology  Clinical Bedside Swallow Evaluation    NAME:Bina Carlin  : 6/10/1927 (89 y.o.)   MRN: [id number]  [id number]  ADMISSION DATE: 2020  PATIENT DIAGNOSIS(ES): Impaired mobility [Z74.09]  Abnormality of gait and mobility [R26.9]  No chief complaint on file.     Patient Active Problem List    Diagnosis Date Noted    History of ST elevation myocardial infarction (STEMI)      Priority: High    CKD (chronic kidney disease) 2015     Priority: High    HLD (hyperlipidemia) 2015     Priority: High    HTN (hypertension) 2011     Priority: High    Diabetes mellitus 2011     Priority: High    Vitamin D deficiency 2015     Priority: Low    SI (stress incontinence), female 2012     Priority: Low    Osteoarthritis 2012     Priority: Low    DJD (degenerative joint disease), lumbar 2020    History of PTCA 2020    Vestibular neuronitis of both ears 2020    Dizziness     Spinal stenosis in cervical region 2020    Lumbar degenerative disc disease 2020    Spinal stenosis, lumbar region, with neurogenic claudication 2020    Abnormality of gait and mobility due to  NTSCI with Impaired Mobility and ADL's secondary to Cervical Myelopathy and Vestibular neuronitis with rehab admission 20. 2020    Generalized muscle ache 2020    Generalized osteoarthrosis, involving multiple sites 2020    Syncope 2020    Current use of long term anticoagulation 2019    Other transient cerebral ischemic attacks and related syndromes     Cerebrovascular accident (CVA) ([address]     Chronic combined systolic and diastolic congestive heart failure ([address] 2019    Monitoring for long-term anticoagulant use 2019    Transient cerebral ischemia 2019    S/P PTCA (percutaneous transluminal coronary angioplasty) 2019    Late  EYE SURGERY      bilateral cataract    HYSTERECTOMY      complete at age 39    [address]      as a child     Allergies   Allergen Reactions    Metoclopramide     Pantoprazole Other (See Comments)    Pcn [Penicillins]      Tolerates rocephin    Protonix [Pantoprazole Sodium]     Tramadol        DATE ONSET: 6/19/2020    Date of Evaluation: 6/25/2020   Evaluating Therapist: Jorge Luis Osullivan CF-SLP    Recommended Diet and Intervention  Diet Solids Recommendation: Regular  Liquid Consistency Recommendation: Thin  Recommended Form of Meds: PO        Compensatory Swallowing Strategies  Compensatory Swallowing Strategies: Small bites/sips;Eat/Feed slowly;Upright as possible for all oral intake    Reason for Referral  Ariana Cedeno was referred for a bedside swallow evaluation to assess the efficiency of her swallow function, identify signs and symptoms of aspiration and make recommendations regarding safe dietary consistencies, effective compensatory strategies, and safe eating environment. General  Chart Reviewed: Yes  Comments: Pt's son present during BSE. Subjective  Subjective: Pt was alert, cooperative, and pleasant for BSE. Behavior/Cognition: Alert; Cooperative;Pleasant mood  Respiratory Status: Room air  O2 Device: None (Room air)  Follows Directions: Simple  Dentition: Adequate;Dentures top;Dentures bottom  Patient Positioning: Upright in chair  Baseline Vocal Quality: Normal  Prior Dysphagia History: None  Consistencies Administered: Reg solid; Dysphagia Pureed (Dysphagia I); Thin - cup    Current Diet level:  Current Diet : Regular  Current Liquid Diet : Thin    Oral Motor Deficits  Oral/Motor  Oral Motor: Within functional limits    Oral Phase Dysfunction  Oral Phase  Oral Phase: WFL  Oral Phase Dysfunction  Lingual/Palatal Residue: Reg solid  Oral Phase  Oral Phase - Comment: Oral phase of swallow WFL.  Pt was able to form and clear a cohesive bolus with mild lingual residue. Pt was able to clear residue with the use of liquid wash. Indicators of Pharyngeal Phase Dysfunction   Pharyngeal Phase  Pharyngeal Phase: WFL  Pharyngeal Phase   Pharyngeal: Pharyngeal phase of swallow WFL. Laryngeal elevation was present during BSE. No overt s/s of aspiration observed following any consistencies presented. Pt was able to tolerate single sips and consecutive sips of water from cup with no overt s/s of aspiration observed. Impression  Dysphagia Diagnosis: Swallow function appears grossly intact  Dysphagia Impression : Oropharyngeal phase of swallow WFL. Dysphagia Outcome Severity Scale: Level 6: Within functional limits/Modified independence     Treatment Plan  Requires SLP Intervention: No  Duration/Frequency of Treatment: No f/u swallow. D/C Recommendations: To be determined     Treatment/Goals   N/A    Prognosis  Individuals consulted  Consulted and agree with results and recommendations: Patient;RN;Family member(RN - Nicole Martell)  Family member consulted: Pt's son. Education  Patient Education: Pt was educated on results of BSE. Patient Education Response: Verbalizes understanding  Safety Devices in place: Yes  Type of devices: Chair alarm in place; Other (comment)(RN in room upon SLP departure.)    Pain:  Pain Assessment  Patient Currently in Pain: No    [id number] Ej Barkley ([facility]):    SWALLOWING:  Ratin         Therapy Time  SLP Individual Minutes  Time In: 4794  Time Out: 7667  Minutes: 10        Signature: Electronically signed by DAVID Bravo-SLP on 2020 at 11:21 AM

## 2020-06-25 NOTE — PROGRESS NOTES
not stated as uncontrolled, and Whooping cough. has a past surgical history that includes Appendectomy; Rectocele repair; Cystocele repair; Ankle surgery; Breast biopsy; Cataract removal (2004); eye surgery; Tonsillectomy; Hysterectomy; and Coronary angioplasty with stent (01/2019).            Restrictions  Restrictions/Precautions  Restrictions/Precautions: Fall Risk  Position Activity Restriction  Other position/activity restrictions: [facility]    Subjective   General  Chart Reviewed: Yes  Referring Practitioner: Dr Oswaldo Ivy  Diagnosis: Michelle Love with imp mob and ADLs 2° cervical myelopathy and vestibular neuronitis  Patient Currently in Pain: No  Vital Signs  Patient Currently in Pain: No  Social/Functional History  Social/Functional History  Lives With: Son  Type of Home: House  Home Layout: Two level, Performs ADL's on one level, Able to Live on Main level with bedroom/bathroom  Home Access: Stairs to enter without rails  Entrance Stairs - Number of Steps: 1  Bathroom Shower/Tub: Tub/Shower unit, Curtain  Bathroom Equipment: Hand-held shower, Shower chair  Home Equipment: 4 wheeled walker, ww  Son reported that patient uses a rollator in the house and a ww in the community  ADL Assistance: Independent  Homemaking Assistance: (son does all homemaking)  Ambulation Assistance: Independent  Transfer Assistance: Independent  Active : No  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  Leisure & Hobbies: Patient enjoys coloring in adult books  Additional Comments: Per patient's son she has 25 hour supervision from multiple family members       Objective   Vision: Impaired  Vision Exceptions: Wears glasses at all times  Hearing Exceptions: Hard of hearing/hearing concerns(Patient hears better with the left ear)    Orientation  Overall Orientation Status: Within Functional Limits     Balance  Sitting Balance: Supervision  Standing Balance: Contact guard assistance  Functional Mobility  Functional - Mobility Device: Rolling Walker  Activity: To/from bathroom  Assist Level: Moderate assistance  Functional Mobility Comments: ambulation assistance need fluctuated between min assist and mod assist  Toilet Transfers  Toilet - Technique: Ambulating  Equipment Used: Grab bars  Toilet Transfer: Minimal assistance  Tub Transfers  Tub Transfers: Not tested  Shower Transfers  Shower - Transfer From: Aminata Mann - Transfer Type: To and From  Shower - Transfer To: Shower seat with back  Shower - Technique: Ambulating  Shower Transfers: Moderate assistance  ADL  Feeding: Setup  Grooming: Setup  UE Bathing: Stand by assistance  LE Bathing: Moderate assistance(for B lower legs and her rear yeyo area for thoroughness)  UE Dressing: Setup; Increased time to complete  LE Dressing: Minimal assistance(Patient did not want to wear socks and reported that she usually does not wear them)  Toileting: Moderate assistance(Patient's undergarment was saturated with uring but then she was continent of bowel )  Tone RUE  RUE Tone: Normotonic  Tone LUE  LUE Tone: Normotonic  Coordination  Movements Are Fluid And Coordinated: No  Coordination and Movement description: Tremors;Right UE;Left UE  Quality of Movement Other  Comment: Patient was noted to have shaking of her upper body when she first went from supine to sit. Patient reported that this is new for her and son confirmed that patient did not have tremors PTA        Transfers  Sit to stand: Minimal assistance  Stand to sit: Minimal assistance  Vision - Basic Assessment  Vision Comments: no complaints  Cognition  Overall Cognitive Status: Exceptions  Arousal/Alertness: Appropriate responses to stimuli  Following Commands:  Follows one step commands with repetition(probably due to [facility])  Attention Span: Appears intact  Memory: Decreased short term memory;Decreased long term memory  Safety Judgement: Good awareness of safety precautions  Insights: Fully aware of deficits  Initiation: Requires cues for some  Sequencing: Requires cues for some  Cognition Comment: Comp - mod assist,  exp - supervision, soc int - modified indep, prob solv supervision, mem - supervision  Perception  Overall Perceptual Status: WFL     Sensation  Overall Sensation Status: WFL(Patient reported no numbness or tingling)        LUE AROM (degrees)  LUE AROM : WFL  Left Hand AROM (degrees)  Left Hand AROM: WFL  RUE AROM (degrees)  RUE AROM : WFL  Right Hand AROM (degrees)  Right Hand AROM: WFL  LUE Strength  LUE Strength Comment: decreased strength in the shoulder 3/5  RUE Strength  RUE Strength Comment: overall minimal decrease 3+/5     Hand Dominance  Hand Dominance: Right             Plan   Plan  Times per week: 5-7 times per week  Plan weeks: 2 weeks  Current Treatment Recommendations: Strengthening, Endurance Training, Neuromuscular Re-education, Self-Care / ADL, Balance Training, Functional Mobility Training, Safety Education & Training, Patient/Caregiver Education & Training      Goals  Patient Goals   Patient goals : \"to at least do things in my home\"     [x]  Patient will complete self care as followed using the recommended adaptive equipment and/or adaptive techniques as instructed:  Feeding: modified independent  Grooming: modified independent  Bathing: modified independent  UE Dressing: modified independent  LE Dressing: modified independent  Toileting: modified independent  Toilet Transfers: modified independent  Tub Transfers: supervision     [x]  Patient will sequence self-care routine with no verbal/tactile cues. [x]  Patient will improve static and dynamic standing balance to complete pants management at modified independent level         [x]  Patient will improve functional endurance to tolerate/complete 60 minutes of ADLs. [x]  Patient will improve B UE strength and endurance to [facility] in order to participate in self-care activities as projected.      [x]  Patient will improve B  hand fine motor coordination to [facility] in order to manage clothing fasteners/self-care containers in a timely manner         [x]  Patient will perform kitchen mobility at device level without episodes of LOB and good safety awareness      [x]  Patient will perform basic room mobility at modified independent level. [x]  Patient will access appropriate D/C site with as few architectural barriers as possible. [x]  Patient and/or caregiver will demonstrate understanding of recommended HEP for coordination.         [x]  Patient's cognition will improve to safely perform ADLs:  Comprehension: min assist  Expression: modified indep  Social Interaction: modified indep  Problem Solving: supervision  Memory: supervision      Therapy Time   Individual Concurrent Group Co-treatment   Time In 0930         Time Out 1030         Minutes 60              Eval: 60 minutes    CHRISSY Mcpherson/L    Electronically signed by ALBA Mcpherson on 6/25/2020 at 11:58 AM

## 2020-06-26 LAB
ANION GAP SERPL CALCULATED.3IONS-SCNC: 12 MEQ/L (ref 9–15)
BUN BLDV-MCNC: 38 MG/DL (ref 8–23)
CALCIUM SERPL-MCNC: 8.8 MG/DL (ref 8.5–9.9)
CHLORIDE BLD-SCNC: 104 MEQ/L (ref 95–107)
CO2: 21 MEQ/L (ref 20–31)
CREAT SERPL-MCNC: 0.99 MG/DL (ref 0.5–0.9)
GFR AFRICAN AMERICAN: >60
GFR NON-AFRICAN AMERICAN: 52.3
GLUCOSE BLD-MCNC: 143 MG/DL (ref 70–99)
GLUCOSE BLD-MCNC: 154 MG/DL (ref 60–115)
GLUCOSE BLD-MCNC: 260 MG/DL (ref 60–115)
GLUCOSE BLD-MCNC: 278 MG/DL (ref 60–115)
GLUCOSE BLD-MCNC: 280 MG/DL (ref 60–115)
HCT VFR BLD CALC: 34.1 % (ref 37–47)
HEMOGLOBIN: 11 G/DL (ref 12–16)
INR BLD: 1.7
IRON SATURATION: 20 % (ref 11–46)
IRON: 57 UG/DL (ref 37–145)
MCH RBC QN AUTO: 30.1 PG (ref 27–31.3)
MCHC RBC AUTO-ENTMCNC: 32.2 % (ref 33–37)
MCV RBC AUTO: 93.5 FL (ref 82–100)
PDW BLD-RTO: 14.9 % (ref 11.5–14.5)
PERFORMED ON: ABNORMAL
PLATELET # BLD: 264 K/UL (ref 130–400)
PLATELET SLIDE REVIEW: NORMAL
POTASSIUM REFLEX MAGNESIUM: 3.9 MEQ/L (ref 3.4–4.9)
PROTHROMBIN TIME: 20.3 SEC (ref 12.3–14.9)
RBC # BLD: 3.65 M/UL (ref 4.2–5.4)
SODIUM BLD-SCNC: 137 MEQ/L (ref 135–144)
TOTAL IRON BINDING CAPACITY: 286 UG/DL (ref 178–450)
VITAMIN D 25-HYDROXY: 26.8 NG/ML (ref 30–100)
WBC # BLD: 11.8 K/UL (ref 4.8–10.8)

## 2020-06-26 PROCEDURE — 82607 VITAMIN B-12: CPT

## 2020-06-26 PROCEDURE — 82746 ASSAY OF FOLIC ACID SERUM: CPT

## 2020-06-26 PROCEDURE — 82306 VITAMIN D 25 HYDROXY: CPT

## 2020-06-26 PROCEDURE — 97110 THERAPEUTIC EXERCISES: CPT

## 2020-06-26 PROCEDURE — 85610 PROTHROMBIN TIME: CPT

## 2020-06-26 PROCEDURE — 96125 COGNITIVE TEST BY HC PRO: CPT

## 2020-06-26 PROCEDURE — 99231 SBSQ HOSP IP/OBS SF/LOW 25: CPT | Performed by: PSYCHIATRY & NEUROLOGY

## 2020-06-26 PROCEDURE — 6370000000 HC RX 637 (ALT 250 FOR IP): Performed by: NURSE PRACTITIONER

## 2020-06-26 PROCEDURE — 6370000000 HC RX 637 (ALT 250 FOR IP): Performed by: PHYSICIAN ASSISTANT

## 2020-06-26 PROCEDURE — 85027 COMPLETE CBC AUTOMATED: CPT

## 2020-06-26 PROCEDURE — 1180000000 HC REHAB R&B

## 2020-06-26 PROCEDURE — 99222 1ST HOSP IP/OBS MODERATE 55: CPT | Performed by: INTERNAL MEDICINE

## 2020-06-26 PROCEDURE — 83550 IRON BINDING TEST: CPT

## 2020-06-26 PROCEDURE — 36415 COLL VENOUS BLD VENIPUNCTURE: CPT

## 2020-06-26 PROCEDURE — 97112 NEUROMUSCULAR REEDUCATION: CPT

## 2020-06-26 PROCEDURE — APPSS30 APP SPLIT SHARED TIME 16-30 MINUTES: Performed by: NURSE PRACTITIONER

## 2020-06-26 PROCEDURE — 2580000003 HC RX 258: Performed by: INTERNAL MEDICINE

## 2020-06-26 PROCEDURE — 97535 SELF CARE MNGMENT TRAINING: CPT

## 2020-06-26 PROCEDURE — 83540 ASSAY OF IRON: CPT

## 2020-06-26 PROCEDURE — 92523 SPEECH SOUND LANG COMPREHEN: CPT

## 2020-06-26 PROCEDURE — 97116 GAIT TRAINING THERAPY: CPT

## 2020-06-26 PROCEDURE — 6370000000 HC RX 637 (ALT 250 FOR IP): Performed by: INTERNAL MEDICINE

## 2020-06-26 PROCEDURE — 6360000002 HC RX W HCPCS: Performed by: INTERNAL MEDICINE

## 2020-06-26 PROCEDURE — 80048 BASIC METABOLIC PNL TOTAL CA: CPT

## 2020-06-26 PROCEDURE — 99233 SBSQ HOSP IP/OBS HIGH 50: CPT | Performed by: PHYSICAL MEDICINE & REHABILITATION

## 2020-06-26 RX ORDER — CARVEDILOL 12.5 MG/1
TABLET ORAL
Qty: 180 TABLET | Refills: 3 | Status: SHIPPED | OUTPATIENT
Start: 2020-06-26 | End: 2021-06-25

## 2020-06-26 RX ORDER — FERROUS SULFATE 325(65) MG
325 TABLET ORAL
COMMUNITY
End: 2021-04-29 | Stop reason: ALTCHOICE

## 2020-06-26 RX ORDER — HYDRALAZINE HYDROCHLORIDE 25 MG/1
25 TABLET, FILM COATED ORAL 3 TIMES DAILY
Status: DISCONTINUED | OUTPATIENT
Start: 2020-06-26 | End: 2020-06-29

## 2020-06-26 RX ORDER — WARFARIN SODIUM 4 MG/1
8 TABLET ORAL
Status: COMPLETED | OUTPATIENT
Start: 2020-06-26 | End: 2020-06-26

## 2020-06-26 RX ORDER — WARFARIN SODIUM 5 MG/1
TABLET ORAL
Qty: 90 TABLET | Refills: 1 | Status: SHIPPED | OUTPATIENT
Start: 2020-06-26 | End: 2020-07-08 | Stop reason: HOSPADM

## 2020-06-26 RX ORDER — WARFARIN SODIUM 6 MG/1
6 TABLET ORAL DAILY
Status: ON HOLD | COMMUNITY
End: 2022-07-30 | Stop reason: HOSPADM

## 2020-06-26 RX ADMIN — HYDRALAZINE HYDROCHLORIDE 50 MG: 50 TABLET, FILM COATED ORAL at 09:00

## 2020-06-26 RX ADMIN — CARVEDILOL 12.5 MG: 12.5 TABLET, FILM COATED ORAL at 08:58

## 2020-06-26 RX ADMIN — HYDRALAZINE HYDROCHLORIDE 10 MG: 20 INJECTION INTRAMUSCULAR; INTRAVENOUS at 15:30

## 2020-06-26 RX ADMIN — HYDRALAZINE HYDROCHLORIDE 25 MG: 25 TABLET, FILM COATED ORAL at 20:52

## 2020-06-26 RX ADMIN — OYSTER SHELL CALCIUM WITH VITAMIN D 1 TABLET: 500; 200 TABLET, FILM COATED ORAL at 13:17

## 2020-06-26 RX ADMIN — WARFARIN SODIUM 8 MG: 4 TABLET ORAL at 18:07

## 2020-06-26 RX ADMIN — ATORVASTATIN CALCIUM 10 MG: 10 TABLET, FILM COATED ORAL at 22:00

## 2020-06-26 RX ADMIN — GABAPENTIN 100 MG: 100 CAPSULE ORAL at 20:52

## 2020-06-26 RX ADMIN — AMLODIPINE BESYLATE 5 MG: 5 TABLET ORAL at 08:58

## 2020-06-26 RX ADMIN — Medication 10 ML: at 09:03

## 2020-06-26 RX ADMIN — SACUBITRIL AND VALSARTAN 1 TABLET: 24; 26 TABLET, FILM COATED ORAL at 20:52

## 2020-06-26 RX ADMIN — SACUBITRIL AND VALSARTAN 1 TABLET: 24; 26 TABLET, FILM COATED ORAL at 08:58

## 2020-06-26 RX ADMIN — CLOPIDOGREL BISULFATE 75 MG: 75 TABLET ORAL at 09:00

## 2020-06-26 RX ADMIN — PREDNISONE 15 MG: 5 TABLET ORAL at 08:58

## 2020-06-26 RX ADMIN — HYDRALAZINE HYDROCHLORIDE 10 MG: 20 INJECTION INTRAMUSCULAR; INTRAVENOUS at 07:00

## 2020-06-26 RX ADMIN — CARVEDILOL 12.5 MG: 12.5 TABLET, FILM COATED ORAL at 18:07

## 2020-06-26 RX ADMIN — Medication 10 ML: at 20:58

## 2020-06-26 RX ADMIN — NITROFURANTOIN (MONOHYDRATE/MACROCRYSTALS) 100 MG: 75; 25 CAPSULE ORAL at 20:52

## 2020-06-26 ASSESSMENT — ENCOUNTER SYMPTOMS
CHEST TIGHTNESS: 0
COLOR CHANGE: 0
VOMITING: 0
WHEEZING: 0
APNEA: 0
VOICE CHANGE: 0
SHORTNESS OF BREATH: 0
BLOOD IN STOOL: 0
ABDOMINAL DISTENTION: 0
TROUBLE SWALLOWING: 1
ANAL BLEEDING: 0
TROUBLE SWALLOWING: 0
VISUAL CHANGE: 0
COUGH: 0
FACIAL SWELLING: 0
NAUSEA: 0

## 2020-06-26 ASSESSMENT — PAIN SCALES - GENERAL
PAINLEVEL_OUTOF10: 0
PAINLEVEL_OUTOF10: 0

## 2020-06-26 NOTE — PROGRESS NOTES
Therapy notified me of pt elevated BP w/o complaint from pt. No complaint of SOB, CP or blurred vision. Verified BP and gave PRN IV medication (see orders). Updated cardiology of pt data and asked to scatter medications to give pt better BP control.  Electronically signed by Clayton Weinberg RN on 6/26/2020 at 3:46 PM

## 2020-06-26 NOTE — PLAN OF CARE
Problem: Falls - Risk of:  Goal: Will remain free from falls  Description: Will remain free from falls  6/26/2020 1642 by Ambika Calabrese RN  Outcome: Ongoing  6/26/2020 0354 by Skinny Schilling RN  Outcome: Ongoing  Goal: Absence of physical injury  Description: Absence of physical injury  6/26/2020 1642 by Ambika Calabrese RN  Outcome: Ongoing  6/26/2020 0354 by Skinny Schilling RN  Outcome: Ongoing     Problem: Skin Integrity:  Goal: Will show no infection signs and symptoms  Description: Will show no infection signs and symptoms  6/26/2020 1642 by Ambika Calabrese RN  Outcome: Ongoing  6/26/2020 0354 by Skinny Schilling RN  Outcome: Ongoing  Goal: Absence of new skin breakdown  Description: Absence of new skin breakdown  6/26/2020 1642 by Ambika Calabrese RN  Outcome: Ongoing  6/26/2020 0354 by Skinny Schilling RN  Outcome: Ongoing     Problem: IP SWALLOWING  Goal: LTG - patient will tolerate the least restrictive diet consistency to allow for safe consumption of daily meals  6/26/2020 1642 by Ambika Calabrese RN  Outcome: Ongoing  6/26/2020 0354 by Skinny Schilling RN  Outcome: Ongoing     Problem: IP COMMUNICATION/DYSARTHRIA  Goal: LTG - patient will improve expressive language skills to allow for communication of wants and needs in daily activities  6/26/2020 1642 by Ambika Calabrese RN  Outcome: Ongoing  6/26/2020 1445 by PK Lamb  Outcome: Met This Shift     Problem: SAFETY  Goal: LTG - patient will adhere to hip precautions during ADL's and transfers  Outcome: Ongoing  Goal: LTG - Patient will demonstrate safety requirements appropriate to situation/environment  Outcome: Ongoing  Goal: LTG - patient will utilize safety techniques  Outcome: Ongoing  Goal: STG - patient locks brakes on wheelchair  Outcome: Ongoing  Goal: STG - Patient uses call light consistently to request assistance with transfers  Outcome: Ongoing  Goal: STG - patient uses gait belt during all transfers  Outcome: Ongoing

## 2020-06-26 NOTE — PROGRESS NOTES
Physical Therapy Rehab Treatment Note  [facility]  [facility]       NAME: Annie Cruz  : 6/10/1927 (80 y.o.)  MRN: [id number]  CODE STATUS: DNR-CCA    Date of Service: 2020  Chart Reviewed: Yes  Family / Caregiver Present: No    Restrictions:  Restrictions/Precautions: Fall Risk  Position Activity Restriction  Other position/activity restrictions: DNRCCA       SUBJECTIVE: Subjective: \" feeling woozy today\"   Pain Screening  Patient Currently in Pain: No  Pre Treatment Pain Screening  Pain at present: 0  Scale Used: Numeric Score  Intervention List: Patient able to continue with treatment    Post Treatment Pain Screenin/10     OBJECTIVE:           Neuromuscular Education  NDT Treatment: Standing  Neuromuscular Comments: Standing balance with weightshifts to facilitate hip strengthening and improve balance. Seated cross body punch to facilitate hip rotation, improve sitting balance, improve posture. Transfers  Sit to Stand: Contact guard assistance;Stand by assistance  Stand to sit: Contact guard assistance    Ambulation  Ambulation?: Yes  Ambulation 1  Surface: carpet  Device: Rollator  Assistance: Contact guard assistance  Quality of Gait: Poor pelvic stability and forward trunk causing increased use of UE on rollator  Distance: [de-identified]'  Comments: Pt reported feeling fatigued in the UE after gait trainging with no reports of feeling dizzy or woozy. Activity Tolerance  Activity Tolerance: Patient Tolerated treatment well    Exercises  Heelslides: 2 x 10  Hip Flexion: x20 march  Hip Abduction: x 20 ADD x 20 (YTB)  Knee Long Arc Quad: x20  Ankle Pumps: x20  Core Strengthening: Seated trunk rotation with yellow/white ball x 10  Neurodevelopmental Techniques: Standing ADD to improve single leg balance x 10ea  Comments: EOB Partial STS + scoot x 5     ASSESSMENT/COMMENTS:  Body structures, Functions, Activity limitations: Decreased functional mobility ; Decreased strength;Decreased endurance;Decreased balance; Increased pain;Decreased posture  Assessment: Pt tolerated treatment well and had no c/o of dizziness at end of session. Pt required verbal and visual cues for exercises.      PLAN OF CARE/Safety:   Plan Comment: Cont PT POC      Therapy Time:   Individual   Time In 1030   Time Out 1130   Minutes 60     Minutes:60      Transfer/Bed mobility trainin      Gait training: 15      Neuro re education: 15     Therapeutic ex: 30      Carlos Burden 20 at 12:59 PM     Electronically signed by Maxwell Segovia PTA on 2020 at 1:00 PM

## 2020-06-26 NOTE — PROGRESS NOTES
Occupational Therapy  Facility/Department: [facility]  Daily Treatment Note  NAME: Ifeoma Flores  : 6/10/1927  MRN: [id number]    Date of Service: 2020    Discharge Recommendations:  Continue to assess pending progress       Assessment      Activity Tolerance  Activity Tolerance: Patient Tolerated treatment well  Safety Devices  Safety Devices in place: Yes  Type of devices: All fall risk precautions in place         Patient Diagnosis(es): There were no encounter diagnoses. 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 has a past surgical history that includes Appendectomy; Rectocele repair; Cystocele repair; Ankle surgery; Breast biopsy; Cataract removal (); eye surgery; Tonsillectomy; Hysterectomy; and Coronary angioplasty with stent (2019).     Restrictions  Restrictions/Precautions  Restrictions/Precautions: Fall Risk  Position Activity Restriction  Other position/activity restrictions: [address]     Subjective   General  Chart Reviewed: Yes  Patient assessed for rehabilitation services?: Yes  Response to previous treatment: Patient with no complaints from previous session  Family / Caregiver Present: No  Referring Practitioner: Dr Frederica Nyhan  Diagnosis: NTSCI with imp mob and ADLs 2° cervical myelopathy and vestibular neuronitis    Subjective  Subjective: I'm not doing good. Pain Assessment  Pain Level: 0  Pre Treatment Pain Screening  Pain at present: 0     Orientation  Orientation  Overall Orientation Status: Within Functional Limits     Objective      Patient complained of feeling dizzy and woozy during ADL, Anderson Cartwright and Dr Henna Pickett notified. Patient's son arrived at the end of treatment session. Patient told son that she was not doing well and feeling weak. She told the son that he needed to take 'this' seriously. Son reassured patient that she was here to get stronger and to keep trying. Patient stated multiple times throughout ADL \"I can't do this\" however patient was completing the task without A while stating she couldn't. ADL    Grooming: Setup    UE Bathing: Supervision    LE Bathing: Contact guard assistance    UE Dressing: Setup    LE Dressing: Stand by assistance    Toileting: Contact guard assistance(1 LOB, able to self correct)    Toilet Transfers  Toilet - Technique: Ambulating  Equipment Used: Grab bars  Toilet Transfer: Stand by assistance  Toilet Transfers Comments: rollator    Shower Transfers  Shower - Transfer From: Blanca Perry - Transfer Type: To and From  Shower - Transfer To: Shower seat with back  Shower - Technique: Ambulating  Shower Transfers: Contact Guard  Shower Transfers Comments: grab bars    Wheelchair Bed Transfers  Wheelchair/Bed - Technique: Stand step  Equipment Used: Bed;Wheelchair  Level of Asssistance: Contact guard assistance  Wheelchair Transfers Comments: EOB -> w/c with rollator     Bed mobility  Supine to Sit: Stand by assistance  Sit to Supine: Stand by assistance  Scooting: Stand by assistance    Balance  Standing Balance: Stand by assistance  Standing Balance  Time: able to stand up to 2 minutes 15 seconds with 2 UE Support X 1 trial, 0 LOB  Activity: orthostatic BP check  Per Rebbecca Cockayne RN, orthostatic BP check was negative.             Plan Plan  Times per week: 5-7 times per week  Plan weeks: 2 weeks  Current Treatment Recommendations: Strengthening, Endurance Training, Neuromuscular Re-education, Self-Care / ADL, Balance Training, Functional Mobility Training, Safety Education & Training, Patient/Caregiver Education & Training    Plan Comment: Continue per OT POC       Goals  Patient Goals   Patient goals : \"to at least do things in my home\"       Therapy Time   Individual Concurrent Group Co-treatment   Time In 930         Time Out 1030         Minutes 60             ADL trainin minutes       Electronically signed by LOIS Kelly on 20 at 10:53 AM EDT      LOIS Kelly

## 2020-06-26 NOTE — PROGRESS NOTES
CARE/Safety:   Plan Comment: Cont PT POC  Safety Devices  Type of devices:  All fall risk precautions in place;Call light within reach      Therapy Time:   Individual   Time In 1500   Time Out 1530   Minutes 30     Minutes:30      Transfers 5      Gait training: 10     Therapeutic ex: Balaji Calderón Retort 06/26/20 at 3:40 PM    Electronically signed by Deyanira Giang PTA on 6/26/2020 at 3:54 PM

## 2020-06-26 NOTE — CONSULTS
[facility] Neurology Consult Note  Name: Stacy Gould  Age: 80 y.o. Gender: female  CodeStatus: DNR-CCA  Allergies: Metoclopramide  Pantoprazole  Pcn [Penicillins]  Protonix [Pantoprazole Sodium]  Tramadol    Chief Complaint:No chief complaint on file. Primary Care Provider: Hector Grajeda MD  InpatientTreatment Team: Treatment Team: Attending Provider: Clarence Burnham DO; Consulting Physician: Brian Rae MD; Consulting Physician: Valencia Mcgee DO; Charge Nurse: Anna Valera RN; Patient Care Tech: Fredy Thomason; Occupational Therapist: CHRISSY Licona/L; Occupational Therapist Assistant: LOIS Marquez; Registered Nurse: Wilver Katz RN; Consulting Physician: Jaclyn Sepulveda MD  Admission Date: 6/24/2020      HPI   Pt seen and examined on rehab unit for neurology consult. Patient was admitted to [facility] from 6/19/2020 to 6/22/2024 vertigo with lower extremity weakness. Patient diagnosed with vestibular neuronitis. Improvement shown with Antivert. MRI brain no acute findings. Carotid duplex with 50 to 69% narrowing bilaterally. Patient was continued on antiplatelet and statin therapy. MRI of cervical spine showed severe central canal stenosis at C5-C6 and moderate to severe central canal stenosis at C4-C5. MRI thoracic spine negative. MRI lumbar spine showed advanced multilevel degenerative changes. MRI findings of spine were discussed with patient's son and it was decided that we would proceed with conservative manage meant given patient's age and risk factors and surgical risk. Patient was not given Decadron due to her underlying diabetes. Pain management was consulted and followed. Patient was initiated on oral prednisone taper. 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 Patient currently alert and oriented x3, no acute distress, hard of hearing. Overall improved. Dizziness improving.   Patient frustrated that she is not able to do more in therapy. No focal deficits. Vitals:    06/26/20 1025   BP: (!) 102/56   Pulse: 69   Resp:    Temp:    SpO2:     Breast Cancer-related family history is not on file. Social History     Socioeconomic History    Marital status:      Spouse name: Not on file    Number of children: Not on file    Years of education: Not on file    Highest education level: Not on file   Occupational History    Not on file   Social Needs    Financial resource strain: Not very hard    Food insecurity     Worry: Never true     Inability: Never true    Transportation needs     Medical: No     Non-medical: No   Tobacco Use    Smoking status: Never Smoker    Smokeless tobacco: Never Used   Substance and Sexual Activity    Alcohol use: No     Alcohol/week: 0.0 standard drinks    Drug use: No    Sexual activity: Not on file   Lifestyle    Physical activity     Days per week: Not on file     Minutes per session: Not on file    Stress: Not on file   Relationships    Social connections     Talks on phone: More than three times a week     Gets together: More than three times a week     Attends [employer] service: 1 to 4 times per year     Active member of club or organization: No     Attends meetings of clubs or organizations: Never     Relationship status:     Intimate partner violence     Fear of current or ex partner: No     Emotionally abused: No     Physically abused: No     Forced sexual activity: No   Other Topics Concern    Not on file   Social History Narrative         Lives With: Son    Type of Home: [id number]: Two level, Able to Live on Main level with bedroom/bathroom, Performs ADL's on one level    Home Access: Stairs to enter with rails    Entrance Stairs - Number of Steps:  Threshold    Bathroom Shower/Tub: Tub/Shower unit    Bathroom Toilet: Handicap height    Bathroom Equipment: Grab bars in shower, Grab bars around toilet, Hand-held shower, Shower chair Bathroom Accessibility: Accessible    Home Equipment: Rolling walker, 4 wheeled walker (Usually uses rollator)    ADL Assistance: [address]: Needs assistance (Son manages housework)    Homemaking Responsibilities: No    Ambulation Assistance: Independent (Rollator)    Transfer Assistance: Independent    Active : No    Patient's  Info: Sons            Review of Systems   Constitutional: Negative for appetite change, chills, fatigue and fever. HENT: Positive for hearing loss. Negative for trouble swallowing. Eyes: Negative for visual disturbance. Respiratory: Negative for cough, chest tightness, shortness of breath and wheezing. Cardiovascular: Negative for chest pain, palpitations and leg swelling. Gastrointestinal: Negative for nausea and vomiting. Musculoskeletal: Positive for gait problem. Skin: Negative for color change and rash. Neurological: Positive for dizziness and weakness. Negative for tremors, seizures, syncope, facial asymmetry, speech difficulty, light-headedness, numbness and headaches. Psychiatric/Behavioral: Negative for agitation, confusion and hallucinations. The patient is not nervous/anxious. Physical Exam  Vitals signs and nursing note reviewed. Constitutional:       General: She is not in acute distress. Appearance: She is not diaphoretic. HENT:      Head: Normocephalic and atraumatic. Eyes:      General: No visual field deficit. Extraocular Movements: Extraocular movements intact. Pupils: Pupils are equal, round, and reactive to light. Cardiovascular:      Rate and Rhythm: Normal rate and regular rhythm. Pulmonary:      Effort: Pulmonary effort is normal. No respiratory distress. Breath sounds: Normal breath sounds. Abdominal:      General: Bowel sounds are normal. There is no distension. Palpations: Abdomen is soft. Tenderness: There is no abdominal tenderness.    Skin:     General: Skin is warm and dry. Neurological:      General: No focal deficit present. Mental Status: She is alert and oriented to person, place, and time. Cranial Nerves: No cranial nerve deficit, dysarthria or facial asymmetry. Motor: Weakness (  BLE) present. No tremor or seizure activity.       Coordination: Finger-Nose-Finger Test normal.      Gait: Gait abnormal.               Medications:  Reviewed    Infusion Medications:    dextrose       Scheduled Medications:    warfarin  8 mg Oral Once    calcium-vitamin D  1 tablet Oral Daily    sodium chloride flush  10 mL Intravenous 2 times per day    amLODIPine  5 mg Oral Daily    atorvastatin  10 mg Oral Nightly    carvedilol  12.5 mg Oral BID WC    clopidogrel  75 mg Oral Daily    gabapentin  100 mg Oral Nightly    insulin lispro  0-12 Units Subcutaneous TID WC    insulin lispro  0-6 Units Subcutaneous Nightly    lidocaine  3 patch Transdermal Daily    nitrofurantoin (macrocrystal-monohydrate)  100 mg Oral Nightly    predniSONE  15 mg Oral Daily    Followed by   Xenia Coleman ON 6/28/2020] predniSONE  10 mg Oral Daily    Followed by   Xenia Coleman ON 7/1/2020] predniSONE  5 mg Oral Daily    sacubitril-valsartan  1 tablet Oral BID    [START ON 6/19/2021] warfarin (COUMADIN) daily dosing (placeholder)   Other RX Placeholder     PRN Meds: fleet, acetaminophen, analgesic ointment, metaxalone, magnesium hydroxide, ondansetron **OR** ondansetron, sodium chloride flush, dextrose, dextrose, glucagon (rDNA), glucose, hydrALAZINE, HYDROcodone 5 mg - acetaminophen, meclizine    Labs:   Recent Labs     06/24/20  0537 06/25/20  0502 06/26/20  0652   WBC 9.5 8.9 11.8*   HGB 11.7* 11.0* 11.0*   HCT 35.8* 34.4* 34.1*    156 264     Recent Labs     06/24/20  0537 06/25/20  0502 06/26/20  0652    131* 137   K 4.4 5.0* 3.9    102 104   CO2 21 17* 21   BUN 35* 47* 38*   CREATININE 1.13* 1.20* 0.99*   CALCIUM 8.9 8.8 8.8     No results for input(s): AST, ALT, BILIDIR, BILITOT, ALKPHOS in the last 72 hours. Recent Labs     06/24/20  1722 06/25/20  0502 06/26/20  0652   INR 1.6 1.4 1.7     No results for input(s): CKTOTAL, TROPONINI in the last 72 hours. Urinalysis:   Lab Results   Component Value Date    NITRU Negative 06/19/2020    WBCUA 0-2 06/19/2020    BACTERIA Negative 06/19/2020    RBCUA 0-2 06/19/2020    BLOODU Negative 06/19/2020    SPECGRAV 1.014 06/19/2020    GLUCOSEU 250 06/19/2020       Radiology:   Most recent    EEG No procedure found. MRI of Brain No results found for this or any previous visit. Results for orders placed during the hospital encounter of 06/19/20   MRI BRAIN WO CONTRAST    Narrative MRI BRAIN WITHOUT CONTRAST:    CLINICAL HISTORY:  r/o CVA , dizziness, nausea, generalized weakness    COMPARISONS:  3/14/2019    TECHNIQUE: Multiplanar, multi-sequence MRI was performed on a 1.5Tesla closed magnet. FINDINGS:  There are no abnormal sites of restricted diffusion. The ventricles are normal in position. There is no evidence for mass effect. There is no shift of the midline structures. There are multiple  extensive foci of increased signal on FLAIR   and T2, in the periventricular deep white matter and corona radiata, which may be secondary to small vessel ischemic changes, demyelination, or aging. There appear similar prior exam. There is moderate dilatation of the cerebral sulci and ventricles,   unchanged. Flow-voids in the major intracranial blood vessels are identified and are patent by spin-echo criteria. There are few small foci of decreased signal on gradient echo sequences within the left frontal and both parietal lobes. They may be   secondary to small remote hemorrhages. There is mucosal thickening within both maxillary sinuses and both ethmoid sinuses. Mastoid air cells are clear. The seventh and eighth nerve complexes and optic nerves are symmetrical.  No evidence for mass in the cerebellopontine angle.  There is generalized thinning of the corpus callosum. The cerebellar tonsils are not ectopic. The pituitary gland is unremarkable. Optic nerves are symmetrical. The calvarium and dura are unremarkable. There is hypertrophy of nasal turbinates, consistent with   rhinitis. Impression NO EVIDENCE OF ACUTE CVA. GENERALIZED PARENCHYMAL VOLUME LOSS AND NONSPECIFIC WHITE MATTER CHANGES, MOST LIKELY SECONDARY TO CHRONIC SMALL VESSEL ISCHEMIC CHANGES. MUCOSAL THICKENING IN ETHMOID AND MAXILLARY SINUSES. A FEW SMALL NONSPECIFIC FOCI ON GRADIENT ECHO SEQUENCES WITHIN THE WHITE MATTER BILATERALLY. MRA of the Head and Neck: No results found for this or any previous visit. No results found for this or any previous visit. Results for orders placed during the hospital encounter of 03/14/19   MRA head w/o contrast    Narrative EXAM:     MRI of the brain without contrast    MRA of the brain without contrast    History: TIA. Dysarthria and left-sided weakness. Technique: Multiplanar multisequence MRI of the brain was performed without contrast. Axial 3-D time-of-flight images of the brain were obtained without contrast. 3-D volume rendered images were performed for evaluation of the cerebral vasculature. Comparison: MRI of the brain from November 14, 2018 and CT brain from March 14, 2019    Findings:    MRI brain:    Confluent periventricular hyperintense T2/FLAIR signal as well as multiple small foci of hyperintensity/FLAIR signal within the bilateral supratentorial white matter are nonspecific, as is patchy hyperintense T2/FLAIR signal within the jana. Findings are   most compatible with chronic small vessel ischemic changes in a patient of this age. These findings are not significantly changed from prior MRI of the brain. Prominence of the sulci and ventricles compatible with moderate generalized parenchymal volume loss.  No edema, hemorrhage, mass, mass effect, midline shift, or abnormal extra-axial fluid collection. Midline structures are within normal limits. The posterior fossa is within normal limits. There is no diffusion restriction. No susceptibility artifact is identified on the gradient echo sequence. Cranial nerves 7/8 complexes appear grossly unremarkable. The visualized paranasal sinuses and bilateral mastoid air cells are clear. MRA brain:    The bilateral distal cervical internal carotid arteries through the skull base are patent. The bilateral middle cerebral arteries through the trifurcation and opercular branches are patent. The vertebrobasilar system including the superior cerebellar and posterior cerebral arteries are patent. Note is made of a fetal origin of the left posterior cerebral artery. The right posterior communicating artery is not identified. The bilateral anterior cerebral arteries and anterior communicating artery are patent. No aneurysm or high-grade stenosis of the visualized cerebral vasculature. Impression MRI brain:    No acute ischemia. Generalized parenchymal volume loss and nonspecific white matter findings most compatible with chronic small vessel ischemic changes in a patient of this age. MRA brain:    No aneurysm or high-grade stenosis of the visualized cerebral vasculature. CT of the Head:   Results for orders placed during the hospital encounter of 06/19/20   CT Head WO Contrast    Narrative CT HEAD WO CONTRAST : 6/19/2020    CLINICAL HISTORY:  weakness, nausea with movement . COMPARISON: 6/18/2020. TECHNIQUE: Spiral unenhanced images were obtained of the head, with routine multiplanar reconstructions performed. Intravenous contrast is noted from a CT abdomen and pelvis from earlier 6/19/2020.     All CT scans at this facility use dose modulation, iterative reconstruction, and/or weight based dosing when appropriate to reduce radiation dose to as low as reasonably cm/s  Dist CCA:  98.8/23.6 cm/s  Prox ICA:  112/32.9 cm/s  Mid ICA:  201/47.2 cm/s  Dist ICA:  144/30.4 cm/s  Prox ECA :  114/12.4 cm/s  Prox VERT:  93.3/20.4 cm/s    ICA/CCA     2.1, which indicates 50-69% narrowing by velocity criteria. Impression 50-69% NARROWING PREDICTED OF BOTH INTERNAL CAROTID ARTERIES, NOT SIGNIFICANTLY CHANGED FROM 11/14/2018. Echo No results found for this or any previous visit. Assessment/Plan:    Vestibular neuronitis  Improved with Antivert  MRI brain no acute findings  Carotid duplex with 50 to 69% narrowing bilateral, continue antiplatelet and statin therapy  Lower extremity weakness  MRI cervical spine shows severe central canal stenosis at C5-C6 and moderate to severe central canal stenosis at C4-C5  MRI thoracic spine negative  MRI lumbar spine shows advanced multilevel degenerative changes with severe foraminal narrowing bilaterally at L4-L5 with possible mild transverse canal narrowing at L3-4 and L4-5  MRI findings of spine were discussed with family and it was decided that we would proceed with conservative management given patient's age and risk factors and surgical risk.  Patient is not a candidate for Decadron given her underlying diabetes.  Pain management following.  Patient started on steroid taper. Patient does complain of right arm quivering though this appears to be coming from the cervical spine.  We did discuss further that the only option to treat this would be surgical though we will wait for physical therapy for now and will consider neurosurgical evaluation if he worsens and does not have any improvement in her walking. Patient with history of MCA CVA currently on Coumadin and Plavix  Continue PT/OT/ST    I independently performed an evaluation on this patient. I have reviewed the above documentation completed by the Nurse Practitioner.  Please see my additional contributions to the HPI, physical exam, assessment/medical decision making. Elijah ANDREI Saleh MD, [phone], American Board of Psychiatry & Neurology  Board Certified in Vascular Neurology  Board Certified in Neuromuscular Medicine  Certified in Neurorehabilitation       Collaborating physicians: Dr Erickson Saleh    Electronically signed by SYLVIE Mo CNP on 6/26/2020 at 1:25 PM

## 2020-06-26 NOTE — PROGRESS NOTES
[facility]   Facility/Department: [facility]  Speech Language Pathology  Initial Speech/Language/Cognitive Assessment    NAME:Bina Calrin  : 6/10/1927 (1 y.o.)   MRN: [id number]  ROOM: [place] [id number]  ADMISSION DATE: 2020  PATIENT DIAGNOSIS(ES): Impaired mobility [Z74.09]  Abnormality of gait and mobility [R26.9]  No chief complaint on file.     Patient Active Problem List    Diagnosis Date Noted    History of ST elevation myocardial infarction (STEMI)      Priority: High    CKD (chronic kidney disease) 2015     Priority: High    HLD (hyperlipidemia) 2015     Priority: High    HTN (hypertension) 2011     Priority: High    Diabetes mellitus 2011     Priority: High    Vitamin D deficiency 2015     Priority: Low    SI (stress incontinence), female 2012     Priority: Low    Osteoarthritis 2012     Priority: Low    DJD (degenerative joint disease), lumbar 2020    History of PTCA 2020    Vestibular neuronitis of both ears 2020    Dizziness     Spinal stenosis in cervical region 2020    Lumbar degenerative disc disease 2020    Spinal stenosis, lumbar region, with neurogenic claudication 2020    Abnormality of gait and mobility due to  [employer] with Impaired Mobility and ADL's secondary to Cervical Myelopathy and Vestibular neuronitis with rehab admission 20. 2020    Generalized muscle ache 2020    Generalized osteoarthrosis, involving multiple sites 2020    Syncope 2020    Current use of long term anticoagulation 2019    Other transient cerebral ischemic attacks and related syndromes     Cerebrovascular accident (CVA) ([address]     Chronic combined systolic and diastolic congestive heart failure ([address] 2019    Monitoring for long-term anticoagulant use 2019    Transient cerebral ischemia 2019    S/P PTCA (percutaneous transluminal coronary angioplasty) 01/18/2019    Late effects of CVA (cerebrovascular accident) 01/02/2019    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/2018    History of non-ST elevation myocardial infarction (NSTEMI) 11/12/2018    Nausea & vomiting 11/11/2018    Chest pain 11/10/2018    Vitamin B12 deficiency 09/17/2018    Recurrent UTI (urinary tract infection) 53/74/0968    Diastolic dysfunction 82/44/4828    Valvular heart disease 03/07/2018    Hypercalcemia 01/15/2018    Lumbar spinal stenosis 12/22/2016    Chronic low back pain 08/17/2016    Eczematous dermatitis     SCC (squamous cell carcinoma), arm 2013    Type 2 diabetes mellitus ([facility] [address] [facility] 2013    Obesity (BMI 30-39.9) 2013    [employer] (hard of hearing) 2013     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 SCC (squamous cell carcinoma), arm 2013    right upper arm, 2015 right forarm    SI (stress incontinence), female 5/29/2012    Type II or unspecified type diabetes mellitus without mention of complication, not stated as uncontrolled     Whooping cough      Past Surgical History:   Procedure Laterality Date    ANKLE SURGERY      broken left ankle.     APPENDECTOMY      BREAST BIOPSY      x2 left breast    CATARACT REMOVAL  2004    bilateral    CORONARY ANGIOPLASTY WITH STENT PLACEMENT  01/2019 Speech: Within Functional Limits    Pragmatics/Social Functioning  Pragmatics: Within functional limits    Cognition:      Orientation  Overall Orientation Status: Within Functional Limits  Attention  Attention: Within Functional Limits  Memory  Memory: Exceptions to [facility]  Short-term Memory: Mild(Patient demonstrated difficulty remembering and repeating 7 numbers, 4-5 words and sentences. )  Problem Solving  Problem Solving: Within Functional Limits  Numeric Reasoning  Numeric Reasoning: Within Functional Limits  Abstract Reasoning  Abstract Reasoning: Within Functional Limits  Safety/Judgement  Safety/Judgement: Within Functional Limits    Additional Assessments:    Portions of the RIPA-2 [facility] Information Processing Assessment-2nd Edition ) were administered. Scores are as follows:  Subtest: Raw Score Standard Score   I. Immediate Memory 20 9   II. Recent Memory 29 13   III. Temporal Orientation 28 12   VIII. Problem Solving and          Abstract Reasoning 28 14        Clock Drawing Score: (scoring obtained from the CLQT, Cognitive Linguistic Quick Test)Not completed secondary to patient's age. Patient exhibited  reduced hearing with the need for direct facial cues and repetition. Prognosis:  Speech Therapy Prognosis  Prognosis: Good  Individuals consulted  Consulted and agree with results and recommendations: Patient;RN(Abisai)    Education:  Patient Education: Patient educated on SLE and cog eval results  Patient Education Response: Verbalizes understanding  Safety Devices in place: Yes  Type of devices: Call light within reach; Bed alarm in place    Pain:  Pain Assessment  Patient Currently in Pain: No  Pain Assessment: 0-10  Pain Level: 0    NATIONAL OUTCOMES MEASUREMENT SYSTEM (NOMS):  SPOKEN LANGUAGE COMPREHENSION  Ratin    SPOKEN LANGUAGE EXPRESSION  Ratin    MOTOR SPEECH  Ratin    PROBLEM SOLVING  Ratin    MEMORY  Ratin             Therapy Time  SLP Individual Minutes  Time In: 0900  Time Out: 0930  Minutes: 30          Signature: Electronically signed by PK Quevedo on 6/26/2020 at 12:15 PM

## 2020-06-26 NOTE — PROGRESS NOTES
Subjective: The patient complains of severe  acute on chronic neck pain and acute upper extremity weakness left greater than right partially relieved by PT, OT, steroid taper and exacerbated by recent fall and progressive cervical spinal stenosis with myelopathy. I am concerned about patients very hard of hearing and elevated blood sugars due to her steroid taper as well as her cardiac issues and risk for bleeding on the Coumadin. ROS x10: The patient also complains of severely impaired mobility and activities of daily living. Otherwise no new problems with vision, hearing, nose, mouth, throat, dermal, cardiovascular, GI, , pulmonary, musculoskeletal, psychiatric or neurological. See Rehab H&P on Rehab chart dated . Vital signs:  BP (!) 203/74   Pulse 61   Temp 98 °F (36.7 °C)   Resp 15   Ht 5' 3\" (1.6 m)   Wt 192 lb 14.4 oz (87.5 kg)   LMP  (LMP Unknown)   SpO2 97%   BMI 34.17 kg/m²   I/O:   PO/Intake:  fair PO intake, no problems observed or reported. Bowel/Bladder:  continent, no problems noted. General:  Patient is well developed, adequately nourished, non-obese and     well kempt. HEENT:    PERRLA, hearing intact to loud voice, external inspection of ear     and nose benign. Inspection of lips, tongue and gums benign  Musculoskeletal: No significant change in strength or tone. All joints stable. Inspection and palpation of digits and nails show no clubbing,       cyanosis or inflammatory conditions. Neuro/Psychiatric: Affect: flat but pleasant. Alert and oriented to person, place and     situation. No significant change in deep tendon reflexes or     Sensation-bilateral upper extremity weakness left greater than right left lower extremity weakness is also there  Lungs:  Diminished, CTA-B. Respiration effort is normal at rest.     Heart:   S1 = S2, RRR. No loud murmurs. Abdomen:  Soft, non-tender, no enlargement of liver or spleen.   Extremities:  No significant lower extremity edema or tenderness. Skin:   Intact to general survey, no visualized or palpated problems. Rehabilitation:  Physical therapy: FIMS:  Bed Mobility:      Transfers: Sit to Stand: Contact guard assistance, Stand by assistance  Stand to sit: Contact guard assistance  Bed to Chair: Minimal assistance(pivot and with ww), Ambulation 1  Surface: carpet  Device: Rollator  Assistance: Minimal assistance, Contact guard assistance  Quality of Gait: Narrow JUAN, reduced step length and height throughout gait, poor pelvic stability and forward trunk. Gait Deviations: Slow Shanelle, Increased JUAN, Decreased step length  Distance: [de-identified]'  Comments: Cues for rollator safety and stand to sit technique,      FIMS:  ,  , Assessment: Pt had difficulty with clearing hips off table when performing mat exercises and needed VC for edge of table exercises. Pt required VC to maintain upright posture during standing exercises and while gait training. Occupational therapy: FIMS:   ,  , Assessment: Patient is a 80 year female with an new onset of coordination problems, dizziness and weakness.  Patient presents with the above stated problem areas and will benefit from OT to address the issues and increase independence    Speech therapy: FIMS:        Lab/X-ray studies reviewed, analyzed and discussed with patient and staff:   Recent Results (from the past 24 hour(s))   POCT Glucose    Collection Time: 06/25/20 11:41 AM   Result Value Ref Range    POC Glucose 314 (H) 60 - 115 mg/dl    Performed on ACCU-CHEK    POCT Glucose    Collection Time: 06/25/20  4:30 PM   Result Value Ref Range    POC Glucose 381 (H) 60 - 115 mg/dl    Performed on ACCU-CHEK    POCT Glucose    Collection Time: 06/25/20  8:13 PM   Result Value Ref Range    POC Glucose 369 (H) 60 - 115 mg/dl    Performed on ACCU-CHEK    POCT Glucose    Collection Time: 06/26/20  5:58 AM   Result Value Ref Range    POC Glucose 154 (H) 60 - 115 mg/dl    Performed intracranial process or significant interval change. Mri Cervical Spine  6/20/2020    Craniocervical junction: Craniocervical junction is within normal limits. Bone marrow: No sign of acute fracture. No abnormality of the marrow signal to suggest any metastatic or diffuse bone disease. Soft tissues: Cervical soft tissues are within normal limits. Cervical cord: The cervical cord has normal morphology and signal. There is no sign of any extramedullary hemorrhage or fluid collection. C1/2: The odontoid and the C1-2 articulation are normal. C2/C3:  There is no neural foraminal stenosis. There is no evidence of central canal stenosis. There is no foraminal stenosis. C3/C4:  Minimal disc osteophyte complex with ventral ridging. No central canal stenosis. No significant foraminal narrowing. C4/C5:  Prominent disc osteophyte complex with effacement of the anterior CSF column in cord deformity. No abnormal cord signal intensity. Findings resulting in moderate central canal stenosis. C5/C6:  There is no neural foraminal stenosis. There is no evidence of central canal stenosis. There is no foraminal stenosis. C6/C7:  Moderate discussed by complex with effacement of the anterior CSF column and cord deformity. No abnormal cord signal intensity. Findings resulting in moderate to severe central canal stenosis. C7-T1:  Minimal disc osteophyte complex without central canal or significant foraminal narrowing     Severe central canal stenosis at C5-6. Moderate to severe central canal stenosis at C4-5. No abnormal cord signal intensity. Mri Thoracic Spine  : 6/20/2020  EXAMINATION: MRI THORACIC SPINE WO CONTRAST DATE AND TIME:6/20/2020 10:45 AM CLINICAL HISTORY: Severe thoracic spine pain. may do just sagittal and see if cord compression then do details/  r/o cord compression  COMPARISON: If available previous films were reviewed for comparison.  Technique: Multiplanar multisequence MRI scans of the thoracic Retroperitoneum: No abnormality of the visualized Aorta or retroperitoneum. Levorotoscoliosis with advanced multilevel degenerative changes. Severe foraminal narrowing bilaterally at L4-5. There may be mild transverse canal narrowing at L3-4 and L4-5 but there is no definite central canal stenosis     Ct Abdomen Pelvis  6/19/2020  EXAMINATION: CT ABDOMEN PELVIS W IV CONTRAST DATE AND TIME:6/19/2020 8:00 AM CLINICAL HISTORY: Acute abdominal pain. Epigastric pain. abd pain, vomiting  COMPARISON: July 23, 2016 TECHNIQUE: Contiguous axial CT sections of the abdomen and pelvis. 100 cc's of IV contrast given. .  All CT scans at this facility use dose modulation, iterative reconstruction, and/or weight based dosing when appropriate to reduce radiation dose to as low as reasonably achievable. FINDINGS    Liver: Negative     Spleen: Negative    Pancreas: Negative   Gallbladder: No calcified gallstones. Normal gallbladder wall. No pericholecystic fluid. Kidney: Right parapelvic cysts. Borderline pelviectasis in the left collecting system. Small bilateral cortical cysts some are too small to characterize with accuracy. Adrenal glands are negative. Bowel: The bowel is not dilated. There is no evidence of diverticulitis or colitis. Appendix: There  is no CT evidence for appendicitis. Nodes: No lymphadenopathy. Aorta: No aneurysm    Peritoneum: No free fluid or free air. The abdominal wall is intact. Pelvis: No abnormal soft tissue mass. The bladder is normal.   Bones: Levoscoliosis of the lumbar spine. BORDERLINE PELVIECTASIS IN LEFT KIDNEY. NO ACUTE PATHOLOGY IN THE ABDOMEN OR PELVIS. Xr Chest   6/19/2020   Osseous structures intact. Cardiopericardial silhouette normal. Pulmonary vasculature normal. Lungs clear. NO ACUTE CARDIOPULMONARY DISEASE. NO ACUTE CARDIOPULMONARY DISEASE. Mri Brain Wo 6/19/2020   NO EVIDENCE OF ACUTE CVA.  GENERALIZED PARENCHYMAL VOLUME LOSS AND comprehensive inpatient rehabilitation program including PT/OT to improve balance, ambulation, ADLs, and to improve the P/AROM. Therapeutic modifications regarding activities in therapies, place, amount of time per day and intensity of therapy made daily. In bed therapies or bedside therapies prn.   2. 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 Check for C.difficile x1 if >2 loose stools in 24 hours, continue bowel & bladder program.  Monitor bowel and bladder function. Lactinex 2 PO every AC. MOM prn, Brown Bomb prn, Glycerin suppository prn, enema prn.  3. Severe neck and low back pain as well as generalized OA pain: reassess pain every shift and prior to and after each therapy session, give prn Tylenol and Norco titration using lowest effective dose, modalities prn in therapy, Lidoderm, K-pad prn.   4. Skin healing and breakdown risk:  continue pressure relief program.  Daily skin exams and reports from nursing. 5. Severe fatigue due to nutritional and hydration deficiency: Add vitamin B12 vitamin D and CoQ10 continue to monitor I&Os, calorie counts prn, dietary consult prn.  6. Acute episodic insomnia with situational adjustment disorder:  prn Ambien, monitor for day time sedation. 7. Falls risk elevated:  patient to use call light to get nursing assistance to get up, bed and chair alarm. 8. Elevated DVT risk: progressive activities in PT, continue prophylaxis SALLIE hose, elevation and Coumadin. 9. Complex discharge planning:  Weekly team meeting every Monday to assess progress towards goals, discuss and address social, psychological and medical comorbidities and to address difficulties they may be having progressing in therapy. Patient and family education is in progress. The patient is to follow-up with their family physician after discharge.         Complex Active General Medical Issues that complicate care Assess & Plan:    1. HTN (hypertension),  HLD (hyperlipidemia), CAD,   Chronic combined systolic and diastolic congestive heart failure,  History of ST elevation myocardial infarction (STEMI),   History of PTCA,  Valvular heart disease-vital signs every shift dose and titrate cardiac medication to include Norvasc, Lipitor, Coreg, Apresoline both intravenous and oral, Coumadin, Entresto consult hospitalist for backup medical  2. CKD (chronic kidney disease)-control blood sugars control blood pressure recheck BMP monitor UA and renal output, push clear liquids  3. Osteoarthritis,  Lumbar spinal stenosis, DJD (degenerative joint disease), lumbar-add Lidoderm BenGay and heat lowest effective dose of opiates  4. SCC (squamous cell carcinoma), arm, Eczematous dermatitis-topical steroids add co-Q10 vitamin D  5. Vitamin D and B12 deficiency-high-dose vitamin D and B12  6. Spinal stenosis in cervical region with cervical myelopathy-inoperable due to moderate multiple medical comorbidities-PT and OT are her greatest hope for recovery her blood sugars are elevated wean steroids  7. Type 2 diabetes mellitus uncontrolled with hyperlipidemia and, Obesity (BMI 30-39. 9)-fingerstick blood sugars q. before meals and at bedtime dose and titrate insulin and carbohydrate insulin intake add diabetic add and dietary Ed  8. Severely [place] (hard of hearing),   Vestibular neuronitis of both ears  9. Nausea, anxiety and elevated blood sugars due to steroids for cervical myelopathy-steroid taper and titrate benzodiazepines if needed titrate Zofran and Antivert monitor stools for blood while patient is on Coumadin and steroids as she is high risk for bleed  10.  Neurogenic bowel and bladder-cath if no void check postvoid residuals teach Crede method recheck stat UA             Carisa Castaneda D.O., PM&R     Attending    [address] [facility]

## 2020-06-26 NOTE — PROGRESS NOTES
Physical Therapy  Facility/Department: [facility] MED SURG [id number]  Physical Therapy Discharge      NAME: Kimi Salmeron    : 6/10/1927 (80 y.o.)  MRN: [id number]    Account: [de-identified]  Gender: female      Patient has been discharged from acute care hospital. DC patient from current PT program.      Electronically signed by Musa Aceves PT on 20 at 8:38 AM EDT

## 2020-06-26 NOTE — PROGRESS NOTES
Occupational Therapy  Facility/Department: [facility]  Daily Treatment Note  NAME: Aaron Chong  : 6/10/1927  MRN: [id number]    Date of Service: 2020    Discharge Recommendations:  Continue to assess pending progress       Assessment      Activity Tolerance  Activity Tolerance: Patient Tolerated treatment well  Safety Devices  Safety Devices in place: Yes  Type of devices: All fall risk precautions in place         Patient Diagnosis(es): There were no encounter diagnoses. 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 has a past surgical history that includes Appendectomy; Rectocele repair; Cystocele repair; Ankle surgery; Breast biopsy; Cataract removal (); eye surgery; Tonsillectomy; Hysterectomy; and Coronary angioplasty with stent (2019).     Restrictions  Restrictions/Precautions  Restrictions/Precautions: Fall Risk  Position Activity Restriction  Other position/activity restrictions: [facility]     Subjective   General  Chart Reviewed: Yes  Patient assessed for rehabilitation services?: Yes  Response to previous treatment: Patient with no complaints from previous session  Family / Caregiver Present: No  Referring Practitioner: Dr January Dover  Diagnosis: NTSCI with imp mob and ADLs 2° cervical myelopathy and vestibular neuronitis    Subjective  Subjective: Hi.    Pain Assessment  Pain Level: 0  Pre Treatment Pain Screening  Pain at present: 0     Orientation  Orientation  Overall Orientation Status: Within Functional Limits     Objective      Health Management  Health Management Level of Assistance: Maximum assistance  Health Management: Patient completed medication management simulation utilizing 7 provided medication bottles with written instruction to place a total of 74 beads into the provided weekly medication organizer. Patient with 0 difficulty opening medication bottles. Patient with 0 difficulty opening organizer boxes. Patient placed a total of 60 beads into organizer with MOD A and MAX verbal cues and a total of 55 being correct. Patient with MIN difficulty manipulating beads. Patient able to sort beads back into correct bottles with MIN errors with MOD verbal cues and set up. Patient able to securely close 3/7 caps on medication bottles.       Plan   Plan  Times per week: 5-7 times per week  Plan weeks: 2 weeks  Current Treatment Recommendations: Strengthening, Endurance Training, Neuromuscular Re-education, Self-Care / ADL, Balance Training, Functional Mobility Training, Safety Education & Training, Patient/Caregiver Education & Training    Plan Comment: Continue per OT POC for planned d/c on 20     Goals  Patient Goals   Patient goals : \"to at least do things in my home\"       Therapy Time   Individual Concurrent Group Co-treatment   Time In 1535         Time Out 1605         Minutes 30             ADL trainin minutes       Electronically signed by LOIS Boudreaux on 20 at 4:17 PM EDT      LOIS Boudreaux

## 2020-06-26 NOTE — CONSULTS
Consult Note  Patient: Danisha Sneed  Unit/Bed: A956/B536-07  YOB: 1927  MRN: [id number]  Acct: [de-identified]   Admitting Diagnosis: Impaired mobility [Z74.09]  Abnormality of gait and mobility [R26.9]  Date:  6/24/2020  Hospital Day: 2      Chief Complaint:  Cardiomyopathy and generalized weakness    History of Present Illness:  Patient was transferred from regular floor for rehab. She is known to have coronary artery disease status post angioplasty of the LAD. Known to have moderately impaired left ventricular function 25% and on Entresto. Has done well. Her older son takes care of her. Also has a history of TIA CVA. Had a OMAR about a year ago. Denies any chest pain. No fever or chills.   No ankle edema    Allergies   Allergen Reactions    Metoclopramide     Pantoprazole Other (See Comments)    Pcn [Penicillins]      Tolerates rocephin    Protonix [Pantoprazole Sodium]     Tramadol        Current Facility-Administered Medications   Medication Dose Route Frequency Provider Last Rate Last Dose    warfarin (COUMADIN) tablet 8 mg  8 mg Oral Once Edil Em, DO        calcium-vitamin D 500-200 MG-UNIT per tablet 1 tablet  1 tablet Oral Daily London Crease, APRN - CNP        fleet rectal enema 1 enema  1 enema Rectal Daily PRN Catrachita Scullin, DO        acetaminophen (TYLENOL) tablet 500 mg  500 mg Oral Q8H PRN London Crease, APRN - CNP        analgesic ointment ointment   Topical PRN London Crease, APRN - CNP        metaxalone (SKELAXIN) tablet 400 mg  400 mg Oral TID PRN London Crease, APRN - CNP        magnesium hydroxide (MILK OF MAGNESIA) 400 MG/5ML suspension 30 mL  30 mL Oral Daily PRN Edil Em, DO        ondansetron (ZOFRAN-ODT) disintegrating tablet 4 mg  4 mg Oral Q8H PRN Edil Em, DO        Or    ondansetron (ZOFRAN) injection 4 mg  4 mg Intravenous Q6H PRN Edil Em, DO        sodium chloride flush 0.9 % injection 10 mL  10 mL Intravenous 2 times per day Paola Scott, DO   10 mL at 06/26/20 6853    sodium chloride flush 0.9 % injection 10 mL  10 mL Intravenous PRN [place] Longest, DO        amLODIPine (NORVASC) tablet 5 mg  5 mg Oral Daily Auburn Longest, DO   5 mg at 06/26/20 0858    atorvastatin (LIPITOR) tablet 10 mg  10 mg Oral Nightly [place] Longest, DO   10 mg at 06/25/20 2144    carvedilol (COREG) tablet 12.5 mg  12.5 mg Oral BID WC [place] Longest, DO   12.5 mg at 06/26/20 0858    clopidogrel (PLAVIX) tablet 75 mg  75 mg Oral Daily Margo Simon, DO   75 mg at 06/26/20 0900    dextrose 5 % solution  100 mL/hr Intravenous PRN Auburn Longest, DO        dextrose 50 % IV solution  12.5 g Intravenous PRN Auburn Longest, DO        gabapentin (NEURONTIN) capsule 100 mg  100 mg Oral Nightly Paolao Simon, DO   100 mg at 06/25/20 2144    glucagon (rDNA) injection 1 mg  1 mg Intramuscular PRN [place] Longest, DO        glucose (GLUTOSE) 40 % oral gel 15 g  15 g Oral PRN Auburn Longest, DO        hydrALAZINE (APRESOLINE) injection 10 mg  10 mg Intravenous Q4H PRN [place] Longest, DO   10 mg at 06/26/20 0700    hydrALAZINE (APRESOLINE) tablet 50 mg  50 mg Oral Daily Margo Simon, DO   50 mg at 06/26/20 0900    HYDROcodone-acetaminophen (NORCO) 5-325 MG per tablet 0.5 tablet  0.5 tablet Oral Q4H PRN [place] Longest, DO        insulin lispro (HUMALOG) injection vial 0-12 Units  0-12 Units Subcutaneous TID  [place] Longest, DO   2 Units at 06/26/20 0902    insulin lispro (HUMALOG) injection vial 0-6 Units  0-6 Units Subcutaneous Nightly [place] Longest, DO   5 Units at 06/25/20 2144    lidocaine 4 % external patch 3 patch  3 patch Transdermal Daily [place] Longest, DO   3 patch at 06/26/20 0857    meclizine (ANTIVERT) tablet 12.5 mg  12.5 mg Oral TID PRN [place] Longest, DO        nitrofurantoin (macrocrystal-monohydrate) (MACROBID) capsule 100 mg  100 mg Oral Nightly Paola Chavez DO   100 mg at 06/25/20 8293    predniSONE (DELTASONE) tablet 15 mg  15 mg Oral Daily Paola Chavez DO   15 mg at 06/26/20 7622 Followed by   Escobar Alves ON 6/28/2020] predniSONE (DELTASONE) tablet 10 mg  10 mg Oral Daily Vivi Gramajo DO        Followed by   Escobar Alves ON 7/1/2020] predniSONE (DELTASONE) tablet 5 mg  5 mg Oral Daily Vivi Gramajo DO        sacubitril-valsartan (ENTRESTO) 24-26 MG per tablet 1 tablet  1 tablet Oral BID Vivi Gramajo DO   1 tablet at 06/26/20 0858    [START ON 6/19/2021] warfarin (COUMADIN) daily dosing (placeholder)   Other RX Placeholder Vivi Gramajo DO           PMHx:  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 SCC (squamous cell carcinoma), arm 2013    right upper arm, 2015 right forarm    SI (stress incontinence), female 5/29/2012    Type II or unspecified type diabetes mellitus without mention of complication, not stated as uncontrolled     Whooping cough        PSHx:  Past Surgical History:   Procedure Laterality Date    ANKLE SURGERY      broken left ankle.  APPENDECTOMY      BREAST BIOPSY      x2 left breast    CATARACT REMOVAL  2004    bilateral    CORONARY ANGIOPLASTY WITH STENT PLACEMENT  01/2019    CYSTOCELE REPAIR      EYE SURGERY      bilateral cataract    HYSTERECTOMY      complete at age 39    [address]      as a child       Social Hx:  Social History     Socioeconomic History    Marital status:       Spouse name: None    Number of children: None    Years of education: None    Highest education level: None   Occupational History    None   Social Needs    Financial resource strain: Not very hard    Food insecurity     Worry: Never true     Inability: Never true    Transportation needs     Medical: No     Non-medical: No   Tobacco Use    Smoking status: Never Smoker    Smokeless tobacco: Never Used   Substance and Sexual Activity    Alcohol use: No     Alcohol/week: 0.0 standard drinks    Drug use: No    Sexual activity: None   Lifestyle    Physical activity     Days per week: None     Minutes per session: None    Stress: None   Relationships    Social connections     Talks on phone: More than three times a week     Gets together: More than three times a week     Attends [employer] service: 1 to 4 times per year     Active member of club or organization: No     Attends meetings of clubs or organizations: Never     Relationship status:     Intimate partner violence     Fear of current or ex partner: No     Emotionally abused: No     Physically abused: No     Forced sexual activity: No   Other Topics Concern    None   Social History Narrative         Lives With: Son    Type of Home: [place] [id number] [place] [id number] [address] [place] [address]: Two level, Able to Live on Main level with bedroom/bathroom, Performs ADL's on one level    Home Access: Stairs to enter with rails    Entrance Stairs - Number of Steps:  Threshold    Bathroom Shower/Tub: Tub/Shower unit    Bathroom Toilet: Handicap height    Bathroom Equipment: Grab bars in shower, Grab bars around toilet, Hand-held shower, Shower chair    Bathroom Accessibility: Accessible    Home Equipment: Rolling walker, 4 wheeled walker (Usually uses rollator)    ADL Assistance: [facility] [address] [facility] [facility] [address]: Needs assistance (Son manages housework)    Homemaking Responsibilities: No    Ambulation Assistance: Independent (Rollator)    Transfer Assistance: Independent    Active : No    Patient's  Info: Sons            Family Hx:  Family History   Problem Relation Age of Onset    Diabetes Mother     Heart Disease Father        Review of Systems:   Review of Systems   Constitutional: Negative for activity change, appetite change, diaphoresis, fatigue and unexpected weight change. HENT: Negative for facial swelling, nosebleeds, trouble swallowing and voice change. Respiratory: Negative for apnea, chest tightness, shortness of breath and wheezing. Cardiovascular: Negative for chest pain, palpitations and leg swelling. Gastrointestinal: Negative for abdominal distention, anal bleeding, blood in stool, nausea and vomiting. Genitourinary: Negative for decreased urine volume and dysuria. Musculoskeletal: Negative for gait problem and myalgias. Skin: Negative for color change, pallor, rash and wound. Neurological: Negative for dizziness, syncope, facial asymmetry, weakness, light-headedness, numbness and headaches. Hematological: Does not bruise/bleed easily. Psychiatric/Behavioral: Negative for agitation, behavioral problems, confusion, hallucinations and suicidal ideas. The patient is not nervous/anxious. All other systems reviewed and are negative. Physical Examination:    BP (!) 102/56   Pulse 69   Temp 98 °F (36.7 °C)   Resp 14   Ht 5' 3\" (1.6 m)   Wt 192 lb 14.4 oz (87.5 kg)   LMP  (LMP Unknown)   SpO2 97%   BMI 34.17 kg/m²    Physical Exam  Constitutional:       Appearance: She is well-developed. HENT:      Head: Normocephalic and atraumatic. Right Ear: External ear normal.      Left Ear: External ear normal.   Eyes:      Conjunctiva/sclera: Conjunctivae normal.      Pupils: Pupils are equal, round, and reactive to light. Neck:      Musculoskeletal: Normal range of motion and neck supple. Cardiovascular:      Rate and Rhythm: Normal rate and regular rhythm. Heart sounds: Normal heart sounds. Pulmonary:      Effort: Pulmonary effort is normal.      Breath sounds: Normal breath sounds. Abdominal:      General: Bowel sounds are normal.      Palpations: Abdomen is soft. Musculoskeletal: Normal range of motion. Skin:     General: Skin is warm and dry. Neurological:      Mental Status: She is alert and oriented to person, place, and time. Deep Tendon Reflexes: Reflexes are normal and symmetric. Psychiatric:         Behavior: Behavior normal.         Thought Content:  Thought content normal.         Judgment: Judgment normal.         LABS:  CBC:  Lab Results   Component Value Date    WBC 11.8 06/26/2020    RBC 3.65 06/26/2020    RBC 3.91 02/28/2012    HGB 11.0 06/26/2020    HCT 34.1 06/26/2020    MCV 93.5 06/26/2020    MCH 30.1 06/26/2020    MCHC 32.2 06/26/2020    RDW 14.9 06/26/2020     06/26/2020    MPV 11.6 02/21/2014     CBC with Differential:   Lab Results   Component Value Date    WBC 11.8 06/26/2020    RBC 3.65 06/26/2020    RBC 3.91 02/28/2012    HGB 11.0 06/26/2020    HCT 34.1 06/26/2020     06/26/2020    MCV 93.5 06/26/2020    MCH 30.1 06/26/2020    MCHC 32.2 06/26/2020    RDW 14.9 06/26/2020    LYMPHOPCT 10.3 06/19/2020    MONOPCT 4.7 06/19/2020    EOSPCT 3.9 02/28/2012    BASOPCT 0.4 06/19/2020    MONOSABS 0.4 06/19/2020    LYMPHSABS 1.0 06/19/2020    EOSABS 0.1 06/19/2020    BASOSABS 0.0 06/19/2020     CMP:    Lab Results   Component Value Date     06/26/2020    K 3.9 06/26/2020     06/26/2020    CO2 21 06/26/2020    BUN 38 06/26/2020    CREATININE 0.99 06/26/2020    GFRAA >60.0 06/26/2020    LABGLOM 52.3 06/26/2020    GLUCOSE 143 06/26/2020    GLUCOSE 154 05/25/2012    PROT 6.5 06/19/2020    LABALBU 4.0 06/19/2020    LABALBU 4.4 05/25/2012    CALCIUM 8.8 06/26/2020    BILITOT 0.3 06/19/2020    ALKPHOS 51 06/19/2020    AST 11 06/19/2020    ALT 14 06/19/2020     BMP:    Lab Results   Component Value Date     06/26/2020    K 3.9 06/26/2020     06/26/2020    CO2 21 06/26/2020    BUN 38 06/26/2020    LABALBU 4.0 06/19/2020    LABALBU 4.4 05/25/2012    CREATININE 0.99 06/26/2020    CALCIUM 8.8 06/26/2020    GFRAA >60.0 06/26/2020    LABGLOM 52.3 06/26/2020    GLUCOSE 143 06/26/2020    GLUCOSE 154 05/25/2012     Magnesium:    Lab Results   Component Value Date    MG 1.5 06/18/2020     Troponin:    Lab Results   Component Value Date    TROPONINI <0.010 06/19/2020       Radiology:  No results found. 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 [R26.9] 06/21/2020    Spinal stenosis in cervical region [M48.02] 06/21/2020    Chronic combined systolic and diastolic congestive heart failure ([facility] [address] [I50.42] 12/02/2019    Vitamin B12 deficiency [E53.8] 09/17/2018    Valvular heart disease [I38] 03/07/2018    Lumbar spinal stenosis [M48.061] 12/22/2016    Eczematous dermatitis [L30.9]     SCC (squamous cell carcinoma), arm [C44.621] 2013    Obesity (BMI 30-39. 9) [E66.9] 2013    Type 2 diabetes mellitus ([facility] [address] [E11.9] 2013    [place] (hard of hearing) [H91.90] 2013          Plan:  1. Continue with Entresto, Coreg and Norvasc. No need for hydralazine. Blood pressure is 178 systolic.             Electronically signed by Jayy Stevenson MD on 6/26/2020 at 1:02 PM

## 2020-06-26 NOTE — PROGRESS NOTES
No sleep issues tonight but patient does arouse easily. No complaints of pain or discomfort. No s/s of any distress. Bed in lowest position,call light within reach.

## 2020-06-26 NOTE — PROGRESS NOTES
INDIVIDUALIZED OVERALL REHAB PLAN OF CARE  ADDENDUM TO REHAB PROGRESS NOTE-for audit purposes must also refer to this day's clinical note and combine the information      Date: 2020  Patient Name: Fadi Ruff   Room: F911/M857-61    MRN: [id number]    : 6/10/1927  (80 y.o.)  Gender: female       Today 2020 during weekly team meeting, I reviewed the patient Fadi Ruff in detail with the therapists and nurses involved in patient's care gathering complex physiatric data regarding current medical issues, progress in therapies, factors limiting progress, social issues, psychological issues, ongoing therapeutic plans and discharge planning. Legend:  I= independent Im =Modified independent  S=Supervised SB=stand by ROACH=set up CG=contact jonathon Min= minimal Mod=Moderate Max=maximal Max of 2 =maximal assist of 2 people      CURRENT FUNCTIONAL STATUS:    NURSING ISSUES:       We have been concerned about her cardiac history and Coumadin therapy with risk of bleeding I will consult Dr. Violeta Esquivel with cardiology. Nursing will continue to focus on bowel and bladder continence transitioning toward independence by time of discharge. Monitoring post void residuals monitoring for severe constipation and bowel obstruction. Focus on achieving ADL goals with co-treating with OT when possible.     PHYSICAL THERAPY  Bed mobility:  Supine to Sit: Minimal assistance (20 1641)  Sit to Supine: Minimal assistance (20 1641)  Transfers:  Sit to Stand: Contact guard assistance;Stand by assistance (20 1532)  Bed to Chair: Minimal assistance(pivot and with ww) (20 1118)  Gait:   Device: Rollator (20 1526)  Assistance: Contact guard assistance (20 1526)  Distance: 100'  (20 1526)  Quality of Gait: Pt presents with poor hip stability, forward trunk and decreased step length and step height.  (20 1526)  Comments: Pt reported feeling fatigued in the UE after gait trainging with no reports of feeling dizzy. (06/26/20 1100)  Stairs:     W/C mobility:         OCCUPATIONAL THERAPY  Hand Dominance: Right  ADL  Feeding: Setup (06/25/20 1129)  Grooming: Setup (06/26/20 1046)  UE Bathing: Supervision (06/26/20 1046)  LE Bathing: Contact guard assistance (06/26/20 1046)  UE Dressing: Setup (06/26/20 1046)  LE Dressing: Stand by assistance (06/26/20 1046)  Toileting: Contact guard assistance(1 LOB, able to self correct) (06/26/20 1046)  Toilet Transfers  Toilet - Technique: Ambulating (06/26/20 1046)  Equipment Used: Grab bars (06/26/20 1046)  Toilet Transfer: Stand by assistance (06/26/20 1046)  Toilet Transfers Comments: rollator (06/26/20 1046)  Tub Transfers  Tub Transfers: Not tested (06/25/20 1143)  Shower Transfers  Shower - Transfer From: Aide [place] (06/26/20 1046)  Shower - Transfer Type: To and From (06/26/20 1046)  Shower - Transfer To: Shower seat with back (06/26/20 1046)  Shower - Technique: Ambulating (06/26/20 1046)  Shower Transfers: Contact Guard (06/26/20 1046)  Shower Transfers Comments: grab bars (06/26/20 1046)      SPEECH THERAPY  Motor Speech: Within Functional Limits  Comprehension: Within Functional Limits(Patient followed 3-4 step directions without difficulty)  Verbal Expression: Within functional limits      Diet/Swallow:  Diet Solids Recommendation: Regular  Liquid Consistency Recommendation:  Thin  Dysphagia Outcome Severity Scale: Level 6: Within functional limits/Modified independence    Compensatory Swallowing Strategies: Small bites/sips, Eat/Feed slowly, Upright as possible for all oral intake             COGNITION  OT: Cognition Comment: Comp - mod assist,  exp - supervision, soc int - modified indep, prob solv supervision, mem - supervision  SP:Memory: Exceptions to [facility]  Problem Solving: Within Functional Limits        THERAPY, MEDICAL AND NURSING COORDINATION:    []  Pain medication before therapies     []  Check orthostatic BP      [x]  Ambulate to the bathroom in room [x]  Add scheduled rest beaks     []  In room therapies      Discharge date set for:               7/8/2020      Home with:   Her son with help from   her son          And:     [address]:     [x]  PT    [x]  OT    []  ST   [x]  Aide   []  SW    [x]  RN                    Outpatient Therapy:  []  PT    []  OT    []  ST   []  Rehab Psych                 Equipment:  [address]      At D/C their function is goaled at:   PT:Long term goal 1: Patient will be independent with bed mobility. Long term goal 2: Patient will be indep with bed and car transfers.   Long term goal 3: Patient will be SBA with 150ft of gait with rollator in busy area and indep for household distance up to 50 feet in familiar environment  Long term goal 4: SBA 4 stairs with rails  OT:Eating  Assistance Needed: Setup or clean-up assistance  CARE Score: 5  Discharge Goal: Independent, Oral Hygiene  Reason if not Attempted: Patient refused  CARE Score: 7  Discharge Goal: Independent, Toileting Hygiene  Assistance Needed: Partial/moderate assistance  CARE Score: 3  Discharge Goal: Independent, Shower/Bathe Self  Assistance Needed: Partial/moderate assistance  CARE Score: 3  Discharge Goal: Independent  Upper Body Dressing  Assistance Needed: Setup or clean-up assistance  CARE Score: 5  Discharge Goal: Independent, Lower Body Dressing  Assistance Needed: Partial/moderate assistance  CARE Score: 3  Discharge Goal: Independent, Putting On/Taking Off Footwear  Assistance Needed: Supervision or touching assistance  CARE Score: 4  Discharge Goal: Independent, Toilet Transfer  Assistance Needed: Partial/moderate assistance  CARE Score: 3  Discharge Goal: Independent  SP:               From a cognitive standpoint they will need:        24 hr supervision  --progress to occasional           Significant problems/ barriers to functional progress include: Pt is at a high risk for functional loss,    [x]  Acute infection/UTI    []  Low BP's     []  COPD flare-up   [] Uncontrolled blood sugar     []  Progressive anemia         [x]  Severe pain exacerbation     [x]  Impaired mental status    [x]  Urinary incontinence    []  Bowel incontinence           Plan to correct barriers to functional progress: Add scheduled rest breaks, control pain by using ice Lidoderm rest and massage as well as pain medications prior to therapy. Based on a comprehensive evaluation of the above, the individualized therapy and Discharge plan will be:    -Times stated are an average that will be varied based on the patient's daily need. PT ____1 1/2__hrs/day 5-7 days per week           OT ___1 1/2___hrs per day 5-7 days per week ST ____1/2___hrs /day 3-5 days per week       Estimated LOS 2 week(s)    - Overall functional prognosis:     [x]  Good    []  Fair    []  Poor -Medical Prognosis:   [x]  Good    []  Fair    []  Poor    This patient was made aware of the discussion of Plan of Care, their projected dicharge date and their projected function at discharge.        Kristin Billingsley, DO

## 2020-06-26 NOTE — PROGRESS NOTES
deficiency    Eczematous dermatitis    Lumbar spinal stenosis    Valvular heart disease    Vitamin B12 deficiency    Chronic combined systolic and diastolic congestive heart failure (HCC)    Spinal stenosis in cervical region    Vestibular neuronitis of both ears    SCC (squamous cell carcinoma), arm    Type 2 diabetes mellitus (HCC)    Obesity (BMI 30-39. 9)    [place] (hard of hearing)    DJD (degenerative joint disease), lumbar    History of PTCA  Resolved Problems:    * No resolved hospital problems. *      ** Total time spent reviewing medical records, evaluating patient, speaking with RN's and consultants where I was focused exclusively on this patient: 35 minutes. This time is excluding time spent performing procedures or significant events occurring earlier or later in the day requiring my attention and focus. Subjective:   Admit Date: 6/24/2020  PCP: Onel Manzano MD    No acute events overnight. Afebrile  Telemetry reviewed. Dizziness has improved. Pt denies chest pain, SOB, N/V, fevers or chills     Objective:     Vitals:    06/26/20 0857 06/26/20 1020 06/26/20 1023 06/26/20 1025   BP:  (!) 128/59 111/68 (!) 102/56   Pulse: 60 51 62 69   Resp:       Temp:       TempSrc:       SpO2:       Weight:       Height:         General appearance:  No distress, A + O x 3. No conversational dyspnea noted. Dentition intact. Answers questions appropriately  Lungs: CTAB no exp wheezes, No rales No retractions; No use of accessory muscles  Heart:  S1, S2 normal, RRR, no MRG appreciated  Abdomen: (+) BS, soft, non-tender; non distended no guarding or rigidity. Extremities:  no cyanosis, no edema bilat lower exts, no calf tenderness bilaterally.  Dry skin noted       Medications:      dextrose        warfarin  8 mg Oral Once    sodium chloride flush  10 mL Intravenous 2 times per day    amLODIPine  5 mg Oral Daily    atorvastatin  10 mg Oral Nightly    carvedilol  12.5 mg Oral BID WC    clopidogrel  75 mg

## 2020-06-26 NOTE — CARE COORDINATION
[address] [place] [address] NOTE  Room: R235/R235-01  Admit Date: 2020       Date: 2020  Patient Name: Shelbie Ramos        MRN: [id number]    : 6/10/1927  (80 y.o.)  Gender: female      Diagnosis: Abnormality of gait and mobility due to NTSCI with Impaired Mobility and ADL's secondary to Cervical Myelopathy and Vestibular neuronitis     REHAB DIAGNOSIS:   Diagnosis: Abnormality of gait and mobility due to NTSCI with Impaired Mobility and ADL's secondary to Cervical Myelopathy and Vestibular neuronitis     CO MORBIDITIES:      Past Medical History:   Diagnosis Date    Arthritis     Chicken pox     Chronic back pain     Chronic low back pain 2016    Eczematous dermatitis     History of bladder infections     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    History of non-ST elevation myocardial infarction (NSTEMI) 2018    History of ST elevation myocardial infarction (STEMI)     Hyperlipidemia     Hypertension     Measles     Mumps     Osteoarthritis 2012    Other cerebrovascular disease     Other transient cerebral ischemic attacks and related syndromes     S/P PTCA (percutaneous transluminal coronary angioplasty) 2019    SCC (squamous cell carcinoma), arm 2013    right upper arm,  right forarm    SI (stress incontinence), female 2012    Type II or unspecified type diabetes mellitus without mention of complication, not stated as uncontrolled     Whooping cough      Past Surgical History:   Procedure Laterality Date    ANKLE SURGERY      broken left ankle.     APPENDECTOMY      BREAST BIOPSY      x2 left breast    CATARACT REMOVAL      bilateral    CORONARY ANGIOPLASTY WITH STENT PLACEMENT  2019    CYSTOCELE REPAIR      EYE SURGERY      bilateral cataract    HYSTERECTOMY      complete at age 39    [address]      as a child Chart Reviewed: Yes  Family / Caregiver Present: No  Restrictions  Restrictions/Precautions: Fall Risk  Position Activity Restriction  Other position/activity restrictions: DNRCCA  CASE MANAGEMENT    Social/Functional History  Social/Functional History  Lives With: Son  Type of Home: House  Home Layout: Two level, Performs ADL's on one level, Able to Live on Main level with bedroom/bathroom  Home Access: Stairs to enter without rails  Entrance Stairs - Number of Steps: 1  Bathroom Shower/Tub: Tub/Shower unit, Curtain  Bathroom Equipment: Hand-held shower, Shower chair  Home Equipment: 4 wheeled walker, Rolling walker(Son reported that patient uses a rollator in the house and a ww in the community)  ADL Assistance: Independent  Homemaking Assistance: (son does all homemaking)  Ambulation Assistance: Independent  Transfer Assistance: Independent  Active : No  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     DIET CARB CONTROL; Low Sodium (2 GM)    SpO2: 97 % (06/26/20 0643)  O2 Flow Rate (L/min): 0 L/min (06/24/20 1837)  No active isolations    Skin Issues: Yes, General edema    Pain Managed: Yes    Bladder continence: No, donna @    Bowel continence: Yes    [place] cardiology consulted for BP, dizziness      PHYSICAL THERAPY  Bed mobility:  Supine to Sit: Minimal assistance (06/25/20 1641)  Sit to Supine: Minimal assistance (06/25/20 1641)  Transfers:  Sit to Stand: Contact guard assistance;Stand by assistance (06/26/20 1100)  Bed to Chair: Minimal assistance(pivot and with []  Other:         - Results:         4. Discharge planning needs:          - Intervention / Plan:  [x]  Weekly team conference      []  family training        - Results:         5.            - Intervention / Plan:          - Results:         6.            - Intervention / Plan:         - Results:         7.            - Intervention / Plan:         - Results:           Discharge Plan   Estimated Length of Stay: tbd    Tentative Discharge date: 7/8/20      Anticipated Discharge Destination:  Home      Team recommendations:    1. Follow up Therapy :    PT  OT  RN  [facility]    2. Home Health    Other:     Equipment needed at Discharge:  Other: has ww and rollator at home      Team Members Present at Conference:    Physician: Dr. Tami Santoyo  RN: Vilma Subramanian RN  Physical Therapist: Mickey Moreno PT  Occupational Therapist: CHRISSY Ambriz  Speech Therapist: Zack Grajeda, PK  Other: : Cnidi Jay, MSN, RN     Electronically signed by Cindi Jay, RN on 6/26/20 at 1:20 PM EDT

## 2020-06-26 NOTE — PROGRESS NOTES
Pt stated she was woozy during ADL, obtained orthostatic VS. Data presents negative, update providers with results. Reviewed home medications w/pt POA son.  Electronically signed by Christ Levin RN on 6/26/2020 at 11:15 AM

## 2020-06-27 LAB
ANION GAP SERPL CALCULATED.3IONS-SCNC: 12 MEQ/L (ref 9–15)
BUN BLDV-MCNC: 42 MG/DL (ref 8–23)
CALCIUM SERPL-MCNC: 9.4 MG/DL (ref 8.5–9.9)
CHLORIDE BLD-SCNC: 105 MEQ/L (ref 95–107)
CO2: 20 MEQ/L (ref 20–31)
CREAT SERPL-MCNC: 1.16 MG/DL (ref 0.5–0.9)
FOLATE: 16.4 NG/ML (ref 7.3–26.1)
GFR AFRICAN AMERICAN: 52.7
GFR NON-AFRICAN AMERICAN: 43.6
GLUCOSE BLD-MCNC: 142 MG/DL (ref 70–99)
GLUCOSE BLD-MCNC: 225 MG/DL (ref 60–115)
GLUCOSE BLD-MCNC: 322 MG/DL (ref 60–115)
GLUCOSE BLD-MCNC: 336 MG/DL (ref 60–115)
HCT VFR BLD CALC: 35.2 % (ref 37–47)
HEMOGLOBIN: 11.6 G/DL (ref 12–16)
INR BLD: 2
MCH RBC QN AUTO: 30.7 PG (ref 27–31.3)
MCHC RBC AUTO-ENTMCNC: 32.9 % (ref 33–37)
MCV RBC AUTO: 93.3 FL (ref 82–100)
PDW BLD-RTO: 14.8 % (ref 11.5–14.5)
PERFORMED ON: ABNORMAL
PLATELET # BLD: 233 K/UL (ref 130–400)
POTASSIUM REFLEX MAGNESIUM: 4.2 MEQ/L (ref 3.4–4.9)
PROTHROMBIN TIME: 22.8 SEC (ref 12.3–14.9)
RBC # BLD: 3.77 M/UL (ref 4.2–5.4)
SODIUM BLD-SCNC: 137 MEQ/L (ref 135–144)
VITAMIN B-12: 282 PG/ML (ref 232–1245)
WBC # BLD: 9.3 K/UL (ref 4.8–10.8)

## 2020-06-27 PROCEDURE — 97110 THERAPEUTIC EXERCISES: CPT

## 2020-06-27 PROCEDURE — 1180000000 HC REHAB R&B

## 2020-06-27 PROCEDURE — 6360000002 HC RX W HCPCS: Performed by: INTERNAL MEDICINE

## 2020-06-27 PROCEDURE — 85027 COMPLETE CBC AUTOMATED: CPT

## 2020-06-27 PROCEDURE — 6370000000 HC RX 637 (ALT 250 FOR IP): Performed by: PHYSICIAN ASSISTANT

## 2020-06-27 PROCEDURE — 36415 COLL VENOUS BLD VENIPUNCTURE: CPT

## 2020-06-27 PROCEDURE — 97535 SELF CARE MNGMENT TRAINING: CPT

## 2020-06-27 PROCEDURE — 99232 SBSQ HOSP IP/OBS MODERATE 35: CPT | Performed by: PHYSICAL MEDICINE & REHABILITATION

## 2020-06-27 PROCEDURE — 85610 PROTHROMBIN TIME: CPT

## 2020-06-27 PROCEDURE — 6370000000 HC RX 637 (ALT 250 FOR IP): Performed by: NURSE PRACTITIONER

## 2020-06-27 PROCEDURE — 97116 GAIT TRAINING THERAPY: CPT

## 2020-06-27 PROCEDURE — 2580000003 HC RX 258: Performed by: INTERNAL MEDICINE

## 2020-06-27 PROCEDURE — 80048 BASIC METABOLIC PNL TOTAL CA: CPT

## 2020-06-27 PROCEDURE — 97112 NEUROMUSCULAR REEDUCATION: CPT

## 2020-06-27 PROCEDURE — 97530 THERAPEUTIC ACTIVITIES: CPT

## 2020-06-27 PROCEDURE — 6370000000 HC RX 637 (ALT 250 FOR IP): Performed by: INTERNAL MEDICINE

## 2020-06-27 RX ADMIN — HYDRALAZINE HYDROCHLORIDE 25 MG: 25 TABLET, FILM COATED ORAL at 08:36

## 2020-06-27 RX ADMIN — AMLODIPINE BESYLATE 5 MG: 5 TABLET ORAL at 08:37

## 2020-06-27 RX ADMIN — CLOPIDOGREL BISULFATE 75 MG: 75 TABLET ORAL at 08:37

## 2020-06-27 RX ADMIN — WARFARIN SODIUM 7 MG: 5 TABLET ORAL at 16:49

## 2020-06-27 RX ADMIN — SACUBITRIL AND VALSARTAN 1 TABLET: 24; 26 TABLET, FILM COATED ORAL at 20:41

## 2020-06-27 RX ADMIN — PREDNISONE 15 MG: 5 TABLET ORAL at 08:37

## 2020-06-27 RX ADMIN — HYDRALAZINE HYDROCHLORIDE 10 MG: 20 INJECTION INTRAMUSCULAR; INTRAVENOUS at 16:55

## 2020-06-27 RX ADMIN — SACUBITRIL AND VALSARTAN 1 TABLET: 24; 26 TABLET, FILM COATED ORAL at 08:36

## 2020-06-27 RX ADMIN — HYDRALAZINE HYDROCHLORIDE 25 MG: 25 TABLET, FILM COATED ORAL at 15:13

## 2020-06-27 RX ADMIN — ATORVASTATIN CALCIUM 10 MG: 10 TABLET, FILM COATED ORAL at 20:41

## 2020-06-27 RX ADMIN — CARVEDILOL 12.5 MG: 12.5 TABLET, FILM COATED ORAL at 08:37

## 2020-06-27 RX ADMIN — HYDRALAZINE HYDROCHLORIDE 25 MG: 25 TABLET, FILM COATED ORAL at 20:41

## 2020-06-27 RX ADMIN — GABAPENTIN 100 MG: 100 CAPSULE ORAL at 20:41

## 2020-06-27 RX ADMIN — NITROFURANTOIN (MONOHYDRATE/MACROCRYSTALS) 100 MG: 75; 25 CAPSULE ORAL at 20:41

## 2020-06-27 RX ADMIN — Medication 10 ML: at 10:00

## 2020-06-27 RX ADMIN — CARVEDILOL 12.5 MG: 12.5 TABLET, FILM COATED ORAL at 16:49

## 2020-06-27 RX ADMIN — OYSTER SHELL CALCIUM WITH VITAMIN D 1 TABLET: 500; 200 TABLET, FILM COATED ORAL at 08:36

## 2020-06-27 RX ADMIN — Medication 10 ML: at 20:40

## 2020-06-27 ASSESSMENT — PAIN SCALES - GENERAL
PAINLEVEL_OUTOF10: 0

## 2020-06-27 NOTE — PROGRESS NOTES
and vestibular neuronitis  Subjective  Subjective: Hi.  Pain Assessment  Pain Assessment: 0-10  Pain Level: 0  Pre Treatment Pain Screening  Pain at present: 0  Scale Used: Numeric Score  Intervention List: Patient able to continue with treatment  Vital Signs  Patient Currently in Pain: No   Orientation     Objective    Pt. engaged in B hand fine motor task to promote independence with ADLs and iADls. Pt. picked up pennies from the table top surface and placed them in a slit in the container lid. Pt. alternated hands with picking up the pennies. Pt. had no difficulty either hand. Pt. picked up beads and threaded them on a leather lace. Pt. had no difficulty with this task as well despite slight tremor. Pt. engaged in B UE strengthening task with 1 lb wrist weights on B wrists. Pt. placed golf tees into a board with her L hand, marbles on top with her R hand. Pt. had no difficulty with the tees but min difficulty with marbles. Pt. then removed the marbles with her L hand and tees with her R hand with min difficulty with marbles only. Pt. completed ROM arc on first and second level with each arm with rest break in between each set due to fatigue. Pt.'s wrist weights were removed and patient squeezed open various resistive clothespins and placed on the rods. Pt. alternated hands as needed and was able to squeeze open each of the different levels of resistance with each hand. Pt. was able to remove each clothespins and place back in the container with no difficulty.       Plan   Plan  Times per week: 5-7 times per week  Plan weeks: 2 weeks  Current Treatment Recommendations: Strengthening, Endurance Training, Neuromuscular Re-education, Self-Care / ADL, Balance Training, Functional Mobility Training, Safety Education & Training, Patient/Caregiver Education & Training  Plan Comment: Continue per OT POC for planned d/c on 7-8-20    Goals  Patient Goals   Patient goals : \"to at least do things in my home\"       Therapy

## 2020-06-27 NOTE — PROGRESS NOTES
Physical Therapy Rehab Treatment Note  [facility]       NAME: Isa Ontiveros  : 6/10/1927 (80 y.o.)  MRN: [id number]  CODE STATUS: DNR-CCA    Date of Service: 2020  Chart Reviewed: Yes  Family / Caregiver Present: No    Restrictions:  Restrictions/Precautions: Fall Risk  Position Activity Restriction  Other position/activity restrictions: DNRCCA       SUBJECTIVE: Subjective: \"I'm good. \"  Pain Screening  Patient Currently in Pain: Denies  Pre Treatment Pain Screening  Pain at present: 0  Intervention List: Patient able to continue with treatment    Post Treatment Pain Screening:  Pain Assessment  Pain Level: 0    OBJECTIVE:     Transfers  Sit to Stand: Stand by assistance  Stand to sit: Stand by assistance  Stand Pivot Transfers: Stand by assistance  Comment: VC's for hand placement with stand > sit    Ambulation  Ambulation?: Yes  Ambulation 1  Surface: carpet;level tile  Device: Rollator  Assistance: Contact guard assistance  Quality of Gait: Poor hip stability, ff posture, decreased chavo heel strike  Gait Deviations: Decreased step length;Decreased step height  Distance: 100'  Comments: VC's for proper breathing technique throughout, fatigue post ambulation. Stairs/Curb  Stairs?: Yes  Stairs  # Steps : 4  Stairs Height: 6\"  Rails: Bilateral  Assistance: Contact guard assistance  Comment: VC's for sequencing, non reciprocal pattern ascending and descending    Exercises  Other exercises  Other exercises 2: 4\" step taps x10 BLE with added targets to improve strength, balance and motor planning     ASSESSMENT/COMMENTS:  Assessment: Pt exhibits an increase in strength and endurance this PM secondary to performing stair training. Visual demonstration and vc's provided for proper sequencing on stairs with good follow through. Short standing RB needed during ambulation.       PLAN OF CARE/Safety:   Plan Comment: Cont PT POC      Therapy Time:   Individual   Time In

## 2020-06-27 NOTE — PROGRESS NOTES
Physical Therapy Rehab Treatment Note  Facility/Department: [facility]  Room: [place] [address]       NAME: Jayesh Mock  : 6/10/1927 (80 y.o.)  MRN: [id number]  CODE STATUS: DNR-CCA    Date of Service: 2020  Chart Reviewed: Yes  Family / Caregiver Present: No    Restrictions:  Restrictions/Precautions: Fall Risk  Position Activity Restriction  Other position/activity restrictions: DNRCCA       SUBJECTIVE: Subjective: \"It's going okay. \"  Pain Screening  Patient Currently in Pain: Denies  Pre Treatment Pain Screening  Pain at present: 0  Intervention List: Patient able to continue with treatment    Post Treatment Pain Screening:  Pain Assessment  Pain Level: 0    OBJECTIVE:   Neuromuscular Education  NDT Treatment: Standing  Neuromuscular Comments: Ana Im in and around objects with rollator to improve balance and environmental scanning in busy environment, seated cross body punches to facilitate hip rotation and improve posture and seated balance. Transfers  Sit to Stand: Contact guard assistance;Stand by assistance  Stand to sit: Stand by assistance;Contact guard assistance  Car Transfer: Contact guard assistance;Minimal Assistance(VC's for safe technique, min A for fwd weight shift and sit > stand from low surface)  Comment: Safe hand placement with sit > stand, vc's for hand placement with stand > sit    Ambulation  Ambulation?: Yes  Ambulation 1  Surface: carpet;level tile  Device: Rollator  Assistance: Contact guard assistance  Quality of Gait: Poor hip stability, ff posture, decreased chavo heel strike  Gait Deviations: Decreased step length;Decreased step height  Distance: 125'  Comments: VC's for proper breathing technique throughout, fatigue post ambulation.       Exercises  Hamstring Sets: x20 YTB  Hip Flexion: x20  Hip Abduction: x20 YTB / Hip ADD ball squeeze x20  Knee Long Arc Quad: x20  Ankle Pumps: x20  Other exercises  Other exercises 2: 4\" step ups x8 BLE to increase strength in BLE and improve step height for stair and gait training     ASSESSMENT/COMMENTS:  Assessment: Pt demonstrated improved strength this tx secondary to increasing overall ambulation. Seated ther ex utilized this session to increase strength in BLE and improve overall functional mobility. Challenged pt this date with manuevering through obstacles. VC's for technique and safety with tight turns needed, good follow through deomonstrated on second trial. Increased time and effort required throughout for short seated RB's secondary to pt fatiguing quickly.      PLAN OF CARE/Safety:   Plan Comment: Cont PT POC      Therapy Time:   Individual   Time In 1030   Time Out 1130   Minutes 60     Minutes:      Transfer/Bed mobility training: 10      Gait training: 15      Neuro re education: 20     Therapeutic ex: [address] Marcie Sims PTA, 06/27/20 at 11:26 AM

## 2020-06-27 NOTE — PROGRESS NOTES
Subjective: The patient complains of severe  acute on chronic neck pain and acute upper extremity weakness left greater than right partially relieved by PT, OT, steroid taper and exacerbated by recent fall and progressive cervical spinal stenosis with myelopathy. I am concerned about patients very hard of hearing and elevated blood sugars due to her steroid taper as well as her cardiac issues and risk for bleeding on the Coumadin. I have consulted Dr. Tommie Yates with cardiology. I did a work-up including labs yesterday because she felt lightheaded orthostatic BPs were checked stat and they were negative. Her electrolytes and H&H looks pretty good she is a little dehydrated we are pushing fluids with that working with cardiology so we do not send her into failure. ROS x10: The patient also complains of severely impaired mobility and activities of daily living. Otherwise no new problems with vision, hearing, nose, mouth, throat, dermal, cardiovascular, GI, , pulmonary, musculoskeletal, psychiatric or neurological. See Rehab H&P on Rehab chart dated . Vital signs:  BP (!) 176/74   Pulse 60   Temp 98.3 °F (36.8 °C) (Oral)   Resp 16   Ht 5' 3\" (1.6 m)   Wt 192 lb 14.4 oz (87.5 kg)   LMP  (LMP Unknown)   SpO2 95%   BMI 34.17 kg/m²   I/O:   PO/Intake:  fair PO intake, no problems observed or reported. Bowel/Bladder:  continent, no problems noted. General:  Patient is well developed, adequately nourished, non-obese and     well kempt. HEENT:    PERRLA, hearing intact to loud voice, external inspection of ear     and nose benign. Inspection of lips, tongue and gums benign  Musculoskeletal: No significant change in strength or tone. All joints stable. Inspection and palpation of digits and nails show no clubbing,       cyanosis or inflammatory conditions. Neuro/Psychiatric: Affect: flat but pleasant. Alert and oriented to person, place and     situation.   No significant change in deep tendon reflexes or     Sensation-bilateral upper extremity weakness left greater than right left lower extremity weakness is also there  Lungs:  Diminished, CTA-B. Respiration effort is normal at rest.     Heart:   S1 = S2, RRR. No loud murmurs. Abdomen:  Soft, non-tender, no enlargement of liver or spleen. Extremities:  No significant lower extremity edema or tenderness. Skin:   Intact to general survey, no visualized or palpated problems. Rehabilitation:  Physical therapy: FIMS:  Bed Mobility: Scooting: Stand by assistance    Transfers: Sit to Stand: Contact guard assistance, Stand by assistance  Stand to sit: Contact guard assistance  Bed to Chair: Minimal assistance(pivot and with ww), Ambulation 1  Surface: carpet  Device: Rollator  Assistance: Contact guard assistance  Quality of Gait: Pt presents with poor hip stability, forward trunk and decreased step length and step height. Gait Deviations: Decreased step length, Decreased step height  Distance: 100'   Comments: Pt reported feeling fatigued in the UE after gait trainging with no reports of feeling dizzy. ,      FIMS:  ,  , Assessment: Pt reported being woozy and dizzy at the beginning of treatment. Took manual BP and read at 196/70. RN notified and assessed pt. Okayed pt to cont with treatment as long as she does not c/o of SOB of chest pain. Occupational therapy: FIMS:   ,  , Assessment: Patient is a 80 year female with an new onset of coordination problems, dizziness and weakness.  Patient presents with the above stated problem areas and will benefit from OT to address the issues and increase independence    Speech therapy: FIMS:        Lab/X-ray studies reviewed, analyzed and discussed with patient and staff:   Recent Results (from the past 24 hour(s))   POCT Glucose    Collection Time: 06/26/20 11:34 AM   Result Value Ref Range    POC Glucose 260 (H) 60 - 115 mg/dl    Performed on ACCU-CHEK    POCT Glucose    Collection Time: 06/26/20  4:31 PM   Result Value Ref Range    POC Glucose 280 (H) 60 - 115 mg/dl    Performed on ACCU-CHEK    POCT Glucose    Collection Time: 06/26/20  8:50 PM   Result Value Ref Range    POC Glucose 278 (H) 60 - 115 mg/dl    Performed on ACCU-CHEK    Basic Metabolic Panel w/ Reflex to MG    Collection Time: 06/27/20  6:00 AM   Result Value Ref Range    Sodium 137 135 - 144 mEq/L    Potassium reflex Magnesium 4.2 3.4 - 4.9 mEq/L    Chloride 105 95 - 107 mEq/L    CO2 20 20 - 31 mEq/L    Anion Gap 12 9 - 15 mEq/L    Glucose 142 (H) 70 - 99 mg/dL    BUN 42 (H) 8 - 23 mg/dL    CREATININE 1.16 (H) 0.50 - 0.90 mg/dL    GFR Non-African American 43.6 (L) >60    GFR  52.7 (L) >60    Calcium 9.4 8.5 - 9.9 mg/dL   CBC    Collection Time: 06/27/20  6:00 AM   Result Value Ref Range    WBC 9.3 4.8 - 10.8 K/uL    RBC 3.77 (L) 4.20 - 5.40 M/uL    Hemoglobin 11.6 (L) 12.0 - 16.0 g/dL    Hematocrit 35.2 (L) 37.0 - 47.0 %    MCV 93.3 82.0 - 100.0 fL    MCH 30.7 27.0 - 31.3 pg    MCHC 32.9 (L) 33.0 - 37.0 %    RDW 14.8 (H) 11.5 - 14.5 %    Platelets 974 860 - 954 K/uL   Protime-INR    Collection Time: 06/27/20  6:00 AM   Result Value Ref Range    Protime 22.8 (H) 12.3 - 14.9 sec    INR 2.0        Ct Head 6/19/2020    FINDINGS: There is no intracranial hemorrhage, mass effect, midline shift, extra-axial collection, evidence of hydrocephalus, recent ischemic infarct, or skull fracture identified. Moderate age-related atrophic changes have not significantly changed from the yesterday's study. NO ACUTE INTRACRANIAL PROCESS OR SIGNIFICANT CHANGE FROM YESTERDAY IDENTIFIED. Ct Head   6/18/2020    Prominence of the sulci and ventricles compatible with mild generalized parenchymal volume loss. Gray-white matter differentiation is preserved. Areas of bilateral supratentorial white matter hypoattenuation are nonspecific but most likely related to chronic small vessel ischemic changes in a patient of this age.  No acute hemorrhage or abnormal extra-axial fluid collection. No mass effect or midline shift. Mild mucosal thickening of the maxillary sinuses. The mastoid air cells are clear. Calvarium is intact. No acute intracranial process or significant interval change. Mri Cervical Spine  6/20/2020    Craniocervical junction: Craniocervical junction is within normal limits. Bone marrow: No sign of acute fracture. No abnormality of the marrow signal to suggest any metastatic or diffuse bone disease. Soft tissues: Cervical soft tissues are within normal limits. Cervical cord: The cervical cord has normal morphology and signal. There is no sign of any extramedullary hemorrhage or fluid collection. C1/2: The odontoid and the C1-2 articulation are normal. C2/C3:  There is no neural foraminal stenosis. There is no evidence of central canal stenosis. There is no foraminal stenosis. C3/C4:  Minimal disc osteophyte complex with ventral ridging. No central canal stenosis. No significant foraminal narrowing. C4/C5:  Prominent disc osteophyte complex with effacement of the anterior CSF column in cord deformity. No abnormal cord signal intensity. Findings resulting in moderate central canal stenosis. C5/C6:  There is no neural foraminal stenosis. There is no evidence of central canal stenosis. There is no foraminal stenosis. C6/C7:  Moderate discussed by complex with effacement of the anterior CSF column and cord deformity. No abnormal cord signal intensity. Findings resulting in moderate to severe central canal stenosis. C7-T1:  Minimal disc osteophyte complex without central canal or significant foraminal narrowing     Severe central canal stenosis at C5-6. Moderate to severe central canal stenosis at C4-5. No abnormal cord signal intensity. Mri Thoracic Spine  : 6/20/2020  EXAMINATION: MRI THORACIC SPINE WO CONTRAST DATE AND TIME:6/20/2020 10:45 AM CLINICAL HISTORY: Severe thoracic spine pain.    may do just sagittal and see if cord compression then do details/  r/o cord compression  COMPARISON: If available previous films were reviewed for comparison. Technique: Multiplanar multisequence MRI scans of the thoracic spine. Findings:     Bones: The thoracic vertebrae are normally aligned with no evidence of fracture. There is normal marrow signal throughout the thoracic spine. Disc space: There is no focal disc herniation or evidence for spinal canal stenosis. Disc spaces are age-appropriate. Spinal cord: The thoracic cord is normal in configuration and signal intensity. Soft tissues: There is no paraspinal soft tissue mass or fluid collection. Negative MRI of the thoracic spine. No signs of cord compression. Mri Lumbar Spine   6/20/2020  EXAMINATION: MRI LUMBAR SPINE WO CONTRAST DATE AND TIME:6/20/2020 10:45 AM CLINICAL HISTORY: Lower back pain. lumbar stenosis/ cord compression  COMPARISON: None TECHNIQUE:  Multi-sequence multiplanar imaging of the lumbar spine was performed. No gadolinium contrast administered. VOLUME: None   MR CONTRAST: None  FINDINGS:  Vertebrae: No acute fracture. Marrow signal changes are within normal limits. Conus: The conus medullaris ends at L1 level and is unremarkable. T12/L1: There is no evidence of disc bulging or disc herniation. No significant facet hypertrophy or thickening of ligamenta flava. There is no central spinal canal stenosis. There is no neural foraminal stenosis. L1/2:  There is no evidence of disc bulging or disc herniation. No significant facet hypertrophy or thickening of ligamenta flava. There is no central spinal canal stenosis. There is no neural foraminal stenosis. L2/3:  Minimal disc bulging with no central canal narrowing or significant foraminal stenosis     L3/4:  Broad-based disc bulging with minimal ventral ridging. Levoscoliosis with moderate to severe right foraminal stenosis. L4/5:  Broad-based disc bulging and ventral ridging. Therapeutic modifications regarding activities in therapies, place, amount of time per day and intensity of therapy made daily. In bed therapies or bedside therapies prn.   2. 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 Check for C.difficile x1 if >2 loose stools in 24 hours, continue bowel & bladder program.  Monitor bowel and bladder function. Lactinex 2 PO every AC. MOM prn, Brown Bomb prn, Glycerin suppository prn, enema prn.  3. Severe neck and low back pain as well as generalized OA pain: reassess pain every shift and prior to and after each therapy session, give prn Tylenol and Norco titration using lowest effective dose, modalities prn in therapy, Lidoderm, K-pad prn.   4. Skin healing and breakdown risk:  continue pressure relief program.  Daily skin exams and reports from nursing. 5. Severe fatigue due to nutritional and hydration deficiency: Add vitamin B12 vitamin D and CoQ10 continue to monitor I&Os, calorie counts prn, dietary consult prn.  6. Acute episodic insomnia with situational adjustment disorder:  prn Ambien, monitor for day time sedation. 7. Falls risk elevated:  patient to use call light to get nursing assistance to get up, bed and chair alarm. 8. Elevated DVT risk: progressive activities in PT, continue prophylaxis SALLIE hose, elevation and Coumadin. 9. Complex discharge planning: Discharge 7/8/2020 home with her son in [facility] PT OT RN aide and social work weekly team meeting every Monday to assess progress towards goals, discuss and address social, psychological and medical comorbidities and to address difficulties they may be having progressing in therapy. Patient and family education is in progress. The patient is to follow-up with their family physician after discharge.         Complex Active General Medical Issues that complicate care Assess & Plan:    1. HTN (hypertension),  HLD (hyperlipidemia), CAD,   Chronic combined systolic and diastolic congestive heart failure,  History of ST elevation myocardial infarction (STEMI),   History of PTCA,  Valvular heart disease-vital signs every shift dose and titrate cardiac medication to include Norvasc, Lipitor, Coreg, Apresoline both intravenous and oral, Coumadin, Entresto consult hospitalist for backup medical consult Dr. Indira Ortiz with cardiology monitor for orthostasis and failure as we rehydrate her  2. CKD (chronic kidney disease)-control blood sugars control blood pressure recheck BMP monitor UA and renal output, push clear liquids push oral fluids add IV fluids as needed  3. Osteoarthritis,  Lumbar spinal stenosis, DJD (degenerative joint disease), lumbar-add Lidoderm BenGay and heat lowest effective dose of opiates  4. SCC (squamous cell carcinoma), arm, Eczematous dermatitis-topical steroids add co-Q10 vitamin D  5. Vitamin D and B12 deficiency-high-dose vitamin D and B12  6. Spinal stenosis in cervical region with cervical myelopathy-inoperable due to moderate multiple medical comorbidities-PT and OT are her greatest hope for recovery her blood sugars are elevated wean steroids  7. Type 2 diabetes mellitus uncontrolled with hyperlipidemia and, Obesity (BMI 30-39. 9)-fingerstick blood sugars q. before meals and at bedtime dose and titrate insulin and carbohydrate insulin intake add diabetic add and dietary Ed  8. Severely [place] (hard of hearing),   Vestibular neuronitis of both ears  9. Nausea, anxiety and elevated blood sugars due to steroids for cervical myelopathy-steroid taper and titrate benzodiazepines if needed titrate Zofran and Antivert monitor stools for blood while patient is on Coumadin and steroids as she is high risk for bleed  10.  Neurogenic bowel and bladder-cath if no void check postvoid residuals teach Crede method recheck stat UA             Krishna Dan, ALESSIO, PM&R     Attending    [address] [facility]

## 2020-06-27 NOTE — PROGRESS NOTES
Clinical Pharmacy Note    Warfarin consult follow-up    Recent Labs     06/27/20  0600   INR 2.0     Recent Labs     06/25/20  0502 06/26/20  0652 06/27/20  0600   HGB 11.0* 11.0* 11.6*   HCT 34.4* 34.1* 35.2*    264 233       Significant drug:drug interactions:  APAP, Norco, prednisone    Notes:  Date INR Warfarin Dose    06/19/20 1.8  6 mg    06/20/20 2.1  7 mg    06/21/20 2.3   7 mg    06/22/20 3.3  HOLD    06/23/20 3.6   HOLD      06/24/20 1.6  7 mg    06/25/20 1.4  8 mg    06/26/20 1.7  8 mg   06/27/20 2.0 7 mg               INR is therapeutic at 2.0 today. Will give patients normal home dose of 7 mg to keep INR in goal range of 2-3. Daily PT/INR until stable within therapeutic range.     Vera King PharmD  6/27/2020 12:42 PM

## 2020-06-27 NOTE — PROGRESS NOTES
Occupational Therapy  Facility/Department: [facility]  Daily Treatment Note  NAME: Kaveh Hernadez  : 6/10/1927  MRN: [id number]    Date of Service: 2020    Discharge Recommendations:  Continue to assess pending progress       Assessment      Activity Tolerance  Activity Tolerance: Patient Tolerated treatment well  Safety Devices  Safety Devices in place: Yes  Type of devices: All fall risk precautions in place         Patient Diagnosis(es): There were no encounter diagnoses. 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 has a past surgical history that includes Appendectomy; Rectocele repair; Cystocele repair; Ankle surgery; Breast biopsy; Cataract removal (); eye surgery; Tonsillectomy; Hysterectomy; and Coronary angioplasty with stent (2019).     Restrictions  Restrictions/Precautions  Restrictions/Precautions: Fall Risk  Position Activity Restriction  Other position/activity restrictions: [facility]  Subjective   General  Chart Reviewed: Yes  Patient assessed for rehabilitation services?: Yes  Response to previous treatment: Patient with no complaints from previous session  Family / Caregiver Present: No  Referring Practitioner: Dr Ingrid Jay  Diagnosis: NTSCI with imp mob and ADLs 2° cervical Education & Training, Patient/Caregiver Education & Training  Plan Comment: Continue per OT POC for planned d/c on 20    Goals  Patient Goals   Patient goals : \"to at least do things in my home\"       Therapy Time   Individual Concurrent Group Co-treatment   Time In 830         Time Out 905         Minutes 35              ADL trainin minutes    Pt. missed 25 minutes for breakfast. Will attempt make up time later.      LOIS Moreno Ra Electronically signed by LOIS Moreno Ra on 2020 at 9:29 AM

## 2020-06-27 NOTE — PLAN OF CARE
Problem: Falls - Risk of:  Goal: Will remain free from falls  Description: Will remain free from falls  6/26/2020 2306 by Clarissa Hogan RN  Outcome: Ongoing  6/26/2020 1642 by Jennifer Campos RN  Outcome: Ongoing  Goal: Absence of physical injury  Description: Absence of physical injury  6/26/2020 2306 by Clarissa Ramirez. Lizzette Orozco RN  Outcome: Ongoing  6/26/2020 1642 by Jennifer Campos RN  Outcome: Ongoing     Problem: Skin Integrity:  Goal: Will show no infection signs and symptoms  Description: Will show no infection signs and symptoms  6/26/2020 2306 by Clarissa Hogan RN  Outcome: Ongoing  6/26/2020 1642 by Jennifer Campos RN  Outcome: Ongoing  Goal: Absence of new skin breakdown  Description: Absence of new skin breakdown  6/26/2020 2306 by Clarissa Hogan RN  Outcome: Ongoing  6/26/2020 1642 by Jennifer Campos RN  Outcome: Ongoing     Problem: IP SWALLOWING  Goal: LTG - patient will tolerate the least restrictive diet consistency to allow for safe consumption of daily meals  6/26/2020 2306 by Clarissa Ramirez.  Lizzette Orozco RN  Outcome: Ongoing  6/26/2020 1642 by Jennifer Campos RN  Outcome: Ongoing     Problem: IP COMMUNICATION/DYSARTHRIA  Goal: LTG - patient will improve expressive language skills to allow for communication of wants and needs in daily activities  6/26/2020 1642 by Jennifer Campos RN  Outcome: Ongoing  6/26/2020 1445 by PK Garcia  Outcome: Met This Shift     Problem: SAFETY  Goal: LTG - patient will adhere to hip precautions during ADL's and transfers  6/26/2020 1642 by Jennifer Campos RN  Outcome: Ongoing  Goal: LTG - Patient will demonstrate safety requirements appropriate to situation/environment  6/26/2020 1642 by Jennifer Campos RN  Outcome: Ongoing  Goal: LTG - patient will utilize safety techniques  6/26/2020 1642 by Jennifer Campos RN  Outcome: Ongoing  Goal: STG - patient locks brakes on wheelchair  6/26/2020 1642 by Jennifer Campos RN  Outcome: Ongoing  Goal: STG - Patient uses

## 2020-06-28 LAB
ANION GAP SERPL CALCULATED.3IONS-SCNC: 13 MEQ/L (ref 9–15)
BUN BLDV-MCNC: 44 MG/DL (ref 8–23)
CALCIUM SERPL-MCNC: 8.8 MG/DL (ref 8.5–9.9)
CHLORIDE BLD-SCNC: 108 MEQ/L (ref 95–107)
CO2: 19 MEQ/L (ref 20–31)
CREAT SERPL-MCNC: 1.21 MG/DL (ref 0.5–0.9)
GFR AFRICAN AMERICAN: 50.2
GFR NON-AFRICAN AMERICAN: 41.5
GLUCOSE BLD-MCNC: 165 MG/DL (ref 60–115)
GLUCOSE BLD-MCNC: 174 MG/DL (ref 70–99)
GLUCOSE BLD-MCNC: 179 MG/DL (ref 60–115)
GLUCOSE BLD-MCNC: 258 MG/DL (ref 60–115)
GLUCOSE BLD-MCNC: 315 MG/DL (ref 60–115)
HCT VFR BLD CALC: 34.9 % (ref 37–47)
HEMOGLOBIN: 11.4 G/DL (ref 12–16)
INR BLD: 2.5
MCH RBC QN AUTO: 30.6 PG (ref 27–31.3)
MCHC RBC AUTO-ENTMCNC: 32.7 % (ref 33–37)
MCV RBC AUTO: 93.6 FL (ref 82–100)
PDW BLD-RTO: 14.9 % (ref 11.5–14.5)
PERFORMED ON: ABNORMAL
PLATELET # BLD: 238 K/UL (ref 130–400)
POTASSIUM REFLEX MAGNESIUM: 4.3 MEQ/L (ref 3.4–4.9)
PROTHROMBIN TIME: 27.1 SEC (ref 12.3–14.9)
RBC # BLD: 3.73 M/UL (ref 4.2–5.4)
SODIUM BLD-SCNC: 140 MEQ/L (ref 135–144)
WBC # BLD: 11.2 K/UL (ref 4.8–10.8)

## 2020-06-28 PROCEDURE — 36415 COLL VENOUS BLD VENIPUNCTURE: CPT

## 2020-06-28 PROCEDURE — 99232 SBSQ HOSP IP/OBS MODERATE 35: CPT | Performed by: PHYSICAL MEDICINE & REHABILITATION

## 2020-06-28 PROCEDURE — 6370000000 HC RX 637 (ALT 250 FOR IP): Performed by: INTERNAL MEDICINE

## 2020-06-28 PROCEDURE — 85610 PROTHROMBIN TIME: CPT

## 2020-06-28 PROCEDURE — 6370000000 HC RX 637 (ALT 250 FOR IP): Performed by: PHYSICIAN ASSISTANT

## 2020-06-28 PROCEDURE — 1180000000 HC REHAB R&B

## 2020-06-28 PROCEDURE — 6370000000 HC RX 637 (ALT 250 FOR IP): Performed by: PHYSICAL MEDICINE & REHABILITATION

## 2020-06-28 PROCEDURE — 85027 COMPLETE CBC AUTOMATED: CPT

## 2020-06-28 PROCEDURE — 6370000000 HC RX 637 (ALT 250 FOR IP): Performed by: NURSE PRACTITIONER

## 2020-06-28 PROCEDURE — 2580000003 HC RX 258: Performed by: INTERNAL MEDICINE

## 2020-06-28 PROCEDURE — 80048 BASIC METABOLIC PNL TOTAL CA: CPT

## 2020-06-28 RX ORDER — VITAMIN B COMPLEX
2000 TABLET ORAL
Status: DISCONTINUED | OUTPATIENT
Start: 2020-06-28 | End: 2020-07-08 | Stop reason: HOSPADM

## 2020-06-28 RX ORDER — LIDOCAINE 4 G/G
3 PATCH TOPICAL DAILY
Status: DISCONTINUED | OUTPATIENT
Start: 2020-06-28 | End: 2020-07-08 | Stop reason: HOSPADM

## 2020-06-28 RX ADMIN — HYDRALAZINE HYDROCHLORIDE 25 MG: 25 TABLET, FILM COATED ORAL at 20:32

## 2020-06-28 RX ADMIN — NITROFURANTOIN (MONOHYDRATE/MACROCRYSTALS) 100 MG: 75; 25 CAPSULE ORAL at 20:32

## 2020-06-28 RX ADMIN — CARVEDILOL 12.5 MG: 12.5 TABLET, FILM COATED ORAL at 08:35

## 2020-06-28 RX ADMIN — SACUBITRIL AND VALSARTAN 1 TABLET: 24; 26 TABLET, FILM COATED ORAL at 08:35

## 2020-06-28 RX ADMIN — Medication 10 ML: at 20:33

## 2020-06-28 RX ADMIN — ATORVASTATIN CALCIUM 10 MG: 10 TABLET, FILM COATED ORAL at 20:32

## 2020-06-28 RX ADMIN — HYDRALAZINE HYDROCHLORIDE 25 MG: 25 TABLET, FILM COATED ORAL at 14:56

## 2020-06-28 RX ADMIN — VITAMIN D, TAB 1000IU (100/BT) 2000 UNITS: 25 TAB at 17:18

## 2020-06-28 RX ADMIN — PREDNISONE 10 MG: 10 TABLET ORAL at 08:35

## 2020-06-28 RX ADMIN — OYSTER SHELL CALCIUM WITH VITAMIN D 1 TABLET: 500; 200 TABLET, FILM COATED ORAL at 08:35

## 2020-06-28 RX ADMIN — WARFARIN SODIUM 7 MG: 5 TABLET ORAL at 17:18

## 2020-06-28 RX ADMIN — Medication 10 ML: at 08:34

## 2020-06-28 RX ADMIN — CLOPIDOGREL BISULFATE 75 MG: 75 TABLET ORAL at 08:35

## 2020-06-28 RX ADMIN — SACUBITRIL AND VALSARTAN 1 TABLET: 24; 26 TABLET, FILM COATED ORAL at 20:32

## 2020-06-28 RX ADMIN — AMLODIPINE BESYLATE 5 MG: 5 TABLET ORAL at 08:35

## 2020-06-28 RX ADMIN — CARVEDILOL 12.5 MG: 12.5 TABLET, FILM COATED ORAL at 17:18

## 2020-06-28 RX ADMIN — GABAPENTIN 100 MG: 100 CAPSULE ORAL at 20:32

## 2020-06-28 RX ADMIN — HYDRALAZINE HYDROCHLORIDE 25 MG: 25 TABLET, FILM COATED ORAL at 08:35

## 2020-06-28 ASSESSMENT — PAIN SCALES - GENERAL: PAINLEVEL_OUTOF10: 0

## 2020-06-28 NOTE — PROGRESS NOTES
Subjective: The patient complains of severe  acute on chronic neck pain and acute upper extremity weakness left greater than right partially relieved by PT, OT, steroid taper and exacerbated by recent fall and progressive cervical spinal stenosis with myelopathy. I am concerned that somebody keeps taking her off a carb count diet which I feel strongly she will need in order to recover well from her recent surgery. We will put her back on 3 carbs per meal at most 4 carbs per meal.  Will add diabetic add and dietary Ed    ROS x10: The patient also complains of severely impaired mobility and activities of daily living. Otherwise no new problems with vision, hearing, nose, mouth, throat, dermal, cardiovascular, GI, , pulmonary, musculoskeletal, psychiatric or neurological. See Rehab H&P on Rehab chart dated . Vital signs:  BP (!) 166/70   Pulse 80   Temp 98 °F (36.7 °C) (Oral)   Resp 17   Ht 5' 3\" (1.6 m)   Wt 192 lb 14.4 oz (87.5 kg)   LMP  (LMP Unknown)   SpO2 97%   BMI 34.17 kg/m²   I/O:   PO/Intake:  fair PO intake, no problems observed or reported. Bowel/Bladder:  continent, no problems noted. General:  Patient is well developed, adequately nourished, non-obese and     well kempt. HEENT:    PERRLA, hearing intact to loud voice, external inspection of ear     and nose benign. Inspection of lips, tongue and gums benign  Musculoskeletal: No significant change in strength or tone. All joints stable. Inspection and palpation of digits and nails show no clubbing,       cyanosis or inflammatory conditions. Neuro/Psychiatric: Affect: flat but pleasant. Alert and oriented to person, place and     situation. No significant change in deep tendon reflexes or     Sensation-bilateral upper extremity weakness left greater than right left lower extremity weakness is    also there  Lungs:  Diminished, CTA-B. Respiration effort is normal at rest.     Heart:   S1 = S2, RRR.   No loud murmurs. Abdomen:  Soft, non-tender, no enlargement of liver or spleen. Extremities:  No significant lower extremity edema or tenderness. Skin:   Intact to general survey, no visualized or palpated problems. Rehabilitation:  Physical therapy: FIMS:  Bed Mobility: Scooting: Stand by assistance    Transfers: Sit to Stand: Stand by assistance  Stand to sit: Stand by assistance  Bed to Chair: Minimal assistance(pivot and with ww), Ambulation 1  Surface: carpet, level tile  Device: Rollator  Assistance: Contact guard assistance  Quality of Gait: Poor hip stability, ff posture, decreased chavo heel strike  Gait Deviations: Decreased step length, Decreased step height  Distance: 100'  Comments: VC's for proper breathing technique throughout, fatigue post ambulation. , Stairs  # Steps : 4  Stairs Height: 6\"  Rails: Bilateral  Assistance: Contact guard assistance  Comment: VC's for sequencing, non reciprocal pattern ascending and descending    FIMS:  ,  , Assessment: Pt exhibits an increase in strength and endurance this PM secondary to performing stair training. Visual demonstration and vc's provided for proper sequencing on stairs with good follow through. Short standing RB needed during ambulation. Occupational therapy: FIMS:   ,  , Assessment: Patient is a 80 year female with an new onset of coordination problems, dizziness and weakness.  Patient presents with the above stated problem areas and will benefit from OT to address the issues and increase independence    Speech therapy: FIMS:        Lab/X-ray studies reviewed, analyzed and discussed with patient and staff:   Recent Results (from the past 24 hour(s))   POCT Glucose    Collection Time: 06/27/20 11:40 AM   Result Value Ref Range    POC Glucose 336 (H) 60 - 115 mg/dl    Performed on ACCU-CHEK    POCT Glucose    Collection Time: 06/27/20  4:28 PM   Result Value Ref Range    POC Glucose 225 (H) 60 - 115 mg/dl    Performed on ACCU-CHEK    POCT Glucose Collection Time: 06/27/20  8:12 PM   Result Value Ref Range    POC Glucose 322 (H) 60 - 115 mg/dl    Performed on ACCU-CHEK    Basic Metabolic Panel w/ Reflex to MG    Collection Time: 06/28/20  5:59 AM   Result Value Ref Range    Sodium 140 135 - 144 mEq/L    Potassium reflex Magnesium 4.3 3.4 - 4.9 mEq/L    Chloride 108 (H) 95 - 107 mEq/L    CO2 19 (L) 20 - 31 mEq/L    Anion Gap 13 9 - 15 mEq/L    Glucose 174 (H) 70 - 99 mg/dL    BUN 44 (H) 8 - 23 mg/dL    CREATININE 1.21 (H) 0.50 - 0.90 mg/dL    GFR Non-African American 41.5 (L) >60    GFR  50.2 (L) >60    Calcium 8.8 8.5 - 9.9 mg/dL   CBC    Collection Time: 06/28/20  6:00 AM   Result Value Ref Range    WBC 11.2 (H) 4.8 - 10.8 K/uL    RBC 3.73 (L) 4.20 - 5.40 M/uL    Hemoglobin 11.4 (L) 12.0 - 16.0 g/dL    Hematocrit 34.9 (L) 37.0 - 47.0 %    MCV 93.6 82.0 - 100.0 fL    MCH 30.6 27.0 - 31.3 pg    MCHC 32.7 (L) 33.0 - 37.0 %    RDW 14.9 (H) 11.5 - 14.5 %    Platelets 937 794 - 253 K/uL   POCT Glucose    Collection Time: 06/28/20  6:20 AM   Result Value Ref Range    POC Glucose 165 (H) 60 - 115 mg/dl    Performed on ACCU-CHEK    Protime-INR    Collection Time: 06/28/20  7:43 AM   Result Value Ref Range    Protime 27.1 (H) 12.3 - 14.9 sec    INR 2.5        Ct Head 6/19/2020    FINDINGS: There is no intracranial hemorrhage, mass effect, midline shift, extra-axial collection, evidence of hydrocephalus, recent ischemic infarct, or skull fracture identified. Moderate age-related atrophic changes have not significantly changed from the yesterday's study. NO ACUTE INTRACRANIAL PROCESS OR SIGNIFICANT CHANGE FROM YESTERDAY IDENTIFIED. Ct Head   6/18/2020    Prominence of the sulci and ventricles compatible with mild generalized parenchymal volume loss. Gray-white matter differentiation is preserved.  Areas of bilateral supratentorial white matter hypoattenuation are nonspecific but most likely related to chronic small vessel ischemic changes in a bulging and ventral ridging. Levoscoliosis with facet hypertrophy and severe left foraminal narrowing and moderate to severe right foraminal narrowing  L5/S1: Disc bulging without central canal narrowing. Severe bilateral foraminal stenosis   Retroperitoneum: No abnormality of the visualized Aorta or retroperitoneum. Levorotoscoliosis with advanced multilevel degenerative changes. Severe foraminal narrowing bilaterally at L4-5. There may be mild transverse canal narrowing at L3-4 and L4-5 but there is no definite central canal stenosis       Ct Abdomen Pelvis  6/19/2020    Liver: Negative     Spleen: Negative    Pancreas: Negative   Gallbladder: No calcified gallstones. Normal gallbladder wall. No pericholecystic fluid. Kidney: Right parapelvic cysts. Borderline pelviectasis in the left collecting system. Small bilateral cortical cysts some are too small to characterize with accuracy. Adrenal glands are negative. Bowel: The bowel is not dilated. There is no evidence of diverticulitis or colitis. Appendix: There  is no CT evidence for appendicitis. Nodes: No lymphadenopathy. Aorta: No aneurysm    Peritoneum: No free fluid or free air. The abdominal wall is intact. Pelvis: No abnormal soft tissue mass. The bladder is normal.   Bones: Levoscoliosis of the lumbar spine. BORDERLINE PELVIECTASIS IN LEFT KIDNEY. NO ACUTE PATHOLOGY IN THE ABDOMEN OR PELVIS. Xr Chest   6/19/2020   Osseous structures intact. Cardiopericardial silhouette normal. Pulmonary vasculature normal. Lungs clear. NO ACUTE CARDIOPULMONARY DISEASE. Mri Brain Wo 6/19/2020   NO EVIDENCE OF ACUTE CVA. GENERALIZED PARENCHYMAL VOLUME LOSS AND NONSPECIFIC WHITE MATTER CHANGES, MOST LIKELY SECONDARY TO CHRONIC SMALL VESSEL ISCHEMIC CHANGES. MUCOSAL THICKENING IN ETHMOID AND MAXILLARY SINUSES. A FEW SMALL NONSPECIFIC FOCI ON GRADIENT ECHO SEQUENCES WITHIN THE WHITE MATTER BILATERALLY.      Us Carotid Artery 6/19/2020   50-69% NARROWING PREDICTED OF BOTH INTERNAL CAROTID ARTERIES, NOT SIGNIFICANTLY CHANGED FROM 11/14/2018. Previous extensive, complex labs, notes and diagnostics reviewed and analyzed. ALLERGIES:    Allergies as of 06/24/2020 - Review Complete 06/24/2020   Allergen Reaction Noted    Metoclopramide  11/29/2011    Pantoprazole Other (See Comments) 12/08/2015    Pcn [penicillins]  11/29/2011    Protonix [pantoprazole sodium]  11/29/2011    Tramadol  11/29/2011      (please also verify by checking [facility])    Complex Physical Medicine & Rehab Issues Assess & Plan:   1. Severe abnormality of gait and mobility and impaired self-care and ADL's secondary to progressive cervical myelopathy. Functional and medical status reassessed regarding patients ability to participate in therapies and patient found to be able to participate in acute intensive comprehensive inpatient rehabilitation program including PT/OT to improve balance, ambulation, ADLs, and to improve the P/AROM. Therapeutic modifications regarding activities in therapies, place, amount of time per day and intensity of therapy made daily. In bed therapies or bedside therapies prn.   2. 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 Check for C.difficile x1 if >2 loose stools in 24 hours, continue bowel & bladder program.  Monitor bowel and bladder function. Lactinex 2 PO every AC.   MOM prn, Brown Bomb prn, Glycerin suppository prn, enema prn.  3. Severe neck and low back pain as well as generalized OA pain: reassess pain every shift and prior to and after each therapy session, give prn Tylenol and Norco titration using lowest effective dose, modalities prn in therapy, Lidoderm, K-pad prn.   4. Skin healing and breakdown risk:  continue pressure relief program.  Daily skin exams and reports from nursing. 5. Severe fatigue due to nutritional and hydration deficiency: Add vitamin B12 vitamin D and CoQ10 continue to monitor I&Os, calorie counts prn, dietary consult prn. Transition to 3 carb per meal diet with an at bedtime snack add diabetic add and dietary Ed  6. Acute episodic insomnia with situational adjustment disorder:  prn Ambien, monitor for day time sedation. 7. Falls risk elevated:  patient to use call light to get nursing assistance to get up, bed and chair alarm. 8. Elevated DVT risk: progressive activities in PT, continue prophylaxis SALLIE hose, elevation and Coumadin. 9. Complex discharge planning: Discharge 7/8/2020 home with her son in [facility] PT OT RN aide and social work weekly team meeting every Monday to assess progress towards goals, discuss and address social, psychological and medical comorbidities and to address difficulties they may be having progressing in therapy. Patient and family education is in progress. The patient is to follow-up with their family physician after discharge. Complex Active General Medical Issues that complicate care Assess & Plan:    1. HTN (hypertension),  HLD (hyperlipidemia), CAD,   Chronic combined systolic and diastolic congestive heart failure,  History of ST elevation myocardial infarction (STEMI),   History of PTCA,  Valvular heart disease-vital signs every shift dose and titrate cardiac medication to include Norvasc, Lipitor, Coreg, Apresoline both intravenous and oral, Coumadin, Entresto consult hospitalist for backup medical consult Dr. Nathalie Quispe with cardiology monitor for orthostasis and failure as we rehydrate her  2. CKD (chronic kidney disease)-control blood sugars control blood pressure recheck BMP monitor UA and renal output, push clear liquids push oral fluids add IV fluids as needed  3.    Osteoarthritis,  Lumbar spinal stenosis, DJD (degenerative joint disease), lumbar-add Lidoderm BenGay and heat lowest effective dose of opiates  4. SCC (squamous cell carcinoma), arm, Eczematous dermatitis-topical steroids add co-Q10 vitamin D  5. Vitamin D and B12 deficiency-high-dose vitamin D and B12  6. Spinal stenosis in cervical region with cervical myelopathy-inoperable due to moderate multiple medical comorbidities-PT and OT are her greatest hope for recovery her blood sugars are elevated wean steroids  7. Type 2 diabetes mellitus uncontrolled with hyperlipidemia and, Obesity (BMI 30-39. 9)-fingerstick blood sugars q. before meals and at bedtime dose and titrate insulin and carbohydrate insulin intake add diabetic add and dietary Ed  8. Severely [place] (hard of hearing),   Vestibular neuronitis of both ears  9. Nausea, anxiety and elevated blood sugars due to steroids for cervical myelopathy-steroid taper and titrate benzodiazepines if needed titrate Zofran and Antivert monitor stools for blood while patient is on Coumadin and steroids as she is high risk for bleed  10.  Neurogenic bowel and bladder-cath if no void check postvoid residuals teach Crede method recheck stat OLGA Becerra D.O., PM&R     Attending    [address]

## 2020-06-28 NOTE — PLAN OF CARE
Problem: Falls - Risk of:  Goal: Will remain free from falls  Description: Will remain free from falls  Outcome: Ongoing  Goal: Absence of physical injury  Description: Absence of physical injury  Outcome: Ongoing     Problem: Skin Integrity:  Goal: Will show no infection signs and symptoms  Description: Will show no infection signs and symptoms  Outcome: Ongoing  Goal: Absence of new skin breakdown  Description: Absence of new skin breakdown  Outcome: Ongoing     Problem: IP SWALLOWING  Goal: LTG - patient will tolerate the least restrictive diet consistency to allow for safe consumption of daily meals  Outcome: Ongoing     Problem: IP COMMUNICATION/DYSARTHRIA  Goal: LTG - patient will improve expressive language skills to allow for communication of wants and needs in daily activities  Outcome: Ongoing     Problem: SAFETY  Goal: LTG - patient will adhere to hip precautions during ADL's and transfers  Outcome: Ongoing  Goal: LTG - Patient will demonstrate safety requirements appropriate to situation/environment  Outcome: Ongoing  Goal: LTG - patient will utilize safety techniques  Outcome: Ongoing  Goal: STG - patient locks brakes on wheelchair  Outcome: Ongoing  Goal: STG - Patient uses call light consistently to request assistance with transfers  Outcome: Ongoing  Goal: STG - patient uses gait belt during all transfers  Outcome: Ongoing     Problem:  Activity:  Goal: Ability to avoid complications of mobility impairment will improve  Description: Ability to avoid complications of mobility impairment will improve  Outcome: Ongoing  Goal: Range of joint motion will improve  Description: Range of joint motion will improve  Outcome: Ongoing  Goal: Ability to ambulate will improve  Description: Ability to ambulate will improve  Outcome: Ongoing  Goal: Muscle strength will improve  Description: Muscle strength will improve  Outcome: Ongoing     Problem: Safety:  Goal: Ability to remain free from injury will improve  Description: Ability to remain free from injury will improve  Outcome: Ongoing

## 2020-06-28 NOTE — PROGRESS NOTES
Clinical Pharmacy Note    Warfarin consult follow-up    Recent Labs     06/28/20  0743   INR 2.5     Recent Labs     06/26/20  0652 06/27/20  0600 06/28/20  0600   HGB 11.0* 11.6* 11.4*   HCT 34.1* 35.2* 34.9*    233 238       Significant drug:drug interactions:  New warfarin drug-drug interactions: None  Discontinued drug-drug interactions: None  Current warfarin-drug interactions: APAP, Plavix, Norco, prednisone    Notes:  Date INR Warfarin Dose    06/19/20 1.8  6 mg    06/20/20 2.1  7 mg    06/21/20 2.3   7 mg    06/22/20 3.3  HOLD    06/23/20 3.6   HOLD      06/24/20 1.6  7 mg    06/25/20 1.4  8 mg    06/26/20 1.7  8 mg    06/27/20 2.0  7 mg    06/28/20 2.5  7 mg             INR is therapeutic at 2.5. Give warfarin 7 mg today to maintain INR within goal range of 2-3 for valvular disease/history of CVA. This is consistent with the patient's usual home dosage regimen of warfarin 7 mg on Sundays. Daily PT/INR until stable within therapeutic range.     Kelly Jordan, PharmVENECIA   6/28/2020 12:01 PM

## 2020-06-29 LAB
ANION GAP SERPL CALCULATED.3IONS-SCNC: 13 MEQ/L (ref 9–15)
BUN BLDV-MCNC: 45 MG/DL (ref 8–23)
CALCIUM SERPL-MCNC: 8.9 MG/DL (ref 8.5–9.9)
CHLORIDE BLD-SCNC: 105 MEQ/L (ref 95–107)
CO2: 17 MEQ/L (ref 20–31)
CREAT SERPL-MCNC: 1.18 MG/DL (ref 0.5–0.9)
GFR AFRICAN AMERICAN: 51.7
GFR NON-AFRICAN AMERICAN: 42.7
GLUCOSE BLD-MCNC: 146 MG/DL (ref 70–99)
GLUCOSE BLD-MCNC: 156 MG/DL (ref 60–115)
GLUCOSE BLD-MCNC: 254 MG/DL (ref 60–115)
GLUCOSE BLD-MCNC: 275 MG/DL (ref 60–115)
GLUCOSE BLD-MCNC: 328 MG/DL (ref 60–115)
HCT VFR BLD CALC: 33.9 % (ref 37–47)
HEMOGLOBIN: 10.9 G/DL (ref 12–16)
INR BLD: 2.6
MCH RBC QN AUTO: 30.2 PG (ref 27–31.3)
MCHC RBC AUTO-ENTMCNC: 32 % (ref 33–37)
MCV RBC AUTO: 94.3 FL (ref 82–100)
PDW BLD-RTO: 14.6 % (ref 11.5–14.5)
PERFORMED ON: ABNORMAL
PLATELET # BLD: 201 K/UL (ref 130–400)
PLATELET SLIDE REVIEW: ADEQUATE
POTASSIUM REFLEX MAGNESIUM: 4.2 MEQ/L (ref 3.4–4.9)
PROTHROMBIN TIME: 27.5 SEC (ref 12.3–14.9)
RBC # BLD: 3.59 M/UL (ref 4.2–5.4)
SODIUM BLD-SCNC: 135 MEQ/L (ref 135–144)
WBC # BLD: 8.8 K/UL (ref 4.8–10.8)

## 2020-06-29 PROCEDURE — 85027 COMPLETE CBC AUTOMATED: CPT

## 2020-06-29 PROCEDURE — 36415 COLL VENOUS BLD VENIPUNCTURE: CPT

## 2020-06-29 PROCEDURE — 97530 THERAPEUTIC ACTIVITIES: CPT

## 2020-06-29 PROCEDURE — 6370000000 HC RX 637 (ALT 250 FOR IP): Performed by: INTERNAL MEDICINE

## 2020-06-29 PROCEDURE — 6370000000 HC RX 637 (ALT 250 FOR IP): Performed by: PHYSICIAN ASSISTANT

## 2020-06-29 PROCEDURE — 99233 SBSQ HOSP IP/OBS HIGH 50: CPT | Performed by: PHYSICAL MEDICINE & REHABILITATION

## 2020-06-29 PROCEDURE — 97116 GAIT TRAINING THERAPY: CPT

## 2020-06-29 PROCEDURE — 97535 SELF CARE MNGMENT TRAINING: CPT

## 2020-06-29 PROCEDURE — 6370000000 HC RX 637 (ALT 250 FOR IP): Performed by: PHYSICAL MEDICINE & REHABILITATION

## 2020-06-29 PROCEDURE — 85610 PROTHROMBIN TIME: CPT

## 2020-06-29 PROCEDURE — 1180000000 HC REHAB R&B

## 2020-06-29 PROCEDURE — 99232 SBSQ HOSP IP/OBS MODERATE 35: CPT | Performed by: PSYCHIATRY & NEUROLOGY

## 2020-06-29 PROCEDURE — 97110 THERAPEUTIC EXERCISES: CPT

## 2020-06-29 PROCEDURE — 80048 BASIC METABOLIC PNL TOTAL CA: CPT

## 2020-06-29 PROCEDURE — APPSS15 APP SPLIT SHARED TIME 0-15 MINUTES: Performed by: NURSE PRACTITIONER

## 2020-06-29 RX ORDER — HYDRALAZINE HYDROCHLORIDE 20 MG/ML
10 INJECTION INTRAMUSCULAR; INTRAVENOUS EVERY 4 HOURS PRN
Status: DISCONTINUED | OUTPATIENT
Start: 2020-06-29 | End: 2020-07-08 | Stop reason: HOSPADM

## 2020-06-29 RX ORDER — HYDRALAZINE HYDROCHLORIDE 50 MG/1
50 TABLET, FILM COATED ORAL 3 TIMES DAILY
Status: DISCONTINUED | OUTPATIENT
Start: 2020-06-29 | End: 2020-07-08 | Stop reason: HOSPADM

## 2020-06-29 RX ORDER — AMLODIPINE BESYLATE 10 MG/1
10 TABLET ORAL DAILY
Status: DISCONTINUED | OUTPATIENT
Start: 2020-06-30 | End: 2020-07-08 | Stop reason: HOSPADM

## 2020-06-29 RX ADMIN — NITROFURANTOIN (MONOHYDRATE/MACROCRYSTALS) 100 MG: 75; 25 CAPSULE ORAL at 20:35

## 2020-06-29 RX ADMIN — CARVEDILOL 12.5 MG: 12.5 TABLET, FILM COATED ORAL at 09:01

## 2020-06-29 RX ADMIN — ATORVASTATIN CALCIUM 10 MG: 10 TABLET, FILM COATED ORAL at 20:35

## 2020-06-29 RX ADMIN — HYDRALAZINE HYDROCHLORIDE 25 MG: 25 TABLET, FILM COATED ORAL at 09:02

## 2020-06-29 RX ADMIN — AMLODIPINE BESYLATE 5 MG: 5 TABLET ORAL at 09:01

## 2020-06-29 RX ADMIN — CLOPIDOGREL BISULFATE 75 MG: 75 TABLET ORAL at 09:01

## 2020-06-29 RX ADMIN — CARVEDILOL 12.5 MG: 12.5 TABLET, FILM COATED ORAL at 17:22

## 2020-06-29 RX ADMIN — HYDRALAZINE HYDROCHLORIDE 50 MG: 50 TABLET, FILM COATED ORAL at 15:06

## 2020-06-29 RX ADMIN — SACUBITRIL AND VALSARTAN 1 TABLET: 24; 26 TABLET, FILM COATED ORAL at 09:01

## 2020-06-29 RX ADMIN — WARFARIN SODIUM 6 MG: 2 TABLET ORAL at 17:23

## 2020-06-29 RX ADMIN — GABAPENTIN 100 MG: 100 CAPSULE ORAL at 20:35

## 2020-06-29 RX ADMIN — SACUBITRIL AND VALSARTAN 1 TABLET: 24; 26 TABLET, FILM COATED ORAL at 20:35

## 2020-06-29 RX ADMIN — HYDRALAZINE HYDROCHLORIDE 50 MG: 50 TABLET, FILM COATED ORAL at 20:35

## 2020-06-29 RX ADMIN — PREDNISONE 10 MG: 10 TABLET ORAL at 09:02

## 2020-06-29 RX ADMIN — VITAMIN D, TAB 1000IU (100/BT) 2000 UNITS: 25 TAB at 17:23

## 2020-06-29 ASSESSMENT — PAIN SCALES - GENERAL
PAINLEVEL_OUTOF10: 0

## 2020-06-29 ASSESSMENT — ENCOUNTER SYMPTOMS
VOMITING: 0
WHEEZING: 0
TROUBLE SWALLOWING: 0
CHEST TIGHTNESS: 0
SHORTNESS OF BREATH: 0
COLOR CHANGE: 0
NAUSEA: 0
COUGH: 0

## 2020-06-29 NOTE — PROGRESS NOTES
INDIVIDUALIZED OVERALL REHAB PLAN OF CARE  ADDENDUM TO REHAB PROGRESS NOTE-for audit purposes must also refer to this day's clinical note and combine the information      Date: 2020  Patient Name: Danisha Sneed   Room: K842/I904-28    MRN: [id number]    : 6/10/1927  (80 y.o.)  Gender: female       Today 2020 during weekly team meeting, I reviewed the patient Danisha Sneed in detail with the therapists and nurses involved in patient's care gathering complex physiatric data regarding current medical issues, progress in therapies, factors limiting progress, social issues, psychological issues, ongoing therapeutic plans and discharge planning. Legend:  I= independent Im =Modified independent  S=Supervised SB=stand by ROACH=set up CG=contact jonathon Min= minimal Mod=Moderate Max=maximal Max of 2 =maximal assist of 2 people      CURRENT FUNCTIONAL STATUS:    NURSING ISSUES:       Adding diabetic add and dietary Ed. Focusing on strengthening and neurogenic bowel and bladder as well as opiate related constipation. She will need Coumadin education and was well in the transition to the Coumadin clinic. Nursing will continue to focus on bowel and bladder continence transitioning toward independence by time of discharge. Monitoring post void residuals monitoring for severe constipation and bowel obstruction. Focus on achieving ADL goals with co-treating with OT when possible.     PHYSICAL THERAPY  Bed mobility:  Supine to Sit: Minimal assistance(Assist trunk to initiate sit) (20 1051)  Sit to Supine: Supervision (20 1051)  Transfers:  Sit to Stand: Stand by assistance (20 1045)  Bed to Chair: Stand by assistance (20 1045)  Gait:   Device: Rollator (20 1326)  Assistance: Stand by assistance;Contact guard assistance (20 1326)  Distance: 175' x 2 (20 1326)  Quality of Gait: Mild improvement with hip stability reciprocal pattern, difficulty maintaining constant speed mem: SUPERV  SP:Memory: Exceptions to [facility]  Problem Solving: Within Functional Limits        THERAPY, MEDICAL AND NURSING COORDINATION:    []  Pain medication before therapies     []  Check orthostatic BP      [x]  Ambulate to the bathroom in room    [x]  Add scheduled rest beaks     []  In room therapies      Discharge date set for:               7/8/2020      Home with: Son with help from   son          And:     [address]:     [x]  PT    [x]  OT    []  ST   [x]  Aide   []  SW    [x]  RN                    Outpatient Therapy:  []  PT    []  OT    []  ST   []  Rehab Psych                 Equipment:  [employer]      At D/C their function is goaled at:   PT:Long term goal 1: Patient will be independent with bed mobility. Long term goal 2: Patient will be indep with bed and car transfers.   Long term goal 3: Patient will be SBA with 150ft of gait with rollator in busy area and indep for household distance up to 50 feet in familiar environment  Long term goal 4: SBA 4 stairs with rails  OT:Eating  Assistance Needed: Setup or clean-up assistance  CARE Score: 5  Discharge Goal: Independent, Oral Hygiene  Reason if not Attempted: Patient refused  CARE Score: 7  Discharge Goal: Independent, Toileting Hygiene  Assistance Needed: Partial/moderate assistance  CARE Score: 3  Discharge Goal: Independent, Shower/Bathe Self  Assistance Needed: Partial/moderate assistance  CARE Score: 3  Discharge Goal: Independent  Upper Body Dressing  Assistance Needed: Setup or clean-up assistance  CARE Score: 5  Discharge Goal: Independent, Lower Body Dressing  Assistance Needed: Partial/moderate assistance  CARE Score: 3  Discharge Goal: Independent, Putting On/Taking Off Footwear  Assistance Needed: Supervision or touching assistance  CARE Score: 4  Discharge Goal: Independent, Toilet Transfer  Assistance Needed: Partial/moderate assistance  CARE Score: 3  Discharge Goal: Independent  SP:               From a cognitive standpoint they will need:        24 hr supervision  --progress to occasional           Significant problems/ barriers to functional progress include: Pt is at a high risk for functional loss,    [x]  Acute infection/UTI    []  Low BP's     []  COPD flare-up   [x]  Uncontrolled blood sugar     []  Progressive anemia         [x]  Severe pain exacerbation     []  Impaired mental status    []  Urinary incontinence    []  Bowel incontinence           Plan to correct barriers to functional progress: Add scheduled rest breaks, control pain by using ice Lidoderm rest and massage as well as pain medications prior to therapy. Based on a comprehensive evaluation of the above, the individualized therapy and Discharge plan will be:    -Times stated are an average that will be varied based on the patient's daily need. PT ____2__hrs/day 5-7 days per week           OT ___1___hrs per day 5-7 days per week ST ____1/2___hrs /day 3-5 days per week       Estimated LOS 2 week(s)    - Overall functional prognosis:     [x]  Good    []  Fair    []  Poor -Medical Prognosis:   [x]  Good    []  Fair    []  Poor    This patient was made aware of the discussion of Plan of Care, their projected dicharge date and their projected function at discharge.        Wisam Mendez DO

## 2020-06-29 NOTE — PROGRESS NOTES
Physical Therapy Rehab Treatment Note  Fac[facility]/Department: [facility]  [facility]       NAME: Waleska Pryor  : 6/10/1927 (80 y.o.)  MRN: [id number]  CODE STATUS: [id number]    Date of Service: 2020  Chart Reviewed: Yes  Patient assessed for rehabilitation services?: Yes  Family / Caregiver Present: No  Diagnosis: Abnormality of gait and mobility due to NTSCI with Impaired Mobility and ADL's secondary to Cervical Myelopathy and Vestibular neuronitis     Restrictions:  Restrictions/Precautions: Fall Risk  Position Activity Restriction  Other position/activity restrictions: DNRCCA       SUBJECTIVE: Subjective: I am a little shaky when I get up  Response To Previous Treatment: Patient with no complaints from previous session.   Pain Screening  Patient Currently in Pain: No  Pre Treatment Pain Screening  Pain at present: 0  Scale Used: Numeric Score  Intervention List: Patient able to continue with treatment    Post Treatment Pain Screening:  Pain Assessment  Pain Assessment: 0-10  Pain Level: 0    OBJECTIVE:   Follows Commands: Within Functional Limits    Bed mobility  Rolling to Left: Modified independent  Rolling to Right: Modified independent  Supine to Sit: Minimal assistance(Assist trunk to initiate sit)  Sit to Supine: Supervision  Scooting: Supervision    Transfers  Sit to Stand: Stand by assistance  Stand to sit: Stand by assistance  Bed to Chair: Stand by assistance    Ambulation  Ambulation?: Yes  More Ambulation?: No  Ambulation 1  Surface: level tile;uneven;carpet;ramp  Device: Rollator  Assistance: Stand by assistance;Contact guard assistance  Quality of Gait: Poor hip stability slow pace ascending ramp poor control descending ramp  Distance: 150' x 2  Comments: Extended seated rest break to complete    Stairs/Curb  Stairs?: Yes  Stairs  # Steps : 4  Stairs Height: 6\"  Rails: Bilateral  Assistance: Contact guard assistance  Comment: Non-reciprocal pattern    Exercises  Bridging: (x 10)  Hamstring Sets: x20 YTB  Gluteal Sets: x 10 3\" hold  Hip Flexion: x20  Hip Abduction: x20 YTB / Hip ADD ball squeeze x20  Knee Long Arc Quad: x20  Ankle Pumps: x20  Comments: Exercises performed to improve hip stability in standing and gait activities  Other exercises  Other exercises 1: Clamshells x 10  Other exercises 2: Reverse clamshells x 10     ASSESSMENT/COMMENTS:  Body structures, Functions, Activity limitations: Decreased functional mobility ; Decreased strength;Decreased endurance;Decreased balance; Increased pain;Decreased posture  Assessment: Patient continues to be limited by hip strength and endurance. Extended rest breaks required to complete functional tasks    PLAN OF CARE/Safety:   Safety Devices  Type of devices:  All fall risk precautions in place;Call light within reach      Therapy Time:   Individual   Time In 1030   Time Out 1130   Minutes 60     Minutes:60      Transfer/Bed mobility training:15      Gait trainin     Therapeutic ex: [id number] Clara Rojas PTA, 20 at 11:16 AM

## 2020-06-29 NOTE — PROGRESS NOTES
myelopathy and vestibular neuronitis    Subjective  Subjective: I guess I can try. Pain Assessment  Pain Level: 0  Pre Treatment Pain Screening  Pain at present: 0     Orientation  Orientation  Overall Orientation Status: Within Functional Limits     Objective      ADL    Grooming: Modified independent     UE Bathing: Setup(adjust water temperature)    LE Bathing: Stand by assistance    UE Dressing: Setup    LE Dressing: Contact guard assistance(LOB when pulling up with B hands, able to to sit into chair )    Toileting: Stand by assistance    Toilet Transfers  Toilet - Technique: Ambulating  Equipment Used: Grab bars  Toilet Transfer: Supervision  Toilet Transfers Comments: ww    Shower Transfers  Shower - Transfer From: Walker  Shower - Transfer Type: To and From  Shower - Transfer To: Shower seat with back  Shower - Technique: Ambulating  Shower Transfers: Stand by assistance  Shower Transfers Comments: grab bars    Coordination  Patient engaged in B FM coordination to increase I with fasteners and small objects for ADL's and IADL's. Patient able to  pieces from table top with 0 difficulty. Patient able to place bolt in L hand and stabilize with 0 difficulty. Patient able to place circular dowel on bolt with R hand with 0 difficulty. Patient able to thread nut with R hand with MIN difficulty. Patient with 0 difficulty sequencing activity.          Plan   Plan  Times per week: 5-7 times per week  Plan weeks: 2 weeks  Current Treatment Recommendations: Strengthening, Endurance Training, Neuromuscular Re-education, Self-Care / ADL, Balance Training, Functional Mobility Training, Safety Education & Training, Patient/Caregiver Education & Training    Plan Comment: Continue per OT POC for planned d/c on 7-8-20         Goals  Patient Goals   Patient goals : \"to at least do things in my home\"       Therapy Time   Individual Concurrent Group Co-treatment   Time In 0935         Time Out 1030         Minutes 54             ADL trainin minutes  Missed 5 minutes due to therapist late from prior ADL, will attempt makeup as able       Electronically signed by LOIS Serrano on 20 at 10:42 AM EDT      LOIS Serrano

## 2020-06-29 NOTE — PROGRESS NOTES
Physical Therapy Rehab Treatment Note  [facility]Department[facility]  [facility]       NAME: Diamond Medrano  : 6/10/1927 (80 y.o.)  MRN: [id number]  CODE STATUS: DNR-CCA    Date of Service: 2020  Chart Reviewed: Yes  Patient assessed for rehabilitation services?: Yes  Family / Caregiver Present: No  Diagnosis: Abnormality of gait and mobility due to NTSCI with Impaired Mobility and ADL's secondary to Cervical Myelopathy and Vestibular neuronitis     Restrictions:  Restrictions/Precautions: Fall Risk  Position Activity Restriction  Other position/activity restrictions: DNRCCA       SUBJECTIVE: Subjective: I am feeling a little better  Response To Previous Treatment: Patient with no complaints from previous session.   Pain Screening  Patient Currently in Pain: No  Pre Treatment Pain Screening  Pain at present: 0  Scale Used: Numeric Score  Intervention List: Patient able to continue with treatment    Post Treatment Pain Screening:  Pain Assessment  Pain Assessment: 0-10  Pain Level: 0    OBJECTIVE:   Follows Commands: Within Functional Limits    Bed mobility  Rolling to Left: Modified independent  Rolling to Right: Modified independent  Supine to Sit: Minimal assistance(Assist trunk to initiate sit)  Sit to Supine: Supervision  Scooting: Supervision    Transfers  Sit to Stand: Stand by assistance  Stand to sit: Stand by assistance  Bed to Chair: Stand by assistance    Ambulation  Ambulation?: Yes  More Ambulation?: Yes  Ambulation 1  Surface: level tile;uneven;carpet;ramp  Device: Rollator  Assistance: Stand by assistance;Contact guard assistance  Quality of Gait: Mild improvement with hip stability reciprocal pattern, difficulty maintaining constant speed descending ramp  Distance: 175' x 2  Comments: Increased distance to improve endurance  Ambulation 2:  Surface: Level tile  Gait training with rollator throughout room to navigate through narrow spaces around obstacles    Exercises  Bridging: (x 10)  Hamstring Sets: x20 YTB  Gluteal Sets: x 10 3\" hold  Hip Flexion: x20  Hip Abduction: x20 YTB / Hip ADD ball squeeze x20  Knee Long Arc Quad: x20  Ankle Pumps: x20  Comments: Exercises performed to improve hip stability in standing and gait activities  Other exercises  Other exercises 1: Clamshells x 10  Other exercises 2: Reverse clamshells x 10     ASSESSMENT/COMMENTS:  Body structures, Functions, Activity limitations: Decreased functional mobility ; Decreased strength;Decreased endurance;Decreased balance; Increased pain;Decreased posture  Assessment: Patient continues to fatigue quickly with gait training. Continues to progress strength and endurance    PLAN OF CARE/Safety:   Safety Devices  Type of devices:  All fall risk precautions in place;Call light within reach      Therapy Time:   Individual   Time In 1300   Time Out 1330   Minutes 30     Therapy Time   Individual Concurrent Group Co-treatment   Time In 1606         Time Out 1611         Minutes 5           Minutes: 35      Transfer: 5      Gait training:15     Therapeutic ex: [address] Jennifer CorningLALITA, 06/29/20 at 3:45 PM

## 2020-06-29 NOTE — PROGRESS NOTES
Subjective: The patient complains of severe  acute on chronic neck pain and acute upper extremity weakness left greater than right partially relieved by PT, OT, steroid taper and exacerbated by recent fall and progressive cervical spinal stenosis with myelopathy. I am concerned that somebody keeps taking her off a carb count diet which I feel strongly she will need in order to recover well from her recent surgery. We will put her back on 3 carbs per meal at most 4 carbs per meal.  Will add diabetic add and dietary Ed. She complains of opiate related constipation relieved with medications and dietary changes her last bowel movement was 6/28/2020. I am concerned about her elevated blood sugar in the evening of 179 after however she is compliant and lives just just continue on a diabetic diet. Insulin was titrated. ROS x10: The patient also complains of severely impaired mobility and activities of daily living. Otherwise no new problems with vision, hearing, nose, mouth, throat, dermal, cardiovascular, GI, , pulmonary, musculoskeletal, psychiatric or neurological. See Rehab H&P on Rehab chart dated . Vital signs:  BP (!) 183/65   Pulse 57   Temp 97 °F (36.1 °C) (Oral)   Resp 18   Ht 5' 3\" (1.6 m)   Wt 192 lb 14.4 oz (87.5 kg)   LMP  (LMP Unknown)   SpO2 97%   BMI 34.17 kg/m²   I/O:   PO/Intake:  fair PO intake, 4 carb ADA diet    Bowel/Bladder:  Continent, opiate related constipation  General:  Patient is well developed, adequately nourished, non-obese and     well kempt. HEENT:    PERRLA, hearing intact to loud voice, external inspection of ear     and nose benign. Inspection of lips, tongue and gums benign  Musculoskeletal: No significant change in strength or tone. All joints stable. Inspection and palpation of digits and nails show no clubbing,       cyanosis or inflammatory conditions. Neuro/Psychiatric: Affect: flat but pleasant.   Alert and oriented to person, place Collection Time: 06/28/20 11:08 AM   Result Value Ref Range    POC Glucose 258 (H) 60 - 115 mg/dl    Performed on ACCU-CHEK    POCT Glucose    Collection Time: 06/28/20  4:23 PM   Result Value Ref Range    POC Glucose 315 (H) 60 - 115 mg/dl    Performed on ACCU-CHEK    POCT Glucose    Collection Time: 06/28/20  8:36 PM   Result Value Ref Range    POC Glucose 179 (H) 60 - 115 mg/dl    Performed on ACCU-CHEK    Basic Metabolic Panel w/ Reflex to MG    Collection Time: 06/29/20  5:48 AM   Result Value Ref Range    Sodium 135 135 - 144 mEq/L    Potassium reflex Magnesium 4.2 3.4 - 4.9 mEq/L    Chloride 105 95 - 107 mEq/L    CO2 17 (L) 20 - 31 mEq/L    Anion Gap 13 9 - 15 mEq/L    Glucose 146 (H) 70 - 99 mg/dL    BUN 45 (H) 8 - 23 mg/dL    CREATININE 1.18 (H) 0.50 - 0.90 mg/dL    GFR Non-African American 42.7 (L) >60    GFR  51.7 (L) >60    Calcium 8.9 8.5 - 9.9 mg/dL   POCT Glucose    Collection Time: 06/29/20  5:51 AM   Result Value Ref Range    POC Glucose 156 (H) 60 - 115 mg/dl    Performed on ACCU-CHEK        Ct Head 6/19/2020   NO ACUTE INTRACRANIAL PROCESS OR SIGNIFICANT CHANGE FROM YESTERDAY IDENTIFIED. Ct Head   6/18/2020   No acute intracranial process or significant interval change. Mri Cervical Spine  6/20/2020    Craniocervical junction: Craniocervical junction is within normal limits. Bone marrow: No sign of acute fracture. No abnormality of the marrow signal to suggest any metastatic or diffuse bone disease. Soft tissues: Cervical soft tissues are within normal limits. Cervical cord: The cervical cord has normal morphology and signal. There is no sign of any extramedullary hemorrhage or fluid collection. C1/2: The odontoid and the C1-2 articulation are normal. C2/C3:  There is no neural foraminal stenosis. There is no evidence of central canal stenosis. There is no foraminal stenosis. C3/C4:  Minimal disc osteophyte complex with ventral ridging. No central canal stenosis. No significant foraminal narrowing. C4/C5:  Prominent disc osteophyte complex with effacement of the anterior CSF column in cord deformity. No abnormal cord signal intensity. Findings resulting in moderate central canal stenosis. C5/C6:  There is no neural foraminal stenosis. There is no evidence of central canal stenosis. There is no foraminal stenosis. C6/C7:  Moderate discussed by complex with effacement of the anterior CSF column and cord deformity. No abnormal cord signal intensity. Findings resulting in moderate to severe central canal stenosis. C7-T1:  Minimal disc osteophyte complex without central canal or significant foraminal narrowing     Severe central canal stenosis at C5-6. Moderate to severe central canal stenosis at C4-5. No abnormal cord signal intensity. Mri Thoracic Spine  : 6/20/2020     Bones: The thoracic vertebrae are normally aligned with no evidence of fracture. There is normal marrow signal throughout the thoracic spine. Disc space: There is no focal disc herniation or evidence for spinal canal stenosis. Disc spaces are age-appropriate. Spinal cord: The thoracic cord is normal in configuration and signal intensity. Soft tissues: There is no paraspinal soft tissue mass or fluid collection. Negative MRI of the thoracic spine. No signs of cord compression. Mri Lumbar Spine   6/20/2020     Vertebrae: No acute fracture. Marrow signal changes are within normal limits. Conus: The conus medullaris ends at L1 level and is unremarkable. T12/L1: There is no evidence of disc bulging or disc herniation. No significant facet hypertrophy or thickening of ligamenta flava. There is no central spinal canal stenosis. There is no neural foraminal stenosis. L1/2:  There is no evidence of disc bulging or disc herniation. No significant facet hypertrophy or thickening of ligamenta flava. There is no central spinal canal stenosis. There is no neural foraminal stenosis. L2/3:  Minimal disc bulging with no central canal narrowing or significant foraminal stenosis     L3/4:  Broad-based disc bulging with minimal ventral ridging. Levoscoliosis with moderate to severe right foraminal stenosis. L4/5:  Broad-based disc bulging and ventral ridging. Levoscoliosis with facet hypertrophy and severe left foraminal narrowing and moderate to severe right foraminal narrowing  L5/S1: Disc bulging without central canal narrowing. Severe bilateral foraminal stenosis   Retroperitoneum: No abnormality of the visualized Aorta or retroperitoneum. Levorotoscoliosis with advanced multilevel degenerative changes. Severe foraminal narrowing bilaterally at L4-5. There may be mild transverse canal narrowing at L3-4 and L4-5 but there is no definite central canal stenosis       Ct Abdomen Pelvis  6/19/2020    Liver: Negative     Spleen: Negative    Pancreas: Negative   Gallbladder: No calcified gallstones. Normal gallbladder wall. No pericholecystic fluid. Kidney: Right parapelvic cysts. Borderline pelviectasis in the left collecting system. Small bilateral cortical cysts some are too small to characterize with accuracy. Adrenal glands are negative. Bowel: The bowel is not dilated. There is no evidence of diverticulitis or colitis. Appendix: There  is no CT evidence for appendicitis. Nodes: No lymphadenopathy. Aorta: No aneurysm    Peritoneum: No free fluid or free air. The abdominal wall is intact. Pelvis: No abnormal soft tissue mass. The bladder is normal.   Bones: Levoscoliosis of the lumbar spine. BORDERLINE PELVIECTASIS IN LEFT KIDNEY. NO ACUTE PATHOLOGY IN THE ABDOMEN OR PELVIS. Xr Chest   6/19/2020   Osseous structures intact. Cardiopericardial silhouette normal. Pulmonary vasculature normal. Lungs clear. NO ACUTE CARDIOPULMONARY DISEASE. Mri Brain Wo 6/19/2020   NO EVIDENCE OF ACUTE CVA.  GENERALIZED PARENCHYMAL VOLUME LOSS AND NONSPECIFIC WHITE MATTER rehabilitation program including PT/OT to improve balance, ambulation, ADLs, and to improve the P/AROM. Therapeutic modifications regarding activities in therapies, place, amount of time per day and intensity of therapy made daily. In bed therapies or bedside therapies prn.   2. 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 Check for C.difficile x1 if >2 loose stools in 24 hours, continue bowel & bladder program.  Monitor bowel and bladder function. Lactinex 2 PO every AC. MOM prn, Brown Bomb prn, Glycerin suppository prn, enema prn.  3. Severe neck and low back pain as well as generalized OA pain: reassess pain every shift and prior to and after each therapy session, give prn Tylenol and Norco titration using lowest effective dose, modalities prn in therapy, Lidoderm, K-pad prn. Use modalities such as heat and BenGay 1 available and when helpful use lowest effective dose of Norco titrating off if possible prior to discharge. 4. Skin healing and breakdown risk:  continue pressure relief program.  Daily skin exams and reports from nursing. 5. Severe fatigue due to nutritional and hydration deficiency: Add vitamin B12 vitamin D and CoQ10 continue to monitor I&Os, calorie counts prn, dietary consult prn. Transition to 3 carb per meal diet with an at bedtime snack add diabetic add and dietary Ed  6. Acute episodic insomnia with situational adjustment disorder:  prn Ambien, monitor for day time sedation. 7. Falls risk elevated:  patient to use call light to get nursing assistance to get up, bed and chair alarm. 8. Elevated DVT risk: progressive activities in PT, continue prophylaxis SALLIE hose, elevation and Coumadin.   9. Complex discharge planning: Discharge 7/8/2020 home with her son [place] [id number] [facility] care PT OT RN aide and social work weekly team meeting every Monday to assess progress towards goals, discuss and address social, psychological and medical comorbidities and to address difficulties they may be having progressing in therapy. Patient and family education is in progress. The patient is to follow-up with their family physician after discharge. Complex Active General Medical Issues that complicate care Assess & Plan:    1. HTN (hypertension),  HLD (hyperlipidemia), CAD,   Chronic combined systolic and diastolic congestive heart failure,  History of ST elevation myocardial infarction (STEMI),   History of PTCA,  Valvular heart disease-vital signs every shift dose and titrate cardiac medication to include Norvasc, Lipitor, Coreg, Apresoline both intravenous and oral, Coumadin, Entresto consult hospitalist for backup medical consult Dr. George Parker with cardiology monitor for orthostasis and failure as we rehydrate her  2. CKD (chronic kidney disease)-control blood sugars control blood pressure recheck BMP monitor UA and renal output, push clear liquids push oral fluids add IV fluids as needed  3. Osteoarthritis,  Lumbar spinal stenosis, DJD (degenerative joint disease), lumbar-add Lidoderm BenGay and heat lowest effective dose of opiates  4. SCC (squamous cell carcinoma), arm, Eczematous dermatitis-topical steroids add co-Q10 vitamin D  5. Vitamin D and B12 deficiency-high-dose vitamin D and B12  6. Spinal stenosis in cervical region with cervical myelopathy-inoperable due to moderate multiple medical comorbidities-PT and OT are her greatest hope for recovery her blood sugars are elevated wean steroids  7. Type 2 diabetes mellitus uncontrolled with hyperlipidemia and, Obesity (BMI 30-39. 9)-fingerstick blood sugars q. before meals and at bedtime dose and titrate insulin and carbohydrate insulin intake add diabetic add and dietary Ed  8. Severely [place] (hard of hearing),   Vestibular neuronitis of both ears  9.  Nausea, anxiety and elevated blood sugars due to steroids for cervical myelopathy-steroid taper and titrate benzodiazepines if needed titrate Zofran and Antivert monitor stools for blood while patient is on Coumadin and steroids as she is high risk for bleed  10.  Neurogenic bowel and bladder-cath if no void check postvoid residuals teach Crede method recheck stat UA             Ramya Washington D.O., PM&R     Attending    [address]

## 2020-06-29 NOTE — PROGRESS NOTES
Clinical Pharmacy Note    Warfarin consult follow-up    Recent Labs     06/29/20  0751   INR 2.6     Recent Labs     06/27/20  0600 06/28/20  0600 06/29/20  0548   HGB 11.6* 11.4* 10.9*   HCT 35.2* 34.9* 33.9*    238 201       Significant drug:drug interactions:  New warfarin drug-drug interactions: prednisone      Notes:  Date INR Warfarin Dose    06/19/20 1.8  6 mg    06/20/20 2.1  7 mg    06/21/20 2.3   7 mg    06/22/20 3.3  HOLD    06/23/20 3.6   HOLD      06/24/20 1.6  7 mg    06/25/20 1.4  8 mg    06/26/20 1.7  8 mg    06/27/20 2.0  7 mg    06/28/20 2.5  7 mg    06/29/20  2.6  6 mg     INR 2.6 which is therapeutic for goal 2-3. Will give pt normal home dose of 6mg x today. INR to be changed to every other day per order from MD as pt INR stabilizes. Will adjust daily PT/INR order as appropriate. Will continue to monitor. MWF PT/INR until stable within therapeutic range. Thank you for consult.    Elena Ramirez PharmD

## 2020-06-29 NOTE — CARE COORDINATION
age 40   [address]       as a child     Chart Reviewed: Yes  Patient assessed for rehabilitation services?: Yes  Family / Caregiver Present: No  Diagnosis: Abnormality of gait and mobility due to NTSCI with Impaired Mobility and ADL's secondary to Cervical Myelopathy and Vestibular neuronitis   Restrictions  Restrictions/Precautions: Fall Risk  Position Activity Restriction  Other position/activity restrictions: [facility]  CASE MANAGEMENT    Social/Functional History  Social/Functional History  Lives With: Son  Type of Home: House  Home Layout: Two level, Performs ADL's on one level, Able to Live on Main level with bedroom/bathroom  Home Access: Stairs to enter without rails  Entrance Stairs - Number of Steps: 1  Bathroom Shower/Tub: Tub/Shower unit, Curtain  Bathroom Equipment: Hand-held shower, Shower chair  Home Equipment: 4 wheeled walker, Rolling walker(Son reported that patient uses a rollator in the house and a ww in the community)  ADL Assistance: Independent  Homemaking Assistance: (son does all homemaking)  Ambulation Assistance: Independent  Transfer Assistance: Independent  Active : No  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     Dietary Nutrition Supplements: Snack (see comment)  DIET CARB CONTROL; Carb Control: 3 carb choices (45 gms)/meal; Low Sodium (2 GM)    SpO2: 97 % (06/29/20 0731)  O2 Flow Rate (L/min): 0 L/min (06/24/20 5423)  No active isolations    Skin Issues: No    Pain Managed: Yes    Bladder continence: No- stress    Bowel continence: Yes      Other: preventative Macrobid       PHYSICAL THERAPY  Bed mobility:  Supine to Sit: Minimal assistance(Assist trunk to initiate sit) (06/29/20 1051)  Sit to Supine: Supervision (06/29/20 1051)  Transfers:  Sit to Stand: Stand by assistance (06/29/20 1045)  Bed to Chair: Stand by assistance (06/29/20 1045)  Gait:   Device: Rollator (06/29/20 1326)  Assistance: Stand by assistance;Contact guard assistance (06/29/20 1326)  Distance: 175' x 2 (06/29/20 1326)  Quality of Gait: Mild improvement with hip stability reciprocal pattern, difficulty maintaining constant speed descending ramp (06/29/20 1326)  Comments: Increased distance to improve endurance (06/29/20 1326)  Stairs:  # Steps : 4 (06/29/20 1045)  Rails: Bilateral (06/29/20 1045)  Assistance: Contact guard assistance (06/29/20 1045)  Comment: Non-reciprocal pattern (06/29/20 1045)  W/C mobility:     LTG:  Long term goal 1: Patient will be independent with bed mobility. Long term goal 2: Patient will be indep with bed and car transfers.   Long term goal 3: Patient will be SBA with 150ft of gait with rollator in busy area and indep for household distance up to 50 feet in familiar environment  Long term goal 4: SBA 4 stairs with rails  PT Treatment Time:  1.5 hrs      OCCUPATIONAL THERAPY  Hand Dominance: Right  ADL  Feeding: Modified independent  (06/27/20 0926)  Grooming: Modified independent  (06/29/20 1040)  UE Bathing: Setup(adjust water temperature) (06/29/20 1040)  LE Bathing: Stand by assistance (06/29/20 1040)  UE Dressing: Setup (06/29/20 1040)  LE Dressing: Contact guard assistance(LOB when pulling up with B hands, able to to sit into chair ) (06/29/20 1040)  Toileting: Stand by assistance (06/29/20 1040)  Toilet Transfers  Toilet - Technique: Ambulating (06/29/20 1040)  Equipment Used: Grab bars (06/29/20 1040)  Toilet Transfer: Supervision (06/29/20 1040)  Toilet Transfers Comments: jersey (06/29/20 1040)  Tub Transfers  Tub Transfers: Not tested (06/25/20 1143)  Shower Transfers  Shower - Transfer From: Adelso Menon (06/29/20 1040)  Shower - Transfer Type: To and From (06/29/20 1040)  Shower - Transfer To: Shower seat with back (06/29/20 1040)  Shower - Technique: Ambulating (06/29/20 1040)  Shower Transfers: Stand by assistance (06/29/20 1040)  Shower Transfers Comments: grab bars (06/29/20 1040)  LTG:  Eating  Assistance Needed: Setup or clean-up assistance  CARE Score: 5  Discharge Goal: Independent, Oral Hygiene  Reason if not Attempted: Patient refused  CARE Score: 7  Discharge Goal: Independent, Toileting Hygiene  Assistance Needed: Partial/moderate assistance  CARE Score: 3  Discharge Goal: Independent, Shower/Bathe Self  Assistance Needed: Partial/moderate assistance  CARE Score: 3  Discharge Goal: Independent  Upper Body Dressing  Assistance Needed: Setup or clean-up assistance  CARE Score: 5  Discharge Goal: Independent, Lower Body Dressing  Assistance Needed: Partial/moderate assistance  CARE Score: 3  Discharge Goal: Independent, Putting On/Taking Off Footwear  Assistance Needed: Supervision or touching assistance  CARE Score: 4  Discharge Goal: Independent, Toilet Transfer  Assistance Needed: Partial/moderate assistance  CARE Score: 3  Discharge Goal: Independent  OT Treatment Time: 1.5 hrs      SPEECH THERAPY    Motor Speech: Within Functional Limits  Comprehension: Within Functional Limits(Patient followed 3-4 step directions without difficulty)  Verbal Expression: Within functional limits      Diet/Swallow:  Diet Solids Recommendation: Regular  Liquid Consistency Recommendation:  Thin  Dysphagia Outcome Severity Scale: Level 6: Within functional limits/Modified independence    Compensatory Swallowing Strategies: Small bites/sips, Eat/Feed slowly, Upright as possible for all oral intake         LTG:                COGNITION  OT: Cognition Comment: comp: MOD I exp: MOD I soc: MOD I prob: SUPERV. mem: SUPERV  SP:Memory: Exceptions to [facility]  Problem Solving: Within Functional Limits    RECREATIONAL THERAPY  Attendance to recreational therapy programs:    []  Pet Therapy  [] Music Therapy  [] Art Therapy    [] Recreation Therapy Group [] Support Group           Patient social interaction (mood, participation): good      Patient strengths: motivated- has 24 hour care  Patients goal:  \"to at least do things in my home    Problems/Barriers: fatigues easily        1. Safety:          - Intervention / Plan:    [x]  falls protocol     [x]  PT/OT    [x]  SP        - Results:         2. Potential DME needs:         - Intervention / Plan:  [x]  PT/OT     [x]  Assess equipment needs/access       - Results:         3. Weakness:          - Intervention / Plan:  [x]  PT/OT      []  Other:         - Results:         4. Discharge planning needs:          - Intervention / Plan:  [x]  Weekly team conference      [x]  family training        - Results:         5.            - Intervention / Plan:          - Results:         6.            - Intervention / Plan:         - Results:         7.            - Intervention / Plan:         - Results:           Discharge Plan   Estimated Length of Stay: 24 days    Tentative Discharge date: 7/8/20      Anticipated Discharge Destination:  Home      Team recommendations:    1. Follow up Therapy :    PT  OT  RN  [facility]    2. Home Health    Other:     Equipment needed at Discharge:  Other: TBD      Team Members Present at Conference:    Physician: Dr. Jasvir Amaya  : Tamara Perez RN  RN: Canelo Morel, RN  Physical Therapist: Alida Weber PT  Occupational Therapist: Mauricio Brown OTR  Speech Therapist: Galileo Green, SLP  Nurse Manager: Jacqueline Ferguson RN    Electronically signed by Abelardo Cheatham RN on 6/29/2020 at 3:36 PM

## 2020-06-29 NOTE — PROGRESS NOTES
[facility] Neurology Daily Progress Note  Name: Isa Ontiveros  Age: 80 y.o. Gender: female  CodeStatus: DNR-CCA  Allergies: Metoclopramide  Pantoprazole  Pcn [Penicillins]  Protonix [Pantoprazole Sodium]  Tramadol    Chief Complaint:No chief complaint on file. Primary Care Provider: Ira Erazo MD  InpatientTreatment Team: Treatment Team: Attending Provider: Destin Mcduffie DO; Consulting Physician: Hailee Mercado MD; Consulting Physician: Arvind Reyna MD; Consulting Physician: Eben Busby MD; Registered Nurse: Abelardo Key RN; Occupational Therapist: Osvaldo Thayer OTR/L; Occupational Therapist Assistant: LOIS Barth; Patient Care Tech: Conrado Garcia; Registered Nurse: Bianca Jordan RN; Nursing Student: Adina Toro  Admission Date: 6/24/2020      HPI   Pt seen and examined on rehab for neuro follow up vertigo and lower extremity weakness. MRI of cervical spine showed severe central canal stenosis at C5-C6 and moderate to severe central canal stenosis at C4-C5. MRI thoracic spine negative. MRI lumbar spine showed advanced multilevel degenerative changes. MRI findings of spine were discussed with patient's son and it was decided that we would proceed with conservative manage meant given patient's age and risk factors and surgical risk. Patient was not given Decadron due to her underlying diabetes. Patient currently alert and oriented x3, no acute distress, hard of hearing. Overall improved. Dizziness resolved. Patient frustrated that she is not able to do more in therapy. No focal deficits. Vitals:    06/29/20 0731   BP: (!) 183/65   Pulse: 57   Resp: 18   Temp: 97 °F (36.1 °C)   SpO2: 97%      Review of Systems   Constitutional: Negative for appetite change, chills, fatigue and fever. HENT: Positive for hearing loss. Negative for trouble swallowing. Eyes: Negative for visual disturbance.    Respiratory: Negative for cough, chest tightness, shortness of breath and wheezing. Cardiovascular: Negative for chest pain, palpitations and leg swelling. Gastrointestinal: Negative for nausea and vomiting. Musculoskeletal: Positive for gait problem. Skin: Negative for color change and rash. Neurological: Positive for weakness. Negative for dizziness, tremors, seizures, syncope, facial asymmetry, speech difficulty, light-headedness, numbness and headaches. Psychiatric/Behavioral: Negative for agitation, confusion and hallucinations. The patient is not nervous/anxious. Physical Exam  Vitals signs and nursing note reviewed. Constitutional:       General: She is not in acute distress. Appearance: She is not diaphoretic. HENT:      Head: Normocephalic and atraumatic. Eyes:      Pupils: Pupils are equal, round, and reactive to light. Cardiovascular:      Rate and Rhythm: Normal rate and regular rhythm. Pulmonary:      Effort: Pulmonary effort is normal. No respiratory distress. Breath sounds: Normal breath sounds. Abdominal:      General: Bowel sounds are normal. There is no distension. Palpations: Abdomen is soft. Tenderness: There is no abdominal tenderness. Skin:     General: Skin is warm and dry. Neurological:      General: No focal deficit present. Mental Status: She is alert and oriented to person, place, and time. Cranial Nerves: No cranial nerve deficit. Motor: Weakness present. No tremor or seizure activity.       Gait: Gait abnormal.               Medications:  Reviewed    Infusion Medications:    dextrose       Scheduled Medications:    [START ON 6/30/2020] amLODIPine  10 mg Oral Daily    hydrALAZINE  50 mg Oral TID    Vitamin D  2,000 Units Oral Dinner    lidocaine  3 patch Transdermal Daily    sodium chloride flush  10 mL Intravenous 2 times per day    atorvastatin  10 mg Oral Nightly    carvedilol  12.5 mg Oral BID WC    clopidogrel  75 mg Oral Daily    gabapentin  100 mg Oral Nightly    insulin lispro  0-12 Units Subcutaneous TID     insulin lispro  0-6 Units Subcutaneous Nightly    nitrofurantoin (macrocrystal-monohydrate)  100 mg Oral Nightly    predniSONE  10 mg Oral Daily    Followed by   Kwesi Sylvester ON 7/1/2020] predniSONE  5 mg Oral Daily    sacubitril-valsartan  1 tablet Oral BID    [START ON 6/19/2021] warfarin (COUMADIN) daily dosing (placeholder)   Other RX Placeholder     PRN Meds: hydrALAZINE, fleet, acetaminophen, analgesic ointment, metaxalone, magnesium hydroxide, ondansetron **OR** ondansetron, sodium chloride flush, dextrose, dextrose, glucagon (rDNA), glucose, hydrALAZINE, HYDROcodone 5 mg - acetaminophen, meclizine    Labs:   Recent Labs     06/27/20  0600 06/28/20  0600 06/29/20  0548   WBC 9.3 11.2* 8.8   HGB 11.6* 11.4* 10.9*   HCT 35.2* 34.9* 33.9*    238 201     Recent Labs     06/27/20  0600 06/28/20  0559 06/29/20  0548    140 135   K 4.2 4.3 4.2    108* 105   CO2 20 19* 17*   BUN 42* 44* 45*   CREATININE 1.16* 1.21* 1.18*   CALCIUM 9.4 8.8 8.9     No results for input(s): AST, ALT, BILIDIR, BILITOT, ALKPHOS in the last 72 hours. Recent Labs     06/27/20  0600 06/28/20  0743 06/29/20  0751   INR 2.0 2.5 2.6     No results for input(s): CKTOTAL, TROPONINI in the last 72 hours. Urinalysis:   Lab Results   Component Value Date    NITRU Negative 06/19/2020    WBCUA 0-2 06/19/2020    BACTERIA Negative 06/19/2020    RBCUA 0-2 06/19/2020    BLOODU Negative 06/19/2020    SPECGRAV 1.014 06/19/2020    GLUCOSEU 250 06/19/2020       Radiology:   Most recent    EEG No procedure found. MRI of Brain No results found for this or any previous visit.   Results for orders placed during the hospital encounter of 06/19/20   MRI BRAIN WO CONTRAST    Narrative MRI BRAIN WITHOUT CONTRAST:    CLINICAL HISTORY:  r/o CVA , dizziness, nausea, generalized weakness    COMPARISONS:  3/14/2019    TECHNIQUE: Multiplanar, multi-sequence MRI was performed on a 1.5Tesla closed magnet. FINDINGS:  There are no abnormal sites of restricted diffusion. The ventricles are normal in position. There is no evidence for mass effect. There is no shift of the midline structures. There are multiple  extensive foci of increased signal on FLAIR   and T2, in the periventricular deep white matter and corona radiata, which may be secondary to small vessel ischemic changes, demyelination, or aging. There appear similar prior exam. There is moderate dilatation of the cerebral sulci and ventricles,   unchanged. Flow-voids in the major intracranial blood vessels are identified and are patent by spin-echo criteria. There are few small foci of decreased signal on gradient echo sequences within the left frontal and both parietal lobes. They may be   secondary to small remote hemorrhages. There is mucosal thickening within both maxillary sinuses and both ethmoid sinuses. Mastoid air cells are clear. The seventh and eighth nerve complexes and optic nerves are symmetrical.  No evidence for mass in the cerebellopontine angle. There is   generalized thinning of the corpus callosum. The cerebellar tonsils are not ectopic. The pituitary gland is unremarkable. Optic nerves are symmetrical. The calvarium and dura are unremarkable. There is hypertrophy of nasal turbinates, consistent with   rhinitis. Impression NO EVIDENCE OF ACUTE CVA. GENERALIZED PARENCHYMAL VOLUME LOSS AND NONSPECIFIC WHITE MATTER CHANGES, MOST LIKELY SECONDARY TO CHRONIC SMALL VESSEL ISCHEMIC CHANGES. MUCOSAL THICKENING IN ETHMOID AND MAXILLARY SINUSES. A FEW SMALL NONSPECIFIC FOCI ON GRADIENT ECHO SEQUENCES WITHIN THE WHITE MATTER BILATERALLY. MRA of the Head and Neck: No results found for this or any previous visit. No results found for this or any previous visit.   Results for orders placed during the hospital encounter of 03/14/19   MRA head w/o contrast    Narrative EXAM:     MRI of the anterior communicating artery are patent. No aneurysm or high-grade stenosis of the visualized cerebral vasculature. Impression MRI brain:    No acute ischemia. Generalized parenchymal volume loss and nonspecific white matter findings most compatible with chronic small vessel ischemic changes in a patient of this age. MRA brain:    No aneurysm or high-grade stenosis of the visualized cerebral vasculature. CT of the Head:   Results for orders placed during the hospital encounter of 06/19/20   CT Head WO Contrast    Narrative CT HEAD WO CONTRAST : 6/19/2020    CLINICAL HISTORY:  weakness, nausea with movement . COMPARISON: 6/18/2020. TECHNIQUE: Spiral unenhanced images were obtained of the head, with routine multiplanar reconstructions performed. Intravenous contrast is noted from a CT abdomen and pelvis from earlier 6/19/2020. All CT scans at this facility use dose modulation, iterative reconstruction, and/or weight based dosing when appropriate to reduce radiation dose to as low as reasonably achievable. FINDINGS:    There is no intracranial hemorrhage, mass effect, midline shift, extra-axial collection, evidence of hydrocephalus, recent ischemic infarct, or skull fracture identified. Moderate age-related atrophic changes have not significantly changed from the yesterday's study. Impression NO ACUTE INTRACRANIAL PROCESS OR SIGNIFICANT CHANGE FROM YESTERDAY IDENTIFIED. No results found for this or any previous visit. No results found for this or any previous visit. Carotid duplex: No results found for this or any previous visit. No results found for this or any previous visit. Results for orders placed during the hospital encounter of 06/19/20   US CAROTID ARTERY BILATERAL    Narrative US CAROTID ARTERY BILATERAL: 6/19/2020    CLINICAL HISTORY:  dizziness . COMPARISON: 11/14/2018.     Grayscale, color and waveform Doppler narrowing bilaterally at L4-L5 with possible mild transverse canal narrowing at L3-4 and L4-5  MRI findings of spine were discussed with family and it was decided that we would proceed with conservative management given patient's age and risk factors and surgical risk.  Patient is not a candidate for Decadron given her underlying diabetes.  Pain management following. Pranav Sue started on steroid taper. Patient does complain of right arm quivering though this appears to be coming from the cervical spine.  We did discuss further that the only option to treat this would be surgical though we will wait for physical therapy for now and will consider neurosurgical evaluation if he worsens and does not have any improvement in her walking. Patient with history of MCA CVA currently on Coumadin and Plavix  Continue PT/OT/ST    I independently performed an evaluation on this patient. I have reviewed the above documentation completed by the Nurse Practitioner. Please see my additional contributions to the HPI, physical exam, assessment/medical decision making. Elijah Crowder MD, [id number] American Board of Psychiatry & Neurology  Board Certified in Vascular Neurology  Board Certified in Neuromuscular Medicine  Certified in Neurorehabilitation         Collaborating physicians: Dr Jin Crowder    Electronically signed by SYLVIE Lemus CNP on 6/29/2020 at 1:37 PM

## 2020-06-30 LAB
GLUCOSE BLD-MCNC: 147 MG/DL (ref 60–115)
GLUCOSE BLD-MCNC: 259 MG/DL (ref 60–115)
GLUCOSE BLD-MCNC: 282 MG/DL (ref 60–115)
GLUCOSE BLD-MCNC: 319 MG/DL (ref 60–115)
PERFORMED ON: ABNORMAL

## 2020-06-30 PROCEDURE — 6370000000 HC RX 637 (ALT 250 FOR IP): Performed by: INTERNAL MEDICINE

## 2020-06-30 PROCEDURE — 99222 1ST HOSP IP/OBS MODERATE 55: CPT | Performed by: INTERNAL MEDICINE

## 2020-06-30 PROCEDURE — 97110 THERAPEUTIC EXERCISES: CPT

## 2020-06-30 PROCEDURE — 97535 SELF CARE MNGMENT TRAINING: CPT

## 2020-06-30 PROCEDURE — 97116 GAIT TRAINING THERAPY: CPT

## 2020-06-30 PROCEDURE — 97530 THERAPEUTIC ACTIVITIES: CPT

## 2020-06-30 PROCEDURE — 1180000000 HC REHAB R&B

## 2020-06-30 PROCEDURE — APPNB30 APP NON BILLABLE TIME 0-30 MINS: Performed by: PHYSICIAN ASSISTANT

## 2020-06-30 PROCEDURE — 6370000000 HC RX 637 (ALT 250 FOR IP): Performed by: PHYSICAL MEDICINE & REHABILITATION

## 2020-06-30 PROCEDURE — 6370000000 HC RX 637 (ALT 250 FOR IP): Performed by: PHYSICIAN ASSISTANT

## 2020-06-30 PROCEDURE — 99232 SBSQ HOSP IP/OBS MODERATE 35: CPT | Performed by: PHYSICAL MEDICINE & REHABILITATION

## 2020-06-30 PROCEDURE — 99232 SBSQ HOSP IP/OBS MODERATE 35: CPT | Performed by: INTERNAL MEDICINE

## 2020-06-30 RX ORDER — ISOSORBIDE MONONITRATE 30 MG/1
30 TABLET, EXTENDED RELEASE ORAL DAILY
Status: DISCONTINUED | OUTPATIENT
Start: 2020-06-30 | End: 2020-07-08 | Stop reason: HOSPADM

## 2020-06-30 RX ORDER — INSULIN GLARGINE 100 [IU]/ML
20 INJECTION, SOLUTION SUBCUTANEOUS NIGHTLY
Status: DISCONTINUED | OUTPATIENT
Start: 2020-06-30 | End: 2020-07-04

## 2020-06-30 RX ADMIN — SACUBITRIL AND VALSARTAN 1 TABLET: 24; 26 TABLET, FILM COATED ORAL at 09:16

## 2020-06-30 RX ADMIN — SACUBITRIL AND VALSARTAN 1 TABLET: 24; 26 TABLET, FILM COATED ORAL at 20:38

## 2020-06-30 RX ADMIN — HYDRALAZINE HYDROCHLORIDE 50 MG: 50 TABLET, FILM COATED ORAL at 09:16

## 2020-06-30 RX ADMIN — HYDRALAZINE HYDROCHLORIDE 50 MG: 50 TABLET, FILM COATED ORAL at 13:53

## 2020-06-30 RX ADMIN — INSULIN GLARGINE 20 UNITS: 100 INJECTION, SOLUTION SUBCUTANEOUS at 21:59

## 2020-06-30 RX ADMIN — NITROFURANTOIN (MONOHYDRATE/MACROCRYSTALS) 100 MG: 75; 25 CAPSULE ORAL at 20:38

## 2020-06-30 RX ADMIN — CARVEDILOL 12.5 MG: 12.5 TABLET, FILM COATED ORAL at 17:56

## 2020-06-30 RX ADMIN — VITAMIN D, TAB 1000IU (100/BT) 2000 UNITS: 25 TAB at 17:56

## 2020-06-30 RX ADMIN — AMLODIPINE BESYLATE 10 MG: 10 TABLET ORAL at 09:16

## 2020-06-30 RX ADMIN — ISOSORBIDE MONONITRATE 30 MG: 30 TABLET, EXTENDED RELEASE ORAL at 09:16

## 2020-06-30 RX ADMIN — WARFARIN SODIUM 7 MG: 5 TABLET ORAL at 17:56

## 2020-06-30 RX ADMIN — CARVEDILOL 12.5 MG: 12.5 TABLET, FILM COATED ORAL at 09:16

## 2020-06-30 RX ADMIN — GABAPENTIN 100 MG: 100 CAPSULE ORAL at 20:38

## 2020-06-30 RX ADMIN — PREDNISONE 10 MG: 10 TABLET ORAL at 09:16

## 2020-06-30 RX ADMIN — HYDRALAZINE HYDROCHLORIDE 50 MG: 50 TABLET, FILM COATED ORAL at 20:38

## 2020-06-30 RX ADMIN — CLOPIDOGREL BISULFATE 75 MG: 75 TABLET ORAL at 09:16

## 2020-06-30 RX ADMIN — ATORVASTATIN CALCIUM 10 MG: 10 TABLET, FILM COATED ORAL at 20:38

## 2020-06-30 ASSESSMENT — PAIN SCALES - GENERAL
PAINLEVEL_OUTOF10: 0

## 2020-06-30 ASSESSMENT — ENCOUNTER SYMPTOMS
CHEST TIGHTNESS: 0
VOMITING: 0
SHORTNESS OF BREATH: 0
NAUSEA: 0

## 2020-06-30 NOTE — PROGRESS NOTES
Physical Therapy Rehab Treatment Note  Facility/Department: [facility]  Room: [place] [address]       NAME: Ariana Cedeno  : 6/10/1927 (80 y.o.)  MRN: [id number]  CODE STATUS: DNR-CCA    Date of Service: 2020  Chart Reviewed: Yes  Patient assessed for rehabilitation services?: Yes  Family / Caregiver Present: No  Diagnosis: Abnormality of gait and mobility due to NTSCI with Impaired Mobility and ADL's secondary to Cervical Myelopathy and Vestibular neuronitis     Restrictions:  Restrictions/Precautions: Fall Risk  Position Activity Restriction  Other position/activity restrictions: DNRCCA       SUBJECTIVE: Subjective: I am a little tired  Response To Previous Treatment: Patient with no complaints from previous session. Pain Screening  Patient Currently in Pain: No  Pre Treatment Pain Screening  Pain at present: 0  Scale Used: Numeric Score  Intervention List: Patient able to continue with treatment    Post Treatment Pain Screening:  Pain Assessment  Pain Assessment: 0-10  Pain Level: 0    OBJECTIVE:   Follows Commands: Within Functional Limits    Bed mobility  Rolling to Left: Modified independent  Rolling to Right: Modified independent  Supine to Sit: Stand by assistance  Sit to Supine: Supervision  Scooting: Supervision    Transfers  Sit to Stand: Stand by assistance x 8 from EOM Nidhi Esequiel) without UE support (Goal from a.m.)  Stand to sit: Stand by assistance  Bed to Chair: Stand by assistance    Ambulation  Ambulation?: Yes  More Ambulation?: No  Ambulation 1  Surface: uneven;outdoors([place]/ [place])  Device: Rollator  Assistance: Contact guard assistance  Quality of Gait: Decreased stability on uneven outdoor surfaces. Able to self correct instability with CGA  Distance: 100' x 2    ASSESSMENT/COMMENTS:  Body structures, Functions, Activity limitations: Decreased functional mobility ; Decreased strength;Decreased endurance;Decreased balance; Increased pain;Decreased posture  Assessment: Patient with increased instability on unlevel outdoor surfaces. Patient set goal in a.m. to acheive 8 STS from EOM without UE support. Goal met    PLAN OF CARE/Safety:   Safety Devices  Type of devices:  All fall risk precautions in place;Call light within reach      Therapy Time:   Individual   Time In 1300   Time Out 1330   Minutes 30     Minutes: 30      Transfer/Bed mobility training: 10      Gait training: Yusra De La Cruz PTA, 06/30/20 at 2:35 PM

## 2020-06-30 NOTE — PROGRESS NOTES
Clinical Pharmacy Note    Warfarin consult follow-up    Recent Labs     06/29/20  0751   INR 2.6     Recent Labs     06/28/20  0600 06/29/20  0548   HGB 11.4* 10.9*   HCT 34.9* 33.9*    201       Significant drug:drug interactions:  Prednisone, APAP, plavix, norco    Date INR Warfarin Dose    06/19/20 1.8  6 mg    06/20/20 2.1  7 mg    06/21/20 2.3   7 mg    06/22/20 3.3  HOLD    06/23/20 3.6   HOLD      06/24/20 1.6  7 mg    06/25/20 1.4  8 mg    06/26/20 1.7  8 mg    06/27/20 2.0  7 mg    06/28/20 2.5  7 mg    06/29/20  2.6  6 mg   06/30/20 none 7 mg                       Notes:  No INR today as labs now MWF. Will continue today with home dose of 7mg x today. INR tomorrow. Will continue to monitor. Thank you for consult.    Alejandro SotoD

## 2020-06-30 NOTE — PROGRESS NOTES
Nutrition Education    Type and Reason for Visit: Consult, Patient Education(Diabetic diet teaching)    Nutrition Assessment:  Consult received for Diabetic diet education. To address with pt prior to discharge.      Electronically signed by Brandon Levin RD, LD on 6/30/20 at 2:40 PM EDT

## 2020-06-30 NOTE — PROGRESS NOTES
Occupational Therapy  Fac[facility]Department[facility]  Daily Treatment Note  NAME: Isaak Tafoya  : 6/10/1927  MRN: [id number]    Date of Service: 2020    Discharge Recommendations:  Continue to assess pending progress       Assessment      Activity Tolerance  Activity Tolerance: Patient Tolerated treatment well  Safety Devices  Safety Devices in place: Yes  Type of devices: All fall risk precautions in place         Patient Diagnosis(es): There were no encounter diagnoses. 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 has a past surgical history that includes Appendectomy; Rectocele repair; Cystocele repair; Ankle surgery; Breast biopsy; Cataract removal (); eye surgery; Tonsillectomy; Hysterectomy; and Coronary angioplasty with stent (2019).     Restrictions  Restrictions/Precautions  Restrictions/Precautions: Fall Risk  Position Activity Restriction  Other position/activity restrictions: [facility]     Subjective   General  Chart Reviewed: Yes  Patient assessed for rehabilitation services?: Yes  Response to previous treatment: Patient with no complaints from previous session  Family / Caregiver Present: No  Referring Practitioner: Dr Gloria Padilla  Diagnosis: NTSCI with imp mob and ADLs 2° cervical Function  UE Strengthing: Patient engaged in B UE strengthening to increase I with ADL's and IADL's. Patient donned B 1 # B wrist weights while completing parquetry activity. Patient with 0 difficulty reaching in lateral and forward planes. Parquetry: The first design the patient completed was a 19 piece symmetrical starburst design that patient completed under the original example picture. Example picture was placed at midline. Patient had 0 difficulty with activity and completed in a timely manner. Pieces were neatly and correctly placed. The second design the patient completed was a 14 piece symmetrical potted flower design that patient completed under the original example picture. Example picture was placed at midline. Patient had 0 difficulty with activity and completed in a timely manner. Pieces were neatly and correctly placed. The third design the patient completed was a 16 piece symmetrical butterfly design that patient completed under the original example picture. Example picture was placed at midline. Patient had 0 difficulty with activity. Pieces were neatly and correctly placed. To improve visual perception, B strength and B FM coordination in order to improve participation in self care and leisure activities. Patient with MIN FM difficulty. Patient engaged in B UE ROM/strengthening and cognition to increase I with ADL's and transfers. Patient able to push 15 beanbags from incline board 1 at a time with raphael box with 2 # weight with 0 difficulty X 2 trials. RB's as needed. Patient required 1 verbal cue to place beanbags above raphael box during activity.       Plan   Plan  Times per week: 5-7 times per week  Plan weeks: 2 weeks  Current Treatment Recommendations: Strengthening, Endurance Training, Neuromuscular Re-education, Self-Care / ADL, Balance Training, Functional Mobility Training, Safety Education & Training, Patient/Caregiver Education & Training    Plan Comment: Continue per OT POC for planned d/c on 7-8-20         Goals  Patient Goals   Patient goals : \"to at least do things in my home\"       Therapy Time   Individual Concurrent Group Co-treatment   Time In 1500         Time Out 1600         Minutes 60             Therapeutic activities: 60 minutes       Electronically signed by LOIS Baron on 6/30/20 at 4:30 PM EDT      LOIS Baron

## 2020-06-30 NOTE — PROGRESS NOTES
left ventricular function 25% and on Entresto. Has done well. Her older son takes care of her. Also has a history of TIA CVA. Had a OMAR about a year ago. Denies any chest pain. No fever or chills. No ankle edema    Review of Systems:   Review of Systems   Constitutional: Negative for activity change and fatigue. HENT: Negative for congestion. Respiratory: Negative for chest tightness and shortness of breath. Cardiovascular: Negative for chest pain, palpitations and leg swelling. Gastrointestinal: Negative for nausea and vomiting. Genitourinary: Negative for difficulty urinating. Musculoskeletal: Positive for gait problem and myalgias. Neurological: Negative for dizziness, syncope and light-headedness. Psychiatric/Behavioral: Negative for agitation and behavioral problems. Physical Examination:    BP (!) 205/61   Pulse 59   Temp 97 °F (36.1 °C)   Resp 15   Ht 5' 3\" (1.6 m)   Wt 188 lb 7.9 oz (85.5 kg)   LMP  (LMP Unknown)   SpO2 98%   BMI 33.39 kg/m²    Physical Exam  Constitutional:       General: She is not in acute distress. Appearance: Normal appearance. She is obese. HENT:      Head: Normocephalic and atraumatic. Neck:      Musculoskeletal: Normal range of motion and neck supple. Cardiovascular:      Rate and Rhythm: Normal rate and regular rhythm. Heart sounds: Murmur present. Pulmonary:      Effort: Pulmonary effort is normal. No respiratory distress. Breath sounds: No wheezing, rhonchi or rales. Abdominal:      Palpations: Abdomen is soft. Tenderness: There is no abdominal tenderness. Musculoskeletal: Normal range of motion. Right lower leg: No edema. Left lower leg: No edema. Skin:     General: Skin is warm and dry. Coloration: Skin is pale. Neurological:      General: No focal deficit present. Mental Status: She is alert and oriented to person, place, and time. Cranial Nerves: No cranial nerve deficit. Psychiatric:         Mood and Affect: Mood normal.         Behavior: Behavior normal.         LABS:  CBC:   Lab Results   Component Value Date    WBC 8.8 06/29/2020    RBC 3.59 06/29/2020    RBC 3.91 02/28/2012    HGB 10.9 06/29/2020    HCT 33.9 06/29/2020    MCV 94.3 06/29/2020    MCH 30.2 06/29/2020    MCHC 32.0 06/29/2020    RDW 14.6 06/29/2020     06/29/2020    MPV 11.6 02/21/2014     CBC with Differential:   Lab Results   Component Value Date    WBC 8.8 06/29/2020    RBC 3.59 06/29/2020    RBC 3.91 02/28/2012    HGB 10.9 06/29/2020    HCT 33.9 06/29/2020     06/29/2020    MCV 94.3 06/29/2020    MCH 30.2 06/29/2020    MCHC 32.0 06/29/2020    RDW 14.6 06/29/2020    LYMPHOPCT 10.3 06/19/2020    MONOPCT 4.7 06/19/2020    EOSPCT 3.9 02/28/2012    BASOPCT 0.4 06/19/2020    MONOSABS 0.4 06/19/2020    LYMPHSABS 1.0 06/19/2020    EOSABS 0.1 06/19/2020    BASOSABS 0.0 06/19/2020     CMP:    Lab Results   Component Value Date     06/29/2020    K 4.2 06/29/2020     06/29/2020    CO2 17 06/29/2020    BUN 45 06/29/2020    CREATININE 1.18 06/29/2020    GFRAA 51.7 06/29/2020    LABGLOM 42.7 06/29/2020    GLUCOSE 146 06/29/2020    GLUCOSE 154 05/25/2012    PROT 6.5 06/19/2020    LABALBU 4.0 06/19/2020    LABALBU 4.4 05/25/2012    CALCIUM 8.9 06/29/2020    BILITOT 0.3 06/19/2020    ALKPHOS 51 06/19/2020    AST 11 06/19/2020    ALT 14 06/19/2020     BMP:    Lab Results   Component Value Date     06/29/2020    K 4.2 06/29/2020     06/29/2020    CO2 17 06/29/2020    BUN 45 06/29/2020    LABALBU 4.0 06/19/2020    LABALBU 4.4 05/25/2012    CREATININE 1.18 06/29/2020    CALCIUM 8.9 06/29/2020    GFRAA 51.7 06/29/2020    LABGLOM 42.7 06/29/2020    GLUCOSE 146 06/29/2020    GLUCOSE 154 05/25/2012     Magnesium:    Lab Results   Component Value Date    MG 1.5 06/18/2020     Troponin:    Lab Results   Component Value Date    TROPONINI <0.010 06/19/2020       Radiology:      Bilateral carotid US 6/19/20:  Impression       50-69% NARROWING PREDICTED OF BOTH INTERNAL CAROTID ARTERIES, NOT SIGNIFICANTLY CHANGED FROM 11/14/2018. CT Brain 6/19/20:  Impression       NO ACUTE INTRACRANIAL PROCESS OR SIGNIFICANT CHANGE FROM YESTERDAY IDENTIFIED. MRI Brain 6/19/20:  Impression       NO EVIDENCE OF ACUTE CVA.       GENERALIZED PARENCHYMAL VOLUME LOSS AND NONSPECIFIC WHITE MATTER CHANGES, MOST LIKELY SECONDARY TO CHRONIC SMALL VESSEL ISCHEMIC CHANGES.       MUCOSAL THICKENING IN ETHMOID AND MAXILLARY SINUSES.       A FEW SMALL NONSPECIFIC FOCI ON GRADIENT ECHO SEQUENCES WITHIN THE WHITE MATTER BILATERALLY. MRI Lumbar Spine 6/20/20:  Impression   Levorotoscoliosis with advanced multilevel degenerative changes. Severe foraminal narrowing bilaterally at L4-5. There may be mild transverse canal narrowing at L3-4 and L4-5 but there is no definite central canal stenosis         Echocardiogram 3/15/19:  Conclusions   Summary   Left ventricular ejection fraction is visually estimated at 20%. severe   hypokinesis of the anterior and LV apical walls. Left ventricular size is mildly increased . Impaired relaxation compatible with diastolic dysfunction. ( reversed E/A   ratio)   Mild (1+) mitral regurgitation is present. No evidence of mitral valve stenosis. No evidence of aortic valve regurgitation . No evidence of aortic valve stenosis. The left atrium is Mildly dilated. No left to right shunt with color flow doppler or right to left shunt with   agitated saline contrast study. Signature   ----------------------------------------------------------------   Electronically signed by Gil Cantrell DO(Interpreting   physician) on 03/15/2019 06:46 PM   ----------------------------------------------------------------      OMAR 3/18/19:  Conclusions   Summary   NO mass or thrombus. Left ventricular ejection fraction is visually estimated at 25%. Left ventricular size is mildly increased .    Moderate (2+) mitral regurgitation is present. No evidence of mitral valve stenosis. No evidence of aortic valve regurgitation . Signature   ----------------------------------------------------------------   Electronically signed by Donya Del Toro DO(Interpreting   physician) on 03/19/2019 01:53 PM   ----------------------------------------------------------------      C 12/30/18:  LMCA--normal  LAD--99% prox, 50% mid s/p PCI LINDSEY of prox LAD  LCx--20-30% prox stenosis  RCA--50% mid stenosis  EF 25%      EKG 6/19/20: SB 51, T wave inversion leads I, aVL and mild T wave inversion V4-V6, QT 447ms      Assessment:    Active Hospital Problems    Diagnosis Date Noted    History of ST elevation myocardial infarction (STEMI) [I25.2]      Priority: High    HLD (hyperlipidemia) [E78.5] 03/18/2015     Priority: High    CKD (chronic kidney disease) [N18.9] 03/18/2015     Priority: High    HTN (hypertension) [I10] 11/29/2011     Priority: High    Vitamin D deficiency [E55.9] 03/18/2015     Priority: Low    Osteoarthritis [M19.90] 05/29/2012     Priority: Low    DJD (degenerative joint disease), lumbar [M47.816] 06/24/2020    History of PTCA [Z98.61] 06/24/2020    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 [R26.9] 06/21/2020    Spinal stenosis in cervical region [M48.02] 06/21/2020    Chronic combined systolic and diastolic congestive heart failure ([address] [I50.42] 12/02/2019    Vitamin B12 deficiency [E53.8] 09/17/2018    Valvular heart disease [I38] 03/07/2018    Lumbar spinal stenosis [M48.061] 12/22/2016    Eczematous dermatitis [L30.9]     SCC (squamous cell carcinoma), arm [C44.621] 2013    Obesity (BMI 30-39. 9) [E66.9] 2013    Type 2 diabetes mellitus ([facility] [address] [E11.9] 2013    [place] (hard of hearing) [H91.90] 2013     1. Uncontrolled HTN  2. ICMP EF 20-25% per echo 3/2019  3. CAD s/p PCI LINDSEY of prox LAD 12/2018  4. Carotid stenosis--50-69% stenosis bilaterally per US 6/19/20   5. Lumbar spinal stenosis  6. Impaired mobility  7. Hx CVA  8. Vestibular neuronitis  9. Chronic systolic CHF--compensated currently  10. DM       Plan:  1. Maximize medical therapy- Coreg 12.5 mg p.o. twice daily- hesitant to titrate due to bradycardia, amlodipine increased to 10 mg p.o. daily to start today 6/30/2020, add Imdur 30 mg p.o. daily, hydralazine increased to 50 mg p.o. 3 times daily on 6/29/2020, Lipitor 10 mg tablet p.o. daily at at bedtime, Entresto 24/26 mg p.o. twice daily Plavix 75 mg p.o. daily, oral anticoagulation with Coumadin  2. Continue to titrate blood pressure medications as needed for improved blood pressure management. If needed, will further titrate hydralazine to 100 mg p.o. 3 times daily. PRN hydralazine  3. Cardiac/diabetic/less than 2 g sodium diet recommended  4. 1500 mL daily fluid restriction  5. Neurology recommendations and management-meclizine as needed for dizziness due to vestibular neuronitis  6. Rehab recommendations  7. Conservative medical management from a cardiac standpoint. Patient without any anginal complaints, signs of decompensated heart failure or uncontrolled arrhythmias. Recommend continued outpatient follow-up with cardiology upon discharge  8. Consider repeat echocardiogram as outpatient to reevaluate LV systolic function  9.  Will follow        Electronically signed by PEDRO LUIS Torrez on 6/30/2020 at 8:06 AM

## 2020-06-30 NOTE — PROGRESS NOTES
Occupational Therapy  Facility/Department: [facility]  Daily Treatment Note  NAME: Dixie Gillespie  : 6/10/1927  MRN: [id number]    Date of Service: 2020    Discharge Recommendations:  Continue to assess pending progress       Assessment      Activity Tolerance  Activity Tolerance: Patient Tolerated treatment well  Safety Devices  Safety Devices in place: Yes  Type of devices: All fall risk precautions in place         Patient Diagnosis(es): There were no encounter diagnoses. 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 has a past surgical history that includes Appendectomy; Rectocele repair; Cystocele repair; Ankle surgery; Breast biopsy; Cataract removal (); eye surgery; Tonsillectomy; Hysterectomy; and Coronary angioplasty with stent (2019).     Restrictions  Restrictions/Precautions  Restrictions/Precautions: Fall Risk  Position Activity Restriction  Other position/activity restrictions: [facility]     Subjective   General  Chart Reviewed: Yes  Patient assessed for rehabilitation services?: Yes  Response to previous treatment: Patient with no complaints from previous session  Family / Caregiver Present: No  Referring Practitioner: Dr Ramirez Host  Diagnosis: NTSCI with imp mob and ADLs 2° cervical

## 2020-06-30 NOTE — PROGRESS NOTES
Patient is resting in bed with no c/o pain, discomfort, or dizziness noted at this time. LS clear and diminished bilaterally.

## 2020-06-30 NOTE — PLAN OF CARE
Problem: Falls - Risk of:  Goal: Will remain free from falls  Description: Will remain free from falls  6/30/2020 1137 by Anahi Walker RN  Outcome: Ongoing  6/30/2020 0220 by Clint Leos RN  Outcome: Ongoing  Goal: Absence of physical injury  Description: Absence of physical injury  6/30/2020 1137 by Anahi Walker RN  Outcome: Ongoing  6/30/2020 0220 by Clint Leos RN  Outcome: Ongoing     Problem: Skin Integrity:  Goal: Will show no infection signs and symptoms  Description: Will show no infection signs and symptoms  6/30/2020 1137 by Anahi Walker RN  Outcome: Ongoing  6/30/2020 0220 by Clint Leos RN  Outcome: Ongoing  Goal: Absence of new skin breakdown  Description: Absence of new skin breakdown  Outcome: Ongoing     Problem: IP SWALLOWING  Goal: LTG - patient will tolerate the least restrictive diet consistency to allow for safe consumption of daily meals  Outcome: Ongoing     Problem: IP COMMUNICATION/DYSARTHRIA  Goal: LTG - patient will improve expressive language skills to allow for communication of wants and needs in daily activities  Outcome: Ongoing     Problem: SAFETY  Goal: LTG - patient will adhere to hip precautions during ADL's and transfers  Outcome: Ongoing  Goal: LTG - Patient will demonstrate safety requirements appropriate to situation/environment  Outcome: Ongoing  Goal: LTG - patient will utilize safety techniques  Outcome: Ongoing  Goal: STG - patient locks brakes on wheelchair  Outcome: Ongoing  Goal: STG - Patient uses call light consistently to request assistance with transfers  Outcome: Ongoing  Goal: STG - patient uses gait belt during all transfers  Outcome: Ongoing     Problem:  Activity:  Goal: Ability to avoid complications of mobility impairment will improve  Description: Ability to avoid complications of mobility impairment will improve  Outcome: Ongoing  Goal: Range of joint motion will improve  Description: Range of joint motion will improve  Outcome:

## 2020-06-30 NOTE — PROGRESS NOTES
Subjective: The patient complains of severe  acute on chronic neck pain and acute upper extremity weakness left greater than right partially relieved by PT, OT, steroid taper and exacerbated by recent fall and progressive cervical spinal stenosis with myelopathy. I am concerned about her continued elevation of her blood sugars in the 200s of 300 range despite being on a low carb diet. I think her nutritional supplements are causing too high of a spike in her blood sugar. I do not want her taking Glucerna. I will also consult endocrinology. ROS x10: The patient also complains of severely impaired mobility and activities of daily living. Otherwise no new problems with vision, hearing, nose, mouth, throat, dermal, cardiovascular, GI, , pulmonary, musculoskeletal, psychiatric or neurological. See Rehab H&P on Rehab chart dated . Vital signs:  BP (!) 172/68   Pulse 55   Temp 97 °F (36.1 °C)   Resp 15   Ht 5' 3\" (1.6 m)   Wt 188 lb 7.9 oz (85.5 kg)   LMP  (LMP Unknown)   SpO2 98%   BMI 33.39 kg/m²   I/O:   PO/Intake:  fair PO intake, 4 carb ADA diet    Bowel/Bladder:  Continent, opiate related constipation  General:  Patient is well developed, adequately nourished, non-obese and     well kempt. HEENT:    PERRLA, hearing intact to loud voice, external inspection of ear     and nose benign. Inspection of lips, tongue and gums benign  Musculoskeletal: No significant change in strength or tone. All joints stable. Inspection and palpation of digits and nails show no clubbing,       cyanosis or inflammatory conditions. Neuro/Psychiatric: Affect: flat but pleasant. Alert and oriented to person, place and     situation. No significant change in deep tendon reflexes or     Sensation-bilateral upper extremity weakness left greater than right left lower extremity weakness is    also there  Lungs:  Diminished, CTA-B. Respiration effort is normal at rest.     Heart:   S1 = S2, RRR.   No loud murmurs. Abdomen:  Soft, non-tender, no enlargement of liver or spleen. Extremities:  No significant lower extremity edema or tenderness. Skin:   Intact to general survey, no visualized or palpated problems. Rehabilitation:  Physical therapy: FIMS:  Bed Mobility: Scooting: Supervision    Transfers: Sit to Stand: Stand by assistance  Stand to sit: Stand by assistance  Bed to Chair: Stand by assistance, Ambulation 1  Surface: level tile, uneven, carpet, ramp  Device: Rollator  Assistance: Stand by assistance, Contact guard assistance  Quality of Gait: Mild improvement with hip stability reciprocal pattern, difficulty maintaining constant speed descending ramp  Gait Deviations: Decreased step length, Decreased step height  Distance: 175' x 2  Comments: Increased distance to improve endurance, Stairs  # Steps : 4  Stairs Height: 6\"  Rails: Bilateral  Assistance: Contact guard assistance  Comment: Non-reciprocal pattern    FIMS:  ,  , Assessment: Patient continues to fatigue quickly with gait training. Continues to progress strength and endurance    Occupational therapy: FIMS:   ,  , Assessment: Patient is a 80 year female with an new onset of coordination problems, dizziness and weakness.  Patient presents with the above stated problem areas and will benefit from OT to address the issues and increase independence    Speech therapy: FIMS:        Lab/X-ray studies reviewed, analyzed and discussed with patient and staff:   Recent Results (from the past 24 hour(s))   POCT Glucose    Collection Time: 06/29/20 11:36 AM   Result Value Ref Range    POC Glucose 275 (H) 60 - 115 mg/dl    Performed on ACCU-CHEK    POCT Glucose    Collection Time: 06/29/20  4:35 PM   Result Value Ref Range    POC Glucose 254 (H) 60 - 115 mg/dl    Performed on ACCU-CHEK    POCT Glucose    Collection Time: 06/29/20  8:07 PM   Result Value Ref Range    POC Glucose 328 (H) 60 - 115 mg/dl    Performed on ACCU-CHEK    POCT Glucose    Collection Time: 06/30/20  6:01 AM   Result Value Ref Range    POC Glucose 147 (H) 60 - 115 mg/dl    Performed on ACCU-CHEK        Ct Head 6/19/2020   NO ACUTE INTRACRANIAL PROCESS OR SIGNIFICANT CHANGE FROM YESTERDAY IDENTIFIED. Ct Head   6/18/2020   No acute intracranial process or significant interval change. Mri Cervical Spine  6/20/2020    Craniocervical junction: Craniocervical junction is within normal limits. Bone marrow: No sign of acute fracture. No abnormality of the marrow signal to suggest any metastatic or diffuse bone disease. Soft tissues: Cervical soft tissues are within normal limits. Cervical cord: The cervical cord has normal morphology and signal. There is no sign of any extramedullary hemorrhage or fluid collection. C1/2: The odontoid and the C1-2 articulation are normal. C2/C3:  There is no neural foraminal stenosis. There is no evidence of central canal stenosis. There is no foraminal stenosis. C3/C4:  Minimal disc osteophyte complex with ventral ridging. No central canal stenosis. No significant foraminal narrowing. C4/C5:  Prominent disc osteophyte complex with effacement of the anterior CSF column in cord deformity. No abnormal cord signal intensity. Findings resulting in moderate central canal stenosis. C5/C6:  There is no neural foraminal stenosis. There is no evidence of central canal stenosis. There is no foraminal stenosis. C6/C7:  Moderate discussed by complex with effacement of the anterior CSF column and cord deformity. No abnormal cord signal intensity. Findings resulting in moderate to severe central canal stenosis. C7-T1:  Minimal disc osteophyte complex without central canal or significant foraminal narrowing     Severe central canal stenosis at C5-6. Moderate to severe central canal stenosis at C4-5. No abnormal cord signal intensity. Mri Thoracic Spine  : 6/20/2020     Bones: The thoracic vertebrae are normally aligned with no evidence of fracture.   There is normal marrow signal throughout the thoracic spine. Disc space: There is no focal disc herniation or evidence for spinal canal stenosis. Disc spaces are age-appropriate. Spinal cord: The thoracic cord is normal in configuration and signal intensity. Soft tissues: There is no paraspinal soft tissue mass or fluid collection. Negative MRI of the thoracic spine. No signs of cord compression. Mri Lumbar Spine   6/20/2020     Vertebrae: No acute fracture. Marrow signal changes are within normal limits. Conus: The conus medullaris ends at L1 level and is unremarkable. T12/L1: There is no evidence of disc bulging or disc herniation. No significant facet hypertrophy or thickening of ligamenta flava. There is no central spinal canal stenosis. There is no neural foraminal stenosis. L1/2:  There is no evidence of disc bulging or disc herniation. No significant facet hypertrophy or thickening of ligamenta flava. There is no central spinal canal stenosis. There is no neural foraminal stenosis. L2/3:  Minimal disc bulging with no central canal narrowing or significant foraminal stenosis     L3/4:  Broad-based disc bulging with minimal ventral ridging. Levoscoliosis with moderate to severe right foraminal stenosis. L4/5:  Broad-based disc bulging and ventral ridging. Levoscoliosis with facet hypertrophy and severe left foraminal narrowing and moderate to severe right foraminal narrowing  L5/S1: Disc bulging without central canal narrowing. Severe bilateral foraminal stenosis   Retroperitoneum: No abnormality of the visualized Aorta or retroperitoneum. Levorotoscoliosis with advanced multilevel degenerative changes. Severe foraminal narrowing bilaterally at L4-5. There may be mild transverse canal narrowing at L3-4 and L4-5 but there is no definite central canal stenosis       Ct Abdomen Pelvis  6/19/2020  BORDERLINE PELVIECTASIS IN LEFT KIDNEY. NO ACUTE PATHOLOGY IN THE ABDOMEN OR PELVIS.      Venkat Vargas Chest   6/19/2020   Osseous structures intact. Cardiopericardial silhouette normal. Pulmonary vasculature normal. Lungs clear. NO ACUTE CARDIOPULMONARY DISEASE. Mri Brain Wo 6/19/2020   NO EVIDENCE OF ACUTE CVA. GENERALIZED PARENCHYMAL VOLUME LOSS AND NONSPECIFIC WHITE MATTER CHANGES, MOST LIKELY SECONDARY TO CHRONIC SMALL VESSEL ISCHEMIC CHANGES. MUCOSAL THICKENING IN ETHMOID AND MAXILLARY SINUSES. A FEW SMALL NONSPECIFIC FOCI ON GRADIENT ECHO SEQUENCES WITHIN THE WHITE MATTER BILATERALLY. Us Carotid Artery   6/19/2020   50-69% NARROWING PREDICTED OF BOTH INTERNAL CAROTID ARTERIES, NOT SIGNIFICANTLY CHANGED FROM 11/14/2018. Previous extensive, complex labs, notes and diagnostics reviewed and analyzed. ALLERGIES:    Allergies as of 06/24/2020 - Review Complete 06/24/2020   Allergen Reaction Noted    Metoclopramide  11/29/2011    Pantoprazole Other (See Comments) 12/08/2015    Pcn [penicillins]  11/29/2011    Protonix [pantoprazole sodium]  11/29/2011    Tramadol  11/29/2011      (please also verify by checking MAR)     Yesterday I evaluated this patient for periodic reassessment of medical and functional status. The patient was discussed in detail at the treatment team meeting focusing on current medical issues, progress in therapies, social issues, psychological issues, barriers to progress and strategies to address these barriers, and discharge planning. See the hand written addendum to rehab progress note. The patient continues to be high risk for future disability and their medical and rehabilitation prognosis continue to be good and therefore, we will continue the patient's rehabilitation course as planned. The patient's tentative discharge date was set. Patient and family education was discussed. The patient was made aware of the team discussion regarding their progress.   Discharge plans were discussed along with barriers to progress and strategies to address these barriers, situational adjustment disorder:  prn Ambien, monitor for day time sedation. 7. Falls risk elevated:  patient to use call light to get nursing assistance to get up, bed and chair alarm. 8. Elevated DVT risk: progressive activities in PT, continue prophylaxis SALLIE hose, elevation and Coumadin. 9. Complex discharge planning: Discharge 7/8/2020 home with her son in [facility] PT OT RN aide and social work I will progress toward her final weekly team meeting  Monday to assess progress towards goals, discuss and address social, psychological and medical comorbidities and to address difficulties they may be having progressing in therapy. Patient and family education is in progress. The patient is to follow-up with their family physician after discharge. Complex Active General Medical Issues that complicate care Assess & Plan:    1. HTN (hypertension),  HLD (hyperlipidemia), CAD,   Chronic combined systolic and diastolic congestive heart failure,  History of ST elevation myocardial infarction (STEMI),   History of PTCA,  Valvular heart disease-vital signs every shift dose and titrate cardiac medication to include Norvasc, Lipitor, Coreg, Apresoline both intravenous and oral, Coumadin, Entresto consult hospitalist for backup medical consult Dr. Kendell Escalona with cardiology monitor for orthostasis and failure as we rehydrate her  2. CKD (chronic kidney disease)-control blood sugars control blood pressure recheck BMP monitor UA and renal output, push clear liquids push oral fluids add IV fluids as needed  3. Osteoarthritis,  Lumbar spinal stenosis, DJD (degenerative joint disease), lumbar-add Lidoderm BenGay and heat lowest effective dose of opiates  4. SCC (squamous cell carcinoma), arm, Eczematous dermatitis-topical steroids add co-Q10 vitamin D  5. Vitamin D and B12 deficiency-high-dose vitamin D and B12  6.    Spinal stenosis in cervical region with cervical myelopathy-inoperable due to moderate multiple medical comorbidities-PT and OT are her greatest hope for recovery her blood sugars are elevated wean steroids  7. Uncontrolled type 2 diabetes mellitus uncontrolled with hyperlipidemia and, Obesity (BMI 30-39. 9)-fingerstick blood sugars q. before meals and at bedtime dose and titrate insulin and carbohydrate insulin intake add diabetic add and dietary Ed continue to titrate carbohydrates and titrate insulin. 8. Severely [place] (hard of hearing),   Vestibular neuronitis of both ears  9. Nausea, anxiety and elevated blood sugars due to steroids for cervical myelopathy-steroid taper and titrate benzodiazepines if needed titrate Zofran and Antivert monitor stools for blood while patient is on Coumadin and steroids as she is high risk for bleed  10.  Neurogenic bowel and bladder-cath if no void check postvoid residuals teach Crede method recheck stat UA             Jaswant Chau D.O., PM&R     Attending    [address]

## 2020-06-30 NOTE — PROGRESS NOTES
[employer])  Assistance: Contact guard assistance    Exercises  Bridging: (x 20 Ball between knees to maintain hip alignment)  Straight Leg Raise: x 10  Hip Abduction: Supine x 20  Other exercises  Other exercises 1: Clamshells x 10  Other exercises 2: Reverse clamshells x 10     ASSESSMENT/COMMENTS:  Body structures, Functions, Activity limitations: Decreased functional mobility ; Decreased strength;Decreased endurance;Decreased balance; Increased pain;Decreased posture  Assessment: Patient continues to make progress with hip strength. Patient fatigues and requires extended seated rest breaks to continue functional tasks    PLAN OF CARE/Safety:   Safety Devices  Type of devices:  All fall risk precautions in place;Call light within reach      Therapy Time:   Individual   Time In 1030   Time Out 1130   Minutes 60     Minutes: 60      Transfer/Bed mobility training: 15      Gait trainin     Therapeutic ex: [address] [facility], 20 at 11:27 AM

## 2020-07-01 LAB
BACTERIA: ABNORMAL /HPF
BILIRUBIN URINE: NEGATIVE
BLOOD, URINE: NEGATIVE
CLARITY: ABNORMAL
COLOR: YELLOW
EPITHELIAL CELLS, UA: ABNORMAL /HPF (ref 0–5)
GLUCOSE BLD-MCNC: 119 MG/DL (ref 60–115)
GLUCOSE BLD-MCNC: 149 MG/DL (ref 60–115)
GLUCOSE BLD-MCNC: 228 MG/DL (ref 60–115)
GLUCOSE BLD-MCNC: 275 MG/DL (ref 60–115)
GLUCOSE URINE: NEGATIVE MG/DL
HYALINE CASTS: ABNORMAL /HPF (ref 0–5)
INR BLD: 2.9
KETONES, URINE: NEGATIVE MG/DL
LEUKOCYTE ESTERASE, URINE: ABNORMAL
NITRITE, URINE: POSITIVE
PERFORMED ON: ABNORMAL
PH UA: 5 (ref 5–9)
PROTEIN UA: 100 MG/DL
PROTHROMBIN TIME: 30.2 SEC (ref 12.3–14.9)
RBC UA: ABNORMAL /HPF (ref 0–5)
SPECIFIC GRAVITY UA: 1.01 (ref 1–1.03)
URINE REFLEX TO CULTURE: YES
UROBILINOGEN, URINE: 0.2 E.U./DL
WBC UA: >100 /HPF (ref 0–5)

## 2020-07-01 PROCEDURE — 6370000000 HC RX 637 (ALT 250 FOR IP): Performed by: PHYSICIAN ASSISTANT

## 2020-07-01 PROCEDURE — 97110 THERAPEUTIC EXERCISES: CPT

## 2020-07-01 PROCEDURE — 99232 SBSQ HOSP IP/OBS MODERATE 35: CPT | Performed by: PHYSICIAN ASSISTANT

## 2020-07-01 PROCEDURE — 6370000000 HC RX 637 (ALT 250 FOR IP): Performed by: INTERNAL MEDICINE

## 2020-07-01 PROCEDURE — 97116 GAIT TRAINING THERAPY: CPT

## 2020-07-01 PROCEDURE — 51798 US URINE CAPACITY MEASURE: CPT

## 2020-07-01 PROCEDURE — 6370000000 HC RX 637 (ALT 250 FOR IP): Performed by: NURSE PRACTITIONER

## 2020-07-01 PROCEDURE — 36415 COLL VENOUS BLD VENIPUNCTURE: CPT

## 2020-07-01 PROCEDURE — 87077 CULTURE AEROBIC IDENTIFY: CPT

## 2020-07-01 PROCEDURE — 87086 URINE CULTURE/COLONY COUNT: CPT

## 2020-07-01 PROCEDURE — 97530 THERAPEUTIC ACTIVITIES: CPT

## 2020-07-01 PROCEDURE — 87186 SC STD MICRODIL/AGAR DIL: CPT

## 2020-07-01 PROCEDURE — 99233 SBSQ HOSP IP/OBS HIGH 50: CPT | Performed by: PSYCHIATRY & NEUROLOGY

## 2020-07-01 PROCEDURE — 6370000000 HC RX 637 (ALT 250 FOR IP): Performed by: PHYSICAL MEDICINE & REHABILITATION

## 2020-07-01 PROCEDURE — 1180000000 HC REHAB R&B

## 2020-07-01 PROCEDURE — 97535 SELF CARE MNGMENT TRAINING: CPT

## 2020-07-01 PROCEDURE — 81001 URINALYSIS AUTO W/SCOPE: CPT

## 2020-07-01 PROCEDURE — 99232 SBSQ HOSP IP/OBS MODERATE 35: CPT | Performed by: PHYSICAL MEDICINE & REHABILITATION

## 2020-07-01 PROCEDURE — 97129 THER IVNTJ 1ST 15 MIN: CPT

## 2020-07-01 PROCEDURE — 99221 1ST HOSP IP/OBS SF/LOW 40: CPT | Performed by: UROLOGY

## 2020-07-01 PROCEDURE — APPSS15 APP SPLIT SHARED TIME 0-15 MINUTES: Performed by: NURSE PRACTITIONER

## 2020-07-01 PROCEDURE — 85610 PROTHROMBIN TIME: CPT

## 2020-07-01 RX ORDER — HYDROCODONE BITARTRATE AND ACETAMINOPHEN 5; 325 MG/1; MG/1
1 TABLET ORAL EVERY 4 HOURS PRN
Status: DISCONTINUED | OUTPATIENT
Start: 2020-07-01 | End: 2020-07-08 | Stop reason: HOSPADM

## 2020-07-01 RX ADMIN — AMLODIPINE BESYLATE 10 MG: 10 TABLET ORAL at 09:18

## 2020-07-01 RX ADMIN — SACUBITRIL AND VALSARTAN 1 TABLET: 24; 26 TABLET, FILM COATED ORAL at 21:48

## 2020-07-01 RX ADMIN — HYDRALAZINE HYDROCHLORIDE 50 MG: 50 TABLET, FILM COATED ORAL at 09:18

## 2020-07-01 RX ADMIN — HYDRALAZINE HYDROCHLORIDE 50 MG: 50 TABLET, FILM COATED ORAL at 14:08

## 2020-07-01 RX ADMIN — ISOSORBIDE MONONITRATE 30 MG: 30 TABLET, EXTENDED RELEASE ORAL at 09:18

## 2020-07-01 RX ADMIN — PREDNISONE 5 MG: 5 TABLET ORAL at 09:18

## 2020-07-01 RX ADMIN — CARVEDILOL 12.5 MG: 12.5 TABLET, FILM COATED ORAL at 09:18

## 2020-07-01 RX ADMIN — ATORVASTATIN CALCIUM 10 MG: 10 TABLET, FILM COATED ORAL at 21:48

## 2020-07-01 RX ADMIN — WARFARIN SODIUM 6 MG: 2 TABLET ORAL at 17:57

## 2020-07-01 RX ADMIN — INSULIN GLARGINE 20 UNITS: 100 INJECTION, SOLUTION SUBCUTANEOUS at 21:55

## 2020-07-01 RX ADMIN — METAXALONE 400 MG: 800 TABLET ORAL at 15:38

## 2020-07-01 RX ADMIN — CARVEDILOL 12.5 MG: 12.5 TABLET, FILM COATED ORAL at 17:57

## 2020-07-01 RX ADMIN — ACETAMINOPHEN 500 MG: 500 TABLET ORAL at 15:38

## 2020-07-01 RX ADMIN — HYDROCODONE BITARTRATE AND ACETAMINOPHEN 0.5 TABLET: 5; 325 TABLET ORAL at 17:56

## 2020-07-01 RX ADMIN — HYDRALAZINE HYDROCHLORIDE 50 MG: 50 TABLET, FILM COATED ORAL at 21:48

## 2020-07-01 RX ADMIN — CLOPIDOGREL BISULFATE 75 MG: 75 TABLET ORAL at 09:18

## 2020-07-01 RX ADMIN — NITROFURANTOIN (MONOHYDRATE/MACROCRYSTALS) 100 MG: 75; 25 CAPSULE ORAL at 21:48

## 2020-07-01 RX ADMIN — GABAPENTIN 100 MG: 100 CAPSULE ORAL at 21:48

## 2020-07-01 RX ADMIN — VITAMIN D, TAB 1000IU (100/BT) 2000 UNITS: 25 TAB at 17:57

## 2020-07-01 RX ADMIN — SACUBITRIL AND VALSARTAN 1 TABLET: 24; 26 TABLET, FILM COATED ORAL at 09:18

## 2020-07-01 ASSESSMENT — ENCOUNTER SYMPTOMS
NAUSEA: 0
SHORTNESS OF BREATH: 0
VOMITING: 0
TROUBLE SWALLOWING: 0
COUGH: 0
CHEST TIGHTNESS: 0
WHEEZING: 0
COLOR CHANGE: 0

## 2020-07-01 ASSESSMENT — PAIN SCALES - GENERAL
PAINLEVEL_OUTOF10: 10
PAINLEVEL_OUTOF10: 10
PAINLEVEL_OUTOF10: 0
PAINLEVEL_OUTOF10: 9

## 2020-07-01 ASSESSMENT — PAIN DESCRIPTION - PAIN TYPE: TYPE: ACUTE PAIN

## 2020-07-01 ASSESSMENT — PAIN DESCRIPTION - DESCRIPTORS: DESCRIPTORS: ACHING

## 2020-07-01 ASSESSMENT — PAIN DESCRIPTION - LOCATION: LOCATION: BACK

## 2020-07-01 NOTE — CARE COORDINATION
Continued Stay Review Note    20  [facility])  Clinical Case Management Department  Written by: Ricardo Dawson RN    Patient Name: Waleska Pryor  Attending Provider: Edita Baez DO      Admit Date: 2020  MRN: [id number]                           : 6/10/1927    Patient Visit Status: Inpatient  Level of Care:Patient admitted to acute inpatient rehab on 20  Anticipated Discharge Plan: At this time, patient is scheduled to discharge on 20 home. We feel that at that time it will safe for patient to discharge. Patient is currently being following by hospitalist, PM&R, cardiology, neurology, and pain management. Last set of vitals:  Temp: 97 °F (36.1 °C) (20)   Pulse: 51 (20 1405)  Resp: 16 (20)  BP: 123/64 (20 1405)  SpO2: 98 % (20)    Current Treatments:    Results/Findings:     Labs:    Lab Results   Component Value Date     2020    K 4.2 2020     2020    CO2 17 2020    BUN 45 2020    CREATININE 1.18 2020    GLUCOSE 146 2020    GLUCOSE 154 2012    CALCIUM 8.9 2020      Lab Results   Component Value Date    WBC 8.8 2020    HGB 10.9 (L) 2020    HCT 33.9 (L) 2020    MCV 94.3 2020     2020     Lab Results   Component Value Date    CKTOTAL 48 2020    CKMB 2.1 2018    CKMBINDEX 7.2 (H) 2018       Other Labs/Urinalysis/Pathology:    Diagnostics/Imaging:  No results found.     JACKIE    Facility/Department: [facility] Initial Assessment: Physical Therapy  Room: R235/R235-01     NAME: Waleska Pryor  : 6/10/1927  MRN: [id number]     Date of Service: 2020     Rehab Diagnosis(es):Abnormality of gait and mobility due to NTSCI with Impaired Mobility and ADL's secondary to Cervical Myelopathy and Vestibular neuronitis         Patient Active Problem List   Diagnosis Date Noted    History of ST elevation myocardial infarction (STEMI)         Priority: High    CKD (chronic kidney disease) 03/18/2015       Priority: High    HLD (hyperlipidemia) 03/18/2015       Priority: High    HTN (hypertension) 11/29/2011       Priority: High    Diabetes mellitus 11/29/2011       Priority: High    Vitamin D deficiency 03/18/2015       Priority: Low    SI (stress incontinence), female 05/29/2012       Priority: Low    Osteoarthritis 05/29/2012       Priority: Low    DJD (degenerative joint disease), lumbar 06/24/2020    History of PTCA 06/24/2020    Vestibular neuronitis of both ears 06/22/2020    Dizziness      Spinal stenosis in cervical region 06/21/2020    Lumbar degenerative disc disease 06/21/2020    Spinal stenosis, lumbar region, with neurogenic claudication 06/21/2020    Abnormality of gait and mobility due to  NTSCI with Impaired Mobility and ADL's secondary to Cervical Myelopathy and Vestibular neuronitis with rehab admission 06/24/20. 06/21/2020    Generalized muscle ache 06/21/2020    Generalized osteoarthrosis, involving multiple sites 06/21/2020    Syncope 06/19/2020    Current use of long term anticoagulation 12/26/2019    Other transient cerebral ischemic attacks and related syndromes      Cerebrovascular accident (CVA) ([address]      Chronic combined systolic and diastolic congestive heart failure ([address] 12/02/2019    Monitoring for long-term anticoagulant use 11/05/2019    Transient cerebral ischemia 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/2019    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/2018    History of non-ST elevation myocardial infarction (NSTEMI) 11/12/2018    Nausea & vomiting 11/11/2018    Chest pain 11/10/2018    Vitamin B12 deficiency 09/17/2018    Recurrent UTI (urinary tract infection) 88/67/4306    Diastolic dysfunction 71/54/2239    Valvular heart disease 03/07/2018    Hypercalcemia 01/15/2018    Lumbar spinal stenosis 12/22/2016    Chronic low back pain 08/17/2016    Eczematous dermatitis      SCC (squamous cell carcinoma), arm 2013    Type 2 diabetes mellitus ([facility] 2013    Obesity (BMI 30-39.9) 2013    [place] (hard of hearing) 2013         Past Medical History        Past Medical History:   Diagnosis Date    Arthritis      Chicken pox      Chronic back pain      Chronic low back pain 8/17/2016    Eczematous dermatitis      History of bladder infections      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/2018    History of non-ST elevation myocardial infarction (NSTEMI) 11/12/2018    History of ST elevation myocardial infarction (STEMI)      Hyperlipidemia      Hypertension      Measles      Mumps      Osteoarthritis 5/29/2012    Other cerebrovascular disease      Other transient cerebral ischemic attacks and related syndromes      S/P PTCA (percutaneous transluminal coronary angioplasty) 1/18/2019    SCC (squamous cell carcinoma), arm 2013     right upper arm, 2015 right forarm    SI (stress incontinence), female 5/29/2012    Type II or unspecified type diabetes mellitus without mention of complication, not stated as uncontrolled      Whooping cough           Past Surgical History         Past Surgical History:   Procedure Laterality Date    ANKLE SURGERY         broken left ankle.     APPENDECTOMY        BREAST BIOPSY         x2 left breast    CATARACT REMOVAL   2004     bilateral    CORONARY ANGIOPLASTY WITH STENT PLACEMENT   01/2019    CYSTOCELE REPAIR        EYE SURGERY         bilateral cataract    HYSTERECTOMY         complete at age 39    RECTOCELE REPAIR        TONSILLECTOMY         as a child            Chart Reviewed: Yes  Diagnosis: Abnormality of gait and mobility due to NTSCI with Impaired Mobility and ADL's secondary to Cervical Myelopathy and Vestibular neuronitis      Restrictions:  Restrictions/Precautions: Fall Risk  Position Activity Restriction  Other position/activity restrictions: DNRCCA        SUBJECTIVE:          Pre and post Treatment Pain Screenin/10     Prior Level of Function:  Social/Functional History  Lives With: Son  Type of Home: House  Home Layout: One level  Home Access: Stairs to enter without rails  Entrance Stairs - Number of Steps: 1  Home Equipment: Rolling walker(and rollator)  ADL Assistance: Independent  Ambulation Assistance: Independent(using rollator)  Transfer Assistance: Independent  Active : No  Additional Comments: Per patient's son she has 24 hour supervision from multiple family members; pt uses rollator in the home and ww when outside the home     OBJECTIVE:   Vision/Hearing:  Vision: Impaired  Vision Exceptions: Wears glasses at all times  H[facility]  Hearing Exceptions: Hard of hearing/hearing concerns     Cognition:  Overall Orientation Status: Within Functional Limits     ROM:  RLE AROM: WFL  LLE AROM : WFL     Strength:  Strength RLE  Comment: 4-/5 excep 3/5 hip  Strength LLE  Comment: 4-/5 except 3/5 hip     Neuro:  Balance  Sitting - Static: Good  Sitting - Dynamic: Good  Standing - Static: Fair;-(pt unable to stand without UE support.  With UE supportshe is able to stand 60 sec with no sway or LOB)  Standing - Dynamic: Fair;-(mild LOB in gait with rollator utilized)     Bed mobility  Rolling to Left: Modified independent  Rolling to Right: Modified independent  Supine to Sit: Stand by assistance  Sit to Supine: Stand by assistance  Comment: increased effort required     Transfers  Sit to Stand: Contact guard assistance(cues for safe techinque also required)  Stand to sit: Contact guard assistance  Bed to Chair: Minimal assistance(pivot and with ww)     Ambulation 1  Surface: carpet  Device: Rollator  Assistance: Minimal assistance; Moderate assistance  Quality of Gait: narrow JUAN, decreased step length, poor pelvic stability,downward gaze with mild LOB with scanning environment with assist for recoery required  Distance: 35 feet  Comments: cues for rollator safety and technique for returning to chair required     Stairs/Curb  Stairs?: No(not safe to test)     Wheelchair Activities  Propulsion: No(pt to be ambulatory)     Activity Tolerance  Activity Tolerance: Patient Tolerated treatment well            Quality Indicators (IRF-YOMI):  Rolling L and R: Independent - 6  Sit>Supine: Supervision or Touching Assistance - 4  Supine>Sit: Supervision or Touching Assistance - 4  Sit>Stand: Supervision or Touching Assistance - 4  Chair/Bed>Chair Transfer: Supervision or Touching Assistance - 4  Car Transfers: Not attempted due to Medical Condition or Safety Concerns (I.e. unsafe or physician orders) - 80  Walk 10 ft: Partial/Moderate Assistance (helper does <50%) - 3  Walk 50 ft with two 90 degree turns: Not attempted due to Medical Condition or Safety Concerns (I.e. unsafe or physician orders) - 80  Walk 150 ft in [address]: Not attempted due to Medical Condition or Safety Concerns (I.e. unsafe or physician orders) - 80  Walking 10 ft on Unlevel Surface: Not attempted due to Medical Condition or Safety Concerns (I.e. unsafe or physician orders) - 80  Picking up Objects from Standing Position: Not attempted due to Medical Condition or Safety Concerns (I.e. unsafe or physician orders) - 80  Stairs: No Not attempted due to medical condition or safety concerns (i.e. unsafe or physician order) - 88  WC Mobility: No Not Applicable (pt did not complete item prior to admission) - 9        ASSESSMENT:  Body structures, Functions, Activity limitations: Decreased functional mobility ; Decreased strength;Decreased endurance;Decreased balance; Increased pain;Decreased posture  Decision Making: Medium Complexity  History: high  Exam: med  Clinical Presentation: med     PT Education: Goals;Plan of Care;General Safety;PT Role  Patient Education: fair follow thru of instructions     CLINICAL IMPRESSION: Pt demonstrates deficits as listed. She is requiring assistance with all mobility at this time. She was previously at an indep level of function. Pt is in need of PT at this level of care prior to safe return to home     PLAN OF CARE:  Frequency: 1-2 treatment sessions per day, 5-7 days per week  Current Treatment Recommendations: Strengthening, Balance Training, Functional Mobility Training, Transfer Training, Endurance Training, Gait Training, Stair training, Neuromuscular Re-education, Pain Management, Home Exercise Program, Safety Education & Training, Patient/Caregiver Education & Training, Manual Therapy - Soft Tissue Mobilization     Patient's Goal:  to be able to walk without assistance with the rollator     GOALS:  Long term goals  Long term goal 1: Patient will be independent with bed mobility. Long term goal 2: Patient will be indep with bed and car transfers. Long term goal 3: Patient will be SBA with 150ft of gait with rollator in busy area and indep for household distance up to 50 feet in familiar environment  Long term goal 4: SBA 4 stairs with rails     ELOS:   Plan weeks: 2       Abdirahman FRANCI Torres, 20 at 11:43 AM                            Occupational Therapy   Occupational Therapy Initial Assessment  Date: 2020            Patient Name: Ariana Cedeno  MRN:   [id number]     : 6/10/1927     Date of Service: 2020     Discharge Recommendations:  Continue to assess pending progress  OT Equipment Recommendations  Other: to be further addressed     Assessment   Performance deficits / Impairments: Decreased functional mobility ; Decreased balance;Decreased ADL status; Decreased high-level IADLs;Decreased endurance;Decreased strength;Decreased fine motor control;Decreased coordination;Decreased cognition  Assessment: Patient is a 80 year female with an new onset of coordination problems, dizziness and weakness. Patient presents with the above stated problem areas and will benefit from OT to address the issues and increase independence  Prognosis: Good  Decision Making: Medium Complexity  History: moderate chart review  Exam: 8 areas of deficit  Assistance / Modification: moderate modifications needed  REQUIRES OT FOLLOW UP: Yes  Activity Tolerance  Activity Tolerance: Patient limited by fatigue  Safety Devices  Safety Devices in place: Yes  Type of devices: All fall risk precautions in place            Patient Diagnosis(es): There were no encounter diagnoses.      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 has a past surgical history that includes Appendectomy; Rectocele repair; Cystocele repair; Ankle surgery; Breast biopsy; Cataract removal (2004); eye surgery; Tonsillectomy; Hysterectomy; and Coronary angioplasty with stent (01/2019).                                   Restrictions  Restrictions/Precautions  Restrictions/Precautions: Fall Risk  Position Activity Restriction  Other position/activity restrictions: DNRCCA     Subjective   General  Chart Reviewed: Yes  Referring Practitioner: Dr Mayer Failing  Diagnosis: NTSCI with imp mob and ADLs 2° cervical myelopathy and vestibular neuronitis  Patient Currently in Pain: No  Vital Signs  Patient Currently in Pain: No  Social/Functional History  Social/Functional History  Lives With: Son  Type of Home: House  Home Layout: Two level, Performs ADL's on one level, Able to Live on Main level with bedroom/bathroom  Home Access: Stairs to enter without rails  Entrance Stairs - Number of Steps: 1  Bathroom Shower/Tub: Tub/Shower unit, Curtain  Bathroom Equipment: Hand-held shower, Shower chair  Home Equipment: 4 wheeled walker, ww  Son reported that patient uses a rollator in the house and a ww in the community  ADL Assistance: Independent  Homemaking Assistance: (son does all homemaking)  Ambulation Assistance: Independent  Transfer Assistance: Independent  Active : No  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  Leisure & Hobbies: Patient enjoys coloring in adult books  Additional Comments: Per patient's son she has 25 hour supervision from multiple family members     Objective   Vision: Impaired  Vision Exceptions: Wears glasses at all times  Hearing Exceptions: Hard of hearing/hearing concerns(Patient hears better with the left ear)    Orientation  Overall Orientation Status: Within Functional Limits  Balance  Sitting Balance: Supervision  Standing Balance: Contact guard assistance  Functional Mobility  Functional - Mobility Device: Rolling Walker  Activity: To/from bathroom  Assist Level: Moderate assistance  Functional Mobility Comments: ambulation assistance need fluctuated between min assist and mod assist  Toilet Transfers  Toilet - Technique: Ambulating  Equipment Used: Grab bars  Toilet Transfer: Minimal assistance  Tub Transfers  Tub Transfers: Not tested  Shower Transfers  Shower - Transfer From: Juangwendolyn Thomasons - Transfer Type: To and From  Shower - Transfer To: Shower seat with back  Shower - Technique: Ambulating  Shower Transfers:  Moderate assistance  ADL  Feeding: Setup  Grooming: Setup  UE Bathing: Stand by assistance  LE Bathing: Moderate assistance(for B lower legs and her rear yeyo area for thoroughness)  UE Dressing: Setup; Increased time to complete  LE Dressing: Minimal assistance(Patient did not want to wear socks and reported that she usually does not wear them)  Toileting: Moderate assistance(Patient's undergarment was saturated with uring but then she was continent of bowel )  Tone RUE  RUE Tone: Normotonic  Tone LUE  LUE Tone: Normotonic  Coordination  Movements Are Fluid And Coordinated: No  Coordination and Movement description: Tremors;Right UE;Left UE  Quality of Movement Other  Comment: Patient was noted to have shaking of her upper body when she first went from supine to sit. Patient reported that this is new for her and son confirmed that patient did not have tremors PTA  Transfers  Sit to stand: Minimal assistance  Stand to sit: Minimal assistance  Vision - Basic Assessment  Vision Comments: no complaints  Cognition  Overall Cognitive Status: Exceptions  Arousal/Alertness: Appropriate responses to stimuli  Following Commands:  Follows one step commands with repetition(probably due to [facility])  Attention Span: Appears intact  Memory: Decreased short term memory;Decreased long term memory  Safety Judgement: Good awareness of safety precautions  Insights: Fully aware of deficits  Initiation: Requires cues for some  Sequencing: Requires cues for some  Cognition Comment: Comp - mod assist,  exp - supervision, soc int - modified indep, prob solv supervision, mem - supervision  Perception  Overall Perceptual Status: WFL  Sensation  Overall Sensation Status: WFL(Patient reported no numbness or tingling)         LUE AROM (degrees)  LUE AROM : WFL  Left Hand AROM (degrees)  Left Hand AROM: WFL  RUE AROM (degrees)  RUE AROM : WFL  Right Hand AROM (degrees)  Right Hand AROM: WFL  LUE Strength  LUE Strength Comment: decreased strength in the shoulder 3/5  RUE Strength  RUE Strength Comment: overall minimal decrease 3+/5  Hand Dominance  Hand Dominance: Right        Plan   Plan  Times per week: 5-7 times per week  Plan weeks: 2 weeks  Current Treatment Recommendations: Strengthening, Endurance Training, Neuromuscular Re-education, Self-Care / ADL, Balance Training, Functional Mobility Training, Safety Education & Training, Patient/Caregiver Education & Training        Goals  Patient Goals   Patient goals : \"to at least do things in my home\"  [x]? Patient will complete self care as followed using the recommended adaptive equipment and/or adaptive techniques as instructed:  Feeding: modified independent  Grooming: modified independent  Bathing: modified independent  UE Dressing: modified independent  LE Dressing: modified independent  Toileting: modified independent  Toilet Transfers: modified independent  Tub Transfers: supervision      [x]? Patient will sequence self-care routine with no verbal/tactile cues.            [x]? Patient will improve static and dynamic standing balance to complete pants management at modified independent level            [x]? Patient will improve functional endurance to tolerate/complete 60 minutes of ADLs.      [x]? Patient will improve B UE strength and endurance to [facility] in order to participate in self-care activities as projected.      [x]? Patient will improve B  hand fine motor coordination to [facility] in order to manage clothing fasteners/self-care containers in a timely manner            [x]? Patient will perform kitchen mobility at device level without episodes of LOB and good safety awareness       [x]? Patient will perform basic room mobility at modified independent level.         [x]? Patient will access appropriate D/C site with as few architectural barriers as possible.                  [x]? Patient and/or caregiver will demonstrate understanding of recommended HEP for coordination.          [x]?   Patient's cognition will improve to safely perform ADLs:  Comprehension: min cup     Current Diet level:  Current Diet : Regular  Current Liquid Diet : Thin     Oral Motor Deficits  Oral/Motor  Oral Motor: Within functional limits     Oral Phase Dysfunction  Oral Phase  Oral Phase: WFL  Oral Phase Dysfunction  Lingual/Palatal Residue: Reg solid  Oral Phase  Oral Phase - Comment: Oral phase of swallow WFL. Pt was able to form and clear a cohesive bolus with mild lingual residue. Pt was able to clear residue with the use of liquid wash. Indicators of Pharyngeal Phase Dysfunction   Pharyngeal Phase  Pharyngeal Phase: WFL  Pharyngeal Phase   Pharyngeal: Pharyngeal phase of swallow WFL. Laryngeal elevation was present during BSE. No overt s/s of aspiration observed following any consistencies presented. Pt was able to tolerate single sips and consecutive sips of water from cup with no overt s/s of aspiration observed.     Impression  Dysphagia Diagnosis: Swallow function appears grossly intact  Dysphagia Impression : Oropharyngeal phase of swallow WFL. Dysphagia Outcome Severity Scale: Level 6: Within functional limits/Modified independence      Treatment Plan  Requires SLP Intervention: No  Duration/Frequency of Treatment: No f/u swallow. D/C Recommendations: To be determined  Treatment/Goals   N/A     Prognosis  Individuals consulted  Consulted and agree with results and recommendations: Patient;RN;Family member(RN - Norberto Almaguer)  Family member consulted: Pt's son.     Education  Patient Education: Pt was educated on results of BSE. Patient Education Response: Verbalizes understanding  Safety Devices in place: Yes  Type of devices: Chair alarm in place; Other (comment)(RN in room upon SLP departure.)     Pain:  Pain Assessment  Patient Currently in Pain: No     [id number] E[address]ley (NOMS):     SWALLOWING:  Ratin     Signature: Electronically signed by DAVID Medellin-SLP on 2020 at 11:21 AM        [email or web address]   Facility/Department: [facility]  Initial Speech/Language/Cognitive Assessment     NAME:Bina Carlin  : 6/10/1927 (80 y.o.)   MRN: [id number]  ROOM: [place] [address]  ADMISSION DATE: 2020  PATIENT DIAGNOSIS(ES): Impaired mobility [Z74.09]  Abnormality of gait and mobility [R26.9]  No chief complaint on file.           Patient Active Problem List     Diagnosis Date Noted    History of ST elevation myocardial infarction (STEMI)         Priority: High    CKD (chronic kidney disease) 2015       Priority: High    HLD (hyperlipidemia) 2015       Priority: High    HTN (hypertension) 2011       Priority: High    Diabetes mellitus 2011       Priority: High    Vitamin D deficiency 2015       Priority: Low    SI (stress incontinence), female 2012       Priority: Low    Osteoarthritis 2012       Priority: Low    DJD (degenerative joint disease), lumbar 2020    History of PTCA 2020    Vestibular neuronitis of both ears 2020    Dizziness      Spinal stenosis in cervical region 2020    Lumbar degenerative disc disease 2020    Spinal stenosis, lumbar region, with neurogenic claudication 2020    Abnormality of gait and mobility due to  NTSCI with Impaired Mobility and ADL's secondary to Cervical Myelopathy and Vestibular neuronitis with rehab admission 20. 2020    Generalized muscle ache 2020    Generalized osteoarthrosis, involving multiple sites 2020    Syncope 2020    Current use of long term anticoagulation 2019    Other transient cerebral ischemic attacks and related syndromes      Cerebrovascular accident (CVA) ([address]      Chronic combined systolic and diastolic congestive heart failure ([address] 2019    Monitoring for long-term anticoagulant use 2019    Transient cerebral ischemia 2019    S/P PTCA (percutaneous transluminal coronary angioplasty) 2019    Late effects of CVA  CORONARY ANGIOPLASTY WITH STENT PLACEMENT   01/2019    CYSTOCELE REPAIR        EYE SURGERY         bilateral cataract    HYSTERECTOMY         complete at age 39    [place]         as a child            DATE ONSET: 06/19/20     Date of Evaluation: 6/26/2020   Evaluating Therapist: Ronnell Olivares SLP     Assessment:      Diagnosis: Mild cognitive deficits were noted in the area of short term/immediate memory with high level repetition. ST suspects this is baseline for the patient. Patient's son lives with her and another son manages her medications and finances. Patient presented with adeqaute reasoning, problem solving, orientation and everyday safety skills.      Recommendations:  Requires SLP Intervention: No  Duration/Frequency of Treatment: No f/u cog/speech  D/C Recommendations: To be determined     Goals:      Patient/family involved in developing goals and treatment plan: Patient and ST discussed eval results and plan for no f/u with ST     Subjective:   Previous level of function and limitations: Per patient her son lives with her and another son manages her finances and medications  General  Chart Reviewed: Yes  Patient assessed for rehabilitation services?: Yes  Family / Caregiver Present: No  Subjective  Subjective: Patient is very hard of hearing, but reported that her left ear is better than the right. Patient was awake and alert. Vision  Vision: Impaired  Vision Exceptions: Wears glasses at all times  Hearing  Hearing: Exceptions to [facility]  Hearing Exceptions: Hard of hearing/hearing concerns     Objective:  Oral/Motor  Oral Motor:  Within functional limits     Auditory Comprehension  Comprehension: Within Functional Limits(Patient followed 3-4 step directions without difficulty)        Expression  Primary Mode of Expression: Verbal     Verbal Expression  Verbal Expression: Within functional limits     Written Expression  Dominant Hand: Right  Written Expression: Unable to assess(DUe to time constraint.)     Motor Speech  Motor Speech: Within Functional Limits     Pragmatics/Social Functioning  Pragmatics: Within functional limits     Cognition:   Orientation  Overall Orientation Status: Within Functional Limits  Attention  Attention: Within Functional Limits  Memory  Memory: Exceptions to [facility]  Short-term Memory: Mild(Patient demonstrated difficulty remembering and repeating 7 numbers, 4-5 words and sentences. )  Problem Solving  Problem Solving: Within Functional Limits  Numeric Reasoning  Numeric Reasoning: Within Functional Limits  Abstract Reasoning  Abstract Reasoning: Within Functional Limits  Safety/Judgement  Safety/Judgement: Within Functional Limits     Additional Assessments:    Portions of the RIPA-2 Javad [place] Information Processing Assessment-2nd Edition ) were administered. Scores are as follows:  Subtest: Raw Score Standard Score   I. Immediate Memory 20 9   II. Recent Memory 29 13   III. Temporal Orientation 28 12   VIII. Problem Solving and          Abstract Reasoning 28 14         Clock Drawing Score: (scoring obtained from the CLQT, Cognitive Linguistic Quick Test)Not completed secondary to patient's age.      Patient exhibited  reduced hearing with the need for direct facial cues and repetition.      Prognosis:  Speech Therapy Prognosis  Prognosis: Good  Individuals consulted  Consulted and agree with results and recommendations: Patient;RN(Abisai)     Education:  Patient Education: Patient educated on SLE and cog eval results  Patient Education Response: Verbalizes understanding  Safety Devices in place: Yes  Type of devices: Call light within reach; Bed alarm in place     Pain:  Pain Assessment  Patient Currently in Pain: No  Pain Assessment: 0-10  Pain Level: 0     NATIONAL OUTCOMES MEASUREMENT SYSTEM (NOMS):  SPOKEN LANGUAGE COMPREHENSION  Ratin     SPOKEN LANGUAGE EXPRESSION  Ratin     MOTOR SPEECH  Ratin     PROBLEM SOLVING  Rating: 6     MEMORY  Ratin     Signature: Electronically signed by PK Rico on 2020 at 12:15 PM       CURRENT STATUS                Occupational Therapy  Facility/Department: [facility]     Date: 2020  Patient Name: Ariana Cedeno        MRN:   [id number]  Account: [de-identified]   : 6/10/1927  (80 y.o.)        Pt. Current Self Care Status:     ADL  Feeding: Modified independent   Grooming: Modified independent   UE Bathing: Setup  LE Bathing: Supervision  UE Dressing: Setup  LE Dressing: Supervision  Toileting: Supervision  Toilet Transfers  Toilet - Technique: Ambulating  Equipment Used: Grab bars  Toilet Transfer: Supervision  Toilet Transfers Comments: rollator  Tub Transfers  Tub Transfers: Not tested  Shower Transfers  Shower - Transfer From: Other(rollator)  Shower - Transfer Type: To and From  Shower - Transfer To: Shower seat with back  Shower - Technique: Ambulating  Shower Transfers: Stand by assistance  Shower Transfers Comments: grab bars     Within 1 Weeks Pt. will be:     Feeding: Mod I  Grooming: Independent  Bathing: Independent  UE Dressing: Independent  LE Dressing: Independent  Toileting: Independent  Toilet Transfers: Independent  Tub Transfers: Independent     Electronically signed by:     ALBA Lovett  2020, 12:37 PM                              [facility]  In-Patient Rehabilitation Physical Therapy Update Note     Ariana Cedeno  6/10/1927  [id number]      Current Level of Function:  Restrictions  Restrictions/Precautions  Restrictions/Precautions: Fall Risk  Position Activity Restriction  Other position/activity restrictions: DNRCCA     Objective     Bed mobility  Rolling to Left: Modified independent  Rolling to Right: Modified independent  Supine to Sit: Supervision  Sit to Supine: Supervision  Scooting: Supervision  Comment: increased effort required     Transfers  Sit to Stand: Supervision  Stand to sit: Stand by assistance(VCs to reach back to control sit to stand.  )  Bed to Chair: Supervision  Stand Pivot Transfers: Stand by assistance  Car Transfer: Stand by assistance(VCs for safety and technique. Increased effort to stand from low car seat. Decreased control with stand to sit to low seat. instructed pt to reachback to control.  )  Comment: VC's for hand placement with stand > sit     Ambulation  Ambulation?: Yes  More Ambulation?: No  Ambulation 1  Surface: level tile, carpet  Device: Rollator  Assistance: Stand by assistance  Quality of Gait: Flexed trunk and hips  Gait Deviations: Decreased step length, Decreased step height  Distance: [id number]  Comments: Increased distance to improve endurance  Ambulation 2  Surface - 2: level tile  Device 2: Rollator  Stairs/Curb  Stairs?: Yes  Stairs  # Steps : 4  Stairs Height: 6\"  Rails: Bilateral  Curbs: 6\"  Device: Rolling walker(Grab bar to ascend(Car) descends using [address])  Assistance: Stand by assistance  Comment: Non-reciprocal pattern. Heavy use of UEs on rails. Wheelchair Activities  Propulsion: No(pt to be ambulatory)        Assessment: Pt has made gains. Pt has not completely met goals at this time. She is in need of PT at this level of care to continue improvement towards safe return to home         Short Term Goals:  Long term goals  Long term goal 1: Patient will be independent with bed mobility. Long term goal 2: Patient will be indep with bed and car transfers.   Long term goal 3: Patient will be SBA with 150ft of gait with rollator in busy area and indep for household distance up to 50 feet in familiar environment  Long term goal 4: SBA 4 stairs with rails              Electronically signed by Alejandra Magaña PT on 7/1/2020 at 1:00 PM  [address]   Electronically signed by Elizabeth Blanchard RN on 7/1/2020 at 2:21 PM

## 2020-07-01 NOTE — PROGRESS NOTES
The patient is rested in bed with no c/o pain or discomfort noted at this time. Patient has been incontinent of urine at HS. Tori-care provided and barrier cream applied.

## 2020-07-01 NOTE — PROGRESS NOTES
Physical Therapy Rehab Treatment Note  [facility]  [facility]       NAME: Mary Cui  : 6/10/1927 (80 y.o.)  MRN: [id number]  CODE STATUS: DNR-CCA    Date of Service: 2020  Chart Reviewed: Yes  Family / Caregiver Present: No    Restrictions:  Restrictions/Precautions: Fall Risk     SUBJECTIVE: Subjective: \"I'm stiff\"  Pain Screening  Patient Currently in Pain: No     Post Treatment Pain Screenin/10     OBJECTIVE:              Bed mobility  Rolling to Left: Modified independent  Rolling to Right: Modified independent  Supine to Sit: Supervision  Sit to Supine: Supervision    Transfers  Sit to Stand: Supervision  Stand to sit: Stand by assistance(VCs to reach back to control sit to stand.  )    Ambulation  Ambulation?: Yes  Ambulation 1  Surface: level tile;carpet  Device: Rollator  Assistance: Stand by assistance  Quality of Gait: Flexed trunk and hips  Distance: 150ft  Comments: VCs for posture        Exercises  Hip Flexion: x20  Hip Abduction: seated YTB x10; hip add with ball x10  Knee Long Arc Quad: x20  Other exercises 6: Standing heel raises, marches, and hip abd x10 ea     ASSESSMENT/COMMENTS:  Assessment: Pt progressing towards goals. Requires occasional Vcs to improve transfers.       PLAN OF CARE/Safety:   Plan Comment: Cont PT POC    Therapy Time:   Individual   Time In 1300   Time Out 1330   Minutes 30     Minutes:      Transfer/Bed mobility trainin      Gait training:10      Neuro re education:0     Therapeutic ex:Balaji Gary PTA, 20 at 1:26 PM

## 2020-07-01 NOTE — PROGRESS NOTES
[facility]  In-Patient Rehabilitation Physical Therapy Update Note     Mary Cui  6/10/1927  [id number]     Current Level of Function:  Restrictions  Restrictions/Precautions  Restrictions/Precautions: Fall Risk  Position Activity Restriction  Other position/activity restrictions: DNRCCA    Objective    Bed mobility  Rolling to Left: Modified independent  Rolling to Right: Modified independent  Supine to Sit: Supervision  Sit to Supine: Supervision  Scooting: Supervision  Comment: increased effort required    Transfers  Sit to Stand: Supervision  Stand to sit: Stand by assistance(VCs to reach back to control sit to stand.  )  Bed to Chair: Supervision  Stand Pivot Transfers: Stand by assistance  Car Transfer: Stand by assistance(VCs for safety and technique. Increased effort to stand from low car seat. Decreased control with stand to sit to low seat. instructed pt to reachback to control.  )  Comment: VC's for hand placement with stand > sit    Ambulation  Ambulation?: Yes  More Ambulation?: No  Ambulation 1  Surface: level tile, carpet  Device: Rollator  Assistance: Stand by assistance  Quality of Gait: Flexed trunk and hips  Gait Deviations: Decreased step length, Decreased step height  Distance: [id number]  Comments: Increased distance to improve endurance  Ambulation 2  Surface - 2: level tile  Device 2: Rollator  Stairs/Curb  Stairs?: Yes  Stairs  # Steps : 4  Stairs Height: 6\"  Rails: Bilateral  Curbs: 6\"  Device: Rolling walker(Grab bar to ascend(Car) descends using [employer])  Assistance: Stand by assistance  Comment: Non-reciprocal pattern. Heavy use of UEs on rails. Wheelchair Activities  Propulsion: No(pt to be ambulatory)         Assessment: Pt has made gains. Pt has not completely met goals at this time.  She is in need of PT at this level of care to continue improvement towards safe return to home       Short Term Goals:  Long term goals  Long term goal 1: Patient will be independent with bed mobility. Long term goal 2: Patient will be indep with bed and car transfers.   Long term goal 3: Patient will be SBA with 150ft of gait with rollator in busy area and indep for household distance up to 50 feet in familiar environment  Long term goal 4: SBA 4 stairs with rails             Electronically signed by Ritika Jordan PT on 7/1/2020 at 1:00 PM

## 2020-07-01 NOTE — PROGRESS NOTES
Subjective: The patient complains of severe  acute on chronic neck pain and acute upper extremity weakness left greater than right partially relieved by PT, OT, steroid taper and exacerbated by recent fall and progressive cervical spinal stenosis with myelopathy. I have been concerned about her urinary incontinence especially at night. I have been monitoring her steroid taper and hoping that her sugars come down with the steroid going down however they are still elevated and I have consulted endocrinology. Her blood pressure still quite high and I agree with prescription of Imdur. We will monitor to see if that helps. ROS x10: The patient also complains of severely impaired mobility and activities of daily living. Otherwise no new problems with vision, hearing, nose, mouth, throat, dermal, cardiovascular, GI, , pulmonary, musculoskeletal, psychiatric or neurological. See Rehab H&P on Rehab chart dated . Vital signs:  BP (!) 202/67   Pulse 74   Temp 97 °F (36.1 °C)   Resp 16   Ht 5' 3\" (1.6 m)   Wt 188 lb 7.9 oz (85.5 kg)   LMP  (LMP Unknown)   SpO2 98%   BMI 33.39 kg/m²   I/O:   PO/Intake:  fair PO intake, 4 carb ADA diet    Bowel/Bladder:   incontinent, opiate related constipation  General:  Patient is well developed, adequately nourished, non-obese and     well kempt. HEENT:    PERRLA, hearing intact to loud voice, external inspection of ear     and nose benign. Inspection of lips, tongue and gums benign  Musculoskeletal: No significant change in strength or tone. All joints stable. Inspection and palpation of digits and nails show no clubbing,       cyanosis or inflammatory conditions. Neuro/Psychiatric: Affect: flat but pleasant. Alert and oriented to person, place and     situation.   No significant change in deep tendon reflexes or     Sensation-bilateral upper extremity weakness left greater than right left lower extremity weakness is    also Minimal disc osteophyte complex without central canal or significant foraminal narrowing     Severe central canal stenosis at C5-6. Moderate to severe central canal stenosis at C4-5. No abnormal cord signal intensity. Mri Thoracic Spine  : 6/20/2020     Bones: The thoracic vertebrae are normally aligned with no evidence of fracture. There is normal marrow signal throughout the thoracic spine. Disc space: There is no focal disc herniation or evidence for spinal canal stenosis. Disc spaces are age-appropriate. Spinal cord: The thoracic cord is normal in configuration and signal intensity. Soft tissues: There is no paraspinal soft tissue mass or fluid collection. Negative MRI of the thoracic spine. No signs of cord compression. Mri Lumbar Spine   6/20/2020     Vertebrae: No acute fracture. Marrow signal changes are within normal limits. Conus: The conus medullaris ends at L1 level and is unremarkable. T12/L1: There is no evidence of disc bulging or disc herniation. No significant facet hypertrophy or thickening of ligamenta flava. There is no central spinal canal stenosis. There is no neural foraminal stenosis. L1/2:  There is no evidence of disc bulging or disc herniation. No significant facet hypertrophy or thickening of ligamenta flava. There is no central spinal canal stenosis. There is no neural foraminal stenosis. L2/3:  Minimal disc bulging with no central canal narrowing or significant foraminal stenosis     L3/4:  Broad-based disc bulging with minimal ventral ridging. Levoscoliosis with moderate to severe right foraminal stenosis. L4/5:  Broad-based disc bulging and ventral ridging. Levoscoliosis with facet hypertrophy and severe left foraminal narrowing and moderate to severe right foraminal narrowing  L5/S1: Disc bulging without central canal narrowing. Severe bilateral foraminal stenosis   Retroperitoneum: No abnormality of the visualized Aorta or retroperitoneum. Levorotoscoliosis with advanced multilevel degenerative changes. Severe foraminal narrowing bilaterally at L4-5. There may be mild transverse canal narrowing at L3-4 and L4-5 but there is no definite central canal stenosis       Ct Abdomen Pelvis  6/19/2020  BORDERLINE PELVIECTASIS IN LEFT KIDNEY. NO ACUTE PATHOLOGY IN THE ABDOMEN OR PELVIS. Xr Chest   6/19/2020   Osseous structures intact. Cardiopericardial silhouette normal. Pulmonary vasculature normal. Lungs clear. NO ACUTE CARDIOPULMONARY DISEASE. Mri Brain Wo 6/19/2020   NO EVIDENCE OF ACUTE CVA. GENERALIZED PARENCHYMAL VOLUME LOSS AND NONSPECIFIC WHITE MATTER CHANGES, MOST LIKELY SECONDARY TO CHRONIC SMALL VESSEL ISCHEMIC CHANGES. MUCOSAL THICKENING IN ETHMOID AND MAXILLARY SINUSES. A FEW SMALL NONSPECIFIC FOCI ON GRADIENT ECHO SEQUENCES WITHIN THE WHITE MATTER BILATERALLY. Us Carotid Artery   6/19/2020   50-69% NARROWING PREDICTED OF BOTH INTERNAL CAROTID ARTERIES, NOT SIGNIFICANTLY CHANGED FROM 11/14/2018. Previous extensive, complex labs, notes and diagnostics reviewed and analyzed. ALLERGIES:    Allergies as of 06/24/2020 - Review Complete 06/24/2020   Allergen Reaction Noted    Metoclopramide  11/29/2011    Pantoprazole Other (See Comments) 12/08/2015    Pcn [penicillins]  11/29/2011    Protonix [pantoprazole sodium]  11/29/2011    Tramadol  11/29/2011      (please also verify by checking MAR)     Yesterday I evaluated this patient for periodic reassessment of medical and functional status. The patient was discussed in detail at the treatment team meeting focusing on current medical issues, progress in therapies, social issues, psychological issues, barriers to progress and strategies to address these barriers, and discharge planning. See the hand written addendum to rehab progress note.   The patient continues to be high risk for future disability and their medical and rehabilitation prognosis continue to be good lowest effective dose of Norco titrating off if possible prior to discharge. 4. Skin healing and breakdown risk:  continue pressure relief program.  Daily skin exams and reports from nursing. 5. Severe fatigue due to nutritional and hydration deficiency: Add vitamin B12 vitamin D and CoQ10 continue to monitor I&Os, calorie counts prn, dietary consult prn. Transition to 3 carb per meal diet with an at bedtime snack add diabetic add and dietary Ed  6. Acute episodic insomnia with situational adjustment disorder:  prn Ambien, monitor for day time sedation. 7. Falls risk elevated:  patient to use call light to get nursing assistance to get up, bed and chair alarm. 8. Elevated DVT risk: progressive activities in PT, continue prophylaxis SALLIE hose, elevation and Coumadin. 9. Complex discharge planning: Discharge 7/8/2020 home with her son in [facility] PT OT RN aide and social work I will progress toward her final weekly team meeting  Monday to assess progress towards goals, discuss and address social, psychological and medical comorbidities and to address difficulties they may be having progressing in therapy. Patient and family education is in progress. The patient is to follow-up with their family physician after discharge. Complex Active General Medical Issues that complicate care Assess & Plan:    1. HTN (hypertension),  HLD (hyperlipidemia), CAD,   Chronic combined systolic and diastolic congestive heart failure,  History of ST elevation myocardial infarction (STEMI),   History of PTCA,  Valvular heart disease-vital signs every shift dose and titrate cardiac medication to include Norvasc, Lipitor, Coreg, Apresoline both intravenous and oral, Coumadin, Entresto consult hospitalist for backup medical consult Dr. Mitchell Hand with cardiology monitor for orthostasis and failure as we rehydrate her  2.    CKD (chronic kidney disease)-control blood sugars control blood pressure recheck BMP monitor UA and renal output, push clear liquids push oral fluids add IV fluids as needed  3. Osteoarthritis,  Lumbar spinal stenosis, DJD (degenerative joint disease), lumbar-add Lidoderm BenGay and heat lowest effective dose of opiates  4. SCC (squamous cell carcinoma), arm, Eczematous dermatitis-topical steroids add co-Q10 vitamin D  5. Vitamin D and B12 deficiency-high-dose vitamin D and B12  6. Spinal stenosis in cervical region with cervical myelopathy-inoperable due to moderate multiple medical comorbidities-PT and OT are her greatest hope for recovery her blood sugars are elevated wean steroids  7. Uncontrolled type 2 diabetes mellitus uncontrolled with hyperlipidemia and, Obesity (BMI 30-39. 9)-fingerstick blood sugars q. before meals and at bedtime dose and titrate insulin and carbohydrate insulin intake add diabetic add and dietary Ed continue to titrate carbohydrates and titrate insulin. 8. Severely [place] (hard of hearing),   Vestibular neuronitis of both ears  9. Nausea, anxiety and elevated blood sugars due to steroids for cervical myelopathy-steroid taper and titrate benzodiazepines if needed titrate Zofran and Antivert monitor stools for blood while patient is on Coumadin and steroids as she is high risk for bleed  10.  Neurogenic bowel and bladder-cath if no void check postvoid residuals teach Crede method recheck stat UA             Ramya Washington D.O., PM&R     Attending    [address] [facility]

## 2020-07-01 NOTE — PROGRESS NOTES
Hospitalist Progress Note      PCP: Ira Erazo MD    Date of Admission: 6/24/2020    Interval HPI:    Pt denies any complaint, denies any shortness of breath, chest pain, nausea, vomiting, fever, chills, constipation or diarrhea. Medications:  Reviewed    Infusion Medications    dextrose       Scheduled Medications    isosorbide mononitrate  30 mg Oral Daily    insulin glargine  20 Units Subcutaneous Nightly    insulin lispro  4 Units Subcutaneous TID WC    amLODIPine  10 mg Oral Daily    hydrALAZINE  50 mg Oral TID    Vitamin D  2,000 Units Oral Dinner    lidocaine  3 patch Transdermal Daily    atorvastatin  10 mg Oral Nightly    carvedilol  12.5 mg Oral BID WC    clopidogrel  75 mg Oral Daily    gabapentin  100 mg Oral Nightly    insulin lispro  0-12 Units Subcutaneous TID WC    insulin lispro  0-6 Units Subcutaneous Nightly    nitrofurantoin (macrocrystal-monohydrate)  100 mg Oral Nightly    predniSONE  5 mg Oral Daily    sacubitril-valsartan  1 tablet Oral BID    [START ON 6/19/2021] warfarin (COUMADIN) daily dosing (placeholder)   Other RX Placeholder     PRN Meds: hydrALAZINE, fleet, acetaminophen, analgesic ointment, metaxalone, magnesium hydroxide, ondansetron **OR** ondansetron, sodium chloride flush, dextrose, dextrose, glucagon (rDNA), glucose, hydrALAZINE, HYDROcodone 5 mg - acetaminophen, meclizine      Intake/Output Summary (Last 24 hours) at 7/1/2020 1147  Last data filed at 6/30/2020 2100  Gross per 24 hour   Intake 240 ml   Output --   Net 240 ml       Exam:    BP (!) 202/67   Pulse 74   Temp 97 °F (36.1 °C)   Resp 16   Ht 5' 3\" (1.6 m)   Wt 188 lb 7.9 oz (85.5 kg)   LMP  (LMP Unknown)   SpO2 98%   BMI 33.39 kg/m²     General appearance: No apparent distress, appears stated age and cooperative. HEENT: Pupils equal, round, and reactive to light. Conjunctivae/corneas clear. Neck: Supple, with full range of motion. No jugular venous distention.  Trachea based on clinical progression. I am managing a portion of pt care. Some medical issues are handled by other specialists. Additional work up and treatment should be done in out pt setting by pt PCP and other out pt providers. In addition to examining and evaluating pt, I spent additional time explaining care, normal and abnormal findings, and treatment plan. All of pt questions were answered. Counseling, diet and education were  provided. Case will be discussed with nursing staff when appropriate. Family will be updated if and when appropriate.       Diet: Dietary Nutrition Supplements: Snack (see comment)  DIET CARB CONTROL; Carb Control: 3 carb choices (45 gms)/meal; Low Sodium (2 GM)    Code Status: DNR-CCA    DVT prophylaxis:warfarin         Electronically signed by SYLVIE Barry CNP on 7/1/2020 at 11:47 AM

## 2020-07-01 NOTE — PROGRESS NOTES
WC    insulin lispro  0-6 Units Subcutaneous Nightly    nitrofurantoin (macrocrystal-monohydrate)  100 mg Oral Nightly    predniSONE  5 mg Oral Daily    sacubitril-valsartan  1 tablet Oral BID    [START ON 6/19/2021] warfarin (COUMADIN) daily dosing (placeholder)   Other RX Placeholder     Continuous Infusions:   dextrose         Objective:   Vitals: /64   Pulse 51   Temp 97 °F (36.1 °C)   Resp 16   Ht 5' 3\" (1.6 m)   Wt 188 lb 7.9 oz (85.5 kg)   LMP  (LMP Unknown)   SpO2 98%   BMI 33.39 kg/m²    Wt Readings from Last 3 Encounters:   06/30/20 188 lb 7.9 oz (85.5 kg)   06/19/20 184 lb (83.5 kg)   06/18/20 184 lb (83.5 kg)        General appearance: alert, appears stated age, cooperative and no distress  Skin: Skin color, texture, turgor normal. No rashes or lesions. Neck: no lymphadenopathy  Lungs: clear to auscultation bilaterally  Heart: regular rate and rhythm, S1, S2 normal, no murmur, click, rub or gallop  Abdomen: soft, non-tender. Bowel sounds normal. No masses,  no organomegaly.   Extremities: extremities normal, atraumatic, no cyanosis or edema    Patient Active Problem List:     HTN (hypertension)     Diabetes mellitus     SI (stress incontinence), female     Osteoarthritis     CKD (chronic kidney disease)     HLD (hyperlipidemia)     Vitamin D deficiency     Eczematous dermatitis     Chronic low back pain     Lumbar spinal stenosis     Hypercalcemia     Diastolic dysfunction     Valvular heart disease     Recurrent UTI (urinary tract infection)     Vitamin B12 deficiency     Chest pain     Nausea & vomiting     History of non-ST elevation myocardial infarction (NSTEMI)     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     History of ST elevation myocardial infarction (STEMI)     Late effects of CVA (cerebrovascular accident)     S/P PTCA (percutaneous transluminal coronary angioplasty)     Transient cerebral ischemia     Monitoring for long-term anticoagulant use     Chronic combined systolic and diastolic congestive heart failure (HCC)     Other transient cerebral ischemic attacks and related syndromes     Cerebrovascular accident (CVA) ([facility] [address]     Current use of long term anticoagulation     Syncope     Spinal stenosis in cervical region     Lumbar degenerative disc disease     Spinal stenosis, lumbar region, with neurogenic claudication     Abnormality of gait and mobility due to  NTSCI with Impaired Mobility and ADL's secondary to Cervical Myelopathy and Vestibular neuronitis with rehab admission 06/24/20. Generalized muscle ache     Generalized osteoarthrosis, involving multiple sites     Vestibular neuronitis of both ears     Dizziness     SCC (squamous cell carcinoma), arm     Type 2 diabetes mellitus (HCC)     Obesity (BMI 30-39. 9)     Que[place] (hard of hearing)     DJD (degenerative joint disease), lumbar     History of PTCA     Uncontrolled type 2 diabetes mellitus with hyperglycemia [facility])            Electronically signed by PEDRO LUIS Tyler on 7/1/2020 at 5:49 PM

## 2020-07-01 NOTE — PROGRESS NOTES
Clinical Pharmacy Note    Warfarin consult follow-up    Recent Labs     07/01/20  0534   INR 2.9     Recent Labs     06/29/20  0548   HGB 10.9*   HCT 33.9*            Significant drug:drug interactions:  Prednisone, APAP, plavix, norco    Date INR Warfarin Dose    06/19/20 1.8  6 mg    06/20/20 2.1  7 mg    06/21/20 2.3   7 mg    06/22/20 3.3  HOLD    06/23/20 3.6   HOLD      06/24/20 1.6  7 mg    06/25/20 1.4  8 mg    06/26/20 1.7  8 mg    06/27/20 2.0  7 mg    06/28/20 2.5  7 mg    06/29/20  2.6  6 mg   06/30/20 none 7 mg   07/01/20 2.9 6 mg                  Notes:  INR therapeutic at 2.9 today as labs now MWF. Will continue today with home dose of 6mg x today. INR Friday. Will continue to monitor. Thank you for consult.    Corinda Claude Ritchie [facility]  7/1/2020  1:50 PM

## 2020-07-01 NOTE — PROGRESS NOTES
[facility] Daily Progress Note  Name: Danisha Sneed  Age: 80 y.o. Gender: female  CodeStatus: DNR-CCA  Allergies: Metoclopramide  Pantoprazole  Pcn [Penicillins]  Protonix [Pantoprazole Sodium]  Tramadol    Chief Complaint:No chief complaint on file. Primary Care Provider: Landon Gavin MD  InpatientTreatment Team: Treatment Team: Attending Provider: Britt Elmore DO; Consulting Physician: Elayne Billy MD; Consulting Physician: Melissa Bingham MD; Consulting Physician: Elayne Watkins MD; Consulting Physician: Danyell Bustamante MD; Occupational Therapist Assistant: LOIS Conte; Registered Nurse: Rogelio Lei, SANJEEV; Consulting Physician: Stuart Martinez MD; Patient Care Tech: Yaw Altman; Registered Nurse: Larayne Halsted, RN  Admission Date: 6/24/2020      HPI Pt seen and examined on rehab for neuro follow up vertigo and lower extremity weakness. MRI of cervical spine showed severe central canal stenosis at C5-C6 and moderate to severe central canal stenosis at C4-C5.  MRI thoracic spine negative.  MRI lumbar spine showed advanced multilevel degenerative changes.  MRI findings of spine were discussed with patient's son and it was decided that we would proceed with conservative manage meant given patient's age and risk factors and surgical risk.  Patient was not given Decadron due to her underlying diabetes. Patient currently alert and oriented x3, no acute distress, hard of hearing.  Overall improved.  Dizziness resolved.  Patient frustrated that she is not able to do more in therapy.  No focal deficits. Vitals:    07/01/20 1405   BP: 123/64   Pulse: 51   Resp:    Temp:    SpO2:       Review of Systems   Constitutional: Negative for appetite change, chills, fatigue and fever. HENT: Positive for hearing loss ([place]). Negative for trouble swallowing. Eyes: Negative for visual disturbance. Respiratory: Negative for cough, chest tightness, shortness of breath and wheezing. Subcutaneous TID WC    insulin lispro  0-6 Units Subcutaneous Nightly    nitrofurantoin (macrocrystal-monohydrate)  100 mg Oral Nightly    predniSONE  5 mg Oral Daily    sacubitril-valsartan  1 tablet Oral BID    [START ON 6/19/2021] warfarin (COUMADIN) daily dosing (placeholder)   Other RX Placeholder     PRN Meds: hydrALAZINE, fleet, acetaminophen, analgesic ointment, metaxalone, magnesium hydroxide, ondansetron **OR** ondansetron, sodium chloride flush, dextrose, dextrose, glucagon (rDNA), glucose, hydrALAZINE, HYDROcodone 5 mg - acetaminophen, meclizine    Labs:   Recent Labs     06/29/20  0548   WBC 8.8   HGB 10.9*   HCT 33.9*        Recent Labs     06/29/20  0548      K 4.2      CO2 17*   BUN 45*   CREATININE 1.18*   CALCIUM 8.9     No results for input(s): AST, ALT, BILIDIR, BILITOT, ALKPHOS in the last 72 hours. Recent Labs     06/29/20  0751 07/01/20  0534   INR 2.6 2.9     No results for input(s): Mary Caylas in the last 72 hours. Urinalysis:   Lab Results   Component Value Date    NITRU POSITIVE 07/01/2020    WBCUA >100 07/01/2020    BACTERIA FEW 07/01/2020    RBCUA 3-5 07/01/2020    BLOODU Negative 07/01/2020    SPECGRAV 1.013 07/01/2020    GLUCOSEU Negative 07/01/2020       Radiology:   Most recent    EEG No procedure found. MRI of Brain No results found for this or any previous visit. Results for orders placed during the hospital encounter of 06/19/20   MRI BRAIN WO CONTRAST    Narrative MRI BRAIN WITHOUT CONTRAST:    CLINICAL HISTORY:  r/o CVA , dizziness, nausea, generalized weakness    COMPARISONS:  3/14/2019    TECHNIQUE: Multiplanar, multi-sequence MRI was performed on a 1.5Tesla closed magnet. FINDINGS:  There are no abnormal sites of restricted diffusion. The ventricles are normal in position. There is no evidence for mass effect. There is no shift of the midline structures.   There are multiple  extensive foci of increased signal on FLAIR   and T2, in the periventricular deep white matter and corona radiata, which may be secondary to small vessel ischemic changes, demyelination, or aging. There appear similar prior exam. There is moderate dilatation of the cerebral sulci and ventricles,   unchanged. Flow-voids in the major intracranial blood vessels are identified and are patent by spin-echo criteria. There are few small foci of decreased signal on gradient echo sequences within the left frontal and both parietal lobes. They may be   secondary to small remote hemorrhages. There is mucosal thickening within both maxillary sinuses and both ethmoid sinuses. Mastoid air cells are clear. The seventh and eighth nerve complexes and optic nerves are symmetrical.  No evidence for mass in the cerebellopontine angle. There is   generalized thinning of the corpus callosum. The cerebellar tonsils are not ectopic. The pituitary gland is unremarkable. Optic nerves are symmetrical. The calvarium and dura are unremarkable. There is hypertrophy of nasal turbinates, consistent with   rhinitis. Impression NO EVIDENCE OF ACUTE CVA. GENERALIZED PARENCHYMAL VOLUME LOSS AND NONSPECIFIC WHITE MATTER CHANGES, MOST LIKELY SECONDARY TO CHRONIC SMALL VESSEL ISCHEMIC CHANGES. MUCOSAL THICKENING IN ETHMOID AND MAXILLARY SINUSES. A FEW SMALL NONSPECIFIC FOCI ON GRADIENT ECHO SEQUENCES WITHIN THE WHITE MATTER BILATERALLY. MRA of the Head and Neck: No results found for this or any previous visit. No results found for this or any previous visit. Results for orders placed during the hospital encounter of 03/14/19   MRA head w/o contrast    Narrative EXAM:     MRI of the brain without contrast    MRA of the brain without contrast    History: TIA. Dysarthria and left-sided weakness.     Technique: Multiplanar multisequence MRI of the brain was performed without contrast. Axial 3-D time-of-flight images of the brain were obtained without contrast. 3-D volume rendered images were performed for evaluation of the cerebral vasculature. Comparison: MRI of the brain from November 14, 2018 and CT brain from March 14, 2019    Findings:    MRI brain:    Confluent periventricular hyperintense T2/FLAIR signal as well as multiple small foci of hyperintensity/FLAIR signal within the bilateral supratentorial white matter are nonspecific, as is patchy hyperintense T2/FLAIR signal within the jana. Findings are   most compatible with chronic small vessel ischemic changes in a patient of this age. These findings are not significantly changed from prior MRI of the brain. Prominence of the sulci and ventricles compatible with moderate generalized parenchymal volume loss. No edema, hemorrhage, mass, mass effect, midline shift, or abnormal extra-axial fluid collection. Midline structures are within normal limits. The posterior fossa is within normal limits. There is no diffusion restriction. No susceptibility artifact is identified on the gradient echo sequence. Cranial nerves 7/8 complexes appear grossly unremarkable. The visualized paranasal sinuses and bilateral mastoid air cells are clear. MRA brain:    The bilateral distal cervical internal carotid arteries through the skull base are patent. The bilateral middle cerebral arteries through the trifurcation and opercular branches are patent. The vertebrobasilar system including the superior cerebellar and posterior cerebral arteries are patent. Note is made of a fetal origin of the left posterior cerebral artery. The right posterior communicating artery is not identified. The bilateral anterior cerebral arteries and anterior communicating artery are patent. No aneurysm or high-grade stenosis of the visualized cerebral vasculature. Impression MRI brain:    No acute ischemia.     Generalized parenchymal volume loss and nonspecific white matter findings most compatible with chronic small vessel ischemic changes in a patient of this age. MRA brain:    No aneurysm or high-grade stenosis of the visualized cerebral vasculature. CT of the Head:   Results for orders placed during the hospital encounter of 06/19/20   CT Head WO Contrast    Narrative CT HEAD WO CONTRAST : 6/19/2020    CLINICAL HISTORY:  weakness, nausea with movement . COMPARISON: 6/18/2020. TECHNIQUE: Spiral unenhanced images were obtained of the head, with routine multiplanar reconstructions performed. Intravenous contrast is noted from a CT abdomen and pelvis from earlier 6/19/2020. All CT scans at this facility use dose modulation, iterative reconstruction, and/or weight based dosing when appropriate to reduce radiation dose to as low as reasonably achievable. FINDINGS:    There is no intracranial hemorrhage, mass effect, midline shift, extra-axial collection, evidence of hydrocephalus, recent ischemic infarct, or skull fracture identified. Moderate age-related atrophic changes have not significantly changed from the yesterday's study. Impression NO ACUTE INTRACRANIAL PROCESS OR SIGNIFICANT CHANGE FROM YESTERDAY IDENTIFIED. No results found for this or any previous visit. No results found for this or any previous visit. Carotid duplex: No results found for this or any previous visit. No results found for this or any previous visit. Results for orders placed during the hospital encounter of 06/19/20   US CAROTID ARTERY BILATERAL    Narrative US CAROTID ARTERY BILATERAL: 6/19/2020    CLINICAL HISTORY:  dizziness . COMPARISON: 11/14/2018. Grayscale, color and waveform Doppler analysis of the cervical carotid and vertebral arteries was performed.      Validated velocity measurements with angiographic measurements, velocity criteria are extrapolated from diameter data as defined by the Society of Radiologist in [address] [place] [address] Drive Radiology 2003; 331;[id number]. FINDINGS:    There is moderate somewhat irregular atherosclerotic plaquing of the carotid bulbs, similarly to the prior study. There is antegrade flow in both vertebral arteries. ARTERIAL BLOOD FLOW VELOCITY    RIGHT Peak Systolic/End Diastolic                                                   Prox CCA:  73.3/14.1 cm/s               Mid CCA:  87.5/13.5 cm/s                Dist CCA:  85.6/14.8 cm/s                Prox ICA:  103/25.6 cm/s                 Mid ICA:  183/32.9 cm/s              Dist ICA:  99.6/25.1 cm/s               Prox ECA:  147/20.4 cm/s               Prox VERT:  64.4/19.2 cm/s                  ICA/CCA    2.1, which indicates 50-69% narrowing by velocity criteria. LEFT PS    Prox CCA:  114/28.0 cm/s  Mid CCA:  96.9/26.7 cm/s  Dist CCA:  98.8/23.6 cm/s  Prox ICA:  112/32.9 cm/s  Mid ICA:  201/47.2 cm/s  Dist ICA:  144/30.4 cm/s  Prox ECA :  114/12.4 cm/s  Prox VERT:  93.3/20.4 cm/s    ICA/CCA     2.1, which indicates 50-69% narrowing by velocity criteria. Impression 50-69% NARROWING PREDICTED OF BOTH INTERNAL CAROTID ARTERIES, NOT SIGNIFICANTLY CHANGED FROM 11/14/2018. Echo No results found for this or any previous visit.           Assessment/Plan:    Vestibular neuronitis, resolved  MRI brain no acute findings  Carotid duplex with 50 to 69% narrowing bilateral, continue antiplatelet and statin therapy  Lower extremity weakness  MRI cervical spine shows severe central canal stenosis at C5-C6 and moderate to severe central canal stenosis at C4-C5  MRI thoracic spine negative  MRI lumbar spine shows advanced multilevel degenerative changes with severe foraminal narrowing bilaterally at L4-L5 with possible mild transverse canal narrowing at L3-4 and L4-5  MRI findings of spine were discussed with family and it was decided that we would proceed with conservative management given patient's age and risk factors and surgical risk.  Patient is not a candidate for Decadron given her underlying diabetes.  Pain management following. Hiltonpat Sanches started on steroid taper. Patient does complain of right arm quivering though this appears to be coming from the cervical spine.  We did discuss further that the only option to treat this would be surgical though we will wait for physical therapy for now and will consider neurosurgical evaluation if he worsens and does not have any improvement in her walking. Patient with history of MCA CVA currently on Coumadin and Plavix  Continue PT/OT/ST  Patient overall doing much better. Vertigo resolved. Lower extremity weakness improving    I independently performed an evaluation on this patient. I have reviewed the above documentation completed by the Nurse Practitioner. Please see my additional contributions to the HPI, physical exam, assessment/medical decision making. Elijah Melo MD, [address] American Board of Psychiatry & Neurology  Board Certified in Vascular Neurology  Board Certified in Neuromuscular Medicine  Certified in Neurorehabilitation       Collaborating physicians: Dr Jaylin Melo    Electronically signed by SYLVIE Hong CNP on 7/1/2020 at 3:10 PM

## 2020-07-01 NOTE — PROGRESS NOTES
Physical Therapy Rehab Treatment Note  [facility]  [facility]       NAME: Baljit Felix  : 6/10/1927 (80 y.o.)  MRN: [id number]  CODE STATUS: DNR-CCA    Date of Service: 2020  Chart Reviewed: Yes  Family / Caregiver Present: No    Restrictions:  Restrictions/Precautions: Fall Risk    SUBJECTIVE: Subjective: \"I'm stiff\"  Pain Screening  Patient Currently in Pain: No     Post Treatment Pain Screenin/10     OBJECTIVE:              Bed mobility  Rolling to Left: Modified independent  Rolling to Right: Modified independent  Supine to Sit: Supervision  Sit to Supine: Supervision    Transfers  Sit to Stand: Supervision  Stand to sit: Supervision  Bed to Chair: Supervision  Car Transfer: Stand by assistance(VCs for safety and technique. Increased effort to stand from low car seat. Decreased control with stand to sit to low seat. instructed pt to reach back to control.  )    Ambulation  Ambulation?: Yes  Ambulation 1  Surface: level tile;carpet  Device: Rollator  Assistance: Stand by assistance  Quality of Gait: Flexed trunk and hips  Distance: [id number]    Stairs/Curb  Stairs?: Yes  Stairs  # Steps : 4  Stairs Height: 6\"  Rails: Bilateral  Assistance: Stand by assistance  Comment: Non-reciprocal pattern. Heavy use of UEs on rails. Exercises  Straight Leg Raise: x 10  Heelslides: x10  Hip Flexion: x20  Hip Abduction: hooklying YTB x10; hip add with ball x10  Knee Long Arc Quad: x20  Knee Short Arc Quad: x20  Core Strengthening: bridges x10  Other exercises  Other exercises 1: Clamshells x 10  Other exercises 3: seates shld shrugs, retro rolls, scap retract x10 ea to improve postural strength  Other exercises 4: seated and supine trunk rotational stretches  Other exercises 5: sit to stand x9 from green mat without UE support     ASSESSMENT/COMMENTS:  Assessment: Pt progressing towards goals. Requires occasional Vcs to improve transfers.     PLAN OF CARE/Safety:   Plan Comment: Cont PT POC    Therapy Time:   Individual   Time In 1030   Time Out 1130   Minutes 60     Minutes:        Transfer/Bed mobility training:10      Gait trainin      Neuro re education:0     Therapeutic ex:30     Geradine Essex, PTA, 20 at 12:14 PM

## 2020-07-01 NOTE — PROGRESS NOTES
Occupational Therapy  Facility/Department: [facility]  Daily Treatment Note  NAME: Rebekah Mazariegos  : 6/10/1927  MRN: [id number]    Date of Service: 2020    Discharge Recommendations:  Continue to assess pending progress       Assessment      Activity Tolerance  Activity Tolerance: Patient Tolerated treatment well  Safety Devices  Safety Devices in place: Yes  Type of devices: All fall risk precautions in place         Patient Diagnosis(es): There were no encounter diagnoses. 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 has a past surgical history that includes Appendectomy; Rectocele repair; Cystocele repair; Ankle surgery; Breast biopsy; Cataract removal (); eye surgery; Tonsillectomy; Hysterectomy; and Coronary angioplasty with stent (2019).     Restrictions  Restrictions/Precautions  Restrictions/Precautions: Fall Risk  Position Activity Restriction  Other position/activity restrictions: [facility]     Subjective   General  Chart Reviewed: Yes  Patient assessed for rehabilitation services?: Yes  Response to previous treatment: Patient with no complaints from previous session  Family / Caregiver Present: No  Referring Practitioner: Dr Kenneth Trent  Diagnosis: NTSCI with imp mob and ADLs 2° cervical myelopathy and vestibular neuronitis    Subjective  Subjective: I'll do what I can. Pain Assessment  Pain Level: 0  Pre Treatment Pain Screening  Pain at present: 0     Orientation  Orientation  Overall Orientation Status: Within Functional Limits     Objective      ADL    Grooming: Modified independent     UE Bathing: Setup    LE Bathing: Supervision    UE Dressing: Setup    LE Dressing: Supervision    Toileting: Supervision    Toilet Transfers  Toilet - Technique: Ambulating  Equipment Used: Grab bars  Toilet Transfer: Supervision  Toilet Transfers Comments: rollator    Shower Transfers  Shower - Transfer From: Other(rollator)  Shower - Transfer Type: To and From  Shower - Transfer To: Shower seat with back  Shower - Technique: Ambulating  Shower Transfers: Stand by assistance  Shower Transfers Comments: grab bars    Patient engaged in therapeutic activity to increase scanning, cognition and handwriting for ADL's and IADL's. Patient provided with worksheet with written questions and bird book to locate correct answers to questions. Patient required A to locate 25% correct pages in index. Patient required 0% A to turn and locate correct pages. Patient able to locate and identify 75% correct answers to questions. Patient with 100% legibility with R FM handwriting.         Plan   Plan  Times per week: 5-7 times per week  Plan weeks: 2 weeks  Current Treatment Recommendations: Strengthening, Endurance Training, Neuromuscular Re-education, Self-Care / ADL, Balance Training, Functional Mobility Training, Safety Education & Training, Patient/Caregiver Education & Training    Plan Comment: Continue per OT POC for planned d/c on 20         Goals  Patient Goals   Patient goals : \"to at least do things in my home\"       Therapy Time   Individual Concurrent Group Co-treatment   Time In 930         Time Out 1030         Minutes 60             ADL trainin minutes  Cognitive Retraining: 10 minutes Electronically signed by LOIS Baron on 7/1/20 at 11:21 AM EDT      LOIS Baron

## 2020-07-01 NOTE — CONSULTS
[facility] ATTENDING INPATIENT  CONSULTATION NOTE                                                                                                                                                                                                Reason for Consult  UTI, voiding dysfunction    History of Present Illness  80year-old with history of multiple issues including CVA patient is very hard of hearing various other issues question of UTI question of bladder dysfunction      Urologic Review of Systems/Symptoms  Other Urologic: Patient is very poor historian    Review of Systems  Head and neck: No issues/reviewed  Cardiac: No recent issues/reviewed  Pulmonary: No issues/reviewed  Gastrointestinal: No issues/reviewed  Neurologic: No recent issues/reviewed  Extremities: No issues/reviewed  Lymphatics: No lymphadenopathy no change  Genitourinary: See above  Skin: No issues/reviewed  Hospitalization: Many admissions in the past  All 14 categories of Review of Systems otherwise reviewed no other findings reported.     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 SCC (squamous cell carcinoma), arm 2013    right upper arm, 2015 right forarm    SI (stress incontinence), female 5/29/2012    Type II or unspecified type diabetes mellitus without mention of complication, not stated as uncontrolled     Whooping cough      Past Surgical History:   Procedure Laterality Date    ANKLE SURGERY      broken left ankle.  APPENDECTOMY      BREAST BIOPSY      x2 left breast    CATARACT REMOVAL  2004    bilateral    CORONARY ANGIOPLASTY WITH STENT PLACEMENT  01/2019    CYSTOCELE REPAIR      EYE SURGERY      bilateral cataract    HYSTERECTOMY      complete at age 39    [address]      as a child     Social History     Socioeconomic History    Marital status:      Spouse name: None    Number of children: None    Years of education: None    Highest education level: None   Occupational History    None   Social Needs    Financial resource strain: Not very hard    Food insecurity     Worry: Never true     Inability: Never true    Transportation needs     Medical: No     Non-medical: No   Tobacco Use    Smoking status: Never Smoker    Smokeless tobacco: Never Used   Substance and Sexual Activity    Alcohol use: No     Alcohol/week: 0.0 standard drinks    Drug use: No    Sexual activity: None   Lifestyle    Physical activity     Days per week: None     Minutes per session: None    Stress: None   Relationships    Social connections     Talks on phone: More than three times a week     Gets together: More than three times a week     Attends [employer] service: 1 to 4 times per year     Active member of club or organization: No     Attends meetings of clubs or organizations: Never     Relationship status:      Intimate partner violence     Fear of current or ex partner: No     Emotionally abused: No     Physically abused: No     Forced sexual activity: No   Other Topics Concern    None   Social History Narrative         Lives With: Son    [facility] of [id number] [facility] [address] [facility] [address] Two level, Able to Live on Main level with bedroom/bathroom, Performs ADL's on one level    Home Access: Stairs to enter with rails    Entrance Stairs - Number of Steps: Threshold    Bathroom Shower/Tub: Tub/Shower unit    Bathroom Toilet: Handicap height    Bathroom Equipment: Grab bars in shower, Grab bars around toilet, Hand-held shower, Shower chair    Bathroom Accessibility: Accessible    Home Equipment: Rolling walker, 4 wheeled walker (Usually uses rollator)    ADL Assistance: [address]: Needs assistance (Son manages housework)    Homemaking Responsibilities: No    Ambulation Assistance: Independent (Rollator)    Transfer Assistance: Independent    Active : No    Patient's  Info:  Sons          Family History   Problem Relation Age of Onset    Diabetes Mother     Heart Disease Father      Current Facility-Administered Medications   Medication Dose Route Frequency Provider Last Rate Last Dose    isosorbide mononitrate (IMDUR) extended release tablet 30 mg  30 mg Oral Daily Gualberto Patterson   30 mg at 07/01/20 3790    insulin glargine (LANTUS) injection vial 20 Units  20 Units Subcutaneous Nightly Christiano Mo MD   20 Units at 06/30/20 2159    insulin lispro (HUMALOG) injection vial 4 Units  4 Units Subcutaneous TID WC Christiano Mo MD   4 Units at 07/01/20 0921    amLODIPine (NORVASC) tablet 10 mg  10 mg Oral Daily Graciela Tapia MD   10 mg at 07/01/20 0918    hydrALAZINE (APRESOLINE) tablet 50 mg  50 mg Oral TID Graciela Pereira MD   50 mg at 07/01/20 2134    hydrALAZINE (APRESOLINE) injection 10 mg  10 mg Intravenous Q4H PRN Graciela Pereira MD        Vitamin D (CHOLECALCIFEROL) tablet 2,000 Units  2,000 Units Oral Dinner Catrachita Scullin, DO   2,000 Units at 06/30/20 1756    lidocaine 4 % external patch 3 patch  3 patch Transdermal Daily Catrachita Scullin, DO   3 patch at 07/01/20 0919    fleet rectal enema 1 enema  1 enema Rectal Daily PRN Catrachita Scullin, DO        acetaminophen (TYLENOL) tablet 500 mg  500 mg Oral Q8H PRN Honorio Lunger, APRN - CNP        analgesic ointment ointment   Topical PRN [place] Kandi, APRN - CNP        metaxalone (SKELAXIN) tablet 400 mg  400 mg Oral TID PRN Starr Reyes, APRN - CNP        magnesium hydroxide (MILK OF MAGNESIA) 400 MG/5ML suspension 30 mL  30 mL Oral Daily PRN Low [place], DO        ondansetron (ZOFRAN-ODT) disintegrating tablet 4 mg  4 mg Oral Q8H PRN Low [place], DO        Or    ondansetron (ZOFRAN) injection 4 mg  4 mg Intravenous Q6H PRN Low [place], DO        sodium chloride flush 0.9 % injection 10 mL  10 mL Intravenous PRN Low [place], DO        atorvastatin (LIPITOR) tablet 10 mg  10 mg Oral Nightly Paola Scott, DO   10 mg at 06/30/20 2038    carvedilol (COREG) tablet 12.5 mg  12.5 mg Oral BID  Paola Scott, DO   12.5 mg at 07/01/20 1270    clopidogrel (PLAVIX) tablet 75 mg  75 mg Oral Daily Paola Scott, DO   75 mg at 07/01/20 0918    dextrose 5 % solution  100 mL/hr Intravenous PRN Low [place], DO        dextrose 50 % IV solution  12.5 g Intravenous PRN Low [place], DO        gabapentin (NEURONTIN) capsule 100 mg  100 mg Oral Nightly Paola Scott, DO   100 mg at 06/30/20 2038    glucagon (rDNA) injection 1 mg  1 mg Intramuscular PRN Low [place], DO        glucose (GLUTOSE) 40 % oral gel 15 g  15 g Oral PRN Low [place], DO        hydrALAZINE (APRESOLINE) injection 10 mg  10 mg Intravenous Q4H PRN Low [place], DO   10 mg at 06/27/20 1655    HYDROcodone-acetaminophen (NORCO) 5-325 MG per tablet 0.5 tablet  0.5 tablet Oral Q4H PRN Low [place], DO        insulin lispro (HUMALOG) injection vial 0-12 Units  0-12 Units Subcutaneous TID  Low [place], DO   2 Units at 07/01/20 0920    insulin lispro (HUMALOG) injection vial 0-6 Units  0-6 Units Subcutaneous Nightly Low [place], DO   4 Units at 06/30/20 2201    meclizine (ANTIVERT) tablet 12.5 mg  12.5 mg Oral TID PRN Low Ravi DO        nitrofurantoin (macrocrystal-monohydrate) (MACROBID) capsule 100 mg  100 mg Oral Nightly Paola Chavez DO   100 mg at 06/30/20 2038    predniSONE (DELTASONE) tablet 5 mg  5 mg Oral Daily María CanoDO maria l   5 mg at 07/01/20 5936    sacubitril-valsartan (ENTRESTO) 24-26 MG per tablet 1 tablet  1 tablet Oral BID María GomezDO   1 tablet at 07/01/20 0918    [START ON 6/19/2021] warfarin (COUMADIN) daily dosing (placeholder)   Other RX Placeholder María Gomez DO         Metoclopramide; Pantoprazole; Pcn [penicillins]; Protonix [pantoprazole sodium]; and Tramadol  All reviewed and verified by Dr Georgie Garcia on today's visit    No results found for: PSA, PSADIA  [unfilled]    Physical Exam  Vitals:    06/30/20 1335 06/30/20 1754 06/30/20 2034 07/01/20 0618   BP: 125/60 (!) 153/69 (!) 158/66 (!) 202/67   Pulse: 54 56 55 74   Resp:   16 16   Temp:   97 °F (36.1 °C) 97 °F (36.1 °C)   TempSrc:       SpO2:   96% 98%   Weight:       Height:         Constitutional: Patient very hard of hearing. Patient resting comfortably in seat  Not responsive due to not being able to hear  Urine culture to be sent urine is nitrite positive. Assessment    Bladder dysfunction   UTI  Plan  Await culture results  Greater 50% of 50 minutes spent evaluating patient face to face. Chula Sosa MD FACS    Please note this report has been partially produced using speech recognition software  And may cause contain errors related to that system including grammar, punctuation and spelling as well as words and phrases that may seem inappropriate. If there are questions or concerns please feel free to contact me to clarify.

## 2020-07-02 LAB
ANION GAP SERPL CALCULATED.3IONS-SCNC: 12 MEQ/L (ref 9–15)
BUN BLDV-MCNC: 49 MG/DL (ref 8–23)
CALCIUM SERPL-MCNC: 9.1 MG/DL (ref 8.5–9.9)
CHLORIDE BLD-SCNC: 105 MEQ/L (ref 95–107)
CO2: 19 MEQ/L (ref 20–31)
CREAT SERPL-MCNC: 1.32 MG/DL (ref 0.5–0.9)
GFR AFRICAN AMERICAN: 45.4
GFR NON-AFRICAN AMERICAN: 37.6
GLUCOSE BLD-MCNC: 110 MG/DL (ref 70–99)
GLUCOSE BLD-MCNC: 113 MG/DL (ref 60–115)
GLUCOSE BLD-MCNC: 131 MG/DL (ref 60–115)
GLUCOSE BLD-MCNC: 270 MG/DL (ref 60–115)
GLUCOSE BLD-MCNC: 297 MG/DL (ref 60–115)
PERFORMED ON: ABNORMAL
PERFORMED ON: NORMAL
POTASSIUM SERPL-SCNC: 4 MEQ/L (ref 3.4–4.9)
SODIUM BLD-SCNC: 136 MEQ/L (ref 135–144)

## 2020-07-02 PROCEDURE — 36415 COLL VENOUS BLD VENIPUNCTURE: CPT

## 2020-07-02 PROCEDURE — 6370000000 HC RX 637 (ALT 250 FOR IP): Performed by: PHYSICIAN ASSISTANT

## 2020-07-02 PROCEDURE — 97535 SELF CARE MNGMENT TRAINING: CPT

## 2020-07-02 PROCEDURE — 99231 SBSQ HOSP IP/OBS SF/LOW 25: CPT | Performed by: PHYSICIAN ASSISTANT

## 2020-07-02 PROCEDURE — 1180000000 HC REHAB R&B

## 2020-07-02 PROCEDURE — 6370000000 HC RX 637 (ALT 250 FOR IP): Performed by: INTERNAL MEDICINE

## 2020-07-02 PROCEDURE — 80048 BASIC METABOLIC PNL TOTAL CA: CPT

## 2020-07-02 PROCEDURE — 97530 THERAPEUTIC ACTIVITIES: CPT

## 2020-07-02 PROCEDURE — 97110 THERAPEUTIC EXERCISES: CPT

## 2020-07-02 PROCEDURE — 6370000000 HC RX 637 (ALT 250 FOR IP): Performed by: PHYSICAL MEDICINE & REHABILITATION

## 2020-07-02 PROCEDURE — G0108 DIAB MANAGE TRN  PER INDIV: HCPCS

## 2020-07-02 PROCEDURE — 97116 GAIT TRAINING THERAPY: CPT

## 2020-07-02 PROCEDURE — 99232 SBSQ HOSP IP/OBS MODERATE 35: CPT | Performed by: PHYSICAL MEDICINE & REHABILITATION

## 2020-07-02 RX ADMIN — HYDRALAZINE HYDROCHLORIDE 50 MG: 50 TABLET, FILM COATED ORAL at 13:53

## 2020-07-02 RX ADMIN — CARVEDILOL 12.5 MG: 12.5 TABLET, FILM COATED ORAL at 08:10

## 2020-07-02 RX ADMIN — HYDRALAZINE HYDROCHLORIDE 50 MG: 50 TABLET, FILM COATED ORAL at 08:10

## 2020-07-02 RX ADMIN — NITROFURANTOIN (MONOHYDRATE/MACROCRYSTALS) 100 MG: 75; 25 CAPSULE ORAL at 20:36

## 2020-07-02 RX ADMIN — GABAPENTIN 100 MG: 100 CAPSULE ORAL at 20:36

## 2020-07-02 RX ADMIN — AMLODIPINE BESYLATE 10 MG: 10 TABLET ORAL at 08:10

## 2020-07-02 RX ADMIN — WARFARIN SODIUM 6 MG: 2 TABLET ORAL at 16:57

## 2020-07-02 RX ADMIN — PREDNISONE 5 MG: 5 TABLET ORAL at 08:10

## 2020-07-02 RX ADMIN — ATORVASTATIN CALCIUM 10 MG: 10 TABLET, FILM COATED ORAL at 20:36

## 2020-07-02 RX ADMIN — ISOSORBIDE MONONITRATE 30 MG: 30 TABLET, EXTENDED RELEASE ORAL at 08:10

## 2020-07-02 RX ADMIN — HYDRALAZINE HYDROCHLORIDE 50 MG: 50 TABLET, FILM COATED ORAL at 20:36

## 2020-07-02 RX ADMIN — VITAMIN D, TAB 1000IU (100/BT) 2000 UNITS: 25 TAB at 16:57

## 2020-07-02 RX ADMIN — SACUBITRIL AND VALSARTAN 1 TABLET: 24; 26 TABLET, FILM COATED ORAL at 20:36

## 2020-07-02 RX ADMIN — CLOPIDOGREL BISULFATE 75 MG: 75 TABLET ORAL at 08:10

## 2020-07-02 RX ADMIN — SACUBITRIL AND VALSARTAN 1 TABLET: 24; 26 TABLET, FILM COATED ORAL at 08:10

## 2020-07-02 RX ADMIN — HYDROCODONE BITARTRATE AND ACETAMINOPHEN 1 TABLET: 5; 325 TABLET ORAL at 16:57

## 2020-07-02 RX ADMIN — INSULIN GLARGINE 20 UNITS: 100 INJECTION, SOLUTION SUBCUTANEOUS at 21:41

## 2020-07-02 RX ADMIN — CARVEDILOL 12.5 MG: 12.5 TABLET, FILM COATED ORAL at 16:57

## 2020-07-02 ASSESSMENT — PAIN SCALES - GENERAL
PAINLEVEL_OUTOF10: 0
PAINLEVEL_OUTOF10: 7
PAINLEVEL_OUTOF10: 0

## 2020-07-02 NOTE — PROGRESS NOTES
Assumed care of pt at [address] [facility]. Pt was in extreme pain to back of head and down right side of body since 1715 on dayshift. Pt's son was here and stated that the pain was just like the pain she had when she was admitted to hospital. Pt was moaning and groining and crying out in pain. Son stated that he \" thought that maybe pt was sitting in bed out of alignment and looked crooked when he came in to visit pt[facility] pt half of a 5 mg/325 mg norco as ordered prn at 1800 and put a perfect serve out to Dr. Patrick Presley. Dr Patrick Presley responded and gave order that pt could have a full norco 5mg/325mg every 4 hours prn pain. Pt ended up having pain relieved by the half dose that was given at 1800 and did no request anymore pain meds and did not have anymore pain. Resting quietly tonight. . Pt used bed pan and urinated 200 cc yellow urine. Pt was bladder scanned for 150 cc urine. One touch at hs was 275. Pt received 3 units of humalog subcut as ordered per sliding scale and received 20 units of lantus subcut. Pt had an hs snack. Pt has been resting and denies any pain so far tonight. Can be incontinent of urine in depends. Pt sleeping now. Son did call to check on pt and was happy that she was sleeping. Call light in reach and bed alarm on. Electronically signed by Petar Godwin.  Ofelia Thornton on 7/2/2020 at 1:23 AM

## 2020-07-02 NOTE — PROGRESS NOTES
Clinical Pharmacy Note    Warfarin consult follow-up    Recent Labs     07/01/20  0534   INR 2.9     No results for input(s): HGB, HCT, PLT in the last 72 hours. Significant drug:drug interactions:  Prednisone, acetaminophen, clopidogrel, hydrocodone-acetaminophen     Notes:  Date INR Warfarin Dose    06/19/20 1.8  6 mg    06/20/20 2.1  7 mg    06/21/20 2.3   7 mg    06/22/20 3.3  HOLD    06/23/20 3.6   HOLD      06/24/20 1.6  7 mg    06/25/20 1.4  8 mg    06/26/20 1.7  8 mg    06/27/20 2.0  7 mg    06/28/20 2.5  7 mg    06/29/20  2.6  6 mg   06/30/20 none 7 mg   07/01/20 2.9 6 mg   07/02/20  ---  6 mg                INR not drawn today, as ordered for Monday, Wednesday, and Friday only. INR of 2.9 was therapeutic yesterday - as such, will continue with patient's home dosage of warfarin 6mg today only. Normal maintenance dosage is as follows:7 mg (5 mg x 1 and 1 mg x 2) every Sun, Tue, Sat; 6 mg (5 mg x 1 and 1 mg x 1) all other days 45mg TWD. INR goal of 2-3 for history of CVA/valvular heart disease. Will continue to follow.      Jose R Hogan PharmD   7/2/2020 2:48 PM

## 2020-07-02 NOTE — PROGRESS NOTES
myelopathy and vestibular neuronitis    Subjective  Subjective: They had to put me in bed yesterday because I hurt so bad. Pain Assessment  Pain Level: 0  Pre Treatment Pain Screening  Pain at present: 0     Orientation  Orientation  Overall Orientation Status: Within Functional Limits     Objective      Coordination  Fine Motor: Patient engaged in B FM coordination/strengthening and cognition to increase I with fasteners and small objects for ADL's and IADL's. Patient completed grooved pegboard activity. Patient able to place 25 shaped dowels into matching grooved designs at various angles with MIN R FM difficulty and MOD L FM difficulty. Patient with MAX L FM difficulty turning dowels into correct angles for placement. Patient engaged in therapeutic activity to increase B FM coordination, sequencing and cognition for ADL's and IADL's. Patient provided with slide calendar and slide calendar pieces. Patient required MIN verbal cues to identify the start day of the July calendar. Patient able to  slide calendar pieces with 0 FM difficulty. Patient able to turn pieces into correct position for placement with MIN in hand manipulation. Patient able to correctly identify which pieces were next with 0 difficulty with numerical sequence. Patient able to slide pieces into slide calendar with 0 difficulty.            Plan   Plan  Times per week: 5-7 times per week  Plan weeks: 2 weeks  Current Treatment Recommendations: Strengthening, Endurance Training, Neuromuscular Re-education, Self-Care / ADL, Balance Training, Functional Mobility Training, Safety Education & Training, Patient/Caregiver Education & Training    Plan Comment: Continue per OT POC for planned d/c on 7-8-20         Goals  Patient Goals   Patient goals : \"to at least do things in my home\"       Therapy Time   Individual Concurrent Group Co-treatment   Time In 1130         Time Out 1200         Minutes 30             Therapeutic

## 2020-07-02 NOTE — PROGRESS NOTES
is normal at rest.     Heart:   S1 = S2, RRR. No loud murmurs. Abdomen:  Soft, non-tender, no enlargement of liver or spleen. Extremities:  No significant lower extremity edema or tenderness. Skin:   Intact to general survey, no visualized or palpated problems. Rehabilitation:  Physical therapy: FIMS:  Bed Mobility: Scooting: Supervision    Transfers: Sit to Stand: Supervision  Stand to sit: Stand by assistance(VCs to reach back to control sit to stand.  )  Bed to Chair: Supervision, Ambulation 1  Surface: level tile, carpet  Device: Rollator  Assistance: Stand by assistance  Quality of Gait: Flexed trunk and hips  Gait Deviations: Decreased step length, Decreased step height  Distance: 150ft  Comments: VCs for posture, Stairs  # Steps : 4  Stairs Height: 6\"  Rails: Bilateral  Curbs: 6\"  Device: Rolling walker(Grab bar to ascend(Car) descends using [employer])  Assistance: Stand by assistance  Comment: Non-reciprocal pattern. Heavy use of UEs on rails. FIMS:  ,  , Assessment: Pt progressing towards goals. Requires occasional Vcs to improve transfers. Occupational therapy: FIMS:   ,  , Assessment: Patient is a 80 year female with an new onset of coordination problems, dizziness and weakness.  Patient presents with the above stated problem areas and will benefit from OT to address the issues and increase independence    Speech therapy: FIMS:        Lab/X-ray studies reviewed, analyzed and discussed with patient and staff:   Recent Results (from the past 24 hour(s))   POCT Glucose    Collection Time: 07/01/20 11:44 AM   Result Value Ref Range    POC Glucose 119 (H) 60 - 115 mg/dl    Performed on ACCU-CHEK    POCT Glucose    Collection Time: 07/01/20  4:22 PM   Result Value Ref Range    POC Glucose 228 (H) 60 - 115 mg/dl    Performed on ACCU-CHEK    POCT Glucose    Collection Time: 07/01/20  8:37 PM   Result Value Ref Range    POC Glucose 275 (H) 60 - 115 mg/dl    Performed on ACCU-CHEK    Basic Metabolic Panel    Collection Time: 07/02/20  6:44 AM   Result Value Ref Range    Sodium 136 135 - 144 mEq/L    Potassium 4.0 3.4 - 4.9 mEq/L    Chloride 105 95 - 107 mEq/L    CO2 19 (L) 20 - 31 mEq/L    Anion Gap 12 9 - 15 mEq/L    Glucose 110 (H) 70 - 99 mg/dL    BUN 49 (H) 8 - 23 mg/dL    CREATININE 1.32 (H) 0.50 - 0.90 mg/dL    GFR Non-African American 37.6 (L) >60    GFR  45.4 (L) >60    Calcium 9.1 8.5 - 9.9 mg/dL   POCT Glucose    Collection Time: 07/02/20  6:44 AM   Result Value Ref Range    POC Glucose 113 60 - 115 mg/dl    Performed on ACCU-CHEK        Ct Head 6/19/2020   NO ACUTE INTRACRANIAL PROCESS OR SIGNIFICANT CHANGE FROM YESTERDAY IDENTIFIED. Ct Head   6/18/2020   No acute intracranial process or significant interval change. Mri Cervical Spine  6/20/2020    Craniocervical junction: Craniocervical junction is within normal limits. Bone marrow: No sign of acute fracture. No abnormality of the marrow signal to suggest any metastatic or diffuse bone disease. Soft tissues: Cervical soft tissues are within normal limits. Cervical cord: The cervical cord has normal morphology and signal. There is no sign of any extramedullary hemorrhage or fluid collection. C1/2: The odontoid and the C1-2 articulation are normal. C2/C3:  There is no neural foraminal stenosis. There is no evidence of central canal stenosis. There is no foraminal stenosis. C3/C4:  Minimal disc osteophyte complex with ventral ridging. No central canal stenosis. No significant foraminal narrowing. C4/C5:  Prominent disc osteophyte complex with effacement of the anterior CSF column in cord deformity. No abnormal cord signal intensity. Findings resulting in moderate central canal stenosis. C5/C6:  There is no neural foraminal stenosis. There is no evidence of central canal stenosis. There is no foraminal stenosis. C6/C7:  Moderate discussed by complex with effacement of the anterior CSF column and cord deformity.  No abnormal cord signal intensity. Findings resulting in moderate to severe central canal stenosis. C7-T1:  Minimal disc osteophyte complex without central canal or significant foraminal narrowing     Severe central canal stenosis at C5-6. Moderate to severe central canal stenosis at C4-5. No abnormal cord signal intensity. Mri Thoracic Spine  : 6/20/2020     Bones: The thoracic vertebrae are normally aligned with no evidence of fracture. There is normal marrow signal throughout the thoracic spine. Disc space: There is no focal disc herniation or evidence for spinal canal stenosis. Disc spaces are age-appropriate. Spinal cord: The thoracic cord is normal in configuration and signal intensity. Soft tissues: There is no paraspinal soft tissue mass or fluid collection. Negative MRI of the thoracic spine. No signs of cord compression. Mri Lumbar Spine   6/20/2020     Vertebrae: No acute fracture. Marrow signal changes are within normal limits. Conus: The conus medullaris ends at L1 level and is unremarkable. T12/L1: There is no evidence of disc bulging or disc herniation. No significant facet hypertrophy or thickening of ligamenta flava. There is no central spinal canal stenosis. There is no neural foraminal stenosis. L1/2:  There is no evidence of disc bulging or disc herniation. No significant facet hypertrophy or thickening of ligamenta flava. There is no central spinal canal stenosis. There is no neural foraminal stenosis. L2/3:  Minimal disc bulging with no central canal narrowing or significant foraminal stenosis     L3/4:  Broad-based disc bulging with minimal ventral ridging. Levoscoliosis with moderate to severe right foraminal stenosis. L4/5:  Broad-based disc bulging and ventral ridging. Levoscoliosis with facet hypertrophy and severe left foraminal narrowing and moderate to severe right foraminal narrowing  L5/S1: Disc bulging without central canal narrowing.  Severe bilateral foraminal stenosis   Retroperitoneum: No abnormality of the visualized Aorta or retroperitoneum. Levorotoscoliosis with advanced multilevel degenerative changes. Severe foraminal narrowing bilaterally at L4-5. There may be mild transverse canal narrowing at L3-4 and L4-5 but there is no definite central canal stenosis       Ct Abdomen Pelvis  6/19/2020  BORDERLINE PELVIECTASIS IN LEFT KIDNEY. NO ACUTE PATHOLOGY IN THE ABDOMEN OR PELVIS. Xr Chest   6/19/2020   Osseous structures intact. Cardiopericardial silhouette normal. Pulmonary vasculature normal. Lungs clear. NO ACUTE CARDIOPULMONARY DISEASE. Mri Brain Wo 6/19/2020   NO EVIDENCE OF ACUTE CVA. GENERALIZED PARENCHYMAL VOLUME LOSS AND NONSPECIFIC WHITE MATTER CHANGES, MOST LIKELY SECONDARY TO CHRONIC SMALL VESSEL ISCHEMIC CHANGES. MUCOSAL THICKENING IN ETHMOID AND MAXILLARY SINUSES. A FEW SMALL NONSPECIFIC FOCI ON GRADIENT ECHO SEQUENCES WITHIN THE WHITE MATTER BILATERALLY. Us Carotid Artery   6/19/2020   50-69% NARROWING PREDICTED OF BOTH INTERNAL CAROTID ARTERIES, NOT SIGNIFICANTLY CHANGED FROM 11/14/2018. Previous extensive, complex labs, notes and diagnostics reviewed and analyzed. ALLERGIES:    Allergies as of 06/24/2020 - Review Complete 06/24/2020   Allergen Reaction Noted    Metoclopramide  11/29/2011    Pantoprazole Other (See Comments) 12/08/2015    Pcn [penicillins]  11/29/2011    Protonix [pantoprazole sodium]  11/29/2011    Tramadol  11/29/2011      (please also verify by checking MAR)      I reviewed her [facility] prescription monitoring service data sheets in hopes of eliminating polypharmacy and weaning to the lowest effective dose of pain medications and eliminating the concomitant use of benzodiazepines. I see no medications of concern. I see no habits of combining sedatives and narcotics. Complex Physical Medicine & Rehab Issues Assess & Plan:   1.  Severe abnormality of gait and mobility and impaired self-care and ADL's secondary to progressive cervical myelopathy. Functional and medical status reassessed regarding patients ability to participate in therapies and patient found to be able to participate in acute intensive comprehensive inpatient rehabilitation program including PT/OT to improve balance, ambulation, ADLs, and to improve the P/AROM. Therapeutic modifications regarding activities in therapies, place, amount of time per day and intensity of therapy made daily. In bed therapies or bedside therapies prn.   2. 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 Check for C.difficile x1 if >2 loose stools in 24 hours, continue bowel & bladder program.  Monitor bowel and bladder function. Lactinex 2 PO every AC. MOM prn, Brown Bomb prn, Glycerin suppository prn, enema prn. Begin spinal cord type bowel and bladder program.  Patient may need a Montero catheter if her bladder is not recovering. Consult urology. Add timed voiding. 3. Severe neck and low back pain as well as generalized OA pain: reassess pain every shift and prior to and after each therapy session, give prn Tylenol and Norco titration using lowest effective dose, modalities prn in therapy, Lidoderm, K-pad prn. Use modalities such as heat and BenGay 1 available and when helpful use lowest effective dose of Norco titrating off if possible prior to discharge. 4. Skin healing and breakdown risk:  continue pressure relief program.  Daily skin exams and reports from nursing. 5. Severe fatigue due to nutritional and hydration deficiency: Add vitamin B12 vitamin D and CoQ10 continue to monitor I&Os, calorie counts prn, dietary consult prn. Transition to 3 carb per meal diet with an at bedtime snack add diabetic add and dietary Ed  6.  Acute episodic insomnia with situational adjustment disorder:  prn Ambien, monitor for day time sedation. 7. Falls risk elevated:  patient to use call light to get nursing assistance to get up, bed and chair alarm. 8. Elevated DVT risk: progressive activities in PT, continue prophylaxis SALLIE hose, elevation and Coumadin. 9. Complex discharge planning: Discharge 7/8/2020 home with her son in [address] [facility] care PT OT RN aide and social work I will progress toward her final weekly team meeting  Monday to assess progress towards goals, discuss and address social, psychological and medical comorbidities and to address difficulties they may be having progressing in therapy. Patient and family education is in progress. The patient is to follow-up with their family physician after discharge. Complex Active General Medical Issues that complicate care Assess & Plan:    1. HTN (hypertension),  HLD (hyperlipidemia), CAD,   Chronic combined systolic and diastolic congestive heart failure,  History of ST elevation myocardial infarction (STEMI),   History of PTCA,  Valvular heart disease-vital signs every shift dose and titrate cardiac medication to include Norvasc, Lipitor, Coreg, Apresoline both intravenous and oral, Coumadin, Entresto consult hospitalist for backup medical consult Dr. Filemon Gilmore with cardiology monitor for orthostasis and failure as we rehydrate her  2. CKD (chronic kidney disease)-control blood sugars control blood pressure recheck BMP monitor UA and renal output, push clear liquids push oral fluids add IV fluids as needed  3. Osteoarthritis,  Lumbar spinal stenosis, DJD (degenerative joint disease), lumbar-add Lidoderm BenGay and heat lowest effective dose of opiates  4. SCC (squamous cell carcinoma), arm, Eczematous dermatitis-topical steroids add co-Q10 vitamin D  5. Vitamin D and B12 deficiency-high-dose vitamin D and B12  6.    Spinal stenosis in cervical region with cervical myelopathy-inoperable due to moderate multiple medical comorbidities-PT and OT are her greatest hope for recovery her blood sugars are elevated wean steroids  7. Uncontrolled type 2 diabetes mellitus uncontrolled with hyperlipidemia and, Obesity (BMI 30-39. 9)-fingerstick blood sugars q. before meals and at bedtime dose and titrate insulin and carbohydrate insulin intake add diabetic add and dietary Ed continue to titrate carbohydrates and titrate insulin. 8. Severely [place] (hard of hearing),   Vestibular neuronitis of both ears  9. Nausea, anxiety and elevated blood sugars due to steroids for cervical myelopathy-steroid taper and titrate benzodiazepines if needed titrate Zofran and Antivert monitor stools for blood while patient is on Coumadin and steroids as she is high risk for bleed  10.  Neurogenic bowel and bladder-cath if no void check postvoid residuals teach Crede method recheck stat UA             Desiree Stewart D.O., PM&R     Attending    [address]

## 2020-07-02 NOTE — PROGRESS NOTES
Pt changed once during night for being incontinent of urine. Pt denied any pain during night. Pleasant. Electronically signed by Marlene Washington.  Genoveva Ivory on 7/2/2020 at 5:12 AM

## 2020-07-02 NOTE — PROGRESS NOTES
Physical Therapy Rehab Treatment Note  Facility/Department: [facility]  Room: [place] [id number]       NAME: Whitney Medrano  : 6/10/1927 (80 y.o.)  MRN: [id number]  CODE STATUS: DNR-CCA    Date of Service: 2020  Chart Reviewed: Yes  Patient assessed for rehabilitation services?: Yes  Family / Caregiver Present: No  Diagnosis: Abnormality of gait and mobility due to NTSCI with Impaired Mobility and ADL's secondary to Cervical Myelopathy and Vestibular neuronitis     Restrictions:  Restrictions/Precautions: Fall Risk  Position Activity Restriction  Other position/activity restrictions: DNRCCA       SUBJECTIVE: Subjective: I am doing good  Response To Previous Treatment: Patient with no complaints from previous session. Pain Screening  Patient Currently in Pain: No  Pre Treatment Pain Screening  Pain at present: 0  Scale Used: Numeric Score  Intervention List: Patient able to continue with treatment    Post Treatment Pain Screening:  Pain Assessment  Pain Assessment: 0-10  Pain Level: 0    OBJECTIVE:   Follows Commands: Within Functional Limits    Bed mobility  Rolling to Left: Modified independent  Rolling to Right: Modified independent  Supine to Sit: Supervision  Sit to Supine: Supervision  Scooting: Supervision    Transfers  Sit to Stand: Supervision  Stand to sit: Stand by assistance;Supervision(Reaches back with BUE does not control descent into chair well)  Bed to Chair: Supervision    Ambulation  Ambulation?: Yes  More Ambulation?: No  Ambulation 1  Surface: level tile;uneven;carpet  Device: Rollator  Assistance: Stand by assistance;Supervision  Quality of Gait: Flexed trunk and hips slow steady pace reciprocal pattern  Distance: 150' x 2, 50' x 4   Comments: VCs for posture    Stairs/Curb  Stairs?: Yes  Stairs  # Steps : 4  Stairs Height: 6\"  Rails: Bilateral  Comment: Non-reciprocal pattern. Heavy use of UEs on rails.       Exercises  Bridging: (x 20)  Other exercises  Other exercises 1: Clamshells x 10  Other exercises 2: Reverse clamshells x 10  Other exercises 3: STS from EOM x 10 without UE support  Other exercises 4: Supine trunk rotation     ASSESSMENT/COMMENTS:  Body structures, Functions, Activity limitations: Decreased functional mobility ; Decreased strength;Decreased endurance;Decreased balance; Increased pain;Decreased posture  Assessment: Patient ccontinues to require hip and core strengthening to improve transfers and gait. Improved safety noted as therapy progresed    PLAN OF CARE/Safety:   Safety Devices  Type of devices:  All fall risk precautions in place;Call light within reach      Therapy Time:   Individual   Time In 1030   Time Out 1130   Minutes 60     Minutes: 60      Transfer/Bed mobility training: 15      Gait trainin     Therapeutic ex: Yusra De La Cruz PTA, 20 at 11:30 AM

## 2020-07-02 NOTE — PLAN OF CARE
Problem: Falls - Risk of:  Goal: Will remain free from falls  Description: Will remain free from falls  7/2/2020 1259 by Jacobo Rubio RN  Outcome: Ongoing  7/2/2020 0131 by Marlene Washington. Aletha Ndiaye RN  Outcome: Ongoing  Goal: Absence of physical injury  Description: Absence of physical injury  7/2/2020 1259 by Jacobo Rubio RN  Outcome: Ongoing  7/2/2020 0131 by Marlene Washington. Aletha Ndiaey RN  Outcome: Ongoing     Problem: Skin Integrity:  Goal: Will show no infection signs and symptoms  Description: Will show no infection signs and symptoms  7/2/2020 1259 by Jacobo Rubio RN  Outcome: Ongoing  7/2/2020 0131 by Marlene Washington. Aletha Ndiaye RN  Outcome: Ongoing  Goal: Absence of new skin breakdown  Description: Absence of new skin breakdown  7/2/2020 1259 by Jacobo Rubio RN  Outcome: Ongoing  7/2/2020 0131 by Marlene Washington. Aletha Ndiaye RN  Outcome: Ongoing     Problem: IP SWALLOWING  Goal: LTG - patient will tolerate the least restrictive diet consistency to allow for safe consumption of daily meals  Outcome: Ongoing     Problem: IP COMMUNICATION/DYSARTHRIA  Goal: LTG - patient will improve expressive language skills to allow for communication of wants and needs in daily activities  Outcome: Ongoing     Problem: SAFETY  Goal: LTG - patient will adhere to hip precautions during ADL's and transfers  7/2/2020 1259 by Jacobo Rubio RN  Outcome: Ongoing  7/2/2020 0131 by Marlene Washington. Aletha Ndiaye RN  Outcome: Ongoing  Goal: LTG - Patient will demonstrate safety requirements appropriate to situation/environment  7/2/2020 1259 by Jacobo Rubio RN  Outcome: Ongoing  7/2/2020 0131 by Marlene Washington. Aletha Ndiaye RN  Outcome: Ongoing  Goal: LTG - patient will utilize safety techniques  7/2/2020 1259 by Jacobo Rubio RN  Outcome: Ongoing  7/2/2020 0131 by Marlene Washington. Aletha Ndiaye RN  Outcome: Ongoing  Goal: STG - patient locks brakes on wheelchair  7/2/2020 1259 by Jacobo Rubio RN  Outcome: Ongoing  7/2/2020 0131 by Marlene Washington.  Aletha Ndiaye RN  Outcome: Ongoing  Goal: STG - Patient uses call light consistently to request assistance with transfers  7/2/2020 1259 by Damaris Velazquez RN  Outcome: Ongoing  7/2/2020 0131 by Aniya Vasquez. Zack Mari RN  Outcome: Ongoing  Goal: STG - patient uses gait belt during all transfers  7/2/2020 1259 by Damaris Velazquez RN  Outcome: Ongoing  7/2/2020 0131 by Aniya Vasquez. Zack Mari RN  Outcome: Ongoing     Problem:  Activity:  Goal: Ability to avoid complications of mobility impairment will improve  Description: Ability to avoid complications of mobility impairment will improve  Outcome: Ongoing  Goal: Range of joint motion will improve  Description: Range of joint motion will improve  Outcome: Ongoing  Goal: Ability to ambulate will improve  Description: Ability to ambulate will improve  Outcome: Ongoing  Goal: Muscle strength will improve  Description: Muscle strength will improve  Outcome: Ongoing     Problem: Safety:  Goal: Ability to remain free from injury will improve  Description: Ability to remain free from injury will improve  Outcome: Ongoing

## 2020-07-02 NOTE — PROGRESS NOTES
Stacy Gould,  Seen for evaluation and education on Type 2 uncontrolled    Lab Results   Component Value Date    LABA1C 8.2 (H) 06/20/2020     No results found for: EAG    Discussed with Stacy Gould, their current diabetes self-care routines prior to this admission. medication compliance: compliant all of the time, takes metformin only BID, diabetic diet compliance: compliant all of the time, eats much less at home per son, home glucose monitoring: is performed regularly, fasting values range 120-160. Son Jad Franco at bedside. Arina Marrufo is hard of hearing. Discussed how taking insulin now, but they are hopeful to not have to take insulin at discharge. Jad Franco lives next door to her and helps her with checking her BG and all medications. Discussed how being on steroids has raised her BG and hopefully she will be able to come off of the insulin as the steroids are decreased and stopped. Left patient with diabetes education survival packet and will check back with patient and son during lunch next week before discharge to review further discharge needs. Patient verbalizes understanding  of survival skills education.           Raimundo Chavez RN

## 2020-07-02 NOTE — PROGRESS NOTES
Physical Therapy Rehab Treatment Note  Facility/Department: [facility]  Room: R235R235-01       NAME: Annie Cruz  : 6/10/1927 (80 y.o.)  MRN: [id number]  CODE STATUS: DNR-CCA    Date of Service: 2020  Chart Reviewed: Yes  Patient assessed for rehabilitation services?: Yes  Family / Caregiver Present: No  Diagnosis: Abnormality of gait and mobility due to NTSCI with Impaired Mobility and ADL's secondary to Cervical Myelopathy and Vestibular neuronitis     Restrictions:  Restrictions/Precautions: Fall Risk  Position Activity Restriction  Other position/activity restrictions: DNRCCA       SUBJECTIVE: Subjective: I am doing a little better  Response To Previous Treatment: Patient with no complaints from previous session. Pain Screening  Patient Currently in Pain: No  Pre Treatment Pain Screening  Pain at present: 0  Scale Used: Numeric Score  Intervention List: Patient able to continue with treatment    Post Treatment Pain Screening:  Pain Assessment  Pain Assessment: 0-10  Pain Level: 0    OBJECTIVE:   Follows Commands: Within Functional Limits    Transfers  Sit to Stand: Supervision  Stand to sit: Supervision  Car Transfer: Supervision  Comment: Patient shows improved hand placement with all transfers. Controls descent to chair    Ambulation  Ambulation?: Yes  More Ambulation?: No  Ambulation 1  Surface: level tile;uneven;carpet  Device: Rollator  Assistance: Stand by assistance;Supervision  Quality of Gait: Flexed trunk and hips slow steady pace reciprocal pattern  Distance: 175' x 2  Comments: VCs for posture    Exercises  Other exercises  Other exercises 1: Standing marches x 10  Other exercises 2: Standing Hip Abduction x 10  Other exercises 3: Standing heel raises x 10  Other exercises 4: Standing knee flexion x 10     ASSESSMENT/COMMENTS:  Body structures, Functions, Activity limitations: Decreased functional mobility ; Decreased strength;Decreased endurance;Decreased balance; Increased pain;Decreased posture  Assessment: Patient shows improved safety  with proper hand placement with all transfers. Improved gait tolerance to 175'    PLAN OF CARE/Safety:   Safety Devices  Type of devices:  All fall risk precautions in place;Call light within reach      Therapy Time:   Individual   Time In 1300   Time Out 1330   Minutes 30     Minutes: 30      Transfer/Bed mobility trainin      Gait training:10     Therapeutic ex: [address] PTA, 20 at 1:23 PM

## 2020-07-03 LAB
GLUCOSE BLD-MCNC: 105 MG/DL (ref 60–115)
GLUCOSE BLD-MCNC: 127 MG/DL (ref 60–115)
GLUCOSE BLD-MCNC: 144 MG/DL (ref 60–115)
GLUCOSE BLD-MCNC: 161 MG/DL (ref 60–115)
HCT VFR BLD CALC: 31.9 % (ref 37–47)
HEMOGLOBIN: 10.3 G/DL (ref 12–16)
INR BLD: 3.9
MCH RBC QN AUTO: 30.5 PG (ref 27–31.3)
MCHC RBC AUTO-ENTMCNC: 32.4 % (ref 33–37)
MCV RBC AUTO: 94.3 FL (ref 82–100)
PDW BLD-RTO: 15.1 % (ref 11.5–14.5)
PERFORMED ON: ABNORMAL
PERFORMED ON: NORMAL
PLATELET # BLD: 256 K/UL (ref 130–400)
PROTHROMBIN TIME: 37.9 SEC (ref 12.3–14.9)
RBC # BLD: 3.38 M/UL (ref 4.2–5.4)
WBC # BLD: 10.7 K/UL (ref 4.8–10.8)

## 2020-07-03 PROCEDURE — 6370000000 HC RX 637 (ALT 250 FOR IP): Performed by: INTERNAL MEDICINE

## 2020-07-03 PROCEDURE — 36415 COLL VENOUS BLD VENIPUNCTURE: CPT

## 2020-07-03 PROCEDURE — 97535 SELF CARE MNGMENT TRAINING: CPT

## 2020-07-03 PROCEDURE — 97116 GAIT TRAINING THERAPY: CPT

## 2020-07-03 PROCEDURE — 1180000000 HC REHAB R&B

## 2020-07-03 PROCEDURE — 6370000000 HC RX 637 (ALT 250 FOR IP): Performed by: PHYSICAL MEDICINE & REHABILITATION

## 2020-07-03 PROCEDURE — 6370000000 HC RX 637 (ALT 250 FOR IP): Performed by: PHYSICIAN ASSISTANT

## 2020-07-03 PROCEDURE — 97530 THERAPEUTIC ACTIVITIES: CPT

## 2020-07-03 PROCEDURE — APPSS15 APP SPLIT SHARED TIME 0-15 MINUTES: Performed by: NURSE PRACTITIONER

## 2020-07-03 PROCEDURE — 97110 THERAPEUTIC EXERCISES: CPT

## 2020-07-03 PROCEDURE — 85027 COMPLETE CBC AUTOMATED: CPT

## 2020-07-03 PROCEDURE — 85610 PROTHROMBIN TIME: CPT

## 2020-07-03 PROCEDURE — 99232 SBSQ HOSP IP/OBS MODERATE 35: CPT | Performed by: PSYCHIATRY & NEUROLOGY

## 2020-07-03 PROCEDURE — 99232 SBSQ HOSP IP/OBS MODERATE 35: CPT | Performed by: PHYSICAL MEDICINE & REHABILITATION

## 2020-07-03 PROCEDURE — 97112 NEUROMUSCULAR REEDUCATION: CPT

## 2020-07-03 RX ORDER — CIPROFLOXACIN 500 MG/1
250 TABLET, FILM COATED ORAL 2 TIMES DAILY WITH MEALS
Status: COMPLETED | OUTPATIENT
Start: 2020-07-03 | End: 2020-07-07

## 2020-07-03 RX ORDER — L. ACIDOPHILUS/L.BULGARICUS 1MM CELL
2 TABLET ORAL 3 TIMES DAILY
Status: DISCONTINUED | OUTPATIENT
Start: 2020-07-03 | End: 2020-07-08 | Stop reason: HOSPADM

## 2020-07-03 RX ADMIN — PREDNISONE 5 MG: 5 TABLET ORAL at 08:41

## 2020-07-03 RX ADMIN — INSULIN GLARGINE 20 UNITS: 100 INJECTION, SOLUTION SUBCUTANEOUS at 21:17

## 2020-07-03 RX ADMIN — VITAMIN D, TAB 1000IU (100/BT) 2000 UNITS: 25 TAB at 16:59

## 2020-07-03 RX ADMIN — NITROFURANTOIN (MONOHYDRATE/MACROCRYSTALS) 100 MG: 75; 25 CAPSULE ORAL at 21:13

## 2020-07-03 RX ADMIN — CLOPIDOGREL BISULFATE 75 MG: 75 TABLET ORAL at 08:40

## 2020-07-03 RX ADMIN — HYDRALAZINE HYDROCHLORIDE 50 MG: 50 TABLET, FILM COATED ORAL at 14:11

## 2020-07-03 RX ADMIN — CIPROFLOXACIN 250 MG: 500 TABLET, FILM COATED ORAL at 12:03

## 2020-07-03 RX ADMIN — AMLODIPINE BESYLATE 10 MG: 10 TABLET ORAL at 08:40

## 2020-07-03 RX ADMIN — CIPROFLOXACIN 250 MG: 500 TABLET, FILM COATED ORAL at 16:59

## 2020-07-03 RX ADMIN — LACTOBACILLUS TAB 2 TABLET: TAB at 21:13

## 2020-07-03 RX ADMIN — CARVEDILOL 12.5 MG: 12.5 TABLET, FILM COATED ORAL at 16:59

## 2020-07-03 RX ADMIN — HYDRALAZINE HYDROCHLORIDE 50 MG: 50 TABLET, FILM COATED ORAL at 21:14

## 2020-07-03 RX ADMIN — ISOSORBIDE MONONITRATE 30 MG: 30 TABLET, EXTENDED RELEASE ORAL at 08:41

## 2020-07-03 RX ADMIN — LACTOBACILLUS TAB 2 TABLET: TAB at 14:11

## 2020-07-03 RX ADMIN — SACUBITRIL AND VALSARTAN 1 TABLET: 24; 26 TABLET, FILM COATED ORAL at 21:13

## 2020-07-03 RX ADMIN — GABAPENTIN 100 MG: 100 CAPSULE ORAL at 21:13

## 2020-07-03 RX ADMIN — SACUBITRIL AND VALSARTAN 1 TABLET: 24; 26 TABLET, FILM COATED ORAL at 08:40

## 2020-07-03 RX ADMIN — CARVEDILOL 12.5 MG: 12.5 TABLET, FILM COATED ORAL at 08:40

## 2020-07-03 RX ADMIN — HYDRALAZINE HYDROCHLORIDE 50 MG: 50 TABLET, FILM COATED ORAL at 08:41

## 2020-07-03 RX ADMIN — ATORVASTATIN CALCIUM 10 MG: 10 TABLET, FILM COATED ORAL at 21:13

## 2020-07-03 ASSESSMENT — PAIN SCALES - GENERAL
PAINLEVEL_OUTOF10: 0

## 2020-07-03 ASSESSMENT — ENCOUNTER SYMPTOMS
TROUBLE SWALLOWING: 0
NAUSEA: 0
CHEST TIGHTNESS: 0
VOMITING: 0
WHEEZING: 0
SHORTNESS OF BREATH: 0
COUGH: 0
COLOR CHANGE: 0

## 2020-07-03 NOTE — PROGRESS NOTES
Physical Therapy Rehab Treatment Note  Facility/Department: [facility]  Room: [place] [address]       NAME: Ariana Cedeno  : 6/10/1927 (80 y.o.)  MRN: [id number]  CODE STATUS: DNR-CCA    Date of Service: 7/3/2020  Chart Reviewed: Yes  Family / Caregiver Present: No  General Comment  Comments: agreeable to tx    Restrictions:  Restrictions/Precautions: Fall Risk       SUBJECTIVE: Subjective: \"i hope i can go home soon\"  Pain Screening  Patient Currently in Pain: No  Pre Treatment Pain Screening  Pain at present: 0  Scale Used: Numeric Score    Post Treatment Pain Screenin  Pain Assessment  Pain Assessment: 0-10  Pain Level: 0    OBJECTIVE:   Overall Orientation Status: Within Functional Limits    Transfers  Sit to Stand: Stand by assistance  Stand to sit: Minimal Assistance  Comment: pt with loss of control of right arm with stand to sit. pt needed min assist for safety. Ambulation  Ambulation?: Yes  Ambulation 1  Surface: level tile;carpet  Device: Rollator  Assistance: Contact guard assistance;Stand by assistance  Quality of Gait: flexed trunk with slow and steady pace, cga needed over threshold strip, decreased heel strike chavo  Gait Deviations: Slow Shanelle; Increased JUAN  Distance: 150 feet with turns       Exercises  Physio/Swiss Ball: lateral motions, knee flexion, heel presses   Seated marches, laq, ap, gs. ASSESSMENT/COMMENTS:pt hopeful to go home soon. Resides with her son. Pt able to tolerated longer distances with rollator but is still unsteady with over thresholds and turns at times. PLAN OF CARE/Safety: ongoing.          Therapy Time:   Individual   Time In 1330   Time Out 1400   Minutes 30     Minutes:30      Transfer/Bed mobility trainin      Gait training:10      Neuro re education:0     Therapeutic ex:15      Yessica Kohler, LALITA, 20 at 1:46 PM

## 2020-07-03 NOTE — PROGRESS NOTES
Occupational Therapy  Facility/Department: [facility]  Daily Treatment Note  NAME: Dixie Gillespie  : 6/10/1927  MRN: [id number]    Date of Service: 7/3/2020    Discharge Recommendations:  Continue to assess pending progress       Assessment  Pt tolerated all activities provided she could take breaks as needed . She was very pleasant for session  But said \"I'm really tired are we done now\" at end of session               Patient Diagnosis(es): There were no encounter diagnoses. 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 has a past surgical history that includes Appendectomy; Rectocele repair; Cystocele repair; Ankle surgery; Breast biopsy; Cataract removal (); eye surgery; Tonsillectomy; Hysterectomy; and Coronary angioplasty with stent (2019).     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 Pt participate din AROM and fine motor and strengthening activities Pt required demonstrative examples of all activities due to hearing loss. Pt also required frequent breaks even wit minimally repetitive activities.                                                                                     Plan   Plan  Times per week: 5-7 times per week  Plan weeks: 2 weeks  Current Treatment Recommendations: Strengthening, Endurance Training, Neuromuscular Re-education, Self-Care / ADL, Balance Training, Functional Mobility Training, Safety Education & Training, Patient/Caregiver Education & Training  Plan Comment: Continue per OT POC for planned d/c on 7-8-20  G-Code     OutComes Score                                                  AM-PAC Score             Goals  Patient Goals   Patient goals : \"to at least do things in my home\"       Therapy Time   Individual Concurrent Group Co-treatment   Time In 1100         Time Out 1200         Minutes 61                 Latanya Maravilla OTR/L

## 2020-07-03 NOTE — PROGRESS NOTES
Physical Therapy Rehab Treatment Note  [facility]  [facility]       NAME: Leatha Hager  : 6/10/1927 (80 y.o.)  MRN: [id number]  CODE STATUS: DNR-CCA    Date of Service: 7/3/2020  Chart Reviewed: Yes  Family / Caregiver Present: No  General Comment  Comments: agreeable to tx    Restrictions:  Restrictions/Precautions: Fall Risk       SUBJECTIVE: Subjective: \"I don't know when i'm going home\"  Pain Screening  Patient Currently in Pain: No  Pre Treatment Pain Screening  Pain at present: 0  Scale Used: Numeric Score    Post Treatment Pain Screenin  Pain Assessment  Pain Assessment: 0-10  Pain Level: 0    OBJECTIVE:   Overall Orientation Status: Within Functional Limits           Neuromuscular Education  Neuromuscular Comments: around bolsters with rollator. pt able to manage through bolsters without lob or running into objects. 4 inch step taps. standing ball bounce/catch with min assist for support. .pt able to perform sit to stand with one hand but needed min assist for success. pt able to stand and lift ball overhead x 10 with min assist for balance. Sit to stands x 10. Transfers  Sit to Stand: Supervision  Stand to sit: Supervision;Stand by assistance    Ambulation 1  Surface: carpet;level tile  Device: Rollator  Assistance: Contact guard assistance;Stand by assistance  Quality of Gait: wbos, mild trunk flexion, one lob with distraction, min assist to correct  Gait Deviations: Slow Shanelle; Increased JUAN  Distance: 150 feet with turns x 2. Stairs/Curb  Stairs?: Yes  Stairs  # Steps : 8(4 steps then seated rest. )  Rails: Bilateral  Assistance: Stand by assistance;Contact guard assistance  Comment: increased fatigue with 2nd set of steps. ASSESSMENT/COMMENTS:tolerated session well. Pt needed rest breaks after each activity  but able to recover with seated rest breaks. PLAN OF CARE/Safety: ongoing.           Therapy Time:   Individual   Time In 0900   Time Out 1000   Minutes 60     Minutes:60      Transfer/Bed mobility trainin      Gait trainin      Neuro re education:23     Therapeutic ex:0      Maria D Franklin PTA, 20 at 9:41 AM

## 2020-07-03 NOTE — PROGRESS NOTES
Oral Nightly    insulin lispro  0-12 Units Subcutaneous TID WC    insulin lispro  0-6 Units Subcutaneous Nightly    nitrofurantoin (macrocrystal-monohydrate)  100 mg Oral Nightly    sacubitril-valsartan  1 tablet Oral BID    [START ON 6/19/2021] warfarin (COUMADIN) daily dosing (placeholder)   Other RX Placeholder     PRN Meds: HYDROcodone 5 mg - acetaminophen, hydrALAZINE, fleet, acetaminophen, analgesic ointment, metaxalone, magnesium hydroxide, ondansetron **OR** ondansetron, sodium chloride flush, dextrose, dextrose, glucagon (rDNA), glucose, hydrALAZINE, meclizine    Labs:   Recent Labs     07/03/20  0534   WBC 10.7   HGB 10.3*   HCT 31.9*        Recent Labs     07/02/20  0644      K 4.0      CO2 19*   BUN 49*   CREATININE 1.32*   CALCIUM 9.1     No results for input(s): AST, ALT, BILIDIR, BILITOT, ALKPHOS in the last 72 hours. Recent Labs     07/01/20  0534 07/03/20  0535   INR 2.9 3.9     No results for input(s): CKTOTAL, TROPONINI in the last 72 hours. Urinalysis:   Lab Results   Component Value Date    NITRU POSITIVE 07/01/2020    WBCUA >100 07/01/2020    BACTERIA FEW 07/01/2020    RBCUA 3-5 07/01/2020    BLOODU Negative 07/01/2020    SPECGRAV 1.013 07/01/2020    GLUCOSEU Negative 07/01/2020       Radiology:   Most recent    EEG No procedure found. MRI of Brain No results found for this or any previous visit. Results for orders placed during the hospital encounter of 06/19/20   MRI BRAIN WO CONTRAST    Narrative MRI BRAIN WITHOUT CONTRAST:    CLINICAL HISTORY:  r/o CVA , dizziness, nausea, generalized weakness    COMPARISONS:  3/14/2019    TECHNIQUE: Multiplanar, multi-sequence MRI was performed on a [employer] [facility] magnet. FINDINGS:  There are no abnormal sites of restricted diffusion. The ventricles are normal in position. There is no evidence for mass effect. There is no shift of the midline structures.   There are multiple  extensive foci of increased signal on FLAIR chronic small vessel ischemic changes in a patient of this age. MRA brain:    No aneurysm or high-grade stenosis of the visualized cerebral vasculature. CT of the Head:   Results for orders placed during the hospital encounter of 06/19/20   CT Head WO Contrast    Narrative CT HEAD WO CONTRAST : 6/19/2020    CLINICAL HISTORY:  weakness, nausea with movement . COMPARISON: 6/18/2020. TECHNIQUE: Spiral unenhanced images were obtained of the head, with routine multiplanar reconstructions performed. Intravenous contrast is noted from a CT abdomen and pelvis from earlier 6/19/2020. All CT scans at this facility use dose modulation, iterative reconstruction, and/or weight based dosing when appropriate to reduce radiation dose to as low as reasonably achievable. FINDINGS:    There is no intracranial hemorrhage, mass effect, midline shift, extra-axial collection, evidence of hydrocephalus, recent ischemic infarct, or skull fracture identified. Moderate age-related atrophic changes have not significantly changed from the yesterday's study. Impression NO ACUTE INTRACRANIAL PROCESS OR SIGNIFICANT CHANGE FROM YESTERDAY IDENTIFIED. No results found for this or any previous visit. No results found for this or any previous visit. Carotid duplex: No results found for this or any previous visit. No results found for this or any previous visit. Results for orders placed during the hospital encounter of 06/19/20   US CAROTID ARTERY BILATERAL    Narrative US CAROTID ARTERY BILATERAL: 6/19/2020    CLINICAL HISTORY:  dizziness . COMPARISON: 11/14/2018. Grayscale, color and waveform Doppler analysis of the cervical carotid and vertebral arteries was performed.      Validated velocity measurements with angiographic measurements, velocity criteria are extrapolated from diameter data as defined by the Society of Radiologist in [address] [place] [address] Radiology 2003; 840;141-077. FINDINGS:    There is moderate somewhat irregular atherosclerotic plaquing of the carotid bulbs, similarly to the prior study. There is antegrade flow in both vertebral arteries. ARTERIAL BLOOD FLOW VELOCITY    RIGHT Peak Systolic/End Diastolic                                                   Prox CCA:  73.3/14.1 cm/s               Mid CCA:  87.5/13.5 cm/s                Dist CCA:  85.6/14.8 cm/s                Prox ICA:  103/25.6 cm/s                 Mid ICA:  183/32.9 cm/s              Dist ICA:  99.6/25.1 cm/s               Prox ECA:  147/20.4 cm/s               Prox VERT:  64.4/19.2 cm/s                  ICA/CCA    2.1, which indicates 50-69% narrowing by velocity criteria. LEFT PS    Prox CCA:  114/28.0 cm/s  Mid CCA:  96.9/26.7 cm/s  Dist CCA:  98.8/23.6 cm/s  Prox ICA:  112/32.9 cm/s  Mid ICA:  201/47.2 cm/s  Dist ICA:  144/30.4 cm/s  Prox ECA :  114/12.4 cm/s  Prox VERT:  93.3/20.4 cm/s    ICA/CCA     2.1, which indicates 50-69% narrowing by velocity criteria. Impression 50-69% NARROWING PREDICTED OF BOTH INTERNAL CAROTID ARTERIES, NOT SIGNIFICANTLY CHANGED FROM 11/14/2018. Echo No results found for this or any previous visit.           Assessment/Plan:  Vestibular neuronitis, resolved  MRI brain no acute findings  Carotid duplex with 50 to 69% narrowing bilateral, continue antiplatelet and statin therapy  Lower extremity weakness  MRI cervical spine shows severe central canal stenosis at C5-C6 and moderate to severe central canal stenosis at C4-C5  MRI thoracic spine negative  MRI lumbar spine shows advanced multilevel degenerative changes with severe foraminal narrowing bilaterally at L4-L5 with possible mild transverse canal narrowing at L3-4 and L4-5  MRI findings of spine were discussed with family and it was decided that we would proceed with conservative management given patient's age and risk factors and surgical risk.  Patient is not a candidate for Decadron given her underlying diabetes.  Pain management following. Trenton Lan started on steroid taper. Patient does complain of right arm quivering though this appears to be coming from the cervical spine.  We did discuss further that the only option to treat this would be surgical though we will wait for physical therapy for now and will consider neurosurgical evaluation if he worsens and does not have any improvement in her walking. Patient with history of MCA CVA currently on Coumadin and Plavix  Continue PT/OT/ST  Patient overall doing much better. Vertigo resolved. Lower extremity weakness improving     I independently performed an evaluation on this patient. I have reviewed the above documentation completed by the Nurse Practitioner. Please see my additional contributions to the HPI, physical exam, assessment/medical decision making. Elijah Zamorano MD, Hetty Boxer, American Board of Psychiatry & Neurology  Board Certified in Vascular Neurology  Board Certified in Neuromuscular Medicine  Certified in Neurorehabilitation         Collaborating physicians: Dr Poli Zamorano    Electronically signed by SYLVIE Jones CNP on 7/3/2020 at 11:30 AM

## 2020-07-03 NOTE — PLAN OF CARE
Problem: Falls - Risk of:  Goal: Will remain free from falls  Description: Will remain free from falls  7/3/2020 0034 by Sintia Billings RN  Outcome: Ongoing     Problem: Falls - Risk of:  Goal: Absence of physical injury  Description: Absence of physical injury  7/3/2020 0034 by Sintia Billings RN  Outcome: Ongoing

## 2020-07-03 NOTE — PROGRESS NOTES
Patient is resting in bed with no c/o pain or discomfort noted at this time. Patient agreed to use pure wick at HS to help with skin integrity. Sleeping well through out the night.

## 2020-07-03 NOTE — PLAN OF CARE
Problem: Falls - Risk of:  Goal: Will remain free from falls  Description: Will remain free from falls  7/3/2020 1317 by Hunter Hess RN  Outcome: Ongoing  7/3/2020 0037 by Praveen Rm RN  Outcome: Ongoing  7/3/2020 0034 by Praveen Rm RN  Outcome: Ongoing  Goal: Absence of physical injury  Description: Absence of physical injury  7/3/2020 1317 by Hunter Hess RN  Outcome: Ongoing  7/3/2020 0037 by Praveen Rm RN  Outcome: Ongoing  7/3/2020 0034 by Praveen Rm RN  Outcome: Ongoing     Problem: Skin Integrity:  Goal: Will show no infection signs and symptoms  Description: Will show no infection signs and symptoms  Outcome: Ongoing  Goal: Absence of new skin breakdown  Description: Absence of new skin breakdown  Outcome: Ongoing     Problem: IP SWALLOWING  Goal: LTG - patient will tolerate the least restrictive diet consistency to allow for safe consumption of daily meals  Outcome: Ongoing     Problem: IP COMMUNICATION/DYSARTHRIA  Goal: LTG - patient will improve expressive language skills to allow for communication of wants and needs in daily activities  Outcome: Ongoing     Problem: SAFETY  Goal: LTG - patient will adhere to hip precautions during ADL's and transfers  Outcome: Ongoing  Goal: LTG - Patient will demonstrate safety requirements appropriate to situation/environment  Outcome: Ongoing  Goal: LTG - patient will utilize safety techniques  Outcome: Ongoing  Goal: STG - patient locks brakes on wheelchair  Outcome: Ongoing  Goal: STG - Patient uses call light consistently to request assistance with transfers  Outcome: Ongoing  Goal: STG - patient uses gait belt during all transfers  Outcome: Ongoing     Problem:  Activity:  Goal: Ability to avoid complications of mobility impairment will improve  Description: Ability to avoid complications of mobility impairment will improve  Outcome: Ongoing  Goal: Range of joint motion will improve  Description: Range of joint motion will improve  Outcome: Ongoing  Goal: Ability to ambulate will improve  Description: Ability to ambulate will improve  Outcome: Ongoing  Goal: Muscle strength will improve  Description: Muscle strength will improve  Outcome: Ongoing     Problem: Safety:  Goal: Ability to remain free from injury will improve  Description: Ability to remain free from injury will improve  Outcome: Ongoing

## 2020-07-03 NOTE — PROGRESS NOTES
Subjective: The patient complains of severe  acute on chronic neck pain and acute upper extremity weakness left greater than right partially relieved by PT, OT, steroid taper and exacerbated by recent fall and progressive cervical spinal stenosis with myelopathy. I am concerned about her active chronic UTIs she is on Macrobid at home to prevent a UTI despite that she is clearly got another UTI okay to check a stat CBC and start her on Cipro. Gram-negative's of 50,000 colony-forming units are growing in her urine and she is nitrate positive. Working on timed voids and checking PVRs. So far PVRs are okay her blood sugars are still a bit high. ROS x10: The patient also complains of severely impaired mobility and activities of daily living. Otherwise no new problems with vision, hearing, nose, mouth, throat, dermal, cardiovascular, GI, , pulmonary, musculoskeletal, psychiatric or neurological. See Rehab H&P on Rehab chart dated . Vital signs:  BP (!) 167/68   Pulse 51   Temp 97 °F (36.1 °C) (Oral)   Resp 17   Ht 5' 3\" (1.6 m)   Wt 188 lb 7.9 oz (85.5 kg)   LMP  (LMP Unknown)   SpO2 96%   BMI 33.39 kg/m²   I/O:   PO/Intake:  fair PO intake,  3carb ADA diet    Bowel/Bladder:   Incontinent-trial timed voiding gram-negative UTI active and chronic, opiate related constipation  General:  Patient is well developed, adequately nourished, non-obese and     well kempt. HEENT:    PERRLA, hearing intact to loud voice, external inspection of ear     and nose benign. Inspection of lips, tongue and gums benign  Musculoskeletal: No significant change in strength or tone. All joints stable. Inspection and palpation of digits and nails show no clubbing,       cyanosis or inflammatory conditions. Neuro/Psychiatric: Affect: flat but pleasant. Alert and oriented to person, place and     situation.   No significant change in deep tendon reflexes or     Sensation-bilateral upper extremity weakness left greater than right left lower extremity weakness is    also there  Lungs:  Diminished, CTA-B. Respiration effort is normal at rest.     Heart:   S1 = S2, RRR. No loud murmurs. Abdomen:  Soft, non-tender, no enlargement of liver or spleen. Extremities:  No significant lower extremity edema or tenderness. Skin:   Intact to general survey, no visualized or palpated problems. Rehabilitation:  Physical therapy: FIMS:  Bed Mobility: Scooting: Supervision    Transfers: Sit to Stand: Supervision  Stand to sit: Supervision, Stand by assistance  Bed to Chair: Supervision, Ambulation 1  Surface: carpet, level tile  Device: Rollator  Assistance: Contact guard assistance, Stand by assistance  Quality of Gait: wbos, mild trunk flexion, one lob with distraction, min assist to correct  Gait Deviations: Slow Shanelle, Increased JUAN  Distance: 150 feet with turns  Comments: VCs for posture, Stairs  # Steps : 8(4 steps then seated rest. )  Stairs Height: 6\"  Rails: Bilateral  Curbs: 6\"  Device: Rolling walker(Grab bar to ascend(Car) descends using [employer])  Assistance: Stand by assistance, Contact guard assistance  Comment: increased fatigue with 2nd set of steps. FIMS:  ,  , Assessment: Patient shows improved safety  with proper hand placement with all transfers. Improved gait tolerance to 175'    Occupational therapy: FIMS:   ,  , Assessment: Patient is a 80 year female with an new onset of coordination problems, dizziness and weakness.  Patient presents with the above stated problem areas and will benefit from OT to address the issues and increase independence    Speech therapy: FIMS:        Lab/X-ray studies reviewed, analyzed and discussed with patient and staff:   Recent Results (from the past 24 hour(s))   POCT Glucose    Collection Time: 07/02/20 12:06 PM   Result Value Ref Range    POC Glucose 131 (H) 60 - 115 mg/dl    Performed on ACCU-CHEK    POCT Glucose    Collection Time: 07/02/20  4:22 PM   Result Value Ref Range    POC Glucose 297 (H) 60 - 115 mg/dl    Performed on ACCU-CHEK    POCT Glucose    Collection Time: 07/02/20  8:24 PM   Result Value Ref Range    POC Glucose 270 (H) 60 - 115 mg/dl    Performed on ACCU-CHEK    Protime-INR    Collection Time: 07/03/20  5:35 AM   Result Value Ref Range    Protime 37.9 (H) 12.3 - 14.9 sec    INR 3.9    POCT Glucose    Collection Time: 07/03/20  6:18 AM   Result Value Ref Range    POC Glucose 105 60 - 115 mg/dl    Performed on ACCU-CHEK        Ct Head 6/19/2020   NO ACUTE INTRACRANIAL PROCESS OR SIGNIFICANT CHANGE FROM YESTERDAY IDENTIFIED. Ct Head   6/18/2020   No acute intracranial process or significant interval change. Mri Cervical Spine  6/20/2020    Craniocervical junction: Craniocervical junction is within normal limits. Bone marrow: No sign of acute fracture. No abnormality of the marrow signal to suggest any metastatic or diffuse bone disease. Soft tissues: Cervical soft tissues are within normal limits. Cervical cord: The cervical cord has normal morphology and signal. There is no sign of any extramedullary hemorrhage or fluid collection. C1/2: The odontoid and the C1-2 articulation are normal. C2/C3:  There is no neural foraminal stenosis. There is no evidence of central canal stenosis. There is no foraminal stenosis. C3/C4:  Minimal disc osteophyte complex with ventral ridging. No central canal stenosis. No significant foraminal narrowing. C4/C5:  Prominent disc osteophyte complex with effacement of the anterior CSF column in cord deformity. No abnormal cord signal intensity. Findings resulting in moderate central canal stenosis. C5/C6:  There is no neural foraminal stenosis. There is no evidence of central canal stenosis. There is no foraminal stenosis. C6/C7:  Moderate discussed by complex with effacement of the anterior CSF column and cord deformity. No abnormal cord signal intensity.  Findings resulting in moderate to severe central canal stenosis. C7-T1:  Minimal disc osteophyte complex without central canal or significant foraminal narrowing     Severe central canal stenosis at C5-6. Moderate to severe central canal stenosis at C4-5. No abnormal cord signal intensity. Mri Thoracic Spine  : 6/20/2020     Bones: The thoracic vertebrae are normally aligned with no evidence of fracture. There is normal marrow signal throughout the thoracic spine. Disc space: There is no focal disc herniation or evidence for spinal canal stenosis. Disc spaces are age-appropriate. Spinal cord: The thoracic cord is normal in configuration and signal intensity. Soft tissues: There is no paraspinal soft tissue mass or fluid collection. Negative MRI of the thoracic spine. No signs of cord compression. Mri Lumbar Spine   6/20/2020  Levorotoscoliosis with advanced multilevel degenerative changes. Severe foraminal narrowing bilaterally at L4-5. There may be mild transverse canal narrowing at L3-4 and L4-5 but there is no definite central canal stenosis       Ct Abdomen Pelvis  6/19/2020  BORDERLINE PELVIECTASIS IN LEFT KIDNEY. NO ACUTE PATHOLOGY IN THE ABDOMEN OR PELVIS. Xr Chest   6/19/2020   Osseous structures intact. Cardiopericardial silhouette normal. Pulmonary vasculature normal. Lungs clear. NO ACUTE CARDIOPULMONARY DISEASE. Mri Brain Wo 6/19/2020   NO EVIDENCE OF ACUTE CVA. GENERALIZED PARENCHYMAL VOLUME LOSS AND NONSPECIFIC WHITE MATTER CHANGES, MOST LIKELY SECONDARY TO CHRONIC SMALL VESSEL ISCHEMIC CHANGES. MUCOSAL THICKENING IN ETHMOID AND MAXILLARY SINUSES. A FEW SMALL NONSPECIFIC FOCI ON GRADIENT ECHO SEQUENCES WITHIN THE WHITE MATTER BILATERALLY. Us Carotid Artery   6/19/2020   50-69% NARROWING PREDICTED OF BOTH INTERNAL CAROTID ARTERIES, NOT SIGNIFICANTLY CHANGED FROM 11/14/2018. Previous extensive, complex labs, notes and diagnostics reviewed and analyzed.      ALLERGIES:    Allergies as of 06/24/2020 - Review Complete 06/24/2020   Allergen Reaction Noted    Metoclopramide  11/29/2011    Pantoprazole Other (See Comments) 12/08/2015    Pcn [penicillins]  11/29/2011    Protonix [pantoprazole sodium]  11/29/2011    Tramadol  11/29/2011      (please also verify by checking [facility])       Complex Physical Medicine & Rehab Issues Assess & Plan:   1. Severe abnormality of gait and mobility and impaired self-care and ADL's secondary to progressive cervical myelopathy. Functional and medical status reassessed regarding patients ability to participate in therapies and patient found to be able to participate in acute intensive comprehensive inpatient rehabilitation program including PT/OT to improve balance, ambulation, ADLs, and to improve the P/AROM. Therapeutic modifications regarding activities in therapies, place, amount of time per day and intensity of therapy made daily. In bed therapies or bedside therapies prn.   2. 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 Check for C.difficile x1 if >2 loose stools in 24 hours, continue bowel & bladder program.  Monitor bowel and bladder function. Lactinex 2 PO every AC. MOM prn, Brown Bomb prn, Glycerin suppository prn, enema prn. Begin spinal cord type bowel and bladder program.  Patient may need a Montero catheter if her bladder is not recovering. Consult urology. Add timed voiding. 3. Severe neck and low back pain as well as generalized OA pain: reassess pain every shift and prior to and after each therapy session, give prn Tylenol and Norco titration using lowest effective dose, modalities prn in therapy, Lidoderm, K-pad prn. Use modalities such as heat and BenGay 1 available and when helpful use lowest effective dose of Norco titrating off if possible prior to discharge.   4. Skin healing and breakdown risk:  continue pressure relief

## 2020-07-03 NOTE — PROGRESS NOTES
Clinical Pharmacy Note    Warfarin consult follow-up    Recent Labs     07/03/20  0535   INR 3.9     Recent Labs     07/03/20  0534   HGB 10.3*   HCT 31.9*          Significant drug:drug interactions:  Current: prednisone, APAP, clopidogrel, norco   New warfarin drug-drug interactions: cipro started today    Notes:  Date INR Warfarin Dose    06/19/20 1.8  6 mg    06/20/20 2.1  7 mg    06/21/20 2.3   7 mg    06/22/20 3.3  HOLD    06/23/20 3.6   HOLD      06/24/20 1.6  7 mg    06/25/20 1.4  8 mg    06/26/20 1.7  8 mg    06/27/20 2.0  7 mg    06/28/20 2.5  7 mg    06/29/20  2.6  6 mg   06/30/20 none 7 mg   07/01/20 2.9 6 mg   07/02/20  ---  6 mg     07/03/20  3.9  HOLD       Patients INR supra therapeutic for goal 2-3. INR jumped one point since Wednesday. Will resume Daily PT/INR until stable within therapeutic range for closer monitoring.hold dose x today. Will reassess therapy tomorrow and keep closer monitoring with addition of cipro which can cause increase in INR. Thank you for consult.    Santana SotoD

## 2020-07-03 NOTE — PROGRESS NOTES
Occupational Therapy  Facility/Department: [facility]  Daily Treatment Note  NAME: Lily Rodriguez  : 6/10/1927  MRN: [id number]    Date of Service: 7/3/2020    Discharge Recommendations:  Continue to assess pending progress    Assessment  Activity Tolerance  Activity Tolerance: Patient Tolerated treatment well  Safety Devices  Safety Devices in place: Yes  Type of devices: All fall risk precautions in place         Patient Diagnosis(es): There were no encounter diagnoses. 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 has a past surgical history that includes Appendectomy; Rectocele repair; Cystocele repair; Ankle surgery; Breast biopsy; Cataract removal (); eye surgery; Tonsillectomy; Hysterectomy; and Coronary angioplasty with stent (2019).     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 Pt instructed to orient pegs so that they successfully fit into holes. Pt had Min difficulty with activity and worked at a steady pace without rest breaks. Pt had no difficulty with problem solving aspects of activity. To improve hand fine motor coordination for mgmt of clothing fasteners/ADL containers in a timely manner. Theraputty: Green putty (medium resistance), squeezing, rolling, pinching, folding, inserting and removing beads. Pt successfully inserted/removed 15 of 15 beads with Min difficulty. To improve hand strength/fine motor coordination for mgmt of clothing fasteners/ADL containers in a timely manner. Reacher Activity: Pt used her right hand to manipulate a reacher in order to pick various sized rings up from the floor that were spread out around her. Pt then used the reacher to don rings onto horizontal rods at graded heights and distances. (x 75 rings). Pt had Min difficulty with activity and worked at a slow and steady pace without rest breaks. To improve reacher skills for use during ADL/IADL completion. Sorting/Folding Laundry: Pt used bilateral hands to sort and fold various articles of laundry (towels, wash cloths, and pillow cases). Pt had Min difficulty with activity and worked at a steady pace without rest breaks. To improve functional endurance for ADL completion.        Plan  Plan  Times per week: 5-7 times per week  Plan weeks: 2 weeks  Current Treatment Recommendations: Strengthening, Endurance Training, Neuromuscular Re-education, Self-Care / ADL, Balance Training, Functional Mobility Training, Safety Education & Training, Patient/Caregiver Education & Training  Plan Comment: Continue per OT POC for planned d/c on 7-8-20    Goals  Patient Goals   Patient goals : \"to at least do things in my home\"    Therapy Time   Individual Concurrent Group Co-treatment   Time In 1100         Time Out 1200         Minutes 60         Therapeutic activities: 60 minutes    Electronically signed by LOIS Smith on 7/3/2020 at 12:43 PM  LOIS Smith

## 2020-07-04 LAB
GLUCOSE BLD-MCNC: 116 MG/DL (ref 60–115)
GLUCOSE BLD-MCNC: 245 MG/DL (ref 60–115)
GLUCOSE BLD-MCNC: 287 MG/DL (ref 60–115)
GLUCOSE BLD-MCNC: 65 MG/DL (ref 60–115)
INR BLD: 3.2
ORGANISM: ABNORMAL
PERFORMED ON: ABNORMAL
PERFORMED ON: NORMAL
PROTHROMBIN TIME: 32.5 SEC (ref 12.3–14.9)
URINE CULTURE, ROUTINE: ABNORMAL

## 2020-07-04 PROCEDURE — 97530 THERAPEUTIC ACTIVITIES: CPT

## 2020-07-04 PROCEDURE — 1180000000 HC REHAB R&B

## 2020-07-04 PROCEDURE — 6370000000 HC RX 637 (ALT 250 FOR IP): Performed by: PHYSICAL MEDICINE & REHABILITATION

## 2020-07-04 PROCEDURE — 6370000000 HC RX 637 (ALT 250 FOR IP): Performed by: INTERNAL MEDICINE

## 2020-07-04 PROCEDURE — 6370000000 HC RX 637 (ALT 250 FOR IP): Performed by: PHYSICIAN ASSISTANT

## 2020-07-04 PROCEDURE — 85610 PROTHROMBIN TIME: CPT

## 2020-07-04 PROCEDURE — 36415 COLL VENOUS BLD VENIPUNCTURE: CPT

## 2020-07-04 RX ORDER — INSULIN GLARGINE 100 [IU]/ML
15 INJECTION, SOLUTION SUBCUTANEOUS NIGHTLY
Status: DISCONTINUED | OUTPATIENT
Start: 2020-07-04 | End: 2020-07-08 | Stop reason: HOSPADM

## 2020-07-04 RX ADMIN — AMLODIPINE BESYLATE 10 MG: 10 TABLET ORAL at 08:51

## 2020-07-04 RX ADMIN — ANALGESIC BALM: 1.74; 4.06 OINTMENT TOPICAL at 20:50

## 2020-07-04 RX ADMIN — SACUBITRIL AND VALSARTAN 1 TABLET: 24; 26 TABLET, FILM COATED ORAL at 20:50

## 2020-07-04 RX ADMIN — GABAPENTIN 100 MG: 100 CAPSULE ORAL at 20:50

## 2020-07-04 RX ADMIN — ATORVASTATIN CALCIUM 10 MG: 10 TABLET, FILM COATED ORAL at 20:50

## 2020-07-04 RX ADMIN — LACTOBACILLUS TAB 2 TABLET: TAB at 08:51

## 2020-07-04 RX ADMIN — CARVEDILOL 12.5 MG: 12.5 TABLET, FILM COATED ORAL at 16:59

## 2020-07-04 RX ADMIN — LACTOBACILLUS TAB 2 TABLET: TAB at 15:10

## 2020-07-04 RX ADMIN — HYDRALAZINE HYDROCHLORIDE 50 MG: 50 TABLET, FILM COATED ORAL at 15:10

## 2020-07-04 RX ADMIN — CARVEDILOL 12.5 MG: 12.5 TABLET, FILM COATED ORAL at 08:51

## 2020-07-04 RX ADMIN — CIPROFLOXACIN 250 MG: 500 TABLET, FILM COATED ORAL at 17:07

## 2020-07-04 RX ADMIN — CLOPIDOGREL BISULFATE 75 MG: 75 TABLET ORAL at 08:51

## 2020-07-04 RX ADMIN — NITROFURANTOIN (MONOHYDRATE/MACROCRYSTALS) 100 MG: 75; 25 CAPSULE ORAL at 20:50

## 2020-07-04 RX ADMIN — CIPROFLOXACIN 250 MG: 500 TABLET, FILM COATED ORAL at 08:50

## 2020-07-04 RX ADMIN — VITAMIN D, TAB 1000IU (100/BT) 2000 UNITS: 25 TAB at 16:58

## 2020-07-04 RX ADMIN — LACTOBACILLUS TAB 2 TABLET: TAB at 20:50

## 2020-07-04 RX ADMIN — HYDRALAZINE HYDROCHLORIDE 50 MG: 50 TABLET, FILM COATED ORAL at 08:51

## 2020-07-04 RX ADMIN — INSULIN GLARGINE 15 UNITS: 100 INJECTION, SOLUTION SUBCUTANEOUS at 20:51

## 2020-07-04 RX ADMIN — ISOSORBIDE MONONITRATE 30 MG: 30 TABLET, EXTENDED RELEASE ORAL at 08:51

## 2020-07-04 RX ADMIN — SACUBITRIL AND VALSARTAN 1 TABLET: 24; 26 TABLET, FILM COATED ORAL at 08:53

## 2020-07-04 RX ADMIN — HYDRALAZINE HYDROCHLORIDE 50 MG: 50 TABLET, FILM COATED ORAL at 20:50

## 2020-07-04 ASSESSMENT — PAIN SCALES - GENERAL
PAINLEVEL_OUTOF10: 0
PAINLEVEL_OUTOF10: 0

## 2020-07-04 NOTE — PROGRESS NOTES
Subjective: The patient complains of severe  acute on chronic neck pain and acute upper extremity weakness left greater than right partially relieved by PT, OT, steroid taper and exacerbated by recent fall and progressive cervical spinal stenosis with myelopathy. ROS x10: The patient also complains of severely impaired mobility and activities of daily living. Otherwise no new problems with vision, hearing, nose, mouth, throat, dermal, cardiovascular, GI, , pulmonary, musculoskeletal, psychiatric or neurological. See Rehab H&P on Rehab chart dated . Vital signs:  BP (!) 152/73   Pulse 60   Temp 97 °F (36.1 °C) (Oral)   Resp 17   Ht 5' 3\" (1.6 m)   Wt 188 lb 7.9 oz (85.5 kg)   LMP  (LMP Unknown)   SpO2 99%   BMI 33.39 kg/m²   I/O:   PO/Intake:  fair PO intake,  3carb ADA diet    Bowel/Bladder:   Incontinent-trial timed voiding gram-negative UTI active and chronic, opiate related constipation  General:  Patient is well developed, adequately nourished, non-obese and     well kempt. HEENT:    PERRLA, hearing intact to loud voice, external inspection of ear     and nose benign. Inspection of lips, tongue and gums benign  Musculoskeletal: No significant change in strength or tone. All joints stable. Inspection and palpation of digits and nails show no clubbing,       cyanosis or inflammatory conditions. Neuro/Psychiatric: Affect: flat but pleasant. Alert and oriented to person, place and     situation. No significant change in deep tendon reflexes or     Sensation-bilateral upper extremity weakness left greater than right left lower extremity weakness is    also there  Lungs:  Diminished, CTA-B. Respiration effort is normal at rest.     Heart:   S1 = S2, RRR. No loud murmurs. Abdomen:  Soft, non-tender, no enlargement of liver or spleen. Extremities:  No significant lower extremity edema or tenderness.   Skin:   Intact to general survey, no visualized or palpated problems. Rehabilitation:  Physical therapy: FIMS:  Bed Mobility: Scooting: Supervision    Transfers: Sit to Stand: Stand by assistance  Stand to sit: Minimal Assistance  Bed to Chair: Supervision, Ambulation 1  Surface: level tile, carpet  Device: Rollator  Assistance: Contact guard assistance, Stand by assistance  Quality of Gait: flexed trunk with slow and steady pace, cga needed over threshold strip, decreased heel strike chavo  Gait Deviations: Slow Shanelle, Increased JUAN  Distance: 150 feet with turns  Comments: VCs for posture, Stairs  # Steps : 8(4 steps then seated rest. )  Stairs Height: 6\"  Rails: Bilateral  Curbs: 6\"  Device: Rolling walker(Grab bar to ascend(Car) descends using [facility])  Assistance: Stand by assistance, Contact guard assistance  Comment: increased fatigue with 2nd set of steps. FIMS:  ,  , Assessment: Patient shows improved safety  with proper hand placement with all transfers. Improved gait tolerance to 175'    Occupational therapy: FIMS:   ,  , Assessment: Patient is a 80 year female with an new onset of coordination problems, dizziness and weakness.  Patient presents with the above stated problem areas and will benefit from OT to address the issues and increase independence    Speech therapy: FIMS:        Lab/X-ray studies reviewed, analyzed and discussed with patient and staff:   Recent Results (from the past 24 hour(s))   POCT Glucose    Collection Time: 07/03/20 12:01 PM   Result Value Ref Range    POC Glucose 127 (H) 60 - 115 mg/dl    Performed on ACCU-CHEK    POCT Glucose    Collection Time: 07/03/20  4:23 PM   Result Value Ref Range    POC Glucose 144 (H) 60 - 115 mg/dl    Performed on ACCU-CHEK    POCT Glucose    Collection Time: 07/03/20  8:34 PM   Result Value Ref Range    POC Glucose 161 (H) 60 - 115 mg/dl    Performed on ACCU-CHEK    POCT Glucose    Collection Time: 07/04/20  6:04 AM   Result Value Ref Range    POC Glucose 65 60 - 115 mg/dl    Performed on ACCU-CHEK Protime-INR    Collection Time: 07/04/20  6:06 AM   Result Value Ref Range    Protime 32.5 (H) 12.3 - 14.9 sec    INR 3.2        Ct Head 6/19/2020   NO ACUTE INTRACRANIAL PROCESS OR SIGNIFICANT CHANGE FROM YESTERDAY IDENTIFIED. Ct Head   6/18/2020   No acute intracranial process or significant interval change. Mri Cervical Spine  6/20/2020    Craniocervical junction: Craniocervical junction is within normal limits. Bone marrow: No sign of acute fracture. No abnormality of the marrow signal to suggest any metastatic or diffuse bone disease. Soft tissues: Cervical soft tissues are within normal limits. Cervical cord: The cervical cord has normal morphology and signal. There is no sign of any extramedullary hemorrhage or fluid collection. C1/2: The odontoid and the C1-2 articulation are normal. C2/C3:  There is no neural foraminal stenosis. There is no evidence of central canal stenosis. There is no foraminal stenosis. C3/C4:  Minimal disc osteophyte complex with ventral ridging. No central canal stenosis. No significant foraminal narrowing. C4/C5:  Prominent disc osteophyte complex with effacement of the anterior CSF column in cord deformity. No abnormal cord signal intensity. Findings resulting in moderate central canal stenosis. C5/C6:  There is no neural foraminal stenosis. There is no evidence of central canal stenosis. There is no foraminal stenosis. C6/C7:  Moderate discussed by complex with effacement of the anterior CSF column and cord deformity. No abnormal cord signal intensity. Findings resulting in moderate to severe central canal stenosis. C7-T1:  Minimal disc osteophyte complex without central canal or significant foraminal narrowing     Severe central canal stenosis at C5-6. Moderate to severe central canal stenosis at C4-5. No abnormal cord signal intensity. Mri Thoracic Spine  : 6/20/2020     Bones:  The thoracic vertebrae are normally aligned with no evidence of fracture. There is normal marrow signal throughout the thoracic spine. Disc space: There is no focal disc herniation or evidence for spinal canal stenosis. Disc spaces are age-appropriate. Spinal cord: The thoracic cord is normal in configuration and signal intensity. Soft tissues: There is no paraspinal soft tissue mass or fluid collection. Negative MRI of the thoracic spine. No signs of cord compression. Mri Lumbar Spine   6/20/2020  Levorotoscoliosis with advanced multilevel degenerative changes. Severe foraminal narrowing bilaterally at L4-5. There may be mild transverse canal narrowing at L3-4 and L4-5 but there is no definite central canal stenosis       Ct Abdomen Pelvis  6/19/2020  BORDERLINE PELVIECTASIS IN LEFT KIDNEY. NO ACUTE PATHOLOGY IN THE ABDOMEN OR PELVIS. Xr Chest   6/19/2020   Osseous structures intact. Cardiopericardial silhouette normal. Pulmonary vasculature normal. Lungs clear. NO ACUTE CARDIOPULMONARY DISEASE. Mri Brain Wo 6/19/2020   NO EVIDENCE OF ACUTE CVA. GENERALIZED PARENCHYMAL VOLUME LOSS AND NONSPECIFIC WHITE MATTER CHANGES, MOST LIKELY SECONDARY TO CHRONIC SMALL VESSEL ISCHEMIC CHANGES. MUCOSAL THICKENING IN ETHMOID AND MAXILLARY SINUSES. A FEW SMALL NONSPECIFIC FOCI ON GRADIENT ECHO SEQUENCES WITHIN THE WHITE MATTER BILATERALLY. Us Carotid Artery   6/19/2020   50-69% NARROWING PREDICTED OF BOTH INTERNAL CAROTID ARTERIES, NOT SIGNIFICANTLY CHANGED FROM 11/14/2018. Previous extensive, complex labs, notes and diagnostics reviewed and analyzed. ALLERGIES:    Allergies as of 06/24/2020 - Review Complete 06/24/2020   Allergen Reaction Noted    Metoclopramide  11/29/2011    Pantoprazole Other (See Comments) 12/08/2015    Pcn [penicillins]  11/29/2011    Protonix [pantoprazole sodium]  11/29/2011    Tramadol  11/29/2011      (please also verify by checking [facility])       Complex Physical Medicine & Rehab Issues Assess & Plan:   1.  Severe abnormality of gait and mobility and impaired self-care and ADL's secondary to progressive cervical myelopathy. Functional and medical status reassessed regarding patients ability to participate in therapies and patient found to be able to participate in acute intensive comprehensive inpatient rehabilitation program including PT/OT to improve balance, ambulation, ADLs, and to improve the P/AROM. Therapeutic modifications regarding activities in therapies, place, amount of time per day and intensity of therapy made daily. In bed therapies or bedside therapies prn.   2. 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 Check for C.difficile x1 if >2 loose stools in 24 hours, continue bowel & bladder program.  Monitor bowel and bladder function. Lactinex 2 PO every AC. MOM prn, Brown Bomb prn, Glycerin suppository prn, enema prn. Begin spinal cord type bowel and bladder program.  Patient may need a Montero catheter if her bladder is not recovering. Consult urology. Add timed voiding. 3. Severe neck and low back pain as well as generalized OA pain: reassess pain every shift and prior to and after each therapy session, give prn Tylenol and Norco titration using lowest effective dose, modalities prn in therapy, Lidoderm, K-pad prn. Use modalities such as heat and BenGay 1 available and when helpful use lowest effective dose of Norco titrating off if possible prior to discharge. 4. Skin healing and breakdown risk:  continue pressure relief program.  Daily skin exams and reports from nursing. 5. Severe fatigue due to nutritional and hydration deficiency: Add vitamin B12 vitamin D and CoQ10 continue to monitor I&Os, calorie counts prn, dietary consult prn. Transition to 3 carb per meal diet with an at bedtime snack add diabetic add and dietary Ed  6.  Acute episodic insomnia with situational adjustment disorder:  prn Ambien, monitor for day time sedation. 7. Falls risk elevated:  patient to use call light to get nursing assistance to get up, bed and chair alarm. 8. Elevated DVT risk: progressive activities in PT, continue prophylaxis SALLIE hose, elevation and Coumadin. 9. Complex discharge planning: Discharge 7/8/2020 home with her son in [facility] care PT OT RN aide and social work I will progress toward her final weekly team meeting  Monday to assess progress towards goals, discuss and address social, psychological and medical comorbidities and to address difficulties they may be having progressing in therapy. Patient and family education is in progress. The patient is to follow-up with their family physician after discharge. Complex Active General Medical Issues that complicate care Assess & Plan:    1. HTN (hypertension),  HLD (hyperlipidemia), CAD,   Chronic combined systolic and diastolic congestive heart failure,  History of ST elevation myocardial infarction (STEMI),   History of PTCA,  Valvular heart disease-vital signs every shift dose and titrate cardiac medication to include Norvasc, Lipitor, Coreg, Apresoline both intravenous and oral, Coumadin, Entresto consult hospitalist for backup medical consult Dr. Marga Sebastian with cardiology monitor for orthostasis and failure as we rehydrate her  2. CKD (chronic kidney disease)-control blood sugars control blood pressure recheck BMP monitor UA and renal output, push clear liquids push oral fluids add IV fluids as needed  3. Osteoarthritis,  Lumbar spinal stenosis, DJD (degenerative joint disease), lumbar-add Lidoderm BenGay and heat lowest effective dose of opiates  4. SCC (squamous cell carcinoma), arm, Eczematous dermatitis-topical steroids add co-Q10 vitamin D  5. Vitamin D and B12 deficiency-high-dose vitamin D and B12  6.    Spinal stenosis in cervical region with cervical myelopathy-inoperable due to moderate multiple medical comorbidities-PT and OT are her greatest hope for recovery her blood sugars are elevated wean steroids  7. Uncontrolled type 2 diabetes mellitus uncontrolled with hyperlipidemia and, Obesity (BMI 30-39. 9)-fingerstick blood sugars q. before meals and at bedtime dose and titrate insulin and carbohydrate insulin intake add diabetic add and dietary Ed continue to titrate carbohydrates and titrate insulin. 8. Severely [place] (hard of hearing),   Vestibular neuronitis of both ears  9. Nausea, anxiety and elevated blood sugars due to steroids for cervical myelopathy-steroid taper and titrate benzodiazepines if needed titrate Zofran and Antivert monitor stools for blood while patient is on Coumadin and steroids as she is high risk for bleed  10. Acute on chronic UTI with history of neurogenic bowel and bladder-cath if no void check postvoid residuals teach Crede method recheck stat UA status post treatment of UTI with Cipro and await for sensitivity. Check stat CBC consult urology.          Michaela Comas MD Lelon Lesches, D.O., PM&R     Attending    [address]

## 2020-07-04 NOTE — PROGRESS NOTES
Department of Internal Medicine  Progress Note      SUBJECTIVE: Afebrile and hemodynamically stable. Voices no complaints. No acute events overnight.        ROS:  All 12 systems reviewed and negative except mentioned in HPI and Assessment and plan    MEDICATIONS:  Current Facility-Administered Medications   Medication Dose Route Frequency Provider Last Rate Last Dose    ciprofloxacin (CIPRO) tablet 250 mg  250 mg Oral BID  Catrachita Scullin, DO   250 mg at 07/04/20 0850    lactobacillus acidophilus [facility]) 2 tablet  2 tablet Oral TID Catrachita Scullin, DO   2 tablet at 07/04/20 0851    HYDROcodone-acetaminophen (NORCO) 5-325 MG per tablet 1 tablet  1 tablet Oral Q4H PRN Catrachita Scullin, DO   1 tablet at 07/02/20 1657    isosorbide mononitrate (IMDUR) extended release tablet 30 mg  30 mg Oral Daily Gualberto Lopez   30 mg at 07/04/20 0851    insulin glargine (LANTUS) injection vial 20 Units  20 Units Subcutaneous Nightly Christiano Mo MD   20 Units at 07/03/20 2117    insulin lispro (HUMALOG) injection vial 4 Units  4 Units Subcutaneous TID  Christiano Mo MD   4 Units at 07/04/20 1237    amLODIPine (NORVASC) tablet 10 mg  10 mg Oral Daily Graciela Pereira MD   10 mg at 07/04/20 0851    hydrALAZINE (APRESOLINE) tablet 50 mg  50 mg Oral TID Graciela Pereira MD   50 mg at 07/04/20 0851    hydrALAZINE (APRESOLINE) injection 10 mg  10 mg Intravenous Q4H PRN Graciela Pereira MD        Vitamin D (CHOLECALCIFEROL) tablet 2,000 Units  2,000 Units Oral Dinner Catrachita Scullin, DO   2,000 Units at 07/03/20 1659    lidocaine 4 % external patch 3 patch  3 patch Transdermal Daily Catrachita Scullin, DO   3 patch at 07/04/20 0851    fleet rectal enema 1 enema  1 enema Rectal Daily PRN Catrachita Scullin, DO        acetaminophen (TYLENOL) tablet 500 mg  500 mg Oral Q8H PRN Aly Orris, APRN - CNP   500 mg at 07/01/20 1538    analgesic ointment ointment   Topical PRN Aly Orris, APRN - CNP        metaxalone [facility]) tablet 400 mg  400 mg Oral TID PRN SYLVIE Ruiz - CNP   400 mg at 07/01/20 1538    magnesium hydroxide (MILK OF MAGNESIA) 400 MG/5ML suspension 30 mL  30 mL Oral Daily PRN Marcos Hesteron, DO        ondansetron (ZOFRAN-ODT) disintegrating tablet 4 mg  4 mg Oral Q8H PRN Marcos Hesteron, DO        Or    ondansetron (ZOFRAN) injection 4 mg  4 mg Intravenous Q6H PRN Marcos Hetseron, DO        sodium chloride flush 0.9 % injection 10 mL  10 mL Intravenous PRN Marcos Hesteron, DO        atorvastatin (LIPITOR) tablet 10 mg  10 mg Oral Nightly Marcos Goon, DO   10 mg at 07/03/20 2113    carvedilol (COREG) tablet 12.5 mg  12.5 mg Oral BID  Marcos Sullivan, DO   12.5 mg at 07/04/20 0851    clopidogrel (PLAVIX) tablet 75 mg  75 mg Oral Daily Paola Chavez, DO   75 mg at 07/04/20 0851    dextrose 5 % solution  100 mL/hr Intravenous PRN Marcos Hesteron, DO        dextrose 50 % IV solution  12.5 g Intravenous PRN Marcos Hesteron, DO        gabapentin (NEURONTIN) capsule 100 mg  100 mg Oral Nightly Paola Chavez, DO   100 mg at 07/03/20 2113    glucagon (rDNA) injection 1 mg  1 mg Intramuscular PRN Marcos Sullivan, DO        glucose (GLUTOSE) 40 % oral gel 15 g  15 g Oral PRN Marcos Hesteron, DO        hydrALAZINE (APRESOLINE) injection 10 mg  10 mg Intravenous Q4H PRN Marcos Sullivan, DO   10 mg at 06/27/20 1655    insulin lispro (HUMALOG) injection vial 0-12 Units  0-12 Units Subcutaneous TID  Paola Chavez, DO   6 Units at 07/04/20 1237    insulin lispro (HUMALOG) injection vial 0-6 Units  0-6 Units Subcutaneous Nightly Marcos Sullivan, DO   1 Units at 07/03/20 2116    meclizine (ANTIVERT) tablet 12.5 mg  12.5 mg Oral TID PRN Marcos Hesteron, DO        nitrofurantoin (macrocrystal-monohydrate) (MACROBID) capsule 100 mg  100 mg Oral Nightly Paola Scott, DO   100 mg at 07/03/20 2113    sacubitril-valsartan (ENTRESTO) 24-26 MG per tablet 1 tablet  1 tablet Oral BID Marcos Sullivan DO   1 tablet at 07/04/20 0853    [START ON 6/19/2021] warfarin (COUMADIN) daily dosing (placeholder)   Other RX Placeholder Darien Clarke DO             OBJECTIVE:  Vital Signs:  Vitals:    07/04/20 0739   BP: (!) 152/73   Pulse: 60   Resp: 17   Temp: 97 °F (36.1 °C)   SpO2: 99%       Focal exam:      General Exam (except as mentioned above):  CONSTITUTIONAL: Awake, alert, no apparent distress  EYES:  PERRL, conjunctiva normal  ENT:  Normocephalic, atraumatic  NECK:  Supple  BACK:  Symmetric  LUNGS:  CTAB except bilateral basilar crackles. CARDIOVASCULAR:  S1S2 present  ABDOMEN:  soft, non-distended, non-tender  MUSCULOSKELETAL:  There is no redness, warmth, or swelling of the joints. NEUROLOGIC:  Alert, awake, oriented x 3. No FND  EXTREMITIES: Warm and well perfused. LABS  Recent Labs     07/03/20  0534   WBC 10.7   RBC 3.38*   HGB 10.3*   HCT 31.9*   MCV 94.3   MCH 30.5   MCHC 32.4*   RDW 15.1*          Recent Labs     07/02/20  0644      K 4.0      CO2 19*   BUN 49*   CREATININE 1.32*   GLUCOSE 110*   CALCIUM 9.1       No results for input(s): MG in the last 72 hours. ASSESSMENT AND PLAN    Active Hospital Problems    Diagnosis Date Noted    History of ST elevation myocardial infarction (STEMI) [I25.2]      Priority: High    HLD (hyperlipidemia) [E78.5] 03/18/2015     Priority: High    CKD (chronic kidney disease) [N18.9] 03/18/2015     Priority: High    HTN (hypertension) [I10] 11/29/2011     Priority: High    Vitamin D deficiency [E55.9] 03/18/2015     Priority: Low    Osteoarthritis [M19.90] 05/29/2012     Priority: Low    Uncontrolled type 2 diabetes mellitus with hyperglycemia (HCC) [E11.65]     DJD (degenerative joint disease), lumbar [M47.816] 06/24/2020    History of PTCA [Z98.61] 06/24/2020    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  [R26.9]

## 2020-07-04 NOTE — PLAN OF CARE
Problem: Falls - Risk of:  Goal: Will remain free from falls  Description: Will remain free from falls  7/4/2020 0034 by Eden Inman RN  Outcome: Ongoing     Problem: Falls - Risk of:  Goal: Absence of physical injury  Description: Absence of physical injury  7/4/2020 0034 by Eden Inman RN  Outcome: Ongoing     Problem: Skin Integrity:  Goal: Will show no infection signs and symptoms  Description: Will show no infection signs and symptoms  7/4/2020 0034 by Eden Inman RN  Outcome: Ongoing     Problem: Skin Integrity:  Goal: Absence of new skin breakdown  Description: Absence of new skin breakdown  7/4/2020 0034 by Eden Inman RN  Outcome: Ongoing     Problem: IP SWALLOWING  Goal: LTG - patient will tolerate the least restrictive diet consistency to allow for safe consumption of daily meals  7/4/2020 0034 by Eden Inman RN  Outcome: Ongoing     Problem: IP COMMUNICATION/DYSARTHRIA  Goal: LTG - patient will improve expressive language skills to allow for communication of wants and needs in daily activities  7/4/2020 0034 by Eden Inman RN  Outcome: Ongoing     Problem: SAFETY  Goal: LTG - patient will adhere to hip precautions during ADL's and transfers  7/4/2020 0034 by Eden Inman RN  Outcome: Ongoing     Problem: SAFETY  Goal: LTG - Patient will demonstrate safety requirements appropriate to situation/environment  7/4/2020 0034 by Eden Inman RN  Outcome: Ongoing     Problem: SAFETY  Goal: LTG - patient will utilize safety techniques  7/4/2020 0034 by Eden Inman RN  Outcome: Ongoing     Problem: SAFETY  Goal: STG - patient locks brakes on wheelchair  7/4/2020 0034 by Eden Inman RN  Outcome: Ongoing     Problem: SAFETY  Goal: STG - Patient uses call light consistently to request assistance with transfers  7/4/2020 0034 by Eden Inman RN  Outcome: Ongoing     Problem: SAFETY  Goal: STG - patient uses gait belt during all transfers  7/4/2020 0034 by Zelalem Castellanos RN  Outcome: Ongoing     Problem: Activity:  Goal: Ability to avoid complications of mobility impairment will improve  Description: Ability to avoid complications of mobility impairment will improve  7/4/2020 0034 by Zelalem Castellanos RN  Outcome: Ongoing     Problem: Activity:  Goal: Range of joint motion will improve  Description: Range of joint motion will improve  7/4/2020 0034 by Zelalem Castellanos RN  Outcome: Ongoing     Problem: Activity:  Goal: Ability to ambulate will improve  Description: Ability to ambulate will improve  7/4/2020 0034 by Zelalem Castellanos RN  Outcome: Ongoing     Problem:  Activity:  Goal: Muscle strength will improve  Description: Muscle strength will improve  7/4/2020 0034 by Zelalem Castellanos RN  Outcome: Ongoing     Problem: Safety:  Goal: Ability to remain free from injury will improve  Description: Ability to remain free from injury will improve  7/4/2020 0034 by Zelalem Castellanos RN  Outcome: Ongoing

## 2020-07-04 NOTE — PROGRESS NOTES
Assessment completed earlier in the shift. VSS. Denies pain. LBM 7/2/2020. HS OT- 161. Insulin administered per MAR and snack provided. Purewick in place. No distress noted. Call light within reach and bed alarm activated.   Electronically signed by Slime Monroy RN on 7/4/2020 at 1:03 AM

## 2020-07-04 NOTE — PLAN OF CARE
Problem: Falls - Risk of:  Goal: Will remain free from falls  Description: Will remain free from falls  7/4/2020 1341 by Larayne Halsted, RN  Outcome: Ongoing  7/4/2020 0034 by Екатерина Alvarado RN  Outcome: Ongoing  Goal: Absence of physical injury  Description: Absence of physical injury  7/4/2020 1341 by Larayne Halsted, RN  Outcome: Ongoing  7/4/2020 0034 by Екатерина Alvarado RN  Outcome: Ongoing     Problem: Skin Integrity:  Goal: Will show no infection signs and symptoms  Description: Will show no infection signs and symptoms  7/4/2020 1341 by Larayne Halsted, RN  Outcome: Ongoing  7/4/2020 0034 by Екатерина Alvarado RN  Outcome: Ongoing  Goal: Absence of new skin breakdown  Description: Absence of new skin breakdown  7/4/2020 1341 by Larayne Halsted, RN  Outcome: Ongoing  7/4/2020 0034 by Екатерина Alvarado RN  Outcome: Ongoing     Problem: IP SWALLOWING  Goal: LTG - patient will tolerate the least restrictive diet consistency to allow for safe consumption of daily meals  7/4/2020 0034 by Екатерина Alvarado RN  Outcome: Ongoing     Problem: IP COMMUNICATION/DYSARTHRIA  Goal: LTG - patient will improve expressive language skills to allow for communication of wants and needs in daily activities  7/4/2020 0034 by Екатерина Alvarado RN  Outcome: Ongoing     Problem: SAFETY  Goal: LTG - patient will adhere to hip precautions during ADL's and transfers  7/4/2020 1341 by Larayne Halsted, RN  Outcome: Ongoing  7/4/2020 0034 by Екатерина Alvarado RN  Outcome: Ongoing  Goal: LTG - Patient will demonstrate safety requirements appropriate to situation/environment  7/4/2020 1341 by Larayne Halsted, RN  Outcome: Ongoing  7/4/2020 0034 by Екатерина Alvarado RN  Outcome: Ongoing  Goal: LTG - patient will utilize safety techniques  7/4/2020 1341 by Larayne Halsted, RN  Outcome: Ongoing  7/4/2020 0034 by Екатерина Alvarado RN  Outcome: Ongoing  Goal: STG - patient locks brakes on wheelchair  7/4/2020 1341 by Brooke Moe RN  Outcome: Ongoing  7/4/2020 0034 by My Espana RN  Outcome: Ongoing  Goal: STG - Patient uses call light consistently to request assistance with transfers  7/4/2020 1341 by Brooke Moe RN  Outcome: Ongoing  7/4/2020 0034 by My Espnaa RN  Outcome: Ongoing  Goal: STG - patient uses gait belt during all transfers  7/4/2020 1341 by Brooke Moe RN  Outcome: Ongoing  7/4/2020 0034 by My Espana RN  Outcome: Ongoing     Problem:  Activity:  Goal: Ability to avoid complications of mobility impairment will improve  Description: Ability to avoid complications of mobility impairment will improve  7/4/2020 1341 by Brooke Moe RN  Outcome: Ongoing  7/4/2020 0034 by My Espana RN  Outcome: Ongoing  Goal: Range of joint motion will improve  Description: Range of joint motion will improve  7/4/2020 1341 by Brooke Moe RN  Outcome: Ongoing  7/4/2020 0034 by My Espana RN  Outcome: Ongoing  Goal: Ability to ambulate will improve  Description: Ability to ambulate will improve  7/4/2020 1341 by Brooke Moe RN  Outcome: Ongoing  7/4/2020 0034 by My Espana RN  Outcome: Ongoing  Goal: Muscle strength will improve  Description: Muscle strength will improve  7/4/2020 1341 by Brooke Moe RN  Outcome: Ongoing  7/4/2020 0034 by My Espana RN  Outcome: Ongoing     Problem: Safety:  Goal: Ability to remain free from injury will improve  Description: Ability to remain free from injury will improve  7/4/2020 1341 by Brooke Moe RN  Outcome: Ongoing  7/4/2020 0034 by My Espana RN  Outcome: Ongoing

## 2020-07-04 NOTE — PROGRESS NOTES
Occupational Therapy  Facility/Department: [facility]  Daily Treatment Note  NAME: Laya Forte  : 6/10/1927  MRN: [id number]    Date of Service: 2020    Discharge Recommendations:  Continue to assess pending progress       Assessment      REQUIRES OT FOLLOW UP: Yes  Activity Tolerance  Activity Tolerance: Patient Tolerated treatment well  Safety Devices  Safety Devices in place: Yes  Type of devices: All fall risk precautions in place         Patient Diagnosis(es): There were no encounter diagnoses. 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 has a past surgical history that includes Appendectomy; Rectocele repair; Cystocele repair; Ankle surgery; Breast biopsy; Cataract removal (); eye surgery; Tonsillectomy; Hysterectomy; and Coronary angioplasty with stent (2019).     Restrictions  Restrictions/Precautions  Restrictions/Precautions: Fall Risk  Position Activity Restriction  Other position/activity restrictions: [facility]  Subjective   General  Chart Reviewed: Yes  Patient assessed for rehabilitation services?: Yes  Response to previous treatment: Patient with no complaints from previous session  Family / Caregiver Present: No  Referring Practitioner: Dr Db Campbell  Diagnosis: NTSCI with imp mob and ADLs 2° cervical myelopathy and vestibular neuronitis  Subjective  Subjective:    Pain Assessment  Pain Assessment: 0-10  Pain Level: 0  Pre Treatment Pain Screening  Pain at present: 0  Scale Used: Numeric Score  Intervention List: Patient able to continue with treatment;Patient declined any intervention  Vital Signs  Patient Currently in Pain: Denies   Orientation  Orientation  Overall Orientation Status: Within Functional Limits  Objective        While seated at tabletop, pt tied bows onto foam peg board using ribbon and B UEs to improve fine motor coordination and manipulation during self care. Pt demonstrates good ability to manipulate each ribbon and tie bows neatly. Pt able to tie 15 bows in 15 minutes. Pt then untied all bows and returned them to the container with good coordination. While seated at tabletop, pt placed graded clips varying from 1lb-8lbs onto horizontal and vertical rods using B UEs and 1lb wrist weights to improve strength and endurance during ADLs and IADLs. Pt completed repetitive reaching during activity and required no rest breaks. Pt then removed clips based on color that therapist named and had no errors. Pt completed with good activity tolerance. Problem solving incorporated into activity.            Plan   Plan  Times per week: 5-7 times per week  Plan weeks: 2 weeks  Current Treatment Recommendations: Strengthening, Endurance Training, Neuromuscular Re-education, Self-Care / ADL, Balance Training, Functional Mobility Training, Safety Education & Training, Patient/Caregiver Education & Training  Plan Comment: Continue per OT POC for planned d/c on 7-8-20  G-Code     OutComes Score                                                  AM-PAC Score             Goals  Patient Goals   Patient goals : \"to at least do things in my home\"       Therapy Time   Individual Concurrent Group Co-treatment   Time In 1100         Time Out 1130         Minutes 30           Therapeutic activities: 30 minutes     Bernhard Councilman, OT   Electronically signed by Bernhard Councilman, OT on 7/4/2020 at 11:39 AM

## 2020-07-04 NOTE — PROGRESS NOTES
Nutrition Education    Type and Reason for Visit: Consult, Patient Education(Diabetic Diet teaching)    Nutrition Assessment:  Met with pt to review Basic Diabetes Meal Planning Guidelines. Reviewed importance of consistent carbohydrates at each meal limiting carb choices to 3 per meal.  Discussed proper meal spacing for glycemic control. Identified carbohydrate containing foods and serving sizes. Utilized breakfast ticket to demonstrate carbohydrate counting. Discussed food label reading and provided sample meal plans. RDN contact number provided for any questions prior to discharge    · Verbally reviewed information with patient  · Written educational materials provided. · Contact name and number provided. · Refer to Patient Education activity for more details.     Electronically signed by Musa Carrizales, YOLIS, LD on 7/4/20 at 8:58 AM EDT

## 2020-07-05 LAB
GLUCOSE BLD-MCNC: 135 MG/DL (ref 60–115)
GLUCOSE BLD-MCNC: 140 MG/DL (ref 60–115)
GLUCOSE BLD-MCNC: 147 MG/DL (ref 60–115)
GLUCOSE BLD-MCNC: 190 MG/DL (ref 60–115)
INR BLD: 2.6
PERFORMED ON: ABNORMAL
PROTHROMBIN TIME: 27.6 SEC (ref 12.3–14.9)

## 2020-07-05 PROCEDURE — 97110 THERAPEUTIC EXERCISES: CPT

## 2020-07-05 PROCEDURE — 6370000000 HC RX 637 (ALT 250 FOR IP): Performed by: PHYSICIAN ASSISTANT

## 2020-07-05 PROCEDURE — 99232 SBSQ HOSP IP/OBS MODERATE 35: CPT | Performed by: INTERNAL MEDICINE

## 2020-07-05 PROCEDURE — 97116 GAIT TRAINING THERAPY: CPT

## 2020-07-05 PROCEDURE — 1180000000 HC REHAB R&B

## 2020-07-05 PROCEDURE — 85610 PROTHROMBIN TIME: CPT

## 2020-07-05 PROCEDURE — 6370000000 HC RX 637 (ALT 250 FOR IP): Performed by: PHYSICAL MEDICINE & REHABILITATION

## 2020-07-05 PROCEDURE — 36415 COLL VENOUS BLD VENIPUNCTURE: CPT

## 2020-07-05 PROCEDURE — 6370000000 HC RX 637 (ALT 250 FOR IP): Performed by: INTERNAL MEDICINE

## 2020-07-05 RX ORDER — WARFARIN SODIUM 4 MG/1
4 TABLET ORAL
Status: COMPLETED | OUTPATIENT
Start: 2020-07-05 | End: 2020-07-05

## 2020-07-05 RX ADMIN — ANALGESIC BALM: 1.74; 4.06 OINTMENT TOPICAL at 20:47

## 2020-07-05 RX ADMIN — VITAMIN D, TAB 1000IU (100/BT) 2000 UNITS: 25 TAB at 16:51

## 2020-07-05 RX ADMIN — HYDRALAZINE HYDROCHLORIDE 50 MG: 50 TABLET, FILM COATED ORAL at 08:20

## 2020-07-05 RX ADMIN — WARFARIN SODIUM 4 MG: 4 TABLET ORAL at 16:51

## 2020-07-05 RX ADMIN — INSULIN GLARGINE 15 UNITS: 100 INJECTION, SOLUTION SUBCUTANEOUS at 20:51

## 2020-07-05 RX ADMIN — LACTOBACILLUS TAB 2 TABLET: TAB at 20:47

## 2020-07-05 RX ADMIN — CARVEDILOL 12.5 MG: 12.5 TABLET, FILM COATED ORAL at 08:21

## 2020-07-05 RX ADMIN — HYDRALAZINE HYDROCHLORIDE 50 MG: 50 TABLET, FILM COATED ORAL at 20:47

## 2020-07-05 RX ADMIN — AMLODIPINE BESYLATE 10 MG: 10 TABLET ORAL at 08:21

## 2020-07-05 RX ADMIN — LACTOBACILLUS TAB 2 TABLET: TAB at 14:19

## 2020-07-05 RX ADMIN — SACUBITRIL AND VALSARTAN 1 TABLET: 24; 26 TABLET, FILM COATED ORAL at 20:47

## 2020-07-05 RX ADMIN — CARVEDILOL 12.5 MG: 12.5 TABLET, FILM COATED ORAL at 16:51

## 2020-07-05 RX ADMIN — NITROFURANTOIN (MONOHYDRATE/MACROCRYSTALS) 100 MG: 75; 25 CAPSULE ORAL at 20:47

## 2020-07-05 RX ADMIN — SACUBITRIL AND VALSARTAN 1 TABLET: 24; 26 TABLET, FILM COATED ORAL at 08:20

## 2020-07-05 RX ADMIN — GABAPENTIN 100 MG: 100 CAPSULE ORAL at 20:47

## 2020-07-05 RX ADMIN — CIPROFLOXACIN 250 MG: 500 TABLET, FILM COATED ORAL at 08:21

## 2020-07-05 RX ADMIN — CLOPIDOGREL BISULFATE 75 MG: 75 TABLET ORAL at 08:21

## 2020-07-05 RX ADMIN — HYDRALAZINE HYDROCHLORIDE 50 MG: 50 TABLET, FILM COATED ORAL at 14:19

## 2020-07-05 RX ADMIN — LACTOBACILLUS TAB 2 TABLET: TAB at 08:20

## 2020-07-05 RX ADMIN — CIPROFLOXACIN 250 MG: 500 TABLET, FILM COATED ORAL at 16:51

## 2020-07-05 RX ADMIN — ATORVASTATIN CALCIUM 10 MG: 10 TABLET, FILM COATED ORAL at 20:47

## 2020-07-05 RX ADMIN — ISOSORBIDE MONONITRATE 30 MG: 30 TABLET, EXTENDED RELEASE ORAL at 08:20

## 2020-07-05 ASSESSMENT — PAIN SCALES - GENERAL
PAINLEVEL_OUTOF10: 0
PAINLEVEL_OUTOF10: 0

## 2020-07-05 NOTE — PROGRESS NOTES
Subjective: The patient complains of severe  acute on chronic neck pain and acute upper extremity weakness left greater than right partially relieved by PT, OT, steroid taper and exacerbated by recent fall and progressive cervical spinal stenosis with myelopathy. ROS x10: The patient also complains of severely impaired mobility and activities of daily living. Otherwise no new problems with vision, hearing, nose, mouth, throat, dermal, cardiovascular, GI, , pulmonary, musculoskeletal, psychiatric or neurological. See Rehab H&P on Rehab chart dated . Vital signs:  BP (!) 161/65   Pulse 63   Temp 97 °F (36.1 °C)   Resp 16   Ht 5' 3\" (1.6 m)   Wt 188 lb 7.9 oz (85.5 kg)   LMP  (LMP Unknown)   SpO2 97%   BMI 33.39 kg/m²   I/O:   PO/Intake:  fair PO intake,  3carb ADA diet    Bowel/Bladder:   Incontinent-trial timed voiding gram-negative UTI active and chronic, opiate related constipation  General:  Patient is well developed, adequately nourished, non-obese and     well kempt. HEENT:    PERRLA, hearing intact to loud voice, external inspection of ear     and nose benign. Inspection of lips, tongue and gums benign  Musculoskeletal: No significant change in strength or tone. All joints stable. Inspection and palpation of digits and nails show no clubbing,       cyanosis or inflammatory conditions. Neuro/Psychiatric: Affect: flat but pleasant. Alert and oriented to person, place and     situation. No significant change in deep tendon reflexes or     Sensation-bilateral upper extremity weakness left greater than right left lower extremity weakness is    also there  Lungs:  Diminished, CTA-B. Respiration effort is normal at rest.     Heart:   S1 = S2, RRR. No loud murmurs. Abdomen:  Soft, non-tender, no enlargement of liver or spleen. Extremities:  No significant lower extremity edema or tenderness.   Skin:   Intact to general survey, no visualized or palpated problems. Rehabilitation:  Physical therapy: FIMS:  Bed Mobility: Scooting: Supervision    Transfers: Sit to Stand: Stand by assistance  Stand to sit: Stand by assistance  Bed to Chair: Supervision, Ambulation 1  Surface: level tile  Device: Rollator  Assistance: Stand by assistance  Quality of Gait: fwd flex, slow maria luisa, mild SOB, no LOB with turns  Gait Deviations: Slow Maria Luisa, Increased JUAN  Distance:  150ft  Comments: VCs for posture, Stairs  # Steps : 8(4 steps then seated rest. )  Stairs Height: 6\"  Rails: Bilateral  Curbs: 6\"  Device: Rolling walker(Grab bar to ascend(Car) descends using [employer])  Assistance: Stand by assistance, Contact guard assistance  Comment: increased fatigue with 2nd set of steps. FIMS:  ,  , Assessment: Pt with good leonardo to treatment despite mild fatigue and SOB post ambulation    Occupational therapy: FIMS:   ,  , Assessment: Patient is a 80 year female with an new onset of coordination problems, dizziness and weakness.  Patient presents with the above stated problem areas and will benefit from OT to address the issues and increase independence    Speech therapy: FIMS:        Lab/X-ray studies reviewed, analyzed and discussed with patient and staff:   Recent Results (from the past 24 hour(s))   POCT Glucose    Collection Time: 07/04/20  4:24 PM   Result Value Ref Range    POC Glucose 116 (H) 60 - 115 mg/dl    Performed on ACCU-CHEK    POCT Glucose    Collection Time: 07/04/20  8:15 PM   Result Value Ref Range    POC Glucose 245 (H) 60 - 115 mg/dl    Performed on ACCU-CHEK    Protime-INR    Collection Time: 07/05/20  5:28 AM   Result Value Ref Range    Protime 27.6 (H) 12.3 - 14.9 sec    INR 2.6    POCT Glucose    Collection Time: 07/05/20  6:06 AM   Result Value Ref Range    POC Glucose 135 (H) 60 - 115 mg/dl    Performed on ACCU-CHEK    POCT Glucose    Collection Time: 07/05/20 11:10 AM   Result Value Ref Range    POC Glucose 190 (H) 60 - 115 mg/dl    Performed on ACCU-CHEK Ct Head 6/19/2020   NO ACUTE INTRACRANIAL PROCESS OR SIGNIFICANT CHANGE FROM YESTERDAY IDENTIFIED. Ct Head   6/18/2020   No acute intracranial process or significant interval change. Mri Cervical Spine  6/20/2020    Craniocervical junction: Craniocervical junction is within normal limits. Bone marrow: No sign of acute fracture. No abnormality of the marrow signal to suggest any metastatic or diffuse bone disease. Soft tissues: Cervical soft tissues are within normal limits. Cervical cord: The cervical cord has normal morphology and signal. There is no sign of any extramedullary hemorrhage or fluid collection. C1/2: The odontoid and the C1-2 articulation are normal. C2/C3:  There is no neural foraminal stenosis. There is no evidence of central canal stenosis. There is no foraminal stenosis. C3/C4:  Minimal disc osteophyte complex with ventral ridging. No central canal stenosis. No significant foraminal narrowing. C4/C5:  Prominent disc osteophyte complex with effacement of the anterior CSF column in cord deformity. No abnormal cord signal intensity. Findings resulting in moderate central canal stenosis. C5/C6:  There is no neural foraminal stenosis. There is no evidence of central canal stenosis. There is no foraminal stenosis. C6/C7:  Moderate discussed by complex with effacement of the anterior CSF column and cord deformity. No abnormal cord signal intensity. Findings resulting in moderate to severe central canal stenosis. C7-T1:  Minimal disc osteophyte complex without central canal or significant foraminal narrowing     Severe central canal stenosis at C5-6. Moderate to severe central canal stenosis at C4-5. No abnormal cord signal intensity. Mri Thoracic Spine  : 6/20/2020     Bones: The thoracic vertebrae are normally aligned with no evidence of fracture. There is normal marrow signal throughout the thoracic spine. Disc space:  There is no focal disc herniation or evidence for spinal canal stenosis. Disc spaces are age-appropriate. Spinal cord: The thoracic cord is normal in configuration and signal intensity. Soft tissues: There is no paraspinal soft tissue mass or fluid collection. Negative MRI of the thoracic spine. No signs of cord compression. Mri Lumbar Spine   6/20/2020  Levorotoscoliosis with advanced multilevel degenerative changes. Severe foraminal narrowing bilaterally at L4-5. There may be mild transverse canal narrowing at L3-4 and L4-5 but there is no definite central canal stenosis       Ct Abdomen Pelvis  6/19/2020  BORDERLINE PELVIECTASIS IN LEFT KIDNEY. NO ACUTE PATHOLOGY IN THE ABDOMEN OR PELVIS. Xr Chest   6/19/2020   Osseous structures intact. Cardiopericardial silhouette normal. Pulmonary vasculature normal. Lungs clear. NO ACUTE CARDIOPULMONARY DISEASE. Mri Brain Wo 6/19/2020   NO EVIDENCE OF ACUTE CVA. GENERALIZED PARENCHYMAL VOLUME LOSS AND NONSPECIFIC WHITE MATTER CHANGES, MOST LIKELY SECONDARY TO CHRONIC SMALL VESSEL ISCHEMIC CHANGES. MUCOSAL THICKENING IN ETHMOID AND MAXILLARY SINUSES. A FEW SMALL NONSPECIFIC FOCI ON GRADIENT ECHO SEQUENCES WITHIN THE WHITE MATTER BILATERALLY. Us Carotid Artery   6/19/2020   50-69% NARROWING PREDICTED OF BOTH INTERNAL CAROTID ARTERIES, NOT SIGNIFICANTLY CHANGED FROM 11/14/2018. Previous extensive, complex labs, notes and diagnostics reviewed and analyzed. ALLERGIES:    Allergies as of 06/24/2020 - Review Complete 06/24/2020   Allergen Reaction Noted    Metoclopramide  11/29/2011    Pantoprazole Other (See Comments) 12/08/2015    Pcn [penicillins]  11/29/2011    Protonix [pantoprazole sodium]  11/29/2011    Tramadol  11/29/2011      (please also verify by checking [facility])       Complex Physical Medicine & Rehab Issues Assess & Plan:   1. Severe abnormality of gait and mobility and impaired self-care and ADL's secondary to progressive cervical myelopathy.   Functional and medical status reassessed regarding patients ability to participate in therapies and patient found to be able to participate in acute intensive comprehensive inpatient rehabilitation program including PT/OT to improve balance, ambulation, ADLs, and to improve the P/AROM. Therapeutic modifications regarding activities in therapies, place, amount of time per day and intensity of therapy made daily. In bed therapies or bedside therapies prn.   2. 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 Check for C.difficile x1 if >2 loose stools in 24 hours, continue bowel & bladder program.  Monitor bowel and bladder function. Lactinex 2 PO every AC. MOM prn, Brown Bomb prn, Glycerin suppository prn, enema prn. Begin spinal cord type bowel and bladder program.  Patient may need a Montero catheter if her bladder is not recovering. Consult urology. Add timed voiding. 3. Severe neck and low back pain as well as generalized OA pain: reassess pain every shift and prior to and after each therapy session, give prn Tylenol and Norco titration using lowest effective dose, modalities prn in therapy, Lidoderm, K-pad prn. Use modalities such as heat and BenGay 1 available and when helpful use lowest effective dose of Norco titrating off if possible prior to discharge. 4. Skin healing and breakdown risk:  continue pressure relief program.  Daily skin exams and reports from nursing. 5. Severe fatigue due to nutritional and hydration deficiency: Add vitamin B12 vitamin D and CoQ10 continue to monitor I&Os, calorie counts prn, dietary consult prn. Transition to 3 carb per meal diet with an at bedtime snack add diabetic add and dietary Ed  6. Acute episodic insomnia with situational adjustment disorder:  prn Ambien, monitor for day time sedation.   7. Falls risk elevated:  patient to use call light to get nursing assistance to get up, bed and chair alarm. 8. Elevated DVT risk: progressive activities in PT, continue prophylaxis SALLIE hose, elevation and Coumadin. 9. Complex discharge planning: Discharge 7/8/2020 home with her son in [facility] PT OT RN aide and social work I will progress toward her final weekly team meeting  Monday to assess progress towards goals, discuss and address social, psychological and medical comorbidities and to address difficulties they may be having progressing in therapy. Patient and family education is in progress. The patient is to follow-up with their family physician after discharge. Complex Active General Medical Issues that complicate care Assess & Plan:    1. HTN (hypertension),  HLD (hyperlipidemia), CAD,   Chronic combined systolic and diastolic congestive heart failure,  History of ST elevation myocardial infarction (STEMI),   History of PTCA,  Valvular heart disease-vital signs every shift dose and titrate cardiac medication to include Norvasc, Lipitor, Coreg, Apresoline both intravenous and oral, Coumadin, Entresto consult hospitalist for backup medical consult Dr. Santigao Maldonado with cardiology monitor for orthostasis and failure as we rehydrate her  2. CKD (chronic kidney disease)-control blood sugars control blood pressure recheck BMP monitor UA and renal output, push clear liquids push oral fluids add IV fluids as needed  3. Osteoarthritis,  Lumbar spinal stenosis, DJD (degenerative joint disease), lumbar-add Lidoderm BenGay and heat lowest effective dose of opiates  4. SCC (squamous cell carcinoma), arm, Eczematous dermatitis-topical steroids add co-Q10 vitamin D  5. Vitamin D and B12 deficiency-high-dose vitamin D and B12  6.    Spinal stenosis in cervical region with cervical myelopathy-inoperable due to moderate multiple medical comorbidities-PT and OT are her greatest hope for recovery her blood sugars are elevated wean steroids  7. Uncontrolled type 2 diabetes mellitus uncontrolled with hyperlipidemia and, Obesity (BMI 30-39. 9)-fingerstick blood sugars q. before meals and at bedtime dose and titrate insulin and carbohydrate insulin intake add diabetic add and dietary Ed continue to titrate carbohydrates and titrate insulin. 8. Severely [place] (hard of hearing),   Vestibular neuronitis of both ears  9. Nausea, anxiety and elevated blood sugars due to steroids for cervical myelopathy-steroid taper and titrate benzodiazepines if needed titrate Zofran and Antivert monitor stools for blood while patient is on Coumadin and steroids as she is high risk for bleed  10. Acute on chronic UTI with history of neurogenic bowel and bladder-cath if no void check postvoid residuals teach Crede method recheck stat UA status post treatment of UTI with Cipro and await for sensitivity. Check stat CBC consult urology.          Yovana Hinojosa D.O., PM&R     Attending    [address]

## 2020-07-05 NOTE — PLAN OF CARE
Problem: Falls - Risk of:  Goal: Will remain free from falls  Description: Will remain free from falls  7/4/2020 2337 by Sylwia Rodriguez RN  Outcome: Ongoing  7/4/2020 1341 by Abbie Watts RN  Outcome: Ongoing  Goal: Absence of physical injury  Description: Absence of physical injury  7/4/2020 2337 by Sylwia Rodriguez RN  Outcome: Ongoing  7/4/2020 1341 by Abbie Watts RN  Outcome: Ongoing     Problem: Skin Integrity:  Goal: Will show no infection signs and symptoms  Description: Will show no infection signs and symptoms  7/4/2020 2337 by Sylwia Rodriguez RN  Outcome: Ongoing  7/4/2020 1341 by Abbie Watts RN  Outcome: Ongoing  Goal: Absence of new skin breakdown  Description: Absence of new skin breakdown  7/4/2020 2337 by Sylwia Rodriguez RN  Outcome: Ongoing  7/4/2020 1341 by Abbie Watts RN  Outcome: Ongoing     Problem: IP SWALLOWING  Goal: LTG - patient will tolerate the least restrictive diet consistency to allow for safe consumption of daily meals  Outcome: Ongoing     Problem: IP COMMUNICATION/DYSARTHRIA  Goal: LTG - patient will improve expressive language skills to allow for communication of wants and needs in daily activities  Outcome: Ongoing     Problem: SAFETY  Goal: LTG - patient will adhere to hip precautions during ADL's and transfers  7/4/2020 2337 by Sylwia Rodriguez RN  Outcome: Ongoing  7/4/2020 1341 by Abbie Watts RN  Outcome: Ongoing  Goal: LTG - Patient will demonstrate safety requirements appropriate to situation/environment  7/4/2020 2337 by Sylwia Rodriguez RN  Outcome: Ongoing  7/4/2020 1341 by Abbie Watts RN  Outcome: Ongoing  Goal: LTG - patient will utilize safety techniques  7/4/2020 2337 by Sylwia Rodriguez RN  Outcome: Ongoing  7/4/2020 1341 by Abbie Watts RN  Outcome: Ongoing  Goal: STG - patient locks brakes on wheelchair  7/4/2020 2337 by Sylwia Rodriguez RN  Outcome: Ongoing  7/4/2020 1341 by Abbie Watts RN  Outcome: Ongoing  Goal: STG - Patient uses call light consistently to request assistance with transfers  7/4/2020 2337 by Idalia Martell RN  Outcome: Ongoing  7/4/2020 1341 by Larayne Halsted, RN  Outcome: Ongoing  Goal: STG - patient uses gait belt during all transfers  7/4/2020 2337 by Idalia Martell RN  Outcome: Ongoing  7/4/2020 1341 by Larayne Halsted, RN  Outcome: Ongoing     Problem:  Activity:  Goal: Ability to avoid complications of mobility impairment will improve  Description: Ability to avoid complications of mobility impairment will improve  7/4/2020 2337 by Idalia Martell RN  Outcome: Ongoing  7/4/2020 1341 by Larayne Halsted, RN  Outcome: Ongoing  Goal: Range of joint motion will improve  Description: Range of joint motion will improve  7/4/2020 2337 by Idalia Martell RN  Outcome: Ongoing  7/4/2020 1341 by Larayne Halsted, RN  Outcome: Ongoing  Goal: Ability to ambulate will improve  Description: Ability to ambulate will improve  7/4/2020 2337 by Idalia Martell RN  Outcome: Ongoing  7/4/2020 1341 by Larayne Halsted, RN  Outcome: Ongoing  Goal: Muscle strength will improve  Description: Muscle strength will improve  7/4/2020 2337 by Idalia Martell RN  Outcome: Ongoing  7/4/2020 1341 by Larayne Halsted, RN  Outcome: Ongoing     Problem: Safety:  Goal: Ability to remain free from injury will improve  Description: Ability to remain free from injury will improve  7/4/2020 2337 by Idalia Martell RN  Outcome: Ongoing  7/4/2020 1341 by Larayne Halsted, RN  Outcome: Ongoing

## 2020-07-05 NOTE — PROGRESS NOTES
Physical Therapy Rehab Treatment Note  Facility/Department: [facility]  Room: [facility]       NAME: Naveed Peck  : 6/10/1927 (22 y.o.)  MRN: [id number]  CODE STATUS: DNR-CCA    Date of Service: 2020       Restrictions:  Restrictions/Precautions: Fall Risk       SUBJECTIVE: Subjective: Reports ok for treatment. Pre Treatment Pain Screening  Pain at present: 0  Scale Used: Numeric Score  Intervention List: Patient able to continue with treatment    Post Treatment Pain Screening:  Pain Assessment  Pain Assessment: 0-10  Pain Level: 0    OBJECTIVE:     Transfers  Sit to Stand: Stand by assistance  Stand to sit: Stand by assistance    Ambulation 1  Surface: level tile  Device: Rollator  Assistance: Stand by assistance  Quality of Gait: fwd flex, slow maria luisa, mild SOB, no LOB with turns  Distance:  150ft        Exercises  Hip Flexion: x20  Knee Long Arc Quad: x20  Ankle Pumps: x20     ASSESSMENT/COMMENTS:  Assessment: Pt with good leonardo to treatment despite mild fatigue and SOB post ambulation    PLAN OF CARE/Safety:   Plan Comment: cont per POC  Safety Devices  Type of devices:  All fall risk precautions in place;Call light within reach      Therapy Time:   Individual   Time In 1130   Time Out 1200   Minutes 30     Minutes:30      Transfer/Bed mobility training:      Gait training:15      Neuro re education:     Therapeutic ex:15      Rupal Griffin PTA, 20 at 1:30 PM

## 2020-07-05 NOTE — PLAN OF CARE
Problem: Falls - Risk of:  Goal: Will remain free from falls  Description: Will remain free from falls  7/5/2020 1007 by Willian Perez RN  Outcome: Ongoing  7/4/2020 2337 by Kian Collins RN  Outcome: Ongoing  Goal: Absence of physical injury  Description: Absence of physical injury  7/5/2020 1007 by Willian Perez RN  Outcome: Ongoing  7/4/2020 2337 by Kian Collins RN  Outcome: Ongoing     Problem: Skin Integrity:  Goal: Will show no infection signs and symptoms  Description: Will show no infection signs and symptoms  7/5/2020 1007 by Willina Perez RN  Outcome: Ongoing  7/4/2020 2337 by Kian Collins RN  Outcome: Ongoing  Goal: Absence of new skin breakdown  Description: Absence of new skin breakdown  7/5/2020 1007 by Willian Perez RN  Outcome: Ongoing  7/4/2020 2337 by Kian Collins RN  Outcome: Ongoing     Problem: IP SWALLOWING  Goal: LTG - patient will tolerate the least restrictive diet consistency to allow for safe consumption of daily meals  7/5/2020 1007 by Willian Perez RN  Outcome: Ongoing  7/4/2020 2337 by Kian Collins RN  Outcome: Ongoing     Problem: IP COMMUNICATION/DYSARTHRIA  Goal: LTG - patient will improve expressive language skills to allow for communication of wants and needs in daily activities  7/5/2020 1007 by Willian Perez RN  Outcome: Ongoing  7/4/2020 2337 by Kian Collins RN  Outcome: Ongoing     Problem: SAFETY  Goal: LTG - patient will adhere to hip precautions during ADL's and transfers  7/5/2020 1007 by Willian Perez RN  Outcome: Ongoing  7/4/2020 2337 by Kian Collins RN  Outcome: Ongoing  Goal: LTG - Patient will demonstrate safety requirements appropriate to situation/environment  7/5/2020 1007 by Willian Perez RN  Outcome: Ongoing  7/4/2020 2337 by Kian Collins RN  Outcome: Ongoing  Goal: LTG - patient will utilize safety techniques  7/5/2020 1007 by Willian Perez RN  Outcome: Ongoing  7/4/2020 2337 by Mina Guzmán

## 2020-07-06 LAB
GLUCOSE BLD-MCNC: 110 MG/DL (ref 60–115)
GLUCOSE BLD-MCNC: 122 MG/DL (ref 60–115)
GLUCOSE BLD-MCNC: 171 MG/DL (ref 60–115)
GLUCOSE BLD-MCNC: 218 MG/DL (ref 60–115)
INR BLD: 2.1
PERFORMED ON: ABNORMAL
PERFORMED ON: NORMAL
PROTHROMBIN TIME: 23.6 SEC (ref 12.3–14.9)

## 2020-07-06 PROCEDURE — 6370000000 HC RX 637 (ALT 250 FOR IP): Performed by: INTERNAL MEDICINE

## 2020-07-06 PROCEDURE — 85610 PROTHROMBIN TIME: CPT

## 2020-07-06 PROCEDURE — 99233 SBSQ HOSP IP/OBS HIGH 50: CPT | Performed by: PHYSICAL MEDICINE & REHABILITATION

## 2020-07-06 PROCEDURE — 97530 THERAPEUTIC ACTIVITIES: CPT

## 2020-07-06 PROCEDURE — 97110 THERAPEUTIC EXERCISES: CPT

## 2020-07-06 PROCEDURE — 36415 COLL VENOUS BLD VENIPUNCTURE: CPT

## 2020-07-06 PROCEDURE — 6370000000 HC RX 637 (ALT 250 FOR IP): Performed by: PHYSICAL MEDICINE & REHABILITATION

## 2020-07-06 PROCEDURE — 97116 GAIT TRAINING THERAPY: CPT

## 2020-07-06 PROCEDURE — APPSS15 APP SPLIT SHARED TIME 0-15 MINUTES: Performed by: NURSE PRACTITIONER

## 2020-07-06 PROCEDURE — 97535 SELF CARE MNGMENT TRAINING: CPT

## 2020-07-06 PROCEDURE — 6370000000 HC RX 637 (ALT 250 FOR IP): Performed by: PHYSICIAN ASSISTANT

## 2020-07-06 PROCEDURE — 97112 NEUROMUSCULAR REEDUCATION: CPT

## 2020-07-06 PROCEDURE — 99231 SBSQ HOSP IP/OBS SF/LOW 25: CPT | Performed by: PHYSICIAN ASSISTANT

## 2020-07-06 PROCEDURE — 99232 SBSQ HOSP IP/OBS MODERATE 35: CPT | Performed by: PSYCHIATRY & NEUROLOGY

## 2020-07-06 PROCEDURE — 1180000000 HC REHAB R&B

## 2020-07-06 RX ADMIN — CARVEDILOL 12.5 MG: 12.5 TABLET, FILM COATED ORAL at 09:02

## 2020-07-06 RX ADMIN — ISOSORBIDE MONONITRATE 30 MG: 30 TABLET, EXTENDED RELEASE ORAL at 09:02

## 2020-07-06 RX ADMIN — CARVEDILOL 12.5 MG: 12.5 TABLET, FILM COATED ORAL at 16:58

## 2020-07-06 RX ADMIN — HYDRALAZINE HYDROCHLORIDE 50 MG: 50 TABLET, FILM COATED ORAL at 15:13

## 2020-07-06 RX ADMIN — GABAPENTIN 100 MG: 100 CAPSULE ORAL at 22:11

## 2020-07-06 RX ADMIN — SACUBITRIL AND VALSARTAN 1 TABLET: 24; 26 TABLET, FILM COATED ORAL at 22:10

## 2020-07-06 RX ADMIN — INSULIN GLARGINE 15 UNITS: 100 INJECTION, SOLUTION SUBCUTANEOUS at 22:15

## 2020-07-06 RX ADMIN — HYDRALAZINE HYDROCHLORIDE 50 MG: 50 TABLET, FILM COATED ORAL at 22:11

## 2020-07-06 RX ADMIN — CLOPIDOGREL BISULFATE 75 MG: 75 TABLET ORAL at 09:02

## 2020-07-06 RX ADMIN — LACTOBACILLUS TAB 2 TABLET: TAB at 22:10

## 2020-07-06 RX ADMIN — VITAMIN D, TAB 1000IU (100/BT) 2000 UNITS: 25 TAB at 16:57

## 2020-07-06 RX ADMIN — ATORVASTATIN CALCIUM 10 MG: 10 TABLET, FILM COATED ORAL at 22:10

## 2020-07-06 RX ADMIN — CIPROFLOXACIN 250 MG: 500 TABLET, FILM COATED ORAL at 09:01

## 2020-07-06 RX ADMIN — SACUBITRIL AND VALSARTAN 1 TABLET: 24; 26 TABLET, FILM COATED ORAL at 09:00

## 2020-07-06 RX ADMIN — CIPROFLOXACIN 250 MG: 500 TABLET, FILM COATED ORAL at 16:57

## 2020-07-06 RX ADMIN — LACTOBACILLUS TAB 2 TABLET: TAB at 15:13

## 2020-07-06 RX ADMIN — WARFARIN SODIUM 6 MG: 2 TABLET ORAL at 18:01

## 2020-07-06 RX ADMIN — LACTOBACILLUS TAB 2 TABLET: TAB at 09:01

## 2020-07-06 RX ADMIN — AMLODIPINE BESYLATE 10 MG: 10 TABLET ORAL at 09:00

## 2020-07-06 RX ADMIN — HYDRALAZINE HYDROCHLORIDE 50 MG: 50 TABLET, FILM COATED ORAL at 09:02

## 2020-07-06 ASSESSMENT — ENCOUNTER SYMPTOMS
VOMITING: 0
SHORTNESS OF BREATH: 0
COUGH: 0
WHEEZING: 0
COLOR CHANGE: 0
TROUBLE SWALLOWING: 0
NAUSEA: 0
CHEST TIGHTNESS: 0

## 2020-07-06 ASSESSMENT — PAIN SCALES - GENERAL
PAINLEVEL_OUTOF10: 0
PAINLEVEL_OUTOF10: 0

## 2020-07-06 NOTE — PROGRESS NOTES
Physical Therapy Rehab Treatment Note  [facility]       NAME: Shabnam Davila  : 6/10/1927 (80 y.o.)  MRN: [id number]  CODE STATUS: DNR-CCA    Date of Service: 2020  Chart Reviewed: Yes  Family / Caregiver Present: Yes(Pt's son)    Restrictions:  Restrictions/Precautions: Fall Risk       SUBJECTIVE: Subjective: Pt's son reports he is her POA and lives next door. States he comes over to help pt with her meds and other things daily. Pain Screening  Patient Currently in Pain: No     Post Treatment Pain Screenin/10     OBJECTIVE:                Transfers  Sit to Stand: Modified independent  Stand to sit: Modified independent  Comment: Pt demo'd good safety with rollator breaks. Ambulation  Ambulation?: Yes  Ambulation 1  Surface: level tile  Device: Rollator  Assistance Supervision  Quality of Gait: R side trandelenburg  Distance: 100ft     ASSESSMENT/COMMENTS:  Assessment: Pt indep with rollator short distances. Supervision with increased faitigue increased distances. Pt is nearing goals. Patient Education: Pt's son observed transfers and gait around hospital room with rollator. Son reports his mom looks and is moving around good. Reports he is not concerned with pt's D/C in 2 two days but would like to speek with the  to make sure this is still on track.       PLAN OF CARE/Safety:   Plan Comment: cont per POC      Therapy Time:   Individual   Time In 1108   Time Out 1120   Minutes 12     Minutes:time made up from missed minutes earlier this AM.        Transfer/Bed mobility trainin      Gait trainin      Neuro re education:0     Therapeutic ex:0    Lily Gary PTA, 20 at 11:32 AM

## 2020-07-06 NOTE — PROGRESS NOTES
Hospitalist Progress Note      PCP: Lillian Baum MD    Date of Admission: 6/24/2020    Interval HPI:    Patient observed participating in therapy denies any shortness of breath, chest pain, nausea, abdominal cramping, gas, constipation or diarrhea. Medications:  Reviewed    Infusion Medications    dextrose       Scheduled Medications    insulin glargine  15 Units Subcutaneous Nightly    ciprofloxacin  250 mg Oral BID WC    lactobacillus acidophilus  2 tablet Oral TID    isosorbide mononitrate  30 mg Oral Daily    insulin lispro  4 Units Subcutaneous TID WC    amLODIPine  10 mg Oral Daily    hydrALAZINE  50 mg Oral TID    Vitamin D  2,000 Units Oral Dinner    lidocaine  3 patch Transdermal Daily    atorvastatin  10 mg Oral Nightly    carvedilol  12.5 mg Oral BID WC    clopidogrel  75 mg Oral Daily    gabapentin  100 mg Oral Nightly    insulin lispro  0-12 Units Subcutaneous TID WC    insulin lispro  0-6 Units Subcutaneous Nightly    nitrofurantoin (macrocrystal-monohydrate)  100 mg Oral Nightly    sacubitril-valsartan  1 tablet Oral BID    [START ON 6/19/2021] warfarin (COUMADIN) daily dosing (placeholder)   Other RX Placeholder     PRN Meds: HYDROcodone 5 mg - acetaminophen, hydrALAZINE, fleet, acetaminophen, analgesic ointment, metaxalone, magnesium hydroxide, ondansetron **OR** ondansetron, sodium chloride flush, dextrose, dextrose, glucagon (rDNA), glucose, hydrALAZINE, meclizine    No intake or output data in the 24 hours ending 07/06/20 1010    Exam:    BP (!) 152/65   Pulse 55   Temp 97.2 °F (36.2 °C) (Axillary)   Resp 17   Ht 5' 3\" (1.6 m)   Wt 188 lb 7.9 oz (85.5 kg)   LMP  (LMP Unknown)   SpO2 95%   BMI 33.39 kg/m²     General appearance: No apparent distress, appears stated age and cooperative. HEENT: Pupils equal, round, and reactive to light. Conjunctivae/corneas clear. Neck: Supple, with full range of motion. No jugular venous distention.  Trachea midline. Respiratory:  Normal respiratory effort. Clear to auscultation, bilaterally without Rales/Wheezes/Rhonchi. Cardiovascular: Regular rate and rhythm with normal S1/S2 without murmurs, rubs or gallops. Abdomen: Soft, non-tender, non-distended with normal bowel sounds. Musculoskeletal: No clubbing, cyanosis or edema bilaterally. Full range of motion without deformity. Skin: Skin color, texture, turgor normal.  No rashes or lesions. Neuro: Alert and oriented, thought content appropriate, normal insight  Capillary Refill: Brisk,< 3 seconds   Peripheral Pulses: +2 palpable, equal bilaterally       Labs:   No results for input(s): WBC, HGB, HCT, PLT in the last 72 hours. No results for input(s): NA, K, CL, CO2, BUN, CREATININE, CALCIUM, PHOS in the last 72 hours. Invalid input(s): MAGNES  No results for input(s): AST, ALT, BILIDIR, BILITOT, ALKPHOS in the last 72 hours. Recent Labs     07/04/20  0606 07/05/20  0528 07/06/20  0533   INR 3.2 2.6 2.1     No results for input(s): Bran Luke in the last 72 hours.     Urinalysis:      Lab Results   Component Value Date    NITRU POSITIVE 07/01/2020    WBCUA >100 07/01/2020    BACTERIA FEW 07/01/2020    RBCUA 3-5 07/01/2020    BLOODU Negative 07/01/2020    SPECGRAV 1.013 07/01/2020    GLUCOSEU Negative 07/01/2020       Radiology:  No orders to display           Assessment/Plan:    Gait and mobility abnormality secondary cervical myelopathy and  vestibular neuritis: not a surgical candidate, continue rehab POC, PT/OT , had completed conservative management with tapering oral steroid, neurology and pain mgmt following     HTN/CHF/CAD/ HLD : Continue cardiac meds  Including diuretics , antihypertensives,entresto,  Statin and anticoagulation with Coumadin and plavix, monitor daily INR     CKD 3:stable, avoid nephrotoxins     Acute cystitis possibly d/t bladder dysfunction: continue daily cipro    DM II: continue POCT glucose monitoring with SSI and lantus , endocrinology managing         Additional work up or/and treatment plan may be added today or then after based on clinical progression. I am managing a portion of pt care. Some medical issues are handled by other specialists. Additional work up and treatment should be done in out pt setting by pt PCP and other out pt providers. In addition to examining and evaluating pt, I spent additional time explaining care, normal and abnormal findings, and treatment plan. All of pt questions were answered. Counseling, diet and education were  provided. Case will be discussed with nursing staff when appropriate. Family will be updated if and when appropriate.       Diet: Dietary Nutrition Supplements: Snack (see comment)  DIET CARB CONTROL; Carb Control: 3 carb choices (45 gms)/meal; Low Sodium (2 GM)    Code Status: DNR-CCA     DVT Prophylaxis: warfarin    Electronically signed by SYLVIE Delaney CNP on 7/6/2020 at 10:10 AM

## 2020-07-06 NOTE — PROGRESS NOTES
Subjective: The patient complains of severe  acute on chronic neck pain and acute upper extremity weakness left greater than right partially relieved by PT, OT, steroid taper and exacerbated by recent fall and progressive cervical spinal stenosis with myelopathy. I am concerned that she is approaching discharge in a few days and is still incontinent using a pure wick catheter at night. I have encouraged timed voids Crede method and to monitor for elevated postvoid residuals given her recent severe UTI. She has had chronic urinary retention in the past and has been on Macrobid at home. I will re-have her urine rechecked and monitor the culture and sensitivity. ROS x10: The patient also complains of severely impaired mobility and activities of daily living. Otherwise no new problems with vision, hearing, nose, mouth, throat, dermal, cardiovascular, GI, , pulmonary, musculoskeletal, psychiatric or neurological. See Rehab H&P on Rehab chart dated . Vital signs:  BP (!) 152/65   Pulse 55   Temp 97.2 °F (36.2 °C) (Axillary)   Resp 17   Ht 5' 3\" (1.6 m)   Wt 188 lb 7.9 oz (85.5 kg)   LMP  (LMP Unknown)   SpO2 95%   BMI 33.39 kg/m²   I/O:   PO/Intake:  fair PO intake,  3carb ADA diet    Bowel/Bladder:   Incontinent-trial timed voiding gram-negative UTI active and chronic, opiate related constipation  General:  Patient is well developed, adequately nourished, non-obese and     well kempt. HEENT:    PERRLA, hearing intact to loud voice, external inspection of ear     and nose benign. Inspection of lips, tongue and gums benign  Musculoskeletal: No significant change in strength or tone. All joints stable. Inspection and palpation of digits and nails show no clubbing,       cyanosis or inflammatory conditions. Neuro/Psychiatric: Affect: flat but pleasant. Alert and oriented to person, place and     situation.   No significant change in deep tendon reflexes or     Sensation-bilateral upper extremity weakness left greater than right left lower extremity weakness is    also there  Lungs:  Diminished, CTA-B. Respiration effort is normal at rest.     Heart:   S1 = S2, RRR. No loud murmurs. Abdomen:  Soft, non-tender, no enlargement of liver or spleen. Extremities:  No significant lower extremity edema or tenderness. Skin:   Intact to general survey, no visualized or palpated problems. Rehabilitation:  Physical therapy: FIMS:  Bed Mobility: Scooting: Supervision    Transfers: Sit to Stand: Stand by assistance  Stand to sit: Stand by assistance  Bed to Chair: Supervision, Ambulation 1  Surface: level tile  Device: Rollator  Assistance: Stand by assistance  Quality of Gait: fwd flex, slow shanelle, mild SOB, no LOB with turns  Gait Deviations: Slow Shanelle, Increased JUAN  Distance:  150ft  Comments: VCs for posture, Stairs  # Steps : 8(4 steps then seated rest. )  Stairs Height: 6\"  Rails: Bilateral  Curbs: 6\"  Device: Rolling walker(Grab bar to ascend(Car) descends using [address])  Assistance: Stand by assistance, Contact guard assistance  Comment: increased fatigue with 2nd set of steps. FIMS:  ,  , Assessment: Pt with good leonardo to treatment despite mild fatigue and SOB post ambulation    Occupational therapy: FIMS:   ,  , Assessment: Patient is a 80 year female with an new onset of coordination problems, dizziness and weakness.  Patient presents with the above stated problem areas and will benefit from OT to address the issues and increase independence    Speech therapy: FIMS:        Lab/X-ray studies reviewed, analyzed and discussed with patient and staff:     Recent culture shows Raoultella species-resistant to Macrobid  amoxicillin-clavulanate Sensitive <=2 mcg/mL    ceFAZolin Sensitive <=4 mcg/mL    ciprofloxacin Sensitive <=0.25 mcg/mL    gentamicin Sensitive <=1 mcg/mL    nitrofurantoin Resistant 256 mcg/mL    trimethoprim-sulfamethoxazole Sensitive <=20 mcg/mL          Recent Results canal stenosis. There is no foraminal stenosis. C6/C7:  Moderate discussed by complex with effacement of the anterior CSF column and cord deformity. No abnormal cord signal intensity. Findings resulting in moderate to severe central canal stenosis. C7-T1:  Minimal disc osteophyte complex without central canal or significant foraminal narrowing     Severe central canal stenosis at C5-6. Moderate to severe central canal stenosis at C4-5. No abnormal cord signal intensity. Mri Thoracic Spine  : 6/20/2020     Bones: The thoracic vertebrae are normally aligned with no evidence of fracture. There is normal marrow signal throughout the thoracic spine. Disc space: There is no focal disc herniation or evidence for spinal canal stenosis. Disc spaces are age-appropriate. Spinal cord: The thoracic cord is normal in configuration and signal intensity. Soft tissues: There is no paraspinal soft tissue mass or fluid collection. Negative MRI of the thoracic spine. No signs of cord compression. Mri Lumbar Spine   6/20/2020  Levorotoscoliosis with advanced multilevel degenerative changes. Severe foraminal narrowing bilaterally at L4-5. There may be mild transverse canal narrowing at L3-4 and L4-5 but there is no definite central canal stenosis       Ct Abdomen Pelvis  6/19/2020  BORDERLINE PELVIECTASIS IN LEFT KIDNEY. NO ACUTE PATHOLOGY IN THE ABDOMEN OR PELVIS. Xr Chest   6/19/2020   Osseous structures intact. Cardiopericardial silhouette normal. Pulmonary vasculature normal. Lungs clear. NO ACUTE CARDIOPULMONARY DISEASE. Mri Brain Wo 6/19/2020   NO EVIDENCE OF ACUTE CVA. GENERALIZED PARENCHYMAL VOLUME LOSS AND NONSPECIFIC WHITE MATTER CHANGES, MOST LIKELY SECONDARY TO CHRONIC SMALL VESSEL ISCHEMIC CHANGES. MUCOSAL THICKENING IN ETHMOID AND MAXILLARY SINUSES. A FEW SMALL NONSPECIFIC FOCI ON GRADIENT ECHO SEQUENCES WITHIN THE WHITE MATTER BILATERALLY.      Us Carotid Artery   6/19/2020 50-69% NARROWING PREDICTED OF BOTH INTERNAL CAROTID ARTERIES, NOT SIGNIFICANTLY CHANGED FROM 11/14/2018. Previous extensive, complex labs, notes and diagnostics reviewed and analyzed. ALLERGIES:    Allergies as of 06/24/2020 - Review Complete 06/24/2020   Allergen Reaction Noted    Metoclopramide  11/29/2011    Pantoprazole Other (See Comments) 12/08/2015    Pcn [penicillins]  11/29/2011    Protonix [pantoprazole sodium]  11/29/2011    Tramadol  11/29/2011      (please also verify by checking MAR)     Today I evaluated this patient for periodic reassessment of medical and functional status. The patient was discussed in detail at the treatment team meeting focusing on current medical issues, progress in therapies, social issues, psychological issues, barriers to progress and strategies to address these barriers, and discharge planning. See the addendum to rehab progress note-as a second progress note in the chart. The patient continues to be high risk for future disability and their medical and rehabilitation prognosis continue to be good and therefore, we will continue the patient's rehabilitation course as planned. The patient's tentative discharge date was set. Patient and family education was discussed. The patient was made aware of the team discussion regarding their progress. Complex Physical Medicine & Rehab Issues Assess & Plan:   1. Severe abnormality of gait and mobility and impaired self-care and ADL's secondary to progressive cervical myelopathy. Functional and medical status reassessed regarding patients ability to participate in therapies and patient found to be able to participate in acute intensive comprehensive inpatient rehabilitation program including PT/OT to improve balance, ambulation, ADLs, and to improve the P/AROM. Therapeutic modifications regarding activities in therapies, place, amount of time per day and intensity of therapy made daily.   In bed therapies or and medical comorbidities and to address difficulties they may be having progressing in therapy. Patient and family education is in progress. The patient is to follow-up with their family physician after discharge. Complex Active General Medical Issues that complicate care Assess & Plan:    1. HTN (hypertension),  HLD (hyperlipidemia), CAD,   Chronic combined systolic and diastolic congestive heart failure,  History of ST elevation myocardial infarction (STEMI),   History of PTCA,  Valvular heart disease-vital signs every shift dose and titrate cardiac medication to include Norvasc, Lipitor, Coreg, Apresoline both intravenous and oral, Coumadin, Entresto consult hospitalist for backup medical consult Dr. George Parker with cardiology monitor for orthostasis and failure as we rehydrate her  2. CKD (chronic kidney disease)-control blood sugars control blood pressure recheck BMP monitor UA and renal output, push clear liquids push oral fluids add IV fluids as needed  3. Osteoarthritis,  Lumbar spinal stenosis, DJD (degenerative joint disease), lumbar-add Lidoderm BenGay and heat lowest effective dose of opiates  4. SCC (squamous cell carcinoma), arm, Eczematous dermatitis-topical steroids add co-Q10 vitamin D  5. Vitamin D and B12 deficiency-high-dose vitamin D and B12  6. Spinal stenosis in cervical region with cervical myelopathy-inoperable due to moderate multiple medical comorbidities-PT and OT are her greatest hope for recovery her blood sugars are elevated wean steroids  7. Uncontrolled type 2 diabetes mellitus uncontrolled with hyperlipidemia and, Obesity (BMI 30-39. 9)-fingerstick blood sugars q. before meals and at bedtime dose and titrate insulin and carbohydrate insulin intake add diabetic add and dietary Ed continue to titrate carbohydrates and titrate insulin. 8. Severely [place] (hard of hearing),   Vestibular neuronitis of both ears  9.  Nausea, anxiety and elevated blood sugars due to steroids for cervical myelopathy-steroid taper and titrate benzodiazepines if needed titrate Zofran and Antivert monitor stools for blood while patient is on Coumadin and steroids as she is high risk for bleed  10. Acute on chronic UTI with history of neurogenic bowel and bladder-cath if no void check postvoid residuals teach Crede method recheck stat UA status post treatment of UTI with Cipro and await for sensitivity. White blood cell count seems to be coming back down continue to follow I will recheck her urine prior to discharge.              Nori Cruz D.O., PM&R     Attending    [address]

## 2020-07-06 NOTE — PLAN OF CARE
Problem: Falls - Risk of:  Goal: Will remain free from falls  Description: Will remain free from falls  7/5/2020 2345 by Denis Willingham RN  Outcome: Ongoing  7/5/2020 1007 by Chio Boggs RN  Outcome: Ongoing  Goal: Absence of physical injury  Description: Absence of physical injury  7/5/2020 2345 by Denis Willingham RN  Outcome: Ongoing  7/5/2020 1007 by Chio Boggs RN  Outcome: Ongoing     Problem: Skin Integrity:  Goal: Will show no infection signs and symptoms  Description: Will show no infection signs and symptoms  7/5/2020 2345 by Denis Willingham RN  Outcome: Ongoing  7/5/2020 1007 by Chio Boggs RN  Outcome: Ongoing  Goal: Absence of new skin breakdown  Description: Absence of new skin breakdown  7/5/2020 2345 by Denis Willingham RN  Outcome: Ongoing  7/5/2020 1007 by Chio Boggs RN  Outcome: Ongoing     Problem: IP SWALLOWING  Goal: LTG - patient will tolerate the least restrictive diet consistency to allow for safe consumption of daily meals  7/5/2020 2345 by Denis Willingham RN  Outcome: Ongoing  7/5/2020 1007 by Chio Boggs RN  Outcome: Ongoing     Problem: IP COMMUNICATION/DYSARTHRIA  Goal: LTG - patient will improve expressive language skills to allow for communication of wants and needs in daily activities  7/5/2020 2345 by Denis Willingham RN  Outcome: Ongoing  7/5/2020 1007 by Chio Boggs RN  Outcome: Ongoing     Problem: SAFETY  Goal: LTG - patient will adhere to hip precautions during ADL's and transfers  7/5/2020 2345 by Denis Willingham RN  Outcome: Ongoing  7/5/2020 1007 by Chio Boggs RN  Outcome: Ongoing  Goal: LTG - Patient will demonstrate safety requirements appropriate to situation/environment  7/5/2020 2345 by Denis Willingham RN  Outcome: Ongoing  7/5/2020 1007 by Chio Boggs RN  Outcome: Ongoing  Goal: LTG - patient will utilize safety techniques  7/5/2020 2345 by Denis Willingham RN  Outcome: Ongoing  7/5/2020 1007 by Joao Evans

## 2020-07-06 NOTE — PROGRESS NOTES
Patient up in chair visiting with family. Vitals stable. Healthy appetite. Cooperative with care. Denies pain and discomfort at this time.  Will continue to monitor

## 2020-07-06 NOTE — PROGRESS NOTES
Physical Therapy Rehab Treatment Note  Facility/Department: [facility]  Room: R235R235-01       NAME: Sameer Becerra  : 6/10/1927 (80 y.o.)  MRN: [id number]  CODE STATUS: DNR-CCA    Date of Service: 2020  Chart Reviewed: Yes  Family / Caregiver Present: No  Restrictions:  Restrictions/Precautions: Fall Risk     SUBJECTIVE: Subjective: Pt states she is ready to go home. Pain Screening  Patient Currently in Pain: No     Post Treatment Pain Screenin/10     OBJECTIVE:              Bed mobility  Rolling to Left: Modified independent  Rolling to Right: Modified independent  Supine to Sit: Supervision  Sit to Supine: Modified independent  Comment: Increased time and effort to comlete sup to sit however no physical assist or cues required. Transfers  Sit to Stand: Modified independent  Stand to sit: Modified independent  Bed to Chair: Modified independent  Car Transfer: Stand by assistance(VCs for technique and safety. Increased effort to stand from low car seat.  )  Comment: Pt demo'd good safety with rollator breaks. Ambulation  Ambulation?: Yes  Ambulation 1  Surface: level tile;carpet  Device: Rollator  Assistance: Modified Independent;Supervision  Quality of Gait: R side trandelenburg; increased fatigue after increased distances. Distance: 60ftx2 indep; 150ft supervision    Stairs/Curb  Stairs?: Yes  Stairs  # Steps : 4  Stairs Height: 6\"  Rails: Bilateral  Assistance: Supervision        Exercises  Hip Flexion: x20  Hip Abduction: seated side kicks x10; hip add with ball x20  Knee Long Arc Quad: x20  Ankle Pumps: x20  Neurodevelopmental Techniques: Stood 60 sec without UE support SBA increased ant/post sway  Comments: Reviewed above seated LE therex for HEP. Other exercises  Other exercises 1: Standing marches x 10  Other exercises 2: Standing Hip Abduction x 10  Other exercises 3: Standing heel raises x 10     ASSESSMENT/COMMENTS:  Assessment: Pt indep with rollator short distances. Supervision with increased faitigue increased distances. Pt is nearing goals. Patient Education: Reviewed HEP for seated LE therex. Pt with difficulty reading small writing. Will review larger print next visit. PLAN OF CARE/Safety:   Plan Comment: cont per POC  Therapy Time:   Individual   Time In 906   Time Out 1000   Minutes 47     Minutes:Time missed d/t pt with nsg receiving meds.         Transfer/Bed mobility training:10      Gait trainin      Neuro re education:5     Therapeutic ex:20    Paula Ly PTA, 20 at 9:56 AM

## 2020-07-06 NOTE — PROGRESS NOTES
Occupational Therapy  Facility/Department: [facility]  Daily Treatment Note  NAME: Isa Ontiveros  : 6/10/1927  MRN: [id number]    Date of Service: 2020    Discharge Recommendations:  Continue to assess pending progress       Assessment      Activity Tolerance  Activity Tolerance: Patient Tolerated treatment well  Safety Devices  Safety Devices in place: Yes  Type of devices: All fall risk precautions in place         Patient Diagnosis(es): There were no encounter diagnoses. 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 has a past surgical history that includes Appendectomy; Rectocele repair; Cystocele repair; Ankle surgery; Breast biopsy; Cataract removal (); eye surgery; Tonsillectomy; Hysterectomy; and Coronary angioplasty with stent (2019).     Restrictions  Restrictions/Precautions  Restrictions/Precautions: Fall Risk  Position Activity Restriction  Other position/activity restrictions: [facility]     Subjective   General  Chart Reviewed: Yes  Patient assessed for rehabilitation services?: Yes  Response to previous treatment: Patient with no complaints from previous session  Family / Caregiver Present: No  Referring Practitioner: Dr Mac Mcneil  Diagnosis: NTSCI with imp mob and ADLs 2° cervical myelopathy and vestibular neuronitis    Subjective  Subjective: I'm okay. Pain Assessment  Pain Level: 0  Pre Treatment Pain Screening  Pain at present: 0     Orientation  Orientation  Overall Orientation Status: Within Functional Limits     Objective      Upper Extremity Function  UE Strengthening: Patient engaged in therapeutic activity to increase B UE ROM/strengthening, B FM coordination and problem solving for ADL's and IADL's. Patient assembled 28 piece puzzle. Patient able to reach in lateral planes with 0 difficulty. Patient able to reach in forward planes with 0 difficulty. Patient able to  puzzle pieces with MIN difficulty with FM. Patient able to turn pieces into correct position for placement with 0 difficulty with in hand manipulation. Patient required 0 verbal cues and increased time to identify where puzzle pieces were to be placed on puzzle board. Patient alternated Verlie Minks as needed for RB's.       Plan   Plan  Times per week: 5-7 times per week  Plan weeks: 2 weeks  Current Treatment Recommendations: Strengthening, Endurance Training, Neuromuscular Re-education, Self-Care / ADL, Balance Training, Functional Mobility Training, Safety Education & Training, Patient/Caregiver Education & Training    Plan Comment: Continue per OT POC for planned d/c on 7-8-20         Goals  Patient Goals   Patient goals : \"to at least do things in my home\"       Therapy Time   Individual Concurrent Group Co-treatment   Time In 1030         Time Out 1055         Minutes 25             Therapeutic activities: 25 minutes  Missed 5 minutes due to  with NP Kimo Travis, will attempt makeup as able       Electronically signed by LOIS Fernández on 7/6/20 at 11:55 AM EDT      LOIS Fernández

## 2020-07-06 NOTE — PROGRESS NOTES
Danisha Sneed is a 80 y.o. female patient.   Chief complaint uncontrolled diabetes    Current Facility-Administered Medications   Medication Dose Route Frequency Provider Last Rate Last Dose    insulin glargine (LANTUS) injection vial 15 Units  15 Units Subcutaneous Nightly Graciela Johnson MD   15 Units at 07/05/20 2051    ciprofloxacin (CIPRO) tablet 250 mg  250 mg Oral BID  Catrachita Scullin, DO   250 mg at 07/05/20 1651    lactobacillus acidophilus [place]) 2 tablet  2 tablet Oral TID [place], DO   2 tablet at 07/05/20 2047    HYDROcodone-acetaminophen (NORCO) 5-325 MG per tablet 1 tablet  1 tablet Oral Q4H PRN Catrachita Scullin, DO   1 tablet at 07/02/20 1657    isosorbide mononitrate (IMDUR) extended release tablet 30 mg  30 mg Oral Daily [address], [place]   30 mg at 07/05/20 0820    insulin lispro (HUMALOG) injection vial 4 Units  4 Units Subcutaneous TID  Christiano Mo MD   4 Units at 07/05/20 1654    amLODIPine (NORVASC) tablet 10 mg  10 mg Oral Daily Graciela Johnson MD   10 mg at 07/05/20 1115    hydrALAZINE (APRESOLINE) tablet 50 mg  50 mg Oral TID Graciela Johnson MD   50 mg at 07/05/20 2047    hydrALAZINE (APRESOLINE) injection 10 mg  10 mg Intravenous Q4H PRN Graciela Pereira MD        Vitamin D (CHOLECALCIFEROL) tablet 2,000 Units  2,000 Units Oral Dinner Catrachita Scullin, DO   2,000 Units at 07/05/20 1651    lidocaine 4 % external patch 3 patch  3 patch Transdermal Daily Catrachita Scullin, DO   3 patch at 07/05/20 0824    fleet rectal enema 1 enema  1 enema Rectal Daily PRN Catrachita Scullin, DO        acetaminophen (TYLENOL) tablet 500 mg  500 mg Oral Q8H PRN [place] Crease, APRN - CNP   500 mg at 07/01/20 1538    analgesic ointment ointment   Topical PRN [place] Crease, APRN - CNP        metaxalone (SKELAXIN) tablet 400 mg  400 mg Oral TID PRN [place] Crease, APRN - CNP   400 mg at 07/01/20 1538    magnesium hydroxide (MILK OF MAGNESIA) 400 MG/5ML suspension 30 mL  30 mL Oral Daily PRN Luberta Flash, DO        ondansetron (ZOFRAN-ODT) disintegrating tablet 4 mg  4 mg Oral Q8H PRN Luberta Flash, DO        Or    ondansetron (ZOFRAN) injection 4 mg  4 mg Intravenous Q6H PRN Luberta Flash, DO        sodium chloride flush 0.9 % injection 10 mL  10 mL Intravenous PRN Luberta Flash, DO        atorvastatin (LIPITOR) tablet 10 mg  10 mg Oral Nightly Paola Scott, DO   10 mg at 07/05/20 2047    carvedilol (COREG) tablet 12.5 mg  12.5 mg Oral BID WC Paola Scott, DO   12.5 mg at 07/05/20 1651    clopidogrel (PLAVIX) tablet 75 mg  75 mg Oral Daily Paola Scott, DO   75 mg at 07/05/20 0821    dextrose 5 % solution  100 mL/hr Intravenous PRN Luberta Flash, DO        dextrose 50 % IV solution  12.5 g Intravenous PRN Luberta Flash, DO        gabapentin (NEURONTIN) capsule 100 mg  100 mg Oral Nightly Paola Scott, DO   100 mg at 07/05/20 2047    glucagon (rDNA) injection 1 mg  1 mg Intramuscular PRN Luberta Flash, DO        glucose (GLUTOSE) 40 % oral gel 15 g  15 g Oral PRN Luberta Flash, DO        hydrALAZINE (APRESOLINE) injection 10 mg  10 mg Intravenous Q4H PRN Luberta Flash, DO   10 mg at 06/27/20 1655    insulin lispro (HUMALOG) injection vial 0-12 Units  0-12 Units Subcutaneous TID  Luberta Flash, DO   2 Units at 07/05/20 1653    insulin lispro (HUMALOG) injection vial 0-6 Units  0-6 Units Subcutaneous Nightly Luberta Flash, DO   1 Units at 07/05/20 2052    meclizine (ANTIVERT) tablet 12.5 mg  12.5 mg Oral TID PRN Luberta Flash, DO        nitrofurantoin (macrocrystal-monohydrate) (MACROBID) capsule 100 mg  100 mg Oral Nightly Paola Scott, DO   100 mg at 07/05/20 2047    sacubitril-valsartan (ENTRESTO) 24-26 MG per tablet 1 tablet  1 tablet Oral BID Luberta Flash, DO   1 tablet at 07/05/20 2047    [START ON 6/19/2021] warfarin (COUMADIN) daily dosing (placeholder)   Other RX Placeholder Viviana Redd, DO         Allergies   Allergen Reactions    Metoclopramide     Pantoprazole Other (See Comments)    Pcn [Penicillins]      Tolerates rocephin    Protonix [Pantoprazole Sodium]     Tramadol      Principal Problem:    Abnormality of gait and mobility due to  NTSCI with Impaired Mobility and ADL's secondary to Cervical Myelopathy and Vestibular neuronitis with rehab admission 06/24/20. Active Problems:    HTN (hypertension)    CKD (chronic kidney disease)    HLD (hyperlipidemia)    History of ST elevation myocardial infarction (STEMI)    Osteoarthritis    Vitamin D deficiency    Eczematous dermatitis    Lumbar spinal stenosis    Valvular heart disease    Vitamin B12 deficiency    Chronic combined systolic and diastolic congestive heart failure (HCC)    Spinal stenosis in cervical region    Vestibular neuronitis of both ears    SCC (squamous cell carcinoma), arm    Type 2 diabetes mellitus (HCC)    Obesity (BMI 30-39. 9)    [place] (hard of hearing)    DJD (degenerative joint disease), lumbar    History of PTCA    Uncontrolled type 2 diabetes mellitus with hyperglycemia ([facility] [address]  Resolved Problems:    * No resolved hospital problems. *    Blood pressure (!) 158/50, pulse 51, temperature 97 °F (36.1 °C), temperature source Oral, resp. rate 17, height 5' 3\" (1.6 m), weight 188 lb 7.9 oz (85.5 kg), SpO2 96 %, not currently breastfeeding. Subjective:  Symptoms:  Stable. Diet:  Adequate intake. Activity level: Impaired due to weakness. Objective:  General Appearance:  Comfortable. Vital signs: (most recent): Blood pressure (!) 158/50, pulse 51, temperature 97 °F (36.1 °C), temperature source Oral, resp. rate 17, height 5' 3\" (1.6 m), weight 188 lb 7.9 oz (85.5 kg), SpO2 96 %, not currently breastfeeding. Vital signs are normal.    HEENT: Normal HEENT exam.    Lungs:  Normal effort and normal respiratory rate. Heart: Normal rate. Abdomen: Abdomen is soft. Extremities: There is dependent edema. Neurological: Patient is alert. Results for Leyla Rodriguez (MRN [id number]) as of 7/5/2020 23:01   Ref. Range 7/5/2020 06:06 7/5/2020 11:10 7/5/2020 16:04 7/5/2020 20:22   POC Glucose Latest Ref Range: 60 - 115 mg/dl 135 (H) 190 (H) 140 (H) 147 (H)       Assessment:  (Uncontrolled type 2 diabetes  Cervical spinal stenosis  Weakness  Chronic kidney disease). Plan:   (Continue Lantus 15 units at bedtime and Humalog 4 units with each meals less than 150 medium dose Humalog coverage  Monitor glycemic control closely).        Jose Berry MD  7/5/2020

## 2020-07-06 NOTE — PROGRESS NOTES
Endocrinology Progress Note    Assessment and Plan:   Assessment-  1. Type 2 diabetes  2. Lower extremity weakness  3. Severe spinal canal stenosis  4. Thyroid induced hyperglycemia    Plan-  1. Continue Lantus 15 units at bedtime  2. Continue Humalog 4 units 3 times daily before meals  3. Medium dose sliding scale coverage  4. Will wean insulin as steroids are decreased  5. Monitor glycemic control closely, avoid hypoglycemia  6. No changes to current treatment plan    POC Glucose:   Recent Labs     07/05/20  1110 07/05/20  1604 07/05/20  2022 07/06/20  0602 07/06/20  1132   POCGLU 190* 140* 147* 122* 218*     HGBA1C:  Lab Results   Component Value Date    LABA1C 8.2 (H) 06/20/2020    LABA1C 7.8 01/14/2020    LABA1C 7.9 07/11/2019     CBC:   No results for input(s): WBC, HGB, PLT in the last 72 hours. CMP:    No results for input(s): NA, K, CL, CO2, BUN, CREATININE, GLUCOSE, CALCIUM, LABGLOM in the last 72 hours. CC: No chief complaint on file. Subjective: Interval History: Radha Javed is a 80year-old type II diabetic female admitted to our rehabilitation unit for strengthening and conditioning. She has weakness and muscle atrophy due to severe spinal canal stenosis. Her glycemic control is improving on current insulin dosing regiment. She is having steroid-induced hyperglycemia. Patient is currently in the rehabilitation area, making good progress.     Review of systems: denies polyuria, polydipsia, ABD pain, flank pain, N/V/D, or diaphoresis  Medications:   Scheduled Meds:   warfarin  6 mg Oral Once    insulin glargine  15 Units Subcutaneous Nightly    ciprofloxacin  250 mg Oral BID WC    lactobacillus acidophilus  2 tablet Oral TID    isosorbide mononitrate  30 mg Oral Daily    insulin lispro  4 Units Subcutaneous TID WC    amLODIPine  10 mg Oral Daily    hydrALAZINE  50 mg Oral TID    Vitamin D  2,000 Units Oral Dinner    lidocaine  3 patch Transdermal Daily    atorvastatin  10 mg Oral Nightly    carvedilol  12.5 mg Oral BID WC    clopidogrel  75 mg Oral Daily    gabapentin  100 mg Oral Nightly    insulin lispro  0-12 Units Subcutaneous TID WC    insulin lispro  0-6 Units Subcutaneous Nightly    sacubitril-valsartan  1 tablet Oral BID    [START ON 6/19/2021] warfarin (COUMADIN) daily dosing (placeholder)   Other RX Placeholder     Continuous Infusions:   dextrose         Objective:   Vitals: BP (!) 152/65   Pulse 55   Temp 97.2 °F (36.2 °C) (Axillary)   Resp 17   Ht 5' 3\" (1.6 m)   Wt 188 lb 7.9 oz (85.5 kg)   LMP  (LMP Unknown)   SpO2 95%   BMI 33.39 kg/m²    Wt Readings from Last 3 Encounters:   06/30/20 188 lb 7.9 oz (85.5 kg)   06/19/20 184 lb (83.5 kg)   06/18/20 184 lb (83.5 kg)        General appearance: alert, appears stated age, cooperative and no distress  Skin: Skin color, texture, turgor normal. No rashes or lesions. Neck: no lymphadenopathy  Lungs: clear to auscultation bilaterally  Heart: regular rate and rhythm, S1, S2 normal, no murmur, click, rub or gallop  Abdomen: soft, non-tender. Bowel sounds normal. No masses,  no organomegaly.   Extremities: extremities normal, atraumatic, no cyanosis or edema    Patient Active Problem List:     HTN (hypertension)     Diabetes mellitus     SI (stress incontinence), female     Osteoarthritis     CKD (chronic kidney disease)     HLD (hyperlipidemia)     Vitamin D deficiency     Eczematous dermatitis     Chronic low back pain     Lumbar spinal stenosis     Hypercalcemia     Diastolic dysfunction     Valvular heart disease     Recurrent UTI (urinary tract infection)     Vitamin B12 deficiency     Chest pain     Nausea & vomiting     History of non-ST elevation myocardial infarction (NSTEMI)     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     History of ST elevation myocardial infarction (STEMI)     Late effects of CVA (cerebrovascular accident)     S/P PTCA (percutaneous transluminal coronary angioplasty)     Transient cerebral ischemia     Monitoring for long-term anticoagulant use     Chronic combined systolic and diastolic congestive heart failure (HCC)     Other transient cerebral ischemic attacks and related syndromes     Cerebrovascular accident (CVA) ([address]     Current use of long term anticoagulation     Syncope     Spinal stenosis in cervical region     Lumbar degenerative disc disease     Spinal stenosis, lumbar region, with neurogenic claudication     Abnormality of gait and mobility due to  NTSCI with Impaired Mobility and ADL's secondary to Cervical Myelopathy and Vestibular neuronitis with rehab admission 06/24/20. Generalized muscle ache     Generalized osteoarthrosis, involving multiple sites     Vestibular neuronitis of both ears     Dizziness     SCC (squamous cell carcinoma), arm     Type 2 diabetes mellitus (HCC)     Obesity (BMI 30-39. 9)     [place] (hard of hearing)     DJD (degenerative joint disease), lumbar     History of PTCA     Uncontrolled type 2 diabetes mellitus with hyperglycemia [facility])            Electronically signed by PEDRO LUIS Guillen on 7/6/2020 at 1:43 PM

## 2020-07-06 NOTE — PROGRESS NOTES
INDIVIDUALIZED OVERALL REHAB PLAN OF CARE  ADDENDUM TO REHAB PROGRESS NOTE-for audit purposes must also refer to this day's clinical note and combine the information      Date: 2020  Patient Name: Dixie Gillespie   Room: S135/M808-60    MRN: [id number]    : 6/10/1927  (80 y.o.)  Gender: female       Today 2020 during weekly team meeting, I reviewed the patient Dixie Gillespie in detail with the therapists and nurses involved in patient's care gathering complex physiatric data regarding current medical issues, progress in therapies, factors limiting progress, social issues, psychological issues, ongoing therapeutic plans and discharge planning. Legend:  I= independent Im =Modified independent  S=Supervised SB=stand by ROACH=set up CG=contact jonathon Min= minimal Mod=Moderate Max=maximal Max of 2 =maximal assist of 2 people      CURRENT FUNCTIONAL STATUS:    NURSING ISSUES:         Patient up 1 with a walker. Vital signs stable. Took medications whole with sips of water. She is A/Ox4 and hard of hearing. Will continue to monitor. Nursing will continue to focus on bowel and bladder continence transitioning toward independence by time of discharge. Monitoring post void residuals monitoring for severe constipation and bowel obstruction. Focus on achieving ADL goals with co-treating with OT when possible.     PHYSICAL THERAPY  Bed mobility:  Supine to Sit: Supervision (20 1050)  Sit to Supine: Supervision (20 1050)  Transfers:  Sit to Stand: Stand by assistance (20)  Bed to Chair: Supervision (20 104)  Gait:   Device: Rollator (20)  Assistance: Stand by assistance (20)  Distance:  150ft (20)  Quality of Gait: fwd flex, slow maria luisa, mild SOB, no LOB with turns (20)  Comments: VCs for posture (20 1047)  Stairs:  # Steps : 8(4 steps then seated rest. ) (20 0915)  Curbs: 6\" (20 1046)  Rails: Bilateral (20 7141)  Assistance: Stand by assistance;Contact guard assistance (07/03/20 0915)  Comment: increased fatigue with 2nd set of steps. (07/03/20 0915)  W/C mobility:         OCCUPATIONAL THERAPY  Hand Dominance: Right  ADL  Feeding: Modified independent  (06/27/20 0926)  Grooming: Modified independent  (07/01/20 1118)  UE Bathing: Setup (07/01/20 1118)  LE Bathing: Supervision (07/01/20 1118)  UE Dressing: Setup (07/01/20 1118)  LE Dressing: Supervision (07/01/20 1118)  Toileting: Supervision (07/01/20 1118)  Toilet Transfers  Toilet - Technique: Ambulating (07/01/20 1119)  Equipment Used: Grab bars (07/01/20 1119)  Toilet Transfer: Supervision (07/01/20 1119)  Toilet Transfers Comments: rollator (07/01/20 1119)  Tub Transfers  Tub Transfers: Not tested (06/25/20 1143)  Shower Transfers  Shower - Transfer From: Other(rollator) (07/01/20 1119)  Shower - Transfer Type: To and From (07/01/20 1119)  Shower - Transfer To: Shower seat with back (07/01/20 1119)  Shower - Technique: Ambulating (07/01/20 1119)  Shower Transfers: Stand by assistance (07/01/20 1119)  Shower Transfers Comments: grab bars (07/01/20 1119)      SPEECH THERAPY  Motor Speech: Within Functional Limits  Comprehension: Within Functional Limits(Patient followed 3-4 step directions without difficulty)  Verbal Expression: Within functional limits      Diet/Swallow:  Diet Solids Recommendation: Regular  Liquid Consistency Recommendation:  Thin  Dysphagia Outcome Severity Scale: Level 6: Within functional limits/Modified independence    Compensatory Swallowing Strategies: Small bites/sips, Eat/Feed slowly, Upright as possible for all oral intake             COGNITION  OT: Cognition Comment: comp: MOD I exp: MOD I soc: MOD I prob: SUPERV. mem: SUPERV  SP:Memory: Exceptions to [facility]  Problem Solving: Within Functional Limits        THERAPY, MEDICAL AND NURSING COORDINATION:    []  Pain medication before therapies     []  Check orthostatic BP      [x]  Ambulate to the bathroom in room    [x]  Add scheduled rest beaks     []  In room therapies      Discharge date set for:               Discharge 7/8/2020      Home with:   Her son with help from   her son          And:     [address]:     [x]  PT    []  OT    []  ST   [x]  Aide   []  SW    [x]  RN                    Outpatient Therapy:  []  PT    []  OT    []  ST   []  Rehab Psych                 Equipment:  [employer]      At D/C their function is goaled at:   PT:Long term goal 1: Patient will be independent with bed mobility. Long term goal 2: Patient will be indep with bed and car transfers.   Long term goal 3: Patient will be SBA with 150ft of gait with rollator in busy area and indep for household distance up to 50 feet in familiar environment  Long term goal 4: SBA 4 stairs with rails  OT:Eating  Assistance Needed: Independent  CARE Score: 6  Discharge Goal: Independent, Oral Hygiene  Reason if not Attempted: Patient refused  CARE Score: 7  Discharge Goal: Independent, Toileting Hygiene  Assistance Needed: Partial/moderate assistance  CARE Score: 3  Discharge Goal: Independent, Shower/Bathe Self  Assistance Needed: Partial/moderate assistance  CARE Score: 3  Discharge Goal: Independent  Upper Body Dressing  Assistance Needed: Setup or clean-up assistance  CARE Score: 5  Discharge Goal: Independent, Lower Body Dressing  Assistance Needed: Partial/moderate assistance  CARE Score: 3  Discharge Goal: Independent, Putting On/Taking Off Footwear  Assistance Needed: Supervision or touching assistance  CARE Score: 4  Discharge Goal: Independent, Toilet Transfer  Assistance Needed: Partial/moderate assistance  CARE Score: 3  Discharge Goal: Independent  SP:               From a cognitive standpoint they will need:        24 hr supervision  --progress to occasional           Significant problems/ barriers to functional progress include: Pt is at a high risk for functional loss,    [x]  Acute infection/UTI    []  Low BP's     []  COPD

## 2020-07-06 NOTE — DISCHARGE INSTR - COC
Continuity of Care Form    Patient Name: Ninette Primrose   :  6/10/1927  MRN:  [id number]    Admit date:  2020  Discharge date: 2020    Code Status Order: DNR-CCA   Advance Directives:   [address] Directive Type of Healthcare Directive Copy in [address] Agent's Name Healthcare Agent's Phone Number    [phone]  Yes, patient has an advance directive for healthcare treatment  Durable power of  for health care;Living will  Yes, copy in chart  Adult Children  stefany marie  [phone]          Admitting Physician:  Verenice Shelby DO  PCP: Shanell Murphy MD    Discharging Nurse: Villa Hernandez RN  [address] Unit/Room#: D637/P763-11  Discharging Unit Phone Number: [phone]    Emergency Contact:   Extended Emergency Contact Information  Primary Emergency Contact: [address] [facility]. [address] Phone: [phone]  Work Phone: [phone]  Mobile Phone: [phone]  Relation: Child    Past Surgical History:  Past Surgical History:   Procedure Laterality Date    ANKLE SURGERY      broken left ankle.     APPENDECTOMY      BREAST BIOPSY      x2 left breast    CATARACT REMOVAL      bilateral    CORONARY ANGIOPLASTY WITH STENT PLACEMENT  2019    CYSTOCELE REPAIR      EYE SURGERY      bilateral cataract    HYSTERECTOMY      complete at age 39    [address]      as a child       Immunization History:   Immunization History   Administered Date(s) Administered    Influenza 2012    Influenza Vaccine, unspecified formulation 2011, 2012, 09/15/2015, 2016    Influenza Virus Vaccine 10/21/2014, 09/15/2015, 2016, 10/18/2017    Influenza, High Dose (Fluzone 65 yrs and older) 2018    Influenza, Triv, inactivated, subunit, adjuvanted, IM (Fluad 65 yrs and older) 10/18/2017, 10/02/2019    Pneumococcal Conjugate 13-vinicio Perera) 12/15/2016    Pneumococcal Polysaccharide ([id number]) 09/15/2015    Td vaccine (adult) 07/26/1996    Td, unspecified formulation 07/26/1996    Tdap (Boostrix, Adacel) 02/11/2017       Active Problems:  Patient Active Problem List   Diagnosis Code    HTN (hypertension) I10    Diabetes mellitus E11.9    SI (stress incontinence), female N39.3    Osteoarthritis M19.90    CKD (chronic kidney disease) N18.9    HLD (hyperlipidemia) E78.5    Vitamin D deficiency E55.9    Eczematous dermatitis L30.9    Chronic low back pain M54.5, G89.29    Lumbar spinal stenosis M48.061    Hypercalcemia H80.93    Diastolic dysfunction Y76.44    Valvular heart disease I38    Recurrent UTI (urinary tract infection)-Raoultella resistant to Macrobid 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.0    Vitamin B12 deficiency E53.8    Chest pain R07.9    Nausea & vomiting R11.2    History of non-ST elevation myocardial infarction (NSTEMI) I25.2    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.73    History of ST elevation myocardial infarction (STEMI) I25.2    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    S/P PTCA (percutaneous transluminal coronary angioplasty) Z98.61    Transient cerebral ischemia G45.9    Monitoring for long-term anticoagulant use Z51.81, Z79.01    Chronic combined systolic and diastolic congestive heart failure (HCC) I50.42    Other transient cerebral ischemic attacks and related syndromes G45.8    Cerebrovascular accident (CVA) ([facility] I63.9    Current use of long term anticoagulation Z79.01    Syncope R55    Spinal stenosis in cervical region M48.02    Lumbar degenerative disc disease M51.36    Spinal stenosis, lumbar region, with neurogenic claudication M48.062    Abnormality of gait and mobility due to  NTSCI with Impaired Mobility and ADL's secondary to Cervical Myelopathy and Vestibular neuronitis with rehab admission 06/24/20.  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 9) E66.9    [place] (hard of hearing) H91.90    DJD (degenerative joint disease), lumbar M47.816    History of PTCA Z98.61    Uncontrolled type 2 diabetes mellitus with hyperglycemia (HCC) E11.65       Isolation/Infection:   Isolation          No Isolation        Patient Infection Status     None to display          Nurse Assessment:  Last Vital Signs: BP (!) 152/65   Pulse 55   Temp 97.2 °F (36.2 °C) (Axillary)   Resp 17   Ht 5' 3\" (1.6 m)   Wt 188 lb 7.9 oz (85.5 kg)   LMP  (LMP Unknown)   SpO2 95%   BMI 33.39 kg/m²     Last documented pain score (0-10 scale): Pain Level: 0  Last Weight:   Wt Readings from Last 1 Encounters:   06/30/20 188 lb 7.9 oz (85.5 kg)     Mental Status:  oriented, alert, coherent and logical    IV Access:  - None    Nursing Mobility/ADLs:  Walking   Assisted  Transfer  Independent  Bathing  Assisted  Dressing  Assisted  Toileting  Independent  Feeding  Independent  Med Admin  Assisted  Med Delivery   whole    Wound Care Documentation and Therapy:        Elimination:  Continence:   · Bowel: Yes  · Bladder: No  Urinary Catheter: None   Colostomy/Ileostomy/Ileal Conduit: No       Date of Last BM: 7/8/2020    Intake/Output Summary (Last 24 hours) at 7/6/2020 1216  Last data filed at 7/6/2020 1208  Gross per 24 hour   Intake 550 ml   Output --   Net 550 ml     No intake/output data recorded. Safety Concerns: At Risk for Falls    Impairments/Disabilities:      Vision and Hearing    Nutrition Therapy:  Current Nutrition Therapy:   - Oral Diet:  Carb Control 3 carbs/meal (1500kcals/day)    Routes of Feeding: Oral  Liquids:  Thin Liquids  Daily Fluid Restriction: no  Last Modified Barium Swallow with Video (Video Swallowing Test): not done    Treatments at the Time of Hospital Discharge:   Respiratory Treatments: ***  Oxygen Therapy:  is not on home oxygen therapy. Ventilator:    - No ventilator support    Rehab Therapies: Physical Therapy and Occupational Therapy  Weight Bearing Status/Restrictions: No weight bearing restirctions  Other Medical Equipment (for information only, NOT a DME order):  walker  Other Treatments: ***    Patient's personal belongings (please select all that are sent with patient):  Glasses, Dentures upper and lower    RN SIGNATURE:  Electronically signed by Pat Pandya RN on 7/8/20 at 12:21 PM EDT    CASE MANAGEMENT/SOCIAL WORK SECTION    Inpatient Status Date: Patient admitted to acute inpatient rehab on 6/24/20    Readmission Risk Assessment Score:  Readmission Risk              Risk of Unplanned Readmission:        21           Discharging to Facility/ Agency   · Name: [facility]  · Address:  · Phone:[phone]  · Fax:    Dialysis Facility (if applicable)   · Name:  · Address:  · Dialysis Schedule:  · Phone:  · Fax:    / signature: Electronically signed by May Duran RN on 7/6/20 at 12:16 PM EDT    PHYSICIAN SECTION    Prognosis: Good    Condition at Discharge: Stable    Rehab Potential (if transferring to Rehab): Good    Recommended Labs or Other Treatments After Discharge:     Physician Certification: I certify the above information and transfer of Bessie Meckel  is necessary for the continuing treatment of the diagnosis listed and that she requires Home Care for  30 days.      Update Admission H&P: No change in H&P    PHYSICIAN SIGNATURE:Dr Suzzette Hatchet DO    Electronically signed by May Duran RN on 7/6/20 at 12:16 PM EDT

## 2020-07-06 NOTE — CARE COORDINATION
Spoke with son and patient, requesting [facility] at discharge.  Electronically signed by Cherylene Starr, RN on 7/6/2020 at 12:14 PM

## 2020-07-06 NOTE — CARE COORDINATION
[address] [place] [address] NOTE  Room: R235/R235-01  Admit Date: 2020       Date: 2020  Patient Name: Ifeoma Flores        MRN: [id number]    : 6/10/1927  (80 y.o.)  Gender: female      Diagnosis: Abnormality of gait and mobility due to NTSCI with Impaired Mobility and ADL's secondary to Cervical Myelopathy and Vestibular neuronitis     REHAB DIAGNOSIS:   Diagnosis: Abnormality of gait and mobility due to NTSCI with Impaired Mobility and ADL's secondary to Cervical Myelopathy and Vestibular neuronitis     CO MORBIDITIES:  Systolic and diastolic CHF, type 2 diabetes with hyperglycemia      Past Medical History:   Diagnosis Date    Arthritis     Chicken pox     Chronic back pain     Chronic low back pain 2016    Eczematous dermatitis     History of bladder infections     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    History of non-ST elevation myocardial infarction (NSTEMI) 2018    History of ST elevation myocardial infarction (STEMI)     Hyperlipidemia     Hypertension     Measles     Mumps     Osteoarthritis 2012    Other cerebrovascular disease     Other transient cerebral ischemic attacks and related syndromes     S/P PTCA (percutaneous transluminal coronary angioplasty) 2019    SCC (squamous cell carcinoma), arm 2013    right upper arm,  right forarm    SI (stress incontinence), female 2012    Type II or unspecified type diabetes mellitus without mention of complication, not stated as uncontrolled     Whooping cough      Past Surgical History:   Procedure Laterality Date    ANKLE SURGERY      broken left ankle.     APPENDECTOMY      BREAST BIOPSY      x2 left breast    CATARACT REMOVAL      bilateral    CORONARY ANGIOPLASTY WITH STENT PLACEMENT  2019    CYSTOCELE REPAIR      EYE SURGERY      bilateral cataract    HYSTERECTOMY      complete at age 40   [address]      as a child     Chart Reviewed: Yes  Family / Caregiver Present: No  Restrictions  Restrictions/Precautions: Fall Risk  Position Activity Restriction  Other position/activity restrictions: DNRCCA  CASE MANAGEMENT    Social/Functional History  Social/Functional History  Lives With: Son  Type of Home: House  Home Layout: Two level, Performs ADL's on one level, Able to Live on Main level with bedroom/bathroom  Home Access: Stairs to enter without rails  Entrance Stairs - Number of Steps: 1  Bathroom Shower/Tub: Tub/Shower unit, Curtain  Bathroom Equipment: Hand-held shower, Shower chair  Home Equipment: 4 wheeled walker, Rolling walker(Son reported that patient uses a rollator in the house and a ww in the community)  ADL Assistance: Independent  Homemaking Assistance: (son does all homemaking)  Ambulation Assistance: Independent  Transfer Assistance: Independent  Active : No  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     Dietary Nutrition Supplements: Snack (see comment)  DIET CARB CONTROL; Carb Control: 3 carb choices (45 gms)/meal; Low Sodium (2 GM)    SpO2: 95 % (07/06/20 0757)  O2 Flow Rate (L/min): 0 L/min (06/24/20 1837)  No active isolations    Skin Issues: No    Pain Managed: Yes    Bladder continence: No    Bowel continence: Yes      Other: cipro for UTI      PHYSICAL THERAPY  Bed mobility:  Supine to Sit: Supervision (07/06/20 0931)  Sit to Supine: Modified independent (07/06/20 0931)  Transfers:  Sit to Comments: osvaldo reynolds (07/01/20 1119)  LTG:  Eating  Assistance Needed: Independent  CARE Score: 6  Discharge Goal: Independent, Oral Hygiene  Reason if not Attempted: Patient refused  CARE Score: 7  Discharge Goal: Independent, Toileting Hygiene  Assistance Needed: Partial/moderate assistance  CARE Score: 3  Discharge Goal: Independent, Shower/Bathe Self  Assistance Needed: Partial/moderate assistance  CARE Score: 3  Discharge Goal: Independent  Upper Body Dressing  Assistance Needed: Setup or clean-up assistance  CARE Score: 5  Discharge Goal: Independent, Lower Body Dressing  Assistance Needed: Partial/moderate assistance  CARE Score: 3  Discharge Goal: Independent, Putting On/Taking Off Footwear  Assistance Needed: Supervision or touching assistance  CARE Score: 4  Discharge Goal: Independent, Toilet Transfer  Assistance Needed: Partial/moderate assistance  CARE Score: 3  Discharge Goal: Independent  OT Treatment Time: 1.5 hrs      SPEECH THERAPY    Motor Speech: Within Functional Limits  Comprehension: Within Functional Limits(Patient followed 3-4 step directions without difficulty)  Verbal Expression: Within functional limits      Diet/Swallow:  Diet Solids Recommendation: Regular  Liquid Consistency Recommendation:  Thin  Dysphagia Outcome Severity Scale: Level 6: Within functional limits/Modified independence    Compensatory Swallowing Strategies: Small bites/sips, Eat/Feed slowly, Upright as possible for all oral intake         LTG:                COGNITION  OT: Cognition Comment: comp: MOD I exp: MOD I soc: MOD I prob: SUPERV. mem: SUPERV  SP:Memory: Exceptions to [facility]  Problem Solving: Within Functional Limits    RECREATIONAL THERAPY  Attendance to recreational therapy programs:    []  Pet Therapy  [] Music Therapy  [] Art Therapy    [] Recreation Therapy Group [] Support Group           Patient social interaction (mood, participation): good      Patient strengths: motivated    Patients goal:  \"to at least do things in my home\"    Problems/Barriers: needs 24 hour care        1. Safety:          - Intervention / Plan:    [x]  falls protocol     [x]  PT/OT    [x]  SP        - Results:         2. Potential DME needs:         - Intervention / Plan:  [x]  PT/OT     [x]  Assess equipment needs/access       - Results:         3. Weakness:          - Intervention / Plan:  [x]  PT/OT      []  Other:         - Results:         4. Discharge planning needs:          - Intervention / Plan:  [x]  Weekly team conference      [x]  family training        - Results:         5.            - Intervention / Plan:          - Results:         6.            - Intervention / Plan:         - Results:         7.            - Intervention / Plan:         - Results:           Discharge Plan   Estimated Length of Stay: 13 days    Tentative Discharge date: 7/8/20      Anticipated Discharge Destination:  Home      Team recommendations:    1. Follow up Therapy :    PT  OT  RN  [facility]    2. Home Health    Other:     Equipment needed at Discharge:  Other: TBD      Team Members Present at Conference:    Physician: Dr. Ibis Ulloa  : Elena Márquez RN  RN: Miriam Ambrose RN  Physical Therapist: David Cornejo, PT  Occupational Therapist: Omar Lund OTR  Speech Therapist: Nirmal Engle, SLP    Electronically signed by Blanca Eagle RN on 7/7/2020 at 2:46 PM

## 2020-07-06 NOTE — PROGRESS NOTES
Patient up 1 with a walker. Vital signs stable. Took medications whole with sips of water. She is A/Ox4 and hard of hearing. Will continue to monitor.

## 2020-07-06 NOTE — PROGRESS NOTES
Clinical Pharmacy Note    Warfarin consult follow-up    Recent Labs     07/06/20  0533   INR 2.1     No results for input(s): HGB, HCT, PLT in the last 72 hours. Notes:  Date INR Warfarin Dose    06/19/20 1.8  6 mg    06/20/20 2.1  7 mg    06/21/20 2.3   7 mg    06/22/20 3.3  HOLD    06/23/20 3.6   HOLD      06/24/20 1.6  7 mg    06/25/20 1.4  8 mg    06/26/20 1.7  8 mg    06/27/20 2.0  7 mg    06/28/20 2.5  7 mg    06/29/20  2.6  6 mg   06/30/20 none 7 mg   07/01/20 2.9 6 mg   07/02/20  ---  6 mg    07/03/20 3.9 HOLD   07/04/20 3.2 HOLD   07/05/20 2.6 4 mg    07/06/20  2.1  6 mg                             INR of 2.1 is therapeutic, goal range of 2-3 (CVA). Will give home dose of 6 mg warfarin today. Daily PT/INR until stable within therapeutic range. ANDREI Hodges Ph.  7/6/2020  10:21 AM

## 2020-07-06 NOTE — PROGRESS NOTES
[id number] [facility] Neurology Daily Progress Note  Name: Ariana Cedeno  Age: 80 y.o. Gender: female  CodeStatus: DNR-CCA  Allergies: Metoclopramide  Pantoprazole  Pcn [Penicillins]  Protonix [Pantoprazole Sodium]  Tramadol    Chief Complaint:No chief complaint on file. Primary Care Provider: Christina Campbell MD  InpatientTreatment Team: Treatment Team: Attending Provider: Chidi Evans DO; Consulting Physician: Ginette Yeh MD; Consulting Physician: Barb Garcia MD; Consulting Physician: Alyssa Cortes MD; Consulting Physician: Brian Malcolm MD; Consulting Physician: Denis Rapp MD; Registered Nurse: Shravan Chavira, RN; Nursing Student: Mariam Lowery; Occupational Therapist Assistant: LOIS Kelly; Registered Nurse: Adriana Kauffman, RN; Patient Care Tech: EleExecNote Speed  Admission Date: 6/24/2020      HPI Pt seen and examined on rehab for neuro follow up vertigo and lower extremity weakness. MRI of cervical spine showed severe central canal stenosis at C5-C6 and moderate to severe central canal stenosis at C4-C5.  MRI thoracic spine negative.  MRI lumbar spine showed advanced multilevel degenerative changes.  MRI findings of spine were discussed with patient's son and it was decided that we would proceed with conservative manage meant given patient's age and risk factors and surgical risk.  Patient was not given Decadron due to her underlying diabetes. Patient currently alert and oriented x3, no acute distress, hard of hearing.  Overall improved.  Dizziness resolved.    Vitals:    07/06/20 0757   BP: (!) 152/65   Pulse: 55   Resp: 17   Temp: 97.2 °F (36.2 °C)   SpO2: 95%      Review of Systems   Constitutional: Negative for appetite change, chills, fatigue and fever. HENT: Positive for hearing loss ([place]). Negative for trouble swallowing. Eyes: Negative for visual disturbance. Respiratory: Negative for cough, chest tightness, shortness of breath and wheezing.     Cardiovascular: Negative for chest pain, palpitations and leg swelling. Gastrointestinal: Negative for nausea and vomiting. Musculoskeletal: Positive for gait problem. Skin: Negative for color change and rash. Neurological: Positive for weakness. Negative for dizziness, tremors, seizures, syncope, facial asymmetry, speech difficulty, light-headedness, numbness and headaches. Psychiatric/Behavioral: Negative for agitation, confusion and hallucinations. The patient is not nervous/anxious. Physical Exam  Vitals signs and nursing note reviewed. Constitutional:       General: She is not in acute distress. Appearance: She is not diaphoretic. HENT:      Head: Normocephalic and atraumatic. Eyes:      Extraocular Movements: Extraocular movements intact. Pupils: Pupils are equal, round, and reactive to light. Cardiovascular:      Rate and Rhythm: Normal rate and regular rhythm. Pulmonary:      Effort: Pulmonary effort is normal. No respiratory distress. Breath sounds: Normal breath sounds. Abdominal:      General: Bowel sounds are normal. There is no distension. Palpations: Abdomen is soft. Tenderness: There is no abdominal tenderness. Skin:     General: Skin is warm and dry. Neurological:      General: No focal deficit present. Mental Status: She is alert and oriented to person, place, and time. Cranial Nerves: No cranial nerve deficit. Motor: Weakness present. No tremor or seizure activity.       Gait: Gait abnormal.               Medications:  Reviewed    Infusion Medications:    dextrose       Scheduled Medications:    warfarin  6 mg Oral Once    insulin glargine  15 Units Subcutaneous Nightly    ciprofloxacin  250 mg Oral BID WC    lactobacillus acidophilus  2 tablet Oral TID    isosorbide mononitrate  30 mg Oral Daily    insulin lispro  4 Units Subcutaneous TID WC    amLODIPine  10 mg Oral Daily    hydrALAZINE  50 mg Oral TID    Vitamin D  2,000 Units Oral Dinner   Anne lidocaine  3 patch Transdermal Daily    atorvastatin  10 mg Oral Nightly    carvedilol  12.5 mg Oral BID     clopidogrel  75 mg Oral Daily    gabapentin  100 mg Oral Nightly    insulin lispro  0-12 Units Subcutaneous TID     insulin lispro  0-6 Units Subcutaneous Nightly    sacubitril-valsartan  1 tablet Oral BID    [START ON 6/19/2021] warfarin (COUMADIN) daily dosing (placeholder)   Other RX Placeholder     PRN Meds: HYDROcodone 5 mg - acetaminophen, hydrALAZINE, fleet, acetaminophen, analgesic ointment, metaxalone, magnesium hydroxide, ondansetron **OR** ondansetron, sodium chloride flush, dextrose, dextrose, glucagon (rDNA), glucose, hydrALAZINE, meclizine    Labs:   No results for input(s): WBC, HGB, HCT, PLT in the last 72 hours. No results for input(s): NA, K, CL, CO2, BUN, CREATININE, CALCIUM, PHOS in the last 72 hours. Invalid input(s): MAGNES  No results for input(s): AST, ALT, BILIDIR, BILITOT, ALKPHOS in the last 72 hours. Recent Labs     07/04/20  0606 07/05/20  0528 07/06/20  0533   INR 3.2 2.6 2.1     No results for input(s): Glean Sabianism in the last 72 hours. Urinalysis:   Lab Results   Component Value Date    NITRU POSITIVE 07/01/2020    WBCUA >100 07/01/2020    BACTERIA FEW 07/01/2020    RBCUA 3-5 07/01/2020    BLOODU Negative 07/01/2020    SPECGRAV 1.013 07/01/2020    GLUCOSEU Negative 07/01/2020       Radiology:   Most recent    EEG No procedure found. MRI of Brain No results found for this or any previous visit. Results for orders placed during the hospital encounter of 06/19/20   MRI BRAIN WO CONTRAST    Narrative MRI BRAIN WITHOUT CONTRAST:    CLINICAL HISTORY:  r/o CVA , dizziness, nausea, generalized weakness    COMPARISONS:  3/14/2019    TECHNIQUE: Multiplanar, multi-sequence MRI was performed on a 1.5Tesla closed magnet. FINDINGS:  There are no abnormal sites of restricted diffusion. The ventricles are normal in position.   There is no evidence for mass effect. There is no shift of the midline structures. There are multiple  extensive foci of increased signal on FLAIR   and T2, in the periventricular deep white matter and corona radiata, which may be secondary to small vessel ischemic changes, demyelination, or aging. There appear similar prior exam. There is moderate dilatation of the cerebral sulci and ventricles,   unchanged. Flow-voids in the major intracranial blood vessels are identified and are patent by spin-echo criteria. There are few small foci of decreased signal on gradient echo sequences within the left frontal and both parietal lobes. They may be   secondary to small remote hemorrhages. There is mucosal thickening within both maxillary sinuses and both ethmoid sinuses. Mastoid air cells are clear. The seventh and eighth nerve complexes and optic nerves are symmetrical.  No evidence for mass in the cerebellopontine angle. There is   generalized thinning of the corpus callosum. The cerebellar tonsils are not ectopic. The pituitary gland is unremarkable. Optic nerves are symmetrical. The calvarium and dura are unremarkable. There is hypertrophy of nasal turbinates, consistent with   rhinitis. Impression NO EVIDENCE OF ACUTE CVA. GENERALIZED PARENCHYMAL VOLUME LOSS AND NONSPECIFIC WHITE MATTER CHANGES, MOST LIKELY SECONDARY TO CHRONIC SMALL VESSEL ISCHEMIC CHANGES. MUCOSAL THICKENING IN ETHMOID AND MAXILLARY SINUSES. A FEW SMALL NONSPECIFIC FOCI ON GRADIENT ECHO SEQUENCES WITHIN THE WHITE MATTER BILATERALLY. MRA of the Head and Neck: No results found for this or any previous visit. No results found for this or any previous visit. Results for orders placed during the hospital encounter of 03/14/19   MRA head w/o contrast    Narrative EXAM:     MRI of the brain without contrast    MRA of the brain without contrast    History: TIA. Dysarthria and left-sided weakness.     Technique: Multiplanar multisequence MRI of the brain was performed without contrast. Axial 3-D time-of-flight images of the brain were obtained without contrast. 3-D volume rendered images were performed for evaluation of the cerebral vasculature. Comparison: MRI of the brain from November 14, 2018 and CT brain from March 14, 2019    Findings:    MRI brain:    Confluent periventricular hyperintense T2/FLAIR signal as well as multiple small foci of hyperintensity/FLAIR signal within the bilateral supratentorial white matter are nonspecific, as is patchy hyperintense T2/FLAIR signal within the jana. Findings are   most compatible with chronic small vessel ischemic changes in a patient of this age. These findings are not significantly changed from prior MRI of the brain. Prominence of the sulci and ventricles compatible with moderate generalized parenchymal volume loss. No edema, hemorrhage, mass, mass effect, midline shift, or abnormal extra-axial fluid collection. Midline structures are within normal limits. The posterior fossa is within normal limits. There is no diffusion restriction. No susceptibility artifact is identified on the gradient echo sequence. Cranial nerves 7/8 complexes appear grossly unremarkable. The visualized paranasal sinuses and bilateral mastoid air cells are clear. MRA brain:    The bilateral distal cervical internal carotid arteries through the skull base are patent. The bilateral middle cerebral arteries through the trifurcation and opercular branches are patent. The vertebrobasilar system including the superior cerebellar and posterior cerebral arteries are patent. Note is made of a fetal origin of the left posterior cerebral artery. The right posterior communicating artery is not identified. The bilateral anterior cerebral arteries and anterior communicating artery are patent. No aneurysm or high-grade stenosis of the visualized cerebral vasculature.       Impression MRI brain:    No acute ischemia. Generalized parenchymal volume loss and nonspecific white matter findings most compatible with chronic small vessel ischemic changes in a patient of this age. MRA brain:    No aneurysm or high-grade stenosis of the visualized cerebral vasculature. CT of the Head:   Results for orders placed during the hospital encounter of 06/19/20   CT Head WO Contrast    Narrative CT HEAD WO CONTRAST : 6/19/2020    CLINICAL HISTORY:  weakness, nausea with movement . COMPARISON: 6/18/2020. TECHNIQUE: Spiral unenhanced images were obtained of the head, with routine multiplanar reconstructions performed. Intravenous contrast is noted from a CT abdomen and pelvis from earlier 6/19/2020. All CT scans at this facility use dose modulation, iterative reconstruction, and/or weight based dosing when appropriate to reduce radiation dose to as low as reasonably achievable. FINDINGS:    There is no intracranial hemorrhage, mass effect, midline shift, extra-axial collection, evidence of hydrocephalus, recent ischemic infarct, or skull fracture identified. Moderate age-related atrophic changes have not significantly changed from the yesterday's study. Impression NO ACUTE INTRACRANIAL PROCESS OR SIGNIFICANT CHANGE FROM YESTERDAY IDENTIFIED. No results found for this or any previous visit. No results found for this or any previous visit. Carotid duplex: No results found for this or any previous visit. No results found for this or any previous visit. Results for orders placed during the hospital encounter of 06/19/20   US CAROTID ARTERY BILATERAL    Narrative US CAROTID ARTERY BILATERAL: 6/19/2020    CLINICAL HISTORY:  dizziness . COMPARISON: 11/14/2018. Grayscale, color and waveform Doppler analysis of the cervical carotid and vertebral arteries was performed.      Validated velocity measurements with angiographic measurements, velocity that we would proceed with conservative management given patient's age and risk factors and surgical risk.  Patient is not a candidate for Decadron given her underlying diabetes.  Pain management following. Carlo Ghotra started on steroid taper. Patient does complain of right arm quivering though this appears to be coming from the cervical spine.  We did discuss further that the only option to treat this would be surgical though we will wait for physical therapy for now and will consider neurosurgical evaluation if he worsens and does not have any improvement in her walking. Patient with history of MCA CVA currently on Coumadin and Plavix  Continue PT/OT/ST  Patient overall doing much better.  Vertigo resolved.  Lower extremity weakness improving    I independently performed an evaluation on this patient. I have reviewed the above documentation completed by the Nurse Practitioner. Please see my additional contributions to the HPI, physical exam, assessment/medical decision making. Elijah Headley MD, Hina Acosta, American Board of Psychiatry & Neurology  Board Certified in Vascular Neurology  Board Certified in Neuromuscular Medicine  Certified in Neurorehabilitation           Collaborating physicians: Dr Gin Headley    Electronically signed by SYLVIE Layton CNP on 7/6/2020 at 1:49 PM

## 2020-07-06 NOTE — PROGRESS NOTES
Occupational Therapy  Facility/Department: [facility]  Daily Treatment Note  NAME: Dale Torres  : 6/10/1927  MRN: [id number]    Date of Service: 2020    Discharge Recommendations:  Continue to assess pending progress       Assessment      Activity Tolerance  Activity Tolerance: Patient Tolerated treatment well  Safety Devices  Safety Devices in place: Yes  Type of devices: All fall risk precautions in place         Patient Diagnosis(es): There were no encounter diagnoses. 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 has a past surgical history that includes Appendectomy; Rectocele repair; Cystocele repair; Ankle surgery; Breast biopsy; Cataract removal (); eye surgery; Tonsillectomy; Hysterectomy; and Coronary angioplasty with stent (2019).     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 Pain Assessment  Pain Level: 0  Pre Treatment Pain Screening  Pain at present: 0     Orientation  Orientation  Overall Orientation Status: Within Functional Limits     Objective      Coordination  Fine Motor: Patient engaged in B FM strengthening and coordination to increase I with fasteners and small objects for ADL's and IADL's. Patient utilized the graded green clip (4 lb pressure) to  marbles 1 at a time from container. Patient with MIN difficulty pinching open graded clip. Patient able to  marbles with MOD difficulty. Patient able to release marbles onto pegboard with MIN difficulty. Patient able to  marbles 5 at a time from pegboard without graded clip with MIN in hand manipulation difficulty. Rest breaks as needed. Patient engaged in B UE ROM, B FM coordination and cognition to increase I with fasteners and small objects for ADL's and IADL's. Patient donned B 1/2 # wrist weights. Patient able to disassemble various sized nuts and bolts on to toolbench with 0 difficulty. Patient with 0 difficulty reaching under toolbench with l UE. Patient with 0 difficulty reaching over toolbench with R UE. Upper Extremity Function  UE Strengthing: Patient donned B 1/2 # wrist weights while completing Parquetry. The first design the patient completed was a 17 piece asymmetrical snail design that patient completed under the original example picture. Example picture placed at midline. Patient had MIn difficulty with activity and completed in a timely manner. Pieces were neatly and 16/16 correctly placed. The second design the patient completed was a 20 piece asymmetrical fish design that patient completed under the original example picture. Example picture was placed at midline. Patient had MIN difficulty with activity and completed in a timely manner. Pieces were slightly ajar and 17/20 correctly placed.      To improve cognition, B UE ROM/strengthening and B FM coordination in order to improve participation in self care and leisure activities. Patient with MIN FM difficulty. Patient with 0 UE reaching difficulty.       Plan   Plan  Times per week: 5-7 times per week  Plan weeks: 2 weeks  Current Treatment Recommendations: Strengthening, Endurance Training, Neuromuscular Re-education, Self-Care / ADL, Balance Training, Functional Mobility Training, Safety Education & Training, Patient/Caregiver Education & Training    Plan Comment: Continue per OT POC for planned d/c on 7-8-20         Goals  Patient Goals   Patient goals : \"to at least do things in my home\"       Therapy Time   Individual Concurrent Group Co-treatment   Time In 1400         Time Out 1500         Minutes 60             Therapeutic activities: 60 minutes       Electronically signed by LOIS Jovel on 7/6/20 at 3:21 PM EDT      LOIS Jovel

## 2020-07-06 NOTE — PROGRESS NOTES
Endocrinology Progress Note    Assessment and Plan:   Assessment-  1. Type 2 diabetes  2. Lower extremity weakness  3. Severe spinal canal stenosis  4. Thyroid induced hyperglycemia    Plan-  1. Continue Lantus 20 units at bedtime  2. Continue Humalog 4 units 3 times daily before meals  3. Medium dose sliding scale coverage  4. Will wean insulin as steroids are decreased  5. Monitor glycemic control closely, avoid hypoglycemia  6. No changes to current treatment plan    POC Glucose:   Recent Labs     07/05/20  0606 07/05/20  1110 07/05/20  1604 07/05/20 2022 07/06/20  0602   POCGLU 135* 190* 140* 147* 122*     HGBA1C:  Lab Results   Component Value Date    LABA1C 8.2 (H) 06/20/2020    LABA1C 7.8 01/14/2020    LABA1C 7.9 07/11/2019     CBC:   No results for input(s): WBC, HGB, PLT in the last 72 hours. CMP:    No results for input(s): NA, K, CL, CO2, BUN, CREATININE, GLUCOSE, CALCIUM, LABGLOM in the last 72 hours. CC: No chief complaint on file. Subjective: Interval History: Yoan Coronado is a 80year-old type II diabetic female admitted to our rehabilitation unit for strengthening and conditioning. She has weakness and muscle atrophy due to severe spinal canal stenosis. Her glycemic control is improving on current insulin dosing regiment.   She is having steroid-induced hyperglycemia    Review of systems: denies polyuria, polydipsia, ABD pain, flank pain, N/V/D, or diaphoresis  Medications:   Scheduled Meds:   insulin glargine  15 Units Subcutaneous Nightly    ciprofloxacin  250 mg Oral BID WC    lactobacillus acidophilus  2 tablet Oral TID    isosorbide mononitrate  30 mg Oral Daily    insulin lispro  4 Units Subcutaneous TID WC    amLODIPine  10 mg Oral Daily    hydrALAZINE  50 mg Oral TID    Vitamin D  2,000 Units Oral Dinner    lidocaine  3 patch Transdermal Daily    atorvastatin  10 mg Oral Nightly    carvedilol  12.5 mg Oral BID WC    clopidogrel  75 mg Oral Daily    gabapentin  100 mg Oral Nightly    insulin lispro  0-12 Units Subcutaneous TID WC    insulin lispro  0-6 Units Subcutaneous Nightly    nitrofurantoin (macrocrystal-monohydrate)  100 mg Oral Nightly    sacubitril-valsartan  1 tablet Oral BID    [START ON 6/19/2021] warfarin (COUMADIN) daily dosing (placeholder)   Other RX Placeholder     Continuous Infusions:   dextrose         Objective:   Vitals: BP (!) 152/65   Pulse 55   Temp 97.2 °F (36.2 °C) (Axillary)   Resp 17   Ht 5' 3\" (1.6 m)   Wt 188 lb 7.9 oz (85.5 kg)   LMP  (LMP Unknown)   SpO2 95%   BMI 33.39 kg/m²    Wt Readings from Last 3 Encounters:   06/30/20 188 lb 7.9 oz (85.5 kg)   06/19/20 184 lb (83.5 kg)   06/18/20 184 lb (83.5 kg)        General appearance: alert, appears stated age, cooperative and no distress  Skin: Skin color, texture, turgor normal. No rashes or lesions. Neck: no lymphadenopathy  Lungs: clear to auscultation bilaterally  Heart: regular rate and rhythm, S1, S2 normal, no murmur, click, rub or gallop  Abdomen: soft, non-tender. Bowel sounds normal. No masses,  no organomegaly.   Extremities: extremities normal, atraumatic, no cyanosis or edema    Patient Active Problem List:     HTN (hypertension)     Diabetes mellitus     SI (stress incontinence), female     Osteoarthritis     CKD (chronic kidney disease)     HLD (hyperlipidemia)     Vitamin D deficiency     Eczematous dermatitis     Chronic low back pain     Lumbar spinal stenosis     Hypercalcemia     Diastolic dysfunction     Valvular heart disease     Recurrent UTI (urinary tract infection)     Vitamin B12 deficiency     Chest pain     Nausea & vomiting     History of non-ST elevation myocardial infarction (NSTEMI)     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     History of ST elevation myocardial infarction (STEMI)     Late effects of CVA (cerebrovascular accident)     S/P PTCA (percutaneous transluminal coronary angioplasty)     Transient cerebral ischemia     Monitoring for long-term anticoagulant use     Chronic combined systolic and diastolic congestive heart failure (HCC)     Other transient cerebral ischemic attacks and related syndromes     Cerebrovascular accident (CVA) ([address]     Current use of long term anticoagulation     Syncope     Spinal stenosis in cervical region     Lumbar degenerative disc disease     Spinal stenosis, lumbar region, with neurogenic claudication     Abnormality of gait and mobility due to  NTSCI with Impaired Mobility and ADL's secondary to Cervical Myelopathy and Vestibular neuronitis with rehab admission 06/24/20. Generalized muscle ache     Generalized osteoarthrosis, involving multiple sites     Vestibular neuronitis of both ears     Dizziness     SCC (squamous cell carcinoma), arm     Type 2 diabetes mellitus (HCC)     Obesity (BMI 30-39. 9)     [place] (hard of hearing)     DJD (degenerative joint disease), lumbar     History of PTCA     Uncontrolled type 2 diabetes mellitus with hyperglycemia [facility])            Electronically signed by PEDRO LUIS Bronson on 7/6/2020 at 10:13 AM

## 2020-07-07 LAB
BACTERIA: NEGATIVE /HPF
BILIRUBIN URINE: NEGATIVE
BLOOD, URINE: NEGATIVE
CLARITY: CLEAR
COLOR: YELLOW
EPITHELIAL CELLS, UA: ABNORMAL /HPF (ref 0–5)
GLUCOSE BLD-MCNC: 126 MG/DL (ref 60–115)
GLUCOSE BLD-MCNC: 136 MG/DL (ref 60–115)
GLUCOSE BLD-MCNC: 161 MG/DL (ref 60–115)
GLUCOSE BLD-MCNC: 208 MG/DL (ref 60–115)
GLUCOSE URINE: NEGATIVE MG/DL
HYALINE CASTS: ABNORMAL /HPF (ref 0–5)
INR BLD: 1.9
KETONES, URINE: NEGATIVE MG/DL
LEUKOCYTE ESTERASE, URINE: ABNORMAL
NITRITE, URINE: NEGATIVE
PERFORMED ON: ABNORMAL
PH UA: 5 (ref 5–9)
PROTEIN UA: ABNORMAL MG/DL
PROTHROMBIN TIME: 21.3 SEC (ref 12.3–14.9)
RBC UA: ABNORMAL /HPF (ref 0–5)
SPECIFIC GRAVITY UA: 1.01 (ref 1–1.03)
URINE REFLEX TO CULTURE: YES
UROBILINOGEN, URINE: 0.2 E.U./DL
WBC UA: ABNORMAL /HPF (ref 0–5)

## 2020-07-07 PROCEDURE — 6370000000 HC RX 637 (ALT 250 FOR IP): Performed by: INTERNAL MEDICINE

## 2020-07-07 PROCEDURE — 97116 GAIT TRAINING THERAPY: CPT

## 2020-07-07 PROCEDURE — 6370000000 HC RX 637 (ALT 250 FOR IP): Performed by: PHYSICAL MEDICINE & REHABILITATION

## 2020-07-07 PROCEDURE — 97535 SELF CARE MNGMENT TRAINING: CPT

## 2020-07-07 PROCEDURE — 36415 COLL VENOUS BLD VENIPUNCTURE: CPT

## 2020-07-07 PROCEDURE — 97110 THERAPEUTIC EXERCISES: CPT

## 2020-07-07 PROCEDURE — 87086 URINE CULTURE/COLONY COUNT: CPT

## 2020-07-07 PROCEDURE — 99232 SBSQ HOSP IP/OBS MODERATE 35: CPT | Performed by: PHYSICAL MEDICINE & REHABILITATION

## 2020-07-07 PROCEDURE — 85610 PROTHROMBIN TIME: CPT

## 2020-07-07 PROCEDURE — 1180000000 HC REHAB R&B

## 2020-07-07 PROCEDURE — 6370000000 HC RX 637 (ALT 250 FOR IP): Performed by: PHYSICIAN ASSISTANT

## 2020-07-07 PROCEDURE — 81001 URINALYSIS AUTO W/SCOPE: CPT

## 2020-07-07 PROCEDURE — 97530 THERAPEUTIC ACTIVITIES: CPT

## 2020-07-07 RX ORDER — ISOSORBIDE MONONITRATE 30 MG/1
30 TABLET, EXTENDED RELEASE ORAL DAILY
Qty: 30 TABLET | Refills: 3 | Status: SHIPPED | OUTPATIENT
Start: 2020-07-07 | End: 2020-10-27 | Stop reason: SDUPTHER

## 2020-07-07 RX ORDER — GABAPENTIN 100 MG/1
100 CAPSULE ORAL NIGHTLY
Qty: 90 CAPSULE | Refills: 1 | Status: SHIPPED | OUTPATIENT
Start: 2020-07-07 | End: 2020-07-16

## 2020-07-07 RX ORDER — METAXALONE 400 MG/1
400 TABLET ORAL 3 TIMES DAILY PRN
Qty: 30 TABLET | Refills: 0 | Status: SHIPPED | OUTPATIENT
Start: 2020-07-07 | End: 2020-07-16

## 2020-07-07 RX ORDER — HYDRALAZINE HYDROCHLORIDE 50 MG/1
50 TABLET, FILM COATED ORAL 3 TIMES DAILY
Qty: 90 TABLET | Refills: 3 | Status: SHIPPED | OUTPATIENT
Start: 2020-07-07 | End: 2020-11-02

## 2020-07-07 RX ORDER — AMLODIPINE BESYLATE 10 MG/1
10 TABLET ORAL DAILY
Qty: 30 TABLET | Refills: 3 | Status: SHIPPED | OUTPATIENT
Start: 2020-07-07 | End: 2020-08-18 | Stop reason: ALTCHOICE

## 2020-07-07 RX ORDER — HYDROCODONE BITARTRATE AND ACETAMINOPHEN 5; 325 MG/1; MG/1
1 TABLET ORAL EVERY 8 HOURS PRN
Qty: 20 TABLET | Refills: 0 | Status: SHIPPED | OUTPATIENT
Start: 2020-07-07 | End: 2020-07-14

## 2020-07-07 RX ADMIN — HYDRALAZINE HYDROCHLORIDE 50 MG: 50 TABLET, FILM COATED ORAL at 15:00

## 2020-07-07 RX ADMIN — AMLODIPINE BESYLATE 10 MG: 10 TABLET ORAL at 08:50

## 2020-07-07 RX ADMIN — CIPROFLOXACIN 250 MG: 500 TABLET, FILM COATED ORAL at 08:51

## 2020-07-07 RX ADMIN — HYDRALAZINE HYDROCHLORIDE 50 MG: 50 TABLET, FILM COATED ORAL at 21:06

## 2020-07-07 RX ADMIN — CIPROFLOXACIN 250 MG: 500 TABLET, FILM COATED ORAL at 17:49

## 2020-07-07 RX ADMIN — LACTOBACILLUS TAB 2 TABLET: TAB at 08:50

## 2020-07-07 RX ADMIN — CARVEDILOL 12.5 MG: 12.5 TABLET, FILM COATED ORAL at 17:49

## 2020-07-07 RX ADMIN — GABAPENTIN 100 MG: 100 CAPSULE ORAL at 21:06

## 2020-07-07 RX ADMIN — SACUBITRIL AND VALSARTAN 1 TABLET: 24; 26 TABLET, FILM COATED ORAL at 21:06

## 2020-07-07 RX ADMIN — ISOSORBIDE MONONITRATE 30 MG: 30 TABLET, EXTENDED RELEASE ORAL at 08:50

## 2020-07-07 RX ADMIN — INSULIN GLARGINE 15 UNITS: 100 INJECTION, SOLUTION SUBCUTANEOUS at 21:07

## 2020-07-07 RX ADMIN — LACTOBACILLUS TAB 2 TABLET: TAB at 21:06

## 2020-07-07 RX ADMIN — LACTOBACILLUS TAB 2 TABLET: TAB at 15:00

## 2020-07-07 RX ADMIN — VITAMIN D, TAB 1000IU (100/BT) 2000 UNITS: 25 TAB at 17:48

## 2020-07-07 RX ADMIN — CARVEDILOL 12.5 MG: 12.5 TABLET, FILM COATED ORAL at 08:50

## 2020-07-07 RX ADMIN — CLOPIDOGREL BISULFATE 75 MG: 75 TABLET ORAL at 08:50

## 2020-07-07 RX ADMIN — WARFARIN SODIUM 7 MG: 5 TABLET ORAL at 17:48

## 2020-07-07 RX ADMIN — SACUBITRIL AND VALSARTAN 1 TABLET: 24; 26 TABLET, FILM COATED ORAL at 08:50

## 2020-07-07 RX ADMIN — HYDRALAZINE HYDROCHLORIDE 50 MG: 50 TABLET, FILM COATED ORAL at 08:50

## 2020-07-07 ASSESSMENT — PAIN SCALES - GENERAL
PAINLEVEL_OUTOF10: 0
PAINLEVEL_OUTOF10: 0

## 2020-07-07 NOTE — PROGRESS NOTES
Pt resting quietly, Pt transfers up with Rolator and one assist, Pt is AC/HS Hs BS was 171, insulin given snack provided, Pure wick in place, shift assessment completed earlier this shift, pt transfer up with one assist and Rolator, LBM 7/6/2020 will continue to monitor, PT [place] can hear better in L Ear must speak very loudly, Pt on ASA and Plavix, call light within reach will continue to monitor,

## 2020-07-07 NOTE — PROGRESS NOTES
Occupational Therapy  Facility/Department: [facility]  Daily Treatment Note  NAME: Kaveh Hernadez  : 6/10/1927  MRN: [id number]    Date of Service: 2020    Discharge Recommendations:  Continue to assess pending progress       Assessment      Activity Tolerance  Activity Tolerance: Patient Tolerated treatment well  Safety Devices  Safety Devices in place: Yes  Type of devices: All fall risk precautions in place         Patient Diagnosis(es): The primary encounter diagnosis was Spinal stenosis, lumbar region, with neurogenic claudication. Diagnoses of Spinal stenosis in cervical region, Ischemic cardiomyopathy, Essential hypertension, and Chronic kidney disease, unspecified CKD stage were also pertinent to this visit. 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 has a past surgical history that includes Appendectomy; Rectocele repair; Cystocele repair; Ankle surgery; Breast biopsy; Cataract removal (); eye surgery; Tonsillectomy; Hysterectomy; and Coronary angioplasty with stent (2019).     Restrictions  Restrictions/Precautions  Restrictions/Precautions: Fall Risk  Position Activity Restriction  Other position/activity restrictions: [address]     Subjective   General  Chart Reviewed: Yes  Patient assessed for rehabilitation services?: Yes  Response to previous treatment: Patient with no complaints from previous session  Family / Caregiver Present: No  Referring Practitioner: Dr January Dover  Diagnosis: NTSCI with imp mob and ADLs 2° cervical myelopathy and vestibular neuronitis    Subjective  Subjective: I have a shower chair. Pain Assessment  Pain Level: 0  Pre Treatment Pain Screening  Pain at present: 0     Orientation  Orientation  Overall Orientation Status: Within Functional Limits     Objective      Tub Transfers  Tub - Transfer From: Other(rollator)  Tub - Transfer Type: To and From  Tub - Transfer To: Shower seat with back  Tub - Technique: Ambulating  Tub Transfers: Supervision  Tub Transfers Comments: osvaldo reynolds    Upper Extremity Function  UE Strengthing: UE Strengthening: Patient engaged in B UE ROM/strengthening to increase I with ADL's and transfers. Patient provided written HEP. Patient able to utilize 1/2 # hand weight 1 X 10 repetitions in various planes. Patient required MAX verbal cues for proper technique. Patient required 0 verbal cues to keep correct count. RB's as needed.        Plan   Plan  Times per week: 5-7 times per week  Plan weeks: 2 weeks  Current Treatment Recommendations: Strengthening, Endurance Training, Neuromuscular Re-education, Self-Care / ADL, Balance Training, Functional Mobility Training, Safety Education & Training, Patient/Caregiver Education & Training    Plan Comment: Continue per OT POC for planned d/c on 7-8-20         Goals  Patient Goals   Patient goals : \"to at least do things in my home\"       Therapy Time   Individual Concurrent Group Co-treatment   Time In 1530         Time Out 1600         Minutes 30             ADL training: 10 minutes  Therapeutic activities: 20 minutes       Electronically signed by LOIS Bustamante on 7/7/20 at 4:12 PM EDT      LOIS Bustamante

## 2020-07-07 NOTE — PROGRESS NOTES
left greater than right left lower extremity weakness is    also there  Lungs:  Diminished, CTA-B. Respiration effort is normal at rest.     Heart:   S1 = S2, RRR. No loud murmurs. Abdomen:  Soft, non-tender, no enlargement of liver or spleen. Extremities:  No significant lower extremity edema or tenderness. Skin:   Intact to general survey, no visualized or palpated problems. Rehabilitation:  Physical therapy: FIMS:  Bed Mobility: Scooting: Supervision    Transfers: Sit to Stand: Modified independent  Stand to sit: Modified independent  Bed to Chair: Modified independent, Ambulation 1  Surface: level tile, carpet  Device: Rollator  Assistance: Modified Independent, Supervision  Quality of Gait: R side trandelenburg; increased fatigue after increased distances. Gait Deviations: Slow Shanelle, Increased JUAN  Distance: 175ft  Comments: indep short didtances in room with rollator. S increased distances. , Stairs  # Steps : 4  Stairs Height: 6\"  Rails: Bilateral  Curbs: 6\"  Device: Rolling walker(Grab bar to ascend(Car) descends using [employer])  Assistance: Supervision  Comment: increased fatigue with 2nd set of steps. FIMS:  ,  , Assessment: Pt's balance is challanged in R SLS with 1 UE support. Requires B UE support to maintain dynamic balance. Occupational therapy: FIMS:   ,  , Assessment: Patient is a 80 year female with an new onset of coordination problems, dizziness and weakness.  Patient presents with the above stated problem areas and will benefit from OT to address the issues and increase independence    Speech therapy: FIMS:        Lab/X-ray studies reviewed, analyzed and discussed with patient and staff:     Recent culture shows Raoultella species-resistant to Macrobid  amoxicillin-clavulanate Sensitive <=2 mcg/mL    ceFAZolin Sensitive <=4 mcg/mL    ciprofloxacin Sensitive <=0.25 mcg/mL    gentamicin Sensitive <=1 mcg/mL    nitrofurantoin Resistant 256 mcg/mL C5/C6:  There is no neural foraminal stenosis. There is no evidence of central canal stenosis. There is no foraminal stenosis. C6/C7:  Moderate discussed by complex with effacement of the anterior CSF column and cord deformity. No abnormal cord signal intensity. Findings resulting in moderate to severe central canal stenosis. C7-T1:  Minimal disc osteophyte complex without central canal or significant foraminal narrowing     Severe central canal stenosis at C5-6. Moderate to severe central canal stenosis at C4-5. No abnormal cord signal intensity. Mri Thoracic Spine  : 6/20/2020     Bones: The thoracic vertebrae are normally aligned with no evidence of fracture. There is normal marrow signal throughout the thoracic spine. Disc space: There is no focal disc herniation or evidence for spinal canal stenosis. Disc spaces are age-appropriate. Spinal cord: The thoracic cord is normal in configuration and signal intensity. Soft tissues: There is no paraspinal soft tissue mass or fluid collection. Negative MRI of the thoracic spine. No signs of cord compression. Mri Lumbar Spine   6/20/2020  Levorotoscoliosis with advanced multilevel degenerative changes. Severe foraminal narrowing bilaterally at L4-5. There may be mild transverse canal narrowing at L3-4 and L4-5 but there is no definite central canal stenosis       Ct Abdomen Pelvis  6/19/2020  BORDERLINE PELVIECTASIS IN LEFT KIDNEY. NO ACUTE PATHOLOGY IN THE ABDOMEN OR PELVIS. Xr Chest   6/19/2020   Osseous structures intact. Cardiopericardial silhouette normal. Pulmonary vasculature normal. Lungs clear. NO ACUTE CARDIOPULMONARY DISEASE. Mri Brain Wo 6/19/2020   NO EVIDENCE OF ACUTE CVA. GENERALIZED PARENCHYMAL VOLUME LOSS AND NONSPECIFIC WHITE MATTER CHANGES, MOST LIKELY SECONDARY TO CHRONIC SMALL VESSEL ISCHEMIC CHANGES. MUCOSAL THICKENING IN ETHMOID AND MAXILLARY SINUSES.  A FEW SMALL NONSPECIFIC FOCI ON GRADIENT ECHO SEQUENCES WITHIN THE WHITE MATTER BILATERALLY. Us Carotid Artery   6/19/2020   50-69% NARROWING PREDICTED OF BOTH INTERNAL CAROTID ARTERIES, NOT SIGNIFICANTLY CHANGED FROM 11/14/2018. Previous extensive, complex labs, notes and diagnostics reviewed and analyzed. ALLERGIES:    Allergies as of 06/24/2020 - Review Complete 06/24/2020   Allergen Reaction Noted    Metoclopramide  11/29/2011    Pantoprazole Other (See Comments) 12/08/2015    Pcn [penicillins]  11/29/2011    Protonix [pantoprazole sodium]  11/29/2011    Tramadol  11/29/2011      (please also verify by checking MAR)      Yesterday I evaluated this patient for periodic reassessment of medical and functional status. The patient was discussed in detail at the treatment team meeting focusing on current medical issues, progress in therapies, social issues, psychological issues, barriers to progress and strategies to address these barriers, and discharge planning. See the hand written addendum to rehab progress note. The patient continues to be high risk for future disability and their medical and rehabilitation prognosis continue to be good and therefore, we will continue the patient's rehabilitation course as planned. The patient's tentative discharge date was set. Patient and family education was discussed. The patient was made aware of the team discussion regarding their progress. Discharge plans were discussed along with barriers to progress and strategies to address these barriers, patient encouraged to continue to discuss discharge plans with . Complex Physical Medicine & Rehab Issues Assess & Plan:   1. Severe abnormality of gait and mobility and impaired self-care and ADL's secondary to progressive cervical myelopathy.   Functional and medical status reassessed regarding patients ability to participate in therapies and patient found to be able to participate in acute intensive comprehensive inpatient rehabilitation program including PT/OT to improve balance, ambulation, ADLs, and to improve the P/AROM. Therapeutic modifications regarding activities in therapies, place, amount of time per day and intensity of therapy made daily. In bed therapies or bedside therapies prn.   2. 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 Check for C.difficile x1 if >2 loose stools in 24 hours, continue bowel & bladder program.  Monitor bowel and bladder function. Lactinex 2 PO every AC. MOM prn, Brown Bomb prn, Glycerin suppository prn, enema prn. Begin spinal cord type bowel and bladder program.  Patient may need a Montero catheter if her bladder is not recovering. Consult urology. Add timed voiding. 3. Severe neck and low back pain as well as generalized OA pain: reassess pain every shift and prior to and after each therapy session, give prn Tylenol and Norco titration using lowest effective dose, modalities prn in therapy, Lidoderm, K-pad prn. Use modalities such as heat and BenGay 1 available and when helpful use lowest effective dose of Norco titrating off if possible prior to discharge. 4. Skin healing and breakdown risk:  continue pressure relief program.  Daily skin exams and reports from nursing. 5. Severe fatigue due to nutritional and hydration deficiency: Add vitamin B12 vitamin D and CoQ10 continue to monitor I&Os, calorie counts prn, dietary consult prn. Transition to 3 carb per meal diet with an at bedtime snack add diabetic add and dietary Ed  6. Acute episodic insomnia with situational adjustment disorder:  prn Ambien, monitor for day time sedation. 7. Falls risk elevated:  patient to use call light to get nursing assistance to get up, bed and chair alarm.   8. Elevated DVT risk: progressive activities in PT, continue prophylaxis SALLIE hose, elevation and Coumadin. 9. Complex discharge planning: We will prepare for discharge and complete family training and work on medication simplification and education discharge 7/8/2020 home with her son in [address] [facility] PT OT RN aide and social work. She is status post her final weekly team meeting  Monday to assess progress towards goals, discuss and address social, psychological and medical comorbidities and to address difficulties they may be having progressing in therapy. Patient and family education is in progress. The patient is to follow-up with their family physician after discharge. Complex Active General Medical Issues that complicate care Assess & Plan:    1. HTN (hypertension),  HLD (hyperlipidemia), CAD,   Chronic combined systolic and diastolic congestive heart failure,  History of ST elevation myocardial infarction (STEMI),   History of PTCA,  Valvular heart disease-vital signs every shift dose and titrate cardiac medication to include Norvasc, Lipitor, Coreg, Apresoline both intravenous and oral, Coumadin, Entresto consult hospitalist for backup medical consult Dr. Justyn Borrego with cardiology monitor for orthostasis and failure as we rehydrate her  2. CKD (chronic kidney disease)-control blood sugars control blood pressure recheck BMP monitor UA and renal output, push clear liquids push oral fluids add IV fluids as needed  3. Osteoarthritis,  Lumbar spinal stenosis, DJD (degenerative joint disease), lumbar-add Lidoderm BenGay and heat lowest effective dose of opiates  4. SCC (squamous cell carcinoma), arm, Eczematous dermatitis-topical steroids add co-Q10 vitamin D  5. Vitamin D and B12 deficiency-high-dose vitamin D and B12  6. Spinal stenosis in cervical region with cervical myelopathy-inoperable due to moderate multiple medical comorbidities-PT and OT are her greatest hope for recovery her blood sugars are elevated wean steroids  7.  Uncontrolled type 2 diabetes mellitus uncontrolled with hyperlipidemia and, Obesity (BMI 30-39. 9)-fingerstick blood sugars q. before meals and at bedtime dose and titrate insulin and carbohydrate insulin intake add diabetic add and dietary Ed continue to titrate carbohydrates and titrate insulin. 8. Severely [place] (hard of hearing),   Vestibular neuronitis of both ears  9. Nausea, anxiety and elevated blood sugars due to steroids for cervical myelopathy-steroid taper and titrate benzodiazepines if needed titrate Zofran and Antivert monitor stools for blood while patient is on Coumadin and steroids as she is high risk for bleed  10. Acute on chronic UTI with history of neurogenic bowel and bladder-cath if no void check postvoid residuals teach Crede method recheck stat UA status post treatment of UTI with Cipro and await for sensitivity. White blood cell count seems to be coming back down continue to follow I will recheck her urine prior to discharge.              Claudene Mills, D.O., PM&R     Attending    [address]

## 2020-07-07 NOTE — PROGRESS NOTES
Clinical Pharmacy Note    Warfarin consult follow-up    Recent Labs     07/07/20  0553   INR 1.9     No results for input(s): HGB, HCT, PLT in the last 72 hours. Notes:  Date INR Warfarin Dose    06/19/20 1.8  6 mg    06/20/20 2.1  7 mg    06/21/20 2.3   7 mg    06/22/20 3.3  HOLD    06/23/20 3.6   HOLD      06/24/20 1.6  7 mg    06/25/20 1.4  8 mg    06/26/20 1.7  8 mg    06/27/20 2.0  7 mg    06/28/20 2.5  7 mg    06/29/20  2.6  6 mg   06/30/20 none 7 mg   07/01/20 2.9 6 mg   07/02/20  ---  6 mg    07/03/20 3.9 HOLD   07/04/20 3.2 HOLD   07/05/20 2.6 4 mg    07/06/20  2.1  6 mg   07/07/20 1.9 7 mg                                                 INR of 1.9 is slightly SUB-therapeutic, goal range of 2-3 (CVA). Will give home dose of 7 mg warfarin today. Daily PT/INR until stable within therapeutic range. ANDREI Jimenes Ph.  7/7/2020  11:20 AM

## 2020-07-07 NOTE — PROGRESS NOTES
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    History of non-ST elevation myocardial infarction (NSTEMI) 2018    History of ST elevation myocardial infarction (STEMI)     Hyperlipidemia     Hypertension     Measles     Mumps     Osteoarthritis 2012    Other cerebrovascular disease     Other transient cerebral ischemic attacks and related syndromes     S/P PTCA (percutaneous transluminal coronary angioplasty) 2019    SCC (squamous cell carcinoma), arm     right upper arm,  right forarm    SI (stress incontinence), female 2012    Type II or unspecified type diabetes mellitus without mention of complication, not stated as uncontrolled     Whooping cough      Past Surgical History:   Procedure Laterality Date    ANKLE SURGERY      broken left ankle.     APPENDECTOMY      BREAST BIOPSY      x2 left breast    CATARACT REMOVAL      bilateral    CORONARY ANGIOPLASTY WITH STENT PLACEMENT  2019    CYSTOCELE REPAIR      EYE SURGERY      bilateral cataract    HYSTERECTOMY      complete at age 39    [address]      as a child       Precautions:   Restrictions/Precautions: Fall Risk  Other position/activity restrictions: DNRCCA     Social/Functional History:  Social/Functional History  Lives With: Son  Type of Home: House  Home Layout: Two level, Performs ADL's on one level, Able to Live on Main level with bedroom/bathroom  Home Access: Stairs to enter without rails  Entrance Stairs - Number of Steps: 1  Bathroom Shower/Tub: Tub/Shower unit, Curtain  Bathroom Equipment: Hand-held shower, Shower chair  Home Equipment: 4 wheeled walker, Rolling walker(Son reported that patient uses a rollator in the house and a ww in the community)  ADL Assistance: [address]: (son does all homemaking)  Ambulation Assistance: Independent  Transfer Assistance: Independent  Active : No  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  Leisure & Hobbies: Patient enjoys coloring in adult books  Additional Comments: Per patient's son she has 25 hour supervision from multiple family members    Current Functional Status:  ADL  Feeding: Modified independent , Dentures  Grooming: Modified independent   UE Bathing: Modified independent   LE Bathing: Modified independent   UE Dressing: Modified independent   LE Dressing: Modified independent   Toileting: Modified independent   Toilet Transfers  Toilet - Technique: Ambulating  Equipment Used: Grab bars  Toilet Transfer: Modified independent  Toilet Transfers Comments: rollator  Tub Transfers  Tub - Transfer From: Other(rollator)  Tub - Transfer Type: To and From  Tub - Transfer To: Shower seat with back  Tub - Technique: Ambulating  Tub Transfers: Supervision  Tub Transfers Comments: osvaldo reynolds  Shower Transfers  Shower - Transfer From: Other(rollator)  Shower - Transfer Type: To and From  Shower - Transfer To: Shower seat with back  Shower - Technique: Ambulating  Shower Transfers: Modified independence  Shower Transfers Comments: osvaldo reynolds    Orientation Status:  Orientation  Overall Orientation Status: Within Functional Limits    Cognition Status:  Cognition  Overall Cognitive Status: Exceptions  Arousal/Alertness: Appropriate responses to stimuli  Following Commands:  Follows one step commands consistently, Follows multistep commands with increased time  Attention Span: Appears intact  Memory: Decreased short term memory  Safety Judgement: Decreased awareness of need for safety  Problem Solving: Assistance required to generate solutions, Assistance required to correct errors made  Insights: Decreased awareness of deficits  Initiation: Does not require cues  Sequencing: Does not require cues  Cognition Comment: comp: MOD I exp: MOD I soc: MOD I prob: SUPERV. mem: SUPERV    Perception Status:  Perception  Overall Perceptual Status: WFL    Sensation Status:  Sensation  Overall Sensation Status: WFL    Vision and Hearing Status:  Vision  Vision: Impaired  Vision Exceptions: Wears glasses at all times  Hearing  Hearing: Exceptions to [facility] Exceptions: Hard of hearing/hearing concerns     UE Function Status:    ROM:   LUE AROM (degrees)  LUE AROM : WFL  Left Hand AROM (degrees)  Left Hand AROM: WFL  RUE AROM (degrees)  RUE AROM : WFL  Right Hand AROM (degrees)  Right Hand AROM: WFL    Strength:  LUE Strength  Gross LUE Strength: WFL  LUE Strength Comment: decreased strength in the shoulder 3/5  RUE Strength  Gross RUE Strength: WFL  RUE Strength Comment: overall minimal decrease 3+/5    Coordination, Tone, Quality of Movement: Tone RUE  RUE Tone: Normotonic  Tone LUE  LUE Tone: Normotonic  Coordination  Movements Are Fluid And Coordinated: No  Coordination and Movement description: Tremors, Right UE, Left UE  Quality of Movement Other  Comment: Patient was noted to have shaking of her upper body when she first went from supine to sit. Patient reported that this is new for her and son confirmed that patient did not have tremors PTA    D/C Recommendations:    Equipment Recommendations:  OT D/C Equipment  Equipment Needed: No    OT Follow Up:  OT D/C RECOMMENDATIONS  REQUIRES OT FOLLOW UP: Yes  Type: Home OT    Home Exercise Program Provided: Yes  If yes, type of HEP: UE strengthening     Electronically signed by:     Claudeen Grebe, MOT, OTR/L  7/7/2020, 4:31 PM

## 2020-07-07 NOTE — PROGRESS NOTES
Physical Therapy Rehab Treatment Note  [facility]       NAME: Aaron Chong  : 6/10/1927 (80 y.o.)  MRN: [id number]  CODE STATUS: DNR-CCA    Date of Service: 2020  Chart Reviewed: Yes  Family / Caregiver Present: No    Restrictions:  Restrictions/Precautions: Fall Risk     SUBJECTIVE: Subjective: Pt states she is ready to go home tomorrow. Pain Screening  Patient Currently in Pain: No     Post Treatment Pain Screenin/10     OBJECTIVE:              Transfers  Stand to sit: Modified Independent  Stand to sit: Modified independent  Bed to Chair: Modified independent    Ambulation  Ambulation?: Yes  Ambulation 1  Surface: level tile;carpet  Device: Rollator  Assistance: Modified Independent  Quality of Gait: R side trandelenburg    Distance: 60ft  Ambulation 2  Surface - 2: level tile;carpet  Device 2: Rollator  Assistance 2: Supervision  Distance: 150ft        Exercises  Hip Flexion: x20  Hip Abduction: abd x20; hip add with ball x20  Knee Long Arc Quad: x20  Ankle Pumps: x20  Comments: Reviewed HEP for quality. Pt verbalized and demo'd understanding. Standing picking up objects with reacher. Independent with 1 UE support on rollator. ASSESSMENT/COMMENTS:  Assessment: Goals met  Patient Education: Reviewed seated LE therex for HEP and issued handout in large print. Pt able to complete indep after instruction. PLAN OF CARE/Safety:   Plan Comment: Pt scheduled to D/C tomorrow .       Therapy Time:   Individual   Time In 1330   Time Out 1400   Minutes 30     Minutes:      Transfer/Bed mobility trainin      Gait training:10      Neuro re education:0     Therapeutic ex:15    Tosin Ying PTA, 20 at 1:43 PM

## 2020-07-07 NOTE — PLAN OF CARE
Problem: Falls - Risk of:  Goal: Will remain free from falls  Description: Will remain free from falls  Outcome: Ongoing  Goal: Absence of physical injury  Description: Absence of physical injury  Outcome: Ongoing     Problem: Skin Integrity:  Goal: Will show no infection signs and symptoms  Description: Will show no infection signs and symptoms  Outcome: Ongoing  Goal: Absence of new skin breakdown  Description: Absence of new skin breakdown  Outcome: Ongoing     Problem: SAFETY  Goal: LTG - patient will adhere to hip precautions during ADL's and transfers  Outcome: Ongoing  Goal: LTG - Patient will demonstrate safety requirements appropriate to situation/environment  Outcome: Ongoing  Goal: LTG - patient will utilize safety techniques  Outcome: Ongoing  Goal: STG - patient locks brakes on wheelchair  Outcome: Ongoing  Goal: STG - Patient uses call light consistently to request assistance with transfers  Outcome: Ongoing  Goal: STG - patient uses gait belt during all transfers  Outcome: Ongoing     Problem:  Activity:  Goal: Ability to avoid complications of mobility impairment will improve  Description: Ability to avoid complications of mobility impairment will improve  Outcome: Ongoing  Goal: Range of joint motion will improve  Description: Range of joint motion will improve  Outcome: Ongoing  Goal: Ability to ambulate will improve  Description: Ability to ambulate will improve  Outcome: Ongoing  Goal: Muscle strength will improve  Description: Muscle strength will improve  Outcome: Ongoing     Problem: Safety:  Goal: Ability to remain free from injury will improve  Description: Ability to remain free from injury will improve  Outcome: Ongoing

## 2020-07-07 NOTE — PROGRESS NOTES
Occupational Therapy  Facility/Department: [facility]  Daily Treatment Note  NAME: Stacy Gould  : 6/10/1927  MRN: [id number]    Date of Service: 2020    Discharge Recommendations:  Continue to assess pending progress       Assessment      Activity Tolerance  Activity Tolerance: Patient Tolerated treatment well  Safety Devices  Safety Devices in place: Yes  Type of devices: All fall risk precautions in place         Patient Diagnosis(es): The primary encounter diagnosis was Spinal stenosis, lumbar region, with neurogenic claudication. Diagnoses of Spinal stenosis in cervical region, Ischemic cardiomyopathy, Essential hypertension, and Chronic kidney disease, unspecified CKD stage were also pertinent to this visit. 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 has a past surgical history that includes Appendectomy; Rectocele repair; Cystocele repair; Ankle surgery; Breast biopsy; Cataract removal (); eye surgery; Tonsillectomy; Hysterectomy; and Coronary angioplasty with stent (2019).     Restrictions  Restrictions/Precautions  Restrictions/Precautions: Fall Risk  Position Activity Restriction  Other position/activity restrictions: [facility]     Subjective   General  Chart Reviewed: Yes  Patient assessed for rehabilitation services?: Yes  Response to previous treatment: Patient with no complaints from previous session  Family / Caregiver Present: No  Referring Practitioner: Dr Db Campbell  Diagnosis: NTSCI with imp mob and ADLs 2° cervical myelopathy and vestibular neuronitis    Subjective  Subjective: Okay. Pain Assessment  Pain Level: 0  Pre Treatment Pain Screening  Pain at present: 0     Orientation  Orientation  Overall Orientation Status: Within Functional Limits     Objective      ADL    Feeding: Modified independent     Grooming: Modified independent     UE Bathing: Modified independent     LE Bathing: Modified independent     UE Dressing: Modified independent     LE Dressing: Modified independent     Toileting: Modified independent      Toilet Transfers  Toilet - Technique: Ambulating  Equipment Used: Grab bars  Toilet Transfer: Modified independent  Toilet Transfers Comments: rollator    Shower Transfers  Shower - Transfer From: Other(rollator)  Shower - Transfer Type: To and From  Shower - Transfer To: Shower seat with back  Shower - Technique: Ambulating  Shower Transfers: Modified independence  Shower Transfers Comments: grab bars          Plan    Pt's plan of care was discussed by team OTs and Janes at Monday POC review meeting.      Plan  Times per week: 5-7 times per week  Plan weeks: 2 weeks  Current Treatment Recommendations: Strengthening, Endurance Training, Neuromuscular Re-education, Self-Care / ADL, Balance Training, Functional Mobility Training, Safety Education & Training, Patient/Caregiver Education & Training    Plan Comment: Continue per OT POC for planned d/c on 20         Goals  Patient Goals   Patient goals : \"to at least do things in my home\"       Therapy Time   Individual Concurrent Group Co-treatment   Time In 1000         Time Out 1100         Minutes 60             ADL trainin minutes       Electronically signed by LOIS Dawn on 20 at 2:12 PM JOSE L Hernandez [facility], LOIS

## 2020-07-08 VITALS
HEIGHT: 63 IN | HEART RATE: 64 BPM | SYSTOLIC BLOOD PRESSURE: 155 MMHG | DIASTOLIC BLOOD PRESSURE: 66 MMHG | BODY MASS INDEX: 33.4 KG/M2 | RESPIRATION RATE: 17 BRPM | OXYGEN SATURATION: 96 % | WEIGHT: 188.49 LBS | TEMPERATURE: 98 F

## 2020-07-08 LAB
GLUCOSE BLD-MCNC: 133 MG/DL (ref 60–115)
GLUCOSE BLD-MCNC: 196 MG/DL (ref 60–115)
INR BLD: 2
PERFORMED ON: ABNORMAL
PERFORMED ON: ABNORMAL
PROTHROMBIN TIME: 22.4 SEC (ref 12.3–14.9)
URINE CULTURE, ROUTINE: NORMAL

## 2020-07-08 PROCEDURE — 6370000000 HC RX 637 (ALT 250 FOR IP): Performed by: INTERNAL MEDICINE

## 2020-07-08 PROCEDURE — 36415 COLL VENOUS BLD VENIPUNCTURE: CPT

## 2020-07-08 PROCEDURE — 99239 HOSP IP/OBS DSCHRG MGMT >30: CPT | Performed by: PHYSICAL MEDICINE & REHABILITATION

## 2020-07-08 PROCEDURE — 6370000000 HC RX 637 (ALT 250 FOR IP): Performed by: PHYSICAL MEDICINE & REHABILITATION

## 2020-07-08 PROCEDURE — 6370000000 HC RX 637 (ALT 250 FOR IP): Performed by: PHYSICIAN ASSISTANT

## 2020-07-08 PROCEDURE — 85610 PROTHROMBIN TIME: CPT

## 2020-07-08 RX ADMIN — CARVEDILOL 12.5 MG: 12.5 TABLET, FILM COATED ORAL at 09:04

## 2020-07-08 RX ADMIN — ISOSORBIDE MONONITRATE 30 MG: 30 TABLET, EXTENDED RELEASE ORAL at 09:05

## 2020-07-08 RX ADMIN — LACTOBACILLUS TAB 2 TABLET: TAB at 09:04

## 2020-07-08 RX ADMIN — CLOPIDOGREL BISULFATE 75 MG: 75 TABLET ORAL at 09:05

## 2020-07-08 RX ADMIN — HYDRALAZINE HYDROCHLORIDE 50 MG: 50 TABLET, FILM COATED ORAL at 09:05

## 2020-07-08 RX ADMIN — AMLODIPINE BESYLATE 10 MG: 10 TABLET ORAL at 09:05

## 2020-07-08 RX ADMIN — SACUBITRIL AND VALSARTAN 1 TABLET: 24; 26 TABLET, FILM COATED ORAL at 09:05

## 2020-07-08 NOTE — PROGRESS NOTES
Pt was cooperative and pleasant tonight . Excited to be discharged home tomorrow with son. Denies any pain. One touch tonight was 136. Pt received 15 units of Lantus subcut and no sliding scale insulin. Will monitor on rounds tonight. Call light in reach and bed alarm on. Electronically signed by Carlos Gomez on 7/8/2020 at 12:26 AM

## 2020-07-08 NOTE — PLAN OF CARE
Problem: Falls - Risk of:  Goal: Will remain free from falls  Description: Will remain free from falls  Outcome: Ongoing  Goal: Absence of physical injury  Description: Absence of physical injury  Outcome: Ongoing     Problem: Skin Integrity:  Goal: Will show no infection signs and symptoms  Description: Will show no infection signs and symptoms  Outcome: Ongoing  Goal: Absence of new skin breakdown  Description: Absence of new skin breakdown  Outcome: Ongoing     Problem: SAFETY  Goal: LTG - patient will adhere to hip precautions during ADL's and transfers  Outcome: Ongoing  Goal: LTG - Patient will demonstrate safety requirements appropriate to situation/environment  Outcome: Ongoing  Goal: LTG - patient will utilize safety techniques  Outcome: Ongoing  Goal: STG - patient locks brakes on wheelchair  Outcome: Ongoing  Goal: STG - Patient uses call light consistently to request assistance with transfers  Outcome: Ongoing  Goal: STG - patient uses gait belt during all transfers  Outcome: Ongoing     Problem: Safety:  Goal: Ability to remain free from injury will improve  Description: Ability to remain free from injury will improve  Outcome: Ongoing

## 2020-07-08 NOTE — PROGRESS NOTES
Subjective: The patient complains of severe  acute on chronic neck pain and acute upper extremity weakness left greater than right partially relieved by PT, OT, steroid taper and exacerbated by recent fall and progressive cervical spinal stenosis with myelopathy. As she per approaches discharge later today have been concerned about her active chronic medical issues active chronic pain she will go home on [place] at lowest effective dose with monitoring from her son for use with severe pain flareups due to her cervical myelopathy and vestibular neuritis. She also will need monitoring regarding her Coumadin medical hold after that. I am concerned about her active and recent U TI gram-negative's resistant to Macrobid. She had been taking Macrobid at home but it looks like it be of no use at this point because this now a resistant bug. [id number] Course: The patient was admitted to the Rehabilitation Unit to address ADL and mobility deficits. The patient was enrolled in acute PT, OT program.  Weekly team meetings were held to assess functional progress toward their goals. The patient's medical issues were addressed. The patient progressed in the rehab program and is now ready for discharge. Refer to FIM scores summary report for detailed functional status. Greater than 35 minutes was spent on coordinating patients discharge including follow-up care, medications and patient/family education. ROS x10: The patient also complains of severely impaired mobility and activities of daily living. Otherwise no new problems with vision, hearing, nose, mouth, throat, dermal, cardiovascular, GI, , pulmonary, musculoskeletal, psychiatric or neurological. See Rehab H&P on Rehab chart dated .        Vital signs:  BP (!) 155/66   Pulse 64   Temp 98 °F (36.7 °C) (Oral)   Resp 17   Ht 5' 3\" (1.6 m)   Wt 188 lb 7.9 oz (85.5 kg)   LMP  (LMP Unknown)   SpO2 96%   BMI 33.39 kg/m² I/O:   PO/Intake:  fair PO intake,  3carb ADA diet    Bowel/Bladder:   Incontinent-trial timed voiding gram-negative UTI active and chronic, opiate related    constipation  General:  Patient is well developed, adequately nourished, non-obese and     well kempt. HEENT:    PERRLA, very hard of hearing but hearing intact to loud voice, external inspection of ear     and nose benign. Inspection of lips, tongue and gums benign  Musculoskeletal: No significant change in strength or tone. All joints stable. Inspection and palpation of digits and nails show no clubbing,       cyanosis or inflammatory conditions. Neuro/Psychiatric: Affect: flat but pleasant. Alert and oriented to person, place and     situation. No significant change in deep tendon reflexes or     Sensation-bilateral upper extremity weakness left greater than right left lower extremity weakness is    also there  Lungs:  Diminished, CTA-B. Respiration effort is normal at rest.     Heart:   S1 = S2, RRR. No loud murmurs. Abdomen:  Soft, non-tender, no enlargement of liver or spleen. Extremities:  No significant lower extremity edema or tenderness. Skin:   Intact to general survey, no visualized or palpated problems. Rehabilitation:  Physical therapy: FIMS:  Bed Mobility: Scooting: Supervision    Transfers: Sit to Stand: Modified independent  Stand to sit: Modified independent  Bed to Chair: Modified independent, Ambulation 1  Surface: level tile, carpet  Device: Rollator  Assistance: Modified Independent  Quality of Gait: R side trandelenburg    Gait Deviations: Slow Shanelle, Increased JUAN  Distance: 60ft  Comments: indep short didtances in room with rollator. S increased distances.   , Stairs  # Steps : 4  Stairs Height: 6\"  Rails: Bilateral  Curbs: 6\"  Device: Rolling walker(Grab bar to ascend(Car) descends using [address] [employer])  Assistance: Supervision  Comment: recip ascending;non-recip descending    FIMS:  ,  , Assessment: Goals met    Occupational therapy: FIMS:   ,  , Assessment: Patient is a 80 year female with an new onset of coordination problems, dizziness and weakness. Patient presents with the above stated problem areas and will benefit from OT to address the issues and increase independence    Speech therapy: FIMS:        Lab/X-ray studies reviewed, analyzed and discussed with patient and staff:     Recent culture shows Raoultella species-resistant to Macrobid  amoxicillin-clavulanate Sensitive <=2 mcg/mL    ceFAZolin Sensitive <=4 mcg/mL    ciprofloxacin Sensitive <=0.25 mcg/mL    gentamicin Sensitive <=1 mcg/mL    nitrofurantoin Resistant 256 mcg/mL    trimethoprim-sulfamethoxazole Sensitive <=20 mcg/mL          Recent Results (from the past 24 hour(s))   POCT Glucose    Collection Time: 07/07/20 11:32 AM   Result Value Ref Range    POC Glucose 208 (H) 60 - 115 mg/dl    Performed on ACCU-CHEK    POCT Glucose    Collection Time: 07/07/20  4:20 PM   Result Value Ref Range    POC Glucose 161 (H) 60 - 115 mg/dl    Performed on ACCU-CHEK    POCT Glucose    Collection Time: 07/07/20  9:08 PM   Result Value Ref Range    POC Glucose 136 (H) 60 - 115 mg/dl    Performed on ACCU-CHEK    Protime-INR    Collection Time: 07/08/20  5:36 AM   Result Value Ref Range    Protime 22.4 (H) 12.3 - 14.9 sec    INR 2.0    POCT Glucose    Collection Time: 07/08/20  6:20 AM   Result Value Ref Range    POC Glucose 133 (H) 60 - 115 mg/dl    Performed on ACCU-CHEK        Ct Head 6/19/2020   NO ACUTE INTRACRANIAL PROCESS OR SIGNIFICANT CHANGE FROM YESTERDAY IDENTIFIED. Ct Head   6/18/2020   No acute intracranial process or significant interval change. Mri Cervical Spine  6/20/2020    Craniocervical junction: Craniocervical junction is within normal limits. Bone marrow: No sign of acute fracture. No abnormality of the marrow signal to suggest any metastatic or diffuse bone disease. Soft tissues: Cervical soft tissues are within normal limits. and L4-5 but there is no definite central canal stenosis       Ct Abdomen Pelvis  6/19/2020  BORDERLINE PELVIECTASIS IN LEFT KIDNEY. NO ACUTE PATHOLOGY IN THE ABDOMEN OR PELVIS. Xr Chest   6/19/2020   Osseous structures intact. Cardiopericardial silhouette normal. Pulmonary vasculature normal. Lungs clear. NO ACUTE CARDIOPULMONARY DISEASE. Mri Brain Wo 6/19/2020   NO EVIDENCE OF ACUTE CVA. GENERALIZED PARENCHYMAL VOLUME LOSS AND NONSPECIFIC WHITE MATTER CHANGES, MOST LIKELY SECONDARY TO CHRONIC SMALL VESSEL ISCHEMIC CHANGES. MUCOSAL THICKENING IN ETHMOID AND MAXILLARY SINUSES. A FEW SMALL NONSPECIFIC FOCI ON GRADIENT ECHO SEQUENCES WITHIN THE WHITE MATTER BILATERALLY. Us Carotid Artery   6/19/2020   50-69% NARROWING PREDICTED OF BOTH INTERNAL CAROTID ARTERIES, NOT SIGNIFICANTLY CHANGED FROM 11/14/2018. Previous extensive, complex labs, notes and diagnostics reviewed and analyzed. ALLERGIES:    Allergies as of 06/24/2020 - Review Complete 06/24/2020   Allergen Reaction Noted    Metoclopramide  11/29/2011    Pantoprazole Other (See Comments) 12/08/2015    Pcn [penicillins]  11/29/2011    Protonix [pantoprazole sodium]  11/29/2011    Tramadol  11/29/2011      (please also verify by checking MAR)       I reviewed her [facility] prescription monitoring service data sheets in hopes of eliminating polypharmacy and weaning to the lowest effective dose of pain medications and eliminating the concomitant use of benzodiazepines. I see no medications of concern. I see no habits of combining sedatives and narcotics. Complex Physical Medicine & Rehab Issues Assess & Plan:   1. Severe abnormality of gait and mobility and impaired self-care and ADL's secondary to progressive cervical myelopathy.   Functional and medical status have improved status post acute rehab at [facility].  2. 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 Check for C.difficile x1 if >2 loose stools in 24 hours, continue bowel & bladder program.  Monitor bowel and bladder function. Lactinex 2 PO every AC. MOM prn, Brown Bomb prn, Glycerin suppository prn, enema prn. Begin spinal cord type bowel and bladder program.  Patient may need a Montero catheter if her bladder is not recovering. Consult urology. Add timed voiding. 3. Severe neck and low back pain as well as generalized OA pain: reassess pain every shift and prior to and after each therapy session, give prn Tylenol and Norco titration using lowest effective dose, modalities prn in therapy, Lidoderm, K-pad prn. Use modalities such as heat and BenGay 1 available and when helpful use lowest effective dose of Norco titrating off if possible prior to discharge. 4. Skin healing and breakdown risk:  continue pressure relief program.  Daily skin exams and reports from nursing. 5. Severe fatigue due to nutritional and hydration deficiency: Add vitamin B12 vitamin D and CoQ10 continue to monitor I&Os, calorie counts prn, dietary consult prn. Transition to 3 carb per meal diet with an at bedtime snack add diabetic add and dietary Ed  6. Acute episodic insomnia with situational adjustment disorder:  prn Ambien, monitor for day time sedation. 7. Falls risk elevated:  patient to use call light to get nursing assistance to get up, bed and chair alarm. 8. Elevated DVT risk: progressive activities in PT, continue prophylaxis SALLIE hose, elevation and Coumadin. 9. Complex discharge planning: We will prepare for discharge and complete family training and work on medication simplification and education discharge 7/8/2020 home with her son in [facility] care PT OT RN aide and social work.   She is status post her final weekly team meeting  Monday to assess progress towards goals, discuss and address social, psychological and medical comorbidities and to address difficulties they may be having progressing in therapy. Patient and family education is in progress. The patient is to follow-up with their family physician after discharge. Complex Active General Medical Issues that complicate care Assess & Plan:    1. HTN (hypertension),  HLD (hyperlipidemia), CAD,   Chronic combined systolic and diastolic congestive heart failure,  History of ST elevation myocardial infarction (STEMI),   History of PTCA,  Valvular heart disease-vital signs every shift dose and titrate cardiac medication to include Norvasc, Lipitor, Coreg, Apresoline both intravenous and oral, Coumadin, Entresto consult hospitalist for backup medical consult Dr. Laurent Hodge with cardiology monitor for orthostasis and failure as we rehydrate her  2. CKD (chronic kidney disease)-control blood sugars control blood pressure recheck BMP monitor UA and renal output, push clear liquids push oral fluids add IV fluids as needed  3. Osteoarthritis,  Lumbar spinal stenosis, DJD (degenerative joint disease), lumbar-add Lidoderm BenGay and heat lowest effective dose of opiates  4. SCC (squamous cell carcinoma), arm, Eczematous dermatitis-topical steroids add co-Q10 vitamin D  5. Vitamin D and B12 deficiency-high-dose vitamin D and B12  6. Spinal stenosis in cervical region with cervical myelopathy-inoperable due to moderate multiple medical comorbidities-PT and OT are her greatest hope for recovery her blood sugars are elevated wean steroids  7. Uncontrolled type 2 diabetes mellitus uncontrolled with hyperlipidemia and, Obesity (BMI 30-39. 9)-fingerstick blood sugars q. before meals and at bedtime dose and titrate insulin and carbohydrate insulin intake add diabetic add and dietary Ed continue to titrate carbohydrates and titrate insulin. 8. Severely [place] (hard of hearing),   Vestibular neuronitis of both ears  9.  Nausea, anxiety and elevated blood sugars due to steroids for cervical myelopathy-steroid taper and titrate benzodiazepines if needed titrate Zofran and Antivert monitor stools for blood while patient is on Coumadin and steroids as she is high risk for bleed  10. Acute on chronic UTI with history of neurogenic bowel and bladder-cath if no void check postvoid residuals teach Crede method recheck stat UA status post treatment of UTI with Cipro and await for sensitivity. White blood cell count seems to be coming back down continue to follow I will recheck her urine prior to discharge. She started her Cipro dosing on Friday, July 3 for 10-day course she should be off of it by 14 July. She can follow-up with family doctor and/or urology regarding recheck if symptoms return.              Srini Hinojosa D.O., PM&R     Attending    [address]

## 2020-07-08 NOTE — PROGRESS NOTES
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    History of non-ST elevation myocardial infarction (NSTEMI) 2018    History of ST elevation myocardial infarction (STEMI)     Hyperlipidemia     Hypertension     Measles     Mumps     Osteoarthritis 2012    Other cerebrovascular disease     Other transient cerebral ischemic attacks and related syndromes     S/P PTCA (percutaneous transluminal coronary angioplasty) 2019    SCC (squamous cell carcinoma), arm     right upper arm,  right forarm    SI (stress incontinence), female 2012    Type II or unspecified type diabetes mellitus without mention of complication, not stated as uncontrolled     Whooping cough      Past Surgical History:   Procedure Laterality Date    ANKLE SURGERY      broken left ankle.     APPENDECTOMY      BREAST BIOPSY      x2 left breast    CATARACT REMOVAL      bilateral    CORONARY ANGIOPLASTY WITH STENT PLACEMENT  2019    CYSTOCELE REPAIR      EYE SURGERY      bilateral cataract    HYSTERECTOMY      complete at age 39    [address]      as a child       Restrictions  Restrictions/Precautions  Restrictions/Precautions: Fall Risk  Position Activity Restriction  Other position/activity restrictions: DNRCCA    Objective    Bed mobility  Rolling to Left: Modified independent  Rolling to Right: Modified independent  Supine to Sit: Modified independent  Sit to Supine: Modified independent  Comment: Increased time and effort to complete sup to sit however no physical assist or cues required. Transfers  Sit to Stand: Modified independent  Stand to sit: Modified independent  Bed to Chair: Modified independent  Car Transfer: Supervision  Comment: Pt demo'd good safety with rollator breaks. Ambulation  Ambulation?: Yes  More Ambulation?: No  Ambulation 1  Surface: level tile, carpet  Device: Rollator  Assistance: Modified Independent  Quality of Gait: R side trendelenburg    Gait Deviations: Slow Shanelle, Increased JUAN  Distance: 60ft  Comments: indep short didtances in room with rollator. S increased distances. Ambulation 2  Surface - 2: level tile, carpet  Device 2: Rollator  Assistance 2: Supervision  Distance: 150ft    Stairs/Curb  Stairs?: Yes  Stairs  # Steps : 4  Stairs Height: 6\"  Rails: Bilateral  Curbs: 6\"  Device: Rolling walker(Grab bar to ascend(Car) descends using [employer])  Assistance: Supervision  Comment: recip ascending;non-recip descending  Wheelchair Activities  Propulsion: No(pt to be ambulatory)         Pt/ family education/training: Pt has been educated in safety with mobility. She has demonstrated good follow thru with these tasks    Assessment: Pt has made good gains in all areas. She has met her goals at this time      LTG established:  Long term goal 1: Patient will be independent with bed mobility. -met  Long term goal 2: Patient will be indep with bed and car transfers.-met  Long term goal 3: Patient will be SBA with 150ft of gait with rollator in busy area and indep for household distance up to 50 feet in familiar environment-met  Long term goal 4: SBA 4 stairs with rails-met    Discharge Plan: d/c to home with follow up PT recommended        Electronically signed by Wale Styles PT on 7/8/2020 at 5:01 PM

## 2020-07-08 NOTE — FLOWSHEET NOTE
RN gave all instructions and belongings to patient and her son, Nanette Rosales. I called [facility] pharmacy to make sure all the medications were sent.

## 2020-07-08 NOTE — DISCHARGE SUMMARY
Subjective: The patient complains of severe  acute on chronic neck pain and acute upper extremity weakness left greater than right partially relieved by PT, OT, steroid taper and exacerbated by recent fall and progressive cervical spinal stenosis with myelopathy. As she per approaches discharge later today have been concerned about her active chronic medical issues active chronic pain she will go home on [place] at lowest effective dose with monitoring from her son for use with severe pain flareups due to her cervical myelopathy and vestibular neuritis. She also will need monitoring regarding her Coumadin medical hold after that. I am concerned about her active and recent U TI gram-negative's resistant to Macrobid. She had been taking Macrobid at home but it looks like it be of no use at this point because this now a resistant bug. [id number] Course: The patient was admitted to the Rehabilitation Unit to address ADL and mobility deficits. The patient was enrolled in acute PT, OT program.  Weekly team meetings were held to assess functional progress toward their goals. The patient's medical issues were addressed. The patient progressed in the rehab program and is now ready for discharge. Refer to FIM scores summary report for detailed functional status. Greater than 35 minutes was spent on coordinating patients discharge including follow-up care, medications and patient/family education. ROS x10: The patient also complains of severely impaired mobility and activities of daily living. Otherwise no new problems with vision, hearing, nose, mouth, throat, dermal, cardiovascular, GI, , pulmonary, musculoskeletal, psychiatric or neurological. See Rehab H&P on Rehab chart dated .        Vital signs:  BP (!) 155/66   Pulse 64   Temp 98 °F (36.7 °C) (Oral)   Resp 17   Ht 5' 3\" (1.6 m)   Wt 188 lb 7.9 oz (85.5 kg)   LMP  (LMP Unknown)   SpO2 96%   BMI 33.39 kg/m² I/O:   PO/Intake:  fair PO intake,  3carb ADA diet    Bowel/Bladder:   Incontinent-trial timed voiding gram-negative UTI active and chronic, opiate related    constipation  General:  Patient is well developed, adequately nourished, non-obese and     well kempt. HEENT:    PERRLA, very hard of hearing but hearing intact to loud voice, external inspection of ear     and nose benign. Inspection of lips, tongue and gums benign  Musculoskeletal: No significant change in strength or tone. All joints stable. Inspection and palpation of digits and nails show no clubbing,       cyanosis or inflammatory conditions. Neuro/Psychiatric: Affect: flat but pleasant. Alert and oriented to person, place and     situation. No significant change in deep tendon reflexes or     Sensation-bilateral upper extremity weakness left greater than right left lower extremity weakness is    also there  Lungs:  Diminished, CTA-B. Respiration effort is normal at rest.     Heart:   S1 = S2, RRR. No loud murmurs. Abdomen:  Soft, non-tender, no enlargement of liver or spleen. Extremities:  No significant lower extremity edema or tenderness. Skin:   Intact to general survey, no visualized or palpated problems. Rehabilitation:  Physical therapy: FIMS:  Bed Mobility: Scooting: Supervision    Transfers: Sit to Stand: Modified independent  Stand to sit: Modified independent  Bed to Chair: Modified independent, Ambulation 1  Surface: level tile, carpet  Device: Rollator  Assistance: Modified Independent  Quality of Gait: R side trandelenburg    Gait Deviations: Slow Shanelle, Increased JUAN  Distance: 60ft  Comments: indep short didtances in room with rollator. S increased distances.   , Stairs  # Steps : 4  Stairs Height: 6\"  Rails: Bilateral  Curbs: 6\"  Device: Rolling walker(Grab bar to ascend(Car) descends using [employer])  Assistance: Supervision  Comment: recip ascending;non-recip descending    FIMS:  ,  , Assessment: Goals met    Occupational therapy: FIMS:   ,  , Assessment: Patient is a 80 year female with an new onset of coordination problems, dizziness and weakness. Patient presents with the above stated problem areas and will benefit from OT to address the issues and increase independence    Speech therapy: FIMS:        Lab/X-ray studies reviewed, analyzed and discussed with patient and staff:     Recent culture shows Raoultella species-resistant to Macrobid  amoxicillin-clavulanate Sensitive <=2 mcg/mL    ceFAZolin Sensitive <=4 mcg/mL    ciprofloxacin Sensitive <=0.25 mcg/mL    gentamicin Sensitive <=1 mcg/mL    nitrofurantoin Resistant 256 mcg/mL    trimethoprim-sulfamethoxazole Sensitive <=20 mcg/mL          Recent Results (from the past 24 hour(s))   POCT Glucose    Collection Time: 07/07/20  4:20 PM   Result Value Ref Range    POC Glucose 161 (H) 60 - 115 mg/dl    Performed on ACCU-CHEK    POCT Glucose    Collection Time: 07/07/20  9:08 PM   Result Value Ref Range    POC Glucose 136 (H) 60 - 115 mg/dl    Performed on ACCU-CHEK    Protime-INR    Collection Time: 07/08/20  5:36 AM   Result Value Ref Range    Protime 22.4 (H) 12.3 - 14.9 sec    INR 2.0    POCT Glucose    Collection Time: 07/08/20  6:20 AM   Result Value Ref Range    POC Glucose 133 (H) 60 - 115 mg/dl    Performed on ACCU-CHEK    POCT Glucose    Collection Time: 07/08/20 11:10 AM   Result Value Ref Range    POC Glucose 196 (H) 60 - 115 mg/dl    Performed on ACCU-CHEK        Ct Head 6/19/2020   NO ACUTE INTRACRANIAL PROCESS OR SIGNIFICANT CHANGE FROM YESTERDAY IDENTIFIED. Ct Head   6/18/2020   No acute intracranial process or significant interval change. Mri Cervical Spine  6/20/2020    Craniocervical junction: Craniocervical junction is within normal limits. Bone marrow: No sign of acute fracture. No abnormality of the marrow signal to suggest any metastatic or diffuse bone disease. Soft tissues: Cervical soft tissues are within normal limits. Cervical cord: The cervical cord has normal morphology and signal. There is no sign of any extramedullary hemorrhage or fluid collection. C1/2: The odontoid and the C1-2 articulation are normal. C2/C3:  There is no neural foraminal stenosis. There is no evidence of central canal stenosis. There is no foraminal stenosis. C3/C4:  Minimal disc osteophyte complex with ventral ridging. No central canal stenosis. No significant foraminal narrowing. C4/C5:  Prominent disc osteophyte complex with effacement of the anterior CSF column in cord deformity. No abnormal cord signal intensity. Findings resulting in moderate central canal stenosis. C5/C6:  There is no neural foraminal stenosis. There is no evidence of central canal stenosis. There is no foraminal stenosis. C6/C7:  Moderate discussed by complex with effacement of the anterior CSF column and cord deformity. No abnormal cord signal intensity. Findings resulting in moderate to severe central canal stenosis. C7-T1:  Minimal disc osteophyte complex without central canal or significant foraminal narrowing     Severe central canal stenosis at C5-6. Moderate to severe central canal stenosis at C4-5. No abnormal cord signal intensity. Mri Thoracic Spine  : 6/20/2020     Bones: The thoracic vertebrae are normally aligned with no evidence of fracture. There is normal marrow signal throughout the thoracic spine. Disc space: There is no focal disc herniation or evidence for spinal canal stenosis. Disc spaces are age-appropriate. Spinal cord: The thoracic cord is normal in configuration and signal intensity. Soft tissues: There is no paraspinal soft tissue mass or fluid collection. Negative MRI of the thoracic spine. No signs of cord compression. Mri Lumbar Spine   6/20/2020  Levorotoscoliosis with advanced multilevel degenerative changes. Severe foraminal narrowing bilaterally at L4-5.  There may be mild transverse canal narrowing at L3-4 and L4-5 but there is no definite central canal stenosis       Ct Abdomen Pelvis  6/19/2020  BORDERLINE PELVIECTASIS IN LEFT KIDNEY. NO ACUTE PATHOLOGY IN THE ABDOMEN OR PELVIS. Xr Chest   6/19/2020   Osseous structures intact. Cardiopericardial silhouette normal. Pulmonary vasculature normal. Lungs clear. NO ACUTE CARDIOPULMONARY DISEASE. Mri Brain Wo 6/19/2020   NO EVIDENCE OF ACUTE CVA. GENERALIZED PARENCHYMAL VOLUME LOSS AND NONSPECIFIC WHITE MATTER CHANGES, MOST LIKELY SECONDARY TO CHRONIC SMALL VESSEL ISCHEMIC CHANGES. MUCOSAL THICKENING IN ETHMOID AND MAXILLARY SINUSES. A FEW SMALL NONSPECIFIC FOCI ON GRADIENT ECHO SEQUENCES WITHIN THE WHITE MATTER BILATERALLY. Us Carotid Artery   6/19/2020   50-69% NARROWING PREDICTED OF BOTH INTERNAL CAROTID ARTERIES, NOT SIGNIFICANTLY CHANGED FROM 11/14/2018. Previous extensive, complex labs, notes and diagnostics reviewed and analyzed. ALLERGIES:    Allergies as of 06/24/2020 - Review Complete 06/24/2020   Allergen Reaction Noted    Metoclopramide  11/29/2011    Pantoprazole Other (See Comments) 12/08/2015    Pcn [penicillins]  11/29/2011    Protonix [pantoprazole sodium]  11/29/2011    Tramadol  11/29/2011      (please also verify by checking MAR)       I reviewed her [facility] prescription monitoring service data sheets in hopes of eliminating polypharmacy and weaning to the lowest effective dose of pain medications and eliminating the concomitant use of benzodiazepines. I see no medications of concern. I see no habits of combining sedatives and narcotics. Complex Physical Medicine & Rehab Issues Assess & Plan:   1. Severe abnormality of gait and mobility and impaired self-care and ADL's secondary to progressive cervical myelopathy.   Functional and medical status have improved status post acute rehab at [facility].  2. 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 Check for C.difficile x1 if >2 loose stools in 24 hours, continue bowel & bladder program.  Monitor bowel and bladder function. Lactinex 2 PO every AC. MOM prn, Brown Bomb prn, Glycerin suppository prn, enema prn. Begin spinal cord type bowel and bladder program.  Patient may need a Montero catheter if her bladder is not recovering. Consult urology. Add timed voiding. 3. Severe neck and low back pain as well as generalized OA pain: reassess pain every shift and prior to and after each therapy session, give prn Tylenol and Norco titration using lowest effective dose, modalities prn in therapy, Lidoderm, K-pad prn. Use modalities such as heat and BenGay 1 available and when helpful use lowest effective dose of Norco titrating off if possible prior to discharge. 4. Skin healing and breakdown risk:  continue pressure relief program.  Daily skin exams and reports from nursing. 5. Severe fatigue due to nutritional and hydration deficiency: Add vitamin B12 vitamin D and CoQ10 continue to monitor I&Os, calorie counts prn, dietary consult prn. Transition to 3 carb per meal diet with an at bedtime snack add diabetic add and dietary Ed  6. Acute episodic insomnia with situational adjustment disorder:  prn Ambien, monitor for day time sedation. 7. Falls risk elevated:  patient to use call light to get nursing assistance to get up, bed and chair alarm. 8. Elevated DVT risk: progressive activities in PT, continue prophylaxis SALLIE hose, elevation and Coumadin. 9. Complex discharge planning: We will prepare for discharge and complete family training and work on medication simplification and education discharge 7/8/2020 home with her son in [facility] care PT OT RN aide and social work.   She is status post her final weekly team meeting  Monday to assess progress towards goals, discuss and address social, psychological and medical comorbidities and to address difficulties they may be having progressing in therapy. Patient and family education is in progress. The patient is to follow-up with their family physician after discharge. Complex Active General Medical Issues that complicate care Assess & Plan:    1. HTN (hypertension),  HLD (hyperlipidemia), CAD,   Chronic combined systolic and diastolic congestive heart failure,  History of ST elevation myocardial infarction (STEMI),   History of PTCA,  Valvular heart disease-vital signs every shift dose and titrate cardiac medication to include Norvasc, Lipitor, Coreg, Apresoline both intravenous and oral, Coumadin, Entresto consult hospitalist for backup medical consult Dr. Nata Hernandez with cardiology monitor for orthostasis and failure as we rehydrate her  2. CKD (chronic kidney disease)-control blood sugars control blood pressure recheck BMP monitor UA and renal output, push clear liquids push oral fluids add IV fluids as needed  3. Osteoarthritis,  Lumbar spinal stenosis, DJD (degenerative joint disease), lumbar-add Lidoderm BenGay and heat lowest effective dose of opiates  4. SCC (squamous cell carcinoma), arm, Eczematous dermatitis-topical steroids add co-Q10 vitamin D  5. Vitamin D and B12 deficiency-high-dose vitamin D and B12  6. Spinal stenosis in cervical region with cervical myelopathy-inoperable due to moderate multiple medical comorbidities-PT and OT are her greatest hope for recovery her blood sugars are elevated wean steroids  7. Uncontrolled type 2 diabetes mellitus uncontrolled with hyperlipidemia and, Obesity (BMI 30-39. 9)-fingerstick blood sugars q. before meals and at bedtime dose and titrate insulin and carbohydrate insulin intake add diabetic add and dietary Ed continue to titrate carbohydrates and titrate insulin. 8. Severely [place] (hard of hearing),   Vestibular neuronitis of both ears  9.  Nausea, anxiety and elevated blood sugars due to steroids for cervical myelopathy-steroid taper and titrate benzodiazepines if needed titrate Zofran and Antivert monitor stools for blood while patient is on Coumadin and steroids as she is high risk for bleed  10. Acute on chronic UTI with history of neurogenic bowel and bladder-cath if no void check postvoid residuals teach Crede method recheck stat UA status post treatment of UTI with Cipro and await for sensitivity. White blood cell count seems to be coming back down continue to follow I will recheck her urine prior to discharge. She started her Cipro dosing on Friday, July 3 for 10-day course she should be off of it by 14 July. She can follow-up with family doctor and/or urology regarding recheck if symptoms return.              Sylvia Lucero D.O., PM&R     Attending    [address]

## 2020-07-08 NOTE — DISCHARGE INSTR - DIET

## 2020-07-09 ENCOUNTER — TELEPHONE (OUTPATIENT)
Dept: INTERNAL MEDICINE | Age: 85
End: 2020-07-09

## 2020-07-09 NOTE — TELEPHONE ENCOUNTER
Metaxalone (skelaxin) 400 mg  Needs a PA. Ordered for pt while in hospiatl by Mildred Russo DO. The pt's son was wondering if we would be able to take care of it.

## 2020-07-14 ENCOUNTER — OFFICE VISIT (OUTPATIENT)
Dept: CARDIOLOGY CLINIC | Age: 85
End: 2020-07-14
Payer: MEDICARE

## 2020-07-14 VITALS
DIASTOLIC BLOOD PRESSURE: 84 MMHG | OXYGEN SATURATION: 99 % | HEART RATE: 56 BPM | RESPIRATION RATE: 18 BRPM | SYSTOLIC BLOOD PRESSURE: 124 MMHG

## 2020-07-14 PROCEDURE — 1090F PRES/ABSN URINE INCON ASSESS: CPT | Performed by: INTERNAL MEDICINE

## 2020-07-14 PROCEDURE — 4040F PNEUMOC VAC/ADMIN/RCVD: CPT | Performed by: INTERNAL MEDICINE

## 2020-07-14 PROCEDURE — 1111F DSCHRG MED/CURRENT MED MERGE: CPT | Performed by: INTERNAL MEDICINE

## 2020-07-14 PROCEDURE — 99214 OFFICE O/P EST MOD 30 MIN: CPT | Performed by: INTERNAL MEDICINE

## 2020-07-14 PROCEDURE — G8427 DOCREV CUR MEDS BY ELIG CLIN: HCPCS | Performed by: INTERNAL MEDICINE

## 2020-07-14 PROCEDURE — 1036F TOBACCO NON-USER: CPT | Performed by: INTERNAL MEDICINE

## 2020-07-14 PROCEDURE — G8417 CALC BMI ABV UP PARAM F/U: HCPCS | Performed by: INTERNAL MEDICINE

## 2020-07-14 PROCEDURE — 1123F ACP DISCUSS/DSCN MKR DOCD: CPT | Performed by: INTERNAL MEDICINE

## 2020-07-14 RX ORDER — TRAMADOL HYDROCHLORIDE 50 MG/1
50 TABLET ORAL EVERY 8 HOURS PRN
Qty: 21 TABLET | Refills: 0 | Status: SHIPPED | OUTPATIENT
Start: 2020-07-14 | End: 2020-07-16

## 2020-07-14 ASSESSMENT — ENCOUNTER SYMPTOMS
APNEA: 0
SHORTNESS OF BREATH: 0
BLOOD IN STOOL: 0
TROUBLE SWALLOWING: 0
ANAL BLEEDING: 0
FACIAL SWELLING: 0
ABDOMINAL DISTENTION: 0
CHEST TIGHTNESS: 0
WHEEZING: 0
DIARRHEA: 0
VOICE CHANGE: 0
NAUSEA: 0
COLOR CHANGE: 0
VOMITING: 0

## 2020-07-14 NOTE — PROGRESS NOTES
[facility] CARDIOLOGY OFFICE FOLLOW-UP      Patient: Danisha Sneed  YOB: 1927  MRN: [id number]    Chief Complaint:  Chief Complaint   Patient presents with    Follow-Up from [facility] ER - STEMI    Hypertension         Subjective/HPI:  7/14/2020: Patient presents today for follow-up of recent hospitalization at [facility] for CHF. She was then sent to rehab for recovery. She had done well. As usual has labile blood pressure. Was discharged home on hydralazine 50 3 times daily. Her main issue is the back. Skelaxin has not been approved yet by her her insurance. Diabetes is under control. She is usually accompanied by her son and so also during this visit. Hard of hearing. Severe back pain. Does not want to take Norco.  We will give her Ultram 50 3 times daily for as needed use. And sent her to [facility] next door. She will see me in 1 month        1/7/20: Patient presents today for follow-up of congestive heart failure. On Entresto. Accompanied by her son. She is nauseated. We will discontinue magnesium. Son is an extremely competent care provider. And very caring.   She will see me in 3 months.     10/8/19: Patient presents today for congestive heart failure.  Doing extremely well on Entresto.  On Coumadin.  Accompanied by his son. Rosemary Dennis provides excellent care for her.  No chest pain congestive heart failure symptoms or syncope.  See me in 3 months     7/3/19: Patient presents today for follow-up of congestive heart failure.  We have been able to reduce her blood pressure medications significantly.  She was overmedicated.  We cut down hydralazine from 3-2 and now will stop it.  She is on Coumadin.  On Plavix for angioplasty which was done by Dr. Xin Hernandez in December of last year. Clementina De La Torre is accompanied by her son. Shubham [place] well otherwise. Aleksandra Verdugo is under control.  See me in 3 months     5/1/19: Patient presents today for for evaluation of congestive heart She has mild chronic kidney disease. Hyperlipidemia that stable. See me in 3 months     4/11/18: Patient presents today for Follow-up of hypertension. She gets dizzy. I think she is on too much medications. We will discontinue hydralazine clonidine and Lasix. His mild chronic kidney disease. Also has hyperlipidemia that stable. I will see him in 3 weeks. Off these medications     3/28/2018: Patient presents today for evaluation of recent hospitalization for acute renal insufficiency. Echocardiogram was done which showed preserved LV ejection fraction. Moderately elevated right systolic pressure 60 mm smoker. Last BUN/creatinine are getting better. GFR is in the 40s. She lives with one of her other sons. She note of hypertension. Used to be on lisinopril 20 mg a day and hydrochlorothiazide. During this hospitalization she was discharged home on lisinopril 2010 a day no hydrochlorothiazide. Lasix 20 minutes, day. Norvasc 10 mg a day. Clonidine 0.2 3 times a day hydralazine 10 3 times a day. I like blood pressures have been running stable in the 120s. Physical therapy goes to her house. She denies any chest pain. She started to drink more water. Does not drive. Does minor chores around the house. I would like to simplify his regimen. Take clonidine 0.2 in a.m. and hydralazine 10 mg at night. Other medication which include Lopressor 25 mg by mouth twice a day Lasix 20 mg a day Norvasc and resume a day to continue. See me in 2 weeks.       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 SCC (squamous cell carcinoma), arm 2013    right upper arm, 2015 right forarm    SI (stress incontinence), female 5/29/2012    Type II or unspecified type diabetes mellitus without mention of complication, not stated as uncontrolled     Whooping cough        Past Surgical History:   Procedure Laterality Date    ANKLE SURGERY      broken left ankle.  APPENDECTOMY      BREAST BIOPSY      x2 left breast    CATARACT REMOVAL  2004    bilateral    CORONARY ANGIOPLASTY WITH STENT PLACEMENT  01/2019    CYSTOCELE REPAIR      EYE SURGERY      bilateral cataract    HYSTERECTOMY      complete at age 39    [address]      as a child       Family History   Problem Relation Age of Onset    Diabetes Mother     Heart Disease Father        Social History     Socioeconomic History    Marital status:       Spouse name: None    Number of children: None    Years of education: None    Highest education level: None   Occupational History    None   Social Needs    Financial resource strain: Not very hard    Food insecurity     Worry: Never true     Inability: Never true    Transportation needs     Medical: No     Non-medical: No   Tobacco Use    Smoking status: Never Smoker    Smokeless tobacco: Never Used   Substance and Sexual Activity    Alcohol use: No     Alcohol/week: 0.0 standard drinks    Drug use: No    Sexual activity: None   Lifestyle    Physical activity     Days per week: None     Minutes per session: None    Stress: None   Relationships    Social connections     Talks on phone: More than three times a week     Gets together: More than three times a week     Attends Jain service: 1 to 4 times per year     Active member of club or organization: No     Attends meetings of clubs or organizations: Never     Relationship status:     Intimate partner violence     Fear of current or ex partner: No     Emotionally abused: No     Physically abused: No     Forced sexual activity: No   Other Topics Concern    None   Social History Narrative         Lives With: Son    [id number] [facility] [address]: Two level, Able to Live on Main level with bedroom/bathroom, Performs ADL's on one level    Home Access: Stairs to enter with rails    Entrance Stairs - Number of Steps: Threshold    Bathroom Shower/Tub: Tub/Shower unit    Bathroom Toilet: Handicap height    Bathroom Equipment: Grab bars in shower, Grab bars around toilet, Hand-held shower, Shower chair    Bathroom Accessibility: Accessible    Home Equipment: Rolling walker, 4 wheeled walker (Usually uses rollator)    ADL Assistance: [address] [place] [address] Avenue: Needs assistance (Son manages housework)    Homemaking Responsibilities: No    Ambulation Assistance: Independent (Rollator)    Transfer Assistance: Independent    Active : No    Patient's  Info: Sons            Allergies   Allergen Reactions    Metoclopramide     Pantoprazole Other (See Comments)    Pcn [Penicillins]      Tolerates rocephin    Protonix [Pantoprazole Sodium]     Tramadol        Current Outpatient Medications   Medication Sig Dispense Refill    HYDROcodone-acetaminophen (NORCO) 5-325 MG per tablet Take 1 tablet by mouth every 8 hours as needed for Pain for up to 7 days. 20 tablet 0    gabapentin (NEURONTIN) 100 MG capsule Take 1 capsule by mouth nightly for 30 days.  90 capsule 1    metaxalone (SKELAXIN) 400 MG tablet Take 1 tablet by mouth 3 times daily as needed (For pain and muscle spasm) 30 tablet 0    amLODIPine (NORVASC) 10 MG tablet Take 1 tablet by mouth daily 30 tablet 3    hydrALAZINE (APRESOLINE) 50 MG tablet Take 1 tablet by mouth 3 times daily 90 tablet 3    isosorbide mononitrate (IMDUR) 30 MG extended release tablet Take 1 tablet by mouth daily 30 tablet 3    SITagliptin (JANUVIA) 50 MG tablet Take 1 tablet by mouth daily 90 tablet 1    carvedilol (COREG) 12.5 MG tablet take 1 tablet by mouth twice a day with meals 180 tablet 3    ferrous sulfate (IRON 325) 325 (65 Fe) MG tablet Take 325 mg by mouth Daily with lunch      warfarin (COUMADIN) 6 MG tablet Take 6 mg by mouth M,W,Th,Fr when at home.  Menthol-Methyl Salicylate (ANALGESIC OINTMENT) OINT ointment Apply 2 each topically 4 times daily Apply to painful joints, may use alternative Aspercreme 2 Tube 5    sacubitril-valsartan (ENTRESTO) 24-26 MG per tablet Take 1 tablet by mouth 2 times daily 60 tablet 0    metFORMIN (GLUCOPHAGE) 1000 MG tablet take 1 tablet by mouth twice a day with meals 180 tablet 1    clopidogrel (PLAVIX) 75 MG tablet Take 1 tablet by mouth daily 90 tablet 3    blood glucose monitor kit and supplies Test 2 times a day & as needed for symptoms of irregular blood glucose. Freestyle Freedom Lite 1 kit 0    Lancets MISC 1 each by Does not apply route 2 times daily Test 2 times a day & as needed for symptoms of irregular blood glucose. Freestyle Freedom Lite 100 each 5    blood glucose monitor strips Test 2 times a day & as needed for symptoms of irregular blood glucose.   Freestyle Freedom Lite 200 strip 3    furosemide (LASIX) 20 MG tablet Take 1 tablet by mouth 2 times daily (Patient taking differently: Take 20 mg by mouth daily ) 180 tablet 2    warfarin (COUMADIN) 7.5 MG tablet Take 1 tablet by mouth daily (Patient taking differently: Take 7 mg by mouth Tuesday,Saturday, & Sunday when at home.) 30 tablet 3    TRUEPLUS LANCETS 28G MISC TEST TWO TIMES DAILY 200 each 3    Cholecalciferol (VITAMIN D) 2000 units CAPS capsule Take 2,000 Units by mouth      TRUE METRIX BLOOD GLUCOSE TEST strip TEST two to three times a day 200 strip 5    Needles & Syringes MISC 1 each by Does not apply route daily 50 each 0    Incontinence Supply Disposable (DEPEND UNDERGARMENTS) MISC 1 each by Does not apply route nightly 1 Package 11    Blood Glucose Monitoring Suppl (TRUE METRIX AIR GLUCOSE METER) W/DEVICE KIT       Blood Glucose Monitoring Suppl PRUDENCIO Dx: E11.9 1 Device 0     No current facility-administered medications for this visit. Review of Systems:   Review of Systems   Constitutional: Negative for activity change, appetite change, diaphoresis, fatigue and unexpected weight change. HENT: Negative for facial swelling, nosebleeds, trouble swallowing and voice change. Respiratory: Negative for apnea, chest tightness, shortness of breath and wheezing. Cardiovascular: Negative for chest pain, palpitations and leg swelling. Gastrointestinal: Negative for abdominal distention, anal bleeding, blood in stool, diarrhea, nausea and vomiting. Genitourinary: Negative for decreased urine volume and dysuria. Musculoskeletal: Negative for gait problem, myalgias, neck pain and neck stiffness. Skin: Negative for color change, pallor, rash and wound. Neurological: Negative for dizziness, seizures, syncope, facial asymmetry, weakness, light-headedness, numbness and headaches. Hematological: Does not bruise/bleed easily. Psychiatric/Behavioral: Negative for agitation, behavioral problems, confusion, hallucinations and suicidal ideas. The patient is not nervous/anxious. All other systems reviewed and are negative. Review of System is negative except for as mentioned above. Physical Examination:    /84 (Site: Left Upper Arm, Position: Sitting, Cuff Size: Medium Adult)   Pulse 56   Resp 18   LMP  (LMP Unknown)   SpO2 99%    Physical Exam   Constitutional: She appears healthy. No distress. HENT:   Nose: Nose normal.   Mouth/Throat: Dentition is normal. Oropharynx is clear. Eyes: Pupils are equal, round, and reactive to light.  Conjunctivae are normal.   Neck: Normal range of motion and thyroid normal. Neck supple. Cardiovascular: Regular rhythm, S1 normal, S2 normal, normal heart sounds, intact distal pulses and normal pulses. PMI is not displaced. No murmur heard. Pulmonary/Chest: She has no wheezes. She has no rales. She exhibits no tenderness. Abdominal: Soft. Bowel sounds are normal. She exhibits no distension and no mass. There is no splenomegaly or hepatomegaly. There is no abdominal tenderness. No hernia. Neurological: She is alert and oriented to person, place, and time. She has normal motor skills. Gait normal.   Skin: Skin is warm and dry. No cyanosis. No jaundice. Nails show no clubbing.            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 effects of CVA (cerebrovascular accident)    S/P PTCA (percutaneous transluminal coronary angioplasty)    Transient cerebral ischemia    Monitoring for long-term anticoagulant use    Chronic combined systolic and diastolic congestive heart failure ([address]    Other transient cerebral ischemic attacks and related syndromes    Cerebrovascular accident (CVA) ([address]    Current use of long term anticoagulation    Syncope    Spinal stenosis in cervical region    Lumbar degenerative disc disease    Spinal stenosis, lumbar region, with neurogenic claudication    Abnormality of gait and mobility due to  [employer] with Impaired Mobility and ADL's secondary to Cervical Myelopathy and Vestibular neuronitis with rehab admission 06/24/20.  Generalized muscle ache    Generalized osteoarthrosis, involving multiple sites    Vestibular neuronitis of both ears    Dizziness    SCC (squamous cell carcinoma), arm    Type 2 diabetes mellitus (HCC)    Obesity (BMI 30-39. 9)    [place] (hard of hearing)    DJD (degenerative joint disease), lumbar    History of PTCA    Uncontrolled type 2 diabetes mellitus with hyperglycemia ([facility]           No orders of the defined types were placed in this encounter. No orders of the defined types were placed in this encounter. Assessment:    1. Back pain, unspecified back location, unspecified back pain laterality, unspecified chronicity         Plan:   Given Ultram 50mg for 7 days PRN for Back pain. Stay on same medications. See me in 1 month. This note was partially generated using Dragon voice recognition system, and there may be some incorrect words, spellings, punctuation that were not noticed in checking the note before saving.         Electronically signed by Delano Chavira MD on 7/14/2020 at 12:28 PM

## 2020-07-16 ENCOUNTER — OFFICE VISIT (OUTPATIENT)
Dept: INTERNAL MEDICINE | Age: 85
End: 2020-07-16
Payer: MEDICARE

## 2020-07-16 VITALS
HEIGHT: 63 IN | DIASTOLIC BLOOD PRESSURE: 70 MMHG | WEIGHT: 185 LBS | OXYGEN SATURATION: 98 % | BODY MASS INDEX: 32.78 KG/M2 | SYSTOLIC BLOOD PRESSURE: 120 MMHG | HEART RATE: 70 BPM

## 2020-07-16 PROCEDURE — 4040F PNEUMOC VAC/ADMIN/RCVD: CPT | Performed by: FAMILY MEDICINE

## 2020-07-16 PROCEDURE — 1111F DSCHRG MED/CURRENT MED MERGE: CPT | Performed by: FAMILY MEDICINE

## 2020-07-16 PROCEDURE — G8417 CALC BMI ABV UP PARAM F/U: HCPCS | Performed by: FAMILY MEDICINE

## 2020-07-16 PROCEDURE — G8427 DOCREV CUR MEDS BY ELIG CLIN: HCPCS | Performed by: FAMILY MEDICINE

## 2020-07-16 PROCEDURE — 1090F PRES/ABSN URINE INCON ASSESS: CPT | Performed by: FAMILY MEDICINE

## 2020-07-16 PROCEDURE — 1123F ACP DISCUSS/DSCN MKR DOCD: CPT | Performed by: FAMILY MEDICINE

## 2020-07-16 PROCEDURE — 99214 OFFICE O/P EST MOD 30 MIN: CPT | Performed by: FAMILY MEDICINE

## 2020-07-16 PROCEDURE — 3052F HG A1C>EQUAL 8.0%<EQUAL 9.0%: CPT | Performed by: FAMILY MEDICINE

## 2020-07-16 PROCEDURE — 1036F TOBACCO NON-USER: CPT | Performed by: FAMILY MEDICINE

## 2020-07-16 RX ORDER — TRAMADOL HYDROCHLORIDE 50 MG/1
50 TABLET ORAL WEEKLY
Qty: 30 TABLET | Refills: 0 | Status: SHIPPED | OUTPATIENT
Start: 2020-07-16 | End: 2020-08-15

## 2020-07-16 ASSESSMENT — ENCOUNTER SYMPTOMS
EYE DISCHARGE: 0
EYE PAIN: 0
EYE ITCHING: 0

## 2020-07-16 NOTE — PROGRESS NOTES
Patient: Christine Rutherford    YOB: 1927    Date: 7/16/20       Patient Active Problem List    Diagnosis Date Noted    History of ST elevation myocardial infarction (STEMI)      Priority: High    CKD (chronic kidney disease) 03/18/2015     Priority: High     Overview Note:     Stage G3a A2      HLD (hyperlipidemia) 03/18/2015     Priority: High    HTN (hypertension) 11/29/2011     Priority: High    Diabetes mellitus 11/29/2011     Priority: High    Vitamin D deficiency 03/18/2015     Priority: Low    SI (stress incontinence), female 05/29/2012     Priority: Low    Osteoarthritis 05/29/2012     Priority: Low    Uncontrolled type 2 diabetes mellitus with hyperglycemia (HCC)     DJD (degenerative joint disease), lumbar 06/24/2020    History of PTCA 06/24/2020    Vestibular neuronitis of both ears 06/22/2020    Dizziness     Spinal stenosis in cervical region 06/21/2020    Lumbar degenerative disc disease 06/21/2020    Spinal stenosis, lumbar region, with neurogenic claudication 06/21/2020    Abnormality of gait and mobility due to  NTSCI with Impaired Mobility and ADL's secondary to Cervical Myelopathy and Vestibular neuronitis with rehab admission 06/24/20. 06/21/2020     Overview Note:     80-year old female who presented to the ER with complaints of weakness, nausea, vomiting, and dizziness. CT Head 06/18/20 with no acute intracranial process. The dizziness came on suddenly the night prior to admission and had persisted. In the ER her blood pressure was in the 200's and her blood glucose was elevated at 250. Blood pressure responded with hydralazine. Repeat CT Head 06/19/20 with no change. CT Abdomen/Pelvis 06/19/20 shows borderline pelviectasis in left kidney. No acute pathology. Carotid Duplex 06/19/20 shows 50-69% Right ICA stenosis and 50-69% Left ICA stenosis.   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 Because of persisted neck and lower back pain further imaging was done. MRI of Brain showed no acute findings, MRI Cervical Spine revealed severe central canal stenosis at several levels and MRI of Lumbar Spine showed multilevel degenerative changes and mild canal narrowing but no definite central canal stenosis. MRI Thoracic Spine 06/20/20 negative MRI. Family opted for conservative management at this time. The patient has been found to have severe abnormality of gait and mobility with impaired self care due to NTSCI with Impaired Mobility and ADL's secondary to Cervical Myelopathy and Vestibular Neronitis and is admitted to the acute inpatient rehab program.     Transcribed from pre-admission information sheet completed by Andres Butler RN/mdl as directed by Dr. Alexandra Paredes.           Generalized muscle ache 06/21/2020    Generalized osteoarthrosis, involving multiple sites 06/21/2020    Syncope 06/19/2020    Current use of long term anticoagulation 12/26/2019    Other transient cerebral ischemic attacks and related syndromes     Cerebrovascular accident (CVA) ([address]     Chronic combined systolic and diastolic congestive heart failure ([address] 12/02/2019    Monitoring for long-term anticoagulant use 11/05/2019    Transient cerebral ischemia 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/2019    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/2018    History of non-ST elevation myocardial infarction (NSTEMI) 11/12/2018    Nausea & vomiting 11/11/2018    Chest pain 11/10/2018    Vitamin B12 deficiency 09/17/2018     Overview Note:     Started 2/2018      Recurrent UTI (urinary tract infection)-Raoultella resistant to Macrobid 08/06/2018     Overview Note:     amoxicillin-clavulanate Sensitive <=2 mcg/mL    ceFAZolin Sensitive <=4 mcg/mL    ciprofloxacin Sensitive <=0.25 mcg/mL    gentamicin Sensitive <=1 mcg/mL    nitrofurantoin Resistant 256 mcg/mL    trimethoprim-sulfamethoxazole Sensitive <=20 mcg/mL            Diastolic dysfunction 60/12/2931    Valvular heart disease 03/07/2018    Hypercalcemia 01/15/2018     Overview Note:     kami Tripathi      Lumbar spinal stenosis 12/22/2016    Chronic low back pain 08/17/2016    Eczematous dermatitis     SCC (squamous cell carcinoma), arm 2013     Overview Note:     right upper arm, 2015 right forarm      Type 2 diabetes mellitus ([facility] 2013    Obesity (BMI 30-39.9) 2013     Overview Note:     BMI: 32.7      [place] (hard of hearing[place] 2013     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 SCC (squamous cell carcinoma), arm 2013    right upper arm, 2015 right forarm    SI (stress incontinence), female 5/29/2012    Type II or unspecified type diabetes mellitus without mention of complication, not stated as uncontrolled     Whooping cough      Past Surgical History:   Procedure Laterality Date    ANKLE SURGERY      broken left ankle.     APPENDECTOMY      BREAST BIOPSY      x2 left breast    CATARACT REMOVAL  2004    bilateral    CORONARY ANGIOPLASTY WITH STENT PLACEMENT  01/2019    CYSTOCELE REPAIR      EYE SURGERY      bilateral cataract    HYSTERECTOMY      complete at age 39    RECTOCELE REPAIR      TONSILLECTOMY      as a child     Family History   Problem Relation Age of Onset    Diabetes Mother     Heart Disease Father      Social History     Socioeconomic History    Marital status:      Spouse name: Not on file    Number of children: Not on file    Years of education: Not on file    Highest education level: Not on file   Occupational History    Not on file   Social Needs    Financial resource strain: Not very hard    Food insecurity     Worry: Never true     Inability: Never true    Transportation needs     Medical: No     Non-medical: No   Tobacco Use    Smoking status: Never Smoker    Smokeless tobacco: Never Used   Substance and Sexual Activity    Alcohol use: No     Alcohol/week: 0.0 standard drinks    Drug use: No    Sexual activity: Not on file   Lifestyle    Physical activity     Days per week: Not on file     Minutes per session: Not on file    Stress: Not on file   Relationships    Social connections     Talks on phone: More than three times a week     Gets together: More than three times a week     Attends Christian service: 1 to 4 times per year     Active member of club or organization: No     Attends meetings of clubs or organizations: Never     Relationship status:     Intimate partner violence     Fear of current or ex partner: No     Emotionally abused: No     Physically abused: No     Forced sexual activity: No   Other Topics Concern    Not on file   Social History Narrative         Lives With: Son    Type of Home: [address]: Two level, Able to Live on Main level with bedroom/bathroom, Performs ADL's on one level    Home Access: Stairs to enter with rails    Entrance Stairs - Number of Steps:  Threshold    Bathroom Shower/Tub: Tub/Shower unit    Bathroom Toilet: Handicap height    Bathroom Equipment: Grab bars in shower, Grab bars around toilet, Hand-held shower, Shower chair    Bathroom Accessibility: Accessible    Home Equipment: Rolling walker, 4 wheeled walker (Usually uses rollator)    ADL Assistance: [address]: Needs assistance (Son manages housework)    Homemaking Responsibilities: No    Ambulation Assistance: Independent (Rollator)    Transfer Assistance: Independent    Active : No    Patient's  Info: Sons          Current Outpatient Medications on File Prior to Visit   Medication Sig Dispense Refill    amLODIPine (NORVASC) 10 MG tablet Take 1 tablet by mouth daily 30 tablet 3    hydrALAZINE (APRESOLINE) 50 MG tablet Take 1 tablet by mouth 3 times daily 90 tablet 3    isosorbide mononitrate (IMDUR) 30 MG extended release tablet Take 1 tablet by mouth daily 30 tablet 3    SITagliptin (JANUVIA) 50 MG tablet Take 1 tablet by mouth daily 90 tablet 1    carvedilol (COREG) 12.5 MG tablet take 1 tablet by mouth twice a day with meals 180 tablet 3    ferrous sulfate (IRON 325) 325 (65 Fe) MG tablet Take 325 mg by mouth Daily with lunch      warfarin (COUMADIN) 6 MG tablet Take 6 mg by mouth M,W,Th,Fr when at home.  sacubitril-valsartan (ENTRESTO) 24-26 MG per tablet Take 1 tablet by mouth 2 times daily 60 tablet 0    metFORMIN (GLUCOPHAGE) 1000 MG tablet take 1 tablet by mouth twice a day with meals 180 tablet 1    clopidogrel (PLAVIX) 75 MG tablet Take 1 tablet by mouth daily 90 tablet 3    blood glucose monitor kit and supplies Test 2 times a day & as needed for symptoms of irregular blood glucose. Freestyle Freedom Lite 1 kit 0    Lancets MISC 1 each by Does not apply route 2 times daily Test 2 times a day & as needed for symptoms of irregular blood glucose. Freestyle Freedom Lite 100 each 5    blood glucose monitor strips Test 2 times a day & as needed for symptoms of irregular blood glucose.   Freestyle Freedom Lite 200 strip 3    furosemide (LASIX) 20 MG tablet Take 1 tablet by mouth 2 times daily (Patient taking differently: Take 20 mg by mouth daily ) 180 tablet 2    warfarin (COUMADIN) 7.5 MG tablet Take 1 tablet by mouth daily (Patient taking differently: Take 7 mg by mouth Tuesday,Saturday, & Sunday when at home.) 30 tablet 3    TRUEPLUS LANCETS 28G MISC TEST TWO TIMES DAILY 200 each 3    Cholecalciferol (VITAMIN D) 2000 units CAPS capsule Take 2,000 Units by mouth      TRUE METRIX BLOOD GLUCOSE TEST strip TEST two to three times a day 200 strip 5    Needles & Syringes MISC 1 each by Does not apply route daily 50 each 0    Incontinence Supply Disposable (DEPEND UNDERGARMENTS) MISC 1 each by Does not apply route nightly 1 Package 11    Blood Glucose Monitoring Suppl (TRUE METRIX AIR GLUCOSE METER) W/DEVICE KIT       Blood Glucose Monitoring Suppl PRUDENCIO Dx: E11.9 1 Device 0     No current facility-administered medications on file prior to visit. Allergies   Allergen Reactions    Metoclopramide     Pantoprazole Other (See Comments)    Pcn [Penicillins]      Tolerates rocephin    Protonix [Pantoprazole Sodium]     Tramadol        Chief Complaint   Patient presents with    Follow[facility]Up from [facility] DX: Spinal Stenosis     Urinary Tract Infection     wants to discuss getting her urine checked    Cerumen Impaction     Bilateral has been using debrox x 5 dyas        [facility]    Hospital follow up:    Seen by   Diagnosed as DDD of cervical spine   Treated with steroids for it - which then increased her sugars  Through her hospital stay macrobid was held (she is on it for recurrent UTI's), was treated with Abx while she was in the hospital for a UTI    Steroid now complete   In hospital was being given insulin for elevated sugar  On outpatient basis she is well controlled on metformin    She was seen by pain management and told to f/u with them for continued pain  She was sent with narcotics - son is worried and has not given this to her    Review of Systems   HENT: Positive for hearing loss.  Negative for ear discharge and ear pain. Eyes: Negative for pain, discharge and itching. Genitourinary: Negative for dysuria, enuresis and flank pain. Musculoskeletal: Positive for gait problem. Negative for joint swelling and myalgias. Physical Exam  Vitals:    07/16/20 1211   BP: 120/70   Pulse: 70   SpO2: 98%   Body mass index is 32.77 kg/m². Physical Exam  Constitutional:  Appears well-developed and well-nourished. No distress. HENT:   Head: Normocephalic and atraumatic. Right Ear: External ear normal.   Left Ear: External ear normal.   Nose: Nose normal.   Eyes: Conjunctivae and EOM are normal.  Right eye exhibits no discharge. Left eye exhibits no discharge. No scleral icterus. Neck: Normal range of motion. Musculoskeletal: Normal range of motion. Neurological: alert. Skin: not diaphoretic. Psychiatric: normal mood and affect. behavior is normal. Judgment and thought content normal.   Nursing note and vitals reviewed. Assessment:  Ladan Styles was seen today for follow-up from hospital, urinary tract infection and cerumen impaction. Diagnoses and all orders for this visit:    Spinal stenosis in cervical region  -     traMADol (ULTRAM) 50 MG tablet; Take 1 tablet by mouth once a week for 30 days. Intended supply: 7 days. Take lowest dose possible to manage pain  Not surgical candidate  F/u pain management  Hold narcotics    Type 2 diabetes mellitus with hyperglycemia, without long-term current use of insulin ([facility]  Ok to d/c insulin and resume metformin    Recurrent UTI (urinary tract infection)-Raoultella resistant to Macrobid  -     Cancel: POCT Urinalysis no Micro  -     Culture, Urine; Future  Check urine   Resume macrobid        Orders Placed This Encounter   Procedures    Culture, Urine     Standing Status:   Future     Number of Occurrences:   1     Standing Expiration Date:   7/16/2021     Order Specific Question:   Specify (ex-cath, midstream, cysto, etc)?      Answer:   mid stream       Yumiko Solo

## 2020-07-17 DIAGNOSIS — N39.0 RECURRENT UTI (URINARY TRACT INFECTION): ICD-10-CM

## 2020-07-17 RX ORDER — WARFARIN SODIUM 5 MG/1
TABLET ORAL
Qty: 90 TABLET | Refills: 1 | Status: ON HOLD | OUTPATIENT
Start: 2020-07-17 | End: 2020-11-29 | Stop reason: ALTCHOICE

## 2020-07-19 LAB — URINE CULTURE, ROUTINE: NORMAL

## 2020-07-24 ENCOUNTER — TELEPHONE (OUTPATIENT)
Dept: INTERNAL MEDICINE | Age: 85
End: 2020-07-24

## 2020-07-24 NOTE — TELEPHONE ENCOUNTER
Patient cant hear out of her RT ear, but hears a popping and snapping noise and it is driving her up th wall, stefany is asking if there can be an infection please advise.

## 2020-07-25 ENCOUNTER — OFFICE VISIT (OUTPATIENT)
Dept: INTERNAL MEDICINE | Age: 85
End: 2020-07-25
Payer: MEDICARE

## 2020-07-25 VITALS
TEMPERATURE: 98.2 F | BODY MASS INDEX: 33.06 KG/M2 | OXYGEN SATURATION: 97 % | HEART RATE: 86 BPM | HEIGHT: 63 IN | RESPIRATION RATE: 20 BRPM | SYSTOLIC BLOOD PRESSURE: 132 MMHG | DIASTOLIC BLOOD PRESSURE: 68 MMHG | WEIGHT: 186.6 LBS

## 2020-07-25 PROCEDURE — G8417 CALC BMI ABV UP PARAM F/U: HCPCS | Performed by: PHYSICIAN ASSISTANT

## 2020-07-25 PROCEDURE — 99213 OFFICE O/P EST LOW 20 MIN: CPT | Performed by: PHYSICIAN ASSISTANT

## 2020-07-25 PROCEDURE — 4040F PNEUMOC VAC/ADMIN/RCVD: CPT | Performed by: PHYSICIAN ASSISTANT

## 2020-07-25 PROCEDURE — 1090F PRES/ABSN URINE INCON ASSESS: CPT | Performed by: PHYSICIAN ASSISTANT

## 2020-07-25 PROCEDURE — 1036F TOBACCO NON-USER: CPT | Performed by: PHYSICIAN ASSISTANT

## 2020-07-25 PROCEDURE — 1111F DSCHRG MED/CURRENT MED MERGE: CPT | Performed by: PHYSICIAN ASSISTANT

## 2020-07-25 PROCEDURE — 1123F ACP DISCUSS/DSCN MKR DOCD: CPT | Performed by: PHYSICIAN ASSISTANT

## 2020-07-25 PROCEDURE — G8427 DOCREV CUR MEDS BY ELIG CLIN: HCPCS | Performed by: PHYSICIAN ASSISTANT

## 2020-07-25 RX ORDER — FLUTICASONE PROPIONATE 50 MCG
1 SPRAY, SUSPENSION (ML) NASAL DAILY
Qty: 1 BOTTLE | Refills: 0 | Status: SHIPPED | OUTPATIENT
Start: 2020-07-25

## 2020-07-25 ASSESSMENT — ENCOUNTER SYMPTOMS
TROUBLE SWALLOWING: 0
RHINORRHEA: 1
VOICE CHANGE: 0
COUGH: 0
VOMITING: 0
SINUS PAIN: 0
BACK PAIN: 0
ABDOMINAL PAIN: 0
SHORTNESS OF BREATH: 0

## 2020-07-25 NOTE — PROGRESS NOTES
[facility] Encounter  CHIEF COMPLAINT       Chief Complaint   Patient presents with   J Carlos Rm     right side pt stated has a popping sound        HISTORY OF PRESENT ILLNESS   Ninette Primrose is a 80 y.o. female who presents with:  20-year-old female presenting to walk-in clinic with complaints of popping and noisy sensation of her right ear. This is been going on for approximately 4 weeks. She was hospitalized recently and had follow-up in the office here where the right ear was irrigated secondary to cerumen impaction. Patient states the popping sensation persists, she also is having a lot of runny nose and allergy-like symptoms as well. There is been no fever no imbalance, she uses a walker and has had no other issues since her hospital discharge and her office visit here. Son wanted to make sure that the ear was okay. She has not noted any discharge. Nothing is making the symptoms better or worse no other associated symptoms or complaints. She has had allergy-like symptoms in the past and son states that she has used steroid nasal spray in the past with relief. REVIEW OF SYSTEMS     Review of Systems   Constitutional: Negative for fever. HENT: Positive for postnasal drip and rhinorrhea. Negative for ear discharge, sinus pain, trouble swallowing and voice change. Eyes: Negative for visual disturbance. Respiratory: Negative for cough and shortness of breath. Cardiovascular: Negative for chest pain. Gastrointestinal: Negative for abdominal pain and vomiting. Genitourinary: Negative for dysuria. Musculoskeletal: Negative for back pain. Neurological: Negative for seizures and headaches. Psychiatric/Behavioral: Negative for behavioral problems.      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 SCC (squamous cell carcinoma), arm 2013    right upper arm, 2015 right forarm    SI (stress incontinence), female 5/29/2012    Type II or unspecified type diabetes mellitus without mention of complication, not stated as uncontrolled     Whooping cough      SURGICAL HISTORY     Patient  has a past surgical history that includes Appendectomy; Rectocele repair; Cystocele repair; Ankle surgery; Breast biopsy; Cataract removal (2004); eye surgery; Tonsillectomy; Hysterectomy; and Coronary angioplasty with stent (01/2019). CURRENT MEDICATIONS       Previous Medications    AMLODIPINE (NORVASC) 10 MG TABLET    Take 1 tablet by mouth daily    BLOOD GLUCOSE MONITOR KIT AND SUPPLIES    Test 2 times a day & as needed for symptoms of irregular blood glucose. Freestyle [place] Lite    BLOOD GLUCOSE MONITOR STRIPS    Test 2 times a day & as needed for symptoms of irregular blood glucose.   Freestyle [place] Lite    BLOOD GLUCOSE MONITORING SUPPL (TRUE METRIX AIR GLUCOSE METER) W/DEVICE KIT        BLOOD GLUCOSE MONITORING SUPPL PRUDENCIO    Dx: E11.9    CARVEDILOL (COREG) 12.5 MG TABLET    take 1 tablet by mouth twice a day with meals    CHOLECALCIFEROL (VITAMIN D) 2000 UNITS CAPS CAPSULE    Take 2,000 Units by mouth    CLOPIDOGREL (PLAVIX) 75 MG TABLET    Take 1 tablet by mouth daily    FERROUS SULFATE (IRON 325) 325 (65 FE) MG TABLET    Take 325 mg by mouth Daily with lunch    FUROSEMIDE (LASIX) 20 MG TABLET    Take 1 tablet by mouth 2 times daily    HYDRALAZINE (APRESOLINE) 50 MG TABLET    Take 1 tablet by mouth 3 times daily    INCONTINENCE SUPPLY DISPOSABLE (DEPEND UNDERGARMENTS) MISC    1 each by Does not apply route nightly    ISOSORBIDE MONONITRATE (IMDUR) 30 MG EXTENDED RELEASE TABLET    Take 1 tablet by mouth daily    LANCETS MISC    1 each by Does not apply route 2 times daily Test 2 times a day & as needed for symptoms of irregular blood glucose. Freestyle Freedom Lite    METFORMIN (GLUCOPHAGE) 1000 MG TABLET    take 1 tablet by mouth twice a day with meals    NEEDLES & SYRINGES MISC    1 each by Does not apply route daily    SACUBITRIL-VALSARTAN (ENTRESTO) 24-26 MG PER TABLET    Take 1 tablet by mouth 2 times daily    TRAMADOL (ULTRAM) 50 MG TABLET    Take 1 tablet by mouth once a week for 30 days. Intended supply: 7 days. Take lowest dose possible to manage pain    TRUE METRIX BLOOD GLUCOSE TEST STRIP    TEST two to three times a day    TRUEPLUS LANCETS 28G MISC    TEST TWO TIMES DAILY    WARFARIN (COUMADIN) 5 MG TABLET    take 1 tablet by mouth once daily    WARFARIN (COUMADIN) 6 MG TABLET    Take 6 mg by mouth M,W,Th,Fr when at home. WARFARIN (COUMADIN) 7.5 MG TABLET    Take 1 tablet by mouth daily     ALLERGIES     Patient is is allergic to metoclopramide; pantoprazole; pcn [penicillins]; protonix [pantoprazole sodium]; and tramadol. FAMILY HISTORY     Patient'sfamily history includes Diabetes in her mother; Heart Disease in her father. HISTORY     Patient  reports that she has never smoked. She has never used smokeless tobacco. She reports that she does not drink alcohol or use drugs. PHYSICAL EXAM     VITALS  BP: 132/68, Temp: 98.2 °F (36.8 °C), Pulse: 86, Resp: 20, SpO2: 97 %  Physical Exam  Vitals signs and nursing note reviewed. Exam conducted with a chaperone present. Constitutional:       Appearance: Normal appearance. HENT:      Head: Normocephalic and atraumatic.       Right Ear: Tympanic membrane, ear canal and external ear normal.      Left Ear: Tympanic membrane, ear canal and external ear normal.      Ears:      Comments: Right TM is noninjected, there is no TM perforation seen, good cone of light, external auditory canal is patent, no redness no tenderness with pinna retraction or tragus pressure. Otherwise no facial swelling, no mastoid tenderness. Nose: Nose normal.   Eyes:      Extraocular Movements: Extraocular movements intact. Pupils: Pupils are equal, round, and reactive to light. Neck:      Musculoskeletal: Normal range of motion and neck supple. Cardiovascular:      Rate and Rhythm: Normal rate and regular rhythm. Heart sounds: No murmur. Pulmonary:      Effort: Pulmonary effort is normal.      Breath sounds: Normal breath sounds. Skin:     General: Skin is warm and dry. Neurological:      General: No focal deficit present. Mental Status: She is alert. Mental status is at baseline. Cranial Nerves: No cranial nerve deficit. Psychiatric:         Mood and Affect: Mood normal.       READY CARE COURSE   Labs:  No results found for this visit on 07/25/20. IMAGING:  No orders to display     Scheduled Meds:  Continuous Infusions:  PRN Meds:. PROCEDURES:  FINAL IMPRESSION    MDM  80year-old female, presenting with right ear complaint, hearing popping and whooshing sensation from the right ear. Has been going on for approximately 4 weeks. History is given through the son as patient is hard of hearing. Was seen here for hospital follow-up, and had right ear irrigated out. The cerumen impaction was cleared, but since then patient has persisting symptoms. Presents here to have it checked. On exam the TM looks good, good light reflex, no sign of perforation, no air-fluid level. The EAC is patent. No signs of infection.   I discussed with the son, and patient has had runny nose and congestion symptoms, she has had this in the past and son states that she has used steroid nasal

## 2020-07-25 NOTE — PATIENT INSTRUCTIONS
Patient Education        Rhinitis: Care Instructions  Your Care Instructions  Rhinitis is swelling and irritation in the nose. Allergies and infections are often the cause. Your nose may run or feel stuffy. Other symptoms are itchy and sore eyes, ears, throat, and mouth. If allergies are the cause, your doctor may do tests to find out what you are allergic to. You may be able to stop symptoms if you avoid the things that cause them. Your doctor may suggest or prescribe medicine to ease your symptoms. Follow-up care is a key part of your treatment and safety. Be sure to make and go to all appointments, and call your doctor if you are having problems. It's also a good idea to know your test results and keep a list of the medicines you take. How can you care for yourself at home? · If your rhinitis is caused by allergies, try to find out what sets off (triggers) your symptoms. Take steps to avoid these triggers. ? Avoid yard work. It can stir up both pollen and mold. ? Do not smoke or allow others to smoke around you. If you need help quitting, talk to your doctor about stop-smoking programs and medicines. These can increase your chances of quitting for good. ? Do not use aerosol sprays, cleaning products, or perfumes. ? If pollen is one of your triggers, close your house and car windows during blooming season. ? Clean your house often to control dust.  ? Keep pets outside. · If your doctor recommends over-the-counter medicines to relieve symptoms, take your medicines exactly as prescribed. Call your doctor if you think you are having a problem with your medicine. · Use saline (saltwater) nasal washes to help keep your nasal passages open and wash out mucus and bacteria. You can buy saline nose drops at a grocery store or drugstore. Or you can make your own at home by adding 1 teaspoon of salt and 1 teaspoon of baking soda to 2 cups of distilled water.  If you make your own, fill a bulb syringe with the solution, insert the tip into your nostril, and squeeze gently. Patel Neve your nose. When should you call for help? Call your doctor now or seek immediate medical care if:  · You are having trouble breathing. Watch closely for changes in your health, and be sure to contact your doctor if:  · Mucus from your nose gets thicker (like pus) or has new blood in it. · You have new or worse symptoms. · You do not get better as expected. Where can you learn more? Go to https://[employer] ([employer]: [place]. org and sign in to your [employer] account. Enter M030 in the [employer] box to learn more about \"Rhinitis: Care Instructions. \"     If you do not have an account, please click on the \"Sign Up Now\" link. Current as of: July 29, 2019               Content Version: 12.5  © 3385-4693 Healthwise, Incorporated. Care instructions adapted under license by [facility] ([facility]). If you have questions about a medical condition or this instruction, always ask your healthcare professional. [address] any warranty or liability for your use of this information.

## 2020-08-04 DIAGNOSIS — Z86.73 HISTORY OF CVA (CEREBROVASCULAR ACCIDENT): ICD-10-CM

## 2020-08-04 DIAGNOSIS — Z79.01 CURRENT USE OF LONG TERM ANTICOAGULATION: ICD-10-CM

## 2020-08-04 LAB
INR BLD: 3.4
PROTHROMBIN TIME: 34.3 SEC (ref 12.3–14.9)

## 2020-08-05 ENCOUNTER — TELEPHONE (OUTPATIENT)
Dept: INTERNAL MEDICINE | Age: 85
End: 2020-08-05

## 2020-08-05 ENCOUNTER — ANTI-COAG VISIT (OUTPATIENT)
Dept: INTERNAL MEDICINE | Age: 85
End: 2020-08-05
Payer: MEDICARE

## 2020-08-05 PROCEDURE — 93793 ANTICOAG MGMT PT WARFARIN: CPT | Performed by: FAMILY MEDICINE

## 2020-08-05 NOTE — PROGRESS NOTES
Left vm for Jeanette Fletcher to call back to verify dose that pt is on and if she has been taking any additional nsaids.

## 2020-08-05 NOTE — TELEPHONE ENCOUNTER
Pt saw ENT in [facility] due to pt's ears popping and noise. He felt she had inner ear inflammation and prescribed prednisone. Tory Rosen would like to make sure this is ok with Dr. Myrtle Gomez before he gives it to her.

## 2020-08-05 NOTE — TELEPHONE ENCOUNTER
Pt caregiver is not 100% sure because he has not picked it up from the pharmacy, but he does think it is oral. Although he said he would prefer an ear drop if that is possible

## 2020-08-06 NOTE — TELEPHONE ENCOUNTER
Sammy Cooney called back to say that he called the pharmacy and it is an oral medication. He says that its her right ear that's the problem. With the concerns of steroid increasing her sugar, he would prefer drops and wants to know if Dr. Edinson Pina would prescribe that or will he have to call ENT?

## 2020-08-06 NOTE — TELEPHONE ENCOUNTER
Oral steroid will not only raise her sugar but also cause increase risk of bleeding with her blood thinner    Recommend calling ENT to see if they can substitute it with something else  Thank you!     Remi Sanders, MD

## 2020-08-07 ENCOUNTER — TELEPHONE (OUTPATIENT)
Dept: INTERNAL MEDICINE | Age: 85
End: 2020-08-07

## 2020-08-07 NOTE — TELEPHONE ENCOUNTER
Nothing that will help her symptoms    I have sent in ciprodex - antibiotic + steroid, but I am not sure it will help, he can try it if he likes. Thank you!     Jameel Minaya MD

## 2020-08-07 NOTE — TELEPHONE ENCOUNTER
Najma Silvestre called back after speaking to ENT office. Was told there is nothing else that ENT can do other than ear drum injections. He states that is not something they want to do. Is there an ear drop that Dr. Alison King can prescribe?

## 2020-08-11 DIAGNOSIS — Z86.73 HISTORY OF CVA (CEREBROVASCULAR ACCIDENT): ICD-10-CM

## 2020-08-11 DIAGNOSIS — Z79.01 CURRENT USE OF LONG TERM ANTICOAGULATION: ICD-10-CM

## 2020-08-11 DIAGNOSIS — R30.0 DYSURIA: ICD-10-CM

## 2020-08-11 LAB
BILIRUBIN, POC: ABNORMAL
BLOOD URINE, POC: ABNORMAL
CLARITY, POC: ABNORMAL
COLOR, POC: YELLOW
GLUCOSE URINE, POC: ABNORMAL
INR BLD: 2.7
KETONES, POC: ABNORMAL
LEUKOCYTE EST, POC: ABNORMAL
NITRITE, POC: ABNORMAL
PH, POC: 6
PROTEIN, POC: ABNORMAL
PROTHROMBIN TIME: 28.6 SEC (ref 12.3–14.9)
SPECIFIC GRAVITY, POC: 1.01
UROBILINOGEN, POC: ABNORMAL

## 2020-08-11 PROCEDURE — 81002 URINALYSIS NONAUTO W/O SCOPE: CPT | Performed by: FAMILY MEDICINE

## 2020-08-12 ENCOUNTER — ANTI-COAG VISIT (OUTPATIENT)
Dept: INTERNAL MEDICINE | Age: 85
End: 2020-08-12
Payer: MEDICARE

## 2020-08-12 PROCEDURE — 93793 ANTICOAG MGMT PT WARFARIN: CPT | Performed by: FAMILY MEDICINE

## 2020-08-13 RX ORDER — CIPROFLOXACIN 250 MG/1
250 TABLET, FILM COATED ORAL 2 TIMES DAILY
Qty: 6 TABLET | Refills: 0 | Status: SHIPPED | OUTPATIENT
Start: 2020-08-13 | End: 2020-09-15 | Stop reason: SDUPTHER

## 2020-08-14 LAB
ORGANISM: ABNORMAL
URINE CULTURE, ROUTINE: ABNORMAL

## 2020-08-17 ENCOUNTER — TELEPHONE (OUTPATIENT)
Dept: INTERNAL MEDICINE | Age: 85
End: 2020-08-17

## 2020-08-17 RX ORDER — CEPHALEXIN 125 MG/5ML
125 POWDER, FOR SUSPENSION ORAL DAILY
Qty: 450 ML | Refills: 1 | Status: SHIPPED | OUTPATIENT
Start: 2020-08-17 | End: 2020-09-15

## 2020-08-17 NOTE — TELEPHONE ENCOUNTER
Pharmacy called and said that there will be a lot of waste with this medication, ordering it the way it is. She said the medication is only good for 2wks at a time. Do you want to proceed with this order? Or do something else?

## 2020-08-17 NOTE — TELEPHONE ENCOUNTER
Pharmacy said the 250 mg tabs would be cheaper for the pt, and less hassle, wants to switch to the 250 mg tabs instead of cutting them in half.  Thank you

## 2020-08-18 ENCOUNTER — OFFICE VISIT (OUTPATIENT)
Dept: CARDIOLOGY CLINIC | Age: 85
End: 2020-08-18
Payer: MEDICARE

## 2020-08-18 VITALS
OXYGEN SATURATION: 98 % | SYSTOLIC BLOOD PRESSURE: 118 MMHG | HEART RATE: 58 BPM | RESPIRATION RATE: 20 BRPM | DIASTOLIC BLOOD PRESSURE: 78 MMHG

## 2020-08-18 PROCEDURE — 1036F TOBACCO NON-USER: CPT | Performed by: INTERNAL MEDICINE

## 2020-08-18 PROCEDURE — G8427 DOCREV CUR MEDS BY ELIG CLIN: HCPCS | Performed by: INTERNAL MEDICINE

## 2020-08-18 PROCEDURE — 1123F ACP DISCUSS/DSCN MKR DOCD: CPT | Performed by: INTERNAL MEDICINE

## 2020-08-18 PROCEDURE — G8417 CALC BMI ABV UP PARAM F/U: HCPCS | Performed by: INTERNAL MEDICINE

## 2020-08-18 PROCEDURE — 4040F PNEUMOC VAC/ADMIN/RCVD: CPT | Performed by: INTERNAL MEDICINE

## 2020-08-18 PROCEDURE — 1090F PRES/ABSN URINE INCON ASSESS: CPT | Performed by: INTERNAL MEDICINE

## 2020-08-18 PROCEDURE — 99213 OFFICE O/P EST LOW 20 MIN: CPT | Performed by: INTERNAL MEDICINE

## 2020-08-18 ASSESSMENT — ENCOUNTER SYMPTOMS
CHEST TIGHTNESS: 0
APNEA: 0
DIARRHEA: 0
SHORTNESS OF BREATH: 0
NAUSEA: 0
BLOOD IN STOOL: 0
VOMITING: 0

## 2020-08-18 NOTE — PROGRESS NOTES
[facility] CARDIOLOGY OFFICE FOLLOW-UP      Patient: Whitney Medarno  YOB: 1927  MRN: [id number]    Chief Complaint:  Chief Complaint   Patient presents with    1 Month Follow-Up    Hypertension    Back Pain     Tramadol 50mg PRN         Subjective/HPI:  8/18/2020: Patient presents today for follow-up of hypertension. Has been complaining of leg edema. We will discontinue amlodipine. Continue with the hydralazine 3 times a day. See me in the month. Amlodipine is possibly causing edema       7/14/2020: Patient presents today for follow-up of recent hospitalization at [facility] [place] for CHF. She was then sent to rehab for recovery. She had done well. As usual has labile blood pressure. Was discharged home on hydralazine 50 3 times daily. Her main issue is the back. Skelaxin has not been approved yet by her her insurance. Diabetes is under control. She is usually accompanied by her son and so also during this visit. Hard of hearing. Severe back pain. Does not want to take Norco.  We will give her Ultram 50 3 times daily for as needed use. And sent her to [facility] next door.   She will see me in 1 month           1/7/20: Patient presents today for follow-up of congestive heart failure. Kina Casiano.  Accompanied by her son. Elle Cai is nauseated. Gradyjhonny Briseno will discontinue magnesium.  Son is an extremely competent care provider.  And very caring. Elle Cai will see me in 3 months.     10/8/19: Patient presents today for congestive heart failure.  Doing extremely well on Entresto.  On Coumadin.  Accompanied by his son. Padmini Watts provides excellent care for her.  No chest pain congestive heart failure symptoms or syncope.  See me in 3 months     7/3/19: Patient presents today for follow-up of congestive heart failure.  We have been able to reduce her blood pressure medications significantly.  She was overmedicated.  We cut down hydralazine from 3-2 and now will stop it.  She is on Coumadin.  On Plavix for angioplasty which was done by Dr. bradley in December of last year. Vidya Schroeder is accompanied by her son. Josue Faye well otherwise. Romaine Decker is under control.  See me in 3 months     5/1/19: Patient presents today for for evaluation of congestive heart failure. Has ejection fraction 25%. Had a OMAR done at [facility] [place] when she admitted with confusion. There was no thrombus. Blood pressure remains stable. I will cut down the hydralazine to only one a day. Don't want to overmedicate her. See me in 2 months     4/3/19: Patient presents today for follow-up of recent admission for confusion. She is accompanied by her son. A OMAR was done. There was no thrombus. Ejection fraction was 25%. We will cut down the hydralazine to twice a day. When she is done with magnesium, noting unit. She supposed for some labs done by Dr. Cedric Rome soon. PT/INR will be done by visiting nurse on Saturday. She'll see me in a month.     2/19/19: Patient presents today for Follow-up of coronary artery disease. Stent to the proximal LAD. Has mild to 50% disease in the mid LAD. Ejection fraction was diminished. We are going to check another echo. See what LV ejection fraction is. She was recently admitted to [address] [place] [address] with UTI. Was started on hydralazine. This was 3 times a day and I cut it down to 1 a day. Patient remains stable. I will stop it altogether. See me in 3 months with an echo     1/18/19: Patient presents today for Follow-up of recent hospitalization at [address] [place] [address] for acute MI. Had angioplasty stent to proximal LAD mid LAD at 50% stenosis ejection fraction is 25%. On lisinopril and metoprolol. Month in the [place] office. He reported repeat EF evaluation at some point     8/21/18: Patient presents today for follow-up of hypertension. Much better. Only on 5 mg of Norvasc. Blood sugars are better to more alert. Dizziness is improved. Accompanied by a son. Mild chronic kidney disease is stable.  See me in 6 months.     5/15/18: Patient presents today for follow-up of hypertension. The dizziness is much better after we stopped few of her medication. Blood pressure is holding very well I think we could probably stop the Norvasc but she is quite content and so is a son with the present medications. She has mild chronic kidney disease. Hyperlipidemia that stable. See me in 3 months     4/11/18: Patient presents today for Follow-up of hypertension. She gets dizzy. I think she is on too much medications. We will discontinue hydralazine clonidine and Lasix. His mild chronic kidney disease. Also has hyperlipidemia that stable. I will see him in 3 weeks. Off these medications     3/28/2018: Patient presents today for evaluation of recent hospitalization for acute renal insufficiency. Echocardiogram was done which showed preserved LV ejection fraction. Moderately elevated right systolic pressure 60 mm smoker. Last BUN/creatinine are getting better. GFR is in the 40s. She lives with one of her other sons. She note of hypertension. Used to be on lisinopril 20 mg a day and hydrochlorothiazide. During this hospitalization she was discharged home on lisinopril 2010 a day no hydrochlorothiazide. Lasix 20 minutes, day. Norvasc 10 mg a day. Clonidine 0.2 3 times a day hydralazine 10 3 times a day. I like blood pressures have been running stable in the 120s. Physical therapy goes to her house. She denies any chest pain. She started to drink more water. Does not drive. Does minor chores around the house. I would like to simplify his regimen. Take clonidine 0.2 in a.m. and hydralazine 10 mg at night. Other medication which include Lopressor 25 mg by mouth twice a day Lasix 20 mg a day Norvasc and resume a day to continue. See me in 2 weeks.       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 SCC (squamous cell carcinoma), arm 2013    right upper arm, 2015 right forarm    SI (stress incontinence), female 5/29/2012    Type II or unspecified type diabetes mellitus without mention of complication, not stated as uncontrolled     Whooping cough        Past Surgical History:   Procedure Laterality Date    ANKLE SURGERY      broken left ankle.  APPENDECTOMY      BREAST BIOPSY      x2 left breast    CATARACT REMOVAL  2004    bilateral    CORONARY ANGIOPLASTY WITH STENT PLACEMENT  01/2019    CYSTOCELE REPAIR      EYE SURGERY      bilateral cataract    HYSTERECTOMY      complete at age 39    [address]      as a child       Family History   Problem Relation Age of Onset    Diabetes Mother     Heart Disease Father        Social History     Socioeconomic History    Marital status:       Spouse name: None    Number of children: None    Years of education: None    Highest education level: None   Occupational History    None   Social Needs    Financial resource strain: Not very hard    Food insecurity     Worry: Never true     Inability: Never true    Transportation needs     Medical: No     Non-medical: No   Tobacco Use    Smoking status: Never Smoker    Smokeless tobacco: Never Used   Substance and Sexual Activity    Alcohol use: No     Alcohol/week: 0.0 standard drinks    Drug use: No    Sexual activity: None   Lifestyle    Physical activity     Days per week: None     Minutes per session: None    Stress: None by mouth 3 times daily 90 tablet 3    isosorbide mononitrate (IMDUR) 30 MG extended release tablet Take 1 tablet by mouth daily 30 tablet 3    carvedilol (COREG) 12.5 MG tablet take 1 tablet by mouth twice a day with meals 180 tablet 3    ferrous sulfate (IRON 325) 325 (65 Fe) MG tablet Take 325 mg by mouth Daily with lunch      warfarin (COUMADIN) 6 MG tablet Take 6 mg by mouth M,W,Th,Fr when at home.  sacubitril-valsartan (ENTRESTO) 24-26 MG per tablet Take 1 tablet by mouth 2 times daily 60 tablet 0    metFORMIN (GLUCOPHAGE) 1000 MG tablet take 1 tablet by mouth twice a day with meals 180 tablet 1    clopidogrel (PLAVIX) 75 MG tablet Take 1 tablet by mouth daily 90 tablet 3    blood glucose monitor kit and supplies Test 2 times a day & as needed for symptoms of irregular blood glucose. Freestyle Freedom Lite 1 kit 0    Lancets MISC 1 each by Does not apply route 2 times daily Test 2 times a day & as needed for symptoms of irregular blood glucose. Freestyle Freedom Lite 100 each 5    blood glucose monitor strips Test 2 times a day & as needed for symptoms of irregular blood glucose.   Freestyle Freedom Lite 200 strip 3    furosemide (LASIX) 20 MG tablet Take 1 tablet by mouth 2 times daily (Patient taking differently: Take 20 mg by mouth daily ) 180 tablet 2    warfarin (COUMADIN) 7.5 MG tablet Take 1 tablet by mouth daily (Patient taking differently: Take 7 mg by mouth Tuesday,Saturday, & Sunday when at home.) 30 tablet 3    TRUEPLUS LANCETS 28G MISC TEST TWO TIMES DAILY 200 each 3    Cholecalciferol (VITAMIN D) 2000 units CAPS capsule Take 2,000 Units by mouth      TRUE METRIX BLOOD GLUCOSE TEST strip TEST two to three times a day 200 strip 5    Needles & Syringes MISC 1 each by Does not apply route daily 50 each 0    Incontinence Supply Disposable (DEPEND UNDERGARMENTS) MISC 1 each by Does not apply route nightly 1 Package 11    Blood Glucose Monitoring Suppl (TRUE METRIX AIR GLUCOSE alert and oriented to person, place, and time. She has normal motor skills and normal reflexes. Gait normal.   Skin: Skin is warm and dry. 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 effects of CVA (cerebrovascular accident)    S/P PTCA (percutaneous transluminal coronary angioplasty)    Transient cerebral ischemia    Monitoring for long-term anticoagulant use    Chronic combined systolic and diastolic congestive heart failure ([address]    Other transient cerebral ischemic attacks and related syndromes    Cerebrovascular accident (CVA) ([address]    Current use of long term anticoagulation    Syncope    Spinal stenosis in cervical region    Lumbar degenerative disc disease    Spinal stenosis, lumbar region, with neurogenic claudication    Abnormality of gait and mobility due to  [employer] with Impaired Mobility and ADL's secondary to Cervical Myelopathy and Vestibular neuronitis with rehab admission 06/24/20.  Generalized muscle ache    Generalized osteoarthrosis, involving multiple sites    Vestibular neuronitis of both ears    Dizziness    SCC (squamous cell carcinoma), arm    Type 2 diabetes mellitus (HCC)    Obesity (BMI 30-39. 9)    [place] (hard of hearing)    DJD (degenerative joint disease), lumbar    History of PTCA  Uncontrolled type 2 diabetes mellitus with hyperglycemia ([facility] [address]           No orders of the defined types were placed in this encounter. No orders of the defined types were placed in this encounter. Assessment:    1. CAD S/P percutaneous coronary angioplasty    2. History of non-ST elevation myocardial infarction (NSTEMI)    3. 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 See me in 1 month. This note was partially generated using Dragon voice recognition system, and there may be some incorrect words, spellings, punctuation that were not noticed in checking the note before saving.         Electronically signed by Melissa Bingham MD on 8/19/2020 at 8:25 AM

## 2020-08-24 RX ORDER — TRAMADOL HYDROCHLORIDE 50 MG/1
TABLET ORAL
Qty: 30 TABLET | OUTPATIENT
Start: 2020-08-24

## 2020-08-24 NOTE — TELEPHONE ENCOUNTER
Requesting medication refill. Please approve or deny this request.    Rx requested:  Requested Prescriptions     Pending Prescriptions Disp Refills    traMADol (ULTRAM) 50 MG tablet [Pharmacy Med Name: TRAMADOL HCL 50 MG TABLET] 30 tablet      Sig: take 1 tablet by mouth once daily for 30 DAYS take LOWEST DOSE POSSIBLE TO MANAGE PAIN       Last Office Visit:   7/16/2020      REASON LAST SEEN FOR:    Hospital follow up.  Medicare 6/18/20     LAST OFFICE VISIT NOTE REVIEWED: YES OR NO    no      LAST FILLED BY WHO:    7/16/20 Dr. Carisa White:    1/2020    PATIENT CONTACTED FOR A FOLLOW UP APPT: YES OR NO    no    Next Visit Date:  Future Appointments   Date Time Provider José Manuel Ogi   9/22/2020 12:15 PM Marisol Landa MD [address]   10/26/2020 10:45 AM Audra Moody MD [address]   12/14/2020  1:15 PM Bright Boyce MD [facility]   6/30/2021 11:00 AM Audra Moody MD [facility]

## 2020-08-25 ENCOUNTER — ANTI-COAG VISIT (OUTPATIENT)
Dept: INTERNAL MEDICINE | Age: 85
End: 2020-08-25
Payer: MEDICARE

## 2020-08-25 DIAGNOSIS — Z86.73 HISTORY OF CVA (CEREBROVASCULAR ACCIDENT): ICD-10-CM

## 2020-08-25 DIAGNOSIS — Z79.01 CURRENT USE OF LONG TERM ANTICOAGULATION: ICD-10-CM

## 2020-08-25 LAB
INR BLD: 2.4
PROTHROMBIN TIME: 25.8 SEC (ref 12.3–14.9)

## 2020-08-25 PROCEDURE — 93793 ANTICOAG MGMT PT WARFARIN: CPT | Performed by: FAMILY MEDICINE

## 2020-08-27 ENCOUNTER — TELEPHONE (OUTPATIENT)
Dept: INTERNAL MEDICINE | Age: 85
End: 2020-08-27

## 2020-08-27 RX ORDER — TRAMADOL HYDROCHLORIDE 50 MG/1
50 TABLET ORAL EVERY 6 HOURS PRN
COMMUNITY
End: 2020-08-28 | Stop reason: SDUPTHER

## 2020-08-27 NOTE — TELEPHONE ENCOUNTER
Per Dr. Teresa Golden verbally she originally refused medication thinking the medication was sent in error. I then spoke to her letting her know the last message, and she told me to call the son and let him know it was a misunderstanding and that she will get it sent over soon.  Thank you  I spoke to son, he is aware of the misunderstanding

## 2020-08-27 NOTE — TELEPHONE ENCOUNTER
Pt son called back about medication, says he needs to know what is going on with the medication.  Please advise, he has called the last three days (per pt)

## 2020-08-28 RX ORDER — TRAMADOL HYDROCHLORIDE 50 MG/1
50 TABLET ORAL NIGHTLY PRN
Qty: 30 TABLET | Refills: 1 | Status: SHIPPED | OUTPATIENT
Start: 2020-08-28 | End: 2020-11-26

## 2020-08-28 NOTE — TELEPHONE ENCOUNTER
Requesting medication refill. Please approve or deny this request.    Rx requested:  Requested Prescriptions     Pending Prescriptions Disp Refills    traMADol (ULTRAM) 50 MG tablet        Sig: Take 1 tablet by mouth every 6 hours as needed for Pain.      Refused Prescriptions Disp Refills    traMADol (ULTRAM) 50 MG tablet [Pharmacy Med Name: TRAMADOL HCL 50 MG TABLET] 30 tablet      Sig: take 1 tablet by mouth once daily for 30 DAYS take LOWEST DOSE POSSIBLE TO MANAGE PAIN     Refused By: Frank Kearney     Reason for Refusal: Refill not appropriate       Last Office Visit:   7/16/2020        REASON LAST SEEN AND BY WHO:    Spinal Stenosis, Dr. Shawn Taylor: Serina Herrera NOTE    No note      PATIENT CONTACTED FOR A FOLLOW UP APPT: YES OR NO    no    Next Visit Date:  Future Appointments   Date Time Provider José Manuel Laraisti   9/22/2020 12:15 PM Justin Wu MD [address]   10/26/2020 10:45 AM Samuel Denny MD [address]   12/14/2020  1:15 PM Raymundo Lees MD [facility]   6/30/2021 11:00 AM Samuel Denny MD [address]

## 2020-09-09 ENCOUNTER — HOSPITAL ENCOUNTER (EMERGENCY)
Age: 85
Discharge: HOME | End: 2020-09-09
Attending: EMERGENCY MEDICINE
Payer: MEDICARE

## 2020-09-09 ENCOUNTER — APPOINTMENT (OUTPATIENT)
Dept: CT IMAGING | Age: 85
End: 2020-09-09
Payer: MEDICARE

## 2020-09-09 VITALS
WEIGHT: 185 LBS | OXYGEN SATURATION: 95 % | RESPIRATION RATE: 16 BRPM | BODY MASS INDEX: 32.78 KG/M2 | DIASTOLIC BLOOD PRESSURE: 76 MMHG | HEIGHT: 63 IN | HEART RATE: 63 BPM | SYSTOLIC BLOOD PRESSURE: 177 MMHG | TEMPERATURE: 97.9 F

## 2020-09-09 LAB
BACTERIA: ABNORMAL /HPF
BASOPHILS ABSOLUTE: 0 K/UL (ref 0–0.2)
BASOPHILS RELATIVE PERCENT: 0.4 %
BILIRUBIN URINE: NEGATIVE
BLOOD, URINE: NEGATIVE
CLARITY: ABNORMAL
COLOR: YELLOW
EOSINOPHILS ABSOLUTE: 0.3 K/UL (ref 0–0.7)
EOSINOPHILS RELATIVE PERCENT: 3.8 %
EPITHELIAL CELLS, UA: ABNORMAL /HPF
GLUCOSE URINE: NEGATIVE MG/DL
HCT VFR BLD CALC: 32.2 % (ref 37–47)
HEMOGLOBIN: 10.7 G/DL (ref 12–16)
INR BLD: 2.3
KETONES, URINE: NEGATIVE MG/DL
LEUKOCYTE ESTERASE, URINE: ABNORMAL
LYMPHOCYTES ABSOLUTE: 1.5 K/UL (ref 1–4.8)
LYMPHOCYTES RELATIVE PERCENT: 19.8 %
MCH RBC QN AUTO: 30.3 PG (ref 27–31.3)
MCHC RBC AUTO-ENTMCNC: 33.2 % (ref 33–37)
MCV RBC AUTO: 91.3 FL (ref 82–100)
MONOCYTES ABSOLUTE: 0.8 K/UL (ref 0.2–0.8)
MONOCYTES RELATIVE PERCENT: 10 %
NEUTROPHILS ABSOLUTE: 5.1 K/UL (ref 1.4–6.5)
NEUTROPHILS RELATIVE PERCENT: 66 %
NITRITE, URINE: NEGATIVE
PDW BLD-RTO: 14.4 % (ref 11.5–14.5)
PH UA: 5.5 (ref 5–9)
PLATELET # BLD: 270 K/UL (ref 130–400)
PROTEIN UA: 100 MG/DL
PROTHROMBIN TIME: 25.4 SEC (ref 12.3–14.9)
RBC # BLD: 3.53 M/UL (ref 4.2–5.4)
RENAL EPITHELIAL, UA: ABNORMAL /HPF
SPECIFIC GRAVITY UA: 1.01 (ref 1–1.03)
URINE REFLEX TO CULTURE: YES
UROBILINOGEN, URINE: 0.2 E.U./DL
WBC # BLD: 7.7 K/UL (ref 4.8–10.8)
WBC UA: >100 /HPF (ref 0–5)

## 2020-09-09 PROCEDURE — 81001 URINALYSIS AUTO W/SCOPE: CPT

## 2020-09-09 PROCEDURE — 85610 PROTHROMBIN TIME: CPT

## 2020-09-09 PROCEDURE — 70450 CT HEAD/BRAIN W/O DYE: CPT

## 2020-09-09 PROCEDURE — 87186 SC STD MICRODIL/AGAR DIL: CPT

## 2020-09-09 PROCEDURE — 87077 CULTURE AEROBIC IDENTIFY: CPT

## 2020-09-09 PROCEDURE — 36415 COLL VENOUS BLD VENIPUNCTURE: CPT

## 2020-09-09 PROCEDURE — 99284 EMERGENCY DEPT VISIT MOD MDM: CPT

## 2020-09-09 PROCEDURE — 6360000002 HC RX W HCPCS: Performed by: EMERGENCY MEDICINE

## 2020-09-09 PROCEDURE — 87086 URINE CULTURE/COLONY COUNT: CPT

## 2020-09-09 PROCEDURE — 96375 TX/PRO/DX INJ NEW DRUG ADDON: CPT

## 2020-09-09 PROCEDURE — 96365 THER/PROPH/DIAG IV INF INIT: CPT

## 2020-09-09 PROCEDURE — 85025 COMPLETE CBC W/AUTO DIFF WBC: CPT

## 2020-09-09 PROCEDURE — 2580000003 HC RX 258: Performed by: EMERGENCY MEDICINE

## 2020-09-09 RX ORDER — ONDANSETRON 4 MG/1
4 TABLET, ORALLY DISINTEGRATING ORAL EVERY 8 HOURS PRN
Qty: 10 TABLET | Refills: 0 | Status: SHIPPED | OUTPATIENT
Start: 2020-09-09 | End: 2021-04-21 | Stop reason: SDUPTHER

## 2020-09-09 RX ORDER — CEFUROXIME AXETIL 250 MG/1
250 TABLET ORAL 2 TIMES DAILY
Qty: 14 TABLET | Refills: 0 | Status: SHIPPED | OUTPATIENT
Start: 2020-09-09 | End: 2020-09-16

## 2020-09-09 RX ORDER — NITROFURANTOIN 25; 75 MG/1; MG/1
100 CAPSULE ORAL 2 TIMES DAILY
Qty: 20 CAPSULE | Refills: 0 | Status: SHIPPED | OUTPATIENT
Start: 2020-09-09 | End: 2020-09-09

## 2020-09-09 RX ORDER — SODIUM CHLORIDE 0.9 % (FLUSH) 0.9 %
3 SYRINGE (ML) INJECTION EVERY 8 HOURS
Status: DISCONTINUED | OUTPATIENT
Start: 2020-09-09 | End: 2020-09-09 | Stop reason: HOSPADM

## 2020-09-09 RX ORDER — ONDANSETRON 2 MG/ML
4 INJECTION INTRAMUSCULAR; INTRAVENOUS ONCE
Status: COMPLETED | OUTPATIENT
Start: 2020-09-09 | End: 2020-09-09

## 2020-09-09 RX ADMIN — CEFTRIAXONE 1 G: 1 INJECTION, POWDER, FOR SOLUTION INTRAMUSCULAR; INTRAVENOUS at 16:23

## 2020-09-09 RX ADMIN — ONDANSETRON 4 MG: 2 INJECTION INTRAMUSCULAR; INTRAVENOUS at 15:34

## 2020-09-09 RX ADMIN — Medication 3 ML: at 15:34

## 2020-09-09 ASSESSMENT — ENCOUNTER SYMPTOMS
CHOKING: 0
WHEEZING: 0
TROUBLE SWALLOWING: 0
CHEST TIGHTNESS: 0
COUGH: 0
EYE PAIN: 0
BLOOD IN STOOL: 0
EYE REDNESS: 0
FACIAL SWELLING: 0
SINUS PRESSURE: 0
SHORTNESS OF BREATH: 0
STRIDOR: 0
SORE THROAT: 0
ABDOMINAL PAIN: 0
CONSTIPATION: 0
VOMITING: 0
BACK PAIN: 0
DIARRHEA: 0
VOICE CHANGE: 0
EYE DISCHARGE: 0

## 2020-09-09 ASSESSMENT — PAIN DESCRIPTION - ORIENTATION: ORIENTATION: POSTERIOR

## 2020-09-09 ASSESSMENT — PAIN DESCRIPTION - LOCATION: LOCATION: HEAD

## 2020-09-09 ASSESSMENT — PAIN DESCRIPTION - FREQUENCY: FREQUENCY: CONTINUOUS

## 2020-09-09 ASSESSMENT — PAIN DESCRIPTION - ONSET: ONSET: SUDDEN

## 2020-09-09 ASSESSMENT — PAIN DESCRIPTION - DESCRIPTORS: DESCRIPTORS: SORE

## 2020-09-09 ASSESSMENT — PAIN DESCRIPTION - PAIN TYPE: TYPE: ACUTE PAIN

## 2020-09-09 ASSESSMENT — PAIN SCALES - GENERAL: PAINLEVEL_OUTOF10: 1

## 2020-09-09 NOTE — ED NOTES
Pt and her son were given d/c instructions and two scripts. Pt and her son voiced understanding of d/c instructions without further questions.       Beatris Mackay RN  09/09/20 [id number]

## 2020-09-09 NOTE — ED NOTES
Family member stated that the patient had blood in the urine earlier today. Dr. Magdiel Alamo is aware.      Jonathan Hernandez, [id number]  09/09/20 [address] [facility],[address], RN  09/09/20 [id number]

## 2020-09-09 NOTE — ED PROVIDER NOTES
[facility] ED  eMERGENCY dEPARTMENT eNCOUnter      Pt Name: Aaron Chong  MRN: [id number]  [place] 6/10/1927  Date of evaluation: 9/9/2020  Provider: Sangita Salmeron MD    [address] [place] [address]       Chief Complaint   Patient presents with    Fall     Lost balance and fell at 2pm today. Pt struck posterior head on gravel driveway. -LOC. Small red area on posterior head that pt reports as only complaint. Pt takes Coumadin. HISTORY OF PRESENT ILLNESS   (Location/Symptom, Timing/Onset,Context/Setting, Quality, Duration, Modifying Factors, Severity)  Note limiting factors. Aaron Chong is a 80 y.o. female who presents to the emergency department patient patient diabetes mellitus renal insufficiency coronary artery disease on long-term Coumadin therapy as well as taking aspirin patient lost her balance and fell backwards striking her back of her head no neck pain moving all extremities very well no LOC no active bleeding at this time history of chronic back pain but denies any back pain at this time patient brought here because of the head injury and fall being on Coumadin    HPI    NursingNotes were reviewed. REVIEW OF SYSTEMS    (2-9 systems for level 4, 10 or more for level 5)     Review of Systems   Constitutional: Negative. Negative for activity change and fever. HENT: Negative for congestion, drooling, facial swelling, mouth sores, nosebleeds, sinus pressure, sore throat, trouble swallowing and voice change. Eyes: Negative for pain, discharge, redness and visual disturbance. Respiratory: Negative for cough, choking, chest tightness, shortness of breath, wheezing and stridor. Cardiovascular: Negative for chest pain, palpitations and leg swelling. Gastrointestinal: Negative for abdominal pain, blood in stool, constipation, diarrhea and vomiting. Endocrine: Negative for cold intolerance, polyphagia and polyuria.    Genitourinary: Negative for dysuria, flank pain, frequency, genital sores and urgency. Musculoskeletal: Negative for back pain, joint swelling, neck pain and neck stiffness. Skin: Positive for wound. Negative for pallor and rash. Neurological: Positive for headaches. Negative for tremors, seizures, syncope, weakness and numbness. Hematological: Negative for adenopathy. Does not bruise/bleed easily. Psychiatric/Behavioral: Negative for agitation, behavioral problems, hallucinations and sleep disturbance. The patient is not hyperactive. All other systems reviewed and are negative. Except as noted above the remainder of the review of systems was reviewed and negative. 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 SCC (squamous cell carcinoma), arm 2013    right upper arm, 2015 right forarm    SI (stress incontinence), female 5/29/2012    Type II or unspecified type diabetes mellitus without mention of complication, not stated as uncontrolled     Whooping cough          SURGICALHISTORY       Past Surgical History:   Procedure Laterality Date    ANKLE SURGERY      broken left ankle.     APPENDECTOMY      BREAST BIOPSY      x2 left breast    CATARACT REMOVAL  2004    bilateral    CORONARY ANGIOPLASTY WITH STENT PLACEMENT  01/2019    CYSTOCELE REPAIR      EYE SURGERY      bilateral cataract    HYSTERECTOMY      complete at age 40   [address]      as a child         CURRENT MEDICATIONS       Previous Medications    BLOOD GLUCOSE MONITOR KIT AND SUPPLIES    Test 2 times a day & as needed for symptoms of irregular blood glucose. Freestyle [place] Lite    BLOOD GLUCOSE MONITOR STRIPS    Test 2 times a day & as needed for symptoms of irregular blood glucose. Freestyle [place] Lite    BLOOD GLUCOSE MONITORING SUPPL (TRUE METRIX AIR GLUCOSE METER) W/DEVICE KIT        BLOOD GLUCOSE MONITORING SUPPL PRUDENCIO    Dx: E11.9    CARVEDILOL (COREG) 12.5 MG TABLET    take 1 tablet by mouth twice a day with meals    CEPHALEXIN (KEFLEX) 125 MG/5ML SUSPENSION    Take 5 mLs by mouth daily    CHOLECALCIFEROL (VITAMIN D) 2000 UNITS CAPS CAPSULE    Take 2,000 Units by mouth    CLOPIDOGREL (PLAVIX) 75 MG TABLET    Take 1 tablet by mouth daily    FERROUS SULFATE (IRON 325) 325 (65 FE) MG TABLET    Take 325 mg by mouth Daily with lunch    FLUTICASONE (FLONASE) 50 MCG/ACT NASAL SPRAY    1 spray by Each Nostril route daily    FUROSEMIDE (LASIX) 20 MG TABLET    Take 1 tablet by mouth 2 times daily    HYDRALAZINE (APRESOLINE) 50 MG TABLET    Take 1 tablet by mouth 3 times daily    INCONTINENCE SUPPLY DISPOSABLE (DEPEND UNDERGARMENTS) MISC    1 each by Does not apply route nightly    ISOSORBIDE MONONITRATE (IMDUR) 30 MG EXTENDED RELEASE TABLET    Take 1 tablet by mouth daily    LANCETS MISC    1 each by Does not apply route 2 times daily Test 2 times a day & as needed for symptoms of irregular blood glucose. Freestyle [place] Lite    METFORMIN (GLUCOPHAGE) 1000 MG TABLET    take 1 tablet by mouth twice a day with meals    NEEDLES & SYRINGES MISC    1 each by Does not apply route daily    SACUBITRIL-VALSARTAN (ENTRESTO) 24-26 MG PER TABLET    Take 1 tablet by mouth 2 times daily    TRAMADOL (ULTRAM) 50 MG TABLET    Take 1 tablet by mouth nightly as needed for Pain for up to 90 days.     TRUE METRIX BLOOD GLUCOSE TEST STRIP    TEST two to three times a day    TRUEPLUS LANCETS 28G MISC    TEST TWO TIMES DAILY    WARFARIN (COUMADIN) 5 MG TABLET    take 1 tablet by mouth once daily    WARFARIN (COUMADIN) 6 MG TABLET    Take 6 mg by mouth daily     WARFARIN (COUMADIN) 7.5 MG TABLET    Take 1 tablet by mouth daily       ALLERGIES     Metoclopramide; Pantoprazole; Pcn [penicillins]; and Protonix [pantoprazole sodium]    FAMILY HISTORY       Family History   Problem Relation Age of Onset    Diabetes Mother     Heart Disease Father           SOCIAL HISTORY       Social History     Socioeconomic History    Marital status:      Spouse name: None    Number of children: None    Years of education: None    Highest education level: None   Occupational History    None   Social Needs    Financial resource strain: Not very hard    Food insecurity     Worry: Never true     Inability: Never true    Transportation needs     Medical: No     Non-medical: No   Tobacco Use    Smoking status: Never Smoker    Smokeless tobacco: Never Used   Substance and Sexual Activity    Alcohol use: No     Alcohol/week: 0.0 standard drinks    Drug use: No    Sexual activity: None   Lifestyle    Physical activity     Days per week: None     Minutes per session: None    Stress: None   Relationships    Social connections     Talks on phone: More than three times a week     Gets together: More than three times a week     Attends Mu-ism service: 1 to 4 times per year     Active member of club or organization: No     Attends meetings of clubs or organizations: Never     Relationship status:      Intimate partner violence     Fear of current or ex partner: No     Emotionally abused: No     Physically abused: No     Forced sexual activity: No   Other Topics Concern    None   Social History Narrative         Lives With: Son    Type of Home[facility] [address] in [facility]: Two level, Able to Live on Main level with bedroom/bathroom, Performs ADL's on one level    Home Access: Stairs to enter with rails    Entrance Stairs - Number of Steps: Threshold    Bathroom Shower/Tub: Tub/Shower unit    Bathroom Toilet: Handicap height    Bathroom Equipment: Grab bars in shower, Grab bars around toilet, Hand-held shower, Shower chair    Bathroom Accessibility: Accessible    Home Equipment: Rolling walker, 4 wheeled walker (Usually uses rollator)    ADL Assistance: [address]: Needs assistance (Son manages housework)    Homemaking Responsibilities: No    Ambulation Assistance: Independent (Rollator)    Transfer Assistance: Independent    Active : No    Patient's  Info: Sons            SCREENINGS    Radha Coma Scale  Eye Opening: Spontaneous  Best Verbal Response: Oriented  Best Motor Response: Obeys commands  Radha Coma Scale Score: 15 @FLOW([id number])@      PHYSICAL EXAM    (up to 7 for level 4, 8 or more for level 5)     ED Triage Vitals [09/09/20 1507]   BP Temp Temp Source Pulse Resp SpO2 Height Weight   (!) 186/78 98.5 °F (36.9 °C) Oral 67 18 97 % 5' 3\" (1.6 m) 185 lb (83.9 kg)       Physical Exam  Vitals signs and nursing note reviewed. Constitutional:       General: She is not in acute distress. Appearance: Normal appearance. She is well-developed. She is not ill-appearing, toxic-appearing or diaphoretic. Comments: Alert cooperative patient slightly hard of hearing but follows all the verbal commands moving all the extremities very well   HENT:      Head: Normocephalic.       Comments: Attention given to the scalp patient has minimal scalp hematoma to the occiput area with a bruised area but no laceration no gapping wound no active bleeding is noted at this time positive for minimal tenderness on palpation     Right Ear: Tympanic membrane, ear canal and external ear normal.      Left Ear: Tympanic membrane, ear canal and external ear normal.      Nose: Nose normal. Mouth/Throat:      Mouth: Mucous membranes are moist.   Eyes:      General:         Left eye: No discharge. Extraocular Movements: Extraocular movements intact. Pupils: Pupils are equal, round, and reactive to light. Comments: Pupil equal reactive to light extraocular movement good   Neck:      Musculoskeletal: Normal range of motion and neck supple. No neck rigidity or muscular tenderness. Vascular: No carotid bruit. Comments: Attention come to the neck patient has no midline tenderness no hematoma excellent range of motion of the neck  Cardiovascular:      Rate and Rhythm: Normal rate and regular rhythm. Heart sounds: Normal heart sounds. No murmur. No gallop. Pulmonary:      Effort: No respiratory distress. Breath sounds: Normal breath sounds. No wheezing. Abdominal:      General: Bowel sounds are normal.      Palpations: Abdomen is soft. There is no mass. Tenderness: There is no rebound. Musculoskeletal: Normal range of motion. General: No tenderness. Lymphadenopathy:      Cervical: No cervical adenopathy. Skin:     General: Skin is warm. Capillary Refill: Capillary refill takes less than 2 seconds. Findings: No erythema or rash. Neurological:      General: No focal deficit present. Mental Status: She is alert and oriented to person, place, and time. Cranial Nerves: No cranial nerve deficit. Sensory: No sensory deficit. Motor: No weakness or abnormal muscle tone. Coordination: Coordination normal.      Gait: Gait normal.      Deep Tendon Reflexes: Reflexes normal.      Comments: Attention to the neurological examination cranial nerves II through XII grossly intact good bilateral handgrips no sensory deficit   Psychiatric:         Behavior: Behavior normal.         Thought Content:  Thought content normal.         DIAGNOSTIC RESULTS     EKG: All EKG's are interpreted by the Emergency Department Physician who either high probability of clinicallysignificant/life threatening deterioration in the patient's condition which required my urgent intervention. ONSULTS:  None    PROCEDURES:  Unless otherwise noted below, none     Procedures    FINAL IMPRESSION      1. Fall, initial encounter    2. Closed head injury, initial encounter    3.  Acute cystitis without hematuria          DISPOSITION/PLAN   DISPOSITION        PATIENT REFERRED TO:  Shantanu Menezes MD  [facility] [phone]    In 2 days        DISCHARGE MEDICATIONS:  New Prescriptions    CEFUROXIME (CEFTIN) 250 MG TABLET    Take 1 tablet by mouth 2 times daily for 7 days    ONDANSETRON (ZOFRAN ODT) 4 MG DISINTEGRATING TABLET    Take 1 tablet by mouth every 8 hours as needed for Nausea          (Please note that portions of this note were completed with a voice recognition program.  Efforts were made to edit the dictations but occasionally words are mis-transcribed.)    Jocelyne Cervantes MD (electronically signed)  Attending Emergency Physician       Jocelyne Cervantes MD  09/09/20 0799       Jocelyne Cervantes MD  09/09/20 1601       Jocelyne Cervantes MD  09/09/20 1701

## 2020-09-09 NOTE — ED TRIAGE NOTES
Patient to room #7 for c/o fall causing her to hit the posterior of her head. Patient now c/o nausea. Denies any LOC or any other injuries. Patient is A&Ox4 at this time. Ice applied to posterior of head. Labs drawn and sent. Son remains at bedside.

## 2020-09-12 LAB
ORGANISM: ABNORMAL
URINE CULTURE, ROUTINE: ABNORMAL

## 2020-09-15 ENCOUNTER — OFFICE VISIT (OUTPATIENT)
Dept: INTERNAL MEDICINE | Age: 85
End: 2020-09-15
Payer: MEDICARE

## 2020-09-15 ENCOUNTER — TELEPHONE (OUTPATIENT)
Dept: CARDIOLOGY CLINIC | Age: 85
End: 2020-09-15

## 2020-09-15 VITALS
HEART RATE: 61 BPM | OXYGEN SATURATION: 97 % | BODY MASS INDEX: 32.77 KG/M2 | WEIGHT: 185 LBS | DIASTOLIC BLOOD PRESSURE: 70 MMHG | SYSTOLIC BLOOD PRESSURE: 128 MMHG

## 2020-09-15 PROCEDURE — G8427 DOCREV CUR MEDS BY ELIG CLIN: HCPCS | Performed by: FAMILY MEDICINE

## 2020-09-15 PROCEDURE — 1036F TOBACCO NON-USER: CPT | Performed by: FAMILY MEDICINE

## 2020-09-15 PROCEDURE — 4040F PNEUMOC VAC/ADMIN/RCVD: CPT | Performed by: FAMILY MEDICINE

## 2020-09-15 PROCEDURE — 99213 OFFICE O/P EST LOW 20 MIN: CPT | Performed by: FAMILY MEDICINE

## 2020-09-15 PROCEDURE — 1090F PRES/ABSN URINE INCON ASSESS: CPT | Performed by: FAMILY MEDICINE

## 2020-09-15 PROCEDURE — G8417 CALC BMI ABV UP PARAM F/U: HCPCS | Performed by: FAMILY MEDICINE

## 2020-09-15 PROCEDURE — G0008 ADMIN INFLUENZA VIRUS VAC: HCPCS | Performed by: FAMILY MEDICINE

## 2020-09-15 PROCEDURE — 1123F ACP DISCUSS/DSCN MKR DOCD: CPT | Performed by: FAMILY MEDICINE

## 2020-09-15 PROCEDURE — 90694 VACC AIIV4 NO PRSRV 0.5ML IM: CPT | Performed by: FAMILY MEDICINE

## 2020-09-15 RX ORDER — ZINC OXIDE 13 %
1 CREAM (GRAM) TOPICAL DAILY
Qty: 90 CAPSULE | Refills: 3 | Status: ON HOLD | OUTPATIENT
Start: 2020-09-15 | End: 2020-11-29

## 2020-09-15 RX ORDER — CEPHALEXIN 250 MG/1
250 CAPSULE ORAL DAILY
Status: ON HOLD | COMMUNITY
End: 2020-12-01 | Stop reason: HOSPADM

## 2020-09-15 RX ORDER — CIPROFLOXACIN 250 MG/1
250 TABLET, FILM COATED ORAL 2 TIMES DAILY
Qty: 6 TABLET | Refills: 0 | Status: SHIPPED | OUTPATIENT
Start: 2020-09-15 | End: 2021-01-06 | Stop reason: SDUPTHER

## 2020-09-15 NOTE — PROGRESS NOTES
Patient: Isa Ontiveros    YOB: 1927    Date: 9/15/20       Patient Active Problem List    Diagnosis Date Noted    History of ST elevation myocardial infarction (STEMI)      Priority: High    CKD (chronic kidney disease) 03/18/2015     Priority: High     Overview Note:     Stage G3a A2      HLD (hyperlipidemia) 03/18/2015     Priority: High    HTN (hypertension) 11/29/2011     Priority: High    Diabetes mellitus 11/29/2011     Priority: High    Diastolic dysfunction 18/33/0075     Priority: Medium    Valvular heart disease 03/07/2018     Priority: Medium    Hypercalcemia 01/15/2018     Priority: Medium     Overview Note:     kami Tripathi      Lumbar spinal stenosis 12/22/2016     Priority: Medium    Chronic low back pain 08/17/2016     Priority: Medium    Eczematous dermatitis      Priority: Low    Vitamin D deficiency 03/18/2015     Priority: Low    SI (stress incontinence), female 05/29/2012     Priority: Low    Osteoarthritis 05/29/2012     Priority: Low    Uncontrolled type 2 diabetes mellitus with hyperglycemia (HCC)     DJD (degenerative joint disease), lumbar 06/24/2020    History of PTCA 06/24/2020    Vestibular neuronitis of both ears 06/22/2020    Dizziness     Spinal stenosis in cervical region 06/21/2020    Lumbar degenerative disc disease 06/21/2020    Spinal stenosis, lumbar region, with neurogenic claudication 06/21/2020    Abnormality of gait and mobility due to  NTSCI with Impaired Mobility and ADL's secondary to Cervical Myelopathy and Vestibular neuronitis with rehab admission 06/24/20. 06/21/2020     Overview Note:     80-year old female who presented to the ER with complaints of weakness, nausea, vomiting, and dizziness. CT Head 06/18/20 with no acute intracranial process. The dizziness came on suddenly the night prior to admission and had persisted. In the ER her blood pressure was in the 200's and her blood glucose was elevated at 250.   Blood pressure responded with hydralazine. Repeat CT Head 06/19/20 with no change. CT Abdomen/Pelvis 06/19/20 shows borderline pelviectasis in left kidney. No acute pathology. Carotid Duplex 06/19/20 shows 50-69% Right ICA stenosis and 50-69% Left ICA stenosis. 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 Because of persisted neck and lower back pain further imaging was done. MRI of Brain showed no acute findings, MRI Cervical Spine revealed severe central canal stenosis at several levels and MRI of Lumbar Spine showed multilevel degenerative changes and mild canal narrowing but no definite central canal stenosis. MRI Thoracic Spine 06/20/20 negative MRI. Family opted for conservative management at this time. The patient has been found to have severe abnormality of gait and mobility with impaired self care due to NTSCI with Impaired Mobility and ADL's secondary to Cervical Myelopathy and Vestibular Neronitis and is admitted to the acute inpatient rehab program.     Transcribed from pre-admission information sheet completed by Tati Conklin RN/mdl as directed by Dr. George Hinton.           Generalized muscle ache 06/21/2020    Generalized osteoarthrosis, involving multiple sites 06/21/2020    Syncope 06/19/2020    Current use of long term anticoagulation 12/26/2019    Other transient cerebral ischemic attacks and related syndromes     Cerebrovascular accident (CVA) ([address]     Chronic combined systolic and diastolic congestive heart failure ([address] 12/02/2019    Monitoring for long-term anticoagulant use 11/05/2019    Transient cerebral ischemia 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/2019    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/2018    History of non-ST elevation myocardial infarction (NSTEMI) 11/12/2018    Nausea & vomiting 11/11/2018    Chest pain 11/10/2018    Vitamin B12 deficiency 09/17/2018     Overview Note:     Started 2/2018      Recurrent UTI (urinary tract infection)-Raoultella resistant to Macrobid 08/06/2018     Overview Note:     amoxicillin-clavulanate Sensitive <=2 mcg/mL    ceFAZolin Sensitive <=4 mcg/mL    ciprofloxacin Sensitive <=0.25 mcg/mL    gentamicin Sensitive <=1 mcg/mL    nitrofurantoin Resistant 256 mcg/mL    trimethoprim-sulfamethoxazole Sensitive <=20 mcg/mL            SCC (squamous cell carcinoma), arm 2013     Overview Note:     right upper arm, 2015 right forarm      Type 2 diabetes mellitus 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 SCC (squamous cell carcinoma), arm 2013    right upper arm, 2015 right forarm    SI (stress incontinence), female 5/29/2012    Type II or unspecified type diabetes mellitus without mention of complication, not stated as uncontrolled     Whooping cough      Past Surgical History:   Procedure Laterality Date    ANKLE SURGERY      broken left ankle.     APPENDECTOMY      BREAST BIOPSY      x2 left breast    CATARACT REMOVAL  2004    bilateral    CORONARY ANGIOPLASTY WITH STENT PLACEMENT  01/2019    CYSTOCELE REPAIR      EYE SURGERY      bilateral cataract    HYSTERECTOMY      complete at age 39    [address]      as a child     Family History   Problem Relation Age of Onset    Diabetes Mother     Heart Disease Father      Social History     Socioeconomic History    Marital status:      Spouse name: Not on file    Number of children: Not on file    Years of education: Not on file    Highest education level: Not on file   Occupational History    Not on file   Social Needs    Financial resource strain: Not very hard    Food insecurity     Worry: Never true     Inability: Never true    Transportation needs     Medical: No     Non-medical: No   Tobacco Use    Smoking status: Never Smoker    Smokeless tobacco: Never Used   Substance and Sexual Activity    Alcohol use: No     Alcohol/week: 0.0 standard drinks    Drug use: No    Sexual activity: Not on file   Lifestyle    Physical activity     Days per week: Not on file     Minutes per session: Not on file    Stress: Not on file   Relationships    Social connections     Talks on phone: More than three times a week     Gets together: More than three times a week     Attends Spiritism service: 1 to 4 times per year     Active member of club or organization: No     Attends meetings of clubs or organizations: Never     Relationship status:     Intimate partner violence     Fear of current or ex partner: No     Emotionally abused: No     Physically abused: No     Forced sexual activity: No   Other Topics Concern    Not on file   Social History Narrative         Lives With: Son    Type of Home: [id number] [facility] [address] [place]: Two level, Able to Live on Main level with bedroom/bathroom, Performs ADL's on one level    Home Access: Stairs to enter with rails    Entrance Stairs - Number of Steps:  Threshold    Bathroom Shower/Tub: Tub/Shower unit    Bathroom Toilet: Handicap height    Bathroom Equipment: Grab bars in shower, Grab bars around toilet, Hand-held shower, Shower chair    Bathroom Accessibility: Accessible    Home Equipment: Rolling walker, 4 wheeled walker (Usually uses rollator)    ADL Assistance: [address]: Needs assistance (Son manages housework)    Homemaking Responsibilities: No    Ambulation Assistance: Independent (Rollator)    Transfer Assistance: Independent    Active : No    Patient's  Info: Sons          Current Outpatient Medications on File Prior to Visit   Medication Sig Dispense Refill    cephALEXin (KEFLEX) 250 MG capsule Take 250 mg by mouth daily      ondansetron (ZOFRAN ODT) 4 MG disintegrating tablet Take 1 tablet by mouth every 8 hours as needed for Nausea 10 tablet 0    cefUROXime (CEFTIN) 250 MG tablet Take 1 tablet by mouth 2 times daily for 7 days 14 tablet 0    traMADol (ULTRAM) 50 MG tablet Take 1 tablet by mouth nightly as needed for Pain for up to 90 days.  (Patient taking differently: Take 25 mg by mouth nightly as needed for Pain. ) 30 tablet 1    hydrALAZINE (APRESOLINE) 50 MG tablet Take 1 tablet by mouth 3 times daily 90 tablet 3    isosorbide mononitrate (IMDUR) 30 MG extended release tablet Take 1 tablet by mouth daily 30 tablet 3    carvedilol (COREG) 12.5 MG tablet take 1 tablet by mouth twice a day with meals 180 tablet 3    ferrous sulfate (IRON 325) 325 (65 Fe) MG tablet Take 325 mg by mouth Daily with lunch      warfarin (COUMADIN) 6 MG tablet Take 6 mg by mouth daily       sacubitril-valsartan (ENTRESTO) 24-26 MG per tablet Take 1 tablet by mouth 2 times daily 60 tablet 0    metFORMIN (GLUCOPHAGE) 1000 MG tablet take 1 tablet by mouth twice a day with meals 180 tablet 1    clopidogrel (PLAVIX) 75 MG tablet Take 1 tablet by mouth daily 90 tablet 3    furosemide (LASIX) 20 MG tablet Take 1 tablet by mouth 2 times daily (Patient taking differently: Take 20 mg by mouth daily ) 180 tablet 2    Cholecalciferol (VITAMIN D) 2000 units CAPS capsule Take 2,000 Units by mouth      fluticasone (FLONASE) 50 MCG/ACT nasal spray 1 spray by Each Nostril route daily (Patient not taking: Reported on 9/15/2020) 1 Bottle 0    warfarin (COUMADIN) 5 MG tablet take 1 tablet by mouth once daily 90 tablet 1    blood glucose monitor kit and supplies Test 2 times a day & as needed for symptoms of irregular blood glucose. Freestyle Freedom Lite 1 kit 0    Lancets MISC 1 each by Does not apply route 2 times daily Test 2 times a day & as needed for symptoms of irregular blood glucose. Freestyle Freedom Lite 100 each 5    blood glucose monitor strips Test 2 times a day & as needed for symptoms of irregular blood glucose. Freestyle Freedom Lite 200 strip 3    warfarin (COUMADIN) 7.5 MG tablet Take 1 tablet by mouth daily (Patient taking differently: Take 7 mg by mouth Tuesday,Saturday, & Sunday when at home.) 30 tablet 3    TRUEPLUS LANCETS 28G MISC TEST TWO TIMES DAILY 200 each 3    TRUE METRIX BLOOD GLUCOSE TEST strip TEST two to three times a day 200 strip 5    Needles & Syringes MISC 1 each by Does not apply route daily 50 each 0    Incontinence Supply Disposable (DEPEND UNDERGARMENTS) MISC 1 each by Does not apply route nightly 1 Package 11    Blood Glucose Monitoring Suppl (TRUE METRIX AIR GLUCOSE METER) W/DEVICE KIT       Blood Glucose Monitoring Suppl PRUDENCIO Dx: E11.9 1 Device 0     No current facility-administered medications on file prior to visit. Allergies   Allergen Reactions    Metoclopramide     Pantoprazole Other (See Comments)    Pcn [Penicillins]      Tolerates rocephin    Protonix [Pantoprazole Sodium]        Chief Complaint   Patient presents with   [address] [facility] 9[facility]9-20.  Fall at home and UTI       HPI    F/u ER visit due to mechanical fall   She landed on her buttocks & hit the back of her head  Went into the ER, CT head negative  Also diagnosed with UTI    She did see pain management   Currently doing ok with 1/2 dose ultram PRN at night time  They are ok to continue this     Patient is here with her son today, history provided by him  Patient son says that she is at her baseline, no new or different neurological signs or symptoms  She denies any urinary symptoms    Reviewed urine culture from ER, antibiotic that she was prescribed is not sensitive to bacteria she grew    Review of Systems  Constitutional: Negative for fatigue, fever and sweats. HEENT: Negative for eye discharge and vision loss. Negative for ear drainage, hearing loss and nasal drainage. Respiratory: Negative for cough, dyspnea and wheezing. Cardiovascular:  Negative for chest pain, claudication and irregular heartbeat/palpitations. Physical Exam  Vitals:    09/15/20 1332   BP: 128/70   Pulse: 61   SpO2: 97%   Body mass index is 32.77 kg/m². Physical Exam  Constitutional:  Appears well-developed and well-nourished. No distress. HENT:   Head: Normocephalic and atraumatic. Right Ear: External ear normal.   Left Ear: External ear normal.   Nose: Nose normal.   Eyes: Conjunctivae and EOM are normal.  Right eye exhibits no discharge. Left eye exhibits no discharge. No scleral icterus. Neck: Normal range of motion. Musculoskeletal: Normal range of motion. Neurological: alert. Skin: not diaphoretic. Psychiatric: normal mood and affect. behavior is normal. Judgment and thought content normal.   Nursing note and vitals reviewed. Assessment/Plan:  Noirs Goldberg was seen today for follow-up from hospital.    Diagnoses and all orders for this visit:    Fall, subsequent encounter  Mechanical fall, home safety tips provided    Need for immunization against influenza  -     INFLUENZA, QUADV, ADJUVANTED, 72 YRS =, IM, PF, PREFILL SYR, 0.5ML (FLUAD)    Urinary tract infection without hematuria, site unspecified  -     ciprofloxacin (CIPRO) 250 MG tablet;  Take 1 tablet by mouth 2 times daily

## 2020-09-22 ENCOUNTER — ANTI-COAG VISIT (OUTPATIENT)
Dept: INTERNAL MEDICINE | Age: 85
End: 2020-09-22
Payer: MEDICARE

## 2020-09-22 ENCOUNTER — OFFICE VISIT (OUTPATIENT)
Dept: CARDIOLOGY CLINIC | Age: 85
End: 2020-09-22
Payer: MEDICARE

## 2020-09-22 VITALS
HEART RATE: 70 BPM | RESPIRATION RATE: 20 BRPM | SYSTOLIC BLOOD PRESSURE: 126 MMHG | DIASTOLIC BLOOD PRESSURE: 82 MMHG | OXYGEN SATURATION: 96 %

## 2020-09-22 DIAGNOSIS — Z86.73 HISTORY OF CVA (CEREBROVASCULAR ACCIDENT): ICD-10-CM

## 2020-09-22 DIAGNOSIS — Z79.01 CURRENT USE OF LONG TERM ANTICOAGULATION: ICD-10-CM

## 2020-09-22 LAB
INR BLD: 2.1
PROTHROMBIN TIME: 23.9 SEC (ref 12.3–14.9)

## 2020-09-22 PROCEDURE — 1123F ACP DISCUSS/DSCN MKR DOCD: CPT | Performed by: INTERNAL MEDICINE

## 2020-09-22 PROCEDURE — 1036F TOBACCO NON-USER: CPT | Performed by: INTERNAL MEDICINE

## 2020-09-22 PROCEDURE — G8427 DOCREV CUR MEDS BY ELIG CLIN: HCPCS | Performed by: INTERNAL MEDICINE

## 2020-09-22 PROCEDURE — 99213 OFFICE O/P EST LOW 20 MIN: CPT | Performed by: INTERNAL MEDICINE

## 2020-09-22 PROCEDURE — 1090F PRES/ABSN URINE INCON ASSESS: CPT | Performed by: INTERNAL MEDICINE

## 2020-09-22 PROCEDURE — 4040F PNEUMOC VAC/ADMIN/RCVD: CPT | Performed by: INTERNAL MEDICINE

## 2020-09-22 PROCEDURE — 93793 ANTICOAG MGMT PT WARFARIN: CPT | Performed by: FAMILY MEDICINE

## 2020-09-22 PROCEDURE — 1111F DSCHRG MED/CURRENT MED MERGE: CPT | Performed by: INTERNAL MEDICINE

## 2020-09-22 PROCEDURE — G8417 CALC BMI ABV UP PARAM F/U: HCPCS | Performed by: INTERNAL MEDICINE

## 2020-09-22 ASSESSMENT — ENCOUNTER SYMPTOMS
COLOR CHANGE: 0
SHORTNESS OF BREATH: 0
DIARRHEA: 0
VOMITING: 0
APNEA: 0
BLOOD IN STOOL: 0
CHEST TIGHTNESS: 0
NAUSEA: 0

## 2020-09-22 NOTE — PROGRESS NOTES
[facility] CARDIOLOGY OFFICE FOLLOW-UP      Patient: Manuel Atkins  YOB: 1927  MRN: [id number]    Chief Complaint:  Chief Complaint   Patient presents with    1 Month Follow-Up    Coronary Artery Disease    Follow-Up from [place] [facility]    Fall         Subjective/HPI:  9/22/2020: Patient presents today for follow-up of hypertension. Leg edema is resolved after Norvasc was discontinued. We will cut down the hydralazine to twice a day. Appetite is good. She is sleeping good. He had CAT scan of the brain that was negative on September 9 on chronic Coumadin therapy. As usual accompanied by his son. he is the main healthcare provider for her she will see me in 3 M       8/18/2020: Patient presents today for follow-up of hypertension. Has been complaining of leg edema. We will discontinue amlodipine. Continue with the hydralazine 3 times a day. See me in the month.   Amlodipine is possibly causing edema        7/14/2020: Patient presents today for follow-up of recent hospitalization at [facility] for CHF.  She was then sent to rehab for recovery.  She had done well.  As usual has labile blood pressure.  Was discharged home on hydralazine 50 3 times daily. Heidi Baez main issue is the back. Jeremy Maxwell has not been approved yet by her her insurance. Emily Tessa is under control.  She is usually accompanied by her son and so also during this visit. Molly Beards of hearing.  Severe back pain.  Does not want to take [employer] [place] will give her Ultram 50 3 times daily for as needed use.  And sent her to [address]. David Olson will see me in 1 month           1/7/20: Patient presents today for follow-up of congestive heart failure. Gary Merlin.  Accompanied by her son. David Olson is nauseated. Muriel Harris will discontinue magnesium.  Son is an extremely competent care provider.  And very caring. David Olson will see me in 3 months.     10/8/19: Patient presents today for congestive heart failure.  Doing extremely well on Food insecurity     Worry: Never true     Inability: Never true    Transportation needs     Medical: No     Non-medical: No   Tobacco Use    Smoking status: Never Smoker    Smokeless tobacco: Never Used   Substance and Sexual Activity    Alcohol use: No     Alcohol/week: 0.0 standard drinks    Drug use: No    Sexual activity: None   Lifestyle    Physical activity     Days per week: None     Minutes per session: None    Stress: None   Relationships    Social connections     Talks on phone: More than three times a week     Gets together: More than three times a week     Attends Synagogue service: 1 to 4 times per year     Active member of club or organization: No     Attends meetings of clubs or organizations: Never     Relationship status:     Intimate partner violence     Fear of current or ex partner: No     Emotionally abused: No     Physically abused: No     Forced sexual activity: No   Other Topics Concern    None   Social History Narrative         Lives With: Son    Type of Home: [id number] [facility] [address] [place]: Two level, Able to Live on Main level with bedroom/bathroom, Performs ADL's on one level    Home Access: Stairs to enter with rails    Entrance Stairs - Number of Steps: Threshold    Bathroom Shower/Tub: Tub/Shower unit    Bathroom Toilet: Handicap height    Bathroom Equipment: Grab bars in shower, Grab bars around toilet, Hand-held shower, Shower chair    Bathroom Accessibility: Accessible    Home Equipment: Rolling walker, 4 wheeled walker (Usually uses rollator)    ADL Assistance: [address] [place] [address] Avenue: Needs assistance (Son manages housework)    Homemaking Responsibilities: No    Ambulation Assistance: Independent (Rollator)    Transfer Assistance: Independent    Active : No    Patient's  Info:  Sons            Allergies   Allergen Reactions    Metoclopramide     Pantoprazole Other (See Comments)    Pcn [Penicillins]      Tolerates rocephin    Protonix [Pantoprazole Sodium]        Current Outpatient Medications   Medication Sig Dispense Refill    cephALEXin (KEFLEX) 250 MG capsule Take 250 mg by mouth daily      Probiotic Product (PROBIOTIC DAILY) CAPS Take 1 tablet by mouth daily 90 capsule 3    ondansetron (ZOFRAN ODT) 4 MG disintegrating tablet Take 1 tablet by mouth every 8 hours as needed for Nausea 10 tablet 0    traMADol (ULTRAM) 50 MG tablet Take 1 tablet by mouth nightly as needed for Pain for up to 90 days. (Patient taking differently: Take 25 mg by mouth nightly as needed for Pain. ) 30 tablet 1    fluticasone (FLONASE) 50 MCG/ACT nasal spray 1 spray by Each Nostril route daily 1 Bottle 0    warfarin (COUMADIN) 5 MG tablet take 1 tablet by mouth once daily 90 tablet 1    hydrALAZINE (APRESOLINE) 50 MG tablet Take 1 tablet by mouth 3 times daily 90 tablet 3    isosorbide mononitrate (IMDUR) 30 MG extended release tablet Take 1 tablet by mouth daily 30 tablet 3    carvedilol (COREG) 12.5 MG tablet take 1 tablet by mouth twice a day with meals 180 tablet 3    ferrous sulfate (IRON 325) 325 (65 Fe) MG tablet Take 325 mg by mouth Daily with lunch      warfarin (COUMADIN) 6 MG tablet Take 6 mg by mouth daily       sacubitril-valsartan (ENTRESTO) 24-26 MG per tablet Take 1 tablet by mouth 2 times daily 60 tablet 0    metFORMIN (GLUCOPHAGE) 1000 MG tablet take 1 tablet by mouth twice a day with meals 180 tablet 1    clopidogrel (PLAVIX) 75 MG tablet Take 1 tablet by mouth daily 90 tablet 3    blood glucose monitor kit and supplies Test 2 times a day & as needed for symptoms of irregular blood glucose. Freestyle Freedom Lite 1 kit 0    Lancets MISC 1 each by Does not apply route 2 times daily Test 2 times a day & as needed for symptoms of irregular blood glucose. Freestyle Freedom Lite 100 each 5    blood glucose monitor strips Test 2 times a day & as needed for symptoms of irregular blood glucose.   Freestyle Freedom Lite Pulse 70   Resp 20   LMP  (LMP Unknown)   SpO2 96%    Physical Exam   Constitutional: She appears healthy. HENT:   Nose: Nose normal.   Mouth/Throat: Dentition is normal. Oropharynx is clear. Eyes: Pupils are equal, round, and reactive to light. Neck: Normal range of motion. Cardiovascular: Normal rate, regular rhythm, S1 normal, S2 normal, normal heart sounds, intact distal pulses and normal pulses. No extrasystoles are present. Exam reveals no gallop. No murmur heard. Pulmonary/Chest: Effort normal and breath sounds normal. She has no wheezes. She has no rales. She exhibits no tenderness. Abdominal: Soft. Bowel sounds are normal. She exhibits no distension and no mass. There is no splenomegaly or hepatomegaly. There is no abdominal tenderness. Musculoskeletal: Normal range of motion. General: No tenderness, deformity or edema. Neurological: She is alert and oriented to person, place, and time. She has normal motor skills and normal reflexes. Gait normal.   Skin: Skin is warm and dry.            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 effects of CVA (cerebrovascular accident)    S/P PTCA (percutaneous transluminal coronary angioplasty)    Transient cerebral ischemia    Monitoring for long-term anticoagulant use    Chronic combined systolic and diastolic congestive heart failure (HCC)    Other transient cerebral ischemic attacks and related syndromes    Cerebrovascular accident (CVA) ([address]    Current use of long term anticoagulation    Syncope    Spinal stenosis in cervical region    Lumbar degenerative disc disease    Spinal stenosis, lumbar region, with neurogenic claudication    Abnormality of gait and mobility due to  NTSCI with Impaired Mobility and ADL's secondary to Cervical Myelopathy and Vestibular neuronitis with rehab admission 06/24/20.  Generalized muscle ache    Generalized osteoarthrosis, involving multiple sites    Vestibular neuronitis of both ears    Dizziness    SCC (squamous cell carcinoma), arm    Type 2 diabetes mellitus (HCC)    Obesity (BMI 30-39. 9)    [place] (hard of hearing)    DJD (degenerative joint disease), lumbar    History of PTCA    Uncontrolled type 2 diabetes mellitus with hyperglycemia ([address]           No orders of the defined types were placed in this encounter. No orders of the defined types were placed in this encounter. Assessment:    1. CAD S/P percutaneous coronary angioplasty    2. History of non-ST elevation myocardial infarction (NSTEMI)    3. 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 Diastolic dysfunction         Plan:     Stay on same medications. See me in 3 months. This note was partially generated using Dragon voice recognition system, and there may be some incorrect words, spellings, punctuation that were not noticed in checking the note before saving.         Electronically signed by Jose Alejandro Ochoa MD on 9/22/2020 at 2:16 PM

## 2020-09-22 NOTE — PROGRESS NOTES
Left vm for Dieter Hemming with normal results, her mom will stay on the same dose and recheck in one month.

## 2020-09-29 ENCOUNTER — TELEPHONE (OUTPATIENT)
Dept: INTERNAL MEDICINE | Age: 85
End: 2020-09-29

## 2020-09-29 NOTE — TELEPHONE ENCOUNTER
[facility] needs a order for \"sudden change in hearing\" faxed to [phone]. Insurance will pay if order reads \"sudden change in hearing\".   Please advise

## 2020-10-01 NOTE — TELEPHONE ENCOUNTER
Only thing I can see as a diagnosis was change in hearing - which is what I put as diagnosis, I am assuming this is for audiology? So I placed It as an external audiology eval with diagnosis of change in hearing. Hopefully that should suffice. Thank you!     Ronaldo Chauhan MD

## 2020-10-12 RX ORDER — FUROSEMIDE 20 MG/1
20 TABLET ORAL DAILY
Qty: 90 TABLET | Refills: 1 | Status: SHIPPED | OUTPATIENT
Start: 2020-10-12 | End: 2021-03-29

## 2020-10-12 NOTE — TELEPHONE ENCOUNTER
Requesting medication refill.  Please approve or deny this request.    Rx requested:  Requested Prescriptions     Pending Prescriptions Disp Refills    furosemide (LASIX) 20 MG tablet [Pharmacy Med Name: FUROSEMIDE 20 MG TABLET] 90 tablet 1     Sig: Take 1 tablet by mouth daily       Last Office Visit, reason seen and by who:   9/15/2020  Follow-up from Hospital  Dr. Allen Sow: Ede Santana CONTACTED FOR A FOLLOW UP APPT: YES OR NO    See below    Next Visit Date:  Future Appointments   Date Time Provider José Manuel Adamson   10/26/2020 10:45 AM Wes Paiz MD [facility]   12/14/2020  1:15 PM Gaurang Last MD [facility]   12/22/2020  1:30 PM Chauncy Holter, MD [id number] [id number]   6/30/2021 11:00 AM Wes Paiz MD [facility]

## 2020-10-19 ENCOUNTER — TELEPHONE (OUTPATIENT)
Dept: CARDIOLOGY CLINIC | Age: 85
End: 2020-10-19

## 2020-10-19 NOTE — TELEPHONE ENCOUNTER
PATIENT'S  CALLED OFFICE REQUESTING SAMPLES OF ENTRESTO 24/26 MG TO BE PICKED UP AT THE [facility] OFFICE    PLEASE CONTACT PATIENT WHEN SAMPLES READY FOR

## 2020-10-20 ENCOUNTER — ANTI-COAG VISIT (OUTPATIENT)
Dept: INTERNAL MEDICINE | Age: 85
End: 2020-10-20
Payer: MEDICARE

## 2020-10-20 DIAGNOSIS — Z86.73 HISTORY OF CVA (CEREBROVASCULAR ACCIDENT): ICD-10-CM

## 2020-10-20 DIAGNOSIS — Z79.01 CURRENT USE OF LONG TERM ANTICOAGULATION: ICD-10-CM

## 2020-10-20 LAB
INR BLD: 2.8
PROTHROMBIN TIME: 29.7 SEC (ref 12.3–14.9)

## 2020-10-20 PROCEDURE — 93793 ANTICOAG MGMT PT WARFARIN: CPT | Performed by: FAMILY MEDICINE

## 2020-10-27 ENCOUNTER — OFFICE VISIT (OUTPATIENT)
Dept: INTERNAL MEDICINE | Age: 85
End: 2020-10-27
Payer: MEDICARE

## 2020-10-27 VITALS
SYSTOLIC BLOOD PRESSURE: 136 MMHG | BODY MASS INDEX: 32.89 KG/M2 | OXYGEN SATURATION: 98 % | DIASTOLIC BLOOD PRESSURE: 84 MMHG | WEIGHT: 185.6 LBS | HEIGHT: 63 IN | TEMPERATURE: 96.6 F | HEART RATE: 89 BPM

## 2020-10-27 DIAGNOSIS — E78.5 HYPERLIPIDEMIA, UNSPECIFIED HYPERLIPIDEMIA TYPE: ICD-10-CM

## 2020-10-27 DIAGNOSIS — I10 ESSENTIAL HYPERTENSION: ICD-10-CM

## 2020-10-27 DIAGNOSIS — N39.0 RECURRENT UTI (URINARY TRACT INFECTION): ICD-10-CM

## 2020-10-27 LAB
ALBUMIN SERPL-MCNC: 4.2 G/DL (ref 3.5–4.6)
ALP BLD-CCNC: 56 U/L (ref 40–130)
ALT SERPL-CCNC: 9 U/L (ref 0–33)
ANION GAP SERPL CALCULATED.3IONS-SCNC: 16 MEQ/L (ref 9–15)
AST SERPL-CCNC: 16 U/L (ref 0–35)
BILIRUB SERPL-MCNC: 0.3 MG/DL (ref 0.2–0.7)
BUN BLDV-MCNC: 34 MG/DL (ref 8–23)
CALCIUM SERPL-MCNC: 9.5 MG/DL (ref 8.5–9.9)
CHLORIDE BLD-SCNC: 101 MEQ/L (ref 95–107)
CHOLESTEROL, TOTAL: 214 MG/DL (ref 0–199)
CO2: 22 MEQ/L (ref 20–31)
CREAT SERPL-MCNC: 1.08 MG/DL (ref 0.5–0.9)
GFR AFRICAN AMERICAN: 57.2
GFR NON-AFRICAN AMERICAN: 47.3
GLOBULIN: 2.8 G/DL (ref 2.3–3.5)
GLUCOSE BLD-MCNC: 142 MG/DL (ref 70–99)
HBA1C MFR BLD: 7.4 %
LDL CHOLESTEROL DIRECT: 142 MG/DL (ref 0–129)
POTASSIUM SERPL-SCNC: 4 MEQ/L (ref 3.4–4.9)
SODIUM BLD-SCNC: 139 MEQ/L (ref 135–144)
TOTAL PROTEIN: 7 G/DL (ref 6.3–8)

## 2020-10-27 PROCEDURE — G8427 DOCREV CUR MEDS BY ELIG CLIN: HCPCS | Performed by: FAMILY MEDICINE

## 2020-10-27 PROCEDURE — 4040F PNEUMOC VAC/ADMIN/RCVD: CPT | Performed by: FAMILY MEDICINE

## 2020-10-27 PROCEDURE — 99214 OFFICE O/P EST MOD 30 MIN: CPT | Performed by: FAMILY MEDICINE

## 2020-10-27 PROCEDURE — 1123F ACP DISCUSS/DSCN MKR DOCD: CPT | Performed by: FAMILY MEDICINE

## 2020-10-27 PROCEDURE — G8417 CALC BMI ABV UP PARAM F/U: HCPCS | Performed by: FAMILY MEDICINE

## 2020-10-27 PROCEDURE — G8484 FLU IMMUNIZE NO ADMIN: HCPCS | Performed by: FAMILY MEDICINE

## 2020-10-27 PROCEDURE — 1090F PRES/ABSN URINE INCON ASSESS: CPT | Performed by: FAMILY MEDICINE

## 2020-10-27 PROCEDURE — 36415 COLL VENOUS BLD VENIPUNCTURE: CPT | Performed by: FAMILY MEDICINE

## 2020-10-27 PROCEDURE — 3051F HG A1C>EQUAL 7.0%<8.0%: CPT | Performed by: FAMILY MEDICINE

## 2020-10-27 PROCEDURE — 1036F TOBACCO NON-USER: CPT | Performed by: FAMILY MEDICINE

## 2020-10-27 PROCEDURE — 83036 HEMOGLOBIN GLYCOSYLATED A1C: CPT | Performed by: FAMILY MEDICINE

## 2020-10-27 RX ORDER — ISOSORBIDE MONONITRATE 30 MG/1
30 TABLET, EXTENDED RELEASE ORAL DAILY
Qty: 90 TABLET | Refills: 1 | Status: SHIPPED | OUTPATIENT
Start: 2020-10-27 | End: 2021-04-29 | Stop reason: ALTCHOICE

## 2020-10-27 RX ORDER — CHOLECALCIFEROL (VITAMIN D3) 125 MCG
CAPSULE ORAL
COMMUNITY
Start: 2020-09-15 | End: 2021-10-19 | Stop reason: SDUPTHER

## 2020-10-27 NOTE — PROGRESS NOTES
Patient: Sean Oseguera    YOB: 1927    Date: 10/27/20       Patient Active Problem List    Diagnosis Date Noted    History of ST elevation myocardial infarction (STEMI)      Priority: High    CKD (chronic kidney disease) 03/18/2015     Priority: High     Overview Note:     Stage G3a A2      HLD (hyperlipidemia) 03/18/2015     Priority: High    HTN (hypertension) 11/29/2011     Priority: High    Diabetes mellitus 11/29/2011     Priority: High    Vitamin D deficiency 03/18/2015     Priority: Low    SI (stress incontinence), female 05/29/2012     Priority: Low    Osteoarthritis 05/29/2012     Priority: Low    Uncontrolled type 2 diabetes mellitus with hyperglycemia (HCC)     DJD (degenerative joint disease), lumbar 06/24/2020    History of PTCA 06/24/2020    Vestibular neuronitis of both ears 06/22/2020    Dizziness     Spinal stenosis in cervical region 06/21/2020    Lumbar degenerative disc disease 06/21/2020    Spinal stenosis, lumbar region, with neurogenic claudication 06/21/2020    Abnormality of gait and mobility due to  NTSCI with Impaired Mobility and ADL's secondary to Cervical Myelopathy and Vestibular neuronitis with rehab admission 06/24/20. 06/21/2020     Overview Note:     80-year old female who presented to the ER with complaints of weakness, nausea, vomiting, and dizziness. CT Head 06/18/20 with no acute intracranial process. The dizziness came on suddenly the night prior to admission and had persisted. In the ER her blood pressure was in the 200's and her blood glucose was elevated at 250. Blood pressure responded with hydralazine. Repeat CT Head 06/19/20 with no change. CT Abdomen/Pelvis 06/19/20 shows borderline pelviectasis in left kidney. No acute pathology. Carotid Duplex 06/19/20 shows 50-69% Right ICA stenosis and 50-69% Left ICA stenosis.   Seen by neurology and a diagnosis of vestibular neuronitis was made, started on Antivert with improvement in the Sensitive <=0.25 mcg/mL    gentamicin Sensitive <=1 mcg/mL    nitrofurantoin Resistant 256 mcg/mL    trimethoprim-sulfamethoxazole Sensitive <=20 mcg/mL            Diastolic dysfunction 51/47/4930    Valvular heart disease 03/07/2018    Hypercalcemia 01/15/2018     Overview Note:     kami Tripathi      Lumbar spinal stenosis 12/22/2016    Chronic low back pain 08/17/2016    Eczematous dermatitis     SCC (squamous cell carcinoma), arm 2013     Overview Note:     right upper arm, 2015 right forarm      Type 2 diabetes mellitus ([facility] [address] 2013    Obesity (BMI 30-39.9) 2013     Overview Note:     BMI: 32.7      [place] (hard of hearing) 2013     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 SCC (squamous cell carcinoma), arm 2013    right upper arm, 2015 right forarm    SI (stress incontinence), female 5/29/2012    Type II or unspecified type diabetes mellitus without mention of complication, not stated as uncontrolled     Whooping cough      Past Surgical History:   Procedure Laterality Date    ANKLE SURGERY      broken left ankle.     APPENDECTOMY      BREAST BIOPSY      x2 left breast    CATARACT REMOVAL  2004    bilateral    CORONARY ANGIOPLASTY WITH STENT PLACEMENT  01/2019    CYSTOCELE REPAIR      EYE SURGERY      bilateral cataract    HYSTERECTOMY      complete at age 39    RECTOCELE REPAIR      TONSILLECTOMY      as a child     Social History     Socioeconomic History    Marital status:      Spouse name: Not on file    Number of children: Not on file    Years of education: Not on file    Highest education level: Not on file   Occupational History    Not on file   Social Needs    Financial resource strain: Not very hard    Food insecurity     Worry: Never true     Inability: Never true    Transportation needs     Medical: No     Non-medical: No   Tobacco Use    Smoking status: Never Smoker    Smokeless tobacco: Never Used   Substance and Sexual Activity    Alcohol use: No     Alcohol/week: 0.0 standard drinks    Drug use: No    Sexual activity: Not on file   Lifestyle    Physical activity     Days per week: Not on file     Minutes per session: Not on file    Stress: Not on file   Relationships    Social connections     Talks on phone: More than three times a week     Gets together: More than three times a week     Attends Church service: 1 to 4 times per year     Active member of club or organization: No     Attends meetings of clubs or organizations: Never     Relationship status:     Intimate partner violence     Fear of current or ex partner: No     Emotionally abused: No     Physically abused: No     Forced sexual activity: No   Other Topics Concern    Not on file   Social History Narrative         Lives With: Son    Type of Home: [id number] [facility] [address]: Two level, Able to Live on Main level with bedroom/bathroom, Performs ADL's on one level    Home Access: Stairs to enter with rails    Entrance Stairs - Number of Steps:  Threshold    Bathroom Shower/Tub: Tub/Shower unit    Bathroom Toilet: Handicap height    Bathroom Equipment: Grab bars in shower, Grab bars around toilet, Hand-held shower, Shower chair    Bathroom Accessibility: Accessible    Home Equipment: Rolling walker, 4 wheeled walker (Usually uses rollator) ADL Assistance: Independent    Homemaking Assistance: Needs assistance (Son manages housework)    Homemaking Responsibilities: No    Ambulation Assistance: Independent (Rollator)    Transfer Assistance: Independent    Active : No    Patient's  Info: Sons          Family History   Problem Relation Age of Onset    Diabetes Mother     Heart Disease Father      Current Outpatient Medications on File Prior to Visit   Medication Sig Dispense Refill    Lactobacillus (PROBIOTIC ACIDOPHILUS) CAPS take 1 capsule by mouth once daily      furosemide (LASIX) 20 MG tablet Take 1 tablet by mouth daily 90 tablet 1    cephALEXin (KEFLEX) 250 MG capsule Take 250 mg by mouth daily      Probiotic Product (PROBIOTIC DAILY) CAPS Take 1 tablet by mouth daily 90 capsule 3    ondansetron (ZOFRAN ODT) 4 MG disintegrating tablet Take 1 tablet by mouth every 8 hours as needed for Nausea 10 tablet 0    traMADol (ULTRAM) 50 MG tablet Take 1 tablet by mouth nightly as needed for Pain for up to 90 days.  (Patient taking differently: Take 25 mg by mouth nightly as needed for Pain. ) 30 tablet 1    fluticasone (FLONASE) 50 MCG/ACT nasal spray 1 spray by Each Nostril route daily 1 Bottle 0    warfarin (COUMADIN) 5 MG tablet take 1 tablet by mouth once daily 90 tablet 1    hydrALAZINE (APRESOLINE) 50 MG tablet Take 1 tablet by mouth 3 times daily (Patient taking differently: Take 50 mg by mouth 2 times daily ) 90 tablet 3    isosorbide mononitrate (IMDUR) 30 MG extended release tablet Take 1 tablet by mouth daily 30 tablet 3    carvedilol (COREG) 12.5 MG tablet take 1 tablet by mouth twice a day with meals 180 tablet 3    ferrous sulfate (IRON 325) 325 (65 Fe) MG tablet Take 325 mg by mouth Daily with lunch      warfarin (COUMADIN) 6 MG tablet Take 6 mg by mouth daily       sacubitril-valsartan (ENTRESTO) 24-26 MG per tablet Take 1 tablet by mouth 2 times daily 60 tablet 0    metFORMIN (GLUCOPHAGE) 1000 MG tablet take 1 tablet by mouth twice a day with meals 180 tablet 1    clopidogrel (PLAVIX) 75 MG tablet Take 1 tablet by mouth daily 90 tablet 3    blood glucose monitor kit and supplies Test 2 times a day & as needed for symptoms of irregular blood glucose. Freestyle Freedom Lite 1 kit 0    Lancets MISC 1 each by Does not apply route 2 times daily Test 2 times a day & as needed for symptoms of irregular blood glucose. Freestyle Freedom Lite 100 each 5    blood glucose monitor strips Test 2 times a day & as needed for symptoms of irregular blood glucose. Freestyle Freedom Lite 200 strip 3    warfarin (COUMADIN) 7.5 MG tablet Take 1 tablet by mouth daily (Patient taking differently: Take 7 mg by mouth Tuesday,Saturday, & Sunday when at home.) 30 tablet 3    TRUEPLUS LANCETS 28G MISC TEST TWO TIMES DAILY 200 each 3    Cholecalciferol (VITAMIN D) 2000 units CAPS capsule Take 2,000 Units by mouth      TRUE METRIX BLOOD GLUCOSE TEST strip TEST two to three times a day 200 strip 5    Needles & Syringes MISC 1 each by Does not apply route daily 50 each 0    Incontinence Supply Disposable (DEPEND UNDERGARMENTS) MISC 1 each by Does not apply route nightly 1 Package 11    Blood Glucose Monitoring Suppl (TRUE METRIX AIR GLUCOSE METER) W/DEVICE KIT       Blood Glucose Monitoring Suppl PRUDENCIO Dx: E11.9 1 Device 0     No current facility-administered medications on file prior to visit.       Allergies   Allergen Reactions    Metoclopramide     Pantoprazole Other (See Comments)    Pcn [Penicillins]      Tolerates rocephin    Protonix [Pantoprazole Sodium]        Chief Complaint   Patient presents with    Hypertension    Hyperlipidemia    Discuss Medications     does she need to stay on isosorbide, if so replaced    Blood Sugar Problem     elevated to 170 to 180 for the past week        HPI:  Treatment Adherence:   Medication compliance:  compliant all of the time  Diet compliance:  compliant most of the time  Current exercise: no regular exercise    Diabetes Mellitus Type 2: Current symptoms/problems include none. Home blood sugar records:  fasting range: 170-180  Any episodes of hypoglycemia? no  Tobacco history: She  reports that she has never smoked. She has never used smokeless tobacco.   Daily Aspirin? Yes  Have you had yourannual diabetic retinal (eye) exam? Yes - Records Obtained    Hypertension:  Home blood pressure monitoring: No.  Sheis adherent to a low sodium diet. Patient denies chest pain. Antihypertensive medication side effects: nomedication side effects noted. Use of agents associated with hypertension: none. Hyperlipidemia:  No new myalgias or GI upset on not on anything . Lab Results   Component Value Date    LABA1C 7.4 10/27/2020    LABA1C 8.2 (H) 06/20/2020    LABA1C 7.8 01/14/2020     Lab Results   Component Value Date    LABMICR 33.10 (H) 01/16/2020    CREATININE 1.32 (H) 07/02/2020     Lab Results   Component Value Date    ALT 14 06/19/2020    AST 11 06/19/2020     Lab Results   Component Value Date    CHOL 137 01/14/2020    TRIG 74 04/10/2019    HDL 52 04/10/2019    LDLCALC 68 04/10/2019    LDLDIRECT 66 01/14/2020        Diabetes and Hypertension Visit Information    BP Readings from Last 3 Encounters:   10/27/20 136/84   09/22/20 126/82   09/15/20 128/70          Have you seen any other physician or provider since your last visit? Yes - Records Requested  Have you had any other diagnostic tests since your last visit? Yes - Records Requested  Have you been seen in the emergency room and/or had an admission to a hospital since we last saw you?  No    Patient Care Team:  Marian Banda MD as PCP - General (Family Medicine)  Marian Banda MD as PCP - [facility] Empaneled Provider  DO Angelita Newell MD (Internal Medicine)           Health Maintenance   Topic Date Due    Shingles Vaccine (1 of 2) 01/14/2021 (Originally 6/10/1977)    Annual Wellness Visit (AWV) 06/19/2021    Potassium monitoring  07/02/2021    Creatinine monitoring  07/02/2021    DTaP/Tdap/Td vaccine (2 - Td) 02/11/2027    Flu vaccine  Completed    Pneumococcal 65+ years Vaccine  Completed    Hepatitis A vaccine  Aged Out    Hib vaccine  Aged Out    Meningococcal (ACWY) vaccine  Aged Out     Back pain, OA pain, DDD  Previous MRI reviewed  Had back injection with pain management in 2015 - has not seen them since  Did not tolerate Neurontin  Currently being controlled with tylenol + ultram  Seen neuro    Fall with hospitalization in sept 2020  Was seen by pain management specialist and placed on 1/2 tab ultram  Has been doing well on this  Would like to continue for pain control    CAP s/p PTCA, hx of NSTEMI 11/18, hx of CVA, Diastolic dysfunction, valvular disease  Seeing cardiology   Has had labile BP  Left heart cath done with STENT placement.   99% proximal LAD, mild disease of CX and RCA, EF of 25-30% December 2018  angioplasty stent to proximal LAD mid LAD at 50% stenosis ejection fraction is 25%       Recurrent UTI's  Started on macrobid for UTI prophylaxis on 8/6/17  Has been taking Abx as prescribed, no med SE's  One-2 UTI's since starting medication  Has been seen by urology and ID, agreed to continue macrodantin 100 mg daily and increased dose to 100 mg from 50 mg  Then switched to keflex due to continues UTI's     Hypercalcemia  No longer seeing   Calcium trending down   Lab Results   Component Value Date    CALCIUM 9.5 10/27/2020    PHOS 3.4 01/14/2020       CVA 11/18, long term anticoagulation was started   Seeing neurology /Nolberto  Currently on coumadin and Plavix only     Hypersensitivity eruption with eczema  was seeing  with [facility]  No current skin changes or symptoms     CKD:  Has had increased Cr/GFR for >6 months  Has been stable  No symptoms from it  No nephrology referral in the past  CKD work up 6/2015 was WNL        Anemia  stable    Review of Systems   Constitutional: Negative for activity change, appetite change, chills, diaphoresis, fever and unexpected weight change. Respiratory: Negative for apnea, chest tightness, shortness of breath, wheezing and stridor. Cardiovascular: Negative for chest pain and palpitations. Physical Exam  Vitals:    10/27/20 1208   BP: 136/84   Pulse: 89   Temp: 96.6 °F (35.9 °C)   SpO2: 98%   Body mass index is 32.88 kg/m². Wt Readings from Last 3 Encounters:   10/27/20 185 lb 9.6 oz (84.2 kg)   09/15/20 185 lb (83.9 kg)   09/09/20 185 lb (83.9 kg)       Physical Exam  Constitutional:  Appears well-developed and well-nourished. No distress. HENT:   Head: Normocephalic and atraumatic. Right Ear: External ear normal.   Left Ear: External ear normal.   Nose: Nose normal.   Eyes: Conjunctivae and EOM are normal.  Right eye exhibits no discharge. Left eye exhibits no discharge. No scleral icterus. Neck: Normal range of motion. Cardiovascular: Normal rate, regular rhythm and normal heart sounds. Pulmonary/Chest: Effort normal and breath sounds normal. No respiratory distress. no wheezes. no rales. no tenderness. Musculoskeletal: Normal range of motion. Neurological: alert. Skin: not diaphoretic. Psychiatric: normal mood and affect. behavior is normal. Judgment and thought content normal.   Nursing note and vitals reviewed. Assessment/Plan:  Myrtle Galaviz was seen today for hypertension, hyperlipidemia, discuss medications and blood sugar problem. Diagnoses and all orders for this visit:    Type 2 diabetes mellitus with complication, without long-term current use of insulin (HCC)  -     POCT glycosylated hemoglobin (Hb A1C)  Stable, controlled  Plan: continue current medications    Essential hypertension  -     isosorbide mononitrate (IMDUR) 30 MG extended release tablet; Take 1 tablet by mouth daily  -     Comprehensive Metabolic Panel;  Future  Stable, controlled  Plan: continue current medications    Hyperlipidemia, unspecified hyperlipidemia type  -     Cholesterol, Total; Future  -     LDL Cholesterol, Direct; Future  Stable, controlled  Plan: continue current medications    Recurrent UTI (urinary tract infection)-Raoultella resistant to Macrobid  -     Culture, Urine; Future  Stable, controlled  Plan: continue current medications    Cerebrovascular accident (CVA), unspecified mechanism ([facility] [address]  Chronic combined systolic and diastolic congestive heart failure ([facility] [address]  History of ST elevation myocardial infarction (STEMI)  Diastolic dysfunction  Valvular heart disease  Cont f/u cards  CV (cardiovascular) risk reduction discussed  - BP control  - sugar control  - cardio/exercise  - meds: statin/antiplatelet/beta blocker use  - no tobacco use  - cut alcohol use    Chronic kidney disease, unspecified CKD stage  Stable    Spinal stenosis of lumbar region without neurogenic claudication  Chronic low back pain, unspecified back pain laterality, unspecified whether sciatica present  Osteoarthritis of lumbar spine, unspecified spinal osteoarthritis complication status  Stable, controlled  Plan: continue current medications          Orders Placed This Encounter   Procedures    Culture, Urine     Standing Status:   Future     Number of Occurrences:   1     Standing Expiration Date:   10/27/2021     Order Specific Question:   Specify (ex-cath, midstream, cysto, etc)? Answer:   midstream    Comprehensive Metabolic Panel     Standing Status:   Future     Number of Occurrences:   1     Standing Expiration Date:   1/27/2021    Cholesterol, Total     Standing Status:   Future     Number of Occurrences:   1     Standing Expiration Date:   10/27/2021     Order Specific Question:   Is Patient Fasting? Answer:   no     Order Specific Question:   No of Hours?      Answer:   n/a    LDL Cholesterol, Direct     Standing Status:   Future     Number of Occurrences:   1 Standing Expiration Date:   10/27/2021    POCT glycosylated hemoglobin (Hb A1C)         Return in about 6 months (around 4/27/2021) for Diabetes, Hypertension, Hyperlipidemia.

## 2020-10-29 LAB — URINE CULTURE, ROUTINE: NORMAL

## 2020-11-01 ASSESSMENT — ENCOUNTER SYMPTOMS
STRIDOR: 0
SHORTNESS OF BREATH: 0
WHEEZING: 0
APNEA: 0
CHEST TIGHTNESS: 0

## 2020-11-02 RX ORDER — HYDRALAZINE HYDROCHLORIDE 50 MG/1
TABLET, FILM COATED ORAL
Qty: 90 TABLET | Refills: 5 | Status: SHIPPED | OUTPATIENT
Start: 2020-11-02 | End: 2020-12-09 | Stop reason: ALTCHOICE

## 2020-11-10 ENCOUNTER — TELEPHONE (OUTPATIENT)
Dept: CARDIOLOGY CLINIC | Age: 85
End: 2020-11-10

## 2020-11-10 NOTE — TELEPHONE ENCOUNTER
Patient's  requesting samples of Entresto 24/26 mg to pick  Up in [place] office next Tuesday 11/17. Contact when ready for .

## 2020-11-14 ENCOUNTER — TELEPHONE (OUTPATIENT)
Dept: INTERNAL MEDICINE | Age: 85
End: 2020-11-14
Payer: MEDICARE

## 2020-11-14 PROCEDURE — 81002 URINALYSIS NONAUTO W/O SCOPE: CPT | Performed by: FAMILY MEDICINE

## 2020-11-16 DIAGNOSIS — N39.0 RECURRENT UTI (URINARY TRACT INFECTION): ICD-10-CM

## 2020-11-16 LAB
BILIRUBIN, POC: ABNORMAL
BLOOD URINE, POC: ABNORMAL
CLARITY, POC: ABNORMAL
COLOR, POC: YELLOW
GLUCOSE URINE, POC: ABNORMAL
KETONES, POC: ABNORMAL
LEUKOCYTE EST, POC: ABNORMAL
NITRITE, POC: ABNORMAL
PH, POC: 6
PROTEIN, POC: ABNORMAL
SPECIFIC GRAVITY, POC: 1.02
UROBILINOGEN, POC: ABNORMAL

## 2020-11-17 DIAGNOSIS — Z79.01 CURRENT USE OF LONG TERM ANTICOAGULATION: ICD-10-CM

## 2020-11-17 DIAGNOSIS — Z86.73 HISTORY OF CVA (CEREBROVASCULAR ACCIDENT): ICD-10-CM

## 2020-11-17 LAB
INR BLD: 3.2
PROTHROMBIN TIME: 32.4 SEC (ref 12.3–14.9)

## 2020-11-18 ENCOUNTER — ANTI-COAG VISIT (OUTPATIENT)
Dept: INTERNAL MEDICINE | Age: 85
End: 2020-11-18
Payer: MEDICARE

## 2020-11-18 PROCEDURE — 93793 ANTICOAG MGMT PT WARFARIN: CPT | Performed by: FAMILY MEDICINE

## 2020-11-19 LAB
ORGANISM: ABNORMAL
URINE CULTURE, ROUTINE: ABNORMAL

## 2020-11-19 RX ORDER — SULFAMETHOXAZOLE AND TRIMETHOPRIM 800; 160 MG/1; MG/1
1 TABLET ORAL 2 TIMES DAILY
Qty: 6 TABLET | Refills: 0 | Status: SHIPPED | OUTPATIENT
Start: 2020-11-19 | End: 2020-11-22

## 2020-11-24 ENCOUNTER — TELEPHONE (OUTPATIENT)
Dept: INTERNAL MEDICINE | Age: 85
End: 2020-11-24

## 2020-11-25 ENCOUNTER — TELEPHONE (OUTPATIENT)
Dept: INTERNAL MEDICINE | Age: 85
End: 2020-11-25

## 2020-11-25 ENCOUNTER — HOSPITAL ENCOUNTER (OUTPATIENT)
Dept: LAB | Age: 85
Discharge: HOME | End: 2020-11-25
Payer: MEDICARE

## 2020-11-25 LAB
INR BLD: 4.7
PROTHROMBIN TIME: 43.7 SEC (ref 12.3–14.9)

## 2020-11-25 PROCEDURE — 36415 COLL VENOUS BLD VENIPUNCTURE: CPT

## 2020-11-25 PROCEDURE — 85610 PROTHROMBIN TIME: CPT

## 2020-11-25 NOTE — TELEPHONE ENCOUNTER
Pt's son called to say he tested positive for covid when he had virtual visit with Kulwant Mclaughlin. He is concerned about Chideo, she did have the sniffles one day, but seems fine now. He is her caregiver and is with her several times a day. He is looking for some advice. Does not want to bring her out for a test if not necessary, but will if Kulwant Mclaughlin thinks he ought to.

## 2020-11-27 NOTE — TELEPHONE ENCOUNTER
If she seems fine, it is ok not to test right now, as long as there is someone checking on her often, as at least daily

## 2020-11-29 ENCOUNTER — HOSPITAL ENCOUNTER (EMERGENCY)
Age: 85
Discharge: ANOTHER HOSPITAL | End: 2020-11-29
Attending: EMERGENCY MEDICINE
Payer: MEDICARE

## 2020-11-29 ENCOUNTER — HOSPITAL ENCOUNTER (INPATIENT)
Age: 85
LOS: 6 days | Discharge: HOME | DRG: 377 | End: 2020-12-05
Attending: INTERNAL MEDICINE | Admitting: INTERNAL MEDICINE
Payer: MEDICARE

## 2020-11-29 ENCOUNTER — APPOINTMENT (OUTPATIENT)
Dept: GENERAL RADIOLOGY | Age: 85
End: 2020-11-29
Payer: MEDICARE

## 2020-11-29 VITALS
BODY MASS INDEX: 32.43 KG/M2 | WEIGHT: 183 LBS | RESPIRATION RATE: 16 BRPM | SYSTOLIC BLOOD PRESSURE: 202 MMHG | HEART RATE: 73 BPM | OXYGEN SATURATION: 97 % | TEMPERATURE: 98.6 F | HEIGHT: 63 IN | DIASTOLIC BLOOD PRESSURE: 68 MMHG

## 2020-11-29 LAB
ALBUMIN SERPL-MCNC: 3.9 G/DL (ref 3.5–4.6)
ALP BLD-CCNC: 76 U/L (ref 40–130)
ALT SERPL-CCNC: 22 U/L (ref 0–33)
ANION GAP SERPL CALCULATED.3IONS-SCNC: 14 MEQ/L (ref 9–15)
AST SERPL-CCNC: 16 U/L (ref 0–35)
BACTERIA: ABNORMAL /HPF
BASOPHILS ABSOLUTE: 0 K/UL (ref 0–0.2)
BASOPHILS RELATIVE PERCENT: 0.3 %
BILIRUB SERPL-MCNC: <0.2 MG/DL (ref 0.2–0.7)
BILIRUBIN URINE: NEGATIVE
BLOOD, URINE: NEGATIVE
BUN BLDV-MCNC: 35 MG/DL (ref 8–23)
CALCIUM SERPL-MCNC: 9.6 MG/DL (ref 8.5–9.9)
CHLORIDE BLD-SCNC: 102 MEQ/L (ref 95–107)
CLARITY: CLEAR
CO2: 21 MEQ/L (ref 20–31)
COLOR: YELLOW
CREAT SERPL-MCNC: 1.21 MG/DL (ref 0.5–0.9)
EOSINOPHILS ABSOLUTE: 0 K/UL (ref 0–0.7)
EOSINOPHILS RELATIVE PERCENT: 0.4 %
EPITHELIAL CELLS, UA: ABNORMAL /HPF
GFR AFRICAN AMERICAN: 50.2
GFR NON-AFRICAN AMERICAN: 41.5
GLOBULIN: 3 G/DL (ref 2.3–3.5)
GLUCOSE BLD-MCNC: 232 MG/DL (ref 70–99)
GLUCOSE URINE: NEGATIVE MG/DL
HCT VFR BLD CALC: 30.9 % (ref 37–47)
HEMOGLOBIN: 10 G/DL (ref 12–16)
HYALINE CASTS: ABNORMAL /LPF (ref 0–5)
INFLUENZA A BY PCR: NEGATIVE
INFLUENZA B BY PCR: NEGATIVE
INR BLD: 2.8
KETONES, URINE: NEGATIVE MG/DL
LACTIC ACID: 2.3 MMOL/L (ref 0.5–2.2)
LEUKOCYTE ESTERASE, URINE: NEGATIVE
LIPASE: 106 U/L (ref 12–95)
LYMPHOCYTES ABSOLUTE: 1 K/UL (ref 1–4.8)
LYMPHOCYTES RELATIVE PERCENT: 16.4 %
MAGNESIUM: 1.6 MG/DL (ref 1.7–2.4)
MCH RBC QN AUTO: 29.7 PG (ref 27–31.3)
MCHC RBC AUTO-ENTMCNC: 32.4 % (ref 33–37)
MCV RBC AUTO: 91.8 FL (ref 82–100)
MONOCYTES ABSOLUTE: 0.5 K/UL (ref 0.2–0.8)
MONOCYTES RELATIVE PERCENT: 9 %
NEUTROPHILS ABSOLUTE: 4.5 K/UL (ref 1.4–6.5)
NEUTROPHILS RELATIVE PERCENT: 73.9 %
NITRITE, URINE: NEGATIVE
PDW BLD-RTO: 15.4 % (ref 11.5–14.5)
PH UA: 5.5 (ref 5–9)
PLATELET # BLD: 229 K/UL (ref 130–400)
POTASSIUM SERPL-SCNC: 4.1 MEQ/L (ref 3.4–4.9)
PROTEIN UA: 100 MG/DL
PROTHROMBIN TIME: 29.5 SEC (ref 12.3–14.9)
RBC # BLD: 3.37 M/UL (ref 4.2–5.4)
SARS-COV-2, NAAT: DETECTED
SODIUM BLD-SCNC: 137 MEQ/L (ref 135–144)
SPECIFIC GRAVITY UA: 1.02 (ref 1–1.03)
TOTAL PROTEIN: 6.9 G/DL (ref 6.3–8)
URINE REFLEX TO CULTURE: ABNORMAL
UROBILINOGEN, URINE: 0.2 E.U./DL
WBC # BLD: 6 K/UL (ref 4.8–10.8)
WBC UA: ABNORMAL /HPF (ref 0–5)

## 2020-11-29 PROCEDURE — 36415 COLL VENOUS BLD VENIPUNCTURE: CPT

## 2020-11-29 PROCEDURE — 6360000002 HC RX W HCPCS: Performed by: EMERGENCY MEDICINE

## 2020-11-29 PROCEDURE — 96375 TX/PRO/DX INJ NEW DRUG ADDON: CPT

## 2020-11-29 PROCEDURE — 1210000000 HC MED SURG R&B

## 2020-11-29 PROCEDURE — 2500000003 HC RX 250 WO HCPCS: Performed by: INTERNAL MEDICINE

## 2020-11-29 PROCEDURE — 6370000000 HC RX 637 (ALT 250 FOR IP): Performed by: INTERNAL MEDICINE

## 2020-11-29 PROCEDURE — 85610 PROTHROMBIN TIME: CPT

## 2020-11-29 PROCEDURE — G0378 HOSPITAL OBSERVATION PER HR: HCPCS

## 2020-11-29 PROCEDURE — 87502 INFLUENZA DNA AMP PROBE: CPT

## 2020-11-29 PROCEDURE — U0002 COVID-19 LAB TEST NON-CDC: HCPCS

## 2020-11-29 PROCEDURE — G0379 DIRECT REFER HOSPITAL OBSERV: HCPCS

## 2020-11-29 PROCEDURE — 71045 X-RAY EXAM CHEST 1 VIEW: CPT

## 2020-11-29 PROCEDURE — 96365 THER/PROPH/DIAG IV INF INIT: CPT

## 2020-11-29 PROCEDURE — 83735 ASSAY OF MAGNESIUM: CPT

## 2020-11-29 PROCEDURE — 2580000003 HC RX 258: Performed by: EMERGENCY MEDICINE

## 2020-11-29 PROCEDURE — 81001 URINALYSIS AUTO W/SCOPE: CPT

## 2020-11-29 PROCEDURE — 80053 COMPREHEN METABOLIC PANEL: CPT

## 2020-11-29 PROCEDURE — 96361 HYDRATE IV INFUSION ADD-ON: CPT

## 2020-11-29 PROCEDURE — 85025 COMPLETE CBC W/AUTO DIFF WBC: CPT

## 2020-11-29 PROCEDURE — 83605 ASSAY OF LACTIC ACID: CPT

## 2020-11-29 PROCEDURE — 96368 THER/DIAG CONCURRENT INF: CPT

## 2020-11-29 PROCEDURE — 83690 ASSAY OF LIPASE: CPT

## 2020-11-29 PROCEDURE — 2580000003 HC RX 258: Performed by: INTERNAL MEDICINE

## 2020-11-29 PROCEDURE — 96367 TX/PROPH/DG ADDL SEQ IV INF: CPT

## 2020-11-29 PROCEDURE — 87040 BLOOD CULTURE FOR BACTERIA: CPT

## 2020-11-29 PROCEDURE — 99284 EMERGENCY DEPT VISIT MOD MDM: CPT

## 2020-11-29 RX ORDER — ACETAMINOPHEN 325 MG/1
650 TABLET ORAL EVERY 6 HOURS PRN
Status: DISCONTINUED | OUTPATIENT
Start: 2020-11-29 | End: 2020-11-29

## 2020-11-29 RX ORDER — ONDANSETRON 2 MG/ML
4 INJECTION INTRAMUSCULAR; INTRAVENOUS EVERY 6 HOURS PRN
Status: DISCONTINUED | OUTPATIENT
Start: 2020-11-29 | End: 2020-12-05 | Stop reason: HOSPADM

## 2020-11-29 RX ORDER — POLYETHYLENE GLYCOL 3350 17 G/17G
17 POWDER, FOR SOLUTION ORAL DAILY PRN
Status: DISCONTINUED | OUTPATIENT
Start: 2020-11-29 | End: 2020-11-29

## 2020-11-29 RX ORDER — ONDANSETRON 2 MG/ML
4 INJECTION INTRAMUSCULAR; INTRAVENOUS EVERY 6 HOURS PRN
Status: DISCONTINUED | OUTPATIENT
Start: 2020-11-29 | End: 2020-11-29

## 2020-11-29 RX ORDER — DEXTROSE MONOHYDRATE 25 G/50ML
12.5 INJECTION, SOLUTION INTRAVENOUS PRN
Status: DISCONTINUED | OUTPATIENT
Start: 2020-11-29 | End: 2020-12-05 | Stop reason: HOSPADM

## 2020-11-29 RX ORDER — CARVEDILOL 12.5 MG/1
12.5 TABLET ORAL 2 TIMES DAILY
Status: DISCONTINUED | OUTPATIENT
Start: 2020-11-29 | End: 2020-12-05 | Stop reason: HOSPADM

## 2020-11-29 RX ORDER — PROMETHAZINE HYDROCHLORIDE 12.5 MG/1
12.5 TABLET ORAL EVERY 6 HOURS PRN
Status: DISCONTINUED | OUTPATIENT
Start: 2020-11-29 | End: 2020-12-05 | Stop reason: HOSPADM

## 2020-11-29 RX ORDER — PROMETHAZINE HYDROCHLORIDE 25 MG/1
12.5 TABLET ORAL EVERY 6 HOURS PRN
Status: DISCONTINUED | OUTPATIENT
Start: 2020-11-29 | End: 2020-11-29

## 2020-11-29 RX ORDER — ACETAMINOPHEN 650 MG/1
650 SUPPOSITORY RECTAL EVERY 6 HOURS PRN
Status: DISCONTINUED | OUTPATIENT
Start: 2020-11-29 | End: 2020-12-05 | Stop reason: HOSPADM

## 2020-11-29 RX ORDER — SODIUM CHLORIDE 0.9 % (FLUSH) 0.9 %
10 SYRINGE (ML) INJECTION PRN
Status: DISCONTINUED | OUTPATIENT
Start: 2020-11-29 | End: 2020-12-05 | Stop reason: HOSPADM

## 2020-11-29 RX ORDER — HYDRALAZINE HYDROCHLORIDE 50 MG/1
50 TABLET, FILM COATED ORAL 3 TIMES DAILY
Status: DISCONTINUED | OUTPATIENT
Start: 2020-11-29 | End: 2020-12-05 | Stop reason: HOSPADM

## 2020-11-29 RX ORDER — DEXTROSE MONOHYDRATE 50 MG/ML
100 INJECTION, SOLUTION INTRAVENOUS PRN
Status: DISCONTINUED | OUTPATIENT
Start: 2020-11-29 | End: 2020-12-05 | Stop reason: HOSPADM

## 2020-11-29 RX ORDER — LABETALOL HYDROCHLORIDE 5 MG/ML
20 INJECTION, SOLUTION INTRAVENOUS EVERY 4 HOURS PRN
Status: DISCONTINUED | OUTPATIENT
Start: 2020-11-29 | End: 2020-12-05 | Stop reason: HOSPADM

## 2020-11-29 RX ORDER — FLUTICASONE PROPIONATE 50 MCG
1 SPRAY, SUSPENSION (ML) NASAL DAILY
Status: DISCONTINUED | OUTPATIENT
Start: 2020-11-29 | End: 2020-12-05 | Stop reason: HOSPADM

## 2020-11-29 RX ORDER — MAGNESIUM SULFATE 1 G/100ML
1 INJECTION INTRAVENOUS ONCE
Status: COMPLETED | OUTPATIENT
Start: 2020-11-29 | End: 2020-11-29

## 2020-11-29 RX ORDER — NICOTINE POLACRILEX 4 MG
15 LOZENGE BUCCAL PRN
Status: DISCONTINUED | OUTPATIENT
Start: 2020-11-29 | End: 2020-11-29

## 2020-11-29 RX ORDER — SODIUM CHLORIDE 9 MG/ML
INJECTION, SOLUTION INTRAVENOUS CONTINUOUS
Status: DISCONTINUED | OUTPATIENT
Start: 2020-11-29 | End: 2020-11-29 | Stop reason: HOSPADM

## 2020-11-29 RX ORDER — NICOTINE POLACRILEX 4 MG
15 LOZENGE BUCCAL PRN
Status: DISCONTINUED | OUTPATIENT
Start: 2020-11-29 | End: 2020-12-05 | Stop reason: HOSPADM

## 2020-11-29 RX ORDER — SODIUM CHLORIDE 0.9 % (FLUSH) 0.9 %
10 SYRINGE (ML) INJECTION EVERY 12 HOURS SCHEDULED
Status: DISCONTINUED | OUTPATIENT
Start: 2020-11-29 | End: 2020-12-05 | Stop reason: HOSPADM

## 2020-11-29 RX ORDER — SODIUM CHLORIDE 0.9 % (FLUSH) 0.9 %
10 SYRINGE (ML) INJECTION EVERY 12 HOURS SCHEDULED
Status: DISCONTINUED | OUTPATIENT
Start: 2020-11-29 | End: 2020-11-29

## 2020-11-29 RX ORDER — POLYETHYLENE GLYCOL 3350 17 G/17G
17 POWDER, FOR SOLUTION ORAL DAILY PRN
Status: DISCONTINUED | OUTPATIENT
Start: 2020-11-29 | End: 2020-12-05 | Stop reason: HOSPADM

## 2020-11-29 RX ORDER — ACETAMINOPHEN 650 MG/1
650 SUPPOSITORY RECTAL EVERY 6 HOURS PRN
Status: DISCONTINUED | OUTPATIENT
Start: 2020-11-29 | End: 2020-11-29

## 2020-11-29 RX ORDER — DEXTROSE MONOHYDRATE 50 MG/ML
100 INJECTION, SOLUTION INTRAVENOUS PRN
Status: DISCONTINUED | OUTPATIENT
Start: 2020-11-29 | End: 2020-11-29

## 2020-11-29 RX ORDER — SODIUM CHLORIDE 0.9 % (FLUSH) 0.9 %
3 SYRINGE (ML) INJECTION EVERY 8 HOURS
Status: DISCONTINUED | OUTPATIENT
Start: 2020-11-29 | End: 2020-11-29 | Stop reason: HOSPADM

## 2020-11-29 RX ORDER — SODIUM CHLORIDE 0.9 % (FLUSH) 0.9 %
10 SYRINGE (ML) INJECTION PRN
Status: DISCONTINUED | OUTPATIENT
Start: 2020-11-29 | End: 2020-11-29

## 2020-11-29 RX ORDER — ISOSORBIDE MONONITRATE 30 MG/1
30 TABLET, EXTENDED RELEASE ORAL DAILY
Status: DISCONTINUED | OUTPATIENT
Start: 2020-11-29 | End: 2020-12-05 | Stop reason: HOSPADM

## 2020-11-29 RX ORDER — PHYTONADIONE 10 MG/ML
10 INJECTION, EMULSION INTRAMUSCULAR; INTRAVENOUS; SUBCUTANEOUS ONCE
Status: DISCONTINUED | OUTPATIENT
Start: 2020-11-29 | End: 2020-11-29

## 2020-11-29 RX ORDER — FERROUS SULFATE 325(65) MG
325 TABLET ORAL
Status: DISCONTINUED | OUTPATIENT
Start: 2020-11-29 | End: 2020-12-05 | Stop reason: HOSPADM

## 2020-11-29 RX ORDER — ACETAMINOPHEN 325 MG/1
650 TABLET ORAL EVERY 6 HOURS PRN
Status: DISCONTINUED | OUTPATIENT
Start: 2020-11-29 | End: 2020-12-05 | Stop reason: HOSPADM

## 2020-11-29 RX ORDER — SODIUM CHLORIDE 9 MG/ML
INJECTION, SOLUTION INTRAVENOUS ONCE
Status: DISCONTINUED | OUTPATIENT
Start: 2020-11-29 | End: 2020-11-29

## 2020-11-29 RX ORDER — DEXTROSE MONOHYDRATE 25 G/50ML
12.5 INJECTION, SOLUTION INTRAVENOUS PRN
Status: DISCONTINUED | OUTPATIENT
Start: 2020-11-29 | End: 2020-11-29

## 2020-11-29 RX ADMIN — Medication 10 ML: at 19:20

## 2020-11-29 RX ADMIN — MAGNESIUM SULFATE HEPTAHYDRATE 1 G: 1 INJECTION, SOLUTION INTRAVENOUS at 13:08

## 2020-11-29 RX ADMIN — CARVEDILOL 12.5 MG: 12.5 TABLET, FILM COATED ORAL at 19:23

## 2020-11-29 RX ADMIN — SACUBITRIL AND VALSARTAN 1 TABLET: 24; 26 TABLET, FILM COATED ORAL at 19:23

## 2020-11-29 RX ADMIN — HYDRALAZINE HYDROCHLORIDE 50 MG: 50 TABLET, FILM COATED ORAL at 19:23

## 2020-11-29 RX ADMIN — SODIUM CHLORIDE: 900 INJECTION, SOLUTION INTRAVENOUS at 10:42

## 2020-11-29 RX ADMIN — PHYTONADIONE 10 MG: 10 INJECTION, EMULSION INTRAMUSCULAR; INTRAVENOUS; SUBCUTANEOUS at 11:29

## 2020-11-29 RX ADMIN — LABETALOL HYDROCHLORIDE 20 MG: 5 INJECTION, SOLUTION INTRAVENOUS at 19:45

## 2020-11-29 RX ADMIN — CEFTRIAXONE 1 G: 1 INJECTION, POWDER, FOR SOLUTION INTRAMUSCULAR; INTRAVENOUS at 12:08

## 2020-11-29 ASSESSMENT — ENCOUNTER SYMPTOMS
BACK PAIN: 0
TROUBLE SWALLOWING: 0
ABDOMINAL PAIN: 0
BLOOD IN STOOL: 1
DIARRHEA: 1
COUGH: 1
STRIDOR: 0
SINUS PRESSURE: 0
CHEST TIGHTNESS: 0
VOICE CHANGE: 0
SORE THROAT: 0
CONSTIPATION: 0
EYE DISCHARGE: 0
CHOKING: 0
SHORTNESS OF BREATH: 0
VOMITING: 0
EYE REDNESS: 0
EYE PAIN: 0
WHEEZING: 0
FACIAL SWELLING: 0

## 2020-11-29 NOTE — PROGRESS NOTES
1649: message to dr. Duane Darnell for orders. He stated he wasn't admitting, so message to dr. Lorna Alexis. Dr. Brandon Chávez said dr. Colleen Jesus to see.  Message to dr. Colleen Jesus

## 2020-11-29 NOTE — ED NOTES
Rectal exam completed by MD and was positive for blood in stool. Pt is resting comfortably in room.       Michael Marroquin RN  11/29/20 [id number]

## 2020-11-29 NOTE — PROGRESS NOTES
Case discussed with dr Kilo Neumann regarding admission. covid 19 without hypoxia and change in gfr from 47 to 42. Possible rectal bleed, ?hemorrhoide. Cbc not back.   Awaiting cbc to determine admission needs, treatment plan, etc.

## 2020-11-29 NOTE — ED PROVIDER NOTES
[facility] ED  eMERGENCY dEPARTMENT eNCOUnter      Pt Name: Kael Kumar  MRN: [id number]  [place] 6/10/1927  Date of evaluation: 11/29/2020  Provider: Imer Sandoval MD    [address] [place] [address]       Chief Complaint   Patient presents with    Diarrhea     diarrhea       HISTORY OF PRESENT ILLNESS   (Location/Symptom, Timing/Onset,Context/Setting, Quality, Duration, Modifying Factors, Severity)  Note limiting factors. Kael Kumar is a 80 y.o. female who presents to the emergency department patient with multiple medical issues including history of hypertension diabetes mellitus type 2 renal insufficiency increased cholesterol obesity CHF recurrent UTI coronary disease placement of stent and remote CVA her son is a caregiver who developed coronavirus as per information patient noted to have a blood in the stool low-grade fever patient denies any short of breath or any pain anywhere patient long-term Coumadin therapy history of chronic back pain lumbar spinal stenosis patient is a status post angioplasty hysterectomy repair of rectocele as well as remote appendectomy, as per 2 sons which are both positive for COVID-19 unit caregivers as per son she has intermittent bright-colored blood in the stool no nosebleed no blood in the urine has a low-grade fever off and on along with cough congestion    HPI    NursingNotes were reviewed. REVIEW OF SYSTEMS    (2-9 systems for level 4, 10 or more for level 5)     Review of Systems   Constitutional: Positive for activity change, appetite change, chills, fatigue and fever. HENT: Negative for congestion, drooling, facial swelling, mouth sores, nosebleeds, sinus pressure, sore throat, trouble swallowing and voice change. Eyes: Negative for pain, discharge, redness and visual disturbance. Respiratory: Positive for cough. Negative for choking, chest tightness, shortness of breath, wheezing and stridor.     Cardiovascular: Negative for chest pain, palpitations and leg swelling. Gastrointestinal: Positive for blood in stool and diarrhea. Negative for abdominal pain, constipation and vomiting. Endocrine: Negative for cold intolerance, polyphagia and polyuria. Genitourinary: Negative for dysuria, flank pain, frequency, genital sores and urgency. Musculoskeletal: Negative for back pain, joint swelling, neck pain and neck stiffness. Skin: Negative for pallor and rash. Neurological: Positive for weakness. Negative for tremors, seizures, syncope, numbness and headaches. Hematological: Negative for adenopathy. Does not bruise/bleed easily. Psychiatric/Behavioral: Negative for agitation, behavioral problems, hallucinations and sleep disturbance. The patient is not hyperactive. All other systems reviewed and are negative. Except as noted above the remainder of the review of systems was reviewed and negative.        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 SCC (squamous cell carcinoma), arm 2013    right upper arm, 2015 right forarm    SI (stress incontinence), female 5/29/2012    Type II or unspecified type diabetes mellitus without mention of complication, not stated as uncontrolled     Whooping cough          SURGICALHISTORY       Past Surgical History:   Procedure Laterality Date    ANKLE SURGERY      broken left ankle.  APPENDECTOMY      BREAST BIOPSY      x2 left breast    CATARACT REMOVAL  2004    bilateral    CORONARY ANGIOPLASTY WITH STENT PLACEMENT  01/2019    CYSTOCELE REPAIR      EYE SURGERY      bilateral cataract    HYSTERECTOMY      complete at age 39    [address]      as a child         CURRENT MEDICATIONS       Previous Medications    BLOOD GLUCOSE MONITOR KIT AND SUPPLIES    Test 2 times a day & as needed for symptoms of irregular blood glucose. Freestyle [place] Lite    BLOOD GLUCOSE MONITOR STRIPS    Test 2 times a day & as needed for symptoms of irregular blood glucose. Freestyle [place] Lite    BLOOD GLUCOSE MONITORING SUPPL (TRUE METRIX AIR GLUCOSE METER) W/DEVICE KIT        BLOOD GLUCOSE MONITORING SUPPL PRUDENCIO    Dx: E11.9    CARVEDILOL (COREG) 12.5 MG TABLET    take 1 tablet by mouth twice a day with meals    CEPHALEXIN (KEFLEX) 250 MG CAPSULE    Take 250 mg by mouth daily    CHOLECALCIFEROL (VITAMIN D) 2000 UNITS CAPS CAPSULE    Take 2,000 Units by mouth    CLOPIDOGREL (PLAVIX) 75 MG TABLET    Take 1 tablet by mouth daily    FERROUS SULFATE (IRON 325) 325 (65 FE) MG TABLET    Take 325 mg by mouth Daily with lunch    FLUTICASONE (FLONASE) 50 MCG/ACT NASAL SPRAY    1 spray by Each Nostril route daily    FUROSEMIDE (LASIX) 20 MG TABLET    Take 1 tablet by mouth daily    HYDRALAZINE (APRESOLINE) 50 MG TABLET    take 1 tablet by mouth three times a day    INCONTINENCE SUPPLY DISPOSABLE (DEPEND UNDERGARMENTS) MISC    1 each by Does not apply route nightly    ISOSORBIDE MONONITRATE (IMDUR) 30 MG EXTENDED RELEASE TABLET    Take 1 tablet by mouth daily    LACTOBACILLUS (PROBIOTIC ACIDOPHILUS) CAPS    take 1 capsule by mouth once daily    LANCETS MISC    1 each by Does not apply route 2 times daily Test 2 times a day & as needed for symptoms of irregular blood glucose.   Freestyle [place] Lite    METFORMIN (GLUCOPHAGE) 1000 MG TABLET    take 1 tablet Active member of club or organization: No     Attends meetings of clubs or organizations: Never     Relationship status:     Intimate partner violence     Fear of current or ex partner: No     Emotionally abused: No     Physically abused: No     Forced sexual activity: No   Other Topics Concern    Not on file   Social History Narrative         Lives With: Son    Type of Home: [id number]  [address] [place]: Two level, Able to Live on Main level with bedroom/bathroom, Performs ADL's on one level    Home Access: Stairs to enter with rails    Entrance Stairs - Number of Steps: Threshold    Bathroom Shower/Tub: Tub/Shower unit    Bathroom Toilet: Handicap height    Bathroom Equipment: Grab bars in shower, Grab bars around toilet, Hand-held shower, Shower chair    Bathroom Accessibility: Accessible    Home Equipment: Rolling walker, 4 wheeled walker (Usually uses rollator)    ADL Assistance: [address] [place] [address] Avenue: Needs assistance (Son manages housework)    Homemaking Responsibilities: No    Ambulation Assistance: Independent (Rollator)    Transfer Assistance: Independent    Active : No    Patient's  Info: Sons            SCREENINGS    [place] Coma Scale  Eye Opening: Spontaneous  Best Verbal Response: Oriented  Best Motor Response: Obeys commands  Radha Coma Scale Score: 15 @FLOW([id number])@      PHYSICAL EXAM    (up to 7 for level 4, 8 or more for level 5)     ED Triage Vitals   BP Temp Temp Source Pulse Resp SpO2 Height Weight   11/29/20 1000 11/29/20 1002 11/29/20 1002 11/29/20 1002 11/29/20 1002 11/29/20 1000 11/29/20 1002 11/29/20 1002   (!) 172/70 98.6 °F (37 °C) Oral 73 16 97 % 5' 3\" (1.6 m) 183 lb (83 kg)       Physical Exam  Vitals signs and nursing note reviewed. Constitutional:       General: She is not in acute distress. Appearance: Normal appearance. She is not ill-appearing, toxic-appearing or diaphoretic.       Comments: Alert cooperative patient patient has talking full sentences and has no complaint at this time   HENT:      Head: Normocephalic and atraumatic. Right Ear: Tympanic membrane, ear canal and external ear normal. There is no impacted cerumen. Left Ear: Tympanic membrane, ear canal and external ear normal. There is no impacted cerumen. Nose: Nose normal. No congestion or rhinorrhea. Mouth/Throat:      Mouth: Mucous membranes are moist.      Pharynx: No oropharyngeal exudate or posterior oropharyngeal erythema. Eyes:      General:         Left eye: No discharge. Extraocular Movements: Extraocular movements intact. Pupils: Pupils are equal, round, and reactive to light. Neck:      Musculoskeletal: Normal range of motion and neck supple. No neck rigidity or muscular tenderness. Vascular: No carotid bruit. Cardiovascular:      Rate and Rhythm: Normal rate and regular rhythm. Pulses: Normal pulses. Heart sounds: No murmur. No friction rub. No gallop. Pulmonary:      Effort: No respiratory distress. Breath sounds: No stridor. No wheezing or rhonchi. Chest:      Chest wall: No tenderness. Abdominal:      General: Abdomen is flat. There is no distension. Palpations: There is no mass. Tenderness: There is no abdominal tenderness. There is no left CVA tenderness, guarding or rebound. Hernia: No hernia is present. Comments: Attention: Abdomen patient has no guarding no rebound tenderness   Genitourinary:     Rectum: Guaiac result positive. Comments: Rectal examination performed in the presence of nurse which no external hemorrhoid no stefany bleeding slightly dark stools which are positive for guaiac testing  Musculoskeletal: Normal range of motion. General: No swelling, tenderness or signs of injury. Right lower leg: No edema. Left lower leg: No edema. Lymphadenopathy:      Cervical: No cervical adenopathy.    Skin:     Capillary Refill: Capillary refill takes less than 2 seconds. Coloration: Skin is not pale. Findings: No bruising, erythema, lesion or rash. Neurological:      General: No focal deficit present. Mental Status: She is alert. Cranial Nerves: No cranial nerve deficit. Sensory: No sensory deficit. Motor: No weakness. Coordination: Coordination normal.      Gait: Gait normal.   Psychiatric:         Mood and Affect: Mood normal.         DIAGNOSTIC RESULTS     EKG: All EKG's are interpreted by the Emergency Department Physician who either signs or Co-signsthis chart in the absence of a cardiologist.        RADIOLOGY:   Buddy Ibarra such as CT, Ultrasound and MRI are read by the radiologist. Plain radiographic images are visualized and preliminarily interpreted by the emergency physician with the below findings:        Interpretation per the Radiologist below, if available at the time ofthis note:    XR CHEST PORTABLE   Final Result      Bibasilar atelectasis and/or infiltrate. Cardiomegaly.                   ED BEDSIDE ULTRASOUND:   Performed by ED Physician - none    LABS:  Labs Reviewed   COVID-19 - Abnormal; Notable for the following components:       Result Value    SARS-CoV-2, NAAT DETECTED (*)     All other components within normal limits    Narrative:     Elda VASQUEZ tel. [phone],  covid results called to and read back by Dr. Mike Swan, 11/29/2020 11:07, by [address] - Abnormal; Notable for the following components:    Glucose 232 (*)     BUN 35 (*)     CREATININE 1.21 (*)     GFR Non- 41.5 (*)     GFR  50.2 (*)     All other components within normal limits   LACTIC ACID, PLASMA - Abnormal; Notable for the following components:    Lactic Acid 2.3 (*)     All other components within normal limits   MAGNESIUM - Abnormal; Notable for the following components:    Magnesium 1.6 (*)     All other components within normal limits CBC WITH AUTO DIFFERENTIAL - Abnormal; Notable for the following components:    RBC 3.37 (*)     Hemoglobin 10.0 (*)     Hematocrit 30.9 (*)     MCHC 32.4 (*)     RDW 15.4 (*)     All other components within normal limits   LIPASE - Abnormal; Notable for the following components:    Lipase 106 (*)     All other components within normal limits   URINE RT REFLEX TO CULTURE - Abnormal; Notable for the following components:    Protein,  (*)     All other components within normal limits   PROTIME-INR - Abnormal; Notable for the following components:    Protime 29.5 (*)     All other components within normal limits   MICROSCOPIC URINALYSIS - Abnormal; Notable for the following components:    Bacteria, UA RARE (*)     All other components within normal limits   RAPID INFLUENZA A/B ANTIGENS   CULTURE, BLOOD 1   CULTURE, BLOOD 2   C DIFF TOXIN/ANTIGEN   GASTROINTESTINAL PANEL, MOLECULAR   COVID-19       All other labs were within normal range or not returned as of this dictation. EMERGENCY DEPARTMENT COURSE and DIFFERENTIAL DIAGNOSIS/MDM:   Vitals:    Vitals:    11/29/20 1000 11/29/20 1002 11/29/20 1235   BP: (!) 172/70  (!) 207/75   Pulse:  73    Resp:  16    Temp:  98.6 °F (37 °C)    TempSrc:  Oral    SpO2: 97% 97%    Weight:  183 lb (83 kg)    Height:  5' 3\" (1.6 m)            MDM    CRITICAL CARE TIME   Total Critical Care time was  minutes, excluding separately reportableprocedures. There was a high probability of clinicallysignificant/life threatening deterioration in the patient's condition which required my urgent intervention. ONSULTS:  None    PROCEDURES:  Unless otherwise noted below, none     Procedures    FINAL IMPRESSION      1. COVID-19 virus infection    2. Diarrhea, unspecified type    3. Abnormal coagulation time    4. Dehydration    5. Pneumonia due to COVID-19 virus          DISPOSITION/PLAN   DISPOSITION        PATIENT REFERRED TO:  No follow-up provider specified.     DISCHARGE MEDICATIONS:  New Prescriptions    No medications on file          (Please note that portions of this note were completed with a voice recognition program.  Efforts were made to edit the dictations but occasionally words are mis-transcribed.)    Alice Vincent MD (electronically signed)  Attending Emergency Physician       Alice Vincent MD  11/29/20 8732

## 2020-11-29 NOTE — PROGRESS NOTES
Updated labs and social disposition discussed with dr Kaylene Olmedo. Admit to med/surg at [facility].

## 2020-11-29 NOTE — ED NOTES
Called transfer center, talked to Fatoumata Gomez, she will page the hospitalist at [facility] for Dr. Mike Swan.   Axel Pallas [facility] Ricarda Fantasia  11/29/20 1112

## 2020-11-29 NOTE — ED NOTES
Timothy Lyles patients son can be reached at [phone] and wants updated.       Da Barajas RN  11/29/20 3927

## 2020-11-29 NOTE — H&P
Hospital Medicine  History and Physical    Patient:  Harika Rascon  MRN: [id number]    CHIEF COMPLAINT:      History Obtained From:  Patient, EMR  Primary Care Physician: Kaelyn Monahan MD    HISTORY OF PRESENT ILLNESS:   The patient is a 80 y.o. female with PMH of CAD s/p PCI to LAD, chronic systolic HF with LVEF of 52-22%, CVA on Warfarin, HTN, DM2, CKD3, obesity, recurrent UTIs, hypercalcemia, DDD, OA who presented to [facility] ED with bloody diarrhea, was hypertensive with SBP-202 on arrival, SPO2-97% on RA, no fever, CXR showed bibasilar atelectasis, Hb-10, FOBT was positive, INR-2.8, IV Rocephin, Magnesium and Vit K was administered, transferred to [facility] for further management. 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 SCC (squamous cell carcinoma), arm 2013    right upper arm, 2015 right forarm    SI (stress incontinence), female 5/29/2012    Type II or unspecified type diabetes mellitus without mention of complication, not stated as uncontrolled     Whooping cough        Past Surgical History:      Procedure Laterality Date    ANKLE SURGERY      broken left ankle.     APPENDECTOMY      BREAST BIOPSY      x2 left breast    CATARACT REMOVAL  2004    bilateral    CORONARY ANGIOPLASTY WITH STENT PLACEMENT  01/2019    CYSTOCELE REPAIR      EYE SURGERY bilateral cataract    HYSTERECTOMY      complete at age 40   [address]      as a child       Medications Prior to Admission:    Prior to Admission medications    Medication Sig Start Date End Date Taking?  Authorizing Provider   metFORMIN (GLUCOPHAGE) 1000 MG tablet take 1 tablet by mouth twice a day with meals 11/22/20   Ashleigh Box MD   hydrALAZINE (APRESOLINE) 50 MG tablet take 1 tablet by mouth three times a day 11/2/20   PEDRO LUIS Martin   Lactobacillus (PROBIOTIC ACIDOPHILUS) CAPS take 1 capsule by mouth once daily 9/15/20   Historical Provider, MD   isosorbide mononitrate (IMDUR) 30 MG extended release tablet Take 1 tablet by mouth daily 10/27/20   Ashleigh Box MD   furosemide (LASIX) 20 MG tablet Take 1 tablet by mouth daily 10/12/20   Ashleigh Box MD   cephALEXin (KEFLEX) 250 MG capsule Take 250 mg by mouth daily    Historical Provider, MD   Probiotic Product (PROBIOTIC DAILY) CAPS Take 1 tablet by mouth daily 9/15/20   Ashleigh Box MD   ondansetron (ZOFRAN ODT) 4 MG disintegrating tablet Take 1 tablet by mouth every 8 hours as needed for Nausea 9/9/20   Kaiser Howe MD   fluticasone [facility]) 50 MCG/ACT nasal spray 1 spray by Each Nostril route daily 7/25/20   Deejay Tran PA-C   warfarin (COUMADIN) 5 MG tablet take 1 tablet by mouth once daily 7/17/20   Ashleigh Box MD   carvedilol (COREG) 12.5 MG tablet take 1 tablet by mouth twice a day with meals 6/26/20   Ashleigh Box MD   ferrous sulfate (IRON 325) 325 (65 Fe) MG tablet Take 325 mg by mouth Daily with lunch    Historical Provider, MD   warfarin (COUMADIN) 6 MG tablet Take 6 mg by mouth daily     Historical Provider, MD   sacubitril-valsartan (ENTRESTO) 24-26 MG per tablet Take 1 tablet by mouth 2 times daily 6/12/20   Jorge Montenegro MD   clopidogrel (PLAVIX) 75 MG tablet Take 1 tablet by mouth daily 3/30/20   Ashleigh Box MD   blood glucose monitor kit and supplies Test 2 times a day & as needed for symptoms of irregular blood glucose. Freestyle [place] Lite 11/6/19   Jose Elias Thompson MD   Lancets MISC 1 each by Does not apply route 2 times daily Test 2 times a day & as needed for symptoms of irregular blood glucose. Freestyle [place] Lite 11/6/19   Jose Elias Thompson MD   blood glucose monitor strips Test 2 times a day & as needed for symptoms of irregular blood glucose. Freestyle [place] Lite 11/6/19   Jose Elias Thompson MD   warfarin (COUMADIN) 7.5 MG tablet Take 1 tablet by mouth daily  Patient taking differently: Take 7 mg by mouth Tuesday,Saturday, & Sunday when at home. 8/12/19   PEDRO LUIS Gomez   TRUEPLUS LANCETS 28G MISC TEST TWO TIMES DAILY 4/10/19   Jose Elias Thompson MD   Cholecalciferol (VITAMIN D) 2000 units CAPS capsule Take 2,000 Units by mouth    Historical Provider, MD   TRUE METRIX BLOOD GLUCOSE TEST strip TEST two to three times a day 4/9/18   Jose Elias Thompson MD   Needles & Syringes MISC 1 each by Does not apply route daily 2/19/18   Jose Elias Thompson MD   Incontinence Supply Disposable (DEPEND UNDERGARMENTS) MISC 1 each by Does not apply route nightly 5/1/17   Jose Elias Thompson MD   Blood Glucose Monitoring Suppl (TRUE METRIX AIR GLUCOSE METER) W/DEVICE KIT  4/26/16   Historical Provider, MD   Blood Glucose Monitoring Suppl PRUDENCIO Dx: E11.9 4/27/16   Keke Goldstein DO       Allergies:  Metoclopramide; Pantoprazole; Pcn [penicillins]; and Protonix [pantoprazole sodium]    Social History:   TOBACCO:   reports that she has never smoked. She has never used smokeless tobacco.  ETOH:   reports no history of alcohol use.       Family History:       Problem Relation Age of Onset    Diabetes Mother     Heart Disease Father        REVIEW OF SYSTEMS:  Ten systems reviewed and negative except for stated in HPI    Physical Exam:    Vitals: BP (!) 158/68   Pulse 59   Temp 98.2 °F (36.8 °C) (Oral)   Resp 18   Ht 5' 3\" (1.6 m)   Wt 185 lb 4.8 oz (84.1 kg)   LMP  (LMP Unknown)   SpO2 95%   BMI 32.82 kg/m²   General appearance: alert, appears stated age and cooperative,   Skin: Skin color, texture, turgor normal.   HEENT: Head: Normocephalic, no lesions, without obvious abnormality. Eye: Normal external eye, conjunctiva, lids cornea, DEONTE. Neck: supple, symmetrical, trachea midline  Lungs: clear to auscultation bilaterally  Heart: regular rate and rhythm and S1, S2 normal  Abdomen: soft, BS active  Extremities: no edema  Neurologic: Mental status: Alert, oriented, thought content appropriate     Recent Labs     11/29/20  1039   WBC 6.0   HGB 10.0*        Recent Labs     11/29/20  1039      K 4.1      CO2 21   BUN 35*   CREATININE 1.21*   GLUCOSE 232*   AST 16   ALT 22   BILITOT <0.2   ALKPHOS 76     Troponin T: No results for input(s): TROPONINI in the last 72 hours. ABGs:   Lab Results   Component Value Date    PHART 7.445 11/11/2018    PO2ART 74 11/11/2018    ZPI0YOU 41 11/11/2018     INR:   Recent Labs     11/29/20  1039   INR 2.8     URINALYSIS:  Recent Labs     11/29/20  1109   COLORU Yellow   PHUR 5.5   WBCUA 0-2   BACTERIA RARE*   CLARITYU Clear   SPECGRAV 1.020   LEUKOCYTESUR Negative   UROBILINOGEN 0.2   BILIRUBINUR Negative   BLOODU Negative   GLUCOSEU Negative     -----------------------------------------------------------------   Xr Chest Portable    Result Date: 11/29/2020  Exam: XR CHEST PORTABLE History: Cough and fever Technique: AP portable view of the chest obtained. Comparison: Portable chest radiograph from June 19, 2020 Findings: Heart size is enlarged. Coarsening of the pulmonary interstitium. Patchy and linear bibasilar opacities. No pneumothorax or pleural effusion. No acute osseous normality. Bibasilar atelectasis and/or infiltrate. Cardiomegaly.            Assessment and Plan     80 y.o. female with PMH of CAD s/p PCI to LAD, chronic systolic HF with LVEF of 01-68%, CVA on Warfarin, HTN, DM2, CKD3, obesity, recurrent UTIs, hypercalcemia, DDD, OA who presented to [facility] ED with:    Possible GI bleed  - FOBT was positive per report  - no active bleeding  - Hb at baseline  - INR was reversed with Vit K  - hold warfarin,  - follow H/H    Covid-19 infection  - asymptomatic, on RA  - follow SPO2 level and inflammatory markers    Hypertensive urgency  - with SBP-202 on arrival,   - resumed home meds  - monitor BP    Hypomagnesemia   - replaced    CKD3  - monitor renal function    DM2 with hyperglycemia  - ISS, hypoglycemia protocol    CAD s/p PCI to LAD, chronic systolic HF  - continue home meds    Hx of CVA   - holding Warfarin,      Calista Clay MD  Admitting Hospitalist

## 2020-11-29 NOTE — ED NOTES
Called transfer center, talked to Ben, patient assigned to [place] [id number].   Paige Pemberton  11/29/20 6202

## 2020-11-29 NOTE — ED TRIAGE NOTES
Pt arrives to ED with diarrhea with unknown start date. Pt denies blood in stool but per family member pt does have blood in stool. Per family member she has had a fever as well. No fever upon arrival to ED. Pt denies exposure to anyone with COVID but per son, her other son who organizes her medications was at their house Wednesday and was positive for COVID. Pt placed in isolation.

## 2020-11-30 PROBLEM — K92.2 ACUTE GI BLEEDING: Status: ACTIVE | Noted: 2020-11-30

## 2020-11-30 LAB
ALBUMIN SERPL-MCNC: 3.3 G/DL (ref 3.5–4.6)
ALP BLD-CCNC: 65 U/L (ref 40–130)
ALT SERPL-CCNC: 16 U/L (ref 0–33)
ANION GAP SERPL CALCULATED.3IONS-SCNC: 14 MEQ/L (ref 9–15)
AST SERPL-CCNC: 14 U/L (ref 0–35)
BASOPHILS ABSOLUTE: 0 K/UL (ref 0–0.2)
BASOPHILS RELATIVE PERCENT: 0.4 %
BILIRUB SERPL-MCNC: <0.2 MG/DL (ref 0.2–0.7)
BUN BLDV-MCNC: 22 MG/DL (ref 8–23)
C-REACTIVE PROTEIN: 19.3 MG/L (ref 0–5)
CALCIUM SERPL-MCNC: 8.8 MG/DL (ref 8.5–9.9)
CHLORIDE BLD-SCNC: 99 MEQ/L (ref 95–107)
CO2: 20 MEQ/L (ref 20–31)
CREAT SERPL-MCNC: 1.08 MG/DL (ref 0.5–0.9)
D DIMER: <0.27 MG/L FEU (ref 0–0.5)
EOSINOPHILS ABSOLUTE: 0 K/UL (ref 0–0.7)
EOSINOPHILS RELATIVE PERCENT: 0.2 %
FERRITIN: 150.6 NG/ML (ref 13–150)
GFR AFRICAN AMERICAN: 57.2
GFR NON-AFRICAN AMERICAN: 47.3
GLOBULIN: 2.9 G/DL (ref 2.3–3.5)
GLUCOSE BLD-MCNC: 160 MG/DL (ref 60–115)
GLUCOSE BLD-MCNC: 184 MG/DL (ref 60–115)
GLUCOSE BLD-MCNC: 185 MG/DL (ref 60–115)
GLUCOSE BLD-MCNC: 193 MG/DL (ref 60–115)
GLUCOSE BLD-MCNC: 220 MG/DL (ref 60–115)
GLUCOSE BLD-MCNC: 239 MG/DL (ref 60–115)
GLUCOSE BLD-MCNC: 258 MG/DL (ref 70–99)
HBA1C MFR BLD: 8.4 % (ref 4.8–5.9)
HCT VFR BLD CALC: 29.3 % (ref 37–47)
HEMOGLOBIN: 9.6 G/DL (ref 12–16)
INR BLD: 1.2
LACTIC ACID: 1.9 MMOL/L (ref 0.5–2.2)
LYMPHOCYTES ABSOLUTE: 0.7 K/UL (ref 1–4.8)
LYMPHOCYTES RELATIVE PERCENT: 14.6 %
MCH RBC QN AUTO: 29.7 PG (ref 27–31.3)
MCHC RBC AUTO-ENTMCNC: 32.9 % (ref 33–37)
MCV RBC AUTO: 90.4 FL (ref 82–100)
MONOCYTES ABSOLUTE: 0.3 K/UL (ref 0.2–0.8)
MONOCYTES RELATIVE PERCENT: 6.2 %
NEUTROPHILS ABSOLUTE: 3.7 K/UL (ref 1.4–6.5)
NEUTROPHILS RELATIVE PERCENT: 78.6 %
PDW BLD-RTO: 15.1 % (ref 11.5–14.5)
PERFORMED ON: ABNORMAL
PLATELET # BLD: 245 K/UL (ref 130–400)
POTASSIUM REFLEX MAGNESIUM: 3.9 MEQ/L (ref 3.4–4.9)
PROCALCITONIN: 0.09 NG/ML (ref 0–0.15)
PROTHROMBIN TIME: 15.3 SEC (ref 12.3–14.9)
RBC # BLD: 3.24 M/UL (ref 4.2–5.4)
SODIUM BLD-SCNC: 133 MEQ/L (ref 135–144)
TOTAL PROTEIN: 6.2 G/DL (ref 6.3–8)
VITAMIN D 25-HYDROXY: 29.6 NG/ML (ref 30–100)
WBC # BLD: 4.7 K/UL (ref 4.8–10.8)

## 2020-11-30 PROCEDURE — 86140 C-REACTIVE PROTEIN: CPT

## 2020-11-30 PROCEDURE — 6370000000 HC RX 637 (ALT 250 FOR IP): Performed by: INTERNAL MEDICINE

## 2020-11-30 PROCEDURE — 83036 HEMOGLOBIN GLYCOSYLATED A1C: CPT

## 2020-11-30 PROCEDURE — 85379 FIBRIN DEGRADATION QUANT: CPT

## 2020-11-30 PROCEDURE — 85610 PROTHROMBIN TIME: CPT

## 2020-11-30 PROCEDURE — 36415 COLL VENOUS BLD VENIPUNCTURE: CPT

## 2020-11-30 PROCEDURE — 1210000000 HC MED SURG R&B

## 2020-11-30 PROCEDURE — 85025 COMPLETE CBC W/AUTO DIFF WBC: CPT

## 2020-11-30 PROCEDURE — 2580000003 HC RX 258: Performed by: INTERNAL MEDICINE

## 2020-11-30 PROCEDURE — 82306 VITAMIN D 25 HYDROXY: CPT

## 2020-11-30 PROCEDURE — 84145 PROCALCITONIN (PCT): CPT

## 2020-11-30 PROCEDURE — 83605 ASSAY OF LACTIC ACID: CPT

## 2020-11-30 PROCEDURE — 80053 COMPREHEN METABOLIC PANEL: CPT

## 2020-11-30 PROCEDURE — 82728 ASSAY OF FERRITIN: CPT

## 2020-11-30 RX ORDER — HYDRALAZINE HYDROCHLORIDE 50 MG/1
50 TABLET, FILM COATED ORAL EVERY 8 HOURS PRN
Status: DISCONTINUED | OUTPATIENT
Start: 2020-11-30 | End: 2020-12-05 | Stop reason: HOSPADM

## 2020-11-30 RX ADMIN — SACUBITRIL AND VALSARTAN 1 TABLET: 24; 26 TABLET, FILM COATED ORAL at 09:03

## 2020-11-30 RX ADMIN — FERROUS SULFATE TAB 325 MG (65 MG ELEMENTAL FE) 325 MG: 325 (65 FE) TAB at 15:48

## 2020-11-30 RX ADMIN — Medication 10 ML: at 09:15

## 2020-11-30 RX ADMIN — HYDRALAZINE HYDROCHLORIDE 50 MG: 50 TABLET, FILM COATED ORAL at 09:02

## 2020-11-30 RX ADMIN — CARVEDILOL 12.5 MG: 12.5 TABLET, FILM COATED ORAL at 21:00

## 2020-11-30 RX ADMIN — CARVEDILOL 12.5 MG: 12.5 TABLET, FILM COATED ORAL at 09:03

## 2020-11-30 RX ADMIN — SACUBITRIL AND VALSARTAN 1 TABLET: 24; 26 TABLET, FILM COATED ORAL at 21:00

## 2020-11-30 RX ADMIN — HYDRALAZINE HYDROCHLORIDE 50 MG: 50 TABLET, FILM COATED ORAL at 15:49

## 2020-11-30 RX ADMIN — HYDRALAZINE HYDROCHLORIDE 50 MG: 50 TABLET, FILM COATED ORAL at 21:02

## 2020-11-30 RX ADMIN — ISOSORBIDE MONONITRATE 30 MG: 30 TABLET, EXTENDED RELEASE ORAL at 09:03

## 2020-11-30 RX ADMIN — FLUTICASONE PROPIONATE 1 SPRAY: 50 SPRAY, METERED NASAL at 09:03

## 2020-11-30 RX ADMIN — INSULIN LISPRO 1 UNITS: 100 INJECTION, SOLUTION INTRAVENOUS; SUBCUTANEOUS at 21:03

## 2020-11-30 RX ADMIN — HYDRALAZINE HYDROCHLORIDE 50 MG: 50 TABLET, FILM COATED ORAL at 00:39

## 2020-11-30 ASSESSMENT — PAIN SCALES - GENERAL
PAINLEVEL_OUTOF10: 0
PAINLEVEL_OUTOF10: 0

## 2020-11-30 NOTE — PROGRESS NOTES
Department of Internal Medicine  General Internal Medicine  Attending Progress Note      SUBJECTIVE:  Pt seen and examined.  Resting in bed comfortably with no complaints    OBJECTIVE      Medications    Current Facility-Administered Medications: hydrALAZINE (APRESOLINE) tablet 50 mg, 50 mg, Oral, Q8H PRN  sodium chloride flush 0.9 % injection 10 mL, 10 mL, Intravenous, 2 times per day  sodium chloride flush 0.9 % injection 10 mL, 10 mL, Intravenous, PRN  acetaminophen (TYLENOL) tablet 650 mg, 650 mg, Oral, Q6H PRN **OR** acetaminophen (TYLENOL) suppository 650 mg, 650 mg, Rectal, Q6H PRN  polyethylene glycol (GLYCOLAX) packet 17 g, 17 g, Oral, Daily PRN  promethazine (PHENERGAN) tablet 12.5 mg, 12.5 mg, Oral, Q6H PRN **OR** ondansetron (ZOFRAN) injection 4 mg, 4 mg, Intravenous, Q6H PRN  glucose (GLUTOSE) 40 % oral gel 15 g, 15 g, Oral, PRN  dextrose 50 % IV solution, 12.5 g, Intravenous, PRN  glucagon (rDNA) injection 1 mg, 1 mg, Intramuscular, PRN  dextrose 5 % solution, 100 mL/hr, Intravenous, PRN  insulin lispro (HUMALOG) injection vial 0-6 Units, 0-6 Units, Subcutaneous, TID WC  insulin lispro (HUMALOG) injection vial 0-3 Units, 0-3 Units, Subcutaneous, Nightly  carvedilol (COREG) tablet 12.5 mg, 12.5 mg, Oral, BID  ferrous sulfate (IRON 325) tablet 325 mg, 325 mg, Oral, Lunch  fluticasone (FLONASE) 50 MCG/ACT nasal spray 1 spray, 1 spray, Each Nostril, Daily  hydrALAZINE (APRESOLINE) tablet 50 mg, 50 mg, Oral, TID  isosorbide mononitrate (IMDUR) extended release tablet 30 mg, 30 mg, Oral, Daily  sacubitril-valsartan (ENTRESTO) 24-26 MG per tablet 1 tablet, 1 tablet, Oral, BID  labetalol (NORMODYNE;TRANDATE) injection 20 mg, 20 mg, Intravenous, Q4H PRN  Physical    VITALS:  BP (!) 137/53   Pulse 72   Temp 99.5 °F (37.5 °C) (Oral)   Resp 18   Ht 5' 3\" (1.6 m)   Wt 180 lb 14.4 oz (82.1 kg)   LMP  (LMP Unknown)   SpO2 93%   BMI 32.04 kg/m²   Constitutional: Lying in bed comfortably  Head: Normocephalic, atraumatic  Eyes: EOMI, PERRLA  ENT: moist mucous membranes  Neck: neck supple, trachea midline  Lungs: Good inspiratory effort, CTABL, no wheeze, no rhonchi, no rales  Heart: RRR, normal S1 and S2  GI: Soft, non-distended, non tender, no guarding, no rebound, +BS  MSK: no edema noted  Skin: warm, dry  Psych: appropriate affect     Data    CBC:   Lab Results   Component Value Date    WBC 4.7 11/30/2020    RBC 3.24 11/30/2020    RBC 3.91 02/28/2012    HGB 9.6 11/30/2020    HCT 29.3 11/30/2020    MCV 90.4 11/30/2020    MCH 29.7 11/30/2020    MCHC 32.9 11/30/2020    RDW 15.1 11/30/2020     11/30/2020    MPV 11.6 02/21/2014     CMP:    Lab Results   Component Value Date     11/30/2020    K 3.9 11/30/2020    CL 99 11/30/2020    CO2 20 11/30/2020    BUN 22 11/30/2020    CREATININE 1.08 11/30/2020    GFRAA 57.2 11/30/2020    LABGLOM 47.3 11/30/2020    GLUCOSE 258 11/30/2020    GLUCOSE 154 05/25/2012    PROT 6.2 11/30/2020    LABALBU 3.3 11/30/2020    LABALBU 4.4 05/25/2012    CALCIUM 8.8 11/30/2020    BILITOT <0.2 11/30/2020    ALKPHOS 65 11/30/2020    AST 14 11/30/2020    ALT 16 11/30/2020       ASSESSMENT AND PLAN      # Possible GI bleed- stable  - FOBT was positive per report  - no active bleeding  - Hb at baseline  - INR was reversed with Vit K  - hold warfarin,  - follow H/H     # Covid-19 infection  - asymptomatic, on RA  - follow SPO2 level and inflammatory markers     # Hypertensive urgency  - with SBP-202 on arrival,   - resumed home meds  - monitor BP     # Hypomagnesemia   - replaced     # CKD3  - monitor renal function     # DM2 with hyperglycemia  - ISS, hypoglycemia protocol     # CAD s/p PCI to LAD, chronic systolic HF  - continue home meds     # Hx of CVA   - holding Warfarin    Disposition: Pt with possible GI bleed which seems stable at this time. Hgb stable since admission. Incidentally found to be COVID positive with minimal symptoms. Will monitor respiratory status and Hgb one more night. If Hgb stable and no respiratory complaint, possible discharge home tomorrow.       Yolette Greene, DO  Internal Medicine

## 2020-11-30 NOTE — ACP (ADVANCE CARE PLANNING)
Advance Care Planning     Advance Care Planning Activator (Inpatient)  Conversation Note      Date of ACP Conversation: 11/30/2020    Conversation Conducted with: Patient with [address]: Named in Advance Directive or Healthcare Power of  (name) Kami Ma    ACP Activator: [address] makes decisions on behalf of the incapacitated patient: Decision Maker is asked to consider and make decisions based on patient values, known preferences, or best interests. Health Care Decision Maker:     Current Designated Health Care Decision Maker:   Primary Decision Maker: Karie Willis - Child - [phone]    Secondary Decision Maker: Al Abena - Child - [phone]  (If there is a [address] named in the [facility] Makers\" box in the ACP activity, but it is not visible above, be sure to open that field and then select the health care decision maker relationship (ie \"primary\") in the blank space to the right of the name.) Validate  this information as still accurate & up-to-date; edit [employer] field as needed.)    Note: Assess and validate information in current ACP documents, as indicated. If no Decision Maker listed above or available through scanned documents, then:    If no Authorized Decision Maker has previously been identified, then patient chooses [address]:  \"Who would you like to name as your primary health care decision-maker? \"               Name:         Relationship:           Phone number:   \"Can this person be reached easily? \"   \"Who would you like to name as your back-up decision maker? \"   Name:         Relationship:           Phone number:   \"Can this person be reached easily? \"     Note: If the relationship of these Decision-Makers to the patient does NOT follow your state's Next of Kin hierarchy, recommend that patient complete ACP document that meets state-specific requirements to allow them to act on the patient's behalf when appropriate. Care Preferences    Ventilation: \"If you were in your present state of health and suddenly became very ill and were unable to breathe on your own, what would your preference be about the use of a ventilator (breathing machine) if it were available to you? \"       Would the patient desire the use of ventilator (breathing machine)?: yes    \"If your health worsens and it becomes clear that your chance of recovery is unlikely, what would your preference be about the use of a ventilator (breathing machine) if it were available to you? \"     Would the patient desire the use of ventilator (breathing machine)?: No,       Resuscitation  \"CPR works best to restart the heart when there is a sudden event, like a heart attack, in someone who is otherwise healthy. Unfortunately, CPR does not typically restart the heart for people who have serious health conditions or who are very sick. \"    \"In the event your heart stopped as a result of an underlying serious health condition, would you want attempts to be made to restart your heart (answer \"yes\" for attempt to resuscitate) or would you prefer a natural death (answer \"no\" for do not attempt to resuscitate)? \" no      NOTE: If the patient has a valid advance directive AND now provides care preference(s) that are inconsistent with that prior directive, advise the patient to consider either: creating a new advance directive that complies with state-specific requirements; or, if that is not possible, orally revoking that prior directive in accordance with state-specific requirements, which must be documented in the EHR. [x] Yes   [] No   Educated Patient / Genny Proud regarding differences between Advance Directives and portable DNR orders.     Length of ACP Conversation in minutes:  20    Conversation Outcomes:  [x] ACP discussion completed  [x] Existing advance directive reviewed with

## 2020-11-30 NOTE — FLOWSHEET NOTE
Spiritual Care Services     Summary of Visit:   responded to the spiritual care consult concerning HCPOA, ACP note, patient comfirmed her son Christine Recinos as the HCPOA, called the POA and completed the ACP note, let the POA know that I had spoken with his mother and prayed with her over the phone, son appreciated the call and the update on his mother,     Spiritual Assessment/Intervention/Outcomes:    Encounter Summary  Services provided to[de-identified] (P) Family  Referral/Consult From[de-identified] (P) Multi-disciplinary team  Support System: Children, Family members  Continue Visiting: (P) No  Complexity of Encounter: (P) Moderate  Length of Encounter: (P) 30 minutes  Spiritual Assessment Completed: (P) Yes  Advance Care Planning: (P) Yes  Routine  Type: (P) Follow up  Intervention: (P) Active listening  Outcome: (P) Receptive     Spiritual/Jehovah's witness  Type: Spiritual support  Assessment: Calm, Concerns with suffering  Intervention: Active listening, Prayer  Outcome: Expressed gratitude, Receptive        Advance Directives (For Healthcare)  Healthcare Directive: Yes, patient has an advance directive for healthcare treatment  Type of Healthcare Directive: Health care treatment directive  Copy in Chart: Yes, copy in chart  Chart Copy Status [de-identified] Current  Date Reviewed and Current[de-identified] 11/29/20  Healthcare Agent Appointed: [employer] Agent's Name: [address] Agent's Phone Number: [phone]  If you are unable to speak for yourself, does your Healthcare Agent or Legal Spokesperson know your healthcare wishes?: Yes                Care Plan:        [id number]   Electronically signed by Carmen Moreno on 11/30/20 at 12:48 PM EST     To reach a  for emotional and spiritual support, place an [facility] consult request.   If a  is needed immediately, dial 0 and ask to page the on-call .

## 2020-11-30 NOTE — PROGRESS NOTES
Physical Therapy Missed Treatment   Facility/Department: [facility] MED SURG T574/[id number]    NAME: Elizabeth Spencer    : 6/10/1927 (80 y.o.)  MRN: [id number]    Account: [de-identified]  Gender: female      PT referral received. Pt newly arrived in hospital. Discussed case with LPN and CM. Per protocol for limited staff exposure, it was determine to hold PT eval at this time      Will follow and attempt PT evaluation again at earliest availability.        Bárbara Mis, PT, 20 at 3:29 PM

## 2020-11-30 NOTE — FLOWSHEET NOTE
Took over care of this pt at [phone] from [facility]. Pt's vitals taken, pt was hypertensive. Dr. Rohan Moore notified. Orders give. Call light within reach and bed alarm active. Pt checked but did not void. PT bathed and changed this morning.  Incontinent of urine

## 2020-11-30 NOTE — CARE COORDINATION
[facility] Initial [facility] Assessment    Met with [address] ([address] [facility]) VIA PHONE to discuss discharge plan. PCP: Silverio Cox MD                                Date of Last Visit: HAVE BEEN IN CONTACT WITH OFFICE MANY TIMES IN PAST WEEKS    If no PCP, list provided? N/A    Discharge Planning    Living Arrangements: at home dependent on family care    Who do you live with? [address]. HE LIVES UPSTAIRS/PATIENT LIVES DOWNSTAIRS. OTHER SON JAYLEEN (POA) LIVES NEXT DOOR AND DOES ALL THE PATIENT'S MEDICAL/MEDS/FOOD ETC.     Who helps you with your care:  Family--SEE ABOVE    If lives at home:     Do you have any barriers navigating in your home? no    Patient can perform ADL? Yes    Current Services (outpatient and in home) :  None    Dialysis: No    Is transportation available to get to your appointments? Yes--SONS DO ALL THE TRANSPORTING    DME Equipment:  yes - HAS ROLLATOR/STANDARD WALKER    Respiratory equipment: None    Respiratory provider:  no     Pharmacy:  yes - [address] with Medication Assistance Program?  No      Patient agreeable to [address]? Yes, Company ONLY IF ABSOLUTELY NECESSARY--THEY ARE VERY VIGILANT OF HER AND SHE IS NEVER ALONE AT HOME.  ONLY IF THE PATIENT NEEDS [facility] --TO CALL AND DISCUSS COMPANY. Patient agreeable to SNF/Rehab? No and Declined    Other discharge needs identified? N/A    Freedom of choice list provided with basic dialogue that supports the patient's individualized plan of care/goals and shares the quality data associated with the providers. Yes    Does Patient Have a High-Risk for Readmission Diagnosis (CHF, PN, MI, COPD)? No    The plan for Transition of Care is related to the following treatment goals: TO RETURN HOME AS SOON AS POSSIBLE. FAMILY WANTS HER HOME AND AWAY FROM OTHER SICK PATIENTS. PER JAYLEEN HE HAS BEEN RELEASED OF HIS COVID ISOLATION TODAY. THE OTHER SON IS STILL POSITIVE AND ON ISOLATION. Initial Discharge Plan? (Note: please see concurrent daily documentation for any updates after initial note). TO RETURN HOME. ONLY TO HAVE HHC IS ABSOLUTELY NECESSARY. SON AWARE PT/OT IS PENDING. The Patient and/or patient representative: Ruth DANG [facility]) was provided with choice of any post-acute providers for care and equipment and agrees with discharge plan  Yes    Electronically signed by Veronica Pitt RN on 11/30/2020 at 2:12 PM     DISCUSSED WITH SON REGARDING IMM PAGE 1. HE VERBALLY AGREED FOR TELEPHONE CONSENT AND VERBALLY STATED REASON FOR IMM. IMM SIGNED FOR PATIENT/SON JAYLEEN (POA) VIA PHONE AND PLACED ON THE CHART.

## 2020-12-01 ENCOUNTER — APPOINTMENT (OUTPATIENT)
Dept: GENERAL RADIOLOGY | Age: 85
DRG: 377 | End: 2020-12-01
Attending: INTERNAL MEDICINE
Payer: MEDICARE

## 2020-12-01 PROBLEM — U07.1 COVID-19: Status: ACTIVE | Noted: 2020-12-01

## 2020-12-01 LAB
GLUCOSE BLD-MCNC: 163 MG/DL (ref 60–115)
GLUCOSE BLD-MCNC: 173 MG/DL (ref 60–115)
GLUCOSE BLD-MCNC: 184 MG/DL (ref 60–115)
GLUCOSE BLD-MCNC: 185 MG/DL (ref 60–115)
PERFORMED ON: ABNORMAL

## 2020-12-01 PROCEDURE — 6360000002 HC RX W HCPCS: Performed by: INTERNAL MEDICINE

## 2020-12-01 PROCEDURE — 71045 X-RAY EXAM CHEST 1 VIEW: CPT

## 2020-12-01 PROCEDURE — 2580000003 HC RX 258: Performed by: INTERNAL MEDICINE

## 2020-12-01 PROCEDURE — 1210000000 HC MED SURG R&B

## 2020-12-01 PROCEDURE — XW033E5 INTRODUCTION OF REMDESIVIR ANTI-INFECTIVE INTO PERIPHERAL VEIN, PERCUTANEOUS APPROACH, NEW TECHNOLOGY GROUP 5: ICD-10-PCS | Performed by: INTERNAL MEDICINE

## 2020-12-01 PROCEDURE — 96372 THER/PROPH/DIAG INJ SC/IM: CPT

## 2020-12-01 PROCEDURE — 6370000000 HC RX 637 (ALT 250 FOR IP): Performed by: INTERNAL MEDICINE

## 2020-12-01 PROCEDURE — 97163 PT EVAL HIGH COMPLEX 45 MIN: CPT

## 2020-12-01 PROCEDURE — G0378 HOSPITAL OBSERVATION PER HR: HCPCS

## 2020-12-01 PROCEDURE — 2500000003 HC RX 250 WO HCPCS: Performed by: INTERNAL MEDICINE

## 2020-12-01 PROCEDURE — 96366 THER/PROPH/DIAG IV INF ADDON: CPT

## 2020-12-01 PROCEDURE — 97535 SELF CARE MNGMENT TRAINING: CPT

## 2020-12-01 PROCEDURE — 96365 THER/PROPH/DIAG IV INF INIT: CPT

## 2020-12-01 PROCEDURE — 96375 TX/PRO/DX INJ NEW DRUG ADDON: CPT

## 2020-12-01 RX ORDER — DEXAMETHASONE SODIUM PHOSPHATE 10 MG/ML
6 INJECTION INTRAMUSCULAR; INTRAVENOUS EVERY 24 HOURS
Status: DISCONTINUED | OUTPATIENT
Start: 2020-12-01 | End: 2020-12-05 | Stop reason: HOSPADM

## 2020-12-01 RX ADMIN — SACUBITRIL AND VALSARTAN 1 TABLET: 24; 26 TABLET, FILM COATED ORAL at 20:33

## 2020-12-01 RX ADMIN — FLUTICASONE PROPIONATE 1 SPRAY: 50 SPRAY, METERED NASAL at 08:29

## 2020-12-01 RX ADMIN — HYDRALAZINE HYDROCHLORIDE 50 MG: 50 TABLET, FILM COATED ORAL at 13:25

## 2020-12-01 RX ADMIN — INSULIN LISPRO 1 UNITS: 100 INJECTION, SOLUTION INTRAVENOUS; SUBCUTANEOUS at 20:00

## 2020-12-01 RX ADMIN — CARVEDILOL 12.5 MG: 12.5 TABLET, FILM COATED ORAL at 20:26

## 2020-12-01 RX ADMIN — SACUBITRIL AND VALSARTAN 1 TABLET: 24; 26 TABLET, FILM COATED ORAL at 08:27

## 2020-12-01 RX ADMIN — Medication 10 ML: at 22:05

## 2020-12-01 RX ADMIN — HYDRALAZINE HYDROCHLORIDE 50 MG: 50 TABLET, FILM COATED ORAL at 20:29

## 2020-12-01 RX ADMIN — Medication 10 ML: at 20:40

## 2020-12-01 RX ADMIN — ENOXAPARIN SODIUM 80 MG: 80 INJECTION SUBCUTANEOUS at 20:25

## 2020-12-01 RX ADMIN — Medication 10 ML: at 08:28

## 2020-12-01 RX ADMIN — REMDESIVIR 200 MG: 100 INJECTION, POWDER, LYOPHILIZED, FOR SOLUTION INTRAVENOUS at 21:59

## 2020-12-01 RX ADMIN — HYDRALAZINE HYDROCHLORIDE 50 MG: 50 TABLET, FILM COATED ORAL at 08:30

## 2020-12-01 RX ADMIN — DEXAMETHASONE SODIUM PHOSPHATE 6 MG: 10 INJECTION, SOLUTION INTRAMUSCULAR; INTRAVENOUS at 21:59

## 2020-12-01 RX ADMIN — ISOSORBIDE MONONITRATE 30 MG: 30 TABLET, EXTENDED RELEASE ORAL at 08:27

## 2020-12-01 RX ADMIN — FERROUS SULFATE TAB 325 MG (65 MG ELEMENTAL FE) 325 MG: 325 (65 FE) TAB at 13:25

## 2020-12-01 ASSESSMENT — PAIN SCALES - GENERAL
PAINLEVEL_OUTOF10: 0

## 2020-12-01 NOTE — PROGRESS NOTES
Physical Therapy Med Surg Initial Assessment  Facility/Department: [facility]  Room: W2/W592-       NAME: Andrew Darnell  : 6/10/1927 (35 y.o.)  MRN: [id number]  CODE STATUS: Full Code    Date of Service: 2020    Patient Diagnosis(es): Acute GI bleeding [K92.2]  YOJPD-55 [U07.1]   No chief complaint on file.     Patient Active Problem List    Diagnosis Date Noted    History of ST elevation myocardial infarction (STEMI)      Priority: High    CKD (chronic kidney disease) 2015     Priority: High    HLD (hyperlipidemia) 2015     Priority: High    HTN (hypertension) 2011     Priority: High    Diabetes mellitus 2011     Priority: High    Vitamin D deficiency 2015     Priority: Low    SI (stress incontinence), female 2012     Priority: Low    Osteoarthritis 2012     Priority: Low    COVID-19 2020    Acute GI bleeding 2020    Uncontrolled type 2 diabetes mellitus with hyperglycemia ([address]     DJD (degenerative joint disease), lumbar 2020    History of PTCA 2020    Vestibular neuronitis of both ears 2020    Dizziness     Spinal stenosis in cervical region 2020    Lumbar degenerative disc disease 2020    Spinal stenosis, lumbar region, with neurogenic claudication 2020    Abnormality of gait and mobility due to  [employer] with Impaired Mobility and ADL's secondary to Cervical Myelopathy and Vestibular neuronitis with rehab admission 20. 2020    Generalized muscle ache 2020    Generalized osteoarthrosis, involving multiple sites 2020    Syncope 2020    Current use of long term anticoagulation 2019    Other transient cerebral ischemic attacks and related syndromes     Cerebrovascular accident (CVA) ([address]     Chronic combined systolic and diastolic congestive heart failure ([address] 2019    Monitoring for long-term anticoagulant use 2019    Transient cerebral ischemia 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/2019    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/2018    History of non-ST elevation myocardial infarction (NSTEMI) 11/12/2018    Nausea & vomiting 11/11/2018    Chest pain 11/10/2018    Vitamin B12 deficiency 09/17/2018    Recurrent UTI (urinary tract infection)-Raoultella resistant to Macrobid 81/47/1630    Diastolic dysfunction 03/92/7342    Valvular heart disease 03/07/2018    Hypercalcemia 01/15/2018    Lumbar spinal stenosis 12/22/2016    Chronic low back pain 08/17/2016    Eczematous dermatitis     SCC (squamous cell carcinoma), arm 2013    Type 2 diabetes mellitus ([facility] 2013    Obesity (BMI 30-39.9) 2013    [place] (hard of hearing) 2013        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 SCC (squamous cell carcinoma), arm 2013    right upper arm, 2015 right forarm    SI (stress incontinence), female 5/29/2012    Type II or unspecified type diabetes mellitus without mention of complication, not stated as uncontrolled     Whooping cough      Past Surgical History:   Procedure Laterality Date    ANKLE SURGERY      broken left ankle.     APPENDECTOMY      BREAST BIOPSY      x2 left breast    CATARACT REMOVAL  2004    bilateral    CORONARY ANGIOPLASTY WITH STENT PLACEMENT  01/2019    CYSTOCELE REPAIR      EYE SURGERY      bilateral cataract    HYSTERECTOMY      complete at age 39    [address]      as a child       Chart Reviewed: Yes  Family / Caregiver Present: No    Restrictions:  Restrictions/Precautions: Fall Risk, Isolation     SUBJECTIVE:      Pain  Pre Treatment Pain Screening  Pain at present: 0    Post Treatment Pain Screening:   Pain Assessment  Pain Level: 0    Prior Level of Function:  Social/Functional History  Lives With: Son  Home Layout: One level  Home Access: Stairs to enter with rails  Entrance Stairs - Number of Steps: 3  Home Equipment: Rolling walker  ADL Assistance: Independent  Ambulation Assistance: Independent(with ww)  Transfer Assistance: Independent  Active : No    OBJECTIVE:   Hearing: Exceptions to [place]  Hearing Exceptions: Hard of hearing/hearing concerns    Cognition:  Overall Orientation Status: Impaired  Orientation Level: Oriented to person, Oriented to situation, Oriented to place  Follows Commands: Within Functional Limits         ROM:  RLE PROM: WFL  LLE PROM: WFL    Strength:  Strength RLE  Comment: 3-/5  Strength LLE  Comment: 3-/5  Strength Other  Other: fair abdominal strength    Neuro:  Balance  Posture: Fair  Sitting - Static: Good  Sitting - Dynamic: Good;-  Standing - Static: Poor(UE support and assistance required for balance due to posterior LOB)  Standing - Dynamic: Poor             Bed mobility  Bridging: Minimal assistance  Rolling to Left: Stand by assistance  Rolling to Right: Stand by assistance  Supine to Sit: Minimal assistance  Sit to Supine: Minimal assistance  Scooting: Minimal assistance  Comment: HOB flat with no rails    Transfers  Sit to Stand: Minimal Assistance; Moderate Assistance(multiple trials with increased assistance with fatigue and lower surface noted)  Stand Call light within reach    Goals:  Short term goals  Short term goal 1: SBA bed mobility  Short term goal 2: SBa bed and chair transfers  Short term goal 3: SBA gait with ww 20 feet  Short term goal 4: SBA with HEP    AMPAC (6 CLICK) BASIC MOBILITY  AM-PAC Inpatient Mobility Raw Score : 13     Therapy Time:   Individual   Time In 1530   Time Out 1605   Minutes 35    Eval: 20  Transfer training: 15 min       Bel Willoughby PT, 12/01/20 at 4:22 PM         Definitions for assistance levels  Independent = pt does not require any physical supervision or assistance from another person for activity completion. Device may be needed.   Stand by assistance = pt requires verbal cues or instructions from another person, close to but not touching, to perform the activity  Minimal assistance= pt performs 75% or more of the activity; assistance is required to complete the activity  Moderate assistance= pt performs 50% of the activity; assistance is required to complete the activity  Maximal assistance = pt performs 25% of the activity; assistance is required to complete the activity  Dependent = pt requires total physical assistance to accomplish the task

## 2020-12-01 NOTE — PROGRESS NOTES
Department of Internal Medicine  General Internal Medicine  Attending Progress Note      SUBJECTIVE:  Pt seen and examined. Resting comfortably. Was feeling well this AM and plan was to discharge home. No respiratory complaints, but noted to be mildly hypoxic on room air and was placed on O2. Later in the afternoon, pt still without respiratory complaints, but not feeling well. Due to hypoxia, CXR ordered and pt started on decadron, lovenox and remdesivir.  Inflammatory marker ordered for the AM    OBJECTIVE      Medications    Current Facility-Administered Medications: dexamethasone (DECADRON) injection 6 mg, 6 mg, Intravenous, Q24H  enoxaparin (LOVENOX) injection 80 mg, 1 mg/kg, Subcutaneous, BID  hydrALAZINE (APRESOLINE) tablet 50 mg, 50 mg, Oral, Q8H PRN  sodium chloride flush 0.9 % injection 10 mL, 10 mL, Intravenous, 2 times per day  sodium chloride flush 0.9 % injection 10 mL, 10 mL, Intravenous, PRN  acetaminophen (TYLENOL) tablet 650 mg, 650 mg, Oral, Q6H PRN **OR** acetaminophen (TYLENOL) suppository 650 mg, 650 mg, Rectal, Q6H PRN  polyethylene glycol (GLYCOLAX) packet 17 g, 17 g, Oral, Daily PRN  promethazine (PHENERGAN) tablet 12.5 mg, 12.5 mg, Oral, Q6H PRN **OR** ondansetron (ZOFRAN) injection 4 mg, 4 mg, Intravenous, Q6H PRN  glucose (GLUTOSE) 40 % oral gel 15 g, 15 g, Oral, PRN  dextrose 50 % IV solution, 12.5 g, Intravenous, PRN  glucagon (rDNA) injection 1 mg, 1 mg, Intramuscular, PRN  dextrose 5 % solution, 100 mL/hr, Intravenous, PRN  insulin lispro (HUMALOG) injection vial 0-6 Units, 0-6 Units, Subcutaneous, TID WC  insulin lispro (HUMALOG) injection vial 0-3 Units, 0-3 Units, Subcutaneous, Nightly  carvedilol (COREG) tablet 12.5 mg, 12.5 mg, Oral, BID  ferrous sulfate (IRON 325) tablet 325 mg, 325 mg, Oral, Lunch  fluticasone (FLONASE) 50 MCG/ACT nasal spray 1 spray, 1 spray, Each Nostril, Daily  hydrALAZINE (APRESOLINE) tablet 50 mg, 50 mg, Oral, TID  isosorbide mononitrate (IMDUR) extended release tablet 30 mg, 30 mg, Oral, Daily  sacubitril-valsartan (ENTRESTO) 24-26 MG per tablet 1 tablet, 1 tablet, Oral, BID  labetalol (NORMODYNE;TRANDATE) injection 20 mg, 20 mg, Intravenous, Q4H PRN  Physical    VITALS:  BP (!) 163/52   Pulse 63   Temp 100.2 °F (37.9 °C) (Oral)   Resp 18   Ht 5' 3\" (1.6 m)   Wt 180 lb 14.4 oz (82.1 kg)   LMP  (LMP Unknown)   SpO2 95%   BMI 32.04 kg/m²   Constitutional: Lying in bed comfortably  Head: Normocephalic, atraumatic  Eyes: EOMI, PERRLA  ENT: moist mucous membranes  Neck: neck supple, trachea midline  Lungs: Good inspiratory effort, CTABL, no wheeze, no rhonchi, no rales  Heart: RRR, normal S1 and S2  GI: Soft, non-distended, non tender, no guarding, no rebound, +BS  MSK: no edema noted  Skin: warm, dry  Psych: appropriate affect     Data    CBC:   Lab Results   Component Value Date    WBC 4.7 11/30/2020    RBC 3.24 11/30/2020    RBC 3.91 02/28/2012    HGB 9.6 11/30/2020    HCT 29.3 11/30/2020    MCV 90.4 11/30/2020    MCH 29.7 11/30/2020    MCHC 32.9 11/30/2020    RDW 15.1 11/30/2020     11/30/2020    MPV 11.6 02/21/2014     CMP:    Lab Results   Component Value Date     11/30/2020    K 3.9 11/30/2020    CL 99 11/30/2020    CO2 20 11/30/2020    BUN 22 11/30/2020    CREATININE 1.08 11/30/2020    GFRAA 57.2 11/30/2020    LABGLOM 47.3 11/30/2020    GLUCOSE 258 11/30/2020    GLUCOSE 154 05/25/2012    PROT 6.2 11/30/2020    LABALBU 3.3 11/30/2020    LABALBU 4.4 05/25/2012    CALCIUM 8.8 11/30/2020    BILITOT <0.2 11/30/2020    ALKPHOS 65 11/30/2020    AST 14 11/30/2020    ALT 16 11/30/2020       ASSESSMENT AND PLAN      # Possible GI bleed- stable  - FOBT was positive per report  - no active bleeding  - Hb at baseline  - INR was reversed with Vit K  - hold warfarin,  - follow H/H     # Covid-19 infection with acute hypoxic respiratory failure  - afebrile and without respiratory complaint, but with new hypoxia on 12/1 requiring 2L O2.  Pt desats with exertion  - CXR, inflammatory markers ordered  - started on decadron, pharmacy to initiate remdesivir (day 1/5), lovenox  - follow SPO2 level and inflammatory markers     # Hypertensive urgency- improved  - with SBP-202 on arrival,   - resumed home meds  - monitor BP     # Hypomagnesemia   - replaced     # CKD3  - monitor renal function     # DM2 with hyperglycemia  - ISS, hypoglycemia protocol     # CAD s/p PCI to LAD, chronic systolic HF  - continue home meds     # Hx of CVA   - holding Warfarin    Disposition: Pt with possible GI bleed which seems stable at this time. Hgb stable since admission. Incidentally found to be COVID positive with minimal symptoms. Was set to go home today, but developed new hypoxia. Given hypoxia, decision made to keep patient and initiate treatment with remdesivir, decadron and lovenox. Inflammatory marker ordered. Will likely need to stay for completion of remdesivir treatment- 5 days.       Dima Bonilla, DO  Internal Medicine

## 2020-12-01 NOTE — CARE COORDINATION
PER RN PATIENT DOES NOT FEEL SAFE TO GO HOME TODAY. MD UPDATED AND STATES TO KEEP PATIENT UNTIL TOMORROW. WILL TRY TO WEAN 02 AND SEE IF SHE WILL NEED 02 AT DC TOMORROW. MD WILL ALSO ORDER WHEEL CHAIR AT THAT TIME.

## 2020-12-01 NOTE — PROGRESS NOTES
Order for Remdesivir received from Dr.K. Macario Mullins    1)  Confirmed drug availability for complete patient course of therapy (200 mg IV x 1, then 100 mg daily on days 2-5)    2)  Inclusion Criteria:  Reviewed/Confirmed with provider criteria present   Adults, children (?3.5Kg, only use lyophilized powder), or pregnant women with proven COVID19 as defined by a positive nasopharyngeal swab, tracheal aspirate or sputum   SARS-CoV-2 PCR or a positive serology test requiring hospitalization for COVID-19-severe pneumonia.    Requiring supplemental oxygen     [Insert Documentation of COVID-19 + lab, date/time]    3)  Recommend prioritization of patients who present with symptom onset < 14 days and within 10 days of acute care admission     4)  Exclusion Criteria:  Reviewed/Confirmed none of the following  present: (criteria in bold present during review; risk/benefit rationale of physician documented)    Requiring invasive or non-invasive mechanical ventilation  A) Consider use in patients requiring high-flow oxygen early in the disease state (based on symptom duration)    Use of more than 1 vasopressor agent prior to start of remdesivir    Already improving on current treatment/supportive regimen as evidenced by improving oxygenation, and/or impending discharge    Patients in whom the clinical team think death is in the immediate short-term whereby administration of RDV unlikely to change clinical outcome     5) Monitoring Parameters:  CMP (includes hepatic panel) x 5 days    Consider discontinuation of remdesivir if LFTs substantially increase following initiation of therapy (ie: 5x baseline lab values) or if ALT elevation is accompanied by signs or symptoms of liver inflammation    GFR < 30mL/min: Use with caution due to risk of cyclodextrin toxicity with IV solution as it accumulates in reduced renal clearance       Recent Labs     11/29/20  1039 11/30/20  1015   CREATININE 1.21* 1.08*   Estimated Creatinine Clearance: 33 mL/min (A) (based on SCr of 1.08 mg/dL (H)). Recent Labs     11/30/20  1015   ALT 16   AST 14     Recent Labs     11/30/20  1015   DDIMER <0.27         Pharmacy to continue to follow.     [facility]  Staff Pharmacist, [facility]  12/1/2020  6:02 PM

## 2020-12-02 LAB
ALBUMIN SERPL-MCNC: 3 G/DL (ref 3.5–4.6)
ALP BLD-CCNC: 57 U/L (ref 40–130)
ALT SERPL-CCNC: 14 U/L (ref 0–33)
ANION GAP SERPL CALCULATED.3IONS-SCNC: 13 MEQ/L (ref 9–15)
ANION GAP SERPL CALCULATED.3IONS-SCNC: 16 MEQ/L (ref 9–15)
AST SERPL-CCNC: 16 U/L (ref 0–35)
BILIRUB SERPL-MCNC: <0.2 MG/DL (ref 0.2–0.7)
BUN BLDV-MCNC: 43 MG/DL (ref 8–23)
BUN BLDV-MCNC: 44 MG/DL (ref 8–23)
C-REACTIVE PROTEIN, HIGH SENSITIVITY: 33.5 MG/L (ref 0–5)
CALCIUM SERPL-MCNC: 8 MG/DL (ref 8.5–9.9)
CALCIUM SERPL-MCNC: 8.1 MG/DL (ref 8.5–9.9)
CHLORIDE BLD-SCNC: 101 MEQ/L (ref 95–107)
CHLORIDE BLD-SCNC: 99 MEQ/L (ref 95–107)
CO2: 16 MEQ/L (ref 20–31)
CO2: 17 MEQ/L (ref 20–31)
CREAT SERPL-MCNC: 1.39 MG/DL (ref 0.5–0.9)
CREAT SERPL-MCNC: 1.45 MG/DL (ref 0.5–0.9)
D DIMER: 0.48 MG/L FEU (ref 0–0.5)
FERRITIN: 191.7 NG/ML (ref 13–150)
FIBRINOGEN: 507 MG/DL (ref 235–507)
GFR AFRICAN AMERICAN: 40.7
GFR AFRICAN AMERICAN: 42.8
GFR NON-AFRICAN AMERICAN: 33.7
GFR NON-AFRICAN AMERICAN: 35.3
GLOBULIN: 2.6 G/DL (ref 2.3–3.5)
GLUCOSE BLD-MCNC: 245 MG/DL (ref 60–115)
GLUCOSE BLD-MCNC: 252 MG/DL (ref 60–115)
GLUCOSE BLD-MCNC: 268 MG/DL (ref 60–115)
GLUCOSE BLD-MCNC: 274 MG/DL (ref 70–99)
GLUCOSE BLD-MCNC: 275 MG/DL (ref 70–99)
GLUCOSE BLD-MCNC: 299 MG/DL (ref 60–115)
PERFORMED ON: ABNORMAL
POTASSIUM REFLEX MAGNESIUM: 3.7 MEQ/L (ref 3.4–4.9)
POTASSIUM SERPL-SCNC: 3.7 MEQ/L (ref 3.4–4.9)
REASON FOR REJECTION: NORMAL
REJECTED TEST: NORMAL
SEDIMENTATION RATE, ERYTHROCYTE: 82 MM (ref 0–30)
SODIUM BLD-SCNC: 131 MEQ/L (ref 135–144)
SODIUM BLD-SCNC: 131 MEQ/L (ref 135–144)
TOTAL PROTEIN: 5.6 G/DL (ref 6.3–8)

## 2020-12-02 PROCEDURE — 36415 COLL VENOUS BLD VENIPUNCTURE: CPT

## 2020-12-02 PROCEDURE — 6360000002 HC RX W HCPCS: Performed by: INTERNAL MEDICINE

## 2020-12-02 PROCEDURE — 85384 FIBRINOGEN ACTIVITY: CPT

## 2020-12-02 PROCEDURE — 86141 C-REACTIVE PROTEIN HS: CPT

## 2020-12-02 PROCEDURE — 2580000003 HC RX 258: Performed by: INTERNAL MEDICINE

## 2020-12-02 PROCEDURE — 6370000000 HC RX 637 (ALT 250 FOR IP): Performed by: INTERNAL MEDICINE

## 2020-12-02 PROCEDURE — 2700000000 HC OXYGEN THERAPY PER DAY

## 2020-12-02 PROCEDURE — 80053 COMPREHEN METABOLIC PANEL: CPT

## 2020-12-02 PROCEDURE — 85652 RBC SED RATE AUTOMATED: CPT

## 2020-12-02 PROCEDURE — 85379 FIBRIN DEGRADATION QUANT: CPT

## 2020-12-02 PROCEDURE — 2500000003 HC RX 250 WO HCPCS: Performed by: INTERNAL MEDICINE

## 2020-12-02 PROCEDURE — 97116 GAIT TRAINING THERAPY: CPT

## 2020-12-02 PROCEDURE — 82728 ASSAY OF FERRITIN: CPT

## 2020-12-02 PROCEDURE — 1210000000 HC MED SURG R&B

## 2020-12-02 RX ORDER — FUROSEMIDE 40 MG/1
40 TABLET ORAL DAILY
Status: DISCONTINUED | OUTPATIENT
Start: 2020-12-02 | End: 2020-12-05 | Stop reason: HOSPADM

## 2020-12-02 RX ADMIN — HYDRALAZINE HYDROCHLORIDE 50 MG: 50 TABLET, FILM COATED ORAL at 08:34

## 2020-12-02 RX ADMIN — Medication 10 ML: at 21:16

## 2020-12-02 RX ADMIN — FLUTICASONE PROPIONATE 1 SPRAY: 50 SPRAY, METERED NASAL at 08:34

## 2020-12-02 RX ADMIN — CARVEDILOL 12.5 MG: 12.5 TABLET, FILM COATED ORAL at 08:35

## 2020-12-02 RX ADMIN — ISOSORBIDE MONONITRATE 30 MG: 30 TABLET, EXTENDED RELEASE ORAL at 08:35

## 2020-12-02 RX ADMIN — DEXAMETHASONE SODIUM PHOSPHATE 6 MG: 10 INJECTION, SOLUTION INTRAMUSCULAR; INTRAVENOUS at 22:27

## 2020-12-02 RX ADMIN — CARVEDILOL 12.5 MG: 12.5 TABLET, FILM COATED ORAL at 21:17

## 2020-12-02 RX ADMIN — FERROUS SULFATE TAB 325 MG (65 MG ELEMENTAL FE) 325 MG: 325 (65 FE) TAB at 12:30

## 2020-12-02 RX ADMIN — HYDRALAZINE HYDROCHLORIDE 50 MG: 50 TABLET, FILM COATED ORAL at 12:30

## 2020-12-02 RX ADMIN — INSULIN LISPRO 2 UNITS: 100 INJECTION, SOLUTION INTRAVENOUS; SUBCUTANEOUS at 22:07

## 2020-12-02 RX ADMIN — SACUBITRIL AND VALSARTAN 1 TABLET: 24; 26 TABLET, FILM COATED ORAL at 21:17

## 2020-12-02 RX ADMIN — ENOXAPARIN SODIUM 80 MG: 80 INJECTION SUBCUTANEOUS at 08:34

## 2020-12-02 RX ADMIN — Medication 10 ML: at 08:40

## 2020-12-02 RX ADMIN — HYDRALAZINE HYDROCHLORIDE 50 MG: 50 TABLET, FILM COATED ORAL at 21:17

## 2020-12-02 RX ADMIN — SACUBITRIL AND VALSARTAN 1 TABLET: 24; 26 TABLET, FILM COATED ORAL at 08:34

## 2020-12-02 RX ADMIN — REMDESIVIR 100 MG: 100 INJECTION, POWDER, LYOPHILIZED, FOR SOLUTION INTRAVENOUS at 21:13

## 2020-12-02 ASSESSMENT — PAIN SCALES - GENERAL
PAINLEVEL_OUTOF10: 0

## 2020-12-02 NOTE — PROGRESS NOTES
Physical Therapy Med Surg Daily Treatment Note  Facility/Department: [address] OBSERVATION  [facility]       NAME: Sean Oseguera  : 6/10/1927 (22 y.o.)  MRN: [id number]  CODE STATUS: Full Code    Date of Service: 2020    Patient Diagnosis(es): Acute GI bleeding [K92.2]  BACLZ-90 [U07.1]  COVID-19 [U07.1]   No chief complaint on file.     Patient Active Problem List    Diagnosis Date Noted    History of ST elevation myocardial infarction (STEMI)      Priority: High    CKD (chronic kidney disease) 2015     Priority: High    HLD (hyperlipidemia) 2015     Priority: High    HTN (hypertension) 2011     Priority: High    Diabetes mellitus 2011     Priority: High    Vitamin D deficiency 2015     Priority: Low    SI (stress incontinence), female 2012     Priority: Low    Osteoarthritis 2012     Priority: Low    COVID-19 2020    Acute GI bleeding 2020    Uncontrolled type 2 diabetes mellitus with hyperglycemia ([address]     DJD (degenerative joint disease), lumbar 2020    History of PTCA 2020    Vestibular neuronitis of both ears 2020    Dizziness     Spinal stenosis in cervical region 2020    Lumbar degenerative disc disease 2020    Spinal stenosis, lumbar region, with neurogenic claudication 2020    Abnormality of gait and mobility due to  NTSCI with Impaired Mobility and ADL's secondary to Cervical Myelopathy and Vestibular neuronitis with rehab admission 20. 2020    Generalized muscle ache 2020    Generalized osteoarthrosis, involving multiple sites 2020    Syncope 2020    Current use of long term anticoagulation 2019    Other transient cerebral ischemic attacks and related syndromes     Cerebrovascular accident (CVA) ([address]     Chronic combined systolic and diastolic congestive heart failure ([address] 2019    Monitoring for long-term anticoagulant use 2019  Transient cerebral ischemia 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/2019    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/2018    History of non-ST elevation myocardial infarction (NSTEMI) 11/12/2018    Nausea & vomiting 11/11/2018    Chest pain 11/10/2018    Vitamin B12 deficiency 09/17/2018    Recurrent UTI (urinary tract infection)-Raoultella resistant to Macrobid 73/26/7233    Diastolic dysfunction 85/50/2996    Valvular heart disease 03/07/2018    Hypercalcemia 01/15/2018    Lumbar spinal stenosis 12/22/2016    Chronic low back pain 08/17/2016    Eczematous dermatitis     SCC (squamous cell carcinoma), arm 2013    Type 2 diabetes mellitus [facility] 2013    Obesity (BMI 30-39.9) 2013    [place] (hard of hearing) 2013        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 SCC (squamous cell carcinoma), arm 2013    right upper arm, 2015 right forarm    SI (stress incontinence), female 5/29/2012    Type II or unspecified type diabetes mellitus without mention of complication, not stated as uncontrolled     Whooping cough      Past Surgical History:   Procedure Laterality Date    ANKLE SURGERY      broken left ankle.     APPENDECTOMY      BREAST BIOPSY      x2 left breast    CATARACT REMOVAL  2004    bilateral    CORONARY ANGIOPLASTY WITH STENT PLACEMENT  01/2019    CYSTOCELE REPAIR      EYE SURGERY      bilateral cataract    HYSTERECTOMY      complete at age 39    [address]      as a child       Restrictions  Restrictions/Precautions: Fall Risk;Isolation    SUBJECTIVE   General  Chart Reviewed: Yes  Family / Caregiver Present: No  Subjective  Subjective: \"I need to use the bathroom. \"    Pre-Session Pain Report  Pre Treatment Pain Screening  Pain at present: 0  Scale Used: Numeric Score  Intervention List: Patient able to continue with treatment  Pain Screening  Patient Currently in Pain: No       Post-Session Pain Report  Pain Assessment  Pain Assessment: 0-10  Pain Level: 0         OBJECTIVE        Bed mobility  Supine to Sit: Minimal assistance  Sit to Supine: Minimal assistance  Comment: pt reaches for therapist to complete, increased time and effort needed to complete. Transfers  Sit to Stand: Minimal Assistance  Stand to sit: Minimal Assistance  Comment: vc's for hand placement needed, slight lifting assist needed. Ambulation  Ambulation?: Yes  Ambulation 1  Surface: level tile  Device: Rolling Walker  Assistance: Contact guard assistance  Quality of Gait: fair knee stability, decreased hip stability, fledsewd trunk, decreased foot clearance  Distance: 15' x2  Comments: pt able to increase distance to bathroom and back. Activity Tolerance  Activity Tolerance: Patient Tolerated treatment well          ASSESSMENT   Assessment: pt with slightly improved mobility, able to tolerate increased ambulation distance and has enough family assistance at home to be able to manage her functional mobility.      Discharge Recommendations:  Continue to assess pending progress    Goals  Short term goals  Short term goal 1: SBA bed mobility  Short term goal 2: SBa bed and chair transfers  Short term goal 3: SBA gait with ww 20 feet  Short term goal 4: SBA with HEP    PLAN    Times per week: 1-3  Safety Devices  Type of devices: Bed alarm in place, Left in bed, Call light within reach, Nurse notified     [facility] (6 CLICK) [place]lyn 95 Raw Score : 17      Therapy Time   Individual   Time In 1540   Time Out 1553   Minutes 13      Gait: 8  BM/trsf: [facility], 12/02/20 at 4:06 PM         Definitions for assistance levels  Independent = pt does not require any physical supervision or assistance from another person for activity completion. Device may be needed.   Stand by assistance = pt requires verbal cues or instructions from another person, close to but not touching, to perform the activity  Minimal assistance= pt performs 75% or more of the activity; assistance is required to complete the activity  Moderate assistance= pt performs 50% of the activity; assistance is required to complete the activity  Maximal assistance = pt performs 25% of the activity; assistance is required to complete the activity  Dependent = pt requires total physical assistance to accomplish the task

## 2020-12-02 NOTE — PROGRESS NOTES
7400; Perfect serve sent to attending for cdiff lab order. Lab notified. 1835: Stool sample obtained, labeled and sent to lab via tube system.

## 2020-12-02 NOTE — PROGRESS NOTES
Department of Internal Medicine  General Internal Medicine  Attending Progress Note      SUBJECTIVE:  Pt seen and examined. Resting comfortably.  Pt without respiratory complaints, but continues to require O2    OBJECTIVE      Medications    Current Facility-Administered Medications: [START ON 12/3/2020] enoxaparin (LOVENOX) injection 80 mg, 1 mg/kg, Subcutaneous, Daily  dexamethasone (DECADRON) injection 6 mg, 6 mg, Intravenous, Q24H  [COMPLETED] remdesivir 200 mg in sodium chloride 0.9 % 250 mL IVPB, 200 mg, Intravenous, Once **FOLLOWED BY** remdesivir 100 mg in sodium chloride 0.9 % 250 mL IVPB, 100 mg, Intravenous, Q24H  hydrALAZINE (APRESOLINE) tablet 50 mg, 50 mg, Oral, Q8H PRN  sodium chloride flush 0.9 % injection 10 mL, 10 mL, Intravenous, 2 times per day  sodium chloride flush 0.9 % injection 10 mL, 10 mL, Intravenous, PRN  acetaminophen (TYLENOL) tablet 650 mg, 650 mg, Oral, Q6H PRN **OR** acetaminophen (TYLENOL) suppository 650 mg, 650 mg, Rectal, Q6H PRN  polyethylene glycol (GLYCOLAX) packet 17 g, 17 g, Oral, Daily PRN  promethazine (PHENERGAN) tablet 12.5 mg, 12.5 mg, Oral, Q6H PRN **OR** ondansetron (ZOFRAN) injection 4 mg, 4 mg, Intravenous, Q6H PRN  glucose (GLUTOSE) 40 % oral gel 15 g, 15 g, Oral, PRN  dextrose 50 % IV solution, 12.5 g, Intravenous, PRN  glucagon (rDNA) injection 1 mg, 1 mg, Intramuscular, PRN  dextrose 5 % solution, 100 mL/hr, Intravenous, PRN  insulin lispro (HUMALOG) injection vial 0-6 Units, 0-6 Units, Subcutaneous, TID WC  insulin lispro (HUMALOG) injection vial 0-3 Units, 0-3 Units, Subcutaneous, Nightly  carvedilol (COREG) tablet 12.5 mg, 12.5 mg, Oral, BID  ferrous sulfate (IRON 325) tablet 325 mg, 325 mg, Oral, Lunch  fluticasone (FLONASE) 50 MCG/ACT nasal spray 1 spray, 1 spray, Each Nostril, Daily  hydrALAZINE (APRESOLINE) tablet 50 mg, 50 mg, Oral, TID  isosorbide mononitrate (IMDUR) extended release tablet 30 mg, 30 mg, Oral, Daily  sacubitril-valsartan (ENTRESTO) 24-26 MG per tablet 1 tablet, 1 tablet, Oral, BID  labetalol (NORMODYNE;TRANDATE) injection 20 mg, 20 mg, Intravenous, Q4H PRN  Physical    VITALS:  BP (!) 144/49   Pulse 69   Temp 98.6 °F (37 °C) (Oral)   Resp 18   Ht 5' 3\" (1.6 m)   Wt 183 lb (83 kg)   LMP  (LMP Unknown)   SpO2 95%   BMI 32.42 kg/m²   Constitutional: Lying in bed comfortably, hard of hearing  Head: Normocephalic, atraumatic  Eyes: EOMI, PERRLA  ENT: moist mucous membranes  Neck: neck supple, trachea midline  Lungs: Good inspiratory effort, CTABL, no wheeze, no rhonchi, no rales  Heart: RRR, normal S1 and S2  GI: Soft, non-distended, non tender, no guarding, no rebound, +BS  MSK: no edema noted  Skin: warm, dry  Psych: appropriate affect     Data    CBC:   Lab Results   Component Value Date    WBC 4.7 11/30/2020    RBC 3.24 11/30/2020    RBC 3.91 02/28/2012    HGB 9.6 11/30/2020    HCT 29.3 11/30/2020    MCV 90.4 11/30/2020    MCH 29.7 11/30/2020    MCHC 32.9 11/30/2020    RDW 15.1 11/30/2020     11/30/2020    MPV 11.6 02/21/2014     CMP:    Lab Results   Component Value Date     12/02/2020    K 3.7 12/02/2020     12/02/2020    CO2 17 12/02/2020    BUN 44 12/02/2020    CREATININE 1.39 12/02/2020    GFRAA 42.8 12/02/2020    LABGLOM 35.3 12/02/2020    GLUCOSE 275 12/02/2020    GLUCOSE 154 05/25/2012    PROT 6.2 11/30/2020    LABALBU 3.3 11/30/2020    LABALBU 4.4 05/25/2012    CALCIUM 8.0 12/02/2020    BILITOT <0.2 11/30/2020    ALKPHOS 65 11/30/2020    AST 14 11/30/2020    ALT 16 11/30/2020       ASSESSMENT AND PLAN      # Possible GI bleed- stable  - FOBT was positive per report  - no active bleeding  - Hb at baseline  - INR was reversed with Vit K  - hold warfarin,  - follow H/H     # Covid-19 infection with acute hypoxic respiratory failure  - afebrile and without respiratory complaint, but with new hypoxia on 12/1 requiring 2L O2.  Pt desats with exertion  - CXR, inflammatory markers ordered  - started on decadron, pharmacy to initiate remdesivir (day 2/5), lovenox  - follow SPO2 level and inflammatory markers     # Hypertensive urgency- improved  - with SBP-202 on arrival,   - resumed home meds  - monitor BP     # Hypomagnesemia   - replaced     # CKD3  - monitor renal function     # DM2 with hyperglycemia  - ISS, hypoglycemia protocol     # CAD s/p PCI to LAD, chronic systolic HF  - continue home meds  - restarting lasix in setting of hypoxia, multiple infusions and decreased renal function     # Hx of CVA   - holding Warfarin    Disposition: Pt with possible GI bleed which seems stable at this time. Hgb stable since admission. Incidentally found to be COVID positive with minimal symptoms. Was set to go home 12/1, but developed new hypoxia. Given hypoxia, decision made to keep patient and initiate treatment with remdesivir, decadron and lovenox. Inflammatory marker ordered. Will likely need to stay for completion of remdesivir treatment- 5 days.       [facility] DO  Internal Medicine

## 2020-12-02 NOTE — PROGRESS NOTES
Complete be change completed. mepilex dressing applied to coccyx for preventative. Patient has incontinent of very soft almost runny stool.

## 2020-12-03 LAB
ALBUMIN SERPL-MCNC: 3.3 G/DL (ref 3.5–4.6)
ALP BLD-CCNC: 59 U/L (ref 40–130)
ALT SERPL-CCNC: 14 U/L (ref 0–33)
ANION GAP SERPL CALCULATED.3IONS-SCNC: 13 MEQ/L (ref 9–15)
AST SERPL-CCNC: 14 U/L (ref 0–35)
BILIRUB SERPL-MCNC: <0.2 MG/DL (ref 0.2–0.7)
BUN BLDV-MCNC: 51 MG/DL (ref 8–23)
CALCIUM SERPL-MCNC: 8.5 MG/DL (ref 8.5–9.9)
CHLORIDE BLD-SCNC: 104 MEQ/L (ref 95–107)
CO2: 19 MEQ/L (ref 20–31)
CREAT SERPL-MCNC: 1.57 MG/DL (ref 0.5–0.9)
GFR AFRICAN AMERICAN: 37.2
GFR NON-AFRICAN AMERICAN: 30.7
GLOBULIN: 2.6 G/DL (ref 2.3–3.5)
GLUCOSE BLD-MCNC: 239 MG/DL (ref 60–115)
GLUCOSE BLD-MCNC: 319 MG/DL (ref 70–99)
GLUCOSE BLD-MCNC: 322 MG/DL (ref 60–115)
GLUCOSE BLD-MCNC: 345 MG/DL (ref 60–115)
HCT VFR BLD CALC: 29.6 % (ref 37–47)
HEMOGLOBIN: 9.8 G/DL (ref 12–16)
MCH RBC QN AUTO: 30 PG (ref 27–31.3)
MCHC RBC AUTO-ENTMCNC: 33 % (ref 33–37)
MCV RBC AUTO: 91.1 FL (ref 82–100)
PDW BLD-RTO: 15.1 % (ref 11.5–14.5)
PERFORMED ON: ABNORMAL
PLATELET # BLD: 216 K/UL (ref 130–400)
POTASSIUM REFLEX MAGNESIUM: 4.1 MEQ/L (ref 3.4–4.9)
POTASSIUM SERPL-SCNC: 4.1 MEQ/L (ref 3.4–4.9)
RBC # BLD: 3.25 M/UL (ref 4.2–5.4)
SODIUM BLD-SCNC: 136 MEQ/L (ref 135–144)
TOTAL PROTEIN: 5.9 G/DL (ref 6.3–8)
WBC # BLD: 4.5 K/UL (ref 4.8–10.8)

## 2020-12-03 PROCEDURE — 6360000002 HC RX W HCPCS: Performed by: INTERNAL MEDICINE

## 2020-12-03 PROCEDURE — 1210000000 HC MED SURG R&B

## 2020-12-03 PROCEDURE — 2580000003 HC RX 258: Performed by: INTERNAL MEDICINE

## 2020-12-03 PROCEDURE — 85027 COMPLETE CBC AUTOMATED: CPT

## 2020-12-03 PROCEDURE — 6370000000 HC RX 637 (ALT 250 FOR IP): Performed by: INTERNAL MEDICINE

## 2020-12-03 PROCEDURE — 97166 OT EVAL MOD COMPLEX 45 MIN: CPT

## 2020-12-03 PROCEDURE — 80053 COMPREHEN METABOLIC PANEL: CPT

## 2020-12-03 PROCEDURE — 36415 COLL VENOUS BLD VENIPUNCTURE: CPT

## 2020-12-03 PROCEDURE — 2700000000 HC OXYGEN THERAPY PER DAY

## 2020-12-03 PROCEDURE — 2500000003 HC RX 250 WO HCPCS: Performed by: INTERNAL MEDICINE

## 2020-12-03 RX ORDER — INSULIN GLARGINE 100 [IU]/ML
15 INJECTION, SOLUTION SUBCUTANEOUS NIGHTLY
Status: DISCONTINUED | OUTPATIENT
Start: 2020-12-03 | End: 2020-12-04

## 2020-12-03 RX ADMIN — REMDESIVIR 100 MG: 100 INJECTION, POWDER, LYOPHILIZED, FOR SOLUTION INTRAVENOUS at 23:16

## 2020-12-03 RX ADMIN — INSULIN GLARGINE 15 UNITS: 100 INJECTION, SOLUTION SUBCUTANEOUS at 22:31

## 2020-12-03 RX ADMIN — Medication 10 ML: at 23:16

## 2020-12-03 RX ADMIN — ENOXAPARIN SODIUM 80 MG: 80 INJECTION SUBCUTANEOUS at 08:27

## 2020-12-03 RX ADMIN — INSULIN LISPRO 1 UNITS: 100 INJECTION, SOLUTION INTRAVENOUS; SUBCUTANEOUS at 23:35

## 2020-12-03 RX ADMIN — HYDRALAZINE HYDROCHLORIDE 50 MG: 50 TABLET, FILM COATED ORAL at 14:24

## 2020-12-03 RX ADMIN — DEXAMETHASONE SODIUM PHOSPHATE 6 MG: 10 INJECTION, SOLUTION INTRAMUSCULAR; INTRAVENOUS at 23:16

## 2020-12-03 RX ADMIN — FERROUS SULFATE TAB 325 MG (65 MG ELEMENTAL FE) 325 MG: 325 (65 FE) TAB at 12:45

## 2020-12-03 RX ADMIN — HYDRALAZINE HYDROCHLORIDE 50 MG: 50 TABLET, FILM COATED ORAL at 22:15

## 2020-12-03 RX ADMIN — SACUBITRIL AND VALSARTAN 1 TABLET: 24; 26 TABLET, FILM COATED ORAL at 08:27

## 2020-12-03 RX ADMIN — SACUBITRIL AND VALSARTAN 1 TABLET: 24; 26 TABLET, FILM COATED ORAL at 22:16

## 2020-12-03 RX ADMIN — HYDRALAZINE HYDROCHLORIDE 50 MG: 50 TABLET, FILM COATED ORAL at 08:27

## 2020-12-03 RX ADMIN — FUROSEMIDE 40 MG: 40 TABLET ORAL at 08:27

## 2020-12-03 RX ADMIN — CARVEDILOL 12.5 MG: 12.5 TABLET, FILM COATED ORAL at 22:15

## 2020-12-03 RX ADMIN — Medication 10 ML: at 08:27

## 2020-12-03 ASSESSMENT — PAIN SCALES - GENERAL
PAINLEVEL_OUTOF10: 0
PAINLEVEL_OUTOF10: 0

## 2020-12-03 NOTE — PROGRESS NOTES
Department of Internal Medicine  General Internal Medicine  Attending Progress Note      SUBJECTIVE:  Resting comfortably.  Pt without respiratory complaints, but continues to require O2    OBJECTIVE      Medications    Current Facility-Administered Medications: insulin glargine (LANTUS) injection vial 15 Units, 15 Units, Subcutaneous, Nightly  enoxaparin (LOVENOX) injection 80 mg, 1 mg/kg, Subcutaneous, Daily  furosemide (LASIX) tablet 40 mg, 40 mg, Oral, Daily  dexamethasone (DECADRON) injection 6 mg, 6 mg, Intravenous, Q24H  [COMPLETED] remdesivir 200 mg in sodium chloride 0.9 % 250 mL IVPB, 200 mg, Intravenous, Once **FOLLOWED BY** remdesivir 100 mg in sodium chloride 0.9 % 250 mL IVPB, 100 mg, Intravenous, Q24H  hydrALAZINE (APRESOLINE) tablet 50 mg, 50 mg, Oral, Q8H PRN  sodium chloride flush 0.9 % injection 10 mL, 10 mL, Intravenous, 2 times per day  sodium chloride flush 0.9 % injection 10 mL, 10 mL, Intravenous, PRN  acetaminophen (TYLENOL) tablet 650 mg, 650 mg, Oral, Q6H PRN **OR** acetaminophen (TYLENOL) suppository 650 mg, 650 mg, Rectal, Q6H PRN  polyethylene glycol (GLYCOLAX) packet 17 g, 17 g, Oral, Daily PRN  promethazine (PHENERGAN) tablet 12.5 mg, 12.5 mg, Oral, Q6H PRN **OR** ondansetron (ZOFRAN) injection 4 mg, 4 mg, Intravenous, Q6H PRN  glucose (GLUTOSE) 40 % oral gel 15 g, 15 g, Oral, PRN  dextrose 50 % IV solution, 12.5 g, Intravenous, PRN  glucagon (rDNA) injection 1 mg, 1 mg, Intramuscular, PRN  dextrose 5 % solution, 100 mL/hr, Intravenous, PRN  insulin lispro (HUMALOG) injection vial 0-6 Units, 0-6 Units, Subcutaneous, TID WC  insulin lispro (HUMALOG) injection vial 0-3 Units, 0-3 Units, Subcutaneous, Nightly  carvedilol (COREG) tablet 12.5 mg, 12.5 mg, Oral, BID  ferrous sulfate (IRON 325) tablet 325 mg, 325 mg, Oral, Lunch  fluticasone (FLONASE) 50 MCG/ACT nasal spray 1 spray, 1 spray, Each Nostril, Daily  hydrALAZINE (APRESOLINE) tablet 50 mg, 50 mg, Oral, TID  isosorbide mononitrate (IMDUR) extended release tablet 30 mg, 30 mg, Oral, Daily  sacubitril-valsartan (ENTRESTO) 24-26 MG per tablet 1 tablet, 1 tablet, Oral, BID  labetalol (NORMODYNE;TRANDATE) injection 20 mg, 20 mg, Intravenous, Q4H PRN  Physical    VITALS:  BP (!) 151/50   Pulse 60   Temp 98.6 °F (37 °C) (Oral)   Resp 20   Ht 5' 3\" (1.6 m)   Wt 183 lb (83 kg)   LMP  (LMP Unknown)   SpO2 96%   BMI 32.42 kg/m²   Constitutional: Lying in bed comfortably, hard of hearing  Head: Normocephalic, atraumatic  Eyes: EOMI, PERRLA  ENT: moist mucous membranes  Neck: neck supple, trachea midline  Lungs: Good inspiratory effort, CTABL, no wheeze, no rhonchi, no rales  Heart: RRR, normal S1 and S2  GI: Soft, non-distended, non tender, no guarding, no rebound, +BS  MSK: no edema noted  Skin: warm, dry  Psych: appropriate affect     Data    CBC:   Lab Results   Component Value Date    WBC 4.5 12/03/2020    RBC 3.25 12/03/2020    RBC 3.91 02/28/2012    HGB 9.8 12/03/2020    HCT 29.6 12/03/2020    MCV 91.1 12/03/2020    MCH 30.0 12/03/2020    MCHC 33.0 12/03/2020    RDW 15.1 12/03/2020     12/03/2020    MPV 11.6 02/21/2014     CMP:    Lab Results   Component Value Date     12/03/2020    K 4.1 12/03/2020    K 4.1 12/03/2020     12/03/2020    CO2 19 12/03/2020    BUN 51 12/03/2020    CREATININE 1.57 12/03/2020    GFRAA 37.2 12/03/2020    LABGLOM 30.7 12/03/2020    GLUCOSE 319 12/03/2020    GLUCOSE 154 05/25/2012    PROT 5.9 12/03/2020    LABALBU 3.3 12/03/2020    LABALBU 4.4 05/25/2012    CALCIUM 8.5 12/03/2020    BILITOT <0.2 12/03/2020    ALKPHOS 59 12/03/2020    AST 14 12/03/2020    ALT 14 12/03/2020       ASSESSMENT AND PLAN           # Covid-19 infection with acute hypoxic respiratory failure  - afebrile and without respiratory complaint, but with new hypoxia on 12/1 requiring 2L O2.  Pt desats with exertion  - CXR, inflammatory markers ordered  - started on decadron, pharmacy to initiate remdesivir (day 3/5), lovenox  - follow SPO2 level and inflammatory markers     # Possible GI bleed- stable  - No active bleeding and Hgb stable  - monitor while on lovenox - will resume home coumadin on discharge if no evidence of bleeding during admission    # Hypertensive urgency- improved  - with SBP-202 on arrival,   - resumed home meds  - monitor BP     # Hypomagnesemia   - replaced     # CKD3  - monitor renal function     # DM2 with hyperglycemia  - ISS, hypoglycemia protocol     # CAD s/p PCI to LAD, chronic systolic HF  - continue home meds  - restarting lasix in setting of hypoxia, multiple infusions and decreased renal function     # Hx of CVA   - holding Warfarin    Disposition: Hgb stable since admission. Incidentally found to be COVID positive with minimal symptoms. Was set to go home 12/1, but developed new hypoxia. Given hypoxia, decision made to keep patient and initiate treatment with remdesivir, decadron and lovenox. Inflammatory marker ordered. Will likely need to stay for completion of remdesivir treatment- 5 days.       Km Burton, DO  Internal Medicine

## 2020-12-03 NOTE — FLOWSHEET NOTE
0830 Pt awakens easily. Alert and oriented. VSS. Afebrile. Respirations even and nonlabored. Accepted AM meds without problem. Set up for breakfast. Call light in reach. Bed alarm engaged.

## 2020-12-03 NOTE — PROGRESS NOTES
2300: call received from monitoring room that patient HR dropped to 45. RN assessed patient and found patient to be sleeping and asymptomatic.     2324: Perfect serve sent to Dr. Pranay Araujo to update on patient condition. 2329: No new orders.

## 2020-12-03 NOTE — FLOWSHEET NOTE
0315-Assumed care of pt from RN ALFONZO. Pt resting comfortably in bed. No distress noted. HR ranging from 47-50, SB. Dr. Pranay Araujo aware. Falls precautions in place. Call light in reach.  Electronically signed by Sheldon Jackson RN on 12/3/2020 at 3:24 AM

## 2020-12-03 NOTE — PROGRESS NOTES
Nutrition Assessment     Type and Reason for Visit: RD Nutrition Re-Screen/LOS(age/ + COIVD)    Nutrition Recommendations/Plan:   Continue with Carb Control diet  Decrease Glucerna to 1 x daily    Nutrition Assessment:  Nutritional status appears adequate, weight stable, skin intact, intake noted as > 75% meals and and supplements, gluoce elevated wtih DM/steroids.  Will decrease ONS to 1 x daily    Malnutrition Assessment:  Malnutrition Status: No malnutrition    Current Nutrition Therapies:    DIET CARB CONTROL; Safety Tray; Safety Tray (Disposables)  Dietary Nutrition Supplements: Diabetic Oral Supplement    Anthropometric Measures:  · Height: 5' 3\" (160 cm)  · Current Body Wt: 183 lb (83 kg)(12/2)   · BMI: 32.4    Nutrition Diagnosis:   No nutrition diagnosis at this time     Nutrition Interventions:   Food and/or Nutrient Delivery:  Continue Current Diet, Modify Oral Nutrition Supplement  Nutrition Education/Counseling:  Education not indicated   Coordination of Nutrition Care:  No recommendation at this time    Goals:  po > 75%, gluc < 200       Nutrition Monitoring and Evaluation:     Food/Nutrient Intake Outcomes:  Food and Nutrient Intake, Supplement Intake  Physical Signs/Symptoms Outcomes:  Biochemical Data, Weight, Meal Time Behavior     Discharge Planning:    No discharge needs at this time     Electronically signed by Mary Liu RD, LD on 12/3/20 at 2:45 PM EST

## 2020-12-03 NOTE — CARE COORDINATION
CALLED [facility] TO CHECK IF THEY SERVICE PTS AREA WITH + COVID, THEY CURRENTLY ARE NOT. INFO FAXED TO [address]. I THEN SPOKE TO VALENTINA AT [facility] AND THEY ARE ACCEPTING THE PT, PT HAS [employer] PPO(NOT GOLD). STILL NEED WC RX AND O2 EVAL.

## 2020-12-03 NOTE — PROGRESS NOTES
Call placed to Lab regarding cdiff sample. Lab results in Epic  stated it was rejected.  stated \"it was rejected by Dr. Kristie Carbajal but had no reason why. Perfect serve sent to Dr. Terrace Riedel to notify him of issue.

## 2020-12-03 NOTE — PROGRESS NOTES
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 SCC (squamous cell carcinoma), arm 2013    right upper arm, 2015 right forarm    SI (stress incontinence), female 5/29/2012    Type II or unspecified type diabetes mellitus without mention of complication, not stated as uncontrolled     Whooping cough      Past Surgical History:   Procedure Laterality Date    ANKLE SURGERY      broken left ankle.     APPENDECTOMY      BREAST BIOPSY      x2 left breast    CATARACT REMOVAL  2004    bilateral    CORONARY ANGIOPLASTY WITH STENT PLACEMENT  01/2019    CYSTOCELE REPAIR      EYE SURGERY      bilateral cataract    HYSTERECTOMY      complete at age 39    [address]      as a child        Restrictions  Restrictions/Precautions: Fall Risk, Isolation     Safety Devices: Safety Devices  Safety Devices in place: Yes  Type of devices: All fall risk precautions in place        Subjective  Pre Treatment Pain Screening  Pain at present: 0  Scale Used: Numeric Score  Intervention List: Patient able to continue with treatment, Patient declined any intervention    Pain Reassessment:   Pain Assessment  Patient Currently in Pain: No  Pain Assessment: 0-10  Pain Level: 0       Prior Level of Function:  Social/Functional History  Lives With: Son  Home Layout: One level  Home Access: Stairs to enter with rails  Entrance Stairs - Number of Steps: 3  Bathroom Shower/Tub: Walk-in shower  Bathroom Equipment: Grab bars in shower, Shower chair  Home Equipment: Rolling walker  ADL Assistance: [address]: (Family completes)  Ambulation Assistance: Independent(with ww)  Transfer Assistance: Independent  Active : No  Patient's  Info:  Sons  Additional Comments: Pt states if she does need assistance with ADLs her daughter can assist at home but typically she has been independent    OBJECTIVE:     Orientation Status:  Orientation  Overall Orientation Status: Within Functional Limits    Observation:  Observation/Palpation  Posture: Good  Observation: Pt. alert and attentive, pleasant    Cognition Status:  Cognition  Overall Cognitive Status: WFL    Perception Status:  Perception  Overall Perceptual Status: WFL    Sensation Status:  Sensation  Overall Sensation Status: WFL    Vision and Hearing Status:  Vision  Vision: Within Functional Limits  Hearing  Hearing: Exceptions to [facility]  Hearing Exceptions: Hard of hearing/hearing concerns     ROM:   LUE AROM (degrees)  LUE AROM : WFL  Left Hand AROM (degrees)  Left Hand AROM: WFL  RUE AROM (degrees)  RUE AROM : WFL  Right Hand AROM (degrees)  Right Hand AROM: WFL    Strength:  LUE Strength  Gross LUE Strength: Exceptions to WFL  L Hand General: 3/5  LUE Strength Comment: 3/5 all planes  RUE Strength  Gross RUE Strength: Exceptions to [facility]  R Hand General: 3/5  RUE Strength Comment: 3/5 all planes    Coordination, Tone, Quality of Movement: Tone RUE  RUE Tone: Normotonic  Tone LUE  LUE Tone: Normotonic  Coordination  Movements Are Fluid And Coordinated: No  Coordination and Movement description: Fine motor impairments, Decreased speed, Decreased accuracy, Right UE, Left UE  Quality of Movement Other  Comment: Arthritic changes in hands    Hand Dominance:  Hand Dominance  Hand Dominance: Right    ADL Status:  ADL  Feeding: Independent  Grooming: Supervision  UE Bathing: Stand by assistance  LE Bathing: Contact guard assistance  UE Dressing: Stand by assistance  LE Dressing: Minimal assistance  Toileting: Contact guard assistance  Additional Comments: Simulated ADLs as above.  Pt. able to reach all areas PRN however becomes very fatigued with mobility and requires extended rest.  Toilet Transfers  Toilet - Technique: Ambulating  Equipment Used: Grab bars  Toilet Transfer: Stand by assistance  Toilet Transfers Comments: Verbal cues for safety       Therapy key for assistance levels -   Independent = Pt. is able to perform task with no assistance but may require a device   Stand by assistance = Pt. does not perform task at an independent level but does not need physical assistance, requires verbal cues  Minimal, Moderate, Maximal Assistance = Pt. requires physical assistance (25%, 50%, 75% assist from helper) for task but is able to actively participate in task   Dependent = Pt. requires total assistance with task and is not able to actively participate with task completion     Functional Mobility:  Functional Mobility  Functional - Mobility Device: Rolling Walker  Activity: To/from bathroom  Assist Level: Stand by assistance  Functional Mobility Comments: Slow and steady pace  Transfers  Sit to stand: Stand by assistance  Stand to sit: Stand by assistance  Transfer Comments: Increased time and effort, decreased overall endurance    Bed Mobility  Bed mobility  Rolling to Left: Stand by assistance  Rolling to Right: Stand by assistance  Supine to Sit: Stand by assistance  Sit to Supine: Stand by assistance  Scooting: Minimal assistance  Comment: Increased time and effort, verbal cues to initiate and to scoot as high as possible to HOB    Seated and Standing Balance:  Balance  Sitting Balance: Supervision  Standing Balance: Contact guard assistance    Functional Endurance:  Activity Tolerance  Activity Tolerance: Patient Tolerated treatment well    D/C Recommendations:  OT D/C RECOMMENDATIONS  REQUIRES OT FOLLOW UP: Yes    Equipment Recommendations:  OT Equipment Recommendations  Other: Continue to assess    OT Education:   OT Education  OT Education: OT Role, Plan of Care  Patient Education: Educated pt. on role of acute care OT  Barriers to Learning: None    OT Follow Up:  OT D/C RECOMMENDATIONS  REQUIRES OT FOLLOW UP: Yes       Assessment/Discharge Disposition:  Assessment: Pt. is a 80year old woman from home with family support who presents to [facility] with the above deficits which impact her ability to perform ADLs and IADLs. Pt. would benefit from continued OT to maximize independence and safety with ADL tasks.   Performance deficits / Impairments: Decreased functional mobility , Decreased ADL status, Decreased strength, Decreased endurance, Decreased balance, Decreased high-level IADLs, Decreased fine motor control, Decreased coordination  Prognosis: Good  Discharge Recommendations: Continue to assess pending progress  Decision Making: Medium Complexity  History: Pt's medical history is moderately

## 2020-12-04 LAB
ALBUMIN SERPL-MCNC: 2.8 G/DL (ref 3.5–4.6)
ALP BLD-CCNC: 55 U/L (ref 40–130)
ALT SERPL-CCNC: 16 U/L (ref 0–33)
ANION GAP SERPL CALCULATED.3IONS-SCNC: 15 MEQ/L (ref 9–15)
AST SERPL-CCNC: 17 U/L (ref 0–35)
BILIRUB SERPL-MCNC: <0.2 MG/DL (ref 0.2–0.7)
BLOOD CULTURE, ROUTINE: NORMAL
BUN BLDV-MCNC: 50 MG/DL (ref 8–23)
CALCIUM SERPL-MCNC: 8.2 MG/DL (ref 8.5–9.9)
CHLORIDE BLD-SCNC: 102 MEQ/L (ref 95–107)
CO2: 17 MEQ/L (ref 20–31)
CREAT SERPL-MCNC: 1.45 MG/DL (ref 0.5–0.9)
CULTURE, BLOOD 2: NORMAL
GFR AFRICAN AMERICAN: 40.7
GFR NON-AFRICAN AMERICAN: 33.7
GLOBULIN: 2.8 G/DL (ref 2.3–3.5)
GLUCOSE BLD-MCNC: 245 MG/DL (ref 60–115)
GLUCOSE BLD-MCNC: 327 MG/DL (ref 60–115)
GLUCOSE BLD-MCNC: 362 MG/DL (ref 60–115)
GLUCOSE BLD-MCNC: 362 MG/DL (ref 70–99)
GLUCOSE BLD-MCNC: 374 MG/DL (ref 60–115)
GLUCOSE BLD-MCNC: 384 MG/DL (ref 60–115)
HCT VFR BLD CALC: 30.1 % (ref 37–47)
HEMOGLOBIN: 9.6 G/DL (ref 12–16)
MCH RBC QN AUTO: 29 PG (ref 27–31.3)
MCHC RBC AUTO-ENTMCNC: 32 % (ref 33–37)
MCV RBC AUTO: 90.5 FL (ref 82–100)
PDW BLD-RTO: 14.9 % (ref 11.5–14.5)
PERFORMED ON: ABNORMAL
PLATELET # BLD: 232 K/UL (ref 130–400)
POTASSIUM REFLEX MAGNESIUM: 4.2 MEQ/L (ref 3.4–4.9)
POTASSIUM SERPL-SCNC: 4.2 MEQ/L (ref 3.4–4.9)
RBC # BLD: 3.33 M/UL (ref 4.2–5.4)
SODIUM BLD-SCNC: 134 MEQ/L (ref 135–144)
TOTAL PROTEIN: 5.6 G/DL (ref 6.3–8)
WBC # BLD: 4.8 K/UL (ref 4.8–10.8)

## 2020-12-04 PROCEDURE — 1210000000 HC MED SURG R&B

## 2020-12-04 PROCEDURE — 80053 COMPREHEN METABOLIC PANEL: CPT

## 2020-12-04 PROCEDURE — 6360000002 HC RX W HCPCS: Performed by: INTERNAL MEDICINE

## 2020-12-04 PROCEDURE — 6370000000 HC RX 637 (ALT 250 FOR IP): Performed by: INTERNAL MEDICINE

## 2020-12-04 PROCEDURE — 2580000003 HC RX 258: Performed by: INTERNAL MEDICINE

## 2020-12-04 PROCEDURE — 85027 COMPLETE CBC AUTOMATED: CPT

## 2020-12-04 PROCEDURE — 36415 COLL VENOUS BLD VENIPUNCTURE: CPT

## 2020-12-04 PROCEDURE — 97110 THERAPEUTIC EXERCISES: CPT

## 2020-12-04 PROCEDURE — 2500000003 HC RX 250 WO HCPCS: Performed by: INTERNAL MEDICINE

## 2020-12-04 RX ORDER — INSULIN GLARGINE 100 [IU]/ML
20 INJECTION, SOLUTION SUBCUTANEOUS NIGHTLY
Status: DISCONTINUED | OUTPATIENT
Start: 2020-12-04 | End: 2020-12-05 | Stop reason: HOSPADM

## 2020-12-04 RX ADMIN — CARVEDILOL 12.5 MG: 12.5 TABLET, FILM COATED ORAL at 20:51

## 2020-12-04 RX ADMIN — ISOSORBIDE MONONITRATE 30 MG: 30 TABLET, EXTENDED RELEASE ORAL at 09:50

## 2020-12-04 RX ADMIN — HYDRALAZINE HYDROCHLORIDE 50 MG: 50 TABLET, FILM COATED ORAL at 09:50

## 2020-12-04 RX ADMIN — Medication 10 ML: at 09:52

## 2020-12-04 RX ADMIN — FUROSEMIDE 40 MG: 40 TABLET ORAL at 09:50

## 2020-12-04 RX ADMIN — Medication 10 ML: at 20:54

## 2020-12-04 RX ADMIN — SACUBITRIL AND VALSARTAN 1 TABLET: 24; 26 TABLET, FILM COATED ORAL at 09:50

## 2020-12-04 RX ADMIN — HYDRALAZINE HYDROCHLORIDE 50 MG: 50 TABLET, FILM COATED ORAL at 20:51

## 2020-12-04 RX ADMIN — INSULIN LISPRO 3 UNITS: 100 INJECTION, SOLUTION INTRAVENOUS; SUBCUTANEOUS at 21:43

## 2020-12-04 RX ADMIN — INSULIN GLARGINE 20 UNITS: 100 INJECTION, SOLUTION SUBCUTANEOUS at 21:42

## 2020-12-04 RX ADMIN — REMDESIVIR 100 MG: 100 INJECTION, POWDER, LYOPHILIZED, FOR SOLUTION INTRAVENOUS at 20:51

## 2020-12-04 RX ADMIN — SACUBITRIL AND VALSARTAN 1 TABLET: 24; 26 TABLET, FILM COATED ORAL at 20:50

## 2020-12-04 RX ADMIN — CARVEDILOL 12.5 MG: 12.5 TABLET, FILM COATED ORAL at 09:50

## 2020-12-04 RX ADMIN — FERROUS SULFATE TAB 325 MG (65 MG ELEMENTAL FE) 325 MG: 325 (65 FE) TAB at 12:33

## 2020-12-04 RX ADMIN — ENOXAPARIN SODIUM 80 MG: 80 INJECTION SUBCUTANEOUS at 09:49

## 2020-12-04 RX ADMIN — DEXAMETHASONE SODIUM PHOSPHATE 6 MG: 10 INJECTION, SOLUTION INTRAMUSCULAR; INTRAVENOUS at 20:51

## 2020-12-04 ASSESSMENT — PAIN SCALES - GENERAL
PAINLEVEL_OUTOF10: 0

## 2020-12-04 NOTE — PROGRESS NOTES
Department of Internal Medicine  General Internal Medicine  Attending Progress Note      SUBJECTIVE:  Resting comfortably.  Pt without respiratory complaints, but continues to require O2    OBJECTIVE      Medications    Current Facility-Administered Medications: insulin glargine (LANTUS) injection vial 20 Units, 20 Units, Subcutaneous, Nightly  enoxaparin (LOVENOX) injection 80 mg, 1 mg/kg, Subcutaneous, Daily  furosemide (LASIX) tablet 40 mg, 40 mg, Oral, Daily  dexamethasone (DECADRON) injection 6 mg, 6 mg, Intravenous, Q24H  [COMPLETED] remdesivir 200 mg in sodium chloride 0.9 % 250 mL IVPB, 200 mg, Intravenous, Once **FOLLOWED BY** remdesivir 100 mg in sodium chloride 0.9 % 250 mL IVPB, 100 mg, Intravenous, Q24H  hydrALAZINE (APRESOLINE) tablet 50 mg, 50 mg, Oral, Q8H PRN  sodium chloride flush 0.9 % injection 10 mL, 10 mL, Intravenous, 2 times per day  sodium chloride flush 0.9 % injection 10 mL, 10 mL, Intravenous, PRN  acetaminophen (TYLENOL) tablet 650 mg, 650 mg, Oral, Q6H PRN **OR** acetaminophen (TYLENOL) suppository 650 mg, 650 mg, Rectal, Q6H PRN  polyethylene glycol (GLYCOLAX) packet 17 g, 17 g, Oral, Daily PRN  promethazine (PHENERGAN) tablet 12.5 mg, 12.5 mg, Oral, Q6H PRN **OR** ondansetron (ZOFRAN) injection 4 mg, 4 mg, Intravenous, Q6H PRN  glucose (GLUTOSE) 40 % oral gel 15 g, 15 g, Oral, PRN  dextrose 50 % IV solution, 12.5 g, Intravenous, PRN  glucagon (rDNA) injection 1 mg, 1 mg, Intramuscular, PRN  dextrose 5 % solution, 100 mL/hr, Intravenous, PRN  insulin lispro (HUMALOG) injection vial 0-6 Units, 0-6 Units, Subcutaneous, TID WC  insulin lispro (HUMALOG) injection vial 0-3 Units, 0-3 Units, Subcutaneous, Nightly  carvedilol (COREG) tablet 12.5 mg, 12.5 mg, Oral, BID  ferrous sulfate (IRON 325) tablet 325 mg, 325 mg, Oral, Lunch  fluticasone (FLONASE) 50 MCG/ACT nasal spray 1 spray, 1 spray, Each Nostril, Daily  hydrALAZINE (APRESOLINE) tablet 50 mg, 50 mg, Oral, TID  isosorbide mononitrate (IMDUR) extended release tablet 30 mg, 30 mg, Oral, Daily  sacubitril-valsartan (ENTRESTO) 24-26 MG per tablet 1 tablet, 1 tablet, Oral, BID  labetalol (NORMODYNE;TRANDATE) injection 20 mg, 20 mg, Intravenous, Q4H PRN  Physical    VITALS:  BP (!) 178/53   Pulse 60   Temp 97.7 °F (36.5 °C) (Oral)   Resp 18   Ht 5' 3\" (1.6 m)   Wt 183 lb (83 kg)   LMP  (LMP Unknown)   SpO2 97%   BMI 32.42 kg/m²   Constitutional: Lying in bed comfortably, hard of hearing  Head: Normocephalic, atraumatic  Eyes: EOMI, PERRLA  ENT: moist mucous membranes  Neck: neck supple, trachea midline  Lungs: Good inspiratory effort, CTABL, no wheeze, no rhonchi, no rales  Heart: RRR, normal S1 and S2  GI: Soft, non-distended, non tender, no guarding, no rebound, +BS  MSK: no edema noted  Skin: warm, dry  Psych: appropriate affect     Data    CBC:   Lab Results   Component Value Date    WBC 4.8 12/04/2020    RBC 3.33 12/04/2020    RBC 3.91 02/28/2012    HGB 9.6 12/04/2020    HCT 30.1 12/04/2020    MCV 90.5 12/04/2020    MCH 29.0 12/04/2020    MCHC 32.0 12/04/2020    RDW 14.9 12/04/2020     12/04/2020    MPV 11.6 02/21/2014     CMP:    Lab Results   Component Value Date     12/04/2020    K 4.2 12/04/2020    K 4.2 12/04/2020     12/04/2020    CO2 17 12/04/2020    BUN 50 12/04/2020    CREATININE 1.45 12/04/2020    GFRAA 40.7 12/04/2020    LABGLOM 33.7 12/04/2020    GLUCOSE 362 12/04/2020    GLUCOSE 154 05/25/2012    PROT 5.6 12/04/2020    LABALBU 2.8 12/04/2020    LABALBU 4.4 05/25/2012    CALCIUM 8.2 12/04/2020    BILITOT <0.2 12/04/2020    ALKPHOS 55 12/04/2020    AST 17 12/04/2020    ALT 16 12/04/2020       ASSESSMENT AND PLAN           # Covid-19 infection with acute hypoxic respiratory failure  - afebrile and without respiratory complaint, but with new hypoxia on 12/1 requiring 2L O2.  Pt desats with exertion  - CXR, inflammatory markers ordered  - started on decadron, pharmacy to initiate remdesivir (day 4/5), lovenox  - follow SPO2 level and inflammatory markers     # Possible GI bleed- stable  - No active bleeding and Hgb stable  - monitor while on lovenox - will resume home coumadin on discharge if no evidence of bleeding during admission    # Hypertensive urgency- improved  - with SBP-202 on arrival,   - resumed home meds  - monitor BP     # Hypomagnesemia   - replaced     # CKD3  - monitor renal function     # DM2 with hyperglycemia  - ISS, hypoglycemia protocol     # CAD s/p PCI to LAD, chronic systolic HF  - continue home meds  - restarting lasix in setting of hypoxia, multiple infusions and decreased renal function     # Hx of CVA   - holding Warfarin    Disposition: Hgb stable since admission. Incidentally found to be COVID positive with minimal symptoms. Was set to go home 12/1, but developed new hypoxia. Given hypoxia, decision made to keep patient and initiate treatment with remdesivir, decadron and lovenox. Inflammatory marker ordered. Final dose tomorrow and plan to discharge home with [facility] after final dose. Home O2 eval ordered for the AM and Rx for wheelchair ordered.       Raven Inman, DO  Internal Medicine

## 2020-12-04 NOTE — FLOWSHEET NOTE
Patient assessment completed earlier this shift. Her IV was bad attempted restart x 4, then another RN placed a #20 to the right ac that is positional. No distress noted. Patient tired and weak. Resting comfortably. Patient has been incontinent of bowel and bladder 2 loose stools tonight.

## 2020-12-04 NOTE — PROGRESS NOTES
Pt was up in chair from 10 until 2 pm. Pt walked to bathroom with stand by and walker about every 2 hrs. She went back to bed at 2. Then bathroom and up in chair at 4 pm. Chair alarm on, call light by side. Pt has been calling appropriate.

## 2020-12-04 NOTE — PROGRESS NOTES
Pt had large loose black in bed. Cleaned up. Oxygen was only 90-91 on room air. Placed on 2L came up quickly to 96%. Pt is alert but very hard of hearing, must yell in left ear. Pt assisted up to chair. Breakfast set up. Bed alarm on call light in reach.

## 2020-12-04 NOTE — PROGRESS NOTES
Physical Therapy Med Surg Daily Treatment Note  Facility/Department: [address] OBSERVATION  Room: [place] [address]       NAME: Cheyanne Altman  : 6/10/1927 (89 y.o.)  MRN: [id number]  CODE STATUS: Full Code    Date of Service: 2020    Patient Diagnosis(es): Acute GI bleeding [K92.2]  VOEMM-28 [U07.1]  COVID-19 [U07.1]   No chief complaint on file.     Patient Active Problem List    Diagnosis Date Noted    History of ST elevation myocardial infarction (STEMI)      Priority: High    CKD (chronic kidney disease) 2015     Priority: High    HLD (hyperlipidemia) 2015     Priority: High    HTN (hypertension) 2011     Priority: High    Diabetes mellitus 2011     Priority: High    Vitamin D deficiency 2015     Priority: Low    SI (stress incontinence), female 2012     Priority: Low    Osteoarthritis 2012     Priority: Low    COVID-19 2020    Acute GI bleeding 2020    Uncontrolled type 2 diabetes mellitus with hyperglycemia ([address]     DJD (degenerative joint disease), lumbar 2020    History of PTCA 2020    Vestibular neuronitis of both ears 2020    Dizziness     Spinal stenosis in cervical region 2020    Lumbar degenerative disc disease 2020    Spinal stenosis, lumbar region, with neurogenic claudication 2020    Abnormality of gait and mobility due to  NTSCI with Impaired Mobility and ADL's secondary to Cervical Myelopathy and Vestibular neuronitis with rehab admission 20. 2020    Generalized muscle ache 2020    Generalized osteoarthrosis, involving multiple sites 2020    Syncope 2020    Current use of long term anticoagulation 2019    Other transient cerebral ischemic attacks and related syndromes     Cerebrovascular accident (CVA) ([address]     Chronic combined systolic and diastolic congestive heart failure ([address] 2019    Monitoring for long-term anticoagulant use 2019  Transient cerebral ischemia 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/2019    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/2018    History of non-ST elevation myocardial infarction (NSTEMI) 11/12/2018    Nausea & vomiting 11/11/2018    Chest pain 11/10/2018    Vitamin B12 deficiency 09/17/2018    Recurrent UTI (urinary tract infection)-Raoultella resistant to Macrobid 82/07/8355    Diastolic dysfunction 01/29/7579    Valvular heart disease 03/07/2018    Hypercalcemia 01/15/2018    Lumbar spinal stenosis 12/22/2016    Chronic low back pain 08/17/2016    Eczematous dermatitis     SCC (squamous cell carcinoma), arm 2013    Type 2 diabetes mellitus ([facility] 2013    Obesity (BMI 30-39.9) 2013    Robinson (hard of hearing) 2013        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 SCC (squamous cell carcinoma), arm 2013    right upper arm, 2015 right forarm    SI (stress incontinence), female 5/29/2012    Type II or unspecified type diabetes mellitus without mention of complication, not stated as uncontrolled     Whooping cough      Past Surgical History:   Procedure Laterality Date    ANKLE SURGERY      broken left ankle.     APPENDECTOMY      BREAST BIOPSY      x2 left breast    CATARACT REMOVAL  2004    bilateral    CORONARY ANGIOPLASTY WITH STENT PLACEMENT  01/2019    CYSTOCELE REPAIR      EYE SURGERY      bilateral cataract    HYSTERECTOMY      complete at age 39    [address]      as a child       Restrictions  Restrictions/Precautions: Fall Risk;Isolation    SUBJECTIVE   General  Chart Reviewed: Yes  Family / Caregiver Present: No  Subjective  Subjective: \"i just got back to bed\"  General Comment  Comments: agreeable to bed therex    Pre-Session Pain Report  Pre Treatment Pain Screening  Pain at present: 0  Scale Used: Numeric Score  Pain Screening  Patient Currently in Pain: No       Post-Session Pain Report  Pain Assessment  Pain Assessment: 0-10  Pain Level: 0         OBJECTIVE       Exercises  Straight Leg Raise: x 10 aarom  Hip Flexion: x 10 aarom  Ankle Pumps: x 10 with mild resistance provided  Comments: pt began to fall asleep during tx. ASSESSMENT pt expressed frustration and being sick and not being able to  her hearing aids that were ready. She said the world is getting worse each day. Pt very sleepy and had just returned to bed when I arrived. Discharge Recommendations:  Continue to assess pending progress    Goals  Short term goals  Short term goal 1: SBA bed mobility  Short term goal 2: SBa bed and chair transfers  Short term goal 3: SBA gait with ww 20 feet  Short term goal 4: SBA with HEP    PLAN    Times per week: 1-3        AMPAC (6 CLICK) BASIC MOBILITY  AM-PAC Inpatient Mobility Raw Score : 16     Therapy Time   Individual   Time In  1500   Time Out 1508   Minutes 8      8 minutes therefernando Ignacio PTA, 12/04/20 at 3:10 PM         Definitions for assistance levels  Independent = pt does not require any physical supervision or assistance from another person for activity completion. Device may be needed.   Stand by assistance = pt requires verbal cues or instructions from another person, close to but not touching, to perform the activity  Minimal assistance= pt performs 75% or more of the activity; assistance is required to complete the activity  Moderate assistance= pt performs 50% of the activity; assistance is required to complete the activity  Maximal assistance = pt performs 25% of the activity; assistance is required to complete the activity  Dependent = pt requires total physical assistance to accomplish the task

## 2020-12-05 VITALS
SYSTOLIC BLOOD PRESSURE: 126 MMHG | BODY MASS INDEX: 32.43 KG/M2 | WEIGHT: 183 LBS | TEMPERATURE: 97.2 F | OXYGEN SATURATION: 97 % | HEART RATE: 58 BPM | HEIGHT: 63 IN | RESPIRATION RATE: 18 BRPM | DIASTOLIC BLOOD PRESSURE: 58 MMHG

## 2020-12-05 LAB
ALBUMIN SERPL-MCNC: 2.9 G/DL (ref 3.5–4.6)
ALP BLD-CCNC: 57 U/L (ref 40–130)
ALT SERPL-CCNC: 16 U/L (ref 0–33)
ANION GAP SERPL CALCULATED.3IONS-SCNC: 13 MEQ/L (ref 9–15)
AST SERPL-CCNC: 14 U/L (ref 0–35)
BILIRUB SERPL-MCNC: <0.2 MG/DL (ref 0.2–0.7)
BUN BLDV-MCNC: 50 MG/DL (ref 8–23)
CALCIUM SERPL-MCNC: 8 MG/DL (ref 8.5–9.9)
CHLORIDE BLD-SCNC: 104 MEQ/L (ref 95–107)
CO2: 18 MEQ/L (ref 20–31)
CREAT SERPL-MCNC: 1.3 MG/DL (ref 0.5–0.9)
GFR AFRICAN AMERICAN: 46.2
GFR NON-AFRICAN AMERICAN: 38.2
GLOBULIN: 2.7 G/DL (ref 2.3–3.5)
GLUCOSE BLD-MCNC: 315 MG/DL (ref 70–99)
GLUCOSE BLD-MCNC: 317 MG/DL (ref 60–115)
GLUCOSE BLD-MCNC: 381 MG/DL (ref 60–115)
HCT VFR BLD CALC: 31.2 % (ref 37–47)
HEMOGLOBIN: 10.2 G/DL (ref 12–16)
MCH RBC QN AUTO: 29.8 PG (ref 27–31.3)
MCHC RBC AUTO-ENTMCNC: 32.6 % (ref 33–37)
MCV RBC AUTO: 91.6 FL (ref 82–100)
PDW BLD-RTO: 15.2 % (ref 11.5–14.5)
PERFORMED ON: ABNORMAL
PERFORMED ON: ABNORMAL
PLATELET # BLD: 247 K/UL (ref 130–400)
POTASSIUM REFLEX MAGNESIUM: 4 MEQ/L (ref 3.4–4.9)
POTASSIUM SERPL-SCNC: 4 MEQ/L (ref 3.4–4.9)
RBC # BLD: 3.41 M/UL (ref 4.2–5.4)
SODIUM BLD-SCNC: 135 MEQ/L (ref 135–144)
TOTAL PROTEIN: 5.6 G/DL (ref 6.3–8)
WBC # BLD: 4 K/UL (ref 4.8–10.8)

## 2020-12-05 PROCEDURE — 2580000003 HC RX 258: Performed by: INTERNAL MEDICINE

## 2020-12-05 PROCEDURE — 36415 COLL VENOUS BLD VENIPUNCTURE: CPT

## 2020-12-05 PROCEDURE — 94760 N-INVAS EAR/PLS OXIMETRY 1: CPT

## 2020-12-05 PROCEDURE — 80053 COMPREHEN METABOLIC PANEL: CPT

## 2020-12-05 PROCEDURE — 6370000000 HC RX 637 (ALT 250 FOR IP): Performed by: INTERNAL MEDICINE

## 2020-12-05 PROCEDURE — 2700000000 HC OXYGEN THERAPY PER DAY

## 2020-12-05 PROCEDURE — 6360000002 HC RX W HCPCS: Performed by: INTERNAL MEDICINE

## 2020-12-05 PROCEDURE — 85027 COMPLETE CBC AUTOMATED: CPT

## 2020-12-05 PROCEDURE — 2500000003 HC RX 250 WO HCPCS: Performed by: INTERNAL MEDICINE

## 2020-12-05 RX ORDER — PREDNISONE 20 MG/1
60 TABLET ORAL DAILY
Qty: 30 TABLET | Refills: 0 | Status: SHIPPED | OUTPATIENT
Start: 2020-12-05 | End: 2020-12-15

## 2020-12-05 RX ADMIN — ISOSORBIDE MONONITRATE 30 MG: 30 TABLET, EXTENDED RELEASE ORAL at 09:43

## 2020-12-05 RX ADMIN — HYDRALAZINE HYDROCHLORIDE 50 MG: 50 TABLET, FILM COATED ORAL at 09:43

## 2020-12-05 RX ADMIN — ENOXAPARIN SODIUM 80 MG: 80 INJECTION SUBCUTANEOUS at 09:44

## 2020-12-05 RX ADMIN — FERROUS SULFATE TAB 325 MG (65 MG ELEMENTAL FE) 325 MG: 325 (65 FE) TAB at 12:32

## 2020-12-05 RX ADMIN — REMDESIVIR 100 MG: 100 INJECTION, POWDER, LYOPHILIZED, FOR SOLUTION INTRAVENOUS at 14:23

## 2020-12-05 RX ADMIN — Medication 10 ML: at 09:44

## 2020-12-05 RX ADMIN — HYDRALAZINE HYDROCHLORIDE 50 MG: 50 TABLET, FILM COATED ORAL at 13:54

## 2020-12-05 RX ADMIN — SACUBITRIL AND VALSARTAN 1 TABLET: 24; 26 TABLET, FILM COATED ORAL at 09:43

## 2020-12-05 RX ADMIN — FUROSEMIDE 40 MG: 40 TABLET ORAL at 09:43

## 2020-12-05 ASSESSMENT — PAIN SCALES - GENERAL: PAINLEVEL_OUTOF10: 0

## 2020-12-05 NOTE — PROGRESS NOTES
Pt qual for home o2 due to covid status RN informed but wanted a home o2 eval done      sp02  95%  Hr 45  ra resting      sp02  96%  Hr 53 ra activity    spo2  96%  Hr 50  ra recovery

## 2020-12-05 NOTE — PROGRESS NOTES
Discharge instructions reviewed with the patient and son Pham Jackson. All questions answered. Pt dressed and waiting for her ride.   Pt has a follow up appointment scheduled 12-9-2020

## 2020-12-05 NOTE — DISCHARGE SUMMARY
Physician Discharge Summary     Patient ID:  Harika Rascon  [id number]  22 y.o.  6/10/1927    Admit date: 11/29/2020    Discharge date : 12/05/20     Admitting Physician: Roxi Lan MD     Discharge Physician: Sandi Huerta DO     Admission Diagnoses: Acute GI bleeding [K92.2]  YKCZI-02 [U07.1]  COVID-19 [U07.1]    Discharge Diagnoses: COVID 19 pneumonia    Admission Condition: fair    Discharged Condition: fair    Hospital Course: Pt initially admitted with concern for GI bleed as patient reported weakness and dark stools. Hgb was stable, but stool guiac was positive so patient admitted. Incidentally found to be COVID positive and isolation precautions were placed. Coumadin was held and vitamin k was given. No bleeding noted and Hgb remained stable. PT/OT recommended SNF, but patient and family adamant to go home and were agreeable to [place] [address] despite patient being extremely weak. Pt was set to be discharged on 12/1 but noted to be hypoxic requiring 3L O2 which was new. Pt without respiratory complaints, but given COVID and hypoxia, decision was made to initiate treatment with Remdesivir, decadron. Inflammatory markers were elevated including d-dimer. Pt unable to have CTA due to renal function. In the setting of no bleeding, decision was made to give full lovenox dosing and Hgb remained stable during admission. Pt was able to be weaned off O2 and home O2 eval was completed prior to discharge. Pt completed last dose of Remdesivir on 12/5 and adamant to go home and was discharged home with [place] [address] in stable condition. Rx for wheelchair given at discharge. Pt increased risk of readmission given her weakness. Pt to follow up with PCP after discharge.      Consults: none      Discharge Exam:  Constitutional: Lying in bed comfortably, hard of hearing  Head: Normocephalic, atraumatic  Eyes: EOMI, PERRLA  ENT: moist mucous membranes  Neck: neck supple, trachea midline  Lungs: Good inspiratory effort, CTABL, no wheeze, no rhonchi, no rales  Heart: RRR, normal S1 and S2  GI: Soft, non-distended, non tender, no guarding, no rebound, +BS  MSK: no edema noted  Skin: warm, dry  Psych: appropriate affect    Labs:   Recent Labs     12/03/20  0809 12/04/20  0804 12/05/20  0826   WBC 4.5* 4.8 4.0*   HGB 9.8* 9.6* 10.2*   HCT 29.6* 30.1* 31.2*    232 247     Recent Labs     12/03/20  0809 12/04/20  0804 12/05/20  0826    134* 135   K 4.1  4.1 4.2  4.2 4.0  4.0    102 104   CO2 19* 17* 18*   BUN 51* 50* 50*   CREATININE 1.57* 1.45* 1.30*   CALCIUM 8.5 8.2* 8.0*     Recent Labs     12/03/20  0809 12/04/20  0804 12/05/20  0826   AST 14 17 14   ALT 14 16 16   BILITOT <0.2 <0.2 <0.2   ALKPHOS 59 55 57     No results for input(s): INR in the last 72 hours. No results for input(s): Connee Matar in the last 72 hours. Urinalysis:   Lab Results   Component Value Date    NITRU Negative 11/29/2020    WBCUA 0-2 11/29/2020    BACTERIA RARE 11/29/2020    RBCUA 0-2 07/07/2020    BLOODU Negative 11/29/2020    SPECGRAV 1.020 11/29/2020    GLUCOSEU Negative 11/29/2020       Radiology:   Most recent    Chest CT      WITH CONTRAST:No results found for this or any previous visit. WITHOUT CONTRAST: No results found for this or any previous visit. CXR      2-view:   Results for orders placed during the hospital encounter of 01/02/17   XR Chest Standard TWO VW    Narrative EXAMINATION: CHEST TWO VIEWS  CLINICAL HISTORY: Cough  COMPARISONS: July 23, 2016  FINDINGS:  Two views of the chest are submitted. The cardiac silhouette is enlarged, unchanged configuration. .  The mediastinum is unremarkable. Pulmonary vascular unremarkable. Right sided trachea. No focal infiltrates. No effusions. No Pneumothoraces.     Impression NO ACUTE ACTIVE CARDIOPULMONARY PROCESS        Portable:   Results for orders placed during the hospital encounter of 11/29/20   XR CHEST PORTABLE    Narrative EXAMINATION: CHEST PORTABLE VIEW     CLINICAL HISTORY: Hypoxia    COMPARISONS: November 29, 2020     FINDINGS:    Single  views of the chest is submitted. The cardiac silhouette is enlarged configuration. Pulmonary vascular unremarkable. Right sided trachea. There is increasing fullness, soft tissue density in the right parahilar region. Soft tissue density measures approximately 4.3 x 4.5 cm which may represent a combination of pulmonary vessels and soft tissue density. Atelectasis versus patchy infiltrate left lower lobe. No Pneumothoraces. Impression 1. RIGHT PARAHILAR SOFT TISSUE DENSITY AS DESCRIBED ABOVE. 2. ATELECTASIS VERSUS PATCHY INFILTRATE LEFT LOWER LOBE.  3. OVERALL GIVEN THE CONSTELLATION OF FINDINGS RECOMMEND CT SCAN THE CHEST WITH IV CONTRAST TO FURTHER EVALUATE       Echo No results found for this or any previous visit. Disposition: home    In process/preliminary results:  Outstanding Order Results     No orders found from 10/31/2020 to 11/30/2020.           Patient Instructions:   Current Discharge Medication List      START taking these medications    Details   predniSONE (DELTASONE) 20 MG tablet Take 3 tablets by mouth daily for 10 days  Qty: 30 tablet, Refills: 0         CONTINUE these medications which have NOT CHANGED    Details   metFORMIN (GLUCOPHAGE) 1000 MG tablet take 1 tablet by mouth twice a day with meals  Qty: 180 tablet, Refills: 1    Associated Diagnoses: Type 2 diabetes mellitus with complication, without long-term current use of insulin (HCC)      hydrALAZINE (APRESOLINE) 50 MG tablet take 1 tablet by mouth three times a day  Qty: 90 tablet, Refills: 5      Lactobacillus (PROBIOTIC ACIDOPHILUS) CAPS take 1 capsule by mouth once daily      isosorbide mononitrate (IMDUR) 30 MG extended release tablet Take 1 tablet by mouth daily  Qty: 90 tablet, Refills: 1    Associated Diagnoses: Essential hypertension      furosemide (LASIX) 20 MG tablet Take 1 tablet by mouth daily  Qty: 90 tablet, Refills: 1    Associated Diagnoses: Diastolic dysfunction      ondansetron (ZOFRAN ODT) 4 MG disintegrating tablet Take 1 tablet by mouth every 8 hours as needed for Nausea  Qty: 10 tablet, Refills: 0      fluticasone (FLONASE) 50 MCG/ACT nasal spray 1 spray by Each Nostril route daily  Qty: 1 Bottle, Refills: 0      carvedilol (COREG) 12.5 MG tablet take 1 tablet by mouth twice a day with meals  Qty: 180 tablet, Refills: 3    Associated Diagnoses: Diastolic dysfunction; S/P PTCA (percutaneous transluminal coronary angioplasty); History of non-ST elevation myocardial infarction (NSTEMI)      ferrous sulfate (IRON 325) 325 (65 Fe) MG tablet Take 325 mg by mouth Daily with lunch      warfarin (COUMADIN) 6 MG tablet Take 6 mg by mouth daily       sacubitril-valsartan (ENTRESTO) 24-26 MG per tablet Take 1 tablet by mouth 2 times daily  Qty: 60 tablet, Refills: 0    Comments: LOT: [id number]  EXP: 07/22    1 BOTTLE OF 28 TABLETS  Associated Diagnoses: Diastolic dysfunction      clopidogrel (PLAVIX) 75 MG tablet Take 1 tablet by mouth daily  Qty: 90 tablet, Refills: 3      !! blood glucose monitor kit and supplies Test 2 times a day & as needed for symptoms of irregular blood glucose. Freestyle Freedom Lite  Qty: 1 kit, Refills: 0    Comments: Brand per patient preference. May round up to next available package size. !! Lancets MISC 1 each by Does not apply route 2 times daily Test 2 times a day & as needed for symptoms of irregular blood glucose. Freestyle Freedom Lite  Qty: 100 each, Refills: 5      !! blood glucose monitor strips Test 2 times a day & as needed for symptoms of irregular blood glucose.   Freestyle Freedom Lite  Qty: 200 strip, Refills: 3      !! TRUEPLUS LANCETS 28G MISC TEST TWO TIMES DAILY  Qty: 200 each, Refills: 3      Cholecalciferol (VITAMIN D) 2000 units CAPS capsule Take 2,000 Units by mouth      !! TRUE METRIX BLOOD GLUCOSE TEST strip TEST two to three times a day  Qty: 200 strip, Refills: 5    Comments: True Metrix Test Strips  Associated Diagnoses: Type 2 diabetes mellitus without complication, without long-term current use of insulin ([facility])      Needles & Syringes MISC 1 each by Does not apply route daily  Qty: 50 each, Refills: 0      Incontinence Supply Disposable (DEPEND UNDERGARMENTS) MISC 1 each by Does not apply route nightly  Qty: 1 Package, Refills: 11    Comments: Dispense 30 a month  Associated Diagnoses: Other urinary incontinence      Blood Glucose Monitoring Suppl (TRUE METRIX AIR GLUCOSE METER) W/DEVICE KIT       !! Blood Glucose Monitoring Suppl PRUDENCIO Dx: E11.9  Qty: 1 Device, Refills: 0    Associated Diagnoses: Type 2 diabetes mellitus without complication ([facility]       ! ! - Potential duplicate medications found. Please discuss with provider. STOP taking these medications       cephALEXin (KEFLEX) 250 MG capsule Comments:   Reason for Stopping:         Probiotic Product (PROBIOTIC DAILY) CAPS Comments:   Reason for Stopping:             Activity: activity as tolerated  Diet: diabetic diet  Wound Care: none needed    Follow-up with Yoanna Kaminski MD  in 2 weeks.     DC time 35 minutes    Signed:  Electronically signed by Julia Dunne DO on 12/5/2020 at 3:19 PM

## 2020-12-07 ENCOUNTER — TELEPHONE (OUTPATIENT)
Dept: INTERNAL MEDICINE | Age: 85
End: 2020-12-07

## 2020-12-07 ENCOUNTER — CARE COORDINATION (OUTPATIENT)
Dept: CASE MANAGEMENT | Age: 85
End: 2020-12-07

## 2020-12-07 LAB
BILIRUBIN URINE: NEGATIVE
BLOOD, URINE: NEGATIVE
CLARITY: CLEAR
COLOR: YELLOW
GLUCOSE URINE: 250 MG/DL
KETONES, URINE: NEGATIVE MG/DL
LEUKOCYTE ESTERASE, URINE: ABNORMAL
NITRITE, URINE: NEGATIVE
PH UA: 5.5 (ref 5–9)
PROTEIN UA: ABNORMAL MG/DL
SPECIFIC GRAVITY UA: 1.01 (ref 1–1.03)
UROBILINOGEN, URINE: 0.2 E.U./DL

## 2020-12-07 PROCEDURE — 1111F DSCHRG MED/CURRENT MED MERGE: CPT | Performed by: FAMILY MEDICINE

## 2020-12-07 NOTE — PROGRESS NOTES
Physical Therapy  Facility/Department: [facility] MED SURG [id number]  Physical Therapy Discharge      NAME: Kael Kumar    : 6/10/1927 (80 y.o.)  MRN: [id number]    Account: [de-identified]  Gender: female      Patient has been discharged from acute care hospital. DC patient from current PT program.      Electronically signed by Anya Monge PT on 20 at 4:00 PM EST

## 2020-12-07 NOTE — TELEPHONE ENCOUNTER
Yes, recommend to continue what hospital physician advised  Sugar will come down once steroids completed

## 2020-12-07 NOTE — CARE COORDINATION
risk factors of: diabetes and chronic kidney disease. Education provided regarding infection prevention, and signs and symptoms of COVID-19 and when to seek medical attention with family who verbalized understanding. Discussed exposure protocols and quarantine From CDC: Are you at higher risk for severe illness?   and given an opportunity for questions and concerns. The family agrees to contact the COVID-19 hotline [phone] or PCP office for questions related to COVID-19. For more information on steps you can take to protect yourself, see CDC's How to Protect Yourself     Patient/family/caregiver given information for [employer] and agrees to enroll no  Patient's preferred e-mail: declines  Patient's preferred phone number: declines    Discussed follow-up appointments. If no appointment was previously scheduled, appointment scheduling offered: 12/9 11:15. Is follow up appointment scheduled within 7 days of discharge? Yes  Non-[facility] follow up appointment(s):     Plan for follow-up call in 7-10 days based on severity of symptoms and risk factors. Plan for next call: CTN fu on CV-19 symptoms and UTI. CTN provided contact information for future needs. Pt son/caregiver/Nicholas reports he is frustrated the hosp took his mom off her preventive antibiotic for recurrent UTIs. States she is incontinent urine and he is concerned she has another UTI. States she has c/o pain w/ urination. Reports elevated home BS on steroid. Enc good hydration and low carb intake to help balance BS until off steroids, v/u. [facility] did obtain UAC&S order and result pending. Denies any home, DME, or med needs at this time.        Care Transitions 24 Hour Call    Do you have any ongoing symptoms?:  Yes  Patient-reported symptoms:  Pain, Fatigue  Do you have a copy of your discharge instructions?:  Yes  Do you have all of your prescriptions and are they filled?:  Yes  Have you been contacted by a [facility] Pharmacist?:  No  Have you scheduled your follow up appointment?:  Yes  Were you discharged with any Home Care or Post Acute Services:  Yes  Post Acute Services:  Home Health (Comment: [facility])  Do you feel like you have everything you need to keep you well at home?:  Yes  Care Transitions Interventions         Follow Up  Future Appointments   Date Time Provider José Manuel Adamson   12/9/2020 11:15 AM Tato Larson MD [facility]   12/14/2020  1:15 PM Imani Meredith MD [facility]   12/29/2020 11:45 AM Johnie Saldivar MD [address] [facility]    4/29/2021 12:00 PM Tato Larson MD [facility]   6/30/2021 11:00 AM Tato Larson MD [facility]

## 2020-12-08 ENCOUNTER — HOSPITAL ENCOUNTER (OUTPATIENT)
Age: 85
Setting detail: SPECIMEN
Discharge: HOME | End: 2020-12-08
Payer: MEDICARE

## 2020-12-08 LAB
BACTERIA: ABNORMAL /HPF
EPITHELIAL CELLS, UA: ABNORMAL /HPF (ref 0–5)
HYALINE CASTS: ABNORMAL /HPF (ref 0–5)
INR BLD: 1.4
PROTHROMBIN TIME: 17.3 SEC (ref 12.3–14.9)
RBC UA: ABNORMAL /HPF (ref 0–2)
WBC UA: ABNORMAL /HPF (ref 0–5)

## 2020-12-08 PROCEDURE — 85610 PROTHROMBIN TIME: CPT

## 2020-12-09 ENCOUNTER — VIRTUAL VISIT (OUTPATIENT)
Dept: INTERNAL MEDICINE | Age: 85
End: 2020-12-09
Payer: MEDICARE

## 2020-12-09 LAB — URINE CULTURE, ROUTINE: NORMAL

## 2020-12-09 PROCEDURE — G2025 DIS SITE TELE SVCS RHC/FQHC: HCPCS | Performed by: FAMILY MEDICINE

## 2020-12-09 RX ORDER — CEPHALEXIN 250 MG/1
250 CAPSULE ORAL DAILY
Qty: 90 CAPSULE | Refills: 1 | COMMUNITY
Start: 2020-12-09 | End: 2021-04-13

## 2020-12-09 RX ORDER — HYDRALAZINE HYDROCHLORIDE 50 MG/1
50 TABLET, FILM COATED ORAL 2 TIMES DAILY
COMMUNITY
End: 2021-01-13 | Stop reason: DRUGHIGH

## 2020-12-09 ASSESSMENT — ENCOUNTER SYMPTOMS
CHEST TIGHTNESS: 0
WHEEZING: 0
CHOKING: 0
APNEA: 0
SHORTNESS OF BREATH: 0

## 2020-12-09 NOTE — PROGRESS NOTES
Patient: Michael Boyle    YOB: 1927    Date: 12/9/20       Patient Active Problem List    Diagnosis Date Noted    History of ST elevation myocardial infarction (STEMI)      Priority: High    CKD (chronic kidney disease) 03/18/2015     Priority: High     Overview Note:     Stage G3a A2      HLD (hyperlipidemia) 03/18/2015     Priority: High    HTN (hypertension) 11/29/2011     Priority: High    Diabetes mellitus 11/29/2011     Priority: High    Diastolic dysfunction 91/46/6360     Priority: Medium    Valvular heart disease 03/07/2018     Priority: Medium    Hypercalcemia 01/15/2018     Priority: Medium     Overview Note:     kami Tripathi      Lumbar spinal stenosis 12/22/2016     Priority: Medium    Chronic low back pain 08/17/2016     Priority: Medium    Eczematous dermatitis      Priority: Low    Vitamin D deficiency 03/18/2015     Priority: Low    SI (stress incontinence), female 05/29/2012     Priority: Low    Osteoarthritis 05/29/2012     Priority: Low    COVID-19 12/01/2020    Acute GI bleeding 11/30/2020    Uncontrolled type 2 diabetes mellitus with hyperglycemia (HCC)     DJD (degenerative joint disease), lumbar 06/24/2020    History of PTCA 06/24/2020    Vestibular neuronitis of both ears 06/22/2020    Dizziness     Spinal stenosis in cervical region 06/21/2020    Lumbar degenerative disc disease 06/21/2020    Spinal stenosis, lumbar region, with neurogenic claudication 06/21/2020    Abnormality of gait and mobility due to  NTSCI with Impaired Mobility and ADL's secondary to Cervical Myelopathy and Vestibular neuronitis with rehab admission 06/24/20. 06/21/2020     Overview Note:     80-year old female who presented to the ER with complaints of weakness, nausea, vomiting, and dizziness. CT Head 06/18/20 with no acute intracranial process. The dizziness came on suddenly the night prior to admission and had persisted.   In the ER her blood pressure was in the 200's and her non-ST elevation myocardial infarction (NSTEMI) 11/12/2018    Nausea & vomiting 11/11/2018    Chest pain 11/10/2018    Vitamin B12 deficiency 09/17/2018     Overview Note:     Started 2/2018      Recurrent UTI (urinary tract infection)-Raoultella resistant to Macrobid 08/06/2018     Overview Note:     amoxicillin-clavulanate Sensitive <=2 mcg/mL    ceFAZolin Sensitive <=4 mcg/mL    ciprofloxacin Sensitive <=0.25 mcg/mL    gentamicin Sensitive <=1 mcg/mL    nitrofurantoin Resistant 256 mcg/mL    trimethoprim-sulfamethoxazole Sensitive <=20 mcg/mL            SCC (squamous cell carcinoma), arm 2013     Overview Note:     right upper arm, 2015 right forarm      Type 2 diabetes mellitus ([address] 2013    Obesity (BMI 30-39.9) 2013     Overview Note:     BMI: 32.7      [place] (hard of hearing) 2013     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 SCC (squamous cell carcinoma), arm 2013    right upper arm, 2015 right forarm    SI (stress incontinence), female 5/29/2012    Type II or unspecified type diabetes mellitus without mention of complication, not stated as uncontrolled     Whooping cough      Past Surgical History:   Procedure Laterality Date    ANKLE SURGERY      broken left ankle.     APPENDECTOMY      BREAST BIOPSY      x2 left breast    CATARACT REMOVAL  2004 bilateral    CORONARY ANGIOPLASTY WITH STENT PLACEMENT  01/2019    CYSTOCELE REPAIR      EYE SURGERY      bilateral cataract    HYSTERECTOMY      complete at age 39    [address]      as a child     Family History   Problem Relation Age of Onset    Diabetes Mother     Heart Disease Father      Social History     Socioeconomic History    Marital status:      Spouse name: Not on file    Number of children: Not on file    Years of education: Not on file    Highest education level: Not on file   Occupational History    Not on file   Social Needs    Financial resource strain: Not very hard    Food insecurity     Worry: Never true     Inability: Never true    Transportation needs     Medical: No     Non-medical: No   Tobacco Use    Smoking status: Never Smoker    Smokeless tobacco: Never Used   Substance and Sexual Activity    Alcohol use: No     Alcohol/week: 0.0 standard drinks    Drug use: No    Sexual activity: Not on file   Lifestyle    Physical activity     Days per week: Not on file     Minutes per session: Not on file    Stress: Not on file   Relationships    Social connections     Talks on phone: More than three times a week     Gets together: More than three times a week     Attends [employer] service: 1 to 4 times per year     Active member of club or organization: No     Attends meetings of clubs or organizations: Never     Relationship status:     Intimate partner violence     Fear of current or ex partner: No     Emotionally abused: No     Physically abused: No     Forced sexual activity: No   Other Topics Concern    Not on file   Social History Narrative         Lives With: Son    Type of Home: [address]: Two level, Able to Live on Main level with bedroom/bathroom, Performs ADL's on one level    Home Access: Stairs to enter with rails    Entrance Stairs - Number of Steps:  Threshold    Bathroom Shower/Tub: Tub/Shower unit    Bathroom Toilet: Handicap height    Bathroom Equipment: Grab bars in shower, Grab bars around toilet, Hand-held shower, Shower chair    Bathroom Accessibility: Accessible    Home Equipment: Rolling walker, 4 wheeled walker (Usually uses rollator)    ADL Assistance: [facility]: Needs assistance (Son manages housework)    Homemaking Responsibilities: No    Ambulation Assistance: Independent (Rollator)    Transfer Assistance: Independent    Active : No    Patient's  Info:  Sons          Current Outpatient Medications on File Prior to Visit   Medication Sig Dispense Refill    hydrALAZINE (APRESOLINE) 50 MG tablet Take 50 mg by mouth 2 times daily      predniSONE (DELTASONE) 20 MG tablet Take 3 tablets by mouth daily for 10 days 30 tablet 0    metFORMIN (GLUCOPHAGE) 1000 MG tablet take 1 tablet by mouth twice a day with meals 180 tablet 1    Lactobacillus (PROBIOTIC ACIDOPHILUS) CAPS take 1 capsule by mouth once daily      isosorbide mononitrate (IMDUR) 30 MG extended release tablet Take 1 tablet by mouth daily 90 tablet 1    furosemide (LASIX) 20 MG tablet Take 1 tablet by mouth daily 90 tablet 1    ondansetron (ZOFRAN ODT) 4 MG disintegrating tablet Take 1 tablet by mouth every 8 hours as needed for Nausea 10 tablet 0    fluticasone (FLONASE) 50 MCG/ACT nasal spray 1 spray by Each Nostril route daily (Patient taking differently: 1 spray by Each Nostril route as needed ) 1 Bottle 0    carvedilol (COREG) 12.5 MG tablet take 1 tablet by mouth twice a day with meals 180 tablet 3    ferrous sulfate (IRON 325) 325 (65 Fe) MG tablet Take 325 mg by mouth Daily with lunch      warfarin (COUMADIN) 6 MG tablet Take 6 mg by mouth daily       sacubitril-valsartan (ENTRESTO) 24-26 MG per tablet Take 1 tablet by mouth 2 times daily 60 tablet 0    clopidogrel (PLAVIX) 75 MG tablet Take 1 tablet by mouth daily 90 tablet 3    Cholecalciferol (VITAMIN D) 2000 units CAPS capsule Take 2,000 Units by mouth      blood glucose monitor kit and supplies Test 2 times a day & as needed for symptoms of irregular blood glucose. Freestyle Freedom Lite 1 kit 0    Lancets MISC 1 each by Does not apply route 2 times daily Test 2 times a day & as needed for symptoms of irregular blood glucose. Freestyle Freedom Lite 100 each 5    blood glucose monitor strips Test 2 times a day & as needed for symptoms of irregular blood glucose. Freestyle Freedom Lite 200 strip 3    TRUEPLUS LANCETS 28G MISC TEST TWO TIMES DAILY 200 each 3    TRUE METRIX BLOOD GLUCOSE TEST strip TEST two to three times a day 200 strip 5    Needles & Syringes MISC 1 each by Does not apply route daily 50 each 0    Incontinence Supply Disposable (DEPEND UNDERGARMENTS) MISC 1 each by Does not apply route nightly 1 Package 11    Blood Glucose Monitoring Suppl (TRUE METRIX AIR GLUCOSE METER) W/DEVICE KIT       Blood Glucose Monitoring Suppl PRUDENCIO Dx: E11.9 1 Device 0     No current facility-administered medications on file prior to visit. Allergies   Allergen Reactions    Metoclopramide     Pantoprazole Other (See Comments)    Pcn [Penicillins]      Tolerates rocephin    Protonix [Pantoprazole Sodium]        Chief Complaint   Patient presents with    Follow-Up from Hospital     hospitalized due to covid. [facility] did labs and urine culture. INR 1.7, taking 6 mg warfarin nightly since saturday. SOB doesnt seem to be any worse than its ever been. hospital discontinued keflex. TELEHEALTH EVALUATION -- Audio/Visual (During KSXAS-83 public health emergency)    Due to COVID 19 outbreak, patient's office visit was converted to a virtual visit. Patient was contacted and agreed to proceed with a virtual visit via Telephone Visit  The risks and benefits of converting to a virtual visit were discussed in light of the current infectious disease epidemic.   Patient also understood that insurance coverage and co-pays are up to their individual insurance plans. Time spent with patient on the phone 23 mins    Pursuant to the emergency declaration under the [facility] waiver authority and the [employer] Act, this Virtual  Visit was conducted, with patient's consent, to reduce the patient's risk of exposure to COVID-19 and provide continuity of care for an established patient. Services were provided through a telephone discussion virtually to substitute for in-person clinic visit. HPI    Covid - 23 hospitalization follow up   Spoke ot pt caregiver - her son    Still weak & tired  Has home nurse care, vitals are normal  Pt has hx of recurrent UTI, was told to stop keflex at d/c   INR is thicker as well, since off of Abx  She is on prednisone 60 mg daily, sugar is above 300  On metformin but last GFR was under 39  Son has left over Jardiance at home from last hospitalization    She was discharged dec 5/2020  UCx from the 7th was inconclusive  Son is concerned about a UTI  He is also concerned about restarting her Abx    Was initially seen for blood in stool  No blood in stool noted anymore  Her INR was supratherapeutic at the time due to Abx for UTI prior to going  into hospital     He is also inquiring about starting palliative care  Pt does need help with most ADL's    Review of Systems   Constitutional: Positive for activity change and fatigue. Negative for appetite change, chills, diaphoresis and fever. Respiratory: Negative for apnea, choking, chest tightness, shortness of breath and wheezing. Cardiovascular: Negative for chest pain, palpitations and leg swelling. Physical Exam    Due to this being a TeleHealth encounter, evaluation of the following organ systems is limited: Vitals/Constitutional/EENT/Resp/CV/GI//MS/Neuro/Skin/Heme-Lymph-Imm.     [x] Alert  [] Oriented to person/place/time [x] No apparent distress  [] Toxic appearing    [] Face flushed appearing [] Sclera clear  [] Lips are cyanotic      [x] Breathing appears normal  [] Appears tachypneic      [] Rash on visible skin    [] Cranial Nerves II-XII grossly intact    [] Motor grossly intact in visible upper extremities    [] Motor grossly intact in visible lower extremities    [] Normal Mood  [] Anxious appearing    [] Depressed appearing  [] Confused appearing      [] Poor short term memory  [] Poor long term memory    [] OTHER: Patient is aware of today's time and date  Patient is aware of current pandemic situation with Covid-19  Patient is able to speaking full sentences  Patient is not SOB during speech    Assessment/Plan:  Sol Johnston was seen today for follow-up from hospital.    Diagnoses and all orders for this visit:    Type 2 diabetes mellitus with hyperglycemia, without long-term current use of insulin ([facility] [address]  -     Comprehensive Metabolic Panel;  Future  -     Ambulatory referral to [address] [place] [address] to let sugars elevated due to steroids  She shouldn't even be on metformin due to GFR  Recheck labs and I will advise if metformin needs D/C  She may need to start insulin    Recurrent UTI  -     Ambulatory referral to [address] [place] [address] to restart ABx  Recheck urine Cx    Current use of long term anticoagulation  -     Protime-Inr; Future  -     Ambulatory referral to Palliative Care  Will nto adjust coumadin dose due to restarting Abx    Chronic kidney disease, unspecified CKD stage  -     Ambulatory referral to Palliative Care  Recheck GFR, dropped in hospital     Anemia, unspecified type  -     CBC Auto Differential; Future  -     Ambulatory referral to Palliative Care  Check CBC due to bleed    COVID-19  -     Ambulatory referral to Palliative Care    Cerebrovascular disease  -     Ambulatory referral to Palliative Care    Abnormality of gait and mobility due to  NTSCI with Impaired Mobility and ADL's secondary to Cervical Myelopathy and Vestibular neuronitis  -     Ambulatory referral to Palliative Care          Orders Placed This Encounter   Procedures    Protime-Inr     Standing Status:   Future     Standing Expiration Date:   12/15/2021     Order Specific Question:   Daily Coumadin Dose? Answer:   6 mg daily    Comprehensive Metabolic Panel     Standing Status:   Future     Standing Expiration Date:   3/9/2021    CBC Auto Differential     Standing Status:   Future     Standing Expiration Date:   3/9/2021    Ambulatory referral to Palliative Care     Referral Priority:   Routine     Referral Type:   Eval and Treat     Referral Reason:   Specialty Services Required     Referred to Provider:   Chey Frye MD     Requested Specialty:   Palliative Medicine     Number of Visits Requested:   1         Return in about 4 weeks (around 1/6/2021) for covid f/u .

## 2020-12-14 ENCOUNTER — TELEPHONE (OUTPATIENT)
Dept: INTERNAL MEDICINE | Age: 85
End: 2020-12-14

## 2020-12-15 ENCOUNTER — TELEPHONE (OUTPATIENT)
Dept: INTERNAL MEDICINE | Age: 85
End: 2020-12-15

## 2020-12-15 LAB
INR BLD: 6.7
PROTHROMBIN TIME: 57.3 SEC (ref 12.3–14.9)
REASON FOR REJECTION: NORMAL
REJECTED TEST: NORMAL

## 2020-12-16 ENCOUNTER — ANTI-COAG VISIT (OUTPATIENT)
Dept: INTERNAL MEDICINE | Age: 85
End: 2020-12-16
Payer: MEDICARE

## 2020-12-16 DIAGNOSIS — T45.515A PROTHROMBIN TIME INCREASED DUE TO COUMADIN: Primary | ICD-10-CM

## 2020-12-16 DIAGNOSIS — R79.1 PROTHROMBIN TIME INCREASED DUE TO COUMADIN: Primary | ICD-10-CM

## 2020-12-16 DIAGNOSIS — N39.0 RECURRENT UTI: ICD-10-CM

## 2020-12-16 LAB
BILIRUBIN URINE: NEGATIVE
BLOOD, URINE: NEGATIVE
CLARITY: CLEAR
COLOR: YELLOW
GLUCOSE URINE: NEGATIVE MG/DL
KETONES, URINE: NEGATIVE MG/DL
LEUKOCYTE ESTERASE, URINE: NEGATIVE
NITRITE, URINE: NEGATIVE
PH UA: 5 (ref 5–9)
PROTEIN UA: NEGATIVE MG/DL
SPECIFIC GRAVITY UA: 1.01 (ref 1–1.03)
UROBILINOGEN, URINE: 0.2 E.U./DL

## 2020-12-16 PROCEDURE — 93793 ANTICOAG MGMT PT WARFARIN: CPT | Performed by: FAMILY MEDICINE

## 2020-12-16 NOTE — PROGRESS NOTES
Brit Myles aware to hold two days, then go to 5 mg.  Order sent to home health to have redrawn on 12/18/20 For Your Well Being: Upper Respiratory Infections - Adult    Colds and upper respiratory infections often last 7 to 14 days. They are caused by viruses. Antibiotics are not an effective treatment for viruses.    Symptoms of upper respiratory infections or colds include:  · Sneezing  · Cough  · Runny nose  · Sore throat  · Loss of appetite  · General body aches  · Fatigue  · Fever    Your symptoms may be managed at home in this way:  · Rest at home if there is a fever.  · Drink plenty of liquids.  · For fever, headache, sore throat and body aches, take acetaminophen (Tylenol) or ibuprofen (Advil/Nuprin) as directed.  · For sore throat, try warm salt water gargles, throat lozenges and cold fluids.  · For stuffy nose, a room humidifier or an over-the-counter nasal decongestant (pseudoephedrine 30mg, 1 tablet every 6 hours as needed) may be helpful. Follow label directions.  · Do not smoke or be exposed to cigarette/pipe smoke.  · If cough becomes bothersome, begin Mucinex DM, 1-2 tablets every 12 hours as needed.  · Cover coughs and wash hands frequently for infection control.  · Tea, honey, humidifier    Fever  · Take your temperature every 4 hours during the fever.  · Normal temperatures are:   -oral 98.6   -rectal 99.6   -underarm 97.6 degrees F (Fahrenheit)(least accurate)  · You can treat your fever with Tylenol or ibuprofen (Advil/Nuprin/Motrin) when your temperature is:   - oral 101 degrees F or higher   - rectal 102 degrees F or higher   - underarm 100 degrees or higher  · Follow the instructions on the label.  · Acetaminophen (Tylenol) rectal suppositories can be used for people who are vomiting.  · Dress lightly to allow body heat to escape. If shivering, cover with a light blanket until shivering stops. Maintain normal room temperature.  · Take frequent amounts of cool liquids.

## 2020-12-17 LAB — URINE CULTURE, ROUTINE: NORMAL

## 2020-12-18 ENCOUNTER — TELEPHONE (OUTPATIENT)
Dept: CARDIOLOGY CLINIC | Age: 85
End: 2020-12-18

## 2020-12-18 LAB — URINE CULTURE, ROUTINE: NORMAL

## 2020-12-18 NOTE — TELEPHONE ENCOUNTER
on Snellen called to inquire; Patient is exhausted since she has come home after having COVID. Also having confusion after takein ISOSORBIDE in the morning. Patient is taking same meds at night except the ISOSORBIDE and doesn't have the fatigue and confusion. Kiersten Snellen wants to know if it is safe for her not to take the [address] for a few days to see if that Rx is what is causing the problem.

## 2020-12-19 ENCOUNTER — HOSPITAL ENCOUNTER (OUTPATIENT)
Age: 85
Setting detail: SPECIMEN
Discharge: HOME | End: 2020-12-19
Payer: MEDICARE

## 2020-12-19 LAB
ALBUMIN SERPL-MCNC: 3.2 G/DL (ref 3.5–4.6)
ALP BLD-CCNC: 55 U/L (ref 40–130)
ALT SERPL-CCNC: 10 U/L (ref 0–33)
ANION GAP SERPL CALCULATED.3IONS-SCNC: 12 MEQ/L (ref 9–15)
AST SERPL-CCNC: 10 U/L (ref 0–35)
BILIRUB SERPL-MCNC: 0.3 MG/DL (ref 0.2–0.7)
BUN BLDV-MCNC: 54 MG/DL (ref 8–23)
CALCIUM SERPL-MCNC: 8.4 MG/DL (ref 8.5–9.9)
CHLORIDE BLD-SCNC: 97 MEQ/L (ref 95–107)
CO2: 22 MEQ/L (ref 20–31)
CREAT SERPL-MCNC: 1.42 MG/DL (ref 0.5–0.9)
GFR AFRICAN AMERICAN: 41.7
GFR NON-AFRICAN AMERICAN: 34.5
GLOBULIN: 1.9 G/DL (ref 2.3–3.5)
GLUCOSE BLD-MCNC: 300 MG/DL (ref 70–99)
HCT VFR BLD CALC: 28.9 % (ref 37–47)
HEMOGLOBIN: 9.4 G/DL (ref 12–16)
INR BLD: 4
MCH RBC QN AUTO: 30 PG (ref 27–31.3)
MCHC RBC AUTO-ENTMCNC: 32.5 % (ref 33–37)
MCV RBC AUTO: 92.2 FL (ref 82–100)
PDW BLD-RTO: 15.4 % (ref 11.5–14.5)
PLATELET # BLD: 144 K/UL (ref 130–400)
POTASSIUM SERPL-SCNC: 4.4 MEQ/L (ref 3.4–4.9)
PROTHROMBIN TIME: 38.6 SEC (ref 12.3–14.9)
RBC # BLD: 3.13 M/UL (ref 4.2–5.4)
SODIUM BLD-SCNC: 131 MEQ/L (ref 135–144)
TOTAL PROTEIN: 5.1 G/DL (ref 6.3–8)
WBC # BLD: 7.3 K/UL (ref 4.8–10.8)

## 2020-12-19 PROCEDURE — 85610 PROTHROMBIN TIME: CPT

## 2020-12-19 PROCEDURE — 80053 COMPREHEN METABOLIC PANEL: CPT

## 2020-12-19 PROCEDURE — 85027 COMPLETE CBC AUTOMATED: CPT

## 2020-12-21 ENCOUNTER — ANTI-COAG VISIT (OUTPATIENT)
Dept: INTERNAL MEDICINE | Age: 85
End: 2020-12-21
Payer: MEDICARE

## 2020-12-21 PROCEDURE — 93793 ANTICOAG MGMT PT WARFARIN: CPT | Performed by: FAMILY MEDICINE

## 2020-12-22 NOTE — PROGRESS NOTES
Brayden Diamond made aware to start pt on 4 mg and have home health draw when they can, An order has been faxed to home health.

## 2020-12-23 ENCOUNTER — TELEPHONE (OUTPATIENT)
Dept: CARDIOLOGY CLINIC | Age: 85
End: 2020-12-23

## 2020-12-23 NOTE — TELEPHONE ENCOUNTER
Patient is requesting  ENTRESTO 24-26 SAMPLES.  Please call  [facility] he will  at the [facility] office [phone]

## 2020-12-24 ENCOUNTER — TELEPHONE (OUTPATIENT)
Dept: INTERNAL MEDICINE | Age: 85
End: 2020-12-24

## 2020-12-24 ENCOUNTER — HOSPITAL ENCOUNTER (OUTPATIENT)
Dept: LAB | Age: 85
Discharge: HOME | End: 2020-12-24
Payer: MEDICARE

## 2020-12-24 LAB
INR BLD: 2.1
PROTHROMBIN TIME: 23.3 SEC (ref 12.3–14.9)

## 2020-12-24 PROCEDURE — 85610 PROTHROMBIN TIME: CPT

## 2020-12-24 PROCEDURE — 36415 COLL VENOUS BLD VENIPUNCTURE: CPT

## 2020-12-24 NOTE — TELEPHONE ENCOUNTER
Per pt's son Mendez Jenkins is ready to start home INR testing with upcoming winter and lab draws getting more difficult every time.

## 2020-12-28 ENCOUNTER — ANTI-COAG VISIT (OUTPATIENT)
Dept: INTERNAL MEDICINE | Age: 85
End: 2020-12-28
Payer: MEDICARE

## 2020-12-28 PROCEDURE — 93793 ANTICOAG MGMT PT WARFARIN: CPT | Performed by: FAMILY MEDICINE

## 2020-12-28 NOTE — PROGRESS NOTES
Georgiana Whitley aware of results, that pt will stay on the same dose and the office to let him know when she should retest. He would like to start home INR.

## 2020-12-31 ENCOUNTER — CARE COORDINATION (OUTPATIENT)
Dept: CASE MANAGEMENT | Age: 85
End: 2020-12-31

## 2020-12-31 NOTE — CARE COORDINATION
Carol[facility] Transitions Follow Up Call    2020    Patient: Eileen Espinosa  Patient : 6/10/1927   MRN: [id number]  Reason for Admission: -2020 [address] CV-19, GIB. Discharge Date: 20 RARS: Readmission Risk Score: 36      Needs to be reviewed by the provider   Additional needs identified to be addressed with provider No  home health care-HC  Discussed COVID-19 related testing which was available at this time. Test results were positive. Patient informed of results, if available? Yes         Method of communication with provider : none    Care Transition Nurse (CTN) contacted the family by telephone to follow up after admission. Verified name and  with family as identifiers. Addressed changes since last contact: symptom management-CV-19, GIB. Discharged needs reviewed: home health care-[facility] still providing services. Follow up appointment completed? Yes    Advance Care Planning:   Does patient have an Advance Directive:  reviewed and current. CTN reviewed discharge instructions, medical action plan and red flags with family and discussed any barriers to care and/or understanding of plan of care after discharge. Discussed appropriate site of care based on symptoms and resources available to patient including: Reviewed diet and hydration. Reviewed s/s GIB to report. . The family agrees to contact the PCP office for questions related to their healthcare. Patients top risk factors for readmission: medical condition  Interventions to address risk factors: Education of patient/family/caregiver/guardian to support self-management-Reviewed s/s CV-19 and progressive symptoms to report/go to ED. Discussed follow-up appointments. If no appointment was previously scheduled, appointment scheduling offered: Attended. Has cardiology appt 2021 Is follow up appointment scheduled within 7 days of discharge? Yes  Non-[facility] follow up appointment(s):     Spoke w/ caregiver/Nicholas.  Reports pt is much less confused off steroids. Shares [facility] nurse advises her lungs sound clear. Continues to fatigue easy and has generalized weakness. Denies any cough, SOB, fever, chills, or flu-like symptoms. Tolerates diet and hydration. Denies any dk colored stools, nausea, vomiting, abd bloat. Denies any other home, DME, or med needs. Feels pt has improved well. CTN provided contact information for future needs. CTN s/o. Care Transitions Subsequent and Final Call    Subsequent and Final Calls  Do you have any ongoing symptoms?: Yes  Patient-reported symptoms: Fatigue, Weakness  Have your medications changed?: No  Do you have any questions related to your medications?: No  Do you currently have any active services?: Yes  Are you currently active with any services?: Home Health  Do you have any needs or concerns that I can assist you with?: No  Identified Barriers: Lack of Education  Care Transitions Interventions  Other Interventions:            Follow Up  Future Appointments   Date Time Provider José Manuel Adamson   1/13/2021 11:45 AM Melly Kelley MD [facility]   4/29/2021 12:00 PM Herlinda Alarcon MD [facility]   6/30/2021 11:00 AM Herlinda Alarcon MD [facility], [address] [place] [address]

## 2021-01-03 NOTE — TELEPHONE ENCOUNTER
Requesting medication refill. Please approve or deny this request.    Rx requested:  Requested Prescriptions     Pending Prescriptions Disp Refills    warfarin (COUMADIN) 1 MG tablet [Pharmacy Med Name: WARFARIN SODIUM 1 MG TABLET] 180 tablet      Sig: take 3 tablets by mouth once daily       Last Office Visit, reason seen and by who:   12/9/2020   Follow-Up from [address] hospitalized due to covid. [facility] did labs and urine culture. INR 1.7, taking 6 mg warfarin nightly since saturday. SOB doesnt seem to be any worse than its ever been. hospital discontinued keflex. gonsalo     FOLLOW UP PLAN FROM LAST VISIT: COPY AND PASTE FROM LAST NOTE       Return in about 4 weeks (around 1/6/2021) for covid f/u .         PATIENT CONTACTED FOR A FOLLOW UP APPT: YES OR NO  No   See below    Next Visit Date:  Future Appointments   Date Time Provider José Manuel Adamson   1/13/2021 11:45 AM Minerva Ruiz MD [facility]   4/29/2021 12:00 PM Curtis Murrell, [address] [place] [address]   6/30/2021 11:00 AM Curtis Murrell MD [facility]

## 2021-01-04 ENCOUNTER — TELEPHONE (OUTPATIENT)
Dept: INTERNAL MEDICINE | Age: 86
End: 2021-01-04

## 2021-01-04 DIAGNOSIS — N39.0 RECURRENT UTI: ICD-10-CM

## 2021-01-04 DIAGNOSIS — N39.0 RECURRENT UTI: Primary | ICD-10-CM

## 2021-01-04 LAB
BACTERIA: ABNORMAL /HPF
BILIRUBIN URINE: NEGATIVE
BLOOD, URINE: NEGATIVE
CLARITY: CLEAR
COLOR: YELLOW
EPITHELIAL CELLS, UA: ABNORMAL /HPF (ref 0–5)
GLUCOSE URINE: NEGATIVE MG/DL
HYALINE CASTS: ABNORMAL /HPF (ref 0–5)
KETONES, URINE: NEGATIVE MG/DL
LEUKOCYTE ESTERASE, URINE: ABNORMAL
NITRITE, URINE: NEGATIVE
PH UA: 5 (ref 5–9)
PROTEIN UA: 30 MG/DL
RBC UA: ABNORMAL /HPF (ref 0–5)
SPECIFIC GRAVITY UA: 1.01 (ref 1–1.03)
UROBILINOGEN, URINE: 0.2 E.U./DL
WBC UA: ABNORMAL /HPF (ref 0–5)

## 2021-01-04 RX ORDER — WARFARIN SODIUM 1 MG/1
TABLET ORAL
Qty: 180 TABLET | Refills: 3 | Status: SHIPPED | OUTPATIENT
Start: 2021-01-04 | End: 2021-03-18 | Stop reason: SDUPTHER

## 2021-01-04 NOTE — TELEPHONE ENCOUNTER
Janusz Kirby dropped a urine off, states mom has another UTI. Do you want a POCT, UA and culture?      Please advise

## 2021-01-06 DIAGNOSIS — N39.0 URINARY TRACT INFECTION WITHOUT HEMATURIA, SITE UNSPECIFIED: ICD-10-CM

## 2021-01-06 DIAGNOSIS — N39.0 RECURRENT UTI (URINARY TRACT INFECTION): Primary | ICD-10-CM

## 2021-01-06 RX ORDER — CIPROFLOXACIN 250 MG/1
250 TABLET, FILM COATED ORAL 2 TIMES DAILY
Qty: 10 TABLET | Refills: 0 | Status: SHIPPED | OUTPATIENT
Start: 2021-01-06 | End: 2021-01-11

## 2021-01-07 ENCOUNTER — TELEPHONE (OUTPATIENT)
Dept: UROLOGY | Age: 86
End: 2021-01-07

## 2021-01-07 DIAGNOSIS — T45.515A PROTHROMBIN TIME INCREASED DUE TO COUMADIN: ICD-10-CM

## 2021-01-07 DIAGNOSIS — R79.1 PROTHROMBIN TIME INCREASED DUE TO COUMADIN: ICD-10-CM

## 2021-01-07 LAB
INR BLD: 1.8
ORGANISM: ABNORMAL
PROTHROMBIN TIME: 21 SEC (ref 12.3–14.9)
URINE CULTURE, ROUTINE: ABNORMAL

## 2021-01-07 NOTE — TELEPHONE ENCOUNTER
Patient is being re-referred to urology for frequent UTI. Per Dr. Shakila Dumont, patient needs a 2nd opinion by Dr. Ya Lopez. Spoke with her son, [address] [place] [address], he will make the appt.

## 2021-01-08 ENCOUNTER — TELEPHONE (OUTPATIENT)
Dept: INTERNAL MEDICINE | Age: 86
End: 2021-01-08

## 2021-01-08 ENCOUNTER — ANTI-COAG VISIT (OUTPATIENT)
Dept: INTERNAL MEDICINE | Age: 86
End: 2021-01-08
Payer: MEDICARE

## 2021-01-08 DIAGNOSIS — Z79.01 MONITORING FOR LONG-TERM ANTICOAGULANT USE: ICD-10-CM

## 2021-01-08 DIAGNOSIS — I25.2 HISTORY OF NON-ST ELEVATION MYOCARDIAL INFARCTION (NSTEMI): ICD-10-CM

## 2021-01-08 DIAGNOSIS — Z79.01 CURRENT USE OF LONG TERM ANTICOAGULATION: ICD-10-CM

## 2021-01-08 DIAGNOSIS — N39.0 RECURRENT UTI (URINARY TRACT INFECTION): Primary | ICD-10-CM

## 2021-01-08 DIAGNOSIS — Z51.81 MONITORING FOR LONG-TERM ANTICOAGULANT USE: ICD-10-CM

## 2021-01-08 DIAGNOSIS — Z86.73 HISTORY OF CVA (CEREBROVASCULAR ACCIDENT): ICD-10-CM

## 2021-01-08 DIAGNOSIS — I38 VALVULAR HEART DISEASE: ICD-10-CM

## 2021-01-08 PROCEDURE — 93793 ANTICOAG MGMT PT WARFARIN: CPT | Performed by: FAMILY MEDICINE

## 2021-01-08 NOTE — PROGRESS NOTES
Gretel Carolina is aware of results. Per Dr. Luis Felipe Bell he has cut her dose in half due to antibiotic and recheck on Thursday. \"Yes still repeat INR tomorrow and then again on completiong of antibiotic. Reduce total coumadin dose by 50%, so yes can change from 4 mg to 2 mg x 5 days, then back to normal dosing. \"

## 2021-01-08 NOTE — TELEPHONE ENCOUNTER
Rama Mendoza states that Dr. Pita Billy called him to say that there is nothing he can do for Balta Snyder and referred her to Dr. Rob and another urologist Dr. Lisy Davenport at [facility]. Dr. Rob says there is nothing he can do for her other than for her to stay on the antibiotic. Jeffry Porter does not want to take her all the way up to [facility]. He would like to have a referral to [facility].      Referral faxed

## 2021-01-13 ENCOUNTER — TELEPHONE (OUTPATIENT)
Dept: CARDIOLOGY CLINIC | Age: 86
End: 2021-01-13

## 2021-01-13 ENCOUNTER — VIRTUAL VISIT (OUTPATIENT)
Dept: CARDIOLOGY CLINIC | Age: 86
End: 2021-01-13
Payer: MEDICARE

## 2021-01-13 DIAGNOSIS — I25.10 CAD S/P PERCUTANEOUS CORONARY ANGIOPLASTY: Primary | ICD-10-CM

## 2021-01-13 DIAGNOSIS — I51.89 DIASTOLIC DYSFUNCTION: ICD-10-CM

## 2021-01-13 DIAGNOSIS — I42.9 CARDIOMYOPATHY, UNSPECIFIED TYPE (HCC): ICD-10-CM

## 2021-01-13 DIAGNOSIS — I38 VALVULAR HEART DISEASE: ICD-10-CM

## 2021-01-13 DIAGNOSIS — Z98.61 CAD S/P PERCUTANEOUS CORONARY ANGIOPLASTY: Primary | ICD-10-CM

## 2021-01-13 DIAGNOSIS — Z86.73 HISTORY OF CVA (CEREBROVASCULAR ACCIDENT): ICD-10-CM

## 2021-01-13 PROCEDURE — G8484 FLU IMMUNIZE NO ADMIN: HCPCS | Performed by: INTERNAL MEDICINE

## 2021-01-13 PROCEDURE — 1036F TOBACCO NON-USER: CPT | Performed by: INTERNAL MEDICINE

## 2021-01-13 PROCEDURE — 4040F PNEUMOC VAC/ADMIN/RCVD: CPT | Performed by: INTERNAL MEDICINE

## 2021-01-13 PROCEDURE — 99442 PR PHYS/QHP TELEPHONE EVALUATION 11-20 MIN: CPT | Performed by: INTERNAL MEDICINE

## 2021-01-13 PROCEDURE — G8427 DOCREV CUR MEDS BY ELIG CLIN: HCPCS | Performed by: INTERNAL MEDICINE

## 2021-01-13 PROCEDURE — G8417 CALC BMI ABV UP PARAM F/U: HCPCS | Performed by: INTERNAL MEDICINE

## 2021-01-13 PROCEDURE — 1090F PRES/ABSN URINE INCON ASSESS: CPT | Performed by: INTERNAL MEDICINE

## 2021-01-13 PROCEDURE — 1123F ACP DISCUSS/DSCN MKR DOCD: CPT | Performed by: INTERNAL MEDICINE

## 2021-01-13 RX ORDER — HYDRALAZINE HYDROCHLORIDE 50 MG/1
50 TABLET, FILM COATED ORAL DAILY
COMMUNITY
End: 2021-11-22

## 2021-01-13 ASSESSMENT — ENCOUNTER SYMPTOMS
BLOOD IN STOOL: 0
ABDOMINAL DISTENTION: 0
NAUSEA: 0
COLOR CHANGE: 0
APNEA: 0
VOICE CHANGE: 0
DIARRHEA: 0
CHEST TIGHTNESS: 0
SHORTNESS OF BREATH: 0
WHEEZING: 0
ANAL BLEEDING: 0
VOMITING: 0
TROUBLE SWALLOWING: 0
FACIAL SWELLING: 0

## 2021-01-13 NOTE — PROGRESS NOTES
Madan Sy is a 80 y.o. female evaluated via telephone on 1/13/2021. For follow-up of hypertension TIAs. Has a son who takes excellent care of her. Son called to say that she gets dizzy. I will cut down the dose of hydralazine to night dose only. He is diabetic. Hard of hearing but still pretty well-preserved. Responds to questions appropriately. No chest pain. Has a history of cardiomyopathy for which she is on Entresto      Consent:  She and/or health care decision maker is aware that that she may receive a bill for this telephone service, depending on her insurance coverage, and has provided verbal consent to proceed: Yes      Documentation:  I communicated with the patient and/or health care decision maker about Yes. Details of this discussion including any medical advice provided: Yes      I affirm this is a Patient Initiated Episode with a Patient who has not had a related appointment within my department in the past 7 days or scheduled within the next 24 hours. Patient identification was verified at the start of the visit: Yes    Total Time: minutes: 11-20 minutes    Note: not billable if this call serves to triage the patient into an appointment for the relevant concern      [facility]      Patient: Madan Sy  YOB: 1927  MRN: [id number]    Chief Complaint:  Chief Complaint   Patient presents with    Follow-up     4 month f/u - CVA    Coronary Artery Disease         Subjective/HPI:  9/22/2020: Patient presents today for follow-up of hypertension. Leg edema is resolved after Norvasc was discontinued. We will cut down the hydralazine to twice a day. Appetite is good. She is sleeping good. He had CAT scan of the brain that was negative on September 9 on chronic Coumadin therapy. As usual accompanied by his son. he is the main healthcare provider for her she will see me in 3 M 8/18/2020: Patient presents today for follow-up of hypertension. Has been complaining of leg edema. We will discontinue amlodipine. Continue with the hydralazine 3 times a day. See me in the month.   Amlodipine is possibly causing edema        7/14/2020: Patient presents today for follow-up of recent hospitalization at [facility] [place] for CHF.  She was then sent to rehab for recovery.  She had done well.  As usual has labile blood pressure.  Was discharged home on hydralazine 50 3 times daily. Karri Up main issue is the back. Osvaldo Orr has not been approved yet by her her insurance. oLs Cruz is under control.  She is usually accompanied by her son and so also during this visit. Bree Scott of hearing.  Severe back pain.  Does not want to take Jilda Clubs will give her Ultram 50 3 times daily for as needed use.  And sent her to [address]. Keene Hodgkin will see me in 1 month           1/7/20: Patient presents today for follow-up of congestive heart failure. Yarelis Higuera.  Accompanied by her son. Keene Hodgkin is nauseated. Carolin Klein will discontinue magnesium.  Son is an extremely competent care provider.  And very caring. Keene Hodgkin will see me in 3 months.     10/8/19: Patient presents today for congestive heart failure.  Doing extremely well on Entresto.  On Coumadin.  Accompanied by his son. Lory French provides excellent care for her.  No chest pain congestive heart failure symptoms or syncope.  See me in 3 months     7/3/19: Patient presents today for follow-up of congestive heart failure.  We have been able to reduce her blood pressure medications significantly.  She was overmedicated.  We cut down hydralazine from 3-2 and now will stop it.  She is on Coumadin.  On Plavix for angioplasty which was done by Dr. bradley in December of last year. Keene Hodgkin is accompanied by her son. Mary Suarez well otherwise. Heather Vo is under control.  See me in 3 months    5/1/19: Patient presents today for for evaluation of congestive heart failure. Has ejection fraction 25%. Had a OMAR done at [facility] [place] when she admitted with confusion. There was no thrombus. Blood pressure remains stable. I will cut down the hydralazine to only one a day. Don't want to overmedicate her. See me in 2 months     4/3/19: Patient presents today for follow-up of recent admission for confusion. She is accompanied by her son. A OMAR was done. There was no thrombus. Ejection fraction was 25%. We will cut down the hydralazine to twice a day. When she is done with magnesium, noting unit. She supposed for some labs done by Dr. Rahul Ramirez soon. PT/INR will be done by visiting nurse on Saturday. She'll see me in a month.     2/19/19: Patient presents today for Follow-up of coronary artery disease. Stent to the proximal LAD. Has mild to 50% disease in the mid LAD. Ejection fraction was diminished. We are going to check another echo. See what LV ejection fraction is. She was recently admitted to [facility] with UTI. Was started on hydralazine. This was 3 times a day and I cut it down to 1 a day. Patient remains stable. I will stop it altogether. See me in 3 months with an echo     1/18/19: Patient presents today for Follow-up of recent hospitalization at [facility] for acute MI. Had angioplasty stent to proximal LAD mid LAD at 50% stenosis ejection fraction is 25%. On lisinopril and metoprolol. Month in the [place] office. He reported repeat EF evaluation at some point     8/21/18: Patient presents today for follow-up of hypertension. Much better. Only on 5 mg of Norvasc. Blood sugars are better to more alert. Dizziness is improved. Accompanied by a son. Mild chronic kidney disease is stable.  See me in 6 months.    5/15/18: Patient presents today for follow-up of hypertension. The dizziness is much better after we stopped few of her medication. Blood pressure is holding very well I think we could probably stop the Norvasc but she is quite content and so is a son with the present medications. She has mild chronic kidney disease. Hyperlipidemia that stable. See me in 3 months     4/11/18: Patient presents today for Follow-up of hypertension. She gets dizzy. I think she is on too much medications. We will discontinue hydralazine clonidine and Lasix. His mild chronic kidney disease. Also has hyperlipidemia that stable. I will see him in 3 weeks. Off these medications     3/28/2018: Patient presents today for evaluation of recent hospitalization for acute renal insufficiency. Echocardiogram was done which showed preserved LV ejection fraction. Moderately elevated right systolic pressure 60 mm smoker. Last BUN/creatinine are getting better. GFR is in the 40s. She lives with one of her other sons. She note of hypertension. Used to be on lisinopril 20 mg a day and hydrochlorothiazide. During this hospitalization she was discharged home on lisinopril 2010 a day no hydrochlorothiazide. Lasix 20 minutes, day. Norvasc 10 mg a day. Clonidine 0.2 3 times a day hydralazine 10 3 times a day. I like blood pressures have been running stable in the 120s. Physical therapy goes to her house. She denies any chest pain. She started to drink more water. Does not drive. Does minor chores around the house. I would like to simplify his regimen. Take clonidine 0.2 in a.m. and hydralazine 10 mg at night. Other medication which include Lopressor 25 mg by mouth twice a day Lasix 20 mg a day Norvasc and resume a day to continue. See me in 2 weeks.       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 SCC (squamous cell carcinoma), arm 2013    right upper arm, 2015 right forarm    SI (stress incontinence), female 5/29/2012    Type II or unspecified type diabetes mellitus without mention of complication, not stated as uncontrolled     Whooping cough        Past Surgical History:   Procedure Laterality Date    ANKLE SURGERY      broken left ankle.  APPENDECTOMY      BREAST BIOPSY      x2 left breast    CATARACT REMOVAL  2004    bilateral    CORONARY ANGIOPLASTY WITH STENT PLACEMENT  01/2019    CYSTOCELE REPAIR      EYE SURGERY      bilateral cataract    HYSTERECTOMY      complete at age 39    [address]      as a child       Family History   Problem Relation Age of Onset    Diabetes Mother     Heart Disease Father        Social History     Socioeconomic History    Marital status:       Spouse name: Not on file    Number of children: Not on file    Years of education: Not on file    Highest education level: Not on file   Occupational History    Not on file   Social Needs    Financial resource strain: Not very hard    Food insecurity     Worry: Never true     Inability: Never true    Transportation needs     Medical: No     Non-medical: No   Tobacco Use    Smoking status: Never Smoker    Smokeless tobacco: Never Used   Substance and Sexual Activity    Alcohol use: No     Alcohol/week: 0.0 standard drinks    Drug use: No    Sexual activity: Not on file   Lifestyle  Physical activity     Days per week: Not on file     Minutes per session: Not on file    Stress: Not on file   Relationships    Social connections     Talks on phone: More than three times a week     Gets together: More than three times a week     Attends Gnosticism service: 1 to 4 times per year     Active member of club or organization: No     Attends meetings of clubs or organizations: Never     Relationship status:     Intimate partner violence     Fear of current or ex partner: No     Emotionally abused: No     Physically abused: No     Forced sexual activity: No   Other Topics Concern    Not on file   Social History Narrative         Lives With: Son    Type of Home: [facility] [facility] [address] [facility] [address]: Two level, Able to Live on Main level with bedroom/bathroom, Performs ADL's on one level    Home Access: Stairs to enter with rails    Entrance Stairs - Number of Steps: Threshold    Bathroom Shower/Tub: Tub/Shower unit    Bathroom Toilet: Handicap height    Bathroom Equipment: Grab bars in shower, Grab bars around toilet, Hand-held shower, Shower chair    Bathroom Accessibility: Accessible    Home Equipment: Rolling walker, 4 wheeled walker (Usually uses rollator)    ADL Assistance: [address]: Needs assistance (Son manages housework)    Homemaking Responsibilities: No    Ambulation Assistance: Independent (Rollator)    Transfer Assistance: Independent    Active : No    Patient's  Info:  Sons            Allergies   Allergen Reactions    Metoclopramide     Pantoprazole Other (See Comments)    Pcn [Penicillins]      Tolerates rocephin    Protonix [Pantoprazole Sodium]        Current Outpatient Medications   Medication Sig Dispense Refill    hydrALAZINE (APRESOLINE) 50 MG tablet Take 50 mg by mouth daily      warfarin (COUMADIN) 1 MG tablet take 3 tablets by mouth once daily 180 tablet 3  sacubitril-valsartan (ENTRESTO) 24-26 MG per tablet Take 1 tablet by mouth 2 times daily 28 tablet 0    cephALEXin (KEFLEX) 250 MG capsule Take 1 capsule by mouth daily 90 capsule 1    metFORMIN (GLUCOPHAGE) 1000 MG tablet take 1 tablet by mouth twice a day with meals 180 tablet 1    Lactobacillus (PROBIOTIC ACIDOPHILUS) CAPS take 1 capsule by mouth once daily      isosorbide mononitrate (IMDUR) 30 MG extended release tablet Take 1 tablet by mouth daily 90 tablet 1    furosemide (LASIX) 20 MG tablet Take 1 tablet by mouth daily 90 tablet 1    ondansetron (ZOFRAN ODT) 4 MG disintegrating tablet Take 1 tablet by mouth every 8 hours as needed for Nausea 10 tablet 0    fluticasone (FLONASE) 50 MCG/ACT nasal spray 1 spray by Each Nostril route daily (Patient taking differently: 1 spray by Each Nostril route as needed ) 1 Bottle 0    carvedilol (COREG) 12.5 MG tablet take 1 tablet by mouth twice a day with meals 180 tablet 3    ferrous sulfate (IRON 325) 325 (65 Fe) MG tablet Take 325 mg by mouth Daily with lunch      warfarin (COUMADIN) 6 MG tablet Take 6 mg by mouth daily       clopidogrel (PLAVIX) 75 MG tablet Take 1 tablet by mouth daily 90 tablet 3    blood glucose monitor kit and supplies Test 2 times a day & as needed for symptoms of irregular blood glucose. Freestyle Freedom Lite 1 kit 0    Lancets MISC 1 each by Does not apply route 2 times daily Test 2 times a day & as needed for symptoms of irregular blood glucose. Freestyle Freedom Lite 100 each 5    blood glucose monitor strips Test 2 times a day & as needed for symptoms of irregular blood glucose.   Freestyle Freedom Lite 200 strip 3    TRUEPLUS LANCETS 28G MISC TEST TWO TIMES DAILY 200 each 3    Cholecalciferol (VITAMIN D) 2000 units CAPS capsule Take 2,000 Units by mouth      TRUE METRIX BLOOD GLUCOSE TEST strip TEST two to three times a day 200 strip 5    Needles & Syringes MISC 1 each by Does not apply route daily 50 each 0  Incontinence Supply Disposable (DEPEND UNDERGARMENTS) MISC 1 each by Does not apply route nightly 1 Package 11    Blood Glucose Monitoring Suppl (TRUE METRIX AIR GLUCOSE METER) W/DEVICE KIT       Blood Glucose Monitoring Suppl PRUDENCIO Dx: E11.9 1 Device 0     No current facility-administered medications for this visit. Review of Systems:   Review of Systems   Constitutional: Negative for activity change, appetite change, diaphoresis, fatigue and unexpected weight change. HENT: Negative for facial swelling, nosebleeds, trouble swallowing and voice change. Respiratory: Negative for apnea, chest tightness, shortness of breath and wheezing. Cardiovascular: Negative for chest pain, palpitations and leg swelling. Gastrointestinal: Negative for abdominal distention, anal bleeding, blood in stool, diarrhea, nausea and vomiting. Genitourinary: Negative for decreased urine volume and dysuria. Musculoskeletal: Negative for gait problem, myalgias, neck pain and neck stiffness. Skin: Negative for color change, pallor, rash and wound. Neurological: Negative for dizziness, seizures, syncope, facial asymmetry, weakness, light-headedness, numbness and headaches. Hematological: Does not bruise/bleed easily. Psychiatric/Behavioral: Negative for agitation, behavioral problems, confusion, hallucinations and suicidal ideas. The patient is not nervous/anxious and is not hyperactive. All other systems reviewed and are negative. Review of System is negative except for as mentioned above. Physical Examination:    LMP  (LMP Unknown)    Physical Exam   Constitutional: She appears healthy. HENT:   Nose: Nose normal.   Mouth/Throat: Dentition is normal. Oropharynx is clear. Eyes: Pupils are equal, round, and reactive to light. Neck: Normal range of motion. Cardiovascular: Normal rate, regular rhythm, S1 normal, S2 normal, normal heart sounds, intact distal pulses and normal pulses. No extrasystoles are present. Exam reveals no gallop. No murmur heard. Pulmonary/Chest: Effort normal and breath sounds normal. She has no wheezes. She has no rales. She exhibits no tenderness. Abdominal: Soft. Bowel sounds are normal. She exhibits no distension and no mass. There is no splenomegaly or hepatomegaly. There is no abdominal tenderness. Musculoskeletal: Normal range of motion. General: No tenderness, deformity or edema. Neurological: She is alert and oriented to person, place, and time. She has normal motor skills and normal reflexes. Gait normal.   Skin: Skin is warm and dry.            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 effects of CVA (cerebrovascular accident)    S/P PTCA (percutaneous transluminal coronary angioplasty)    Transient cerebral ischemia    Monitoring for long-term anticoagulant use    Chronic combined systolic and diastolic congestive heart failure ([address]    Other transient cerebral ischemic attacks and related syndromes    Cerebrovascular disease    Current use of long term anticoagulation    Syncope    Spinal stenosis in cervical region  Lumbar degenerative disc disease    Spinal stenosis, lumbar region, with neurogenic claudication    Abnormality of gait and mobility due to  [employer] with Impaired Mobility and ADL's secondary to Cervical Myelopathy and Vestibular neuronitis with rehab admission 06/24/20.  Generalized muscle ache    Generalized osteoarthrosis, involving multiple sites    Vestibular neuronitis of both ears    Dizziness    SCC (squamous cell carcinoma), arm    Type 2 diabetes mellitus (HCC)    Obesity (BMI 30-39. 9)    [place] (hard of hearing)    DJD (degenerative joint disease), lumbar    History of PTCA    Uncontrolled type 2 diabetes mellitus with hyperglycemia (HCC)    Acute GI bleeding    COVID-19           No orders of the defined types were placed in this encounter. No orders of the defined types were placed in this encounter. Assessment:    1. CAD S/P percutaneous coronary angioplasty    2. History of non-ST elevation myocardial infarction (NSTEMI)    3. 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 Diastolic dysfunction         Plan:     Stay on same medications. See me in 3 months. This note was partially generated using Dragon voice recognition system, and there may be some incorrect words, spellings, punctuation that were not noticed in checking the note before saving.         Electronically signed by Beto Ma MD on 1/13/2021 at 1:11 PM

## 2021-01-14 ENCOUNTER — ANTI-COAG VISIT (OUTPATIENT)
Dept: INTERNAL MEDICINE | Age: 86
End: 2021-01-14

## 2021-01-14 ENCOUNTER — TELEPHONE (OUTPATIENT)
Dept: INTERNAL MEDICINE | Age: 86
End: 2021-01-14

## 2021-01-14 DIAGNOSIS — Z79.01 CURRENT USE OF LONG TERM ANTICOAGULATION: ICD-10-CM

## 2021-01-14 DIAGNOSIS — Z86.73 HISTORY OF CVA (CEREBROVASCULAR ACCIDENT): ICD-10-CM

## 2021-01-14 DIAGNOSIS — I38 VALVULAR HEART DISEASE: ICD-10-CM

## 2021-01-14 LAB
INR BLD: 1.3
PROTHROMBIN TIME: 16.4 SEC (ref 12.3–14.9)

## 2021-01-14 NOTE — PROGRESS NOTES
Pt had been advised to cut dose in half while on antibiotic. Has been on 4 mg starting Tuesday and Wednesday night.

## 2021-01-14 NOTE — TELEPHONE ENCOUNTER
Karina Walker states that Baudilio Mendez finished cipro. She does not have appt with urologist for two weeks. He would like to know if she should resume Keflex. He does not think it seems to be doing any good, but afraid that things will be even worse if she does not take.

## 2021-01-20 ENCOUNTER — ANTI-COAG VISIT (OUTPATIENT)
Dept: INTERNAL MEDICINE | Age: 86
End: 2021-01-20

## 2021-01-20 DIAGNOSIS — I38 VALVULAR HEART DISEASE: ICD-10-CM

## 2021-01-20 DIAGNOSIS — Z79.01 CURRENT USE OF LONG TERM ANTICOAGULATION: ICD-10-CM

## 2021-01-20 LAB — INR BLD: 1.6

## 2021-01-28 ENCOUNTER — ANTI-COAG VISIT (OUTPATIENT)
Dept: INTERNAL MEDICINE | Age: 86
End: 2021-01-28
Payer: MEDICARE

## 2021-01-28 DIAGNOSIS — I38 VALVULAR HEART DISEASE: ICD-10-CM

## 2021-01-28 DIAGNOSIS — I25.2 HISTORY OF NON-ST ELEVATION MYOCARDIAL INFARCTION (NSTEMI): ICD-10-CM

## 2021-01-28 DIAGNOSIS — Z86.73 HISTORY OF CVA (CEREBROVASCULAR ACCIDENT): ICD-10-CM

## 2021-01-28 DIAGNOSIS — Z79.01 CURRENT USE OF LONG TERM ANTICOAGULATION: ICD-10-CM

## 2021-01-28 LAB — INR BLD: 1.8

## 2021-01-28 PROCEDURE — 93793 ANTICOAG MGMT PT WARFARIN: CPT | Performed by: FAMILY MEDICINE

## 2021-01-28 NOTE — PROGRESS NOTES
Urology started pt on macrobid 100 mg mon, wed, fri. And took her off keflex. Pt to start cranberry juice daily. Tuesday pt to have US kidneys.

## 2021-02-03 ENCOUNTER — ANTI-COAG VISIT (OUTPATIENT)
Dept: INTERNAL MEDICINE | Age: 86
End: 2021-02-03
Payer: MEDICARE

## 2021-02-03 DIAGNOSIS — G45.8 OTHER TRANSIENT CEREBRAL ISCHEMIC ATTACKS AND RELATED SYNDROMES: ICD-10-CM

## 2021-02-03 DIAGNOSIS — I38 VALVULAR HEART DISEASE: ICD-10-CM

## 2021-02-03 DIAGNOSIS — Z86.73 HISTORY OF CVA (CEREBROVASCULAR ACCIDENT): ICD-10-CM

## 2021-02-03 DIAGNOSIS — Z79.01 CURRENT USE OF LONG TERM ANTICOAGULATION: ICD-10-CM

## 2021-02-03 LAB — INR BLD: 1.8

## 2021-02-03 PROCEDURE — 93793 ANTICOAG MGMT PT WARFARIN: CPT | Performed by: FAMILY MEDICINE

## 2021-02-03 NOTE — PROGRESS NOTES
Lisy Lizarraga states that pt hasnt gotten the third dose yet on 5 mg, since they did the inr test today instead of Thursday. He thought that may have been why inr is still the same. They are going to keep doing inr on Wednesday.

## 2021-02-10 ENCOUNTER — ANTI-COAG VISIT (OUTPATIENT)
Dept: INTERNAL MEDICINE | Age: 86
End: 2021-02-10
Payer: MEDICARE

## 2021-02-10 DIAGNOSIS — I69.90 LATE EFFECTS OF CVA (CEREBROVASCULAR ACCIDENT): ICD-10-CM

## 2021-02-10 DIAGNOSIS — Z86.73 HISTORY OF CVA (CEREBROVASCULAR ACCIDENT): ICD-10-CM

## 2021-02-10 DIAGNOSIS — Z79.01 CURRENT USE OF LONG TERM ANTICOAGULATION: ICD-10-CM

## 2021-02-10 DIAGNOSIS — I38 VALVULAR HEART DISEASE: ICD-10-CM

## 2021-02-10 DIAGNOSIS — I25.2 HISTORY OF NON-ST ELEVATION MYOCARDIAL INFARCTION (NSTEMI): ICD-10-CM

## 2021-02-10 LAB — INR BLD: 1.9

## 2021-02-10 PROCEDURE — 93793 ANTICOAG MGMT PT WARFARIN: CPT | Performed by: FAMILY MEDICINE

## 2021-02-11 NOTE — PROGRESS NOTES
Seth Salter called to say that pt urologist is having pt start doxycycline 100 mg bid for 7 days starting tonight. He would like to know if her medication should be adjusted.

## 2021-02-17 ENCOUNTER — ANTI-COAG VISIT (OUTPATIENT)
Dept: INTERNAL MEDICINE | Age: 86
End: 2021-02-17
Payer: MEDICARE

## 2021-02-17 DIAGNOSIS — I25.2 HISTORY OF ST ELEVATION MYOCARDIAL INFARCTION (STEMI): ICD-10-CM

## 2021-02-17 DIAGNOSIS — Z79.01 CURRENT USE OF LONG TERM ANTICOAGULATION: ICD-10-CM

## 2021-02-17 DIAGNOSIS — I38 VALVULAR HEART DISEASE: ICD-10-CM

## 2021-02-17 DIAGNOSIS — Z86.73 HISTORY OF CVA (CEREBROVASCULAR ACCIDENT): ICD-10-CM

## 2021-02-17 DIAGNOSIS — I51.89 DIASTOLIC DYSFUNCTION: ICD-10-CM

## 2021-02-17 DIAGNOSIS — I25.2 HISTORY OF NON-ST ELEVATION MYOCARDIAL INFARCTION (NSTEMI): ICD-10-CM

## 2021-02-17 LAB — INR BLD: 1.7

## 2021-02-17 PROCEDURE — 93793 ANTICOAG MGMT PT WARFARIN: CPT | Performed by: FAMILY MEDICINE

## 2021-02-17 NOTE — PROGRESS NOTES
After completion of Abx can resume pre Abx dose and recheck INR 1 week  Thank you!     Sasha Pinon MD

## 2021-02-17 NOTE — PROGRESS NOTES
Pt taking 3.5 mg daily while on 7 day round of antibiotic prescribed by urologist. Started antibiotic 2/11/21

## 2021-02-24 ENCOUNTER — ANTI-COAG VISIT (OUTPATIENT)
Dept: INTERNAL MEDICINE | Age: 86
End: 2021-02-24
Payer: MEDICARE

## 2021-02-24 DIAGNOSIS — I38 VALVULAR HEART DISEASE: ICD-10-CM

## 2021-02-24 DIAGNOSIS — Z86.73 HISTORY OF CVA (CEREBROVASCULAR ACCIDENT): ICD-10-CM

## 2021-02-24 DIAGNOSIS — Z79.01 CURRENT USE OF LONG TERM ANTICOAGULATION: ICD-10-CM

## 2021-02-24 LAB — INR BLD: 1.9

## 2021-02-24 PROCEDURE — 93793 ANTICOAG MGMT PT WARFARIN: CPT | Performed by: FAMILY MEDICINE

## 2021-03-01 ENCOUNTER — HOSPITAL ENCOUNTER (EMERGENCY)
Age: 86
Discharge: HOME | End: 2021-03-01
Attending: EMERGENCY MEDICINE
Payer: MEDICARE

## 2021-03-01 ENCOUNTER — APPOINTMENT (OUTPATIENT)
Dept: CT IMAGING | Age: 86
End: 2021-03-01
Payer: MEDICARE

## 2021-03-01 VITALS
WEIGHT: 190 LBS | SYSTOLIC BLOOD PRESSURE: 171 MMHG | DIASTOLIC BLOOD PRESSURE: 108 MMHG | BODY MASS INDEX: 33.66 KG/M2 | HEART RATE: 79 BPM | HEIGHT: 63 IN | OXYGEN SATURATION: 96 % | TEMPERATURE: 97.3 F | RESPIRATION RATE: 18 BRPM

## 2021-03-01 DIAGNOSIS — S09.90XA CLOSED HEAD INJURY, INITIAL ENCOUNTER: Primary | ICD-10-CM

## 2021-03-01 PROCEDURE — 6370000000 HC RX 637 (ALT 250 FOR IP): Performed by: EMERGENCY MEDICINE

## 2021-03-01 PROCEDURE — 70450 CT HEAD/BRAIN W/O DYE: CPT

## 2021-03-01 PROCEDURE — 99284 EMERGENCY DEPT VISIT MOD MDM: CPT

## 2021-03-01 RX ORDER — ACETAMINOPHEN 500 MG
500 TABLET ORAL ONCE
Status: COMPLETED | OUTPATIENT
Start: 2021-03-01 | End: 2021-03-01

## 2021-03-01 RX ADMIN — ACETAMINOPHEN 500 MG: 500 TABLET ORAL at 12:45

## 2021-03-01 ASSESSMENT — ENCOUNTER SYMPTOMS
VOICE CHANGE: 0
EYE REDNESS: 0
SORE THROAT: 0
EYE PAIN: 0
EYE DISCHARGE: 0
CHOKING: 0
FACIAL SWELLING: 0
BLOOD IN STOOL: 0
BACK PAIN: 0
SHORTNESS OF BREATH: 0
CHEST TIGHTNESS: 0
SINUS PRESSURE: 0
CONSTIPATION: 0
DIARRHEA: 0
STRIDOR: 0
WHEEZING: 0
ABDOMINAL PAIN: 0
VOMITING: 0
TROUBLE SWALLOWING: 0
COUGH: 0

## 2021-03-01 NOTE — ED TRIAGE NOTES
Pt was getting out og her chair at home, lost her balance and hit the back of her head on the floor, no loc. Pt A&O x 4, MSP intact, following commands. Pt brought by son from home after falling. No abrasion or cut to back of head, no lumps noted. Pt denies any neck or back pain. PT slight head pain rated 1/10. Pt plan of care explained to Pt by Bryant Lozano during exam at bedside. Son wanted Pt to be seen because she is on Coumadin.

## 2021-03-01 NOTE — ED PROVIDER NOTES
urgency. Musculoskeletal: Negative for back pain, joint swelling, neck pain and neck stiffness. Skin: Negative for pallor and rash. Neurological: Positive for headaches. Negative for tremors, seizures, syncope, weakness and numbness. Hematological: Negative for adenopathy. Does not bruise/bleed easily. Psychiatric/Behavioral: Negative for agitation, behavioral problems, hallucinations and sleep disturbance. The patient is not hyperactive. All other systems reviewed and are negative. Except as noted above the remainder of the review of systems was reviewed and negative. 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 SCC (squamous cell carcinoma), arm 2013    right upper arm, 2015 right forarm    SI (stress incontinence), female 5/29/2012    Type II or unspecified type diabetes mellitus without mention of complication, not stated as uncontrolled     Whooping cough          SURGICALHISTORY       Past Surgical History:   Procedure Laterality Date    ANKLE SURGERY      broken left ankle.     APPENDECTOMY      BREAST BIOPSY      x2 left breast    CATARACT REMOVAL  2004    bilateral    CORONARY ANGIOPLASTY WITH STENT PLACEMENT  01/2019    CYSTOCELE REPAIR      EYE SURGERY      bilateral cataract    HYSTERECTOMY      complete at age [address][age][place] 89      as a child         CURRENT MEDICATIONS       Previous Medications    BLOOD GLUCOSE MONITOR KIT AND SUPPLIES    Test 2 times a day & as needed for symptoms of irregular blood glucose. Freestyle [place] Lite    BLOOD GLUCOSE MONITOR STRIPS    Test 2 times a day & as needed for symptoms of irregular blood glucose. Freestyle [place] Lite    BLOOD GLUCOSE MONITORING SUPPL (TRUE METRIX AIR GLUCOSE METER) W/DEVICE KIT        BLOOD GLUCOSE MONITORING SUPPL PRUDENCIO    Dx: E11.9    CARVEDILOL (COREG) 12.5 MG TABLET    take 1 tablet by mouth twice a day with meals    CEPHALEXIN (KEFLEX) 250 MG CAPSULE    Take 1 capsule by mouth daily    CHOLECALCIFEROL (VITAMIN D) 2000 UNITS CAPS CAPSULE    Take 2,000 Units by mouth    CLOPIDOGREL (PLAVIX) 75 MG TABLET    Take 1 tablet by mouth daily    FERROUS SULFATE (IRON 325) 325 (65 FE) MG TABLET    Take 325 mg by mouth Daily with lunch    FLUTICASONE (FLONASE) 50 MCG/ACT NASAL SPRAY    1 spray by Each Nostril route daily    FUROSEMIDE (LASIX) 20 MG TABLET    Take 1 tablet by mouth daily    HYDRALAZINE (APRESOLINE) 50 MG TABLET    Take 50 mg by mouth daily    INCONTINENCE SUPPLY DISPOSABLE (DEPEND UNDERGARMENTS) MISC    1 each by Does not apply route nightly    ISOSORBIDE MONONITRATE (IMDUR) 30 MG EXTENDED RELEASE TABLET    Take 1 tablet by mouth daily    LACTOBACILLUS (PROBIOTIC ACIDOPHILUS) CAPS    take 1 capsule by mouth once daily    LANCETS MISC    1 each by Does not apply route 2 times daily Test 2 times a day & as needed for symptoms of irregular blood glucose.   Freestyle [place] Lite    METFORMIN (GLUCOPHAGE) 1000 MG TABLET    take 1 tablet by mouth twice a day with meals    NEEDLES & SYRINGES MISC    1 each by Does not apply route daily    ONDANSETRON (ZOFRAN ODT) 4 MG DISINTEGRATING TABLET    Take 1 tablet by mouth every 8 hours as needed for Nausea    SACUBITRIL-VALSARTAN (ENTRESTO) 24-26 MG PER TABLET    Take 1 tablet by mouth 2 times daily    TRUE METRIX BLOOD GLUCOSE TEST STRIP    TEST two to three times a day    TRUEPLUS LANCETS 28G MISC    TEST TWO TIMES DAILY    WARFARIN (COUMADIN) 1 MG TABLET    take 3 tablets by mouth once daily    WARFARIN (COUMADIN) 6 MG TABLET    Take 6 mg by mouth daily        ALLERGIES     Metoclopramide, Pantoprazole, Pcn [penicillins], and Protonix [pantoprazole sodium]    FAMILY HISTORY       Family History   Problem Relation Age of Onset    Diabetes Mother     Heart Disease Father           SOCIAL HISTORY       Social History     Socioeconomic History    Marital status:      Spouse name: None    Number of children: None    Years of education: None    Highest education level: None   Occupational History    None   Social Needs    Financial resource strain: Not very hard    Food insecurity     Worry: Never true     Inability: Never true    Transportation needs     Medical: No     Non-medical: No   Tobacco Use    Smoking status: Never Smoker    Smokeless tobacco: Never Used   Substance and Sexual Activity    Alcohol use: No     Alcohol/week: 0.0 standard drinks    Drug use: No    Sexual activity: None   Lifestyle    Physical activity     Days per week: None     Minutes per session: None    Stress: None   Relationships    Social connections     Talks on phone: More than three times a week     Gets together: More than three times a week     Attends Christianity service: 1 to 4 times per year     Active member of club or organization: No     Attends meetings of clubs or organizations: Never     Relationship status:      Intimate partner violence     Fear of current or ex partner: No     Emotionally abused: No     Physically abused: No     Forced sexual activity: No   Other Topics Concern    None   Social History Narrative         Lives With: Son    Type of Home: [id number]  [address]: Two level, Able to Live on Main level with bedroom/bathroom, Performs BEDSIDE ULTRASOUND:   Performed by ED Physician - none    LABS:  Labs Reviewed - No data to display    All other labs were within normal range or not returned as of this dictation. EMERGENCY DEPARTMENT COURSE and DIFFERENTIAL DIAGNOSIS/MDM:   Vitals:    Vitals:    03/01/21 1206   BP: (!) 171/108   Pulse: 79   Resp: 18   Temp: 97.3 °F (36.3 °C)   TempSrc: Oral   SpO2: 96%   Weight: 190 lb (86.2 kg)   Height: 5' 3\" (1.6 m)           MDM    CRITICAL CARE TIME   Total Critical Care time was  minutes, excluding separately reportableprocedures. There was a high probability of clinicallysignificant/life threatening deterioration in the patient's condition which required my urgent intervention. CONSULTS:  None    PROCEDURES:  Unless otherwise noted below, none     Procedures    FINAL IMPRESSION      1. Closed head injury, initial encounter          DISPOSITION/PLAN   DISPOSITION        PATIENT REFERRED TO:  No follow-up provider specified.     DISCHARGE MEDICATIONS:  New Prescriptions    No medications on file          (Please note that portions of this note were completed with a voice recognition program.  Efforts were made to edit the dictations but occasionally words are mis-transcribed.)    Galo Cuevas MD (electronically signed)  Attending Emergency Physician       Galo Cuevas MD  03/01/21 2292

## 2021-03-03 ENCOUNTER — ANTI-COAG VISIT (OUTPATIENT)
Dept: INTERNAL MEDICINE | Age: 86
End: 2021-03-03
Payer: MEDICARE

## 2021-03-03 DIAGNOSIS — Z51.81 MONITORING FOR LONG-TERM ANTICOAGULANT USE: ICD-10-CM

## 2021-03-03 DIAGNOSIS — Z79.01 MONITORING FOR LONG-TERM ANTICOAGULANT USE: ICD-10-CM

## 2021-03-03 DIAGNOSIS — Z86.73 HISTORY OF CVA (CEREBROVASCULAR ACCIDENT): ICD-10-CM

## 2021-03-03 DIAGNOSIS — Z79.01 CURRENT USE OF LONG TERM ANTICOAGULATION: ICD-10-CM

## 2021-03-03 DIAGNOSIS — I38 VALVULAR HEART DISEASE: ICD-10-CM

## 2021-03-03 LAB — INR BLD: 2

## 2021-03-03 PROCEDURE — 93793 ANTICOAG MGMT PT WARFARIN: CPT | Performed by: FAMILY MEDICINE

## 2021-03-04 ENCOUNTER — TELEPHONE (OUTPATIENT)
Dept: INTERNAL MEDICINE | Age: 86
End: 2021-03-04

## 2021-03-04 NOTE — TELEPHONE ENCOUNTER
Spoke with Jeffry Porter, advised we did get the INR from yesterday. He will await a call back for any new orders.

## 2021-03-04 NOTE — TELEPHONE ENCOUNTER
Evette Ferreira called to say the INR machine wasn't working very well so he wanted to make sure that you received the results from yesterdays test

## 2021-03-10 ENCOUNTER — ANTI-COAG VISIT (OUTPATIENT)
Dept: INTERNAL MEDICINE | Age: 86
End: 2021-03-10
Payer: MEDICARE

## 2021-03-10 DIAGNOSIS — Z79.01 CURRENT USE OF LONG TERM ANTICOAGULATION: ICD-10-CM

## 2021-03-10 DIAGNOSIS — Z86.73 HISTORY OF CVA (CEREBROVASCULAR ACCIDENT): ICD-10-CM

## 2021-03-10 DIAGNOSIS — I38 VALVULAR HEART DISEASE: ICD-10-CM

## 2021-03-10 LAB — INR BLD: 1.6

## 2021-03-10 PROCEDURE — 93793 ANTICOAG MGMT PT WARFARIN: CPT | Performed by: FAMILY MEDICINE

## 2021-03-10 RX ORDER — GLUCOSAM/CHON-MSM1/C/MANG/BOSW 500-416.6
TABLET ORAL
Qty: 100 EACH | Refills: 5 | Status: SHIPPED | OUTPATIENT
Start: 2021-03-10 | End: 2022-04-15

## 2021-03-10 NOTE — TELEPHONE ENCOUNTER
Requesting medication refill.  Please approve or deny this request.    Rx requested:  Requested Prescriptions     Pending Prescriptions Disp Refills    [address] [facility] [Pharmacy Med Name: [facility]] 100 each 5     Sig: use twice a day and if needed       Last Office Visit:   12/9/2020        REASON LAST SEEN AND BY WHO:    Hospital f/u, DM; Dr. Abby Ortiz PCP VISIT:  [address] FROM LAST PCP NOTE    Return in about 4 weeks (around 1/6/2021) for covid f/u      PATIENT CONTACTED FOR A FOLLOW UP APPT: YES OR NO           Next Visit Date:  Future Appointments   Date Time Provider José Manuel Adamson   4/13/2021 10:45 AM Caridad Landers MD [address]   4/29/2021 12:00 PM Helen Orellana MD [facility]   6/30/2021 11:00 AM Helen Orellana MD [facility] [phone]

## 2021-03-17 ENCOUNTER — ANTI-COAG VISIT (OUTPATIENT)
Dept: INTERNAL MEDICINE | Age: 86
End: 2021-03-17
Payer: MEDICARE

## 2021-03-17 DIAGNOSIS — Z79.01 CURRENT USE OF LONG TERM ANTICOAGULATION: ICD-10-CM

## 2021-03-17 DIAGNOSIS — Z86.73 HISTORY OF CVA (CEREBROVASCULAR ACCIDENT): ICD-10-CM

## 2021-03-17 DIAGNOSIS — I38 VALVULAR HEART DISEASE: ICD-10-CM

## 2021-03-17 LAB — INR BLD: 1.8

## 2021-03-17 PROCEDURE — 93793 ANTICOAG MGMT PT WARFARIN: CPT | Performed by: FAMILY MEDICINE

## 2021-03-18 RX ORDER — WARFARIN SODIUM 5 MG/1
5 TABLET ORAL DAILY
Qty: 30 TABLET | Refills: 3 | Status: SHIPPED | OUTPATIENT
Start: 2021-03-18 | End: 2022-03-07

## 2021-03-18 RX ORDER — WARFARIN SODIUM 1 MG/1
TABLET ORAL
Qty: 30 TABLET | Refills: 2 | Status: SHIPPED | OUTPATIENT
Start: 2021-03-18 | End: 2022-03-29 | Stop reason: SDUPTHER

## 2021-03-24 ENCOUNTER — ANTI-COAG VISIT (OUTPATIENT)
Dept: INTERNAL MEDICINE | Age: 86
End: 2021-03-24
Payer: MEDICARE

## 2021-03-24 DIAGNOSIS — I38 VALVULAR HEART DISEASE: ICD-10-CM

## 2021-03-24 DIAGNOSIS — Z86.73 HISTORY OF CVA (CEREBROVASCULAR ACCIDENT): ICD-10-CM

## 2021-03-24 DIAGNOSIS — Z79.01 CURRENT USE OF LONG TERM ANTICOAGULATION: ICD-10-CM

## 2021-03-24 LAB
INR BLD: 1.7
INR BLD: 1.8

## 2021-03-24 PROCEDURE — 93793 ANTICOAG MGMT PT WARFARIN: CPT | Performed by: FAMILY MEDICINE

## 2021-03-24 NOTE — PROGRESS NOTES
Urologist started patient on cranberry juice about 6 weeks ago, wanted to know if this could make a difference in inr being too thick

## 2021-03-29 DIAGNOSIS — I51.89 DIASTOLIC DYSFUNCTION: ICD-10-CM

## 2021-03-29 RX ORDER — FUROSEMIDE 20 MG/1
TABLET ORAL
Qty: 90 TABLET | Refills: 1 | Status: SHIPPED | OUTPATIENT
Start: 2021-03-29 | End: 2021-09-24 | Stop reason: SDUPTHER

## 2021-03-29 RX ORDER — CLOPIDOGREL BISULFATE 75 MG/1
TABLET ORAL
Qty: 90 TABLET | Refills: 3 | Status: SHIPPED | OUTPATIENT
Start: 2021-03-29 | End: 2021-04-13

## 2021-03-29 NOTE — TELEPHONE ENCOUNTER
Requesting medication refill. Please approve or deny this request.    Rx requested:  Requested Prescriptions     Pending Prescriptions Disp Refills    clopidogrel (PLAVIX) 75 MG tablet [Pharmacy Med Name: CLOPIDOGREL 75 MG TABLET] 90 tablet 3     Sig: take 1 tablet by mouth once daily    furosemide (LASIX) 20 MG tablet [Pharmacy Med Name: FUROSEMIDE 20 MG TABLET] 90 tablet 1     Sig: take 1 tablet by mouth once daily       Last Office Visit:   12/9/2020        REASON LAST SEEN AND BY WHO:    HOSP F/U-[facility]     FOLLOW UP PLAN FROM LAST PCP VISIT: COPY AND PASTE FROM LAST PCP NOTE     Return in about 4 weeks (around 1/6/2021) for covid f/u .         PATIENT CONTACTED FOR A FOLLOW UP APPT: YES OR NO    NO    Next Visit Date:  Future Appointments   Date Time Provider José Manuel Adamson   4/13/2021 10:45 AM Dawn Kumar MD [address]   4/29/2021 12:00 PM Umer Hernandez, [address] [place] [address]   6/30/2021 11:00 AM Umer Hernandez MD [facility]

## 2021-03-31 ENCOUNTER — ANTI-COAG VISIT (OUTPATIENT)
Dept: INTERNAL MEDICINE | Age: 86
End: 2021-03-31
Payer: MEDICARE

## 2021-03-31 DIAGNOSIS — I38 VALVULAR HEART DISEASE: ICD-10-CM

## 2021-03-31 DIAGNOSIS — Z79.01 CURRENT USE OF LONG TERM ANTICOAGULATION: ICD-10-CM

## 2021-03-31 DIAGNOSIS — Z86.73 HISTORY OF CVA (CEREBROVASCULAR ACCIDENT): ICD-10-CM

## 2021-03-31 DIAGNOSIS — I69.90 LATE EFFECTS OF CVA (CEREBROVASCULAR ACCIDENT): ICD-10-CM

## 2021-03-31 LAB — INR BLD: 2.2

## 2021-03-31 PROCEDURE — 93793 ANTICOAG MGMT PT WARFARIN: CPT | Performed by: FAMILY MEDICINE

## 2021-04-07 ENCOUNTER — ANTI-COAG VISIT (OUTPATIENT)
Dept: INTERNAL MEDICINE | Age: 86
End: 2021-04-07
Payer: MEDICARE

## 2021-04-07 DIAGNOSIS — Z79.01 MONITORING FOR LONG-TERM ANTICOAGULANT USE: ICD-10-CM

## 2021-04-07 DIAGNOSIS — Z79.01 CURRENT USE OF LONG TERM ANTICOAGULATION: ICD-10-CM

## 2021-04-07 DIAGNOSIS — Z86.73 HISTORY OF CVA (CEREBROVASCULAR ACCIDENT): ICD-10-CM

## 2021-04-07 DIAGNOSIS — Z51.81 MONITORING FOR LONG-TERM ANTICOAGULANT USE: ICD-10-CM

## 2021-04-07 DIAGNOSIS — I38 VALVULAR HEART DISEASE: ICD-10-CM

## 2021-04-07 DIAGNOSIS — I25.2 HISTORY OF NON-ST ELEVATION MYOCARDIAL INFARCTION (NSTEMI): ICD-10-CM

## 2021-04-07 LAB — INR BLD: 2.7

## 2021-04-07 PROCEDURE — 93793 ANTICOAG MGMT PT WARFARIN: CPT | Performed by: FAMILY MEDICINE

## 2021-04-13 ENCOUNTER — OFFICE VISIT (OUTPATIENT)
Dept: CARDIOLOGY CLINIC | Age: 86
End: 2021-04-13
Payer: MEDICARE

## 2021-04-13 VITALS
BODY MASS INDEX: 33.66 KG/M2 | OXYGEN SATURATION: 96 % | HEART RATE: 65 BPM | DIASTOLIC BLOOD PRESSURE: 80 MMHG | HEIGHT: 63 IN | SYSTOLIC BLOOD PRESSURE: 126 MMHG

## 2021-04-13 DIAGNOSIS — I25.10 CAD S/P PERCUTANEOUS CORONARY ANGIOPLASTY: ICD-10-CM

## 2021-04-13 DIAGNOSIS — I25.2 HISTORY OF NON-ST ELEVATION MYOCARDIAL INFARCTION (NSTEMI): ICD-10-CM

## 2021-04-13 DIAGNOSIS — M54.9 BACK PAIN, UNSPECIFIED BACK LOCATION, UNSPECIFIED BACK PAIN LATERALITY, UNSPECIFIED CHRONICITY: ICD-10-CM

## 2021-04-13 DIAGNOSIS — I42.9 CARDIOMYOPATHY, UNSPECIFIED TYPE (HCC): ICD-10-CM

## 2021-04-13 DIAGNOSIS — Z86.73 HISTORY OF CVA (CEREBROVASCULAR ACCIDENT): ICD-10-CM

## 2021-04-13 DIAGNOSIS — Z98.61 CAD S/P PERCUTANEOUS CORONARY ANGIOPLASTY: ICD-10-CM

## 2021-04-13 DIAGNOSIS — I38 VALVULAR HEART DISEASE: ICD-10-CM

## 2021-04-13 DIAGNOSIS — I51.89 DIASTOLIC DYSFUNCTION: Primary | ICD-10-CM

## 2021-04-13 PROCEDURE — G8427 DOCREV CUR MEDS BY ELIG CLIN: HCPCS | Performed by: INTERNAL MEDICINE

## 2021-04-13 PROCEDURE — 1090F PRES/ABSN URINE INCON ASSESS: CPT | Performed by: INTERNAL MEDICINE

## 2021-04-13 PROCEDURE — 1123F ACP DISCUSS/DSCN MKR DOCD: CPT | Performed by: INTERNAL MEDICINE

## 2021-04-13 PROCEDURE — 99214 OFFICE O/P EST MOD 30 MIN: CPT | Performed by: INTERNAL MEDICINE

## 2021-04-13 PROCEDURE — G8417 CALC BMI ABV UP PARAM F/U: HCPCS | Performed by: INTERNAL MEDICINE

## 2021-04-13 PROCEDURE — 4040F PNEUMOC VAC/ADMIN/RCVD: CPT | Performed by: INTERNAL MEDICINE

## 2021-04-13 PROCEDURE — 1036F TOBACCO NON-USER: CPT | Performed by: INTERNAL MEDICINE

## 2021-04-13 RX ORDER — CLOPIDOGREL BISULFATE 75 MG/1
75 TABLET ORAL
Qty: 36 TABLET | Refills: 3 | Status: SHIPPED | OUTPATIENT
Start: 2021-04-14 | End: 2022-03-14

## 2021-04-13 RX ORDER — NITROFURANTOIN 25; 75 MG/1; MG/1
CAPSULE ORAL
Status: ON HOLD | COMMUNITY
Start: 2021-01-27 | End: 2022-07-30 | Stop reason: HOSPADM

## 2021-04-13 ASSESSMENT — ENCOUNTER SYMPTOMS
APNEA: 0
DIARRHEA: 0
CHEST TIGHTNESS: 0
VOICE CHANGE: 0
WHEEZING: 0
COUGH: 0
STRIDOR: 0
TROUBLE SWALLOWING: 0
FACIAL SWELLING: 0
ANAL BLEEDING: 0
COLOR CHANGE: 0
SHORTNESS OF BREATH: 0
ABDOMINAL DISTENTION: 0
VOMITING: 0
NAUSEA: 0
BLOOD IN STOOL: 0

## 2021-04-13 NOTE — PROGRESS NOTES
[facility] CARDIOLOGY OFFICE FOLLOW-UP      Patient: Leighann Hui  YOB: 1927  MRN: [id number]    Chief Complaint:  Chief Complaint   Patient presents with    3 Month Follow-Up    Coronary Artery Disease    Cardiomyopathy         Subjective/HPI:  4/13/21: Patient presents today for follow-up of hypertension TIAs. Rate is well controlled. She is on chronic Coumadin. Also on Plavix for peripheral vascular disease, that was done many years ago. I am going to cut it down to every other day. Blood pressure remains extremely well controlled. Only on one hydralazine once a day. She also takes Entresto Isosorbide and Coreg. No chest pain. She and her son both got the Covid vaccine. Dr. Esqueda Males monitor her Coumadin. She will see me in 3 months. 1/13/21 (VIRTUAL): For follow-up of hypertension TIAs. Has a son who takes excellent care of her. Son called to say that she gets dizzy. I will cut down the dose of hydralazine to night dose only. He is diabetic. Hard of hearing but still pretty well-preserved. Responds to questions appropriately. No chest pain. Has a history of cardiomyopathy for which she is on Entresto       9/22/2020: Patient presents today for follow-up of hypertension. Leg edema is resolved after Norvasc was discontinued. We will cut down the hydralazine to twice a day. Appetite is good. She is sleeping good. He had CAT scan of the brain that was negative on September 9 on chronic Coumadin therapy. As usual accompanied by his son. he is the main healthcare provider for her she will see me in 3 M        8/18/2020: Patient presents today for follow-up of hypertension.  Has been complaining of leg edema.  We will discontinue amlodipine.  Continue with the hydralazine 3 times a day.  See me in the month.  Amlodipine is possibly causing edema        7/14/2020: Patient presents today for follow-up of recent hospitalization at [facility] [place] for CHF.  She was then sent to rehab for recovery.  She had done well.  As usual has labile blood pressure.  Was discharged home on hydralazine 50 3 times daily. Arlene Sorensen main issue is the back. Roxana Becerra has not been approved yet by her her insurance. Richie Flores is under control.  She is usually accompanied by her son and so also during this visit. Alireza Orr of hearing.  Severe back pain.  Does not want to take Cleotha Connor will give her Ultram 50 3 times daily for as needed use.  And sent her to [address]. Fernando Proctor will see me in 1 month           1/7/20: Patient presents today for follow-up of congestive heart failure. Alta Flor.  Accompanied by her son. Fernando Proctor is nauseated. Annelise Capri will discontinue magnesium.  Son is an extremely competent care provider.  And very caring. Fernando Proctor will see me in 3 months.     10/8/19: Patient presents today for congestive heart failure.  Doing extremely well on Entresto.  On Coumadin.  Accompanied by his son. Latoya Henderson provides excellent care for her.  No chest pain congestive heart failure symptoms or syncope.  See me in 3 months     7/3/19: Patient presents today for follow-up of congestive heart failure.  We have been able to reduce her blood pressure medications significantly.  She was overmedicated.  We cut down hydralazine from 3-2 and now will stop it.  She is on Coumadin.  On Plavix for angioplasty which was done by Dr. bradley in December of last year. Fernando Proctor is accompanied by her son. Roxi Velázquezper well otherwise. Michell Walker is under control.  See me in 3 months     5/1/19: Patient presents today for for evaluation of congestive heart failure. Has ejection fraction 25%. Had a OMAR done at [facility] [place] when she admitted with confusion. There was no thrombus. Blood pressure remains stable. I will cut down the hydralazine to only one a day. Don't want to overmedicate her. See me in 2 months     4/3/19: Patient presents today for follow-up of recent admission for confusion. She is accompanied by her son. A OMAR was done.  There was no thrombus. Ejection fraction was 25%. We will cut down the hydralazine to twice a day. When she is done with magnesium, noting unit. She supposed for some labs done by Dr. Chanelle Brown soon. PT/INR will be done by visiting nurse on Saturday. She'll see me in a month.     2/19/19: Patient presents today for Follow-up of coronary artery disease. Stent to the proximal LAD. Has mild to 50% disease in the mid LAD. Ejection fraction was diminished. We are going to check another echo. See what LV ejection fraction is. She was recently admitted to [facility] with UTI. Was started on hydralazine. This was 3 times a day and I cut it down to 1 a day. Patient remains stable. I will stop it altogether. See me in 3 months with an echo     1/18/19: Patient presents today for Follow-up of recent hospitalization at [facility] for acute MI. Had angioplasty stent to proximal LAD mid LAD at 50% stenosis ejection fraction is 25%. On lisinopril and metoprolol. Month in the [place] office. He reported repeat EF evaluation at some point     8/21/18: Patient presents today for follow-up of hypertension. Much better. Only on 5 mg of Norvasc. Blood sugars are better to more alert. Dizziness is improved. Accompanied by a son. Mild chronic kidney disease is stable. See me in 6 months.     5/15/18: Patient presents today for follow-up of hypertension. The dizziness is much better after we stopped few of her medication. Blood pressure is holding very well I think we could probably stop the Norvasc but she is quite content and so is a son with the present medications. She has mild chronic kidney disease. Hyperlipidemia that stable. See me in 3 months     4/11/18: Patient presents today for Follow-up of hypertension. She gets dizzy. I think she is on too much medications. We will discontinue hydralazine clonidine and Lasix. His mild chronic kidney disease. Also has hyperlipidemia that stable. I will see him in 3 weeks.  Off these medications     3/28/2018: Patient [id number]  [address] in [address]: Two level, Able to Live on Main level with bedroom/bathroom, Performs ADL's on one level    Home Access: Stairs to enter with rails    Entrance Stairs - Number of Steps: Threshold    Bathroom Shower/Tub: Tub/Shower unit    Bathroom Toilet: Handicap height    Bathroom Equipment: Grab bars in shower, Grab bars around toilet, Hand-held shower, Shower chair    Bathroom Accessibility: Accessible    Home Equipment: Rolling walker, 4 wheeled walker (Usually uses rollator)    ADL Assistance: [address]: Needs assistance (Son manages housework)    Homemaking Responsibilities: No    Ambulation Assistance: Independent (Rollator)    Transfer Assistance: Independent    Active : No    Patient's  Info:  Sons            Allergies   Allergen Reactions    Metoclopramide     Pantoprazole Other (See Comments)    Pcn [Penicillins]      Tolerates rocephin    Protonix [Pantoprazole Sodium]        Current Outpatient Medications   Medication Sig Dispense Refill    nitrofurantoin, macrocrystal-monohydrate, (MACROBID) 100 MG capsule take 1 capsule by mouth ON MONDAY, WEDNESDAY, AND FRIDAY      [START ON 4/14/2021] clopidogrel (PLAVIX) 75 MG tablet Take 1 tablet by mouth three times a week Monday, Wednesday and friday 36 tablet 3    furosemide (LASIX) 20 MG tablet take 1 tablet by mouth once daily 90 tablet 1    warfarin (COUMADIN) 5 MG tablet Take 1 tablet by mouth daily 30 tablet 3    warfarin (COUMADIN) 1 MG tablet Per coumadin regimen 30 tablet 2    warfarin (COUMADIN) 5 MG tablet Take 1 tablet by mouth daily 30 tablet 6    TRUEplus Lancets 28G MISC use twice a day and if needed 100 each 5    sacubitril-valsartan (ENTRESTO) 24-26 MG per tablet Take 1 tablet by mouth 2 times daily 56 tablet 0    hydrALAZINE (APRESOLINE) 50 MG tablet Take 50 mg by mouth daily      metFORMIN (GLUCOPHAGE) 1000 MG tablet take 1 tablet by mouth twice a day with meals 180 tablet 1    Lactobacillus (PROBIOTIC ACIDOPHILUS) CAPS take 1 capsule by mouth once daily      isosorbide mononitrate (IMDUR) 30 MG extended release tablet Take 1 tablet by mouth daily 90 tablet 1    ondansetron (ZOFRAN ODT) 4 MG disintegrating tablet Take 1 tablet by mouth every 8 hours as needed for Nausea 10 tablet 0    fluticasone (FLONASE) 50 MCG/ACT nasal spray 1 spray by Each Nostril route daily (Patient taking differently: 1 spray by Each Nostril route as needed ) 1 Bottle 0    carvedilol (COREG) 12.5 MG tablet take 1 tablet by mouth twice a day with meals 180 tablet 3    ferrous sulfate (IRON 325) 325 (65 Fe) MG tablet Take 325 mg by mouth Daily with lunch      warfarin (COUMADIN) 6 MG tablet Take 6 mg by mouth daily       blood glucose monitor kit and supplies Test 2 times a day & as needed for symptoms of irregular blood glucose. Freestyle Freedom Lite 1 kit 0    blood glucose monitor strips Test 2 times a day & as needed for symptoms of irregular blood glucose. Freestyle Freedom Lite 200 strip 3    TRUEPLUS LANCETS 28G MISC TEST TWO TIMES DAILY 200 each 3    Cholecalciferol (VITAMIN D) 2000 units CAPS capsule Take 2,000 Units by mouth      TRUE METRIX BLOOD GLUCOSE TEST strip TEST two to three times a day 200 strip 5    Needles & Syringes MISC 1 each by Does not apply route daily 50 each 0    Incontinence Supply Disposable (DEPEND UNDERGARMENTS) MISC 1 each by Does not apply route nightly 1 Package 11    Blood Glucose Monitoring Suppl (TRUE METRIX AIR GLUCOSE METER) W/DEVICE KIT       Blood Glucose Monitoring Suppl PRUDENCIO Dx: E11.9 1 Device 0     No current facility-administered medications for this visit. Review of Systems:   Review of Systems   Constitutional: Negative for activity change, appetite change, diaphoresis, fatigue and unexpected weight change. HENT: Negative for facial swelling, nosebleeds, trouble swallowing and voice change.     Respiratory: Negative for apnea, cough, chest tightness, shortness of breath, wheezing and stridor. Cardiovascular: Negative for chest pain, palpitations and leg swelling. Gastrointestinal: Negative for abdominal distention, anal bleeding, blood in stool, diarrhea, nausea and vomiting. Genitourinary: Negative for decreased urine volume and dysuria. Musculoskeletal: Negative for gait problem and myalgias. Skin: Negative for color change, pallor, rash and wound. Neurological: Negative for dizziness, seizures, syncope, facial asymmetry, weakness, light-headedness, numbness and headaches. Hematological: Does not bruise/bleed easily. Psychiatric/Behavioral: Negative for agitation, behavioral problems, confusion, hallucinations and suicidal ideas. The patient is not nervous/anxious. All other systems reviewed and are negative. Review of System is negative except for as mentioned above. Physical Examination:    /80 (Site: Left Upper Arm, Position: Sitting, Cuff Size: Large Adult)   Pulse 65   Ht 5' 3\" (1.6 m)   LMP  (LMP Unknown)   SpO2 96%   BMI 33.66 kg/m²    Physical Exam   Constitutional: She appears healthy. No distress. HENT:   Nose: Nose normal.   Mouth/Throat: Dentition is normal. Oropharynx is clear. Eyes: Pupils are equal, round, and reactive to light. Conjunctivae are normal.   Neck: Normal range of motion and thyroid normal. Neck supple. Cardiovascular: S1 normal, S2 normal, normal heart sounds, intact distal pulses and normal pulses. An irregularly irregular rhythm present. PMI is not displaced. No murmur heard. Pulmonary/Chest: She has no wheezes. She has no rales. She exhibits no tenderness. Abdominal: Soft. Bowel sounds are normal. She exhibits no distension and no mass. There is no splenomegaly or hepatomegaly. There is no abdominal tenderness. No hernia. Neurological: She is alert and oriented to person, place, and time. She has normal motor skills.  Gait normal. Skin: Skin is warm and dry. No cyanosis. No jaundice. Nails show no clubbing. 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 effects of CVA (cerebrovascular accident)    S/P PTCA (percutaneous transluminal coronary angioplasty)    Transient cerebral ischemia    Monitoring for long-term anticoagulant use    Chronic combined systolic and diastolic congestive heart failure ([address]    Other transient cerebral ischemic attacks and related syndromes    Cerebrovascular disease    Current use of long term anticoagulation    Syncope    Spinal stenosis in cervical region    Lumbar degenerative disc disease    Spinal stenosis, lumbar region, with neurogenic claudication    Abnormality of gait and mobility due to  [employer] with Impaired Mobility and ADL's secondary to Cervical Myelopathy and Vestibular neuronitis with rehab admission 06/24/20.  Generalized muscle ache    Generalized osteoarthrosis, involving multiple sites    Vestibular neuronitis of both ears    Dizziness    SCC (squamous cell carcinoma), arm    Type 2 diabetes mellitus (HCC)    Obesity (BMI 30-39. 9)    [place] (hard of hearing)    DJD (degenerative joint disease), lumbar    History of PTCA    Uncontrolled type 2 diabetes mellitus with hyperglycemia ([address]    Acute GI bleeding    COVID-19           No orders of the defined types were placed in this encounter. Orders Placed This Encounter   Medications    clopidogrel (PLAVIX) 75 MG tablet     Sig: Take 1 tablet by mouth three times a week Monday, Wednesday and friday     Dispense:  36 tablet     Refill:  3             Assessment/Orders:       [id number]    1. Diastolic dysfunction  G62.50    2. CAD S/P percutaneous coronary angioplasty  I25.10     Z98.61    3. 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  Z86.73    4. History of non-ST elevation myocardial infarction (NSTEMI)  I25.2    5. Cardiomyopathy, unspecified type ([facility]  I42.9    6. Valvular heart disease  I38    7. Back pain, unspecified back location, unspecified back pain laterality, unspecified chronicity  M54.9        Orders Placed This Encounter   Medications    clopidogrel (PLAVIX) 75 MG tablet     Sig: Take 1 tablet by mouth three times a week Monday, Wednesday and friday     Dispense:  36 tablet     Refill:  3       Medications Discontinued During This Encounter   Medication Reason    cephALEXin (KEFLEX) 250 MG capsule LIST CLEANUP    clopidogrel (PLAVIX) 75 MG tablet        No orders of the defined types were placed in this encounter. Plan:  Decrease Plavix to Monday Wednesday and Friday only.     Other medications may remain the same    See me in 3 months        Electronically signed by: Jane Ortiz MD  4/13/2021 3:32 PM

## 2021-04-14 ENCOUNTER — ANTI-COAG VISIT (OUTPATIENT)
Dept: INTERNAL MEDICINE | Age: 86
End: 2021-04-14
Payer: MEDICARE

## 2021-04-14 DIAGNOSIS — Z79.01 CURRENT USE OF LONG TERM ANTICOAGULATION: ICD-10-CM

## 2021-04-14 DIAGNOSIS — Z86.73 HISTORY OF CVA (CEREBROVASCULAR ACCIDENT): ICD-10-CM

## 2021-04-14 DIAGNOSIS — I38 VALVULAR HEART DISEASE: ICD-10-CM

## 2021-04-14 DIAGNOSIS — I51.89 DIASTOLIC DYSFUNCTION: ICD-10-CM

## 2021-04-14 LAB — INR BLD: 2.1

## 2021-04-14 PROCEDURE — 93793 ANTICOAG MGMT PT WARFARIN: CPT | Performed by: FAMILY MEDICINE

## 2021-04-21 ENCOUNTER — ANTI-COAG VISIT (OUTPATIENT)
Dept: INTERNAL MEDICINE | Age: 86
End: 2021-04-21

## 2021-04-21 DIAGNOSIS — I38 VALVULAR HEART DISEASE: ICD-10-CM

## 2021-04-21 DIAGNOSIS — Z79.01 CURRENT USE OF LONG TERM ANTICOAGULATION: ICD-10-CM

## 2021-04-21 LAB — INR BLD: 2

## 2021-04-21 RX ORDER — ONDANSETRON 4 MG/1
4 TABLET, ORALLY DISINTEGRATING ORAL EVERY 8 HOURS PRN
Qty: 10 TABLET | Refills: 0 | Status: SHIPPED | OUTPATIENT
Start: 2021-04-21 | End: 2021-10-01 | Stop reason: SDUPTHER

## 2021-04-21 NOTE — TELEPHONE ENCOUNTER
Requesting medication refill. Please approve or deny this request.    Rx requested:  Requested Prescriptions     Pending Prescriptions Disp Refills    ondansetron (ZOFRAN ODT) 4 MG disintegrating tablet 10 tablet 0     Sig: Take 1 tablet by mouth every 8 hours as needed for Nausea       Last Office Visit:   12/9/2020        REASON LAST SEEN AND BY WHO:    Hospital f/u, DM; Dr. Tonio Dunn PCP VISIT:  [address] FROM LAST PCP NOTE     Return in about 4 weeks (around 1/6/2021) for covid f/u .         PATIENT CONTACTED FOR A FOLLOW UP APPT: YES OR NO    Scheduled     Next Visit Date:  Future Appointments   Date Time Provider [facility]   4/29/2021 12:00 PM Dangelo Nguyen, [address] [place] [address]   6/30/2021 11:00 AM Dangelo Nguyen MD [facility]   7/13/2021  1:15 PM Peg Weinstein MD [address] [facility]

## 2021-04-22 ENCOUNTER — TELEPHONE (OUTPATIENT)
Dept: INTERNAL MEDICINE | Age: 86
End: 2021-04-22

## 2021-04-22 NOTE — TELEPHONE ENCOUNTER
Oumar Darnell said Bettys INR yesterday was 2.0. He wanted to let you know Dr. Isbell June cut Bettys Plavix to 3 days a week, Monday Wednesday and Friday. Thank you.

## 2021-04-26 ENCOUNTER — TELEPHONE (OUTPATIENT)
Dept: INTERNAL MEDICINE | Age: 86
End: 2021-04-26

## 2021-04-26 NOTE — TELEPHONE ENCOUNTER
Lisset Cole called and said Kathleen Spear has had diarrhea for two days and would like to know if she can take Pepto bismol?     Thank you

## 2021-04-28 ENCOUNTER — TELEPHONE (OUTPATIENT)
Dept: CARDIOLOGY CLINIC | Age: 86
End: 2021-04-28

## 2021-04-28 ENCOUNTER — ANTI-COAG VISIT (OUTPATIENT)
Dept: INTERNAL MEDICINE | Age: 86
End: 2021-04-28
Payer: MEDICARE

## 2021-04-28 DIAGNOSIS — I25.2 HISTORY OF NON-ST ELEVATION MYOCARDIAL INFARCTION (NSTEMI): ICD-10-CM

## 2021-04-28 DIAGNOSIS — Z86.73 HISTORY OF CVA (CEREBROVASCULAR ACCIDENT): ICD-10-CM

## 2021-04-28 DIAGNOSIS — I51.89 DIASTOLIC DYSFUNCTION: ICD-10-CM

## 2021-04-28 DIAGNOSIS — Z79.01 CURRENT USE OF LONG TERM ANTICOAGULATION: ICD-10-CM

## 2021-04-28 DIAGNOSIS — I38 VALVULAR HEART DISEASE: ICD-10-CM

## 2021-04-28 DIAGNOSIS — I69.90 LATE EFFECTS OF CVA (CEREBROVASCULAR ACCIDENT): ICD-10-CM

## 2021-04-28 LAB — INR BLD: 2.1

## 2021-04-28 PROCEDURE — 93793 ANTICOAG MGMT PT WARFARIN: CPT | Performed by: FAMILY MEDICINE

## 2021-04-28 NOTE — TELEPHONE ENCOUNTER
requesting medication refill.  Please approve or deny this request.    Rx requested:  Requested Prescriptions     Pending Prescriptions Disp Refills    sacubitril-valsartan (ENTRESTO) 24-26 MG per tablet 60 tablet 0     Sig: Take 1 tablet by mouth 2 times daily         Last Office Visit:   4/13/2021      Next Visit Date:  Future Appointments   Date Time Provider [facility] Manuel Snow   4/29/2021 12:00 PM Cheng Barillas MD [address] [facility]   6/30/2021 11:00 AM Cheng Barillas, [address] [place] [address]   7/13/2021  1:15 PM Kavon Olmedo MD [address] [address]

## 2021-04-29 ENCOUNTER — OFFICE VISIT (OUTPATIENT)
Dept: INTERNAL MEDICINE | Age: 86
End: 2021-04-29
Payer: MEDICARE

## 2021-04-29 VITALS
DIASTOLIC BLOOD PRESSURE: 86 MMHG | TEMPERATURE: 96.8 F | HEIGHT: 63 IN | HEART RATE: 72 BPM | OXYGEN SATURATION: 98 % | BODY MASS INDEX: 32.6 KG/M2 | WEIGHT: 184 LBS | SYSTOLIC BLOOD PRESSURE: 136 MMHG

## 2021-04-29 DIAGNOSIS — E78.5 HYPERLIPIDEMIA, UNSPECIFIED HYPERLIPIDEMIA TYPE: ICD-10-CM

## 2021-04-29 DIAGNOSIS — I10 ESSENTIAL HYPERTENSION: ICD-10-CM

## 2021-04-29 DIAGNOSIS — Z98.61 HISTORY OF PTCA: ICD-10-CM

## 2021-04-29 DIAGNOSIS — I50.42 CHRONIC COMBINED SYSTOLIC AND DIASTOLIC CONGESTIVE HEART FAILURE (HCC): ICD-10-CM

## 2021-04-29 DIAGNOSIS — M48.061 SPINAL STENOSIS OF LUMBAR REGION WITHOUT NEUROGENIC CLAUDICATION: ICD-10-CM

## 2021-04-29 DIAGNOSIS — I25.2 HISTORY OF ST ELEVATION MYOCARDIAL INFARCTION (STEMI): ICD-10-CM

## 2021-04-29 DIAGNOSIS — G89.29 CHRONIC LOW BACK PAIN, UNSPECIFIED BACK PAIN LATERALITY, UNSPECIFIED WHETHER SCIATICA PRESENT: ICD-10-CM

## 2021-04-29 DIAGNOSIS — E11.65 TYPE 2 DIABETES MELLITUS WITH HYPERGLYCEMIA, WITHOUT LONG-TERM CURRENT USE OF INSULIN (HCC): Primary | ICD-10-CM

## 2021-04-29 DIAGNOSIS — I51.89 DIASTOLIC DYSFUNCTION: ICD-10-CM

## 2021-04-29 DIAGNOSIS — N39.0 RECURRENT UTI (URINARY TRACT INFECTION): ICD-10-CM

## 2021-04-29 DIAGNOSIS — Z86.73 HISTORY OF CVA (CEREBROVASCULAR ACCIDENT): ICD-10-CM

## 2021-04-29 DIAGNOSIS — I38 VALVULAR HEART DISEASE: ICD-10-CM

## 2021-04-29 DIAGNOSIS — M54.50 CHRONIC LOW BACK PAIN, UNSPECIFIED BACK PAIN LATERALITY, UNSPECIFIED WHETHER SCIATICA PRESENT: ICD-10-CM

## 2021-04-29 DIAGNOSIS — N18.9 CHRONIC KIDNEY DISEASE, UNSPECIFIED CKD STAGE: ICD-10-CM

## 2021-04-29 PROCEDURE — 99214 OFFICE O/P EST MOD 30 MIN: CPT | Performed by: FAMILY MEDICINE

## 2021-04-29 PROCEDURE — G8427 DOCREV CUR MEDS BY ELIG CLIN: HCPCS | Performed by: FAMILY MEDICINE

## 2021-04-29 PROCEDURE — G8417 CALC BMI ABV UP PARAM F/U: HCPCS | Performed by: FAMILY MEDICINE

## 2021-04-29 PROCEDURE — 4040F PNEUMOC VAC/ADMIN/RCVD: CPT | Performed by: FAMILY MEDICINE

## 2021-04-29 PROCEDURE — 1090F PRES/ABSN URINE INCON ASSESS: CPT | Performed by: FAMILY MEDICINE

## 2021-04-29 PROCEDURE — 1036F TOBACCO NON-USER: CPT | Performed by: FAMILY MEDICINE

## 2021-04-29 PROCEDURE — 1123F ACP DISCUSS/DSCN MKR DOCD: CPT | Performed by: FAMILY MEDICINE

## 2021-04-29 ASSESSMENT — ENCOUNTER SYMPTOMS
CHOKING: 0
APNEA: 0
STRIDOR: 0
CHEST TIGHTNESS: 0
WHEEZING: 0
SHORTNESS OF BREATH: 0

## 2021-04-29 ASSESSMENT — PATIENT HEALTH QUESTIONNAIRE - PHQ9
SUM OF ALL RESPONSES TO PHQ QUESTIONS 1-9: 0
1. LITTLE INTEREST OR PLEASURE IN DOING THINGS: 0
SUM OF ALL RESPONSES TO PHQ QUESTIONS 1-9: 0
SUM OF ALL RESPONSES TO PHQ QUESTIONS 1-9: 0

## 2021-04-29 NOTE — PROGRESS NOTES
blood glucose was elevated at 250. Blood pressure responded with hydralazine. Repeat CT Head 06/19/20 with no change. CT Abdomen/Pelvis 06/19/20 shows borderline pelviectasis in left kidney. No acute pathology. Carotid Duplex 06/19/20 shows 50-69% Right ICA stenosis and 50-69% Left ICA stenosis. 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 Because of persisted neck and lower back pain further imaging was done. MRI of Brain showed no acute findings, MRI Cervical Spine revealed severe central canal stenosis at several levels and MRI of Lumbar Spine showed multilevel degenerative changes and mild canal narrowing but no definite central canal stenosis. MRI Thoracic Spine 06/20/20 negative MRI. Family opted for conservative management at this time. The patient has been found to have severe abnormality of gait and mobility with impaired self care due to NTSCI with Impaired Mobility and ADL's secondary to Cervical Myelopathy and Vestibular Neronitis and is admitted to the acute inpatient rehab program.     Transcribed from pre-admission information sheet completed by Fidencio Wheeler RN/mdl as directed by Dr. Breana Jenkins.           Generalized muscle ache 06/21/2020    Generalized osteoarthrosis, involving multiple sites 06/21/2020    Syncope 06/19/2020    Current use of long term anticoagulation 12/26/2019    Other transient cerebral ischemic attacks and related syndromes     Cerebrovascular disease     Chronic combined systolic and diastolic congestive heart failure ([facility] [address] 12/02/2019    Monitoring for long-term anticoagulant use 11/05/2019    Transient cerebral ischemia 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/2019    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/2018    History of Grab bars around toilet, Hand-held shower, Shower chair    Bathroom Accessibility: Accessible    Home Equipment: Rolling walker, 4 wheeled walker (Usually uses rollator)    ADL Assistance: [address]: Needs assistance (Son manages housework)    Homemaking Responsibilities: No    Ambulation Assistance: Independent (Rollator)    Transfer Assistance: Independent    Active : No    Patient's  Info:  Sons          Family History   Problem Relation Age of Onset    Diabetes Mother     Heart Disease Father      Current Outpatient Medications on File Prior to Visit   Medication Sig Dispense Refill    sacubitril-valsartan (ENTRESTO) 24-26 MG per tablet Take 1 tablet by mouth 2 times daily 60 tablet 0    ondansetron (ZOFRAN ODT) 4 MG disintegrating tablet Take 1 tablet by mouth every 8 hours as needed for Nausea 10 tablet 0    nitrofurantoin, macrocrystal-monohydrate, (MACROBID) 100 MG capsule take 1 capsule by mouth ON MONDAY, WEDNESDAY, AND FRIDAY      clopidogrel (PLAVIX) 75 MG tablet Take 1 tablet by mouth three times a week Monday, Wednesday and friday 36 tablet 3    furosemide (LASIX) 20 MG tablet take 1 tablet by mouth once daily 90 tablet 1    warfarin (COUMADIN) 5 MG tablet Take 1 tablet by mouth daily 30 tablet 3    warfarin (COUMADIN) 1 MG tablet Per coumadin regimen 30 tablet 2    warfarin (COUMADIN) 5 MG tablet Take 1 tablet by mouth daily 30 tablet 6    TRUEplus Lancets 28G MISC use twice a day and if needed 100 each 5    hydrALAZINE (APRESOLINE) 50 MG tablet Take 50 mg by mouth daily      metFORMIN (GLUCOPHAGE) 1000 MG tablet take 1 tablet by mouth twice a day with meals 180 tablet 1    Lactobacillus (PROBIOTIC ACIDOPHILUS) CAPS take 1 capsule by mouth once daily      fluticasone (FLONASE) 50 MCG/ACT nasal spray 1 spray by Each Nostril route daily (Patient taking differently: 1 spray by Each Nostril route as needed ) 1 Bottle 0    carvedilol (COREG) 12.5 MG tablet take 1 tablet by mouth twice a day with meals 180 tablet 3    warfarin (COUMADIN) 6 MG tablet Take 6 mg by mouth daily       blood glucose monitor kit and supplies Test 2 times a day & as needed for symptoms of irregular blood glucose. Freestyle Freedom Lite 1 kit 0    blood glucose monitor strips Test 2 times a day & as needed for symptoms of irregular blood glucose. Freestyle Freedom Lite 200 strip 3    TRUEPLUS LANCETS 28G MISC TEST TWO TIMES DAILY 200 each 3    Cholecalciferol (VITAMIN D) 2000 units CAPS capsule Take 2,000 Units by mouth      TRUE METRIX BLOOD GLUCOSE TEST strip TEST two to three times a day 200 strip 5    Needles & Syringes MISC 1 each by Does not apply route daily 50 each 0    Incontinence Supply Disposable (DEPEND UNDERGARMENTS) MISC 1 each by Does not apply route nightly 1 Package 11    Blood Glucose Monitoring Suppl (TRUE METRIX AIR GLUCOSE METER) W/DEVICE KIT       Blood Glucose Monitoring Suppl PRUDENCIO Dx: E11.9 1 Device 0     No current facility-administered medications on file prior to visit. Allergies   Allergen Reactions    Metoclopramide     Pantoprazole Other (See Comments)    Pcn [Penicillins]      Tolerates rocephin    Protonix [Pantoprazole Sodium]        Chief Complaint   Patient presents with    Hyperlipidemia    Hypertension    Diabetes       HPI:  Treatment Adherence:   Medication compliance:  compliant all of the time  Diet compliance:  compliant most of the time  Current exercise: no regular exercise    Diabetes Mellitus Type 2: Current symptoms/problems include none. Home blood sugar records:  fasting range: 170-180  Any episodes of hypoglycemia? no  Tobacco history: She  reports that she has never smoked. She has never used smokeless tobacco.   Daily Aspirin? Yes  Have you had yourannual diabetic retinal (eye) exam? Yes - Records Obtained    Hypertension:  Home blood pressure monitoring: No.  Sheis adherent to a low sodium diet.      Patient denies chest pain. Antihypertensive medication side effects: nomedication side effects noted. Use of agents associated with hypertension: none. Hyperlipidemia:  No new myalgias or GI upset on not on anything . Lab Results   Component Value Date    LABA1C 8.4 (H) 11/30/2020    LABA1C 7.4 10/27/2020    LABA1C 8.2 (H) 06/20/2020     Lab Results   Component Value Date    LABMICR 33.10 (H) 01/16/2020    CREATININE 1.42 (H) 12/19/2020     Lab Results   Component Value Date    ALT 10 12/19/2020    AST 10 12/19/2020     Lab Results   Component Value Date    CHOL 214 (H) 10/27/2020    TRIG 74 04/10/2019    HDL 52 04/10/2019    LDLCALC 68 04/10/2019    LDLDIRECT 142 (H) 10/27/2020        Diabetes and Hypertension Visit Information    BP Readings from Last 3 Encounters:   04/29/21 136/86   04/13/21 126/80   03/01/21 (!) 171/108          Have you seen any other physician or provider since your last visit? Yes - Records Requested  Have you had any other diagnostic tests since your last visit? Yes - Records Requested  Have you been seen in the emergency room and/or had an admission to a hospital since we last saw you?  No    Patient Care Team:  Priscila Neal MD as PCP - General (Family Medicine)  Priscila Neal MD as PCP - [facility] Empaneled Provider  DO Sruthi Jaffe MD (Internal Medicine)           Health Maintenance   Topic Date Due    Shingles Vaccine (1 of 2) Never done    Annual Wellness Visit (AWV)  06/19/2021    Potassium monitoring  12/19/2021    Creatinine monitoring  12/19/2021    DTaP/Tdap/Td vaccine (2 - Td) 02/11/2027    Flu vaccine  Completed    Pneumococcal 65+ years Vaccine  Completed    COVID-19 Vaccine  Completed    Hepatitis A vaccine  Aged Out    Hib vaccine  Aged Out    Meningococcal (ACWY) vaccine  Aged Out     Back pain, OA pain, DDD  Previous MRI reviewed  Had back injection with pain management in 2015 - has not seen them since  Did not tolerate Neurontin  Currently being controlled with tylenol + ultram  Seen neuro    Fall with hospitalization in sept 2020  Was seen by pain management specialist and placed on 1/2 tab ultram  Has been doing well on this  Would like to continue for pain control    CAP s/p PTCA, hx of NSTEMI 11/18, hx of CVA, Diastolic dysfunction, valvular disease  Seeing cardiology   Has had labile BP  Left heart cath done with STENT placement.   99% proximal LAD, mild disease of CX and RCA, EF of 25-30% December 2018  angioplasty stent to proximal LAD mid LAD at 50% stenosis ejection fraction is 25%       Recurrent UTI's  Started on macrobid for UTI prophylaxis on 8/6/17  Has been taking Abx as prescribed, no med SE's  One-2 UTI's since starting medication  Has been seen by urology and ID, agreed to continue macrodantin 100 mg daily and increased dose to 100 mg from 50 mg  Then switched to keflex due to continues UTI's     CVA 11/18, long term anticoagulation was started   Seeing neurology /Nolberto  Currently on coumadin and Plavix only     Hypersensitivity eruption with eczema  was seeing  with [facility]  No current skin changes or symptoms     CKD:  Has had increased Cr/GFR for >6 months  Has been stable  No symptoms from it  No nephrology referral in the past  CKD work up 6/2015 was WNL        Anemia  stable    Review of Systems   Constitutional: Negative for activity change, appetite change, chills, diaphoresis, fever and unexpected weight change. Respiratory: Negative for apnea, chest tightness, shortness of breath, wheezing and stridor. Cardiovascular: Negative for chest pain and palpitations. Physical Exam  Vitals:    04/29/21 1150   BP: 136/86   Pulse: 72   Temp: 96.8 °F (36 °C)   SpO2: 98%   Body mass index is 32.59 kg/m².    Wt Readings from Last 3 Encounters:   04/29/21 184 lb (83.5 kg)   03/01/21 190 lb (86.2 kg)   12/02/20 183 lb (83 kg)       Physical Exam  Constitutional:  Appears well-developed and well-nourished. No distress. HENT:   Head: Normocephalic and atraumatic. Right Ear: External ear normal.   Left Ear: External ear normal.   Nose: Nose normal.   Eyes: Conjunctivae and EOM are normal.  Right eye exhibits no discharge. Left eye exhibits no discharge. No scleral icterus. Neck: Normal range of motion. Cardiovascular: Normal rate, regular rhythm and normal heart sounds. Pulmonary/Chest: Effort normal and breath sounds normal. No respiratory distress. no wheezes. no rales. no tenderness. Musculoskeletal: Normal range of motion. Neurological: alert. Skin: not diaphoretic. Psychiatric: normal mood and affect. behavior is normal. Judgment and thought content normal.   Nursing note and vitals reviewed. Assessment/Plan:  Holly Gamble was seen today for hyperlipidemia, hypertension and diabetes. Diagnoses and all orders for this visit:    Type 2 diabetes mellitus with hyperglycemia, without long-term current use of insulin (HCC)  -     Hemoglobin A1C; Future  Stable, controlled  Plan: continue current medications    Essential hypertension  -     Comprehensive Metabolic Panel; Future  Stable, controlled  Plan: continue current medications    Hyperlipidemia, unspecified hyperlipidemia type  -     Lipid Panel; Future  Stable, controlled  Plan: continue current medications    Chronic kidney disease, unspecified CKD stage  -     CBC Auto Differential; Future  -     PTH, Intact; Future  -     Phosphorus;  Future  Stable    Diastolic dysfunction  -     CBC Auto Differential; Future  Chronic combined systolic and diastolic congestive heart failure (HCC)  -     CBC Auto Differential; Future  History of ST elevation myocardial infarction (STEMI)  -     CBC Auto Differential; Future  Valvular heart disease  History of PTCA  Stable  Cont f/u cardiology    Spinal stenosis of lumbar region without neurogenic claudication  Chronic low back pain, unspecified back pain laterality, unspecified whether sciatica present  No pain meds needed currently    Recurrent UTI (urinary tract infection)-Raoultella resistant to Macrobid  Stable  Seeing  Urology    History of CVA (cerebrovascular accident)  Stable  Seeing neurology    I personally reviewed all recent labs and imaging pertaining to conditions mentioned above in assessment/plan in [facility] and care everywhere, discussed results with patient in office    Diabetes Counseling   Patient was counseled regarding disease risks and adopting healthy behaviors. Patient was provided education materials to assist with self management. Patient was provided log (or received log during previous visit) to record blood pressure, food intake and/or blood sugar. Patient was instructed to keep log up-to-date and to always bring log to all office visits. Reviewed the following:  - Morbidity from diabetes involves both macrovascular (atherosclerosis) and microvascular disease (retinopathy, nephropathy, and neuropathy). - Monitoring recommendations for patients with diabetes were discussed  1. Smoking cessation counseling (Every visit)   2. Blood pressure (Every visit) Goal <140/80   3. Dilated eye examination (Annually, more than annually if significant retinopathy)   4. Foot examination (Annually, every visit if peripheral vascular disease or neuropathy)   5. Laboratory studies:    - Fasting serum lipid profile (Annually)  cholesterol (goal LDL less than 100  mg/dL    - A1C (Every three to six months) Goal <7 percent   - Urinary albumin-to-creatinine ratio (Annually, protein excretion and serum  creatinine should also be monitored if persistent albuminuria is present)    - Serum creatinine, initially as indicated  6. ASA (75 to 162 mg/day) in patients with or at high risk for cardiovascular disease  7. Vaccinations:   - Pneumococcus, One time Patients over age 72 need a second dose if vaccine  was received ? 5 years previously and age was <65 at time of vaccination    - Influenza, Annually    - Hepatitis B, They should have had the three dose series   8. Education, self management review Annually      Orders Placed This Encounter   Procedures    Lipid Panel     Standing Status:   Future     Standing Expiration Date:   4/29/2022     Order Specific Question:   Is Patient Fasting?/# of Hours     Answer:   fasting    Comprehensive Metabolic Panel     Standing Status:   Future     Standing Expiration Date:   8/29/2021    CBC Auto Differential     Standing Status:   Future     Standing Expiration Date:   8/29/2021    PTH, Intact     Standing Status:   Future     Standing Expiration Date:   4/29/2022    Phosphorus     Standing Status:   Future     Standing Expiration Date:   4/29/2022    Hemoglobin A1C     Standing Status:   Future     Standing Expiration Date:   4/29/2022         Return in about 6 months (around 10/29/2021) for Diabetes, Hypertension, Hyperlipidemia.

## 2021-04-29 NOTE — PROGRESS NOTES
Patient: Tesfaye Arenas    YOB: 1927    Date: 4/29/21       Patient Active Problem List    Diagnosis Date Noted    History of ST elevation myocardial infarction (STEMI)      Priority: High    CKD (chronic kidney disease) 03/18/2015     Priority: High     Overview Note:     Stage G3a A2      HLD (hyperlipidemia) 03/18/2015     Priority: High    HTN (hypertension) 11/29/2011     Priority: High    Diabetes mellitus 11/29/2011     Priority: High    Vitamin D deficiency 03/18/2015     Priority: Low    SI (stress incontinence), female 05/29/2012     Priority: Low    Osteoarthritis 05/29/2012     Priority: Low    COVID-19 12/01/2020    Acute GI bleeding 11/30/2020    Uncontrolled type 2 diabetes mellitus with hyperglycemia (HCC)     DJD (degenerative joint disease), lumbar 06/24/2020    History of PTCA 06/24/2020    Vestibular neuronitis of both ears 06/22/2020    Dizziness     Spinal stenosis in cervical region 06/21/2020    Lumbar degenerative disc disease 06/21/2020    Spinal stenosis, lumbar region, with neurogenic claudication 06/21/2020    Abnormality of gait and mobility due to  NTSCI with Impaired Mobility and ADL's secondary to Cervical Myelopathy and Vestibular neuronitis with rehab admission 06/24/20. 06/21/2020     Overview Note:     80-year old female who presented to the ER with complaints of weakness, nausea, vomiting, and dizziness. CT Head 06/18/20 with no acute intracranial process. The dizziness came on suddenly the night prior to admission and had persisted. In the ER her blood pressure was in the 200's and her blood glucose was elevated at 250. Blood pressure responded with hydralazine. Repeat CT Head 06/19/20 with no change. CT Abdomen/Pelvis 06/19/20 shows borderline pelviectasis in left kidney. No acute pathology. Carotid Duplex 06/19/20 shows 50-69% Right ICA stenosis and 50-69% Left ICA stenosis.   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 Because of persisted neck and lower back pain further imaging was done. MRI of Brain showed no acute findings, MRI Cervical Spine revealed severe central canal stenosis at several levels and MRI of Lumbar Spine showed multilevel degenerative changes and mild canal narrowing but no definite central canal stenosis. MRI Thoracic Spine 06/20/20 negative MRI. Family opted for conservative management at this time. The patient has been found to have severe abnormality of gait and mobility with impaired self care due to NTSCI with Impaired Mobility and ADL's secondary to Cervical Myelopathy and Vestibular Neronitis and is admitted to the acute inpatient rehab program.     Transcribed from pre-admission information sheet completed by Janki Marquez RN/mdl as directed by Dr. Sole Zavala.           Generalized muscle ache 06/21/2020    Generalized osteoarthrosis, involving multiple sites 06/21/2020    Syncope 06/19/2020    Current use of long term anticoagulation 12/26/2019    Other transient cerebral ischemic attacks and related syndromes     Cerebrovascular disease     Chronic combined systolic and diastolic congestive heart failure ([facility] [address] 12/02/2019    Monitoring for long-term anticoagulant use 11/05/2019    Transient cerebral ischemia 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/2019    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/2018    History of non-ST elevation myocardial infarction (NSTEMI) 11/12/2018    Nausea & vomiting 11/11/2018    Chest pain 11/10/2018    Vitamin B12 deficiency 09/17/2018     Overview Note:     Started 2/2018      Recurrent UTI (urinary tract infection)-Raoultella resistant to Macrobid 08/06/2018     Overview Note:     amoxicillin-clavulanate Sensitive <=2 mcg/mL ceFAZolin Sensitive <=4 mcg/mL    ciprofloxacin Sensitive <=0.25 mcg/mL    gentamicin Sensitive <=1 mcg/mL    nitrofurantoin Resistant 256 mcg/mL    trimethoprim-sulfamethoxazole Sensitive <=20 mcg/mL            Diastolic dysfunction 90/11/0825    Valvular heart disease 03/07/2018    Hypercalcemia 01/15/2018     Overview Note:     kami Tripathi      Lumbar spinal stenosis 12/22/2016    Chronic low back pain 08/17/2016    Eczematous dermatitis     SCC (squamous cell carcinoma), arm 2013     Overview Note:     right upper arm, 2015 right forarm      Type 2 diabetes mellitus ([address] 2013    Obesity (BMI 30-39.9) 2013     Overview Note:     BMI: 32.7      [place] (hard of hearing) 2013     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 SCC (squamous cell carcinoma), arm 2013    right upper arm, 2015 right forarm    SI (stress incontinence), female 5/29/2012    Type II or unspecified type diabetes mellitus without mention of complication, not stated as uncontrolled     Whooping cough      Past Surgical History:   Procedure Laterality Date    ANKLE SURGERY      broken left ankle.     APPENDECTOMY      BREAST BIOPSY      x2 left breast    CATARACT REMOVAL  2004    bilateral    CORONARY ANGIOPLASTY WITH STENT PLACEMENT  01/2019    CYSTOCELE REPAIR      EYE SURGERY      bilateral cataract    HYSTERECTOMY      complete at age 40   [address]      as a child     Family History   Problem Relation Age of Onset    Diabetes Mother     Heart Disease Father      Social History     Socioeconomic History    Marital status:      Spouse name: Not on file    Number of children: Not on file    Years of education: Not on file    Highest education level: Not on file   Occupational History    Not on file   Social Needs    Financial resource strain: Not very hard    Food insecurity     Worry: Never true     Inability: Never true    Transportation needs     Medical: No     Non-medical: No   Tobacco Use    Smoking status: Never Smoker    Smokeless tobacco: Never Used   Substance and Sexual Activity    Alcohol use: No     Alcohol/week: 0.0 standard drinks    Drug use: No    Sexual activity: Not on file   Lifestyle    Physical activity     Days per week: Not on file     Minutes per session: Not on file    Stress: Not on file   Relationships    Social connections     Talks on phone: More than three times a week     Gets together: More than three times a week     Attends Jainism service: 1 to 4 times per year     Active member of club or organization: No     Attends meetings of clubs or organizations: Never     Relationship status:     Intimate partner violence     Fear of current or ex partner: No     Emotionally abused: No     Physically abused: No     Forced sexual activity: No   Other Topics Concern    Not on file   Social History Narrative         Lives With: Son    Type of Home: [facility] [facility] [address]: Two level, Able to Live on Main level with bedroom/bathroom, Performs ADL's on one level    Home Access: Stairs to enter with rails    Entrance Stairs - Number of Steps:  Threshold    Bathroom Shower/Tub: Tub/Shower unit    Bathroom Toilet: Handicap height    Bathroom Equipment: Grab bars in shower, Grab bars around toilet, Hand-held shower, Shower chair    Bathroom Accessibility: Accessible    Home Equipment: Rolling walker, 4 wheeled walker (Usually uses rollator)    ADL Assistance: [address]: Needs assistance (Son manages housework)    Homemaking Responsibilities: No    Ambulation Assistance: Independent (Rollator)    Transfer Assistance: Independent    Active : No    Patient's  Info:  Sons          Current Outpatient Medications on File Prior to Visit   Medication Sig Dispense Refill    sacubitril-valsartan (ENTRESTO) 24-26 MG per tablet Take 1 tablet by mouth 2 times daily 60 tablet 0    ondansetron (ZOFRAN ODT) 4 MG disintegrating tablet Take 1 tablet by mouth every 8 hours as needed for Nausea 10 tablet 0    nitrofurantoin, macrocrystal-monohydrate, (MACROBID) 100 MG capsule take 1 capsule by mouth ON MONDAY, WEDNESDAY, AND FRIDAY      clopidogrel (PLAVIX) 75 MG tablet Take 1 tablet by mouth three times a week Monday, Wednesday and friday 36 tablet 3    furosemide (LASIX) 20 MG tablet take 1 tablet by mouth once daily 90 tablet 1    warfarin (COUMADIN) 5 MG tablet Take 1 tablet by mouth daily 30 tablet 3    warfarin (COUMADIN) 1 MG tablet Per coumadin regimen 30 tablet 2    warfarin (COUMADIN) 5 MG tablet Take 1 tablet by mouth daily 30 tablet 6    TRUEplus Lancets 28G MISC use twice a day and if needed 100 each 5    hydrALAZINE (APRESOLINE) 50 MG tablet Take 50 mg by mouth daily      metFORMIN (GLUCOPHAGE) 1000 MG tablet take 1 tablet by mouth twice a day with meals 180 tablet 1    Lactobacillus (PROBIOTIC ACIDOPHILUS) CAPS take 1 capsule by mouth once daily      isosorbide mononitrate (IMDUR) 30 MG extended release tablet Take 1 tablet by mouth daily 90 tablet 1    fluticasone (FLONASE) 50 MCG/ACT nasal spray 1 spray by Each Nostril route daily (Patient taking differently: 1 spray by Each Nostril route as needed ) 1 Bottle 0    carvedilol (COREG) 12.5 MG tablet take restarting her Abx    Was initially seen for blood in stool  No blood in stool noted anymore  Her INR was supratherapeutic at the time due to Abx for UTI prior to going  into hospital     He is also inquiring about starting palliative care  Pt does need help with most ADL's    Review of Systems   Constitutional: Negative for activity change, appetite change, chills, diaphoresis, fatigue and fever. Respiratory: Negative for apnea, choking, chest tightness, shortness of breath and wheezing. Cardiovascular: Negative for chest pain, palpitations and leg swelling. Physical Exam    Due to this being a TeleHealth encounter, evaluation of the following organ systems is limited: Vitals/Constitutional/EENT/Resp/CV/GI//MS/Neuro/Skin/Heme-Lymph-Imm. [x] Alert  [] Oriented to person/place/time    [x] No apparent distress  [] Toxic appearing    [] Face flushed appearing [] Sclera clear  [] Lips are cyanotic      [x] Breathing appears normal  [] Appears tachypneic      [] Rash on visible skin    [] Cranial Nerves II-XII grossly intact    [] Motor grossly intact in visible upper extremities    [] Motor grossly intact in visible lower extremities    [] Normal Mood  [] Anxious appearing    [] Depressed appearing  [] Confused appearing      [] Poor short term memory  [] Poor long term memory    [] OTHER: Patient is aware of today's time and date  Patient is aware of current pandemic situation with Covid-19  Patient is able to speaking full sentences  Patient is not SOB during speech    HPI:  Treatment Adherence:   Medication compliance:  compliant all of the time  Diet compliance:  compliant all of the time  Current exercise: no regular exercise    Diabetes Mellitus Type 2: Current symptoms/problems include none. Home blood sugar records:  patient tests   Any episodes of hypoglycemia? no  Tobacco history: [unfilled] [unfilled]   Daily Aspirin?  No:   Have you had yourannual diabetic retinal (eye) exam? {[facility] records:[id number]}    Hypertension:  Home blood pressure monitoring: Yes   Patient is adherent to a low sodium diet. Patient {denies/complains:75055} {Symptoms of Hypertension, Denies:05607}. Antihypertensive medication side effects: {Hypertension med side effects:5728::\"nomedication side effects noted\"}. Use of agents associated with hypertension: {bp agents assoc with hypertension:511::\"none\"}. Assessment/Plan:  Krystian Vanessa was seen today for follow-up from hospital.    Diagnoses and all orders for this visit:    Type 2 diabetes mellitus with hyperglycemia, without long-term current use of insulin ([facility] [address]  -     Comprehensive Metabolic Panel; Future  -     Ambulatory referral to [address] [place] [address] to let sugars elevated due to steroids  She shouldn't even be on metformin due to GFR  Recheck labs and I will advise if metformin needs D/C  She may need to start insulin    Recurrent UTI  -     Ambulatory referral to [address] [place] [address] to restart ABx  Recheck urine Cx    Current use of long term anticoagulation  -     Protime-Inr; Future  -     Ambulatory referral to Palliative Care  Will nto adjust coumadin dose due to restarting Abx    Chronic kidney disease, unspecified CKD stage  -     Ambulatory referral to Palliative Care  Recheck GFR, dropped in hospital     Anemia, unspecified type  -     CBC Auto Differential; Future  -     Ambulatory referral to Palliative Care  Check CBC due to bleed    COVID-19  -     Ambulatory referral to Palliative Care    Cerebrovascular disease  -     Ambulatory referral to Palliative Care    Abnormality of gait and mobility due to  NTSCI with Impaired Mobility and ADL's secondary to Cervical Myelopathy and Vestibular neuronitis  -     Ambulatory referral to Palliative Care          No orders of the defined types were placed in this encounter. No follow-ups on file.

## 2021-05-03 ENCOUNTER — ANTI-COAG VISIT (OUTPATIENT)
Dept: INTERNAL MEDICINE | Age: 86
End: 2021-05-03

## 2021-05-03 DIAGNOSIS — I38 VALVULAR HEART DISEASE: ICD-10-CM

## 2021-05-03 DIAGNOSIS — Z79.01 CURRENT USE OF LONG TERM ANTICOAGULATION: ICD-10-CM

## 2021-05-05 ENCOUNTER — ANTI-COAG VISIT (OUTPATIENT)
Dept: INTERNAL MEDICINE | Age: 86
End: 2021-05-05
Payer: MEDICARE

## 2021-05-05 DIAGNOSIS — I38 VALVULAR HEART DISEASE: ICD-10-CM

## 2021-05-05 DIAGNOSIS — Z79.01 CURRENT USE OF LONG TERM ANTICOAGULATION: ICD-10-CM

## 2021-05-05 DIAGNOSIS — Z86.73 HISTORY OF CVA (CEREBROVASCULAR ACCIDENT): ICD-10-CM

## 2021-05-05 DIAGNOSIS — I25.2 HISTORY OF ST ELEVATION MYOCARDIAL INFARCTION (STEMI): ICD-10-CM

## 2021-05-05 LAB — INR BLD: 2.2

## 2021-05-05 PROCEDURE — 93793 ANTICOAG MGMT PT WARFARIN: CPT | Performed by: FAMILY MEDICINE

## 2021-05-12 ENCOUNTER — ANTI-COAG VISIT (OUTPATIENT)
Dept: INTERNAL MEDICINE | Age: 86
End: 2021-05-12
Payer: MEDICARE

## 2021-05-12 DIAGNOSIS — Z79.01 CURRENT USE OF LONG TERM ANTICOAGULATION: ICD-10-CM

## 2021-05-12 DIAGNOSIS — Z86.73 HISTORY OF CVA (CEREBROVASCULAR ACCIDENT): ICD-10-CM

## 2021-05-12 DIAGNOSIS — I38 VALVULAR HEART DISEASE: ICD-10-CM

## 2021-05-12 LAB — INR BLD: 2.2

## 2021-05-12 PROCEDURE — 93793 ANTICOAG MGMT PT WARFARIN: CPT | Performed by: FAMILY MEDICINE

## 2021-05-19 ENCOUNTER — ANTI-COAG VISIT (OUTPATIENT)
Dept: INTERNAL MEDICINE | Age: 86
End: 2021-05-19
Payer: MEDICARE

## 2021-05-19 DIAGNOSIS — Z86.73 HISTORY OF CVA (CEREBROVASCULAR ACCIDENT): ICD-10-CM

## 2021-05-19 DIAGNOSIS — Z79.01 CURRENT USE OF LONG TERM ANTICOAGULATION: ICD-10-CM

## 2021-05-19 DIAGNOSIS — I38 VALVULAR HEART DISEASE: Primary | ICD-10-CM

## 2021-05-19 LAB — INR BLD: 2.7

## 2021-05-19 PROCEDURE — 93793 ANTICOAG MGMT PT WARFARIN: CPT | Performed by: FAMILY MEDICINE

## 2021-05-22 DIAGNOSIS — E11.8 TYPE 2 DIABETES MELLITUS WITH COMPLICATION, WITHOUT LONG-TERM CURRENT USE OF INSULIN (HCC): ICD-10-CM

## 2021-05-22 NOTE — TELEPHONE ENCOUNTER
Requesting medication refill. Please approve or deny this request.    Rx requested:  Requested Prescriptions     Pending Prescriptions Disp Refills    metFORMIN (GLUCOPHAGE) 1000 MG tablet [Pharmacy Med Name: Teresa Rhodes HCL 1,000 MG TABLET] 180 tablet 1     Sig: take 1 tablet by mouth twice a day with meals       Last Office Visit:   4/29/2021        REASON LAST SEEN AND BY WHO:    dm     FOLLOW UP PLAN FROM LAST PCP VISIT: COPY AND PASTE FROM LAST PCP NOTE     Return in about 6 months (around 10/29/2021) for Diabetes, Hypertension, Hyperlipidemia.         PATIENT CONTACTED FOR A FOLLOW UP APPT: YES OR NO    no    Next Visit Date:  Future Appointments   Date Time Provider José Manuel Laraisti   6/30/2021 11:00 AM Nitin Gore MD [address] [facility]   7/13/2021  1:15 PM Martina Kowalski MD [address] [facility] [address]

## 2021-05-26 ENCOUNTER — ANTI-COAG VISIT (OUTPATIENT)
Dept: INTERNAL MEDICINE | Age: 86
End: 2021-05-26
Payer: MEDICARE

## 2021-05-26 DIAGNOSIS — I38 VALVULAR HEART DISEASE: Primary | ICD-10-CM

## 2021-05-26 DIAGNOSIS — I69.90 LATE EFFECTS OF CVA (CEREBROVASCULAR ACCIDENT): ICD-10-CM

## 2021-05-26 DIAGNOSIS — Z79.01 CURRENT USE OF LONG TERM ANTICOAGULATION: ICD-10-CM

## 2021-05-26 DIAGNOSIS — Z86.73 HISTORY OF CVA (CEREBROVASCULAR ACCIDENT): ICD-10-CM

## 2021-05-26 LAB — INR BLD: 2.8

## 2021-05-26 PROCEDURE — 93793 ANTICOAG MGMT PT WARFARIN: CPT | Performed by: FAMILY MEDICINE

## 2021-05-27 ENCOUNTER — TELEPHONE (OUTPATIENT)
Dept: INTERNAL MEDICINE | Age: 86
End: 2021-05-27

## 2021-05-27 NOTE — TELEPHONE ENCOUNTER
Lobito Narayanan called to say that Margarita Forde has uti again and will be on cipro bid x 5 days. He would like to know how to decrease coumadin. Per Dr. Gilberto Kearns pt will hold Mondays 5 mg dose, then continue with normal dose. Recheck in one week. Lobito Narayanan aware.

## 2021-06-02 ENCOUNTER — TELEPHONE (OUTPATIENT)
Dept: CARDIOLOGY CLINIC | Age: 86
End: 2021-06-02

## 2021-06-02 RX ORDER — SACUBITRIL AND VALSARTAN 24; 26 MG/1; MG/1
1 TABLET, FILM COATED ORAL 2 TIMES DAILY
Qty: 56 TABLET | Refills: 0 | COMMUNITY
Start: 2021-06-02

## 2021-06-09 ENCOUNTER — ANTI-COAG VISIT (OUTPATIENT)
Dept: INTERNAL MEDICINE | Age: 86
End: 2021-06-09
Payer: MEDICARE

## 2021-06-09 DIAGNOSIS — I38 VALVULAR HEART DISEASE: Primary | ICD-10-CM

## 2021-06-09 DIAGNOSIS — Z79.01 CURRENT USE OF LONG TERM ANTICOAGULATION: ICD-10-CM

## 2021-06-09 DIAGNOSIS — Z86.73 HISTORY OF CVA (CEREBROVASCULAR ACCIDENT): ICD-10-CM

## 2021-06-09 LAB — INR BLD: 2.1

## 2021-06-09 PROCEDURE — 93793 ANTICOAG MGMT PT WARFARIN: CPT | Performed by: FAMILY MEDICINE

## 2021-06-16 ENCOUNTER — ANTI-COAG VISIT (OUTPATIENT)
Dept: INTERNAL MEDICINE | Age: 86
End: 2021-06-16
Payer: MEDICARE

## 2021-06-16 DIAGNOSIS — Z79.01 CURRENT USE OF LONG TERM ANTICOAGULATION: ICD-10-CM

## 2021-06-16 DIAGNOSIS — Z86.73 HISTORY OF CVA (CEREBROVASCULAR ACCIDENT): ICD-10-CM

## 2021-06-16 DIAGNOSIS — I38 VALVULAR HEART DISEASE: Primary | ICD-10-CM

## 2021-06-16 LAB — INR BLD: 3

## 2021-06-16 PROCEDURE — 93793 ANTICOAG MGMT PT WARFARIN: CPT | Performed by: FAMILY MEDICINE

## 2021-06-23 ENCOUNTER — ANTI-COAG VISIT (OUTPATIENT)
Dept: INTERNAL MEDICINE | Age: 86
End: 2021-06-23
Payer: MEDICARE

## 2021-06-23 DIAGNOSIS — I69.90 LATE EFFECTS OF CVA (CEREBROVASCULAR ACCIDENT): ICD-10-CM

## 2021-06-23 DIAGNOSIS — I38 VALVULAR HEART DISEASE: Primary | ICD-10-CM

## 2021-06-23 DIAGNOSIS — Z86.73 HISTORY OF CVA (CEREBROVASCULAR ACCIDENT): ICD-10-CM

## 2021-06-23 DIAGNOSIS — Z79.01 CURRENT USE OF LONG TERM ANTICOAGULATION: ICD-10-CM

## 2021-06-23 LAB — INR BLD: 2.65

## 2021-06-23 PROCEDURE — 93793 ANTICOAG MGMT PT WARFARIN: CPT | Performed by: FAMILY MEDICINE

## 2021-07-01 ENCOUNTER — ANTI-COAG VISIT (OUTPATIENT)
Dept: INTERNAL MEDICINE | Age: 86
End: 2021-07-01
Payer: MEDICARE

## 2021-07-01 DIAGNOSIS — Z79.01 CURRENT USE OF LONG TERM ANTICOAGULATION: ICD-10-CM

## 2021-07-01 DIAGNOSIS — I69.90 LATE EFFECTS OF CVA (CEREBROVASCULAR ACCIDENT): ICD-10-CM

## 2021-07-01 DIAGNOSIS — I38 VALVULAR HEART DISEASE: Primary | ICD-10-CM

## 2021-07-01 DIAGNOSIS — I25.2 HISTORY OF NON-ST ELEVATION MYOCARDIAL INFARCTION (NSTEMI): ICD-10-CM

## 2021-07-01 LAB — INR BLD: 2.5

## 2021-07-01 PROCEDURE — 93793 ANTICOAG MGMT PT WARFARIN: CPT | Performed by: FAMILY MEDICINE

## 2021-07-07 ENCOUNTER — ANTI-COAG VISIT (OUTPATIENT)
Dept: INTERNAL MEDICINE | Age: 86
End: 2021-07-07
Payer: MEDICARE

## 2021-07-07 DIAGNOSIS — Z79.01 CURRENT USE OF LONG TERM ANTICOAGULATION: ICD-10-CM

## 2021-07-07 DIAGNOSIS — I38 VALVULAR HEART DISEASE: Primary | ICD-10-CM

## 2021-07-07 LAB — INR BLD: 2.4

## 2021-07-07 PROCEDURE — 93793 ANTICOAG MGMT PT WARFARIN: CPT | Performed by: FAMILY MEDICINE

## 2021-07-13 ENCOUNTER — OFFICE VISIT (OUTPATIENT)
Dept: CARDIOLOGY CLINIC | Age: 86
End: 2021-07-13
Payer: MEDICARE

## 2021-07-13 VITALS
HEIGHT: 63 IN | DIASTOLIC BLOOD PRESSURE: 88 MMHG | BODY MASS INDEX: 32.59 KG/M2 | SYSTOLIC BLOOD PRESSURE: 152 MMHG | HEART RATE: 75 BPM | OXYGEN SATURATION: 98 %

## 2021-07-13 DIAGNOSIS — I25.2 HISTORY OF ST ELEVATION MYOCARDIAL INFARCTION (STEMI): ICD-10-CM

## 2021-07-13 DIAGNOSIS — I10 ESSENTIAL HYPERTENSION: Primary | ICD-10-CM

## 2021-07-13 PROCEDURE — 93000 ELECTROCARDIOGRAM COMPLETE: CPT | Performed by: INTERNAL MEDICINE

## 2021-07-13 PROCEDURE — G8427 DOCREV CUR MEDS BY ELIG CLIN: HCPCS | Performed by: INTERNAL MEDICINE

## 2021-07-13 PROCEDURE — G8417 CALC BMI ABV UP PARAM F/U: HCPCS | Performed by: INTERNAL MEDICINE

## 2021-07-13 PROCEDURE — 1123F ACP DISCUSS/DSCN MKR DOCD: CPT | Performed by: INTERNAL MEDICINE

## 2021-07-13 PROCEDURE — 1036F TOBACCO NON-USER: CPT | Performed by: INTERNAL MEDICINE

## 2021-07-13 PROCEDURE — 99214 OFFICE O/P EST MOD 30 MIN: CPT | Performed by: INTERNAL MEDICINE

## 2021-07-13 PROCEDURE — 1090F PRES/ABSN URINE INCON ASSESS: CPT | Performed by: INTERNAL MEDICINE

## 2021-07-13 PROCEDURE — 4040F PNEUMOC VAC/ADMIN/RCVD: CPT | Performed by: INTERNAL MEDICINE

## 2021-07-13 ASSESSMENT — ENCOUNTER SYMPTOMS
SHORTNESS OF BREATH: 0
APNEA: 0
DIARRHEA: 0
TROUBLE SWALLOWING: 0
ABDOMINAL DISTENTION: 0
COUGH: 0
VOMITING: 0
WHEEZING: 0
COLOR CHANGE: 0
FACIAL SWELLING: 0
STRIDOR: 0
ANAL BLEEDING: 0
CHEST TIGHTNESS: 0
NAUSEA: 0
BLOOD IN STOOL: 0
VOICE CHANGE: 0

## 2021-07-13 NOTE — PROGRESS NOTES
Norvasc was discontinued.  We will cut down the hydralazine to twice a day.  Appetite is good.  She is sleeping good.  He had CAT scan of the brain that was negative on September 9 on chronic Coumadin therapy.  As usual accompanied by his son. he is the main healthcare provider for her she will see me in 3 M        8/18/2020: Patient presents today for follow-up of hypertension.  Has been complaining of leg edema.  We will discontinue amlodipine.  Continue with the hydralazine 3 times a day.  See me in the month.  Amlodipine is possibly causing edema        7/14/2020: Patient presents today for follow-up of recent hospitalization at [facility] for CHF.  She was then sent to rehab for recovery.  She had done well.  As usual has labile blood pressure.  Was discharged home on hydralazine 50 3 times daily. Kamar Caballero main issue is the back. Cheryl Corado has not been approved yet by her her insurance. Kelley Colemanалександр is under control.  She is usually accompanied by her son and so also during this visit. Leti Soares of hearing.  Severe back pain.  Does not want to take Gale Libyan will give her Ultram 50 3 times daily for as needed use.  And sent her to [address]. Leandra Calderon will see me in 1 month           1/7/20: Patient presents today for follow-up of congestive heart failure. Boogie Gomez.  Accompanied by her son. Leandra Calderon is nauseated. Ivette Brown will discontinue magnesium.  Son is an extremely competent care provider.  And very caring. Leandra Calderon will see me in 3 months.     10/8/19: Patient presents today for congestive heart failure.  Doing extremely well on Entresto.  On Coumadin.  Accompanied by his son. Kerrie Howell provides excellent care for her.  No chest pain congestive heart failure symptoms or syncope.  See me in 3 months     7/3/19: Patient presents today for follow-up of congestive heart failure.  We have been able to reduce her blood pressure medications significantly.  She was overmedicated.  We cut down hydralazine from 3-2 and now will stop it.  She is on Coumadin.  On Plavix for angioplasty which was done by Dr. bradley in December of last year. Oralia Pillai is accompanied by her son. Vineet Kate well otherwise. Renee Gómez is under control.  See me in 3 months     5/1/19: Patient presents today for for evaluation of congestive heart failure. Has ejection fraction 25%. Had a OMAR done at [facility] when she admitted with confusion. There was no thrombus. Blood pressure remains stable. I will cut down the hydralazine to only one a day. Don't want to overmedicate her. See me in 2 months     4/3/19: Patient presents today for follow-up of recent admission for confusion. She is accompanied by her son. A OMAR was done. There was no thrombus. Ejection fraction was 25%. We will cut down the hydralazine to twice a day. When she is done with magnesium, noting unit. She supposed for some labs done by Dr. Gina Smith soon. PT/INR will be done by visiting nurse on Saturday. She'll see me in a month.     2/19/19: Patient presents today for Follow-up of coronary artery disease. Stent to the proximal LAD. Has mild to 50% disease in the mid LAD. Ejection fraction was diminished. We are going to check another echo. See what LV ejection fraction is. She was recently admitted to [facility] with UTI. Was started on hydralazine. This was 3 times a day and I cut it down to 1 a day. Patient remains stable. I will stop it altogether. See me in 3 months with an echo     1/18/19: Patient presents today for Follow-up of recent hospitalization at [facility] for acute MI. Had angioplasty stent to proximal LAD mid LAD at 50% stenosis ejection fraction is 25%. On lisinopril and metoprolol. Month in the [place] office. He reported repeat EF evaluation at some point     8/21/18: Patient presents today for follow-up of hypertension. Much better. Only on 5 mg of Norvasc. Blood sugars are better to more alert. Dizziness is improved. Accompanied by a son. Mild chronic kidney disease is stable.  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 SCC (squamous cell carcinoma), arm 2013    right upper arm, 2015 right forarm    SI (stress incontinence), female 5/29/2012    Type II or unspecified type diabetes mellitus without mention of complication, not stated as uncontrolled     Whooping cough        Past Surgical History:   Procedure Laterality Date    ANKLE SURGERY      broken left ankle.  APPENDECTOMY      BREAST BIOPSY      x2 left breast    CATARACT REMOVAL  2004    bilateral    CORONARY ANGIOPLASTY WITH STENT PLACEMENT  01/2019    CYSTOCELE REPAIR      EYE SURGERY      bilateral cataract    HYSTERECTOMY      complete at age 39    [address]      as a child       Family History   Problem Relation Age of Onset    Diabetes Mother     Heart Disease Father        Social History     Socioeconomic History    Marital status:       Spouse name: Not on file    Number of children: Not on file    Years of education: Not on file    Highest education level: Not on file   Occupational History    Not on file   Tobacco Use    Smoking status: Never Smoker    Smokeless tobacco: Never Used   Vaping Use    Vaping Use: Never used   Substance and Sexual Activity    Alcohol use: No     Alcohol/week: 0.0 standard drinks    Drug use: No    Sexual activity: Not on file   Other Topics Concern    Not on file   Social History Narrative         Lives With: Son    Type of Home: [id number]  [address] [facility] Layout: Two level, Able to Live on Main level with bedroom/bathroom, Performs ADL's on one level    Home Access: Stairs to enter with rails    Entrance Stairs - Number of Steps: Threshold    Bathroom Shower/Tub: Tub/Shower unit    Bathroom Toilet: Handicap height    Bathroom Equipment: Grab bars in shower, Grab bars around toilet, Hand-held shower, Shower chair    Bathroom Accessibility: Accessible    Home Equipment: Rolling walker, 4 wheeled walker (Usually uses rollator)    ADL Assistance: [address]: Needs assistance (Son manages housework)    Homemaking Responsibilities: No    Ambulation Assistance: Independent (Rollator)    Transfer Assistance: Independent    Active : No    Patient's  Info: Sons          Social Determinants of Health     Financial Resource Strain:     Difficulty of Paying Living Expenses:    Food Insecurity:     Worried About Running Out of Food in the Last Year:     [address] in the Last Year:    Transportation Needs:     Lack of Transportation (Medical):  Lack of Transportation (Non-Medical):    Physical Activity:     Days of Exercise per Week:     Minutes of Exercise per Session:    Stress:     Feeling of Stress :    Social Connections:     Frequency of Communication with Friends and Family:     Frequency of Social Gatherings with Friends and Family:     Attends Adventism Services:     Active Member of Clubs or Organizations:     Attends Club or Organization Meetings:     Marital Status:    Intimate Partner Violence:     Fear of Current or Ex-Partner:     Emotionally Abused:     Physically Abused:     Sexually Abused:         Allergies   Allergen Reactions    Metoclopramide     Pantoprazole Other (See Comments)    Pcn [Penicillins]      Tolerates rocephin    Protonix [Pantoprazole Sodium]        Current Outpatient Medications   Medication Sig Dispense Refill    carvedilol (COREG) 12.5 MG tablet take 1 tablet by mouth twice a day with meals 180 tablet 0    blood glucose test strips (TRUE METRIX BLOOD GLUCOSE TEST) strip TEST twice a day and if needed 200 strip 0    sacubitril-valsartan (ENTRESTO) 24-26 MG per tablet Take 1 tablet by mouth 2 times daily 56 tablet 0    metFORMIN (GLUCOPHAGE) 1000 MG tablet take 1 tablet by mouth twice a day with meals 180 tablet 1    ondansetron (ZOFRAN ODT) 4 MG disintegrating tablet Take 1 tablet by mouth every 8 hours as needed for Nausea 10 tablet 0    nitrofurantoin, macrocrystal-monohydrate, (MACROBID) 100 MG capsule take 1 capsule by mouth ON MONDAY, WEDNESDAY, AND FRIDAY      clopidogrel (PLAVIX) 75 MG tablet Take 1 tablet by mouth three times a week Monday, Wednesday and friday 36 tablet 3    furosemide (LASIX) 20 MG tablet take 1 tablet by mouth once daily 90 tablet 1    warfarin (COUMADIN) 5 MG tablet Take 1 tablet by mouth daily 30 tablet 3    warfarin (COUMADIN) 1 MG tablet Per coumadin regimen 30 tablet 2    warfarin (COUMADIN) 5 MG tablet Take 1 tablet by mouth daily 30 tablet 6    TRUEplus Lancets 28G MISC use twice a day and if needed 100 each 5    hydrALAZINE (APRESOLINE) 50 MG tablet Take 50 mg by mouth daily      Lactobacillus (PROBIOTIC ACIDOPHILUS) CAPS take 1 capsule by mouth once daily      fluticasone (FLONASE) 50 MCG/ACT nasal spray 1 spray by Each Nostril route daily (Patient taking differently: 1 spray by Each Nostril route as needed ) 1 Bottle 0    warfarin (COUMADIN) 6 MG tablet Take 6 mg by mouth daily       blood glucose monitor kit and supplies Test 2 times a day & as needed for symptoms of irregular blood glucose.   Freestyle Freedom Lite 1 kit 0    TRUEPLUS LANCETS 28G MISC TEST TWO TIMES DAILY 200 each 3    Cholecalciferol (VITAMIN D) 2000 units CAPS capsule Take 2,000 Units by mouth      TRUE METRIX BLOOD GLUCOSE TEST strip TEST two to three times a day 200 strip 5    Needles & Syringes MISC 1 each by Does not apply route daily 50 each 0    Incontinence Supply Disposable (DEPEND UNDERGARMENTS) MISC 1 each by Does not apply route nightly 1 Package 11    Blood Glucose Monitoring Suppl (TRUE METRIX AIR GLUCOSE METER) W/DEVICE KIT       Blood Glucose Monitoring Suppl PRUDENCIO Dx: E11.9 1 Device 0     No current facility-administered medications for this visit. Review of Systems:   Review of Systems   Constitutional: Negative for activity change, appetite change, diaphoresis, fatigue and unexpected weight change. HENT: Negative for facial swelling, nosebleeds, trouble swallowing and voice change. Respiratory: Negative for apnea, cough, chest tightness, shortness of breath, wheezing and stridor. Cardiovascular: Negative for chest pain, palpitations and leg swelling. Gastrointestinal: Negative for abdominal distention, anal bleeding, blood in stool, diarrhea, nausea and vomiting. Genitourinary: Negative for decreased urine volume and dysuria. Musculoskeletal: Negative for gait problem and myalgias. Skin: Negative for color change, pallor, rash and wound. Neurological: Negative for dizziness, seizures, syncope, facial asymmetry, weakness, light-headedness, numbness and headaches. Hematological: Does not bruise/bleed easily. Psychiatric/Behavioral: Negative for agitation, behavioral problems, confusion, hallucinations and suicidal ideas. The patient is not nervous/anxious. All other systems reviewed and are negative. Review of System is negative except for as mentioned above.       Physical Examination:    BP (!) 152/88 (Site: Left Lower Arm, Position: Sitting, Cuff Size: Medium Adult)   Pulse 75   Ht 5' 3\" (1.6 m)   LMP  (LMP Unknown)   SpO2 98%   BMI 32.59 kg/m²    Physical Exam        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 effects of CVA (cerebrovascular accident)    S/P PTCA (percutaneous transluminal coronary angioplasty)    Transient cerebral ischemia    Monitoring for long-term anticoagulant use    Chronic combined systolic and diastolic congestive heart failure (HCC)    Other transient cerebral ischemic attacks and related syndromes    Cerebrovascular disease    Current use of long term anticoagulation    Syncope    Spinal stenosis in cervical region    Lumbar degenerative disc disease    Spinal stenosis, lumbar region, with neurogenic claudication    Abnormality of gait and mobility due to  [employer] with Impaired Mobility and ADL's secondary to Cervical Myelopathy and Vestibular neuronitis with rehab admission 06/24/20.  Generalized muscle ache    Generalized osteoarthrosis, involving multiple sites    Vestibular neuronitis of both ears    Dizziness    SCC (squamous cell carcinoma), arm    Type 2 diabetes mellitus (HCC)    Obesity (BMI 30-39. 9)    [place] (hard of hearing)    DJD (degenerative joint disease), lumbar    History of PTCA    Uncontrolled type 2 diabetes mellitus with hyperglycemia ([facility])    Acute GI bleeding    COVID-19           Orders Placed This Encounter   Procedures    EKG 12 lead     Order Specific Question:   Reason for Exam?     Answer:   Irregular heart rate         No orders of the defined types were placed in this encounter. Assessment/Orders:       ICD-10-CM    1.  Essential hypertension  I10

## 2021-07-14 ENCOUNTER — ANTI-COAG VISIT (OUTPATIENT)
Dept: INTERNAL MEDICINE | Age: 86
End: 2021-07-14
Payer: MEDICARE

## 2021-07-14 DIAGNOSIS — Z79.01 CURRENT USE OF LONG TERM ANTICOAGULATION: ICD-10-CM

## 2021-07-14 DIAGNOSIS — I38 VALVULAR HEART DISEASE: Primary | ICD-10-CM

## 2021-07-14 DIAGNOSIS — Z86.73 HISTORY OF CVA (CEREBROVASCULAR ACCIDENT): ICD-10-CM

## 2021-07-14 LAB — INR BLD: 2.6

## 2021-07-14 PROCEDURE — 93793 ANTICOAG MGMT PT WARFARIN: CPT | Performed by: FAMILY MEDICINE

## 2021-07-22 ENCOUNTER — ANTI-COAG VISIT (OUTPATIENT)
Dept: INTERNAL MEDICINE | Age: 86
End: 2021-07-22
Payer: MEDICARE

## 2021-07-22 DIAGNOSIS — Z79.01 CURRENT USE OF LONG TERM ANTICOAGULATION: ICD-10-CM

## 2021-07-22 DIAGNOSIS — I38 VALVULAR HEART DISEASE: Primary | ICD-10-CM

## 2021-07-22 DIAGNOSIS — Z86.73 HISTORY OF CVA (CEREBROVASCULAR ACCIDENT): ICD-10-CM

## 2021-07-22 LAB — INR BLD: 2.3

## 2021-07-22 PROCEDURE — 93793 ANTICOAG MGMT PT WARFARIN: CPT | Performed by: FAMILY MEDICINE

## 2021-07-28 ENCOUNTER — ANTI-COAG VISIT (OUTPATIENT)
Dept: INTERNAL MEDICINE | Age: 86
End: 2021-07-28
Payer: MEDICARE

## 2021-07-28 DIAGNOSIS — Z79.01 CURRENT USE OF LONG TERM ANTICOAGULATION: ICD-10-CM

## 2021-07-28 DIAGNOSIS — Z86.73 HISTORY OF CVA (CEREBROVASCULAR ACCIDENT): ICD-10-CM

## 2021-07-28 DIAGNOSIS — I38 VALVULAR HEART DISEASE: Primary | ICD-10-CM

## 2021-07-28 LAB — INR BLD: 2.6

## 2021-07-28 PROCEDURE — 93793 ANTICOAG MGMT PT WARFARIN: CPT | Performed by: FAMILY MEDICINE

## 2021-08-04 ENCOUNTER — ANTI-COAG VISIT (OUTPATIENT)
Dept: INTERNAL MEDICINE | Age: 86
End: 2021-08-04
Payer: MEDICARE

## 2021-08-04 DIAGNOSIS — I38 VALVULAR HEART DISEASE: Primary | ICD-10-CM

## 2021-08-04 DIAGNOSIS — I67.9 CEREBROVASCULAR DISEASE: ICD-10-CM

## 2021-08-04 DIAGNOSIS — Z79.01 CURRENT USE OF LONG TERM ANTICOAGULATION: ICD-10-CM

## 2021-08-04 DIAGNOSIS — Z86.73 HISTORY OF CVA (CEREBROVASCULAR ACCIDENT): ICD-10-CM

## 2021-08-04 LAB — INR BLD: 2.6

## 2021-08-04 PROCEDURE — 93793 ANTICOAG MGMT PT WARFARIN: CPT | Performed by: FAMILY MEDICINE

## 2021-08-10 ENCOUNTER — OFFICE VISIT (OUTPATIENT)
Dept: INTERNAL MEDICINE | Age: 86
End: 2021-08-10
Payer: MEDICARE

## 2021-08-10 VITALS
WEIGHT: 186 LBS | SYSTOLIC BLOOD PRESSURE: 112 MMHG | BODY MASS INDEX: 32.95 KG/M2 | DIASTOLIC BLOOD PRESSURE: 82 MMHG | OXYGEN SATURATION: 98 % | TEMPERATURE: 97.7 F | HEART RATE: 78 BPM

## 2021-08-10 DIAGNOSIS — E11.8 TYPE 2 DIABETES MELLITUS WITH COMPLICATION, WITHOUT LONG-TERM CURRENT USE OF INSULIN (HCC): ICD-10-CM

## 2021-08-10 DIAGNOSIS — Z00.00 ROUTINE GENERAL MEDICAL EXAMINATION AT A HEALTH CARE FACILITY: Primary | ICD-10-CM

## 2021-08-10 LAB — HBA1C MFR BLD: 7.3 %

## 2021-08-10 PROCEDURE — G0439 PPPS, SUBSEQ VISIT: HCPCS | Performed by: FAMILY MEDICINE

## 2021-08-10 PROCEDURE — 83036 HEMOGLOBIN GLYCOSYLATED A1C: CPT | Performed by: FAMILY MEDICINE

## 2021-08-10 PROCEDURE — 3051F HG A1C>EQUAL 7.0%<8.0%: CPT | Performed by: FAMILY MEDICINE

## 2021-08-10 PROCEDURE — 4040F PNEUMOC VAC/ADMIN/RCVD: CPT | Performed by: FAMILY MEDICINE

## 2021-08-10 PROCEDURE — 1123F ACP DISCUSS/DSCN MKR DOCD: CPT | Performed by: FAMILY MEDICINE

## 2021-08-10 SDOH — ECONOMIC STABILITY: FOOD INSECURITY: WITHIN THE PAST 12 MONTHS, YOU WORRIED THAT YOUR FOOD WOULD RUN OUT BEFORE YOU GOT MONEY TO BUY MORE.: NEVER TRUE

## 2021-08-10 SDOH — ECONOMIC STABILITY: FOOD INSECURITY: WITHIN THE PAST 12 MONTHS, THE FOOD YOU BOUGHT JUST DIDN'T LAST AND YOU DIDN'T HAVE MONEY TO GET MORE.: NEVER TRUE

## 2021-08-10 ASSESSMENT — LIFESTYLE VARIABLES
HOW OFTEN DO YOU HAVE A DRINK CONTAINING ALCOHOL: NEVER
HOW OFTEN DO YOU HAVE A DRINK CONTAINING ALCOHOL: 0
AUDIT TOTAL SCORE: INCOMPLETE
AUDIT-C TOTAL SCORE: INCOMPLETE

## 2021-08-10 ASSESSMENT — PATIENT HEALTH QUESTIONNAIRE - PHQ9
SUM OF ALL RESPONSES TO PHQ9 QUESTIONS 1 & 2: 0
2. FEELING DOWN, DEPRESSED OR HOPELESS: 0
SUM OF ALL RESPONSES TO PHQ QUESTIONS 1-9: 0
SUM OF ALL RESPONSES TO PHQ QUESTIONS 1-9: 0
1. LITTLE INTEREST OR PLEASURE IN DOING THINGS: 0
SUM OF ALL RESPONSES TO PHQ QUESTIONS 1-9: 0

## 2021-08-10 ASSESSMENT — SOCIAL DETERMINANTS OF HEALTH (SDOH): HOW HARD IS IT FOR YOU TO PAY FOR THE VERY BASICS LIKE FOOD, HOUSING, MEDICAL CARE, AND HEATING?: NOT HARD AT ALL

## 2021-08-10 NOTE — PATIENT INSTRUCTIONS
Personalized Preventive Plan for Eileen Espinosa - 8/10/2021  Medicare offers a range of preventive health benefits. Some of the tests and screenings are paid in full while other may be subject to a deductible, co-insurance, and/or copay. Some of these benefits include a comprehensive review of your medical history including lifestyle, illnesses that may run in your family, and various assessments and screenings as appropriate. After reviewing your medical record and screening and assessments performed today your provider may have ordered immunizations, labs, imaging, and/or referrals for you. A list of these orders (if applicable) as well as your Preventive Care list are included within your After Visit Summary for your review. Other Preventive Recommendations:    · A preventive eye exam performed by an eye specialist is recommended every 1-2 years to screen for glaucoma; cataracts, macular degeneration, and other eye disorders. · A preventive dental visit is recommended every 6 months. · Try to get at least 150 minutes of exercise per week or 10,000 steps per day on a pedometer . · Order or download the FREE \"Exercise & Physical Activity: Your Everyday Guide\" from [employer]. Call 8-519.955.3006 or search [employer] online. · You need 9441-1877 mg of calcium and 5410-3292 IU of vitamin D per day. It is possible to meet your calcium requirement with diet alone, but a vitamin D supplement is usually necessary to meet this goal.  · When exposed to the sun, use a sunscreen that protects against both UVA and UVB radiation with an SPF of 30 or greater. Reapply every 2 to 3 hours or after sweating, drying off with a towel, or swimming. · Always wear a seat belt when traveling in a car. Always wear a helmet when riding a bicycle or motorcycle.

## 2021-08-10 NOTE — PROGRESS NOTES
(COUMADIN) 1 MG tablet Per coumadin regimen Yes Jan Hoffman MD   warfarin (COUMADIN) 5 MG tablet Take 1 tablet by mouth daily Yes Carly Bishop MD   TRUEplus Lancets 28G MISC use twice a day and if needed Yes Carly Bishop MD   hydrALAZINE (APRESOLINE) 50 MG tablet Take 50 mg by mouth daily Yes Historical Provider, MD   Lactobacillus (PROBIOTIC ACIDOPHILUS) CAPS take 1 capsule by mouth once daily Yes Historical Provider, MD   fluticasone (FLONASE) 50 MCG/ACT nasal spray 1 spray by Each Nostril route daily  Patient taking differently: 1 spray by Each Nostril route as needed  Yes Pierre Agrawal PA-C   warfarin (COUMADIN) 6 MG tablet Take 6 mg by mouth daily  Yes Historical Provider, MD   blood glucose monitor kit and supplies Test 2 times a day & as needed for symptoms of irregular blood glucose.   Freestyle [place] Lite Yes Carly Bishop MD   TRUEPLUS LANCETS 28G MISC TEST TWO TIMES DAILY Yes Carly Bishop MD   Cholecalciferol (VITAMIN D) 2000 units CAPS capsule Take 2,000 Units by mouth Yes Historical Provider, MD   TRUE METRIX BLOOD GLUCOSE TEST strip TEST two to three times a day Yes Carly Bishop MD   Incontinence Supply Disposable (DEPEND UNDERGARMENTS) MISC 1 each by Does not apply route nightly Yes Carly Bishop MD   Blood Glucose Monitoring Suppl (TRUE METRIX AIR GLUCOSE METER) W/DEVICE KIT  Yes Historical Provider, MD   Blood Glucose Monitoring Suppl PRUDENCIO Dx: E11.9 Yes Neo Vela DO   [address] 1 each by Does not apply route daily  Patient not taking: Reported on 8/10/2021  Carly Bishop MD       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 SCC (squamous cell carcinoma), arm 2013    right upper arm, 2015 right forarm    SI (stress incontinence), female 5/29/2012    Type II or unspecified type diabetes mellitus without mention of complication, not stated as uncontrolled     Whooping cough        Past Surgical History:   Procedure Laterality Date    ANKLE SURGERY      broken left ankle.  APPENDECTOMY      BREAST BIOPSY      x2 left breast    CATARACT REMOVAL  2004    bilateral    CORONARY ANGIOPLASTY WITH STENT PLACEMENT  01/2019    CYSTOCELE REPAIR      EYE SURGERY      bilateral cataract    HYSTERECTOMY      complete at age 40   [address]      as a child       Family History   Problem Relation Age of Onset    Diabetes Mother     Heart Disease Father        CareTeam (Including outside providers/suppliers regularly involved in providing care):   Patient Care Team:  Genet Araujo MD as PCP - General (Family Medicine)  Genet Araujo MD as PCP - [address] [place] [address]  Empaneled Provider  DO Sonia Chaidez MD (Internal Medicine)    Wt Readings from Last 3 Encounters:   08/10/21 186 lb (84.4 kg)   04/29/21 184 lb (83.5 kg)   03/01/21 190 lb (86.2 kg)     Vitals:    08/10/21 1022   BP: 112/82   Pulse: 78   Temp: 97.7 °F (36.5 °C)   TempSrc: Infrared   SpO2: 98%   Weight: 186 lb (84.4 kg)     Body mass index is 32.95 kg/m². Based upon direct observation of the patient, evaluation of cognition reveals recent and remote memory intact. Patient's complete Health Risk Assessment and screening values have been reviewed and are found in Flowsheets.  The following problems were reviewed today Maintenance Status  Immunization History   Administered Date(s) Administered    COVID-19, Moderna, PF, 100mcg/0.5mL 03/19/2021, 04/14/2021    Influenza 12/05/2012    Influenza Vaccine, unspecified formulation 09/28/2011, 12/05/2012, 09/15/2015, 11/19/2016    Influenza Virus Vaccine 10/21/2014, 09/15/2015, 11/01/2016, 10/18/2017    Influenza, High Dose (Fluzone 65 yrs and older) 09/17/2018    Influenza, Quadv, adjuvanted, 65 yrs +, IM, PF (Fluad) 09/15/2020, 10/22/2020    Influenza, Triv, inactivated, subunit, adjuvanted, IM (Fluad 65 yrs and older) 10/18/2017, 10/02/2019    Pneumococcal Conjugate 13-valent ([id number]) 12/15/2016    Pneumococcal Polysaccharide ([id number]) 09/15/2015    Td vaccine (adult) 07/26/1996    Td, unspecified formulation 07/26/1996    Tdap (Boostrix, Adacel) 02/11/2017        Health Maintenance   Topic Date Due    Shingles Vaccine (1 of 2) Never done   [employer] Visit (AWV)  Never done    Flu vaccine (1) 09/01/2021    Potassium monitoring  04/30/2022    Creatinine monitoring  04/30/2022    DTaP/Tdap/Td vaccine (2 - Td or Tdap) 02/11/2027    Pneumococcal 65+ years Vaccine  Completed    COVID-19 Vaccine  Completed    Hepatitis A vaccine  Aged Out    Hib vaccine  Aged Out    Meningococcal (ACWY) vaccine  Aged Out     Recommendations for [employer] Due: see orders and patient instructions/AVS.  . Recommended screening schedule for the next 5-10 years is provided to the patient in written form: see Patient John Lagos was seen today for medicare awv.     Diagnoses and all orders for this visit:    Routine general medical examination at a health care facility    Type 2 diabetes mellitus with complication, without long-term current use of insulin ([address]  -     POCT glycosylated hemoglobin (Hb A1C)  Stable, controlled  Plan: continue current medications    Yasmine Telles MD

## 2021-08-11 LAB — INR BLD: 2.5

## 2021-08-12 ENCOUNTER — ANTI-COAG VISIT (OUTPATIENT)
Dept: INTERNAL MEDICINE | Age: 86
End: 2021-08-12
Payer: MEDICARE

## 2021-08-12 DIAGNOSIS — Z79.01 CURRENT USE OF LONG TERM ANTICOAGULATION: ICD-10-CM

## 2021-08-12 DIAGNOSIS — I25.2 HISTORY OF NON-ST ELEVATION MYOCARDIAL INFARCTION (NSTEMI): ICD-10-CM

## 2021-08-12 DIAGNOSIS — Z86.73 HISTORY OF CVA (CEREBROVASCULAR ACCIDENT): ICD-10-CM

## 2021-08-12 DIAGNOSIS — I25.2 HISTORY OF ST ELEVATION MYOCARDIAL INFARCTION (STEMI): ICD-10-CM

## 2021-08-12 DIAGNOSIS — I38 VALVULAR HEART DISEASE: Primary | ICD-10-CM

## 2021-08-12 PROCEDURE — 93793 ANTICOAG MGMT PT WARFARIN: CPT | Performed by: FAMILY MEDICINE

## 2021-08-18 ENCOUNTER — ANTI-COAG VISIT (OUTPATIENT)
Dept: INTERNAL MEDICINE | Age: 86
End: 2021-08-18
Payer: MEDICARE

## 2021-08-18 DIAGNOSIS — I38 VALVULAR HEART DISEASE: Primary | ICD-10-CM

## 2021-08-18 DIAGNOSIS — Z79.01 CURRENT USE OF LONG TERM ANTICOAGULATION: ICD-10-CM

## 2021-08-18 LAB — INR BLD: 2.3

## 2021-08-18 PROCEDURE — 93793 ANTICOAG MGMT PT WARFARIN: CPT | Performed by: FAMILY MEDICINE

## 2021-08-25 ENCOUNTER — ANTI-COAG VISIT (OUTPATIENT)
Dept: INTERNAL MEDICINE | Age: 86
End: 2021-08-25
Payer: MEDICARE

## 2021-08-25 DIAGNOSIS — I69.90 LATE EFFECTS OF CVA (CEREBROVASCULAR ACCIDENT): ICD-10-CM

## 2021-08-25 DIAGNOSIS — Z86.73 HISTORY OF CVA (CEREBROVASCULAR ACCIDENT): ICD-10-CM

## 2021-08-25 DIAGNOSIS — I38 VALVULAR HEART DISEASE: Primary | ICD-10-CM

## 2021-08-25 DIAGNOSIS — I25.2 HISTORY OF NON-ST ELEVATION MYOCARDIAL INFARCTION (NSTEMI): ICD-10-CM

## 2021-08-25 DIAGNOSIS — Z79.01 CURRENT USE OF LONG TERM ANTICOAGULATION: ICD-10-CM

## 2021-08-25 LAB — INR BLD: 1.9

## 2021-08-25 PROCEDURE — 93793 ANTICOAG MGMT PT WARFARIN: CPT | Performed by: FAMILY MEDICINE

## 2021-08-26 NOTE — TELEPHONE ENCOUNTER
Requesting medication refill.  Please approve or deny this request.    Rx requested:  Requested Prescriptions     Pending Prescriptions Disp Refills    blood glucose test strips (TRUE METRIX BLOOD GLUCOSE TEST) strip [Pharmacy Med Name: TRUE METRIX GLUCOSE TEST STRIP] 200 strip 0     Sig: TEST twice a day if needed       Last Office Visit:   8/10/2021        REASON LAST SEEN AND BY WHO:    RYANV- GABRIEL    FOLLOW UP PLAN FROM LAST PCP VISIT: COPY AND PASTE FROM LAST PCP NOTE    F/u In 1yr      PATIENT CONTACTED FOR A FOLLOW UP APPT: YES OR NO    no    Next Visit Date:  Future Appointments   Date Time Provider José Manuel Adamson   11/9/2021  1:45 PM Jaylene Rouse MD [address]

## 2021-08-30 RX ORDER — CALCIUM CITRATE/VITAMIN D3 200MG-6.25
TABLET ORAL
Qty: 200 STRIP | Refills: 0 | Status: SHIPPED | OUTPATIENT
Start: 2021-08-30 | End: 2022-07-01

## 2021-08-31 NOTE — TELEPHONE ENCOUNTER
Vaccine Information Statement(s) was given today. This has been reviewed, questions answered, and verbal consent given by Parent for injection(s) and administration of Human papillomavirus (HPV) (patient waited the recommended 15 mins after receiving the HPV vaccine) and Pfizer Covid 19.      Patient tolerated without incident. See immunization grid for documentation.         Claudeen Chard a nurse with [facility] called asking for a UA and C&S due to patients son believing she has a uti. Due to recurrent UTI's I gave verbal permission. Son is waiting for covid-19 restrictions to be up with them before seeing urology.

## 2021-09-02 ENCOUNTER — ANTI-COAG VISIT (OUTPATIENT)
Dept: INTERNAL MEDICINE | Age: 86
End: 2021-09-02
Payer: MEDICARE

## 2021-09-02 DIAGNOSIS — I38 VALVULAR HEART DISEASE: Primary | ICD-10-CM

## 2021-09-02 DIAGNOSIS — Z79.01 CURRENT USE OF LONG TERM ANTICOAGULATION: ICD-10-CM

## 2021-09-02 DIAGNOSIS — I69.90 LATE EFFECTS OF CVA (CEREBROVASCULAR ACCIDENT): ICD-10-CM

## 2021-09-02 LAB — INR BLD: 2.5

## 2021-09-02 PROCEDURE — 93793 ANTICOAG MGMT PT WARFARIN: CPT | Performed by: PHYSICIAN ASSISTANT

## 2021-09-08 ENCOUNTER — ANTI-COAG VISIT (OUTPATIENT)
Dept: INTERNAL MEDICINE | Age: 86
End: 2021-09-08
Payer: MEDICARE

## 2021-09-08 DIAGNOSIS — Z86.73 HISTORY OF CVA (CEREBROVASCULAR ACCIDENT): ICD-10-CM

## 2021-09-08 DIAGNOSIS — I38 VALVULAR HEART DISEASE: Primary | ICD-10-CM

## 2021-09-08 DIAGNOSIS — I25.2 HISTORY OF NON-ST ELEVATION MYOCARDIAL INFARCTION (NSTEMI): ICD-10-CM

## 2021-09-08 DIAGNOSIS — Z79.01 CURRENT USE OF LONG TERM ANTICOAGULATION: ICD-10-CM

## 2021-09-08 LAB — INR BLD: 2.5

## 2021-09-08 PROCEDURE — 93793 ANTICOAG MGMT PT WARFARIN: CPT | Performed by: PHYSICIAN ASSISTANT

## 2021-09-15 ENCOUNTER — ANTI-COAG VISIT (OUTPATIENT)
Dept: INTERNAL MEDICINE | Age: 86
End: 2021-09-15
Payer: MEDICARE

## 2021-09-15 DIAGNOSIS — I38 VALVULAR HEART DISEASE: Primary | ICD-10-CM

## 2021-09-15 DIAGNOSIS — Z79.01 CURRENT USE OF LONG TERM ANTICOAGULATION: ICD-10-CM

## 2021-09-15 DIAGNOSIS — Z86.73 HISTORY OF CVA (CEREBROVASCULAR ACCIDENT): ICD-10-CM

## 2021-09-15 LAB — INR BLD: 2.5

## 2021-09-15 PROCEDURE — 93793 ANTICOAG MGMT PT WARFARIN: CPT | Performed by: PHYSICIAN ASSISTANT

## 2021-09-22 ENCOUNTER — ANTI-COAG VISIT (OUTPATIENT)
Dept: INTERNAL MEDICINE | Age: 86
End: 2021-09-22
Payer: MEDICARE

## 2021-09-22 DIAGNOSIS — Z79.01 CURRENT USE OF LONG TERM ANTICOAGULATION: ICD-10-CM

## 2021-09-22 DIAGNOSIS — I38 VALVULAR HEART DISEASE: Primary | ICD-10-CM

## 2021-09-22 DIAGNOSIS — Z86.73 HISTORY OF CVA (CEREBROVASCULAR ACCIDENT): ICD-10-CM

## 2021-09-22 LAB — INR BLD: 2.4

## 2021-09-22 PROCEDURE — 93793 ANTICOAG MGMT PT WARFARIN: CPT | Performed by: PHYSICIAN ASSISTANT

## 2021-09-24 DIAGNOSIS — Z98.61 S/P PTCA (PERCUTANEOUS TRANSLUMINAL CORONARY ANGIOPLASTY): ICD-10-CM

## 2021-09-24 DIAGNOSIS — I51.89 DIASTOLIC DYSFUNCTION: ICD-10-CM

## 2021-09-24 DIAGNOSIS — I25.2 HISTORY OF NON-ST ELEVATION MYOCARDIAL INFARCTION (NSTEMI): ICD-10-CM

## 2021-09-24 NOTE — TELEPHONE ENCOUNTER
Comments: Pt is following with John Hutchinson.     Last Office Visit (last PCP visit):   5/9/2019    Next Visit Date:  Future Appointments   Date Time Provider José Manuel Ogi   11/9/2021  1:45 PM Merle King MD [address]             Rx requested:  Requested Prescriptions     Pending Prescriptions Disp Refills    furosemide (LASIX) 20 MG tablet 90 tablet 1     Sig: take 1 tablet by mouth once daily    carvedilol (COREG) 12.5 MG tablet 180 tablet 1     Sig: take 1 tablet by mouth twice a day with meals

## 2021-09-29 ENCOUNTER — ANTI-COAG VISIT (OUTPATIENT)
Dept: INTERNAL MEDICINE | Age: 86
End: 2021-09-29

## 2021-09-29 DIAGNOSIS — Z79.01 CURRENT USE OF LONG TERM ANTICOAGULATION: ICD-10-CM

## 2021-09-29 DIAGNOSIS — I38 VALVULAR HEART DISEASE: Primary | ICD-10-CM

## 2021-09-29 LAB — INR BLD: 2.6

## 2021-10-01 RX ORDER — ONDANSETRON 4 MG/1
4 TABLET, ORALLY DISINTEGRATING ORAL EVERY 8 HOURS PRN
Qty: 10 TABLET | Refills: 0 | Status: SHIPPED | OUTPATIENT
Start: 2021-10-01

## 2021-10-01 RX ORDER — CARVEDILOL 12.5 MG/1
TABLET ORAL
Qty: 180 TABLET | Refills: 1 | Status: SHIPPED | OUTPATIENT
Start: 2021-10-01 | End: 2022-03-14 | Stop reason: SDUPTHER

## 2021-10-01 RX ORDER — FUROSEMIDE 20 MG/1
TABLET ORAL
Qty: 90 TABLET | Refills: 1 | Status: ON HOLD | OUTPATIENT
Start: 2021-10-01 | End: 2022-07-30 | Stop reason: HOSPADM

## 2021-10-04 ENCOUNTER — NURSE ONLY (OUTPATIENT)
Dept: INTERNAL MEDICINE | Age: 86
End: 2021-10-04
Payer: MEDICARE

## 2021-10-04 DIAGNOSIS — Z23 NEED FOR IMMUNIZATION AGAINST INFLUENZA: Primary | ICD-10-CM

## 2021-10-04 PROCEDURE — 90694 VACC AIIV4 NO PRSRV 0.5ML IM: CPT | Performed by: PHYSICIAN ASSISTANT

## 2021-10-04 PROCEDURE — G0008 ADMIN INFLUENZA VIRUS VAC: HCPCS | Performed by: PHYSICIAN ASSISTANT

## 2021-10-04 NOTE — PROGRESS NOTES
Vaccine Information Sheet, \"Influenza - Inactivated\" OR \"Live - Intranasal\"  given to Amol Sorto, or parent/legal guardian of  Amol Sorto and verbalized understanding. Patient responses:    Have you ever had a reaction to a flu vaccine? No  Are you able to eat eggs without adverse effects? Yes  Do you have any current illness? No  Have you ever had Guillian [place] Syndrome? No    Flu vaccine given per order. Please see immunization tab.

## 2021-10-07 ENCOUNTER — ANTI-COAG VISIT (OUTPATIENT)
Dept: INTERNAL MEDICINE | Age: 86
End: 2021-10-07
Payer: MEDICARE

## 2021-10-07 DIAGNOSIS — Z79.01 CURRENT USE OF LONG TERM ANTICOAGULATION: ICD-10-CM

## 2021-10-07 DIAGNOSIS — Z86.73 HISTORY OF CVA (CEREBROVASCULAR ACCIDENT): ICD-10-CM

## 2021-10-07 DIAGNOSIS — I38 VALVULAR HEART DISEASE: Primary | ICD-10-CM

## 2021-10-07 LAB — INR BLD: 2.4

## 2021-10-07 PROCEDURE — 93793 ANTICOAG MGMT PT WARFARIN: CPT | Performed by: PHYSICIAN ASSISTANT

## 2021-10-13 LAB — INR BLD: 2.7

## 2021-10-14 ENCOUNTER — ANTI-COAG VISIT (OUTPATIENT)
Dept: INTERNAL MEDICINE | Age: 86
End: 2021-10-14
Payer: MEDICARE

## 2021-10-14 DIAGNOSIS — I38 VALVULAR HEART DISEASE: Primary | ICD-10-CM

## 2021-10-14 DIAGNOSIS — Z79.01 CURRENT USE OF LONG TERM ANTICOAGULATION: ICD-10-CM

## 2021-10-14 PROCEDURE — 93793 ANTICOAG MGMT PT WARFARIN: CPT | Performed by: PHYSICIAN ASSISTANT

## 2021-10-19 RX ORDER — CHOLECALCIFEROL (VITAMIN D3) 125 MCG
CAPSULE ORAL
Qty: 90 CAPSULE | Refills: 1 | Status: SHIPPED | OUTPATIENT
Start: 2021-10-19

## 2021-10-19 NOTE — TELEPHONE ENCOUNTER
Comments:     Last Office Visit (last PCP visit):   8/10/21 awv in office    Next Visit Date:  Future Appointments   Date Time Provider José Manuel Adamson   11/9/2021  1:45 PM Merle King MD [address]       **If hasn't been seen in over a year OR hasn't followed up according to last diabetes/ADHD visit, make appointment for patient before sending refill to provider.     Rx requested:  Requested Prescriptions     Pending Prescriptions Disp Refills    Lactobacillus (PROBIOTIC ACIDOPHILUS) CAPS 90 capsule 1     Sig: take 1 capsule by mouth once daily

## 2021-10-20 ENCOUNTER — ANTI-COAG VISIT (OUTPATIENT)
Dept: INTERNAL MEDICINE | Age: 86
End: 2021-10-20
Payer: MEDICARE

## 2021-10-20 DIAGNOSIS — Z79.01 CURRENT USE OF LONG TERM ANTICOAGULATION: ICD-10-CM

## 2021-10-20 DIAGNOSIS — I38 VALVULAR HEART DISEASE: Primary | ICD-10-CM

## 2021-10-20 LAB — INR BLD: 4.3

## 2021-10-20 PROCEDURE — 93793 ANTICOAG MGMT PT WARFARIN: CPT | Performed by: FAMILY MEDICINE

## 2021-10-20 NOTE — PROGRESS NOTES
Continue current regimen but hold dose for 2 days:     Dosing Plan  As of 10/20/2021    TTR:  73.0 % (2.4 y)   Full warfarin instructions:  10/20: Hold; 10/21: Hold; Otherwise 5 mg every Mon, Wed, Fri; 6 mg all other days             Next INR in 1 week.

## 2021-10-27 ENCOUNTER — ANTI-COAG VISIT (OUTPATIENT)
Dept: INTERNAL MEDICINE | Age: 86
End: 2021-10-27

## 2021-10-27 DIAGNOSIS — Z79.01 CURRENT USE OF LONG TERM ANTICOAGULATION: ICD-10-CM

## 2021-10-27 DIAGNOSIS — I38 VALVULAR HEART DISEASE: Primary | ICD-10-CM

## 2021-10-27 LAB — INR BLD: 2.2

## 2021-11-03 ENCOUNTER — ANTI-COAG VISIT (OUTPATIENT)
Dept: INTERNAL MEDICINE | Age: 86
End: 2021-11-03
Payer: MEDICARE

## 2021-11-03 DIAGNOSIS — I38 VALVULAR HEART DISEASE: Primary | ICD-10-CM

## 2021-11-03 DIAGNOSIS — Z79.01 CURRENT USE OF LONG TERM ANTICOAGULATION: ICD-10-CM

## 2021-11-03 LAB — INR BLD: 2.1

## 2021-11-03 PROCEDURE — 93793 ANTICOAG MGMT PT WARFARIN: CPT | Performed by: FAMILY MEDICINE

## 2021-11-09 ENCOUNTER — OFFICE VISIT (OUTPATIENT)
Dept: CARDIOLOGY CLINIC | Age: 86
End: 2021-11-09
Payer: MEDICARE

## 2021-11-09 VITALS
DIASTOLIC BLOOD PRESSURE: 75 MMHG | WEIGHT: 180 LBS | SYSTOLIC BLOOD PRESSURE: 142 MMHG | HEART RATE: 75 BPM | OXYGEN SATURATION: 97 % | BODY MASS INDEX: 31.89 KG/M2 | HEIGHT: 63 IN

## 2021-11-09 DIAGNOSIS — I50.42 CHRONIC COMBINED SYSTOLIC AND DIASTOLIC CONGESTIVE HEART FAILURE (HCC): ICD-10-CM

## 2021-11-09 DIAGNOSIS — Z98.61 CAD S/P PERCUTANEOUS CORONARY ANGIOPLASTY: ICD-10-CM

## 2021-11-09 DIAGNOSIS — I10 ESSENTIAL HYPERTENSION: Primary | ICD-10-CM

## 2021-11-09 DIAGNOSIS — I25.10 CAD S/P PERCUTANEOUS CORONARY ANGIOPLASTY: ICD-10-CM

## 2021-11-09 PROCEDURE — 4040F PNEUMOC VAC/ADMIN/RCVD: CPT | Performed by: INTERNAL MEDICINE

## 2021-11-09 PROCEDURE — G8484 FLU IMMUNIZE NO ADMIN: HCPCS | Performed by: INTERNAL MEDICINE

## 2021-11-09 PROCEDURE — G8417 CALC BMI ABV UP PARAM F/U: HCPCS | Performed by: INTERNAL MEDICINE

## 2021-11-09 PROCEDURE — G8427 DOCREV CUR MEDS BY ELIG CLIN: HCPCS | Performed by: INTERNAL MEDICINE

## 2021-11-09 PROCEDURE — 1036F TOBACCO NON-USER: CPT | Performed by: INTERNAL MEDICINE

## 2021-11-09 PROCEDURE — 1090F PRES/ABSN URINE INCON ASSESS: CPT | Performed by: INTERNAL MEDICINE

## 2021-11-09 PROCEDURE — 1123F ACP DISCUSS/DSCN MKR DOCD: CPT | Performed by: INTERNAL MEDICINE

## 2021-11-09 PROCEDURE — 99213 OFFICE O/P EST LOW 20 MIN: CPT | Performed by: INTERNAL MEDICINE

## 2021-11-09 RX ORDER — TRAMADOL HYDROCHLORIDE 50 MG/1
TABLET ORAL
COMMUNITY
Start: 2021-11-01

## 2021-11-09 RX ORDER — ACETAMINOPHEN, DEXTROMETHORPHAN HYDROBROMIDE, DOXYLAMINE SUCCINATE, PHENYLEPHRINE HYDROCHLORIDE 650; 20; 12.5; 1 MG/30ML; MG/30ML; MG/30ML; MG/30ML
SOLUTION ORAL
COMMUNITY
Start: 2021-10-19

## 2021-11-09 ASSESSMENT — ENCOUNTER SYMPTOMS
APNEA: 0
SHORTNESS OF BREATH: 0
COLOR CHANGE: 0
CHEST TIGHTNESS: 0

## 2021-11-09 NOTE — PROGRESS NOTES
[facility] CARDIOLOGY OFFICE FOLLOW-UP      Patient: Lynn Rodriguez  YOB: 1927  MRN: [id number]    Chief Complaint:  Chief Complaint   Patient presents with    3 Month Follow-Up    Hypertension    Coronary Artery Disease    Congestive Heart Failure         Subjective/HPI:  11/9/21: Patient presents today for follow-up of hypertension and TIA's. Much better. On Plavix every other day. Blood pressures well controlled today it was 140. Eats well. Accompanied by her son who is the main care provider. Patient is also on Coumadin for A. fib. Pretension hypertensive heart disease. Alert and oriented. Getting weaker by the day. But still very pleasant. Not confused she is diabetic. INR was reviewed. No ankle edema. No headaches nausea vomiting.      7/13/21: Patient presents today for follow-up of hypertension and TIAs. Much better. This is the most alert I have seen. EKG shows sinus rhythm left axis deviation. Possibly LVH criteria. Denies any chest pain congestive heart failure. She did get the Covid vaccine. She is on excellent medication that includes Coreg Entresto and hydralazine 1 dose. She is also on chronic Coumadin therapy INR was reviewed. She is on Plavix for history of TIA. Plavix was added after she had a TIA which happened despite being on Coumadin. He is also diabetic. Her son is the main healthcare provider and provides excellent care for her. She did get the Covid vaccine. See me in 4 months        4/13/21: Patient presents today for follow-up of hypertension TIAs. Miri Cheng is well controlled. She is on chronic Coumadin.  Also on Plavix for peripheral vascular disease, that was done many years ago.  I am going to cut it down to every other day.  Blood pressure remains extremely well controlled.  Only on one hydralazine once a day.  She also takes Entresto Isosorbide and Coreg.  No chest pain.  She and her son both got the Covid vaccine.  Dr. Carissa Casanova monitor her Coumadin. Radha Peterson will see me in 3 months.     1/13/21 (VIRTUAL): For follow-up of hypertension TIAs.  Has a son who takes excellent care of her. Marycruz Gonzales called to say that she gets dizzy.  I will cut down the dose of hydralazine to night dose only. Rama Vasquez is diabetic.  Hard of hearing but still pretty well-preserved.  Responds to questions appropriately.  No chest pain.  Has a history of cardiomyopathy for which she is on Entresto        9/22/2020: Patient presents today for follow-up of hypertension.  Leg edema is resolved after Norvasc was discontinued.  We will cut down the hydralazine to twice a day.  Appetite is good.  She is sleeping good.  He had CAT scan of the brain that was negative on September 9 on chronic Coumadin therapy.  As usual accompanied by his son. he is the main healthcare provider for her she will see me in 3 M        8/18/2020: Patient presents today for follow-up of hypertension.  Has been complaining of leg edema.  We will discontinue amlodipine.  Continue with the hydralazine 3 times a day.  See me in the month.  Amlodipine is possibly causing edema        7/14/2020: Patient presents today for follow-up of recent hospitalization at [facility] [place] for CHF.  She was then sent to rehab for recovery.  She had done well.  As usual has labile blood pressure.  Was discharged home on hydralazine 50 3 times daily. Lynnell Pill main issue is the back. Homero Blum has not been approved yet by her her insurance. Harshil Arreola is under control.  She is usually accompanied by her son and so also during this visit. Irving Khan of [facility].  Severe back pain.  Does not want to take Clorinda Racquel will give her Ultram 50 3 times daily for as needed use.  And sent her to [address]. Radha Peterson will see me in 1 month           1/7/20: Patient presents today for follow-up of congestive heart failure. Coleman Mejia.  Accompanied by her son. Radha Peterson is nauseated. Lety King will discontinue magnesium.  Son is an extremely competent care provider.  And very caring. Neema Cortés will see me in 3 months.     10/8/19: Patient presents today for congestive heart failure.  Doing extremely well on Entresto.  On Coumadin.  Accompanied by his son. Los Diaz provides excellent care for her.  No chest pain congestive heart failure symptoms or syncope.  See me in 3 months     7/3/19: Patient presents today for follow-up of congestive heart failure.  We have been able to reduce her blood pressure medications significantly.  She was overmedicated.  We cut down hydralazine from 3-2 and now will stop it.  She is on Coumadin.  On Plavix for angioplasty which was done by Dr. Renato Monge in December of last year. Neema Cortés is accompanied by her son. Mitchell Dixon well otherwise. Alejandra Hopkins is under control.  See me in 3 months     5/1/19: Patient presents today for for evaluation of congestive heart failure. Has ejection fraction 25%. Had a OMAR done at [facility] [place] when she admitted with confusion. There was no thrombus. Blood pressure remains stable. I will cut down the hydralazine to only one a day. Don't want to overmedicate her. See me in 2 months     4/3/19: Patient presents today for follow-up of recent admission for confusion. She is accompanied by her son. A OMAR was done. There was no thrombus. Ejection fraction was 25%. We will cut down the hydralazine to twice a day. When she is done with magnesium, noting unit. She supposed for some labs done by Dr. Robby Rachel soon. PT/INR will be done by visiting nurse on Saturday. She'll see me in a month.     2/19/19: Patient presents today for Follow-up of coronary artery disease. Stent to the proximal LAD. Has mild to 50% disease in the mid LAD. Ejection fraction was diminished. We are going to check another echo. See what LV ejection fraction is. She was recently admitted to [facility] with UTI. Was started on hydralazine. This was 3 times a day and I cut it down to 1 a day. Patient remains stable. I will stop it altogether.  See me in 3 months with an echo     1/18/19: Patient presents today for Follow-up of recent hospitalization at [facility] for acute MI. Had angioplasty stent to proximal LAD mid LAD at 50% stenosis ejection fraction is 25%. On lisinopril and metoprolol. Month in the [place] office. He reported repeat EF evaluation at some point     8/21/18: Patient presents today for follow-up of hypertension. Much better. Only on 5 mg of Norvasc. Blood sugars are better to more alert. Dizziness is improved. Accompanied by a son. Mild chronic kidney disease is stable. See me in 6 months.     5/15/18: Patient presents today for follow-up of hypertension. The dizziness is much better after we stopped few of her medication. Blood pressure is holding very well I think we could probably stop the Norvasc but she is quite content and so is a son with the present medications. She has mild chronic kidney disease. Hyperlipidemia that stable. See me in 3 months     4/11/18: Patient presents today for Follow-up of hypertension. She gets dizzy. I think she is on too much medications. We will discontinue hydralazine clonidine and Lasix. His mild chronic kidney disease. Also has hyperlipidemia that stable. I will see him in 3 weeks. Off these medications     3/28/2018: Patient presents today for evaluation of recent hospitalization for acute renal insufficiency. Echocardiogram was done which showed preserved LV ejection fraction. Moderately elevated right systolic pressure 60 mm smoker. Last BUN/creatinine are getting better. GFR is in the 40s. She lives with one of her other sons. She note of hypertension. Used to be on lisinopril 20 mg a day and hydrochlorothiazide. During this hospitalization she was discharged home on lisinopril 2010 a day no hydrochlorothiazide. Lasix 20 minutes, day. Norvasc 10 mg a day. Clonidine 0.2 3 times a day hydralazine 10 3 times a day. I like blood pressures have been running stable in the 120s. Physical therapy goes to her house.  She denies any chest pain. She started to drink more water. Does not drive. Does minor chores around the house. I would like to simplify his regimen. Take clonidine 0.2 in a.m. and hydralazine 10 mg at night. Other medication which include Lopressor 25 mg by mouth twice a day Lasix 20 mg a day Norvasc and resume a day to continue. See me in 2 weeks.                  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 SCC (squamous cell carcinoma), arm 2013    right upper arm, 2015 right forarm    SI (stress incontinence), female 5/29/2012    Type II or unspecified type diabetes mellitus without mention of complication, not stated as uncontrolled     Whooping cough        Past Surgical History:   Procedure Laterality Date    ANKLE SURGERY      broken left ankle.  APPENDECTOMY      BREAST BIOPSY      x2 left breast    CATARACT REMOVAL  2004    bilateral    CORONARY ANGIOPLASTY WITH STENT PLACEMENT  01/2019    CYSTOCELE REPAIR      EYE SURGERY      bilateral cataract    HYSTERECTOMY      complete at age 39    [address]      as a child       Family History   Problem Relation Age of Onset    Diabetes Mother     Heart Disease Father        Social History     Socioeconomic History    Marital status:       Spouse name: None    Number of children: None    Years of education: None    Highest education level: None   Occupational History    None   Tobacco Use    Smoking status: Never Smoker    Smokeless tobacco: Never Used   Vaping Use    Vaping Use: Never used   Substance and Sexual Activity    Alcohol use: No     Alcohol/week: 0.0 standard drinks    Drug use: No    Sexual activity: None   Other Topics Concern    None   Social History Narrative         Lives With: Son    Type of Home: [id number] [id number] [place] [facility]: Two level, Able to Live on Main level with bedroom/bathroom, Performs ADL's on one level    Home Access: Stairs to enter with rails    Entrance Stairs - Number of Steps: Threshold    Bathroom Shower/Tub: Tub/Shower unit    Bathroom Toilet: Handicap height    Bathroom Equipment: Grab bars in shower, Grab bars around toilet, Hand-held shower, Shower chair    Bathroom Accessibility: Accessible    Home Equipment: Rolling walker, 4 wheeled walker (Usually uses rollator)    ADL Assistance: [facility]: Needs assistance (Son manages housework)    Homemaking Responsibilities: No    Ambulation Assistance: Independent (Rollator)    Transfer Assistance: Independent    Active : No    Patient's  Info: Sons          Social Determinants of Health     Financial Resource Strain: Low Risk     Difficulty of Paying Living Expenses: Not hard at all   Food Insecurity: No Food Insecurity    Worried About Running Out of Food in the Last Year: Never true    Michelle of Food in the Last Year: Never true   Transportation Needs:     Lack of Transportation (Medical): Not on file    Lack of Transportation (Non-Medical):  Not on file   Physical Activity:     Days of Exercise per Week: Not on file    Minutes of Exercise per Session: Not on file   Stress:     Feeling of Stress : Not on file   Social Connections:     Frequency of Communication with Friends and Family: Not on file    Frequency of Social Gatherings with Friends and Family: Not on file    Attends Confucianist Services: Not on file    Active Member of Clubs or Organizations: Not on file    Attends Club or Organization Meetings: Not on file    Marital Status: Not on file   Intimate Partner Violence:     Fear of Current or Ex-Partner: Not on file    Emotionally Abused: Not on file    Physically Abused: Not on file    Sexually Abused: Not on file   Housing Stability:     Unable to Pay for Housing in the Last Year: Not on file    Number of [place] in the Last Year: Not on file    Unstable Housing in the Last Year: Not on file       Allergies   Allergen Reactions    Metoclopramide     Pantoprazole Other (See Comments)    Pcn [Penicillins]      Tolerates rocephin    Protonix [Pantoprazole Sodium]        Current Outpatient Medications   Medication Sig Dispense Refill    traMADol (ULTRAM) 50 MG tablet take 1/2 tablet by mouth three times a day if needed for pain      Probiotic Product (RA PROBIOTIC COMPLEX) CAPS take 1 capsule by mouth once daily      Lactobacillus (PROBIOTIC ACIDOPHILUS) CAPS take 1 capsule by mouth once daily 90 capsule 1    furosemide (LASIX) 20 MG tablet take 1 tablet by mouth once daily 90 tablet 1    carvedilol (COREG) 12.5 MG tablet take 1 tablet by mouth twice a day with meals 180 tablet 1    ondansetron (ZOFRAN ODT) 4 MG disintegrating tablet Take 1 tablet by mouth every 8 hours as needed for Nausea 10 tablet 0    blood glucose test strips (TRUE METRIX BLOOD GLUCOSE TEST) strip TEST twice a day if needed 200 strip 0    sacubitril-valsartan (ENTRESTO) 24-26 MG per tablet Take 1 tablet by mouth 2 times daily 56 tablet 0    metFORMIN (GLUCOPHAGE) 1000 MG tablet take 1 tablet by mouth twice a day with meals 180 tablet 1    nitrofurantoin, macrocrystal-monohydrate, (MACROBID) 100 MG capsule take 1 capsule by mouth ON MONDAY, WEDNESDAY, AND FRIDAY      clopidogrel (PLAVIX) 75 MG tablet Take 1 tablet by mouth three times a week Monday, Wednesday and friday 36 tablet 3    warfarin (COUMADIN) 5 MG tablet Take 1 tablet by mouth daily 30 tablet 3    warfarin (COUMADIN) 1 MG tablet Per coumadin regimen 30 tablet 2    warfarin (COUMADIN) 5 MG tablet Take 1 tablet by mouth daily 30 tablet 6    TRUEplus Lancets 28G MISC use twice a day and if needed 100 each 5    hydrALAZINE (APRESOLINE) 50 MG tablet Take 50 mg by mouth daily      fluticasone (FLONASE) 50 MCG/ACT nasal spray 1 spray by Each Nostril route daily (Patient taking differently: 1 spray by Each Nostril route as needed ) 1 Bottle 0    warfarin (COUMADIN) 6 MG tablet Take 6 mg by mouth daily       blood glucose monitor kit and supplies Test 2 times a day & as needed for symptoms of irregular blood glucose. Freestyle Freedom Lite 1 kit 0    TRUEPLUS LANCETS 28G MISC TEST TWO TIMES DAILY 200 each 3    Cholecalciferol (VITAMIN D) 2000 units CAPS capsule Take 2,000 Units by mouth      TRUE METRIX BLOOD GLUCOSE TEST strip TEST two to three times a day 200 strip 5    Needles & Syringes MISC 1 each by Does not apply route daily 50 each 0    Incontinence Supply Disposable (DEPEND UNDERGARMENTS) MISC 1 each by Does not apply route nightly 1 Package 11    Blood Glucose Monitoring Suppl (TRUE METRIX AIR GLUCOSE METER) W/DEVICE KIT       Blood Glucose Monitoring Suppl PRUDENCIO Dx: E11.9 1 Device 0     No current facility-administered medications for this visit. Review of Systems:   Review of Systems   Constitutional: Negative for appetite change, diaphoresis and fatigue. Respiratory: Negative for apnea, chest tightness and shortness of breath. Cardiovascular: Negative for chest pain, palpitations and leg swelling. Musculoskeletal: Negative for myalgias. Skin: Negative for color change, pallor, rash and wound. Neurological: Negative for dizziness, syncope, weakness, light-headedness, numbness and headaches. Hematological: Does not bruise/bleed easily. Psychiatric/Behavioral: Negative for agitation, behavioral problems and confusion. The patient is not nervous/anxious and is not hyperactive. Review of System is negative except for as mentioned above. Physical Examination:    BP (!) 142/75 (Site: Left Upper Arm, Position: Sitting, Cuff Size: Large Adult)   Pulse 75   Ht 5' 3\" (1.6 m)   Wt 180 lb (81.6 kg)   LMP  (LMP Unknown)   SpO2 97%   BMI 31.89 kg/m²    Physical Exam   Constitutional: She appears healthy. HENT:   Nose: Nose normal.   Mouth/Throat: Dentition is normal. Oropharynx is clear. Eyes: Pupils are equal, round, and reactive to light. Cardiovascular: Normal rate, regular rhythm, S1 normal, S2 normal, normal heart sounds, intact distal pulses and normal pulses. No extrasystoles are present. Exam reveals no gallop. No murmur heard. Pulmonary/Chest: Effort normal and breath sounds normal. She has no wheezes. She has no rales. She exhibits no tenderness. Abdominal: Soft. Bowel sounds are normal. She exhibits no distension and no mass. There is no splenomegaly or hepatomegaly. There is no abdominal tenderness. Musculoskeletal:         General: No tenderness, deformity or edema. Normal range of motion. Cervical back: Normal range of motion. Neurological: She is alert and oriented to person, place, and time. She has normal motor skills and normal reflexes. Gait normal.   Skin: Skin is warm and dry.            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 effects of CVA (cerebrovascular accident)    S/P PTCA (percutaneous transluminal coronary angioplasty)    Transient cerebral ischemia    Monitoring for long-term anticoagulant use    Chronic combined systolic and diastolic congestive heart failure ([address]    Other transient cerebral ischemic attacks and related syndromes    Cerebrovascular disease    Current use of long term anticoagulation    Syncope    Spinal stenosis in cervical region    Lumbar degenerative disc disease    Spinal stenosis, lumbar region, with neurogenic claudication    Abnormality of gait and mobility due to  [employer] with Impaired Mobility and ADL's secondary to Cervical Myelopathy and Vestibular neuronitis with rehab admission 06/24/20.  Generalized muscle ache    Generalized osteoarthrosis, involving multiple sites    Vestibular neuronitis of both ears    Dizziness    SCC (squamous cell carcinoma), arm    Type 2 diabetes mellitus (HCC)    Obesity (BMI 30-39. 9)    [place] (hard of hearing)    DJD (degenerative joint disease), lumbar    History of PTCA    Uncontrolled type 2 diabetes mellitus with hyperglycemia (HCC)    Acute GI bleeding    COVID-19             Assessment/Orders:   Hypertension hypertensive heart disease  TIA  CAD  Prior PTCA  Anticoagulated            Plan:  Plavix was started that she reportedly she had TIA despite being on Coumadin. But I reduce the dose because of advanced age so far she is holding up well. On Plavix 3 times a week and Coumadin. Aspirin was stopped because of risk of bleeding. Blood pressure remains well controlled. No ankle edema.   She will see me in 3 months                Electronically signed by: Inder Camacho MD  11/12/2021 7:51 AM

## 2021-11-10 ENCOUNTER — ANTI-COAG VISIT (OUTPATIENT)
Dept: INTERNAL MEDICINE | Age: 86
End: 2021-11-10
Payer: MEDICARE

## 2021-11-10 DIAGNOSIS — Z79.01 CURRENT USE OF LONG TERM ANTICOAGULATION: ICD-10-CM

## 2021-11-10 DIAGNOSIS — I38 VALVULAR HEART DISEASE: Primary | ICD-10-CM

## 2021-11-10 DIAGNOSIS — Z86.73 HISTORY OF CVA (CEREBROVASCULAR ACCIDENT): ICD-10-CM

## 2021-11-10 LAB — INR BLD: 2.5

## 2021-11-10 PROCEDURE — 93793 ANTICOAG MGMT PT WARFARIN: CPT | Performed by: PHYSICIAN ASSISTANT

## 2021-11-17 ENCOUNTER — ANTI-COAG VISIT (OUTPATIENT)
Dept: INTERNAL MEDICINE | Age: 86
End: 2021-11-17
Payer: MEDICARE

## 2021-11-17 DIAGNOSIS — Z86.73 HISTORY OF CVA (CEREBROVASCULAR ACCIDENT): ICD-10-CM

## 2021-11-17 DIAGNOSIS — E11.8 TYPE 2 DIABETES MELLITUS WITH COMPLICATION, WITHOUT LONG-TERM CURRENT USE OF INSULIN (HCC): ICD-10-CM

## 2021-11-17 DIAGNOSIS — I38 VALVULAR HEART DISEASE: Primary | ICD-10-CM

## 2021-11-17 DIAGNOSIS — Z79.01 CURRENT USE OF LONG TERM ANTICOAGULATION: ICD-10-CM

## 2021-11-17 LAB — INR BLD: 1.7

## 2021-11-17 PROCEDURE — 93793 ANTICOAG MGMT PT WARFARIN: CPT | Performed by: PHYSICIAN ASSISTANT

## 2021-11-20 DIAGNOSIS — I10 ESSENTIAL HYPERTENSION: Primary | ICD-10-CM

## 2021-11-22 ENCOUNTER — ANTI-COAG VISIT (OUTPATIENT)
Dept: INTERNAL MEDICINE | Age: 86
End: 2021-11-22
Payer: MEDICARE

## 2021-11-22 DIAGNOSIS — Z79.01 CURRENT USE OF LONG TERM ANTICOAGULATION: ICD-10-CM

## 2021-11-22 DIAGNOSIS — Z86.73 HISTORY OF CVA (CEREBROVASCULAR ACCIDENT): ICD-10-CM

## 2021-11-22 DIAGNOSIS — I38 VALVULAR HEART DISEASE: Primary | ICD-10-CM

## 2021-11-22 LAB — INR BLD: 2.1

## 2021-11-22 PROCEDURE — 93793 ANTICOAG MGMT PT WARFARIN: CPT | Performed by: PHYSICIAN ASSISTANT

## 2021-11-22 RX ORDER — HYDRALAZINE HYDROCHLORIDE 50 MG/1
TABLET, FILM COATED ORAL
Qty: 270 TABLET | Refills: 3 | Status: SHIPPED | OUTPATIENT
Start: 2021-11-22 | End: 2022-03-14 | Stop reason: SDUPTHER

## 2021-11-22 NOTE — TELEPHONE ENCOUNTER
Please approve or deny this refill request. The order is pended. Thank you.     LOV 11/9/21    Next Visit Date:  Future Appointments   Date Time Provider José Manuel Adamson   2/15/2022  1:15 PM Lily Joshi MD [address]

## 2021-11-24 ENCOUNTER — ANTI-COAG VISIT (OUTPATIENT)
Dept: INTERNAL MEDICINE | Age: 86
End: 2021-11-24
Payer: MEDICARE

## 2021-11-24 DIAGNOSIS — Z86.73 HISTORY OF CVA (CEREBROVASCULAR ACCIDENT): ICD-10-CM

## 2021-11-24 DIAGNOSIS — Z79.01 CURRENT USE OF LONG TERM ANTICOAGULATION: ICD-10-CM

## 2021-11-24 DIAGNOSIS — I38 VALVULAR HEART DISEASE: Primary | ICD-10-CM

## 2021-11-24 PROCEDURE — 93793 ANTICOAG MGMT PT WARFARIN: CPT | Performed by: PHYSICIAN ASSISTANT

## 2021-12-01 ENCOUNTER — ANTI-COAG VISIT (OUTPATIENT)
Dept: INTERNAL MEDICINE | Age: 86
End: 2021-12-01
Payer: MEDICARE

## 2021-12-01 DIAGNOSIS — Z86.73 HISTORY OF CVA (CEREBROVASCULAR ACCIDENT): ICD-10-CM

## 2021-12-01 DIAGNOSIS — I38 VALVULAR HEART DISEASE: Primary | ICD-10-CM

## 2021-12-01 DIAGNOSIS — Z79.01 CURRENT USE OF LONG TERM ANTICOAGULATION: ICD-10-CM

## 2021-12-01 LAB — INR BLD: 3.1

## 2021-12-01 PROCEDURE — 93793 ANTICOAG MGMT PT WARFARIN: CPT | Performed by: FAMILY MEDICINE

## 2021-12-08 ENCOUNTER — ANTI-COAG VISIT (OUTPATIENT)
Dept: INTERNAL MEDICINE | Age: 86
End: 2021-12-08
Payer: MEDICARE

## 2021-12-08 ENCOUNTER — ANTI-COAG VISIT (OUTPATIENT)
Dept: INTERNAL MEDICINE | Age: 86
End: 2021-12-08

## 2021-12-08 DIAGNOSIS — I38 VALVULAR HEART DISEASE: Primary | ICD-10-CM

## 2021-12-08 DIAGNOSIS — Z79.01 CURRENT USE OF LONG TERM ANTICOAGULATION: ICD-10-CM

## 2021-12-08 LAB — INR BLD: 2.7

## 2021-12-08 PROCEDURE — 93793 ANTICOAG MGMT PT WARFARIN: CPT | Performed by: PHYSICIAN ASSISTANT

## 2021-12-15 ENCOUNTER — ANTI-COAG VISIT (OUTPATIENT)
Dept: INTERNAL MEDICINE | Age: 86
End: 2021-12-15
Payer: MEDICARE

## 2021-12-15 DIAGNOSIS — Z79.01 CURRENT USE OF LONG TERM ANTICOAGULATION: ICD-10-CM

## 2021-12-15 DIAGNOSIS — I38 VALVULAR HEART DISEASE: Primary | ICD-10-CM

## 2021-12-15 LAB — INR BLD: 2.5

## 2021-12-15 PROCEDURE — 93793 ANTICOAG MGMT PT WARFARIN: CPT | Performed by: PHYSICIAN ASSISTANT

## 2021-12-17 ENCOUNTER — TELEPHONE (OUTPATIENT)
Dept: INTERNAL MEDICINE | Age: 86
End: 2021-12-17

## 2021-12-17 NOTE — TELEPHONE ENCOUNTER
Efrain Limon is aware, he will have her test on Sunday and if its off a lot, he will be able to call the hospital and talk to the on call dr.     He is concerned since it was a year ago that she had the same issue and ended up in the hospital with a bleeding ulcer.

## 2021-12-17 NOTE — TELEPHONE ENCOUNTER
Patients  called he was informed of your note. He stated she was hospitalized 1 year ago for the same thing and He wanted this to be cut back or dosage skipped.      Thank you

## 2021-12-22 ENCOUNTER — ANTI-COAG VISIT (OUTPATIENT)
Dept: INTERNAL MEDICINE | Age: 86
End: 2021-12-22
Payer: MEDICARE

## 2021-12-22 DIAGNOSIS — Z79.01 CURRENT USE OF LONG TERM ANTICOAGULATION: ICD-10-CM

## 2021-12-22 DIAGNOSIS — I38 VALVULAR HEART DISEASE: Primary | ICD-10-CM

## 2021-12-22 LAB — INR BLD: 2.7

## 2021-12-22 PROCEDURE — 93793 ANTICOAG MGMT PT WARFARIN: CPT | Performed by: PHYSICIAN ASSISTANT

## 2021-12-29 ENCOUNTER — ANTI-COAG VISIT (OUTPATIENT)
Dept: INTERNAL MEDICINE | Age: 86
End: 2021-12-29
Payer: MEDICARE

## 2021-12-29 DIAGNOSIS — Z79.01 CURRENT USE OF LONG TERM ANTICOAGULATION: ICD-10-CM

## 2021-12-29 DIAGNOSIS — I38 VALVULAR HEART DISEASE: Primary | ICD-10-CM

## 2021-12-29 LAB — INR BLD: 1.7

## 2021-12-29 PROCEDURE — 93793 ANTICOAG MGMT PT WARFARIN: CPT | Performed by: PHYSICIAN ASSISTANT

## 2022-01-03 ENCOUNTER — ANTI-COAG VISIT (OUTPATIENT)
Dept: INTERNAL MEDICINE | Age: 87
End: 2022-01-03
Payer: MEDICARE

## 2022-01-03 DIAGNOSIS — I38 VALVULAR HEART DISEASE: Primary | ICD-10-CM

## 2022-01-03 DIAGNOSIS — Z79.01 CURRENT USE OF LONG TERM ANTICOAGULATION: ICD-10-CM

## 2022-01-03 LAB — INR BLD: 2.2

## 2022-01-03 PROCEDURE — 93793 ANTICOAG MGMT PT WARFARIN: CPT | Performed by: PHYSICIAN ASSISTANT

## 2022-01-12 ENCOUNTER — ANTI-COAG VISIT (OUTPATIENT)
Dept: INTERNAL MEDICINE | Age: 87
End: 2022-01-12
Payer: MEDICARE

## 2022-01-12 DIAGNOSIS — Z79.01 CURRENT USE OF LONG TERM ANTICOAGULATION: ICD-10-CM

## 2022-01-12 DIAGNOSIS — I38 VALVULAR HEART DISEASE: Primary | ICD-10-CM

## 2022-01-12 DIAGNOSIS — Z86.73 HISTORY OF CVA (CEREBROVASCULAR ACCIDENT): ICD-10-CM

## 2022-01-12 LAB — INR BLD: 2.5

## 2022-01-12 PROCEDURE — 93793 ANTICOAG MGMT PT WARFARIN: CPT | Performed by: PHYSICIAN ASSISTANT

## 2022-01-19 ENCOUNTER — ANTI-COAG VISIT (OUTPATIENT)
Dept: INTERNAL MEDICINE | Age: 87
End: 2022-01-19
Payer: MEDICARE

## 2022-01-19 DIAGNOSIS — Z79.01 CURRENT USE OF LONG TERM ANTICOAGULATION: ICD-10-CM

## 2022-01-19 DIAGNOSIS — I38 VALVULAR HEART DISEASE: Primary | ICD-10-CM

## 2022-01-19 DIAGNOSIS — Z86.73 HISTORY OF CVA (CEREBROVASCULAR ACCIDENT): ICD-10-CM

## 2022-01-19 LAB — INR BLD: 2.4

## 2022-01-19 PROCEDURE — 93793 ANTICOAG MGMT PT WARFARIN: CPT | Performed by: PHYSICIAN ASSISTANT

## 2022-01-26 ENCOUNTER — ANTI-COAG VISIT (OUTPATIENT)
Dept: INTERNAL MEDICINE | Age: 87
End: 2022-01-26
Payer: MEDICARE

## 2022-01-26 DIAGNOSIS — I38 VALVULAR HEART DISEASE: Primary | ICD-10-CM

## 2022-01-26 DIAGNOSIS — Z79.01 CURRENT USE OF LONG TERM ANTICOAGULATION: ICD-10-CM

## 2022-01-26 DIAGNOSIS — Z86.73 HISTORY OF CVA (CEREBROVASCULAR ACCIDENT): ICD-10-CM

## 2022-01-26 LAB — INR BLD: 2.5

## 2022-01-26 PROCEDURE — 93793 ANTICOAG MGMT PT WARFARIN: CPT | Performed by: PHYSICIAN ASSISTANT

## 2022-02-02 ENCOUNTER — ANTI-COAG VISIT (OUTPATIENT)
Dept: INTERNAL MEDICINE | Age: 87
End: 2022-02-02
Payer: MEDICARE

## 2022-02-02 DIAGNOSIS — Z79.01 CURRENT USE OF LONG TERM ANTICOAGULATION: ICD-10-CM

## 2022-02-02 DIAGNOSIS — I38 VALVULAR HEART DISEASE: Primary | ICD-10-CM

## 2022-02-02 DIAGNOSIS — Z86.73 HISTORY OF CVA (CEREBROVASCULAR ACCIDENT): ICD-10-CM

## 2022-02-02 LAB — INR BLD: 2.5

## 2022-02-02 PROCEDURE — 93793 ANTICOAG MGMT PT WARFARIN: CPT | Performed by: PHYSICIAN ASSISTANT

## 2022-02-09 ENCOUNTER — ANTI-COAG VISIT (OUTPATIENT)
Dept: INTERNAL MEDICINE | Age: 87
End: 2022-02-09
Payer: MEDICARE

## 2022-02-09 DIAGNOSIS — Z86.73 HISTORY OF CVA (CEREBROVASCULAR ACCIDENT): ICD-10-CM

## 2022-02-09 DIAGNOSIS — I38 VALVULAR HEART DISEASE: Primary | ICD-10-CM

## 2022-02-09 DIAGNOSIS — Z79.01 CURRENT USE OF LONG TERM ANTICOAGULATION: ICD-10-CM

## 2022-02-09 LAB — INR BLD: 2.3

## 2022-02-09 PROCEDURE — 93793 ANTICOAG MGMT PT WARFARIN: CPT | Performed by: PHYSICIAN ASSISTANT

## 2022-02-16 ENCOUNTER — ANTI-COAG VISIT (OUTPATIENT)
Dept: INTERNAL MEDICINE | Age: 87
End: 2022-02-16
Payer: MEDICARE

## 2022-02-16 DIAGNOSIS — I38 VALVULAR HEART DISEASE: Primary | ICD-10-CM

## 2022-02-16 DIAGNOSIS — Z79.01 CURRENT USE OF LONG TERM ANTICOAGULATION: ICD-10-CM

## 2022-02-16 DIAGNOSIS — Z86.73 HISTORY OF CVA (CEREBROVASCULAR ACCIDENT): ICD-10-CM

## 2022-02-16 LAB — INR BLD: 2.3

## 2022-02-16 PROCEDURE — 93793 ANTICOAG MGMT PT WARFARIN: CPT | Performed by: PHYSICIAN ASSISTANT

## 2022-02-23 ENCOUNTER — ANTI-COAG VISIT (OUTPATIENT)
Dept: INTERNAL MEDICINE | Age: 87
End: 2022-02-23
Payer: MEDICARE

## 2022-02-23 DIAGNOSIS — Z79.01 CURRENT USE OF LONG TERM ANTICOAGULATION: ICD-10-CM

## 2022-02-23 DIAGNOSIS — I38 VALVULAR HEART DISEASE: Primary | ICD-10-CM

## 2022-02-23 LAB — INR BLD: 2.5

## 2022-02-23 PROCEDURE — 93793 ANTICOAG MGMT PT WARFARIN: CPT | Performed by: PHYSICIAN ASSISTANT

## 2022-03-02 ENCOUNTER — ANTI-COAG VISIT (OUTPATIENT)
Dept: INTERNAL MEDICINE | Age: 87
End: 2022-03-02
Payer: MEDICARE

## 2022-03-02 DIAGNOSIS — Z79.01 CURRENT USE OF LONG TERM ANTICOAGULATION: ICD-10-CM

## 2022-03-02 DIAGNOSIS — I38 VALVULAR HEART DISEASE: Primary | ICD-10-CM

## 2022-03-02 DIAGNOSIS — I25.2 HISTORY OF ST ELEVATION MYOCARDIAL INFARCTION (STEMI): ICD-10-CM

## 2022-03-02 LAB — INR BLD: 2.7

## 2022-03-02 PROCEDURE — 93793 ANTICOAG MGMT PT WARFARIN: CPT | Performed by: PHYSICIAN ASSISTANT

## 2022-03-05 DIAGNOSIS — I38 VALVULAR HEART DISEASE: ICD-10-CM

## 2022-03-05 DIAGNOSIS — Z79.01 CURRENT USE OF LONG TERM ANTICOAGULATION: ICD-10-CM

## 2022-03-05 DIAGNOSIS — Z86.73 HISTORY OF CVA (CEREBROVASCULAR ACCIDENT): ICD-10-CM

## 2022-03-05 NOTE — TELEPHONE ENCOUNTER
Comments:     Last Office Visit (last PCP visit):   8/10/2021    Next Visit Date:  Future Appointments   Date Time Provider José Manuel Adamson   3/14/2022  1:30 PM Betty Key MD [facility]       **If hasn't been seen in over a year OR hasn't followed up according to last diabetes/ADHD visit, make appointment for patient before sending refill to provider.     Rx requested:  Requested Prescriptions     Pending Prescriptions Disp Refills    warfarin (COUMADIN) 5 MG tablet [Pharmacy Med Name: WARFARIN SODIUM 5 MG TABLET] 120 tablet      Sig: take 1 tablet by mouth daily

## 2022-03-07 RX ORDER — WARFARIN SODIUM 5 MG/1
5 TABLET ORAL DAILY
Qty: 120 TABLET | Refills: 1 | Status: ON HOLD | OUTPATIENT
Start: 2022-03-07 | End: 2022-07-30 | Stop reason: HOSPADM

## 2022-03-09 ENCOUNTER — ANTI-COAG VISIT (OUTPATIENT)
Dept: INTERNAL MEDICINE | Age: 87
End: 2022-03-09
Payer: MEDICARE

## 2022-03-09 DIAGNOSIS — Z79.01 CURRENT USE OF LONG TERM ANTICOAGULATION: ICD-10-CM

## 2022-03-09 LAB — INR BLD: 2.6

## 2022-03-09 PROCEDURE — 93793 ANTICOAG MGMT PT WARFARIN: CPT | Performed by: PHYSICIAN ASSISTANT

## 2022-03-14 ENCOUNTER — OFFICE VISIT (OUTPATIENT)
Dept: CARDIOLOGY CLINIC | Age: 87
End: 2022-03-14
Payer: MEDICARE

## 2022-03-14 VITALS
OXYGEN SATURATION: 96 % | WEIGHT: 188 LBS | HEART RATE: 64 BPM | DIASTOLIC BLOOD PRESSURE: 64 MMHG | SYSTOLIC BLOOD PRESSURE: 136 MMHG | BODY MASS INDEX: 33.31 KG/M2 | HEIGHT: 63 IN

## 2022-03-14 DIAGNOSIS — I25.2 HISTORY OF NON-ST ELEVATION MYOCARDIAL INFARCTION (NSTEMI): Primary | ICD-10-CM

## 2022-03-14 DIAGNOSIS — I42.9 CARDIOMYOPATHY, UNSPECIFIED TYPE (HCC): ICD-10-CM

## 2022-03-14 DIAGNOSIS — R09.89 BILATERAL CAROTID BRUITS: ICD-10-CM

## 2022-03-14 DIAGNOSIS — I25.10 CAD S/P PERCUTANEOUS CORONARY ANGIOPLASTY: ICD-10-CM

## 2022-03-14 DIAGNOSIS — Z98.61 CAD S/P PERCUTANEOUS CORONARY ANGIOPLASTY: ICD-10-CM

## 2022-03-14 DIAGNOSIS — I50.42 CHRONIC COMBINED SYSTOLIC AND DIASTOLIC CONGESTIVE HEART FAILURE (HCC): ICD-10-CM

## 2022-03-14 DIAGNOSIS — I51.89 DIASTOLIC DYSFUNCTION: ICD-10-CM

## 2022-03-14 DIAGNOSIS — I25.2 HISTORY OF NON-ST ELEVATION MYOCARDIAL INFARCTION (NSTEMI): ICD-10-CM

## 2022-03-14 DIAGNOSIS — I25.2 HISTORY OF ST ELEVATION MYOCARDIAL INFARCTION (STEMI): Primary | ICD-10-CM

## 2022-03-14 DIAGNOSIS — I38 VALVULAR HEART DISEASE: ICD-10-CM

## 2022-03-14 DIAGNOSIS — I10 ESSENTIAL HYPERTENSION: ICD-10-CM

## 2022-03-14 DIAGNOSIS — Z98.61 S/P PTCA (PERCUTANEOUS TRANSLUMINAL CORONARY ANGIOPLASTY): ICD-10-CM

## 2022-03-14 DIAGNOSIS — Z86.73 HISTORY OF CVA (CEREBROVASCULAR ACCIDENT): ICD-10-CM

## 2022-03-14 PROCEDURE — G8417 CALC BMI ABV UP PARAM F/U: HCPCS | Performed by: INTERNAL MEDICINE

## 2022-03-14 PROCEDURE — 4040F PNEUMOC VAC/ADMIN/RCVD: CPT | Performed by: INTERNAL MEDICINE

## 2022-03-14 PROCEDURE — G8484 FLU IMMUNIZE NO ADMIN: HCPCS | Performed by: INTERNAL MEDICINE

## 2022-03-14 PROCEDURE — 1036F TOBACCO NON-USER: CPT | Performed by: INTERNAL MEDICINE

## 2022-03-14 PROCEDURE — 1090F PRES/ABSN URINE INCON ASSESS: CPT | Performed by: INTERNAL MEDICINE

## 2022-03-14 PROCEDURE — 99214 OFFICE O/P EST MOD 30 MIN: CPT | Performed by: INTERNAL MEDICINE

## 2022-03-14 PROCEDURE — 1123F ACP DISCUSS/DSCN MKR DOCD: CPT | Performed by: INTERNAL MEDICINE

## 2022-03-14 PROCEDURE — G8427 DOCREV CUR MEDS BY ELIG CLIN: HCPCS | Performed by: INTERNAL MEDICINE

## 2022-03-14 RX ORDER — CARVEDILOL 12.5 MG/1
TABLET ORAL
Qty: 180 TABLET | Refills: 1 | Status: SHIPPED | OUTPATIENT
Start: 2022-03-14

## 2022-03-14 RX ORDER — HYDRALAZINE HYDROCHLORIDE 50 MG/1
50 TABLET, FILM COATED ORAL DAILY
Qty: 90 TABLET | Refills: 1 | Status: SHIPPED | OUTPATIENT
Start: 2022-03-14

## 2022-03-14 RX ORDER — ASPIRIN 81 MG/1
81 TABLET ORAL DAILY
Qty: 90 TABLET | Refills: 1 | Status: SHIPPED | OUTPATIENT
Start: 2022-03-14

## 2022-03-14 ASSESSMENT — ENCOUNTER SYMPTOMS
CHEST TIGHTNESS: 0
COUGH: 0
EYES NEGATIVE: 1
SHORTNESS OF BREATH: 0
NAUSEA: 0
BLOOD IN STOOL: 0
STRIDOR: 0
WHEEZING: 0
GASTROINTESTINAL NEGATIVE: 1
RESPIRATORY NEGATIVE: 1

## 2022-03-14 NOTE — DISCHARGE INSTR - COC
Mammogram Instructions:  You have a mammogram order that has been placed, you need to call [phone] to schedule the Ultrasound/mammogram appointment     The day of the exam you may bathe as usual.  DO NOT use powders, creams or deodorants on the breast or underarm area.    To reduce tenderness in the breasts, it is suggested that you refrain from caffeine for one week prior to your mammogram.    Please come to the Imaging Suites at or before your appointment time so that we may start the exam as close to your appointment time as possible.  If you need to cancel/reschedule, please call [phone] as soon as possible, and we will be happy to reschedule it for you.  Due to our busy schedule, tardiness may result in having to reschedule your exam.    If your previous mammogram was not performed at [facility], you must obtain the films and bring them with you to your mammogram appointment or have them mailed to:  [facility]  [address] [place] [address]    Due to safety concerns, another adult must accompany children that need supervision.     Td vaccine (adult) 07/26/1996    Td, unspecified formulation 07/26/1996    Tdap (Boostrix, Adacel) 02/11/2017       Active Problems:  Patient Active Problem List   Diagnosis Code    HTN (hypertension) I10    Diabetes mellitus E11.9    SI (stress incontinence), female N39.3    Osteoarthritis M19.90    CKD (chronic kidney disease) N18.9    HLD (hyperlipidemia) E78.5    Vitamin D deficiency E55.9    Eczematous dermatitis L30.9    Chronic low back pain M54.5, G89.29    Lumbar spinal stenosis M48.061    Hypercalcemia X86.79    Diastolic dysfunction R48.66    Valvular heart disease I38    Recurrent UTI (urinary tract infection) N39.0    Vitamin B12 deficiency E53.8    Chest pain R07.9    Nausea & vomiting R11.2    History of non-ST elevation myocardial infarction (NSTEMI) I25.2    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.73    History of ST elevation myocardial infarction (STEMI) I25.2    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    S/P PTCA (percutaneous transluminal coronary angioplasty) Z98.61    Transient cerebral ischemia G45.9    Monitoring for long-term anticoagulant use Z51.81, Z79.01    Chronic combined systolic and diastolic congestive heart failure (HCC) I50.42    Other transient cerebral ischemic attacks and related syndromes G45.8    Other cerebrovascular disease I67.89    Current use of long term anticoagulation Z79.01    Syncope R55       Isolation/Infection:   Isolation          No Isolation        Patient Infection Status     None to display          Nurse Assessment:  Last Vital Signs: BP (!) 208/62   Pulse 75   Temp 98.2 °F (36.8 °C)   Resp 16   Ht 5' 3\" (1.6 m)   Wt 184 lb (83.5 kg)   LMP  (LMP Unknown)   SpO2 95%   BMI 32.59 kg/m²     Last documented pain score (0-10 scale): Pain Level: 10  Last Weight:   Wt Readings from Last 1 Encounters:   06/19/20 184 lb (83.5 kg)     Mental Status:  oriented, alert,

## 2022-03-14 NOTE — PROGRESS NOTES
Subsequent Progress Note  Patient: Zenaida Banda  YOB: 1927  MRN: [id number]    Chief Complaint: cva nstemi dementia  Chief Complaint   Patient presents with   Zaki Garcia Doctor     Previous Dr Santana Amezquita pt    Hypertension    Hyperlipidemia       CV Data:     3/2019 OMAR Moderate MR Decreased LVEF 20  12/2018  STEMI LAD LINDSEY     Subjective/HPI: recent CVA s/p tPA no residual deficits. No cp no sob. Went to rehab for 2 weeks and now at home. Son lives with her.    3/14/22 INR 2.6 doing well here w son. No cp no sob no falls no bleed. 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 SCC (squamous cell carcinoma), arm 2013    right upper arm, 2015 right forarm    SI (stress incontinence), female 5/29/2012    Type II or unspecified type diabetes mellitus without mention of complication, not stated as uncontrolled     Whooping cough        Past Surgical History:   Procedure Laterality Date    ANKLE SURGERY      broken left ankle.     APPENDECTOMY      BREAST BIOPSY      x2 left breast    CATARACT REMOVAL  2004    bilateral    CORONARY ANGIOPLASTY WITH STENT PLACEMENT  01/2019    CYSTOCELE REPAIR      EYE SURGERY      bilateral cataract    HYSTERECTOMY      complete at age 39    [address]      as a child       Family History   Problem Relation Age of Onset    Diabetes Mother     Heart Disease Father        Social History     Socioeconomic History    Marital status:      Spouse name: None    Number of children: None    Years of education: None    Highest education level: None   Occupational History    None   Tobacco Use    Smoking status: Never Smoker    Smokeless tobacco: Never Used   Vaping Use    Vaping Use: Never used   Substance and Sexual Activity    Alcohol use: No     Alcohol/week: 0.0 standard drinks    Drug use: No    Sexual activity: None   Other Topics Concern    None   Social History Narrative         Lives With: Son    Type of Home: [facility] [facility] [address] in [address] [place], [place] [address]: Two level, Able to Live on Main level with bedroom/bathroom, Performs ADL's on one level    Home Access: Stairs to enter with rails    Entrance Stairs - Number of Steps: Threshold    Bathroom Shower/Tub: Tub/Shower unit    Bathroom Toilet: Handicap height    Bathroom Equipment: Grab bars in shower, Grab bars around toilet, Hand-held shower, Shower chair    Bathroom Accessibility: Accessible    Home Equipment: Rolling walker, 4 wheeled walker (Usually uses rollator)    ADL Assistance: [address] [place] [address] Avenue: Needs assistance (Son manages housework)    Homemaking Responsibilities: No    Ambulation Assistance: Independent (Rollator)    Transfer Assistance: Independent    Active : No    Patient's  Info: Sons          Social Determinants of Health     Financial Resource Strain: Low Risk     Difficulty of Paying Living Expenses: Not hard at all   Food Insecurity: No Food Insecurity    Worried About Running Out of Food in the Last Year: Never true    Michelle of Food in the Last Year: Never true   Transportation Needs:     Lack of Transportation (Medical): Not on file    Lack of Transportation (Non-Medical):  Not on file   Physical Activity:     Days of Exercise per Week: Not on file    Minutes of Exercise per Session: Not on file   Stress:     Feeling of Stress : Not on file   Social Connections:     Frequency of Communication with Friends and Family: Not on file    Frequency of Social Gatherings with Friends and Family: Not on file    Attends Jewish Services: Not on file    Active Member of [address] [place] [address] or Organizations: Not on file    Attends Club or Organization Meetings: Not on file    Marital Status: Not on file   Intimate Partner Violence:     Fear of Current or Ex-Partner: Not on file    Emotionally Abused: Not on file    Physically Abused: Not on file    Sexually Abused: Not on file   Housing Stability:     Unable to Pay for Housing in the Last Year: Not on file    Number of [place] in the Last Year: Not on file    Unstable Housing in the Last Year: Not on file       Allergies   Allergen Reactions    Metoclopramide     Pantoprazole Other (See Comments)    Pcn [Penicillins]      Tolerates rocephin    Protonix [Pantoprazole Sodium]        Current Outpatient Medications   Medication Sig Dispense Refill    hydrALAZINE (APRESOLINE) 50 MG tablet Take 1 tablet by mouth daily 90 tablet 1    carvedilol (COREG) 12.5 MG tablet take 1 tablet by mouth twice a day with meals 180 tablet 1    aspirin EC 81 MG EC tablet Take 1 tablet by mouth daily 90 tablet 1    warfarin (COUMADIN) 5 MG tablet take 1 tablet by mouth daily 120 tablet 1    metFORMIN (GLUCOPHAGE) 1000 MG tablet take 1 tablet by mouth twice a day with meals 180 tablet 1    traMADol (ULTRAM) 50 MG tablet take 1/2 tablet by mouth three times a day if needed for pain      Probiotic Product (RA PROBIOTIC COMPLEX) CAPS take 1 capsule by mouth once daily      Lactobacillus (PROBIOTIC ACIDOPHILUS) CAPS take 1 capsule by mouth once daily 90 capsule 1    furosemide (LASIX) 20 MG tablet take 1 tablet by mouth once daily 90 tablet 1    ondansetron (ZOFRAN ODT) 4 MG disintegrating tablet Take 1 tablet by mouth every 8 hours as needed for Nausea 10 tablet 0    blood glucose test strips (TRUE METRIX BLOOD GLUCOSE TEST) strip TEST twice a day if needed 200 strip 0    sacubitril-valsartan (ENTRESTO) 24-26 MG per tablet Take 1 tablet by mouth 2 times daily 56 tablet 0    nitrofurantoin, macrocrystal-monohydrate, (MACROBID) 100 MG capsule take 1 capsule by mouth ON MONDAY, WEDNESDAY, AND FRIDAY      warfarin (COUMADIN) 1 MG tablet Per coumadin regimen 30 tablet 2    warfarin (COUMADIN) 5 MG tablet Take 1 tablet by mouth daily 30 tablet 6    TRUEplus Lancets 28G MISC use twice a day and if needed 100 each 5    fluticasone (FLONASE) 50 MCG/ACT nasal spray 1 spray by Each Nostril route daily (Patient taking differently: 1 spray by Each Nostril route as needed ) 1 Bottle 0    warfarin (COUMADIN) 6 MG tablet Take 6 mg by mouth daily       blood glucose monitor kit and supplies Test 2 times a day & as needed for symptoms of irregular blood glucose. Freestyle Freedom Lite 1 kit 0    TRUEPLUS LANCETS 28G MISC TEST TWO TIMES DAILY 200 each 3    Cholecalciferol (VITAMIN D) 2000 units CAPS capsule Take 2,000 Units by mouth      TRUE METRIX BLOOD GLUCOSE TEST strip TEST two to three times a day 200 strip 5    Needles & Syringes MISC 1 each by Does not apply route daily 50 each 0    Incontinence Supply Disposable (DEPEND UNDERGARMENTS) MISC 1 each by Does not apply route nightly 1 Package 11    Blood Glucose Monitoring Suppl (TRUE METRIX AIR GLUCOSE METER) W/DEVICE KIT       Blood Glucose Monitoring Suppl PRUDENCIO Dx: E11.9 1 Device 0     No current facility-administered medications for this visit. Review of Systems:   Review of Systems   Constitutional: Negative for diaphoresis and fatigue. HENT: Negative. Eyes: Negative. Respiratory: Negative. Negative for cough, chest tightness, shortness of breath, wheezing and stridor. Cardiovascular: Negative. Negative for chest pain, palpitations and leg swelling. Gastrointestinal: Negative. Negative for blood in stool and nausea. Genitourinary: Negative. Musculoskeletal: Positive for gait problem. Skin: Negative. Neurological: Positive for weakness. Negative for dizziness, syncope and light-headedness. Hematological: Negative. Psychiatric/Behavioral: Negative. Physical Examination:    /64 (Site: Left Upper Arm, Position: Sitting, Cuff Size: Large Adult)   Pulse 64   Ht 5' 3\" (1.6 m)   Wt 188 lb (85.3 kg)   LMP  (LMP Unknown)   SpO2 96%   BMI 33.30 kg/m²    Physical Exam   Constitutional: She appears healthy. No distress. HENT:   Normal cephalic and Atraumatic   Eyes: Pupils are equal, round, and reactive to light. Neck: Thyroid normal. No JVD present. No neck adenopathy. No thyromegaly present. Cardiovascular: Normal rate, regular rhythm, intact distal pulses and normal pulses. Murmur heard. Pulmonary/Chest: Effort normal and breath sounds normal. She has no wheezes. She has no rales. She exhibits no tenderness. Abdominal: Soft. Bowel sounds are normal. There is no abdominal tenderness. Musculoskeletal:         General: No tenderness or edema. Normal range of motion. Cervical back: Normal range of motion and neck supple. Neurological: She is alert and oriented to person, place, and time. Skin: Skin is warm. No cyanosis. Nails show no clubbing.        LABS:  CBC:   Lab Results   Component Value Date    WBC 9.5 04/30/2021    RBC 3.80 04/30/2021    RBC 3.91 02/28/2012    HGB 11.2 04/30/2021    HCT 34.5 04/30/2021    MCV 90.8 04/30/2021    MCH 29.5 04/30/2021    MCHC 32.5 04/30/2021    RDW 14.4 04/30/2021     04/30/2021    MPV 11.6 02/21/2014     Lipids:  Lab Results   Component Value Date    CHOL 213 (H) 04/30/2021    CHOL 214 (H) 10/27/2020    CHOL 137 01/14/2020     Lab Results   Component Value Date    TRIG 188 (H) 04/30/2021    TRIG 74 04/10/2019    TRIG 98 03/15/2019     Lab Results   Component Value Date HDL 46 04/30/2021    HDL 52 04/10/2019    HDL 48 03/15/2019     Lab Results   Component Value Date    LDLCALC 129 04/30/2021    LDLCALC 68 04/10/2019    LDLCALC 54 03/15/2019     Lab Results   Component Value Date    LABVLDL 34.0 05/01/2013     Lab Results   Component Value Date    CHOLHDLRATIO 3.4 11/28/2012    CHOLHDLRATIO 3.2 05/25/2012     CMP:    Lab Results   Component Value Date     04/30/2021    K 4.9 04/30/2021    K 4.0 12/05/2020    CL 99 04/30/2021    CO2 21 04/30/2021    BUN 37 04/30/2021    CREATININE 1.24 04/30/2021    GFRAA 48.8 04/30/2021    LABGLOM 40.3 04/30/2021    GLUCOSE 174 04/30/2021    GLUCOSE 154 05/25/2012    PROT 6.9 04/30/2021    LABALBU 3.9 04/30/2021    LABALBU 4.4 05/25/2012    CALCIUM 8.9 04/30/2021    BILITOT <0.2 04/30/2021    ALKPHOS 55 04/30/2021    AST 19 04/30/2021    ALT 10 04/30/2021     BMP:    Lab Results   Component Value Date     04/30/2021    K 4.9 04/30/2021    K 4.0 12/05/2020    CL 99 04/30/2021    CO2 21 04/30/2021    BUN 37 04/30/2021    LABALBU 3.9 04/30/2021    LABALBU 4.4 05/25/2012    CREATININE 1.24 04/30/2021    CALCIUM 8.9 04/30/2021    GFRAA 48.8 04/30/2021    LABGLOM 40.3 04/30/2021    GLUCOSE 174 04/30/2021    GLUCOSE 154 05/25/2012     Magnesium:    Lab Results   Component Value Date    MG 1.6 11/29/2020     TSH:  Lab Results   Component Value Date    TSH 1.810 03/15/2019       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 effects of CVA (cerebrovascular accident)    S/P PTCA (percutaneous transluminal coronary angioplasty)    Transient cerebral ischemia    Monitoring for long-term anticoagulant use    Chronic combined systolic and diastolic congestive heart failure ([facility] [address]    Other transient cerebral ischemic attacks and related syndromes    Cerebrovascular disease    Current use of long term anticoagulation    Syncope    Spinal stenosis in cervical region    Lumbar degenerative disc disease    Spinal stenosis, lumbar region, with neurogenic claudication    Abnormality of gait and mobility due to  [employer] with Impaired Mobility and ADL's secondary to Cervical Myelopathy and Vestibular neuronitis with rehab admission 06/24/20.  Generalized muscle ache    Generalized osteoarthrosis, involving multiple sites    Vestibular neuronitis of both ears    Dizziness    SCC (squamous cell carcinoma), arm    Type 2 diabetes mellitus (HCC)    Obesity (BMI 30-39. 9)    [place] (hard of hearing)    DJD (degenerative joint disease), lumbar    History of PTCA    Uncontrolled type 2 diabetes mellitus with hyperglycemia ([facility])    Acute GI bleeding    COVID-19       Medications Discontinued During This Encounter   Medication Reason    carvedilol (COREG) 12.5 MG tablet REORDER    hydrALAZINE (APRESOLINE) 50 MG tablet REORDER    clopidogrel (PLAVIX) 75 MG tablet        Modified Medications    Modified Medication Previous Medication    CARVEDILOL (COREG) 12.5 MG TABLET carvedilol (COREG) 12.5 MG tablet       take 1 tablet by mouth twice a day with meals    take 1 tablet by mouth twice a day with meals    HYDRALAZINE (APRESOLINE) 50 MG TABLET hydrALAZINE (APRESOLINE) 50 MG tablet       Take 1 tablet by mouth daily    take 1 tablet by mouth three times a day       Orders Placed This Encounter   Medications    hydrALAZINE (APRESOLINE) 50 MG tablet     Sig: Take 1 tablet by mouth daily     Dispense:  90 tablet     Refill:  1    carvedilol (COREG) 12.5 MG tablet     Sig: take 1 tablet by mouth twice a day with meals     Dispense:  180 tablet     Refill:  1    aspirin EC 81 MG EC tablet     Sig: Take 1 tablet by mouth daily     Dispense:  90 tablet     Refill:  1       Assessment/Plan:    1. Hypertension, unspecified type  Stable - continue meds. Low salt diet     2. Valvular heart disease       3. NSTEMI (non-ST elevated myocardial infarction) ([facility]  No angina. Continue all CV protective meds. 4. Cerebrovascular accident (CVA) due to embolism of precerebral artery (HCC)  Stable after tPA    5. Hyperlipidemia, unspecified hyperlipidemia type  Satin to be continued. Low fat diet. 6. Chest pain, unspecified type  Stable     7. B/L Carotid stensosis- 50-69%- woll capri continued future surveillance. NO angina and there fore no plans for stress on this elder lady. Continue all current CV protective meds. Counseling:  Heart Healthy Lifestyle, Low Salt Diet, Take Precautions to Prevent Falls and Walk Daily    Return in about 4 months (around 7/14/2022).       Electronically signed by Juan Arriaga MD on 3/14/2022 at 1:30 PM

## 2022-03-16 ENCOUNTER — ANTI-COAG VISIT (OUTPATIENT)
Dept: INTERNAL MEDICINE | Age: 87
End: 2022-03-16
Payer: MEDICARE

## 2022-03-16 DIAGNOSIS — I38 VALVULAR HEART DISEASE: Primary | ICD-10-CM

## 2022-03-16 DIAGNOSIS — Z79.01 CURRENT USE OF LONG TERM ANTICOAGULATION: ICD-10-CM

## 2022-03-16 LAB — INR BLD: 2.8

## 2022-03-16 PROCEDURE — 93793 ANTICOAG MGMT PT WARFARIN: CPT | Performed by: PHYSICIAN ASSISTANT

## 2022-03-17 DIAGNOSIS — I38 VALVULAR HEART DISEASE: ICD-10-CM

## 2022-03-17 DIAGNOSIS — Z79.01 CURRENT USE OF LONG TERM ANTICOAGULATION: ICD-10-CM

## 2022-03-17 DIAGNOSIS — Z86.73 HISTORY OF CVA (CEREBROVASCULAR ACCIDENT): ICD-10-CM

## 2022-03-17 RX ORDER — WARFARIN SODIUM 1 MG/1
TABLET ORAL
Qty: 90 TABLET | OUTPATIENT
Start: 2022-03-17

## 2022-03-21 ENCOUNTER — TELEPHONE (OUTPATIENT)
Dept: INTERNAL MEDICINE | Age: 87
End: 2022-03-21

## 2022-03-21 NOTE — TELEPHONE ENCOUNTER
Patients son is requesting to drop off a urine sample today sometime.  Pt is scheduled for a visit on 3/24 I advised him to wait I had to check with the staff first    Thank you

## 2022-03-23 ENCOUNTER — ANTI-COAG VISIT (OUTPATIENT)
Dept: INTERNAL MEDICINE | Age: 87
End: 2022-03-23
Payer: MEDICARE

## 2022-03-23 DIAGNOSIS — I38 VALVULAR HEART DISEASE: Primary | ICD-10-CM

## 2022-03-23 DIAGNOSIS — Z79.01 CURRENT USE OF LONG TERM ANTICOAGULATION: ICD-10-CM

## 2022-03-23 LAB — INR BLD: 3.4

## 2022-03-23 PROCEDURE — 93793 ANTICOAG MGMT PT WARFARIN: CPT | Performed by: PHYSICIAN ASSISTANT

## 2022-03-24 ENCOUNTER — OFFICE VISIT (OUTPATIENT)
Dept: INTERNAL MEDICINE | Age: 87
End: 2022-03-24
Payer: COMMERCIAL

## 2022-03-24 VITALS
HEIGHT: 63 IN | OXYGEN SATURATION: 95 % | WEIGHT: 188 LBS | DIASTOLIC BLOOD PRESSURE: 70 MMHG | HEART RATE: 60 BPM | BODY MASS INDEX: 33.31 KG/M2 | SYSTOLIC BLOOD PRESSURE: 138 MMHG

## 2022-03-24 DIAGNOSIS — I38 VALVULAR HEART DISEASE: ICD-10-CM

## 2022-03-24 DIAGNOSIS — Z86.73 HISTORY OF CVA (CEREBROVASCULAR ACCIDENT): ICD-10-CM

## 2022-03-24 DIAGNOSIS — R35.0 FREQUENT URINATION: Primary | ICD-10-CM

## 2022-03-24 DIAGNOSIS — E11.8 TYPE 2 DIABETES MELLITUS WITH COMPLICATION, WITHOUT LONG-TERM CURRENT USE OF INSULIN (HCC): ICD-10-CM

## 2022-03-24 DIAGNOSIS — Z79.01 CURRENT USE OF LONG TERM ANTICOAGULATION: ICD-10-CM

## 2022-03-24 LAB — HBA1C MFR BLD: 8.2 %

## 2022-03-24 PROCEDURE — 1123F ACP DISCUSS/DSCN MKR DOCD: CPT | Performed by: PHYSICIAN ASSISTANT

## 2022-03-24 PROCEDURE — 83036 HEMOGLOBIN GLYCOSYLATED A1C: CPT | Performed by: PHYSICIAN ASSISTANT

## 2022-03-24 PROCEDURE — 3052F HG A1C>EQUAL 8.0%<EQUAL 9.0%: CPT | Performed by: PHYSICIAN ASSISTANT

## 2022-03-24 PROCEDURE — 4040F PNEUMOC VAC/ADMIN/RCVD: CPT | Performed by: PHYSICIAN ASSISTANT

## 2022-03-24 PROCEDURE — 1036F TOBACCO NON-USER: CPT | Performed by: PHYSICIAN ASSISTANT

## 2022-03-24 PROCEDURE — 99214 OFFICE O/P EST MOD 30 MIN: CPT | Performed by: PHYSICIAN ASSISTANT

## 2022-03-24 PROCEDURE — 81003 URINALYSIS AUTO W/O SCOPE: CPT | Performed by: PHYSICIAN ASSISTANT

## 2022-03-24 PROCEDURE — G8427 DOCREV CUR MEDS BY ELIG CLIN: HCPCS | Performed by: PHYSICIAN ASSISTANT

## 2022-03-24 PROCEDURE — 1090F PRES/ABSN URINE INCON ASSESS: CPT | Performed by: PHYSICIAN ASSISTANT

## 2022-03-24 PROCEDURE — G8484 FLU IMMUNIZE NO ADMIN: HCPCS | Performed by: PHYSICIAN ASSISTANT

## 2022-03-24 PROCEDURE — G8417 CALC BMI ABV UP PARAM F/U: HCPCS | Performed by: PHYSICIAN ASSISTANT

## 2022-03-29 DIAGNOSIS — R35.0 FREQUENT URINATION: ICD-10-CM

## 2022-03-29 DIAGNOSIS — R35.0 FREQUENT URINATION: Primary | ICD-10-CM

## 2022-03-29 LAB
BILIRUBIN, POC: ABNORMAL
BLOOD URINE, POC: ABNORMAL
CLARITY, POC: ABNORMAL
COLOR, POC: YELLOW
GLUCOSE URINE, POC: ABNORMAL
INR BLD: 2.9
KETONES, POC: ABNORMAL
LEUKOCYTE EST, POC: ABNORMAL
NITRITE, POC: ABNORMAL
PH, POC: 6
PROTEIN, POC: ABNORMAL
SPECIFIC GRAVITY, POC: 1.02
UROBILINOGEN, POC: ABNORMAL

## 2022-03-29 RX ORDER — CIPROFLOXACIN 250 MG/1
250 TABLET, FILM COATED ORAL 2 TIMES DAILY
Qty: 10 TABLET | Refills: 0 | Status: SHIPPED | OUTPATIENT
Start: 2022-03-29 | End: 2022-04-03

## 2022-03-29 RX ORDER — WARFARIN SODIUM 1 MG/1
TABLET ORAL
Qty: 30 TABLET | Refills: 2 | Status: ON HOLD | OUTPATIENT
Start: 2022-03-29 | End: 2022-07-30 | Stop reason: HOSPADM

## 2022-03-29 ASSESSMENT — ENCOUNTER SYMPTOMS: RESPIRATORY NEGATIVE: 1

## 2022-03-29 NOTE — PROGRESS NOTES
Sanya Nguyen (: 6/10/1927) is a 80 y.o. female, Established patient, here for evaluation of the following chief complaint(s):  Urinary Frequency (irritable, and confused x's 3 days Dr. Kina Beasley put her on a maintanance Medication. )        ASSESSMENT/PLAN:  1. Frequent urination  - POCT Urinalysis No Micro (Auto)- pos for nitrites and WBCs  - increase water intake  - will send for culture   - started Cipro today     2. Type 2 diabetes mellitus with complication, without long-term current use of insulin (HCC)  - POCT glycosylated hemoglobin (Hb A1C)  -  A1C worsened : going from 7.3 to 8.2  -  Diabetes Counseling : Discussed disease risks, adopting healthy behaviors,  monitoring glucose and bringing logs to visits  - Discussed most recent labs in detail, foot care, and compliance with all medications, and yearly eye exam  - Diet: Addressed ADA, low sodium and low cholesterol diet. - Exercise: discussed need for aerobic exercise 3 times weekly  - Medications:   Continued current med,s but needs to improve the diet          3. Current use of long term anticoagulation  - warfarin (COUMADIN) 1 MG tablet; Per coumadin regimen  Dispense: 30 tablet; Refill: 2    4. Valvular heart disease  - warfarin (COUMADIN) 1 MG tablet; Per coumadin regimen  Dispense: 30 tablet; Refill: 2    5. History of CVA (cerebrovascular accident)  - warfarin (COUMADIN) 1 MG tablet; Per coumadin regimen  Dispense: 30 tablet; Refill: 2        No follow-ups on file. SUBJECTIVE/OBJECTIVE:  HPI      Urinary frequency   Used to be on Macrobid daily as a preventive, since It was recurrent   Son is concerned that she should be on an ABX again, but does not want to take her to [place]  for urology anymore   No fever       Review of Systems   Constitutional: Negative. HENT: Negative. Respiratory: Negative. Cardiovascular: Negative. Genitourinary: Positive for frequency. Negative for dysuria, flank pain, hematuria and urgency. Physical Exam  Vitals reviewed. Constitutional:       Appearance: Normal appearance. HENT:      Head: Normocephalic. Cardiovascular:      Rate and Rhythm: Normal rate and regular rhythm. Heart sounds: Normal heart sounds. Abdominal:      Tenderness: There is no right CVA tenderness or left CVA tenderness. Neurological:      Mental Status: She is alert. Vitals:    03/24/22 1450   BP: 138/70   Site: Left Upper Arm   Position: Sitting   Cuff Size: Large Adult   Pulse: 60   SpO2: 95%   Weight: 188 lb (85.3 kg)   Height: 5' 3\" (1.6 m)     Results for POC orders placed in visit on 03/24/22   POCT Urinalysis No Micro (Auto)   Result Value Ref Range    Color, UA yellow     Clarity, UA cloudy     Glucose, UA POC -     Bilirubin, UA -     Ketones, UA -     Spec Grav, UA 1.025     Blood, UA POC -     pH, UA 6.0     Protein, UA POC 1+30     Urobilinogen, UA -     Leukocytes, UA 2+ 125     Nitrite, UA + pos    POCT glycosylated hemoglobin (Hb A1C)   Result Value Ref Range    Hemoglobin A1C 8.2 %                 An electronic signature was used to authenticate this note.     --PEDRO LUIS Solano

## 2022-03-31 LAB
ORGANISM: ABNORMAL
URINE CULTURE, ROUTINE: ABNORMAL
URINE CULTURE, ROUTINE: ABNORMAL

## 2022-04-01 ENCOUNTER — ANTI-COAG VISIT (OUTPATIENT)
Dept: INTERNAL MEDICINE | Age: 87
End: 2022-04-01
Payer: MEDICARE

## 2022-04-01 DIAGNOSIS — Z79.01 CURRENT USE OF LONG TERM ANTICOAGULATION: ICD-10-CM

## 2022-04-01 DIAGNOSIS — I38 VALVULAR HEART DISEASE: Primary | ICD-10-CM

## 2022-04-01 LAB — INR BLD: 2.9

## 2022-04-01 PROCEDURE — 93793 ANTICOAG MGMT PT WARFARIN: CPT | Performed by: PHYSICIAN ASSISTANT

## 2022-04-04 ENCOUNTER — TELEPHONE (OUTPATIENT)
Dept: INTERNAL MEDICINE | Age: 87
End: 2022-04-04

## 2022-04-04 NOTE — TELEPHONE ENCOUNTER
Susan Jesus states Britney Boss is feeling better since taking the Cipro. He wants to know if she can stop taking the Macrobid because she has had several UTI's while on it. He would also like to have a standing order for urine to be checked each month.     Thank you

## 2022-04-11 ENCOUNTER — TELEPHONE (OUTPATIENT)
Dept: INTERNAL MEDICINE | Age: 87
End: 2022-04-11

## 2022-04-12 LAB — INR BLD: 2.6

## 2022-04-12 NOTE — TELEPHONE ENCOUNTER
Comments:     Last Office Visit (last PCP visit):   3/24/2022    Next Visit Date:  Future Appointments   Date Time Provider José Manuel Ogi   7/18/2022  1:00 PM Naheed Pascual MD [address] [place] [address]   8/26/2022 11:00 AM PEDRO LUIS [facility]       **If hasn't been seen in over a year OR hasn't followed up according to last diabetes/ADHD visit, make appointment for patient before sending refill to provider.     Rx requested:  Requested Prescriptions     Pending Prescriptions Disp Refills    TRUEplus Lancets 28G MISC 100 each 5     Sig: use twice a day and if needed

## 2022-04-13 ENCOUNTER — ANTI-COAG VISIT (OUTPATIENT)
Dept: INTERNAL MEDICINE | Age: 87
End: 2022-04-13
Payer: MEDICARE

## 2022-04-13 DIAGNOSIS — I38 VALVULAR HEART DISEASE: Primary | ICD-10-CM

## 2022-04-13 DIAGNOSIS — Z79.01 CURRENT USE OF LONG TERM ANTICOAGULATION: ICD-10-CM

## 2022-04-13 PROCEDURE — 93793 ANTICOAG MGMT PT WARFARIN: CPT | Performed by: PHYSICIAN ASSISTANT

## 2022-04-15 NOTE — TELEPHONE ENCOUNTER
Comments:     Last Office Visit (last PCP visit):   Visit date not found    Next Visit Date:  Future Appointments   Date Time Provider José Manuel Adamson   6/6/2022  1:45 PM JASSON ULTRASOUND ROOM 1 [facility]  [facility] RAD   7/18/2022  1:00 PM Daisy Mcdonald MD [address] [place] [address]   8/26/2022 11:00 AM PEDRO LUIS [facility]       **If hasn't been seen in over a year OR hasn't followed up according to last diabetes/ADHD visit, make appointment for patient before sending refill to provider.     Rx requested:  Requested Prescriptions     Pending Prescriptions Disp Refills    TRUEplus Lancets 28G [facility] [Pharmacy Med Name: Jewels Andersen THIN 28G LANCET]       Sig: use twice a day if needed

## 2022-04-18 RX ORDER — GLUCOSAM/CHON-MSM1/C/MANG/BOSW 500-416.6
TABLET ORAL
Qty: 100 EACH | Refills: 1 | Status: SHIPPED | OUTPATIENT
Start: 2022-04-18

## 2022-04-18 RX ORDER — GLUCOSAM/CHON-MSM1/C/MANG/BOSW 500-416.6
TABLET ORAL
Qty: 100 EACH | Refills: 5 | OUTPATIENT
Start: 2022-04-18

## 2022-04-27 ENCOUNTER — ANTI-COAG VISIT (OUTPATIENT)
Dept: INTERNAL MEDICINE | Age: 87
End: 2022-04-27
Payer: MEDICARE

## 2022-04-27 DIAGNOSIS — Z79.01 CURRENT USE OF LONG TERM ANTICOAGULATION: ICD-10-CM

## 2022-04-27 LAB — INR BLD: 2.4

## 2022-04-27 PROCEDURE — 93793 ANTICOAG MGMT PT WARFARIN: CPT | Performed by: PHYSICIAN ASSISTANT

## 2022-05-04 ENCOUNTER — ANTI-COAG VISIT (OUTPATIENT)
Dept: INTERNAL MEDICINE | Age: 87
End: 2022-05-04
Payer: MEDICARE

## 2022-05-04 DIAGNOSIS — I38 VALVULAR HEART DISEASE: Primary | ICD-10-CM

## 2022-05-04 DIAGNOSIS — Z79.01 CURRENT USE OF LONG TERM ANTICOAGULATION: ICD-10-CM

## 2022-05-04 LAB — INR BLD: 2.3

## 2022-05-04 PROCEDURE — 93793 ANTICOAG MGMT PT WARFARIN: CPT | Performed by: PHYSICIAN ASSISTANT

## 2022-05-11 LAB — INR BLD: 2.8

## 2022-05-16 DIAGNOSIS — E11.8 TYPE 2 DIABETES MELLITUS WITH COMPLICATION, WITHOUT LONG-TERM CURRENT USE OF INSULIN (HCC): ICD-10-CM

## 2022-05-17 ENCOUNTER — ANTI-COAG VISIT (OUTPATIENT)
Dept: INTERNAL MEDICINE | Age: 87
End: 2022-05-17
Payer: MEDICARE

## 2022-05-17 DIAGNOSIS — Z79.01 CURRENT USE OF LONG TERM ANTICOAGULATION: ICD-10-CM

## 2022-05-17 DIAGNOSIS — I38 VALVULAR HEART DISEASE: Primary | ICD-10-CM

## 2022-05-17 PROCEDURE — 93793 ANTICOAG MGMT PT WARFARIN: CPT | Performed by: PHYSICIAN ASSISTANT

## 2022-05-18 ENCOUNTER — ANTI-COAG VISIT (OUTPATIENT)
Dept: INTERNAL MEDICINE | Age: 87
End: 2022-05-18
Payer: MEDICARE

## 2022-05-18 DIAGNOSIS — Z79.01 CURRENT USE OF LONG TERM ANTICOAGULATION: ICD-10-CM

## 2022-05-18 DIAGNOSIS — I38 VALVULAR HEART DISEASE: Primary | ICD-10-CM

## 2022-05-18 LAB — INR BLD: 2.4

## 2022-05-18 PROCEDURE — 93793 ANTICOAG MGMT PT WARFARIN: CPT | Performed by: PHYSICIAN ASSISTANT

## 2022-05-25 ENCOUNTER — ANTI-COAG VISIT (OUTPATIENT)
Dept: INTERNAL MEDICINE | Age: 87
End: 2022-05-25
Payer: MEDICARE

## 2022-05-25 DIAGNOSIS — Z79.01 CURRENT USE OF LONG TERM ANTICOAGULATION: ICD-10-CM

## 2022-05-25 DIAGNOSIS — I38 VALVULAR HEART DISEASE: Primary | ICD-10-CM

## 2022-05-25 LAB — INR BLD: 2.8

## 2022-05-25 PROCEDURE — 93793 ANTICOAG MGMT PT WARFARIN: CPT | Performed by: PHYSICIAN ASSISTANT

## 2022-06-01 ENCOUNTER — ANTI-COAG VISIT (OUTPATIENT)
Dept: INTERNAL MEDICINE | Age: 87
End: 2022-06-01
Payer: MEDICARE

## 2022-06-01 DIAGNOSIS — I38 VALVULAR HEART DISEASE: Primary | ICD-10-CM

## 2022-06-01 DIAGNOSIS — Z79.01 CURRENT USE OF LONG TERM ANTICOAGULATION: ICD-10-CM

## 2022-06-01 LAB
INR BLD: 2.2
INR BLD: 2.2

## 2022-06-01 PROCEDURE — 93793 ANTICOAG MGMT PT WARFARIN: CPT | Performed by: PHYSICIAN ASSISTANT

## 2022-06-06 ENCOUNTER — HOSPITAL ENCOUNTER (OUTPATIENT)
Dept: ULTRASOUND IMAGING | Age: 87
Discharge: HOME | End: 2022-06-08
Payer: MEDICARE

## 2022-06-06 DIAGNOSIS — R09.89 BILATERAL CAROTID BRUITS: ICD-10-CM

## 2022-06-06 PROCEDURE — 93880 EXTRACRANIAL BILAT STUDY: CPT | Performed by: INTERNAL MEDICINE

## 2022-06-06 PROCEDURE — 93880 EXTRACRANIAL BILAT STUDY: CPT

## 2022-06-08 ENCOUNTER — ANTI-COAG VISIT (OUTPATIENT)
Dept: INTERNAL MEDICINE | Age: 87
End: 2022-06-08
Payer: MEDICARE

## 2022-06-08 DIAGNOSIS — Z79.01 CURRENT USE OF LONG TERM ANTICOAGULATION: ICD-10-CM

## 2022-06-08 DIAGNOSIS — I38 VALVULAR HEART DISEASE: Primary | ICD-10-CM

## 2022-06-08 LAB — INR BLD: 2.7

## 2022-06-08 PROCEDURE — 93793 ANTICOAG MGMT PT WARFARIN: CPT | Performed by: PHYSICIAN ASSISTANT

## 2022-06-15 ENCOUNTER — ANTI-COAG VISIT (OUTPATIENT)
Dept: INTERNAL MEDICINE | Age: 87
End: 2022-06-15
Payer: MEDICARE

## 2022-06-15 DIAGNOSIS — Z79.01 CURRENT USE OF LONG TERM ANTICOAGULATION: ICD-10-CM

## 2022-06-15 DIAGNOSIS — I38 VALVULAR HEART DISEASE: Primary | ICD-10-CM

## 2022-06-15 LAB — INR BLD: 2.9

## 2022-06-15 PROCEDURE — 93793 ANTICOAG MGMT PT WARFARIN: CPT | Performed by: PHYSICIAN ASSISTANT

## 2022-06-22 ENCOUNTER — ANTI-COAG VISIT (OUTPATIENT)
Dept: INTERNAL MEDICINE | Age: 87
End: 2022-06-22

## 2022-06-22 DIAGNOSIS — I38 VALVULAR HEART DISEASE: Primary | ICD-10-CM

## 2022-06-22 DIAGNOSIS — Z79.01 CURRENT USE OF LONG TERM ANTICOAGULATION: ICD-10-CM

## 2022-06-22 LAB — INR BLD: 2.6

## 2022-06-29 ENCOUNTER — ANTI-COAG VISIT (OUTPATIENT)
Dept: INTERNAL MEDICINE | Age: 87
End: 2022-06-29

## 2022-06-29 LAB — INR BLD: 2.7

## 2022-07-01 RX ORDER — CALCIUM CITRATE/VITAMIN D3 200MG-6.25
TABLET ORAL
Qty: 200 STRIP | Refills: 0 | Status: SHIPPED | OUTPATIENT
Start: 2022-07-01

## 2022-07-01 NOTE — TELEPHONE ENCOUNTER
Comments: This request is coming from the pharmacy. Last Office Visit (last PCP visit):   3/24/2022    Next Visit Date:  Future Appointments   Date Time Provider José Manuel Adamson   7/18/2022  1:00 PM Ida Homans, MD [facility]   8/26/2022 11:00 AM VERNON [address]       If hasn't been seen in over a year OR hasn't followed up according to last diabetes/ADHD visit, make appointment for patient before sending refill to provider.     Rx requested:  Requested Prescriptions     Pending Prescriptions Disp Refills    TRUE METRIX BLOOD GLUCOSE TEST strip [Pharmacy Med Name: TRUE METRIX GLUCOSE TEST STRIP] 200 strip 0     Sig: TEST twice a day if needed

## 2022-07-06 ENCOUNTER — ANTI-COAG VISIT (OUTPATIENT)
Dept: INTERNAL MEDICINE | Age: 87
End: 2022-07-06

## 2022-07-06 DIAGNOSIS — I38 VALVULAR HEART DISEASE: Primary | ICD-10-CM

## 2022-07-06 DIAGNOSIS — Z79.01 CURRENT USE OF LONG TERM ANTICOAGULATION: ICD-10-CM

## 2022-07-06 LAB — INR BLD: 2.3

## 2022-07-13 ENCOUNTER — ANTI-COAG VISIT (OUTPATIENT)
Dept: INTERNAL MEDICINE | Age: 87
End: 2022-07-13

## 2022-07-13 DIAGNOSIS — Z79.01 CURRENT USE OF LONG TERM ANTICOAGULATION: ICD-10-CM

## 2022-07-13 DIAGNOSIS — I38 VALVULAR HEART DISEASE: Primary | ICD-10-CM

## 2022-07-13 LAB — INR BLD: 2.3

## 2022-07-18 ENCOUNTER — OFFICE VISIT (OUTPATIENT)
Dept: CARDIOLOGY CLINIC | Age: 87
End: 2022-07-18
Payer: MEDICARE

## 2022-07-18 VITALS
BODY MASS INDEX: 33.49 KG/M2 | WEIGHT: 189 LBS | SYSTOLIC BLOOD PRESSURE: 138 MMHG | OXYGEN SATURATION: 97 % | HEIGHT: 63 IN | HEART RATE: 70 BPM | DIASTOLIC BLOOD PRESSURE: 82 MMHG

## 2022-07-18 DIAGNOSIS — Z98.61 S/P PTCA (PERCUTANEOUS TRANSLUMINAL CORONARY ANGIOPLASTY): ICD-10-CM

## 2022-07-18 DIAGNOSIS — Z98.61 CAD S/P PERCUTANEOUS CORONARY ANGIOPLASTY: ICD-10-CM

## 2022-07-18 DIAGNOSIS — I25.10 CAD S/P PERCUTANEOUS CORONARY ANGIOPLASTY: ICD-10-CM

## 2022-07-18 DIAGNOSIS — Z86.73 HISTORY OF CVA (CEREBROVASCULAR ACCIDENT): ICD-10-CM

## 2022-07-18 DIAGNOSIS — I10 ESSENTIAL HYPERTENSION: ICD-10-CM

## 2022-07-18 DIAGNOSIS — R09.89 BILATERAL CAROTID BRUITS: ICD-10-CM

## 2022-07-18 DIAGNOSIS — I25.2 HISTORY OF ST ELEVATION MYOCARDIAL INFARCTION (STEMI): ICD-10-CM

## 2022-07-18 DIAGNOSIS — I38 VALVULAR HEART DISEASE: ICD-10-CM

## 2022-07-18 DIAGNOSIS — I42.9 CARDIOMYOPATHY, UNSPECIFIED TYPE (HCC): ICD-10-CM

## 2022-07-18 DIAGNOSIS — I50.42 CHRONIC COMBINED SYSTOLIC AND DIASTOLIC CONGESTIVE HEART FAILURE (HCC): ICD-10-CM

## 2022-07-18 DIAGNOSIS — I25.2 HISTORY OF NON-ST ELEVATION MYOCARDIAL INFARCTION (NSTEMI): Primary | ICD-10-CM

## 2022-07-18 PROBLEM — N39.42 INCONTINENCE WITHOUT SENSORY AWARENESS: Status: ACTIVE | Noted: 2022-04-20

## 2022-07-18 PROCEDURE — G8417 CALC BMI ABV UP PARAM F/U: HCPCS | Performed by: INTERNAL MEDICINE

## 2022-07-18 PROCEDURE — 93000 ELECTROCARDIOGRAM COMPLETE: CPT | Performed by: INTERNAL MEDICINE

## 2022-07-18 PROCEDURE — 1036F TOBACCO NON-USER: CPT | Performed by: INTERNAL MEDICINE

## 2022-07-18 PROCEDURE — 1090F PRES/ABSN URINE INCON ASSESS: CPT | Performed by: INTERNAL MEDICINE

## 2022-07-18 PROCEDURE — 1123F ACP DISCUSS/DSCN MKR DOCD: CPT | Performed by: INTERNAL MEDICINE

## 2022-07-18 PROCEDURE — 99214 OFFICE O/P EST MOD 30 MIN: CPT | Performed by: INTERNAL MEDICINE

## 2022-07-18 PROCEDURE — G8427 DOCREV CUR MEDS BY ELIG CLIN: HCPCS | Performed by: INTERNAL MEDICINE

## 2022-07-18 RX ORDER — CEPHALEXIN 500 MG/1
CAPSULE ORAL
COMMUNITY
Start: 2022-07-01

## 2022-07-18 ASSESSMENT — ENCOUNTER SYMPTOMS
GASTROINTESTINAL NEGATIVE: 1
BLOOD IN STOOL: 0
NAUSEA: 0
CHEST TIGHTNESS: 0
RESPIRATORY NEGATIVE: 1
WHEEZING: 0
SHORTNESS OF BREATH: 0
STRIDOR: 0
COUGH: 0
EYES NEGATIVE: 1

## 2022-07-18 NOTE — PROGRESS NOTES
Subsequent Progress Note  Patient: Gideon Milner  YOB: 1927  MRN: [id number]    Chief Complaint: cva nstemi dementia  Chief Complaint   Patient presents with    Follow-up     Had Carotid US done 03/14/22    Hyperlipidemia       CV Data:     3/2019 OMAR Moderate MR Decreased LVEF 20  12/2018  STEMI LAD LINDSEY   6/22 CUS - mild     Subjective/HPI: recent CVA s/p tPA no residual deficits. No cp no sob. Went to rehab for 2 weeks and now at home. Son lives with her.    3/14/22 INR 2.6 doing well here w son. No cp no sob no falls no bleed. 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/2018    History of non-ST elevation myocardial infarction (NSTEMI) 11/12/2018    History of ST elevation myocardial infarction (STEMI)     Hyperlipidemia     Hypertension     Measles     Mumps     Osteoarthritis 5/29/2012    Other cerebrovascular disease     Other transient cerebral ischemic attacks and related syndromes     S/P PTCA (percutaneous transluminal coronary angioplasty) 1/18/2019    SCC (squamous cell carcinoma), arm 2013    right upper arm, 2015 right forarm    SI (stress incontinence), female 5/29/2012    Type II or unspecified type diabetes mellitus without mention of complication, not stated as uncontrolled     Whooping cough        Past Surgical History:   Procedure Laterality Date    ANKLE SURGERY      broken left ankle.     APPENDECTOMY      BREAST BIOPSY      x2 left breast    CATARACT REMOVAL  2004    bilateral    CORONARY ANGIOPLASTY WITH STENT PLACEMENT  01/2019    CYSTOCELE REPAIR      EYE SURGERY      bilateral cataract    HYSTERECTOMY      complete at age 40    [facility] as a child       Family History   Problem Relation Age of Onset    Diabetes Mother     Heart Disease Father        Social History     Socioeconomic History    Marital status:    Tobacco Use    Smoking status: Never    Smokeless tobacco: Never   Vaping Use    Vaping Use: Never used   Substance and Sexual Activity    Alcohol use: No     Alcohol/week: 0.0 standard drinks    Drug use: No   Social History Narrative         Lives With: Son    Type of Home: [facility] [address] in [address] [place], [place] [address]: Two level, Able to Live on Main level with bedroom/bathroom, Performs ADL's on one level    Home Access: Stairs to enter with rails    Entrance Stairs - Number of Steps: Threshold    Bathroom Shower/Tub: Tub/Shower unit    Bathroom Toilet: Handicap height    Bathroom Equipment: Grab bars in shower, Grab bars around toilet, Hand-held shower, Shower chair    Bathroom Accessibility: Accessible    Home Equipment: Rolling walker, 4 wheeled walker (Usually uses rollator)    ADL Assistance: [address]: Needs assistance (Son manages housework)    Homemaking Responsibilities: No    Ambulation Assistance: Independent (Rollator)    Transfer Assistance: Independent    Active : No    Patient's  Info:  Sons          Social Determinants of Health     Financial Resource Strain: Low Risk     Difficulty of Paying Living Expenses: Not hard at all   Food Insecurity: No Food Insecurity    Worried About Running Out of Food in the Last Year: Never true    Ran Out of Food in the Last Year: Never true       Allergies   Allergen Reactions    Metoclopramide      Other reaction(s): Unknown    Other      Other reaction(s): Unknown    Pantoprazole Other (See Comments)    Pcn [Penicillins]      Tolerates rocephin    Penicillin G      Other reaction(s): Unknown    Protonix [Pantoprazole Sodium]     Tramadol Hcl      Other reaction(s): Unknown       Current Outpatient Medications   Medication Sig Dispense Refill    cephALEXin (KEFLEX) 500 MG capsule take 1 capsule by mouth ON MONDAY, WEDNESDAY AND FRIDAY      TRUE METRIX BLOOD GLUCOSE TEST strip TEST twice a day if needed 200 strip 0    metFORMIN (GLUCOPHAGE) 1000 MG tablet take 1 tablet by mouth twice a day with meals 180 tablet 0    TRUEplus Lancets 28G MISC use twice a day if needed 100 each 1    warfarin (COUMADIN) 1 MG tablet Per coumadin regimen 30 tablet 2    hydrALAZINE (APRESOLINE) 50 MG tablet Take 1 tablet by mouth daily 90 tablet 1    carvedilol (COREG) 12.5 MG tablet take 1 tablet by mouth twice a day with meals 180 tablet 1    aspirin EC 81 MG EC tablet Take 1 tablet by mouth daily 90 tablet 1    warfarin (COUMADIN) 5 MG tablet take 1 tablet by mouth daily 120 tablet 1    traMADol (ULTRAM) 50 MG tablet take 1/2 tablet by mouth three times a day if needed for pain      Probiotic Product (RA PROBIOTIC COMPLEX) CAPS take 1 capsule by mouth once daily      Lactobacillus (PROBIOTIC ACIDOPHILUS) CAPS take 1 capsule by mouth once daily 90 capsule 1    furosemide (LASIX) 20 MG tablet take 1 tablet by mouth once daily 90 tablet 1    ondansetron (ZOFRAN ODT) 4 MG disintegrating tablet Take 1 tablet by mouth every 8 hours as needed for Nausea 10 tablet 0    sacubitril-valsartan (ENTRESTO) 24-26 MG per tablet Take 1 tablet by mouth 2 times daily 56 tablet 0    nitrofurantoin, macrocrystal-monohydrate, (MACROBID) 100 MG capsule take 1 capsule by mouth ON MONDAY, WEDNESDAY, AND FRIDAY      warfarin (COUMADIN) 5 MG tablet Take 1 tablet by mouth daily 30 tablet 6    fluticasone (FLONASE) 50 MCG/ACT nasal spray 1 spray by Each Nostril route daily (Patient taking differently: 1 spray by Each Nostril route as needed) 1 Bottle 0    warfarin (COUMADIN) 6 MG tablet Take 6 mg by mouth daily       blood glucose monitor kit and supplies Test 2 times a day & as needed for symptoms of irregular blood glucose.   Freestyle Freedom Lite 1 kit 0    TRUEPLUS LANCETS 28G MISC TEST TWO TIMES DAILY 200 each 3    Cholecalciferol (VITAMIN D) 2000 units CAPS capsule Take 2,000 Units by mouth      Needles & Syringes MISC 1 each by Does not apply route daily 50 each 0    Incontinence Supply Disposable (DEPEND UNDERGARMENTS) MISC 1 each by Does not apply route nightly 1 Package 11    Blood Glucose Monitoring Suppl (TRUE METRIX AIR GLUCOSE METER) W/DEVICE KIT       Blood Glucose Monitoring Suppl PRUDENCIO Dx: E11.9 1 Device 0     No current facility-administered medications for this visit. Review of Systems:   Review of Systems   Constitutional:  Negative for diaphoresis and fatigue. HENT: Negative. Eyes: Negative. Respiratory: Negative. Negative for cough, chest tightness, shortness of breath, wheezing and stridor. Cardiovascular: Negative. Negative for chest pain, palpitations and leg swelling. Gastrointestinal: Negative. Negative for blood in stool and nausea. Genitourinary: Negative. Musculoskeletal:  Positive for gait problem. Skin: Negative. Neurological:  Positive for weakness. Negative for dizziness, syncope and light-headedness. Hematological: Negative. Psychiatric/Behavioral: Negative. Physical Examination:    /82 (Site: Right Upper Arm, Position: Sitting, Cuff Size: Large Adult)   Pulse 70   Ht 5' 3\" (1.6 m)   Wt 189 lb (85.7 kg)   LMP  (LMP Unknown)   SpO2 97%   BMI 33.48 kg/m²    Physical Exam   Constitutional: She appears healthy. No distress. HENT:   Normal cephalic and Atraumatic   Eyes: Pupils are equal, round, and reactive to light. Neck: Thyroid normal. No JVD present. No neck adenopathy. No thyromegaly present. Cardiovascular: Normal rate, regular rhythm, intact distal pulses and normal pulses. Murmur heard. Pulmonary/Chest: Effort normal and breath sounds normal. She has no wheezes. She has no rales. She exhibits no tenderness. Abdominal: Soft. Bowel sounds are normal. There is no abdominal tenderness.    Musculoskeletal: General: No tenderness or edema. Normal range of motion. Cervical back: Normal range of motion and neck supple. Neurological: She is alert and oriented to person, place, and time. Skin: Skin is warm. No cyanosis. Nails show no clubbing.      LABS:  CBC:   Lab Results   Component Value Date/Time    WBC 9.5 04/30/2021 10:27 AM    RBC 3.80 04/30/2021 10:27 AM    RBC 3.91 02/28/2012 06:49 PM    HGB 11.2 04/30/2021 10:27 AM    HCT 34.5 04/30/2021 10:27 AM    MCV 90.8 04/30/2021 10:27 AM    MCH 29.5 04/30/2021 10:27 AM    MCHC 32.5 04/30/2021 10:27 AM    RDW 14.4 04/30/2021 10:27 AM     04/30/2021 10:27 AM    MPV 11.6 02/21/2014 06:52 PM     Lipids:  Lab Results   Component Value Date    CHOL 213 (H) 04/30/2021    CHOL 214 (H) 10/27/2020    CHOL 137 01/14/2020     Lab Results   Component Value Date    TRIG 188 (H) 04/30/2021    TRIG 74 04/10/2019    TRIG 98 03/15/2019     Lab Results   Component Value Date    HDL 46 04/30/2021    HDL 52 04/10/2019    HDL 48 03/15/2019     Lab Results   Component Value Date    LDLCALC 129 04/30/2021    LDLCALC 68 04/10/2019    LDLCALC 54 03/15/2019     Lab Results   Component Value Date    LABVLDL 34.0 05/01/2013     Lab Results   Component Value Date    CHOLHDLRATIO 3.4 11/28/2012    CHOLHDLRATIO 3.2 05/25/2012     CMP:    Lab Results   Component Value Date/Time     04/30/2021 10:27 AM    K 4.9 04/30/2021 10:27 AM    K 4.0 12/05/2020 08:26 AM    CL 99 04/30/2021 10:27 AM    CO2 21 04/30/2021 10:27 AM    BUN 37 04/30/2021 10:27 AM    CREATININE 1.24 04/30/2021 10:27 AM    GFRAA 48.8 04/30/2021 10:27 AM    LABGLOM 40.3 04/30/2021 10:27 AM    GLUCOSE 174 04/30/2021 10:27 AM    GLUCOSE 154 05/25/2012 06:21 PM    PROT 6.9 04/30/2021 10:27 AM    LABALBU 3.9 04/30/2021 10:27 AM    LABALBU 4.4 05/25/2012 06:21 PM    CALCIUM 8.9 04/30/2021 10:27 AM    BILITOT <0.2 04/30/2021 10:27 AM    ALKPHOS 55 04/30/2021 10:27 AM    AST 19 04/30/2021 10:27 AM    ALT 10 04/30/2021

## 2022-07-20 ENCOUNTER — ANTI-COAG VISIT (OUTPATIENT)
Dept: INTERNAL MEDICINE | Age: 87
End: 2022-07-20

## 2022-07-20 DIAGNOSIS — I38 VALVULAR HEART DISEASE: Primary | ICD-10-CM

## 2022-07-20 DIAGNOSIS — Z79.01 CURRENT USE OF LONG TERM ANTICOAGULATION: ICD-10-CM

## 2022-07-20 LAB — INR BLD: 2.7

## 2022-07-27 ENCOUNTER — ANTI-COAG VISIT (OUTPATIENT)
Dept: INTERNAL MEDICINE | Age: 87
End: 2022-07-27

## 2022-07-27 LAB — INR BLD: 2.5

## 2022-07-28 ENCOUNTER — HOSPITAL ENCOUNTER (EMERGENCY)
Dept: CT IMAGING | Age: 87
Discharge: HOME | DRG: 065 | End: 2022-07-30
Payer: MEDICARE

## 2022-07-28 ENCOUNTER — HOSPITAL ENCOUNTER (INPATIENT)
Age: 87
LOS: 2 days | Discharge: HOSPICE | DRG: 065 | End: 2022-07-31
Attending: EMERGENCY MEDICINE | Admitting: INTERNAL MEDICINE
Payer: MEDICARE

## 2022-07-28 DIAGNOSIS — R41.82 ALTERED MENTAL STATUS, UNSPECIFIED ALTERED MENTAL STATUS TYPE: Primary | ICD-10-CM

## 2022-07-28 DIAGNOSIS — R53.1 GENERALIZED WEAKNESS: ICD-10-CM

## 2022-07-28 DIAGNOSIS — I63.9 CEREBROVASCULAR ACCIDENT (CVA), UNSPECIFIED MECHANISM (HCC): ICD-10-CM

## 2022-07-28 PROBLEM — R41.0 CONFUSION: Status: ACTIVE | Noted: 2022-07-28

## 2022-07-28 LAB
ALBUMIN SERPL-MCNC: 3.7 G/DL (ref 3.5–4.6)
ALP BLD-CCNC: 73 U/L (ref 40–130)
ALT SERPL-CCNC: 9 U/L (ref 0–33)
ANION GAP SERPL CALCULATED.3IONS-SCNC: 14 MEQ/L (ref 9–15)
AST SERPL-CCNC: 9 U/L (ref 0–35)
BASOPHILS ABSOLUTE: 0 K/UL (ref 0–0.1)
BASOPHILS RELATIVE PERCENT: 0.1 % (ref 0.1–1.2)
BILIRUB SERPL-MCNC: <0.2 MG/DL (ref 0.2–0.7)
BILIRUBIN URINE: NEGATIVE
BLOOD, URINE: NEGATIVE
BUN BLDV-MCNC: 37 MG/DL (ref 8–23)
CALCIUM SERPL-MCNC: 9.2 MG/DL (ref 8.5–9.9)
CHLORIDE BLD-SCNC: 99 MEQ/L (ref 95–107)
CHP ED QC CHECK: NORMAL
CLARITY: CLEAR
CO2: 22 MEQ/L (ref 20–31)
COLOR: YELLOW
CREAT SERPL-MCNC: 1.19 MG/DL (ref 0.5–0.9)
EOSINOPHILS ABSOLUTE: 0.4 K/UL (ref 0–0.4)
EOSINOPHILS RELATIVE PERCENT: 5.7 % (ref 0.7–5.8)
EPITHELIAL CELLS, UA: NORMAL /HPF
GFR AFRICAN AMERICAN: 51
GFR NON-AFRICAN AMERICAN: 42.1
GLOBULIN: 2.8 G/DL (ref 2.3–3.5)
GLUCOSE BLD-MCNC: 182 MG/DL (ref 70–99)
GLUCOSE BLD-MCNC: 230 MG/DL (ref 70–99)
GLUCOSE BLD-MCNC: 240 MG/DL
GLUCOSE URINE: NEGATIVE MG/DL
HCT VFR BLD CALC: 31.4 % (ref 37–47)
HEMOGLOBIN: 9.8 G/DL (ref 11.2–15.7)
IMMATURE GRANULOCYTES #: 0 K/UL
IMMATURE GRANULOCYTES %: 0.4 %
INR BLD: 2.7
KETONES, URINE: NEGATIVE MG/DL
LACTIC ACID: 2.5 MMOL/L (ref 0.5–2.2)
LEUKOCYTE ESTERASE, URINE: NEGATIVE
LYMPHOCYTES ABSOLUTE: 2 K/UL (ref 1.2–3.7)
LYMPHOCYTES RELATIVE PERCENT: 25.7 %
MAGNESIUM: 1.7 MG/DL (ref 1.7–2.4)
MCH RBC QN AUTO: 28.1 PG (ref 25.6–32.2)
MCHC RBC AUTO-ENTMCNC: 31.2 % (ref 32.2–35.5)
MCV RBC AUTO: 90 FL (ref 79.4–94.8)
MONOCYTES ABSOLUTE: 0.6 K/UL (ref 0.2–0.9)
MONOCYTES RELATIVE PERCENT: 7.9 % (ref 4.7–12.5)
NEUTROPHILS ABSOLUTE: 4.7 K/UL (ref 1.6–6.1)
NEUTROPHILS RELATIVE PERCENT: 60.2 % (ref 34–71.1)
NITRITE, URINE: NEGATIVE
PDW BLD-RTO: 14.3 % (ref 11.7–14.4)
PERFORMED ON: ABNORMAL
PH UA: 5 (ref 5–9)
PLATELET # BLD: 297 K/UL (ref 182–369)
POTASSIUM SERPL-SCNC: 4.4 MEQ/L (ref 3.4–4.9)
PROTEIN UA: 30 MG/DL
PROTHROMBIN TIME: 27.6 SEC (ref 12.3–14.9)
RBC # BLD: 3.49 M/UL (ref 3.93–5.22)
SODIUM BLD-SCNC: 135 MEQ/L (ref 135–144)
SPECIFIC GRAVITY UA: 1.01 (ref 1–1.03)
TOTAL PROTEIN: 6.5 G/DL (ref 6.3–8)
TROPONIN: <0.01 NG/ML (ref 0–0.01)
URINE REFLEX TO CULTURE: ABNORMAL
UROBILINOGEN, URINE: 0.2 E.U./DL
WBC # BLD: 7.8 K/UL (ref 4–10)
WBC UA: NORMAL /HPF (ref 0–5)

## 2022-07-28 PROCEDURE — G0378 HOSPITAL OBSERVATION PER HR: HCPCS

## 2022-07-28 PROCEDURE — 36415 COLL VENOUS BLD VENIPUNCTURE: CPT

## 2022-07-28 PROCEDURE — 6370000000 HC RX 637 (ALT 250 FOR IP): Performed by: INTERNAL MEDICINE

## 2022-07-28 PROCEDURE — 83735 ASSAY OF MAGNESIUM: CPT

## 2022-07-28 PROCEDURE — 99285 EMERGENCY DEPT VISIT HI MDM: CPT

## 2022-07-28 PROCEDURE — 80053 COMPREHEN METABOLIC PANEL: CPT

## 2022-07-28 PROCEDURE — 93005 ELECTROCARDIOGRAM TRACING: CPT

## 2022-07-28 PROCEDURE — 83605 ASSAY OF LACTIC ACID: CPT

## 2022-07-28 PROCEDURE — 81001 URINALYSIS AUTO W/SCOPE: CPT

## 2022-07-28 PROCEDURE — 70450 CT HEAD/BRAIN W/O DYE: CPT

## 2022-07-28 PROCEDURE — 6360000002 HC RX W HCPCS

## 2022-07-28 PROCEDURE — 2580000003 HC RX 258: Performed by: INTERNAL MEDICINE

## 2022-07-28 PROCEDURE — 96374 THER/PROPH/DIAG INJ IV PUSH: CPT

## 2022-07-28 PROCEDURE — 85025 COMPLETE CBC W/AUTO DIFF WBC: CPT

## 2022-07-28 PROCEDURE — 85610 PROTHROMBIN TIME: CPT

## 2022-07-28 PROCEDURE — 84484 ASSAY OF TROPONIN QUANT: CPT

## 2022-07-28 RX ORDER — SODIUM CHLORIDE 0.9 % (FLUSH) 0.9 %
3 SYRINGE (ML) INJECTION EVERY 8 HOURS
Status: DISCONTINUED | OUTPATIENT
Start: 2022-07-28 | End: 2022-07-31 | Stop reason: HOSPADM

## 2022-07-28 RX ORDER — WARFARIN SODIUM 4 MG/1
4 TABLET ORAL DAILY
Status: DISCONTINUED | OUTPATIENT
Start: 2022-07-28 | End: 2022-07-28

## 2022-07-28 RX ORDER — ONDANSETRON 2 MG/ML
INJECTION INTRAMUSCULAR; INTRAVENOUS
Status: COMPLETED
Start: 2022-07-28 | End: 2022-07-28

## 2022-07-28 RX ORDER — POLYETHYLENE GLYCOL 3350 17 G/17G
17 POWDER, FOR SOLUTION ORAL DAILY PRN
Status: DISCONTINUED | OUTPATIENT
Start: 2022-07-28 | End: 2022-07-31 | Stop reason: HOSPADM

## 2022-07-28 RX ORDER — ENOXAPARIN SODIUM 100 MG/ML
30 INJECTION SUBCUTANEOUS 2 TIMES DAILY
Status: DISCONTINUED | OUTPATIENT
Start: 2022-07-29 | End: 2022-07-28

## 2022-07-28 RX ORDER — ACETAMINOPHEN 650 MG/1
650 SUPPOSITORY RECTAL EVERY 6 HOURS PRN
Status: DISCONTINUED | OUTPATIENT
Start: 2022-07-28 | End: 2022-07-31 | Stop reason: HOSPADM

## 2022-07-28 RX ORDER — ONDANSETRON 2 MG/ML
4 INJECTION INTRAMUSCULAR; INTRAVENOUS EVERY 6 HOURS PRN
Status: DISCONTINUED | OUTPATIENT
Start: 2022-07-28 | End: 2022-07-31 | Stop reason: HOSPADM

## 2022-07-28 RX ORDER — ASPIRIN 81 MG/1
81 TABLET ORAL DAILY
Status: DISCONTINUED | OUTPATIENT
Start: 2022-07-28 | End: 2022-07-31 | Stop reason: HOSPADM

## 2022-07-28 RX ORDER — WARFARIN SODIUM 3 MG/1
6 TABLET ORAL ONCE
Status: DISCONTINUED | OUTPATIENT
Start: 2022-07-28 | End: 2022-07-29

## 2022-07-28 RX ORDER — DEXTROSE MONOHYDRATE 100 MG/ML
INJECTION, SOLUTION INTRAVENOUS CONTINUOUS PRN
Status: DISCONTINUED | OUTPATIENT
Start: 2022-07-28 | End: 2022-07-31 | Stop reason: HOSPADM

## 2022-07-28 RX ORDER — PROMETHAZINE HYDROCHLORIDE 12.5 MG/1
12.5 TABLET ORAL EVERY 6 HOURS PRN
Status: DISCONTINUED | OUTPATIENT
Start: 2022-07-28 | End: 2022-07-31 | Stop reason: HOSPADM

## 2022-07-28 RX ORDER — TRAMADOL HYDROCHLORIDE 50 MG/1
25 TABLET ORAL EVERY 6 HOURS PRN
Status: DISCONTINUED | OUTPATIENT
Start: 2022-07-28 | End: 2022-07-31 | Stop reason: HOSPADM

## 2022-07-28 RX ORDER — INSULIN LISPRO 100 [IU]/ML
0-4 INJECTION, SOLUTION INTRAVENOUS; SUBCUTANEOUS NIGHTLY
Status: DISCONTINUED | OUTPATIENT
Start: 2022-07-28 | End: 2022-07-29

## 2022-07-28 RX ORDER — SODIUM CHLORIDE 0.9 % (FLUSH) 0.9 %
10 SYRINGE (ML) INJECTION PRN
Status: DISCONTINUED | OUTPATIENT
Start: 2022-07-28 | End: 2022-07-31 | Stop reason: HOSPADM

## 2022-07-28 RX ORDER — INSULIN LISPRO 100 [IU]/ML
0-8 INJECTION, SOLUTION INTRAVENOUS; SUBCUTANEOUS
Status: DISCONTINUED | OUTPATIENT
Start: 2022-07-29 | End: 2022-07-29

## 2022-07-28 RX ORDER — ACETAMINOPHEN 325 MG/1
650 TABLET ORAL EVERY 6 HOURS PRN
Status: DISCONTINUED | OUTPATIENT
Start: 2022-07-28 | End: 2022-07-31 | Stop reason: HOSPADM

## 2022-07-28 RX ORDER — HYDRALAZINE HYDROCHLORIDE 25 MG/1
25 TABLET, FILM COATED ORAL 2 TIMES DAILY
Status: DISCONTINUED | OUTPATIENT
Start: 2022-07-28 | End: 2022-07-31 | Stop reason: HOSPADM

## 2022-07-28 RX ORDER — ONDANSETRON 2 MG/ML
8 INJECTION INTRAMUSCULAR; INTRAVENOUS ONCE
Status: COMPLETED | OUTPATIENT
Start: 2022-07-28 | End: 2022-07-28

## 2022-07-28 RX ORDER — CARVEDILOL 12.5 MG/1
12.5 TABLET ORAL 2 TIMES DAILY WITH MEALS
Status: DISCONTINUED | OUTPATIENT
Start: 2022-07-28 | End: 2022-07-31 | Stop reason: HOSPADM

## 2022-07-28 RX ORDER — LABETALOL HYDROCHLORIDE 5 MG/ML
10 INJECTION, SOLUTION INTRAVENOUS ONCE
Status: DISCONTINUED | OUTPATIENT
Start: 2022-07-28 | End: 2022-07-28

## 2022-07-28 RX ORDER — SODIUM CHLORIDE 9 MG/ML
INJECTION, SOLUTION INTRAVENOUS PRN
Status: DISCONTINUED | OUTPATIENT
Start: 2022-07-28 | End: 2022-07-31 | Stop reason: HOSPADM

## 2022-07-28 RX ORDER — SODIUM CHLORIDE 0.9 % (FLUSH) 0.9 %
10 SYRINGE (ML) INJECTION EVERY 12 HOURS SCHEDULED
Status: DISCONTINUED | OUTPATIENT
Start: 2022-07-28 | End: 2022-07-31 | Stop reason: HOSPADM

## 2022-07-28 RX ADMIN — HYDRALAZINE HYDROCHLORIDE 25 MG: 25 TABLET, FILM COATED ORAL at 22:07

## 2022-07-28 RX ADMIN — ONDANSETRON 8 MG: 2 INJECTION INTRAMUSCULAR; INTRAVENOUS at 18:13

## 2022-07-28 RX ADMIN — TRAMADOL HYDROCHLORIDE 25 MG: 50 TABLET ORAL at 22:08

## 2022-07-28 RX ADMIN — Medication 10 ML: at 22:10

## 2022-07-28 ASSESSMENT — PAIN - FUNCTIONAL ASSESSMENT: PAIN_FUNCTIONAL_ASSESSMENT: WONG-BAKER FACES

## 2022-07-28 ASSESSMENT — PAIN SCALES - WONG BAKER
WONGBAKER_NUMERICALRESPONSE: 6

## 2022-07-28 ASSESSMENT — PAIN DESCRIPTION - FREQUENCY: FREQUENCY: CONTINUOUS

## 2022-07-28 ASSESSMENT — PAIN DESCRIPTION - LOCATION
LOCATION: GENERALIZED
LOCATION: ARM

## 2022-07-28 NOTE — ED NOTES
Dr. Oconnell Crew called back to Dr. Marylene Pfeiffer.      [facility]  07/28/22 1951

## 2022-07-28 NOTE — ED PROVIDER NOTES
CC/HPI: 43-year-old female to the emergency department with strokelike symptoms. Patient has a history of TIAs and strokes and is currently taking Coumadin. Son takes care of her and states that he spoke with her on the phone between 230 and 3 and she seemed fine. Then when she came to dinner he noticed that she was disoriented and confused which usually she is alert and sharp. 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 Patient very hard of hearing does not have her hearing aids. Appears confused and anxious. Patient taken directly to CAT scan. Patient with some nausea and vomiting upon arrival to CT. Given 8 mg of IV Zofran  HEENT:         Normocephalic/Atramatic        PERRL, EOMI. Patient blind in right eye       Throat non-edematous. No erythema noted. No exudates noted. Moist membranes  NECK: Nontender, no signs of trauma, no lymphadenopathy  HEART: Reg S1/S2, without murmer, rub or gallop  LUNGS: Clear to auscultation bilaterally, respirations even and unlabored  ABDOMEN: Bowel sounds positive, soft, nondistended. Non-tender to palpation. No guarding rebound or rigidity  MUSCULOSKELETAL/EXTREMITITES:  No signs of trauma, cyanosis or edema. LYMPH: no peripheral lympadenopathy noted  SKIN:  Warm & dry, no rash  NEUROLOGIC:  Alert and oriented x 2 after CAT scan knows where she is at. Does not know the date or day of the week. Patient cranial nerves II through XII grossly intact other than patient blind out of her right eye and deaf in right ear and very hard of hearing. Patient moving all extremities with good  strength.   No obvious focal or cerebellar deficits    Medical decision making/ED course;  Patient taken directly to CAT scan upon arrival.  CT scan interpreted by radiologist as showing no obvious signs of intracranial hemorrhage or mass-effect. Chronic atrophy noted. Lab work showed white blood cell count of 7.8 with an H&H 9.8 and 31.4 and platelets of 187. INR was 2.7 with a PTT of 27.6. Glucose was 230. Lactic acid was 2.5. Creatinine was 1.19. ER EKG interpretation -normal sinus rhythm at 66 bpm with lateral T wave inversion in 1 aVL and V5 V6. Otherwise normal intervals. -Appears similar to previous EKG from June 2020 that showed sinus bradycardia with lateral T wave inversion    Discussed with patient's son. On repeat examination son states that patient appeared to be back to baseline. Answering questions appropriately and did not seem confused. Son wanted to take patient home however when we road tested her by ambulating her she was slightly more unsteady on her feet and the son was concerned that she would not be able to get around like normally and he would not be able to care for her at home. Also discussed with the son that patient is DNR comfort care. Clinical impression;  1) altered mental status  2) generalized weakness    Disposition/plan; case discussed with Dr. Zoya Boyer, will admit for further evaluation and treatment.      Jaime Pettit DO  07/28/22 2002

## 2022-07-28 NOTE — ED NOTES
Dr. Andria Chappell answering service called for Dr. Eva Dawson.      [facility]  07/28/22 1917

## 2022-07-29 ENCOUNTER — APPOINTMENT (OUTPATIENT)
Dept: CT IMAGING | Age: 87
DRG: 065 | End: 2022-07-29
Payer: MEDICARE

## 2022-07-29 PROBLEM — I63.9 ACUTE CVA (CEREBROVASCULAR ACCIDENT) (HCC): Status: ACTIVE | Noted: 2022-07-29

## 2022-07-29 LAB
ANION GAP SERPL CALCULATED.3IONS-SCNC: 15 MEQ/L (ref 9–15)
BASOPHILS ABSOLUTE: 0 K/UL (ref 0–0.1)
BASOPHILS RELATIVE PERCENT: 0.3 % (ref 0.1–1.2)
BUN BLDV-MCNC: 30 MG/DL (ref 8–23)
CALCIUM SERPL-MCNC: 9.2 MG/DL (ref 8.5–9.9)
CHLORIDE BLD-SCNC: 100 MEQ/L (ref 95–107)
CO2: 20 MEQ/L (ref 20–31)
CREAT SERPL-MCNC: 1.19 MG/DL (ref 0.5–0.9)
EKG ATRIAL RATE: 66 BPM
EKG P AXIS: -11 DEGREES
EKG P-R INTERVAL: 202 MS
EKG Q-T INTERVAL: 414 MS
EKG QRS DURATION: 96 MS
EKG QTC CALCULATION (BAZETT): 434 MS
EKG R AXIS: -2 DEGREES
EKG T AXIS: 145 DEGREES
EKG VENTRICULAR RATE: 66 BPM
EOSINOPHILS ABSOLUTE: 0.1 K/UL (ref 0–0.4)
EOSINOPHILS RELATIVE PERCENT: 0.9 % (ref 0.7–5.8)
GFR AFRICAN AMERICAN: 51
GFR NON-AFRICAN AMERICAN: 42.1
GLUCOSE BLD-MCNC: 205 MG/DL (ref 70–99)
GLUCOSE BLD-MCNC: 207 MG/DL (ref 70–99)
GLUCOSE BLD-MCNC: 220 MG/DL (ref 70–99)
HCT VFR BLD CALC: 30.3 % (ref 37–47)
HEMOGLOBIN: 9.4 G/DL (ref 11.2–15.7)
IMMATURE GRANULOCYTES #: 0.1 K/UL
IMMATURE GRANULOCYTES %: 0.4 %
INR BLD: 2.5
LACTIC ACID: 2 MMOL/L (ref 0.5–2.2)
LYMPHOCYTES ABSOLUTE: 1.5 K/UL (ref 1.2–3.7)
LYMPHOCYTES RELATIVE PERCENT: 13.1 %
MCH RBC QN AUTO: 27.4 PG (ref 25.6–32.2)
MCHC RBC AUTO-ENTMCNC: 31 % (ref 32.2–35.5)
MCV RBC AUTO: 88.3 FL (ref 79.4–94.8)
MONOCYTES ABSOLUTE: 0.6 K/UL (ref 0.2–0.9)
MONOCYTES RELATIVE PERCENT: 4.9 % (ref 4.7–12.5)
NEUTROPHILS ABSOLUTE: 9.1 K/UL (ref 1.6–6.1)
NEUTROPHILS RELATIVE PERCENT: 80.4 % (ref 34–71.1)
PDW BLD-RTO: 14.1 % (ref 11.7–14.4)
PERFORMED ON: ABNORMAL
PERFORMED ON: ABNORMAL
PLATELET # BLD: 311 K/UL (ref 182–369)
POTASSIUM REFLEX MAGNESIUM: 4.5 MEQ/L (ref 3.4–4.9)
PROTHROMBIN TIME: 26.4 SEC (ref 12.3–14.9)
RBC # BLD: 3.43 M/UL (ref 3.93–5.22)
SODIUM BLD-SCNC: 135 MEQ/L (ref 135–144)
WBC # BLD: 11.3 K/UL (ref 4–10)

## 2022-07-29 PROCEDURE — 85610 PROTHROMBIN TIME: CPT

## 2022-07-29 PROCEDURE — 85025 COMPLETE CBC W/AUTO DIFF WBC: CPT

## 2022-07-29 PROCEDURE — 70450 CT HEAD/BRAIN W/O DYE: CPT

## 2022-07-29 PROCEDURE — 6360000002 HC RX W HCPCS: Performed by: INTERNAL MEDICINE

## 2022-07-29 PROCEDURE — 1210000000 HC MED SURG R&B

## 2022-07-29 PROCEDURE — 80048 BASIC METABOLIC PNL TOTAL CA: CPT

## 2022-07-29 PROCEDURE — 2580000003 HC RX 258: Performed by: EMERGENCY MEDICINE

## 2022-07-29 PROCEDURE — 2580000003 HC RX 258: Performed by: INTERNAL MEDICINE

## 2022-07-29 PROCEDURE — 83605 ASSAY OF LACTIC ACID: CPT

## 2022-07-29 PROCEDURE — 6370000000 HC RX 637 (ALT 250 FOR IP): Performed by: INTERNAL MEDICINE

## 2022-07-29 PROCEDURE — 36415 COLL VENOUS BLD VENIPUNCTURE: CPT

## 2022-07-29 RX ORDER — LORAZEPAM 2 MG/ML
1 CONCENTRATE ORAL EVERY 4 HOURS PRN
Status: DISCONTINUED | OUTPATIENT
Start: 2022-07-29 | End: 2022-07-31 | Stop reason: HOSPADM

## 2022-07-29 RX ORDER — MORPHINE SULFATE 2 MG/ML
2 INJECTION, SOLUTION INTRAMUSCULAR; INTRAVENOUS
Status: DISCONTINUED | OUTPATIENT
Start: 2022-07-29 | End: 2022-07-31 | Stop reason: HOSPADM

## 2022-07-29 RX ORDER — SODIUM CHLORIDE 9 MG/ML
INJECTION, SOLUTION INTRAVENOUS CONTINUOUS
Status: DISCONTINUED | OUTPATIENT
Start: 2022-07-29 | End: 2022-07-31 | Stop reason: HOSPADM

## 2022-07-29 RX ORDER — INSULIN LISPRO 100 [IU]/ML
0-8 INJECTION, SOLUTION INTRAVENOUS; SUBCUTANEOUS EVERY 6 HOURS
Status: DISCONTINUED | OUTPATIENT
Start: 2022-07-29 | End: 2022-07-31 | Stop reason: HOSPADM

## 2022-07-29 RX ADMIN — Medication 3 ML: at 10:12

## 2022-07-29 RX ADMIN — SODIUM CHLORIDE: 900 INJECTION, SOLUTION INTRAVENOUS at 11:20

## 2022-07-29 RX ADMIN — ACETAMINOPHEN 650 MG: 650 SUPPOSITORY RECTAL at 22:06

## 2022-07-29 RX ADMIN — TRAMADOL HYDROCHLORIDE 25 MG: 50 TABLET ORAL at 08:35

## 2022-07-29 RX ADMIN — MORPHINE SULFATE 2 MG: 2 INJECTION, SOLUTION INTRAMUSCULAR; INTRAVENOUS at 18:52

## 2022-07-29 NOTE — PROGRESS NOTES
1210 Family discussed hospice choice, freedom of choice given, they have used [facility] in the past and would like patient to go home as her  did for hospice care. Call placed to [facility] for consult, waiting call back. [address] called from [facility]. Order, facesheet and requested info sent via fax.  [phone]    [id number] Received a call from [facility] that they will meet with family here tomorrow at 2pm.

## 2022-07-29 NOTE — H&P
disease     Other transient cerebral ischemic attacks and related syndromes     S/P PTCA (percutaneous transluminal coronary angioplasty) 1/18/2019    SCC (squamous cell carcinoma), arm 2013    right upper arm, 2015 right forarm    SI (stress incontinence), female 5/29/2012    Type II or unspecified type diabetes mellitus without mention of complication, not stated as uncontrolled     Whooping cough        Past Surgical History:      Procedure Laterality Date    ANKLE SURGERY      broken left ankle. APPENDECTOMY      BREAST BIOPSY      x2 left breast    CATARACT REMOVAL  2004    bilateral    CORONARY ANGIOPLASTY WITH STENT PLACEMENT  01/2019    CYSTOCELE REPAIR      EYE SURGERY      bilateral cataract    HYSTERECTOMY (CERVIX STATUS UNKNOWN)      complete at age 40    [address] [facility]      as a child       Medications Prior to Admission:    Prior to Admission medications    Medication Sig Start Date End Date Taking?  Authorizing Provider   cephALEXin (KEFLEX) 500 MG capsule take 1 capsule by mouth ON MONDAY, [facility] AND FRIDAY 7/1/22  Yes Historical Provider, MD   metFORMIN (GLUCOPHAGE) 1000 MG tablet take 1 tablet by mouth twice a day with meals 5/19/22  Yes PEDRO LUIS Swanson   carvedilol (COREG) 12.5 MG tablet take 1 tablet by mouth twice a day with meals 3/14/22  Yes Russ Santiago MD   aspirin EC 81 MG EC tablet Take 1 tablet by mouth daily 3/14/22  Yes Russ Santiago MD   warfarin (COUMADIN) 5 MG tablet take 1 tablet by mouth daily 3/7/22  Yes PEDRO LUIS Swanson   traMADol (ULTRAM) 50 MG tablet take 1/2 tablet by mouth three times a day if needed for pain 11/1/21  Yes Historical Provider, MD   Probiotic Product (RA PROBIOTIC COMPLEX) CAPS take 1 capsule by mouth once daily 10/19/21  Yes Historical Provider, MD   Lactobacillus (PROBIOTIC ACIDOPHILUS) CAPS take 1 capsule by mouth once daily 10/19/21  Yes Maldonado Del Cid MD   furosemide (LASIX) 20 MG tablet take 1 tablet by mouth once daily 10/1/21  Yes PEDRO LUIS Gee   ondansetron (ZOFRAN ODT) 4 MG disintegrating tablet Take 1 tablet by mouth every 8 hours as needed for Nausea 10/1/21  Yes PEDRO LUIS Cote   sacubitril-valsartan (ENTRESTO) 24-26 MG per tablet Take 1 tablet by mouth 2 times daily 6/2/21  Yes Marylu Trujillo MD   nitrofurantoin, macrocrystal-monohydrate, (MACROBID) 100 MG capsule take 1 capsule by mouth ON MONDAY, WEDNESDAY, AND FRIDAY 1/27/21  Yes Historical Provider, MD   fluticasone (FLONASE) 50 MCG/ACT nasal spray 1 spray by Each Nostril route daily  Patient taking differently: 1 spray by Each Nostril route as needed 7/25/20  Yes Jeanna Lara PA-C   warfarin (COUMADIN) 6 MG tablet Take 6 mg by mouth daily    Yes Historical Provider, MD   Cholecalciferol (VITAMIN D) 2000 units CAPS capsule Take 2,000 Units by mouth   Yes Historical Provider, MD   TRUE METRIX BLOOD GLUCOSE TEST strip TEST twice a day if needed 7/1/22   PEDRO LUIS Gee   TRUEplus Lancets 28G MISC use twice a day if needed 4/18/22   PEDRO LUIS Cote   warfarin (COUMADIN) 1 MG tablet Per coumadin regimen 3/29/22   PEDRO LUIS Gee   hydrALAZINE (APRESOLINE) 50 MG tablet Take 1 tablet by mouth daily 3/14/22   Anjali Joaquin MD   warfarin (COUMADIN) 5 MG tablet Take 1 tablet by mouth daily 3/12/21   Fartun Ayala MD   blood glucose monitor kit and supplies Test 2 times a day & as needed for symptoms of irregular blood glucose.   Freestyle Newell Lite 11/6/19   Fartun Ayala MD   TRUEPLUS LANCETS 28G MISC TEST TWO TIMES DAILY 4/10/19   Fartun Ayala MD   Needles & Syringes MISC 1 each by Does not apply route daily 2/19/18   Fartun Ayala MD   Incontinence Supply Disposable (DEPEND UNDERGARMENTS) MISC 1 each by Does not apply route nightly 5/1/17   Fartun Ayala MD   Blood Glucose Monitoring Suppl (TRUE METRIX AIR GLUCOSE METER) W/DEVICE KIT  4/26/16   Historical Provider, MD   Blood Glucose Monitoring Suppl PRUDENCIO Dx: E11.9 4/27/16   Aryanember Dillon DO       Allergies:  Metoclopramide, Other, Pantoprazole, Pcn [penicillins], Penicillin g, Protonix [pantoprazole sodium], and Tramadol hcl    Social History:   TOBACCO:   reports that she has never smoked. She has never used smokeless tobacco.  ETOH:   reports no history of alcohol use. Family History:       Problem Relation Age of Onset    Diabetes Mother     Heart Disease Father        REVIEW OF SYSTEMS:  Ten systems reviewed and negative except for stated in HPI    Physical Exam:    Vitals: BP (!) 137/91   Pulse 97   Temp 98.8 °F (37.1 °C)   Resp 20   Ht 5' 3\" (1.6 m)   Wt 245 lb (111.1 kg)   LMP  (LMP Unknown)   SpO2 92%   BMI 43.40 kg/m²   General appearance: Patient is unresponsive. Unable to follow command. Skin: Skin color, texture, turgor normal. No rashes or lesions  HEENT: Head: Normocephalic, no lesions, without obvious abnormality. Neck: no adenopathy, no carotid bruit, no JVD, supple, symmetrical, trachea midline, and thyroid not enlarged, symmetric, no tenderness/mass/nodules  Lungs: clear to auscultation bilaterally  Heart: regular rate and rhythm, S1, S2 normal, no murmur, click, rub or gallop  Abdomen:  Soft. Unable to assess for tenderness given her unresponsive status. Extremities: extremities normal, atraumatic, no cyanosis or edema  Neurologic: Mental status: Patient is comatose.     Recent Labs     07/28/22 1813 07/29/22  0553   WBC 7.8 11.3*   HGB 9.8* 9.4*    311     Recent Labs     07/28/22 1813 07/28/22  1824 07/29/22  0553     --  135   K 4.4  --  4.5   CL 99  --  100   CO2 22  --  20   BUN 37*  --  30*   CREATININE 1.19*  --  1.19*   GLUCOSE 230* 240 205*   AST 9  --   --    ALT 9  --   --    BILITOT <0.2  --   --    ALKPHOS 73  --   --      Troponin T:   Recent Labs     07/28/22 1813   TROPONINI <0.010       ABGs:   Lab Results   Component Value Date/Time    PHART 7.445 11/11/2018 05:05 PM    PO2ART 74 11/11/2018 05:05 PM SSB5DUZ 41 11/11/2018 05:05 PM     INR:   Recent Labs     07/27/22  0000 07/28/22  1813 07/29/22  0553   INR 2.50 2.7 2.5     URINALYSIS:  Recent Labs     07/28/22 1813   COLORU Yellow   PHUR 5.0   WBCUA 0-2   CLARITYU Clear   SPECGRAV 1.015   LEUKOCYTESUR Negative   UROBILINOGEN 0.2   BILIRUBINUR Negative   BLOODU Negative   GLUCOSEU Negative     -----------------------------------------------------------------   CT HEAD WO CONTRAST    Result Date: 7/29/2022  CT Brain. Contrast medium:  without contrast.. History: Altered mental status. Technical factors: CT imaging of the brain was obtained and formatted as 5 mm contiguous axial images. 2.5 mm contiguous axial images were obtained through the osseous structures. Sagittal and coronal reconstruction obtained during postprocessing. Comparison:  CT brain, July 28, 2022, March 1, 2021. Findings: Study limited secondary to motion artifact. Extra-axial spaces:  Normal. Intracranial hemorrhage:  None. Ventricular system: Ventricles mildly enlarged. Sulci mildly prominent. Basal Cisterns:  Normal. Cerebral Parenchyma: Bilateral symmetric periventricular areas decreased attenuation. Ill-defined areas of decreased attenuation are found bilaterally within the occipital lobe (series 3, image 10) exerting no mass effect. Midline Shift:  None. Cerebellum:  Normal. Paranasal sinuses and mastoid air cells: Congenital aplasia frontal sinuses. Mucosal thickening, bilateral maxillary sinuses. Mastoid air cells well pneumatized bilaterally. Visualized Orbits:  Normal.     Impression: Bilateral areas decreased attenuation occipital lobes. While findings may represent manifestation of chronic ischemic white matter disease, other etiologies, including acute ischemia/infarct cannot entirely excluded. If clinical concern warrants, MRI brain may be obtained for further evaluation. Mild cerebral atrophy. Chronic ischemic white matter disease.  All CT scans at this facility use dose modulation, iterative reconstruction, and/or weight based dosing when appropriate to reduce radiation dose to as low as reasonably achievable. CT HEAD WO CONTRAST    Result Date: 7/28/2022  INDICATION: 70-year-old female presenting with impaired mobility. COMPARISON: March 1, 2021. . TECHNIQUE: Multidetector CT imaging was obtained from the skull base to vertex without the administration of intravenous contrast. Coronal and sagittal reformatted images were obtained. Automated dose exposure control was used for this exam. FINDINGS: No evidence of parenchymal hemorrhages or contusions. No evidence of intra or extra-axial fluid collection is seen. Areas of low-attenuation are visualized in the periventricular and subcortical white matter demonstrating no change in comparison to the prior study, scattered chronic lacunar infarcts seen, findings consistent with chronic microvascular disease. No evidence of acute territorial infarct is seen. Prominence of the ventricles and sulci are visualized demonstrating no change in comparison to the prior study consistent with chronic atrophic brain changes. Vascular calcifications are seen. Circumferential mucosal thickening visualized in the paranasal sinuses. Unremarkable aeration of the mastoid air cells. The calvarium is intact. No acute intracranial hemorrhage or mass effect. Chronic atrophic changes and chronic microvascular disease demonstrate no significant change in comparison to the prior study. Assessment and Plan     *Acute change in mental status with follow-up CT showing hypoattenuation suggestive of ischemic versus embolic stroke. This is associated with comatose state at this time. Her PPS is 10%. Fast score is 7F. *History of ischemic cardiomyopathy. Echocardiogram done in 2019 showed ejection fraction of 25%. *Hypertension. *Diabetes. Plan:  I had a long conversation with her 3 sons and 1 daughter at the bedside.   Given her comatose state, advanced age and [facility] status in the setting of clinical suspicion of acute CVA, family opted not to put her through any additional acute medical care but to focus on comfort. Continue to watch the patient over the next 24 hours. Hospice referral tomorrow. Spent about 45 minutes in the critical care evaluation, assessment and treatment of this patient thus far.         Patient Active Problem List   Diagnosis Code    HTN (hypertension) I10    Diabetes mellitus E11.9    SI (stress incontinence), female N39.3    Osteoarthritis M19.90    CKD (chronic kidney disease) N18.9    HLD (hyperlipidemia) E78.5    Vitamin D deficiency E55.9    Eczematous dermatitis L30.9    Chronic low back pain M54.50, G89.29    Lumbar spinal stenosis M48.061    Hypercalcemia E51.97    Diastolic dysfunction I22.83    Valvular heart disease I38    Recurrent UTI (urinary tract infection)-Raoultella resistant to Macrobid N39.0    Vitamin B12 deficiency E53.8    Chest pain R07.9    Nausea & vomiting R11.2    History of non-ST elevation myocardial infarction (NSTEMI) I25.2    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.73    History of ST elevation myocardial infarction (STEMI) I25.2    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    S/P PTCA (percutaneous transluminal coronary angioplasty) Z98.61    Transient cerebral ischemia G45.9    Monitoring for long-term anticoagulant use Z51.81, Z79.01    Chronic combined systolic and diastolic congestive heart failure (HCC) I50.42    Other transient cerebral ischemic attacks and related syndromes G45.8    Cerebrovascular disease I67.9    Current use of long term anticoagulation Z79.01    Syncope R55    Spinal stenosis in cervical region M48.02    Lumbar degenerative disc disease M51.36    Spinal stenosis, lumbar region, with neurogenic claudication M48.062    Abnormality of gait and mobility due to  NTSCI with Impaired Mobility and ADL's secondary to Cervical Myelopathy and Vestibular neuronitis with rehab admission 06/24/20. 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 9) E66.9    [place] (hard of hearing) H91.90    DJD (degenerative joint disease), lumbar M47.816    History of PTCA Z98.61    Uncontrolled type 2 diabetes mellitus with hyperglycemia (HCC) E11.65    Acute GI bleeding K92.2    COVID-19 U07.1    Incontinence without sensory awareness N39.42    Confusion R41.0       Anthony Veliz MD, MD  Admitting Hospitalist    TTS: 85mins where I focused more than 75% of my attention on rendering care, and planning treatment course for this patient, in addition to talking to RN team, mid levels, consulting with other physicians and following up on labs and imaging. High Risk Readmission Screening Tool Score Noted.      Emergency Contact:

## 2022-07-29 NOTE — PROGRESS NOTES
Warfarin Dosing - Pharmacy Consult Note  Consulting Provider: Dr Mojgan Lara  Indication:  Atrial Fibrillation  Warfarin Dose prior to admission:   maintenance plan:  5 mg (5 mg x 1) every Mon, Wed, Fri; 6 mg (5 mg x 1 and 1 mg x 1) all other days     Concurrent anticoagulants/antiplatelets: NO  Significant Drug Interactions: No obvious interactions  Recent Labs     07/27/22  0000 07/28/22  1813   INR 2.50 2.7   HGB  --  9.8*   PLT  --  297   LABALBU  --  3.7     Recent warfarin administrations        No warfarin orders with administrations found. Orders not given:            warfarin placeholder: dosing by pharmacy    warfarin (COUMADIN) tablet 6 mg                   Date   INR    Dose    07/28/22   2.7     6 mg  Assessment/Plan  (Goal INR: 2 - 3)  INR is therapeutic at 2.7. Will dose with home dose of 6 mg x 1 tonight (5 mg Mon,Wed,Fri and 6 mg all other days)    Active problem list reviewed. INR orders are placed. Chart reviewed for pertinent labs, drug/diet interactions, and past doses. Documentation of patient's clinical condition was reviewed. Pharmacy Dosing:  Pharmacy will continue to follow.       Tatiana Smith formerly [facility]  7/28/2022  9:07 PM

## 2022-07-29 NOTE — PROGRESS NOTES
Physical Therapy    PT spoke with charge nurse Christian Allen) about a rapid response called on pt today due to pt being unresponsive. Advised to hold PT evaluation today as pt just came back from scan to r/o TIA. PT to attempt to see Evaluate tomorrow if appropriate as family is to discuss hospice.      Hong Hopkins, PT #[id number]

## 2022-07-29 NOTE — PROGRESS NOTES
Nutrition Note    Nutrition referral received for + malnutrition screen,. Pt currently NPO, per notes plan is to transition to hospice care.  RDN for monitor for changes in medical plan of care, assessment deferred at this time    Electronically signed by Fernanda Chong RD, LD on 7/29/22 at 5:34 PM EDT

## 2022-07-29 NOTE — PROGRESS NOTES
Pts. Son in room and he was unable to wake pt up and called for nurse, she opens her eyes and yells  out but is not coherent and yells stop it you are hurting me. Pt is [facility] and Dr. Rabia Sawyer was notified of pts change in condition. Pt pupils are round non reactive to light, she will not follow commands. Her abdomen is grossly distended, has bowel sounds.

## 2022-07-29 NOTE — PROGRESS NOTES
1270 Rapid response called for change in mental status. Pt yelling out and moaning randomly. Patient not responding to commands as requested. Son, Robert Luo, at bedside explains pt became confused yesterday around 4pm after being her normal alert self in the morning and at lunch. Norberto Loyd explains at 1863 when he came to get us at the desk that his mother isn't acting right and drooling. Norberto Loyd explains this is worse than yesterday's confusion. Norberto Loyd tells me that he was here for admission last night and that his mother was her normal self by time she came to the floor. 46, son requesting repeat head CT scan from yesterday to compare. Dr Purnima Bernstein approved STAT order. Pt to CT    1001 pt back from CT. Pt resting at this time.

## 2022-07-30 LAB
GLUCOSE BLD-MCNC: 162 MG/DL (ref 70–99)
GLUCOSE BLD-MCNC: 184 MG/DL (ref 70–99)
GLUCOSE BLD-MCNC: 196 MG/DL (ref 70–99)
GLUCOSE BLD-MCNC: 202 MG/DL (ref 70–99)
GLUCOSE BLD-MCNC: 209 MG/DL (ref 70–99)
PERFORMED ON: ABNORMAL

## 2022-07-30 PROCEDURE — 6370000000 HC RX 637 (ALT 250 FOR IP): Performed by: INTERNAL MEDICINE

## 2022-07-30 PROCEDURE — 2580000003 HC RX 258: Performed by: INTERNAL MEDICINE

## 2022-07-30 PROCEDURE — 1210000000 HC MED SURG R&B

## 2022-07-30 RX ADMIN — SACUBITRIL AND VALSARTAN 1 TABLET: 24; 26 TABLET, FILM COATED ORAL at 10:10

## 2022-07-30 RX ADMIN — SACUBITRIL AND VALSARTAN 1 TABLET: 24; 26 TABLET, FILM COATED ORAL at 20:40

## 2022-07-30 RX ADMIN — HYDRALAZINE HYDROCHLORIDE 25 MG: 25 TABLET, FILM COATED ORAL at 10:10

## 2022-07-30 RX ADMIN — CARVEDILOL 12.5 MG: 12.5 TABLET, FILM COATED ORAL at 17:33

## 2022-07-30 RX ADMIN — SODIUM CHLORIDE: 900 INJECTION, SOLUTION INTRAVENOUS at 15:10

## 2022-07-30 RX ADMIN — CARVEDILOL 12.5 MG: 12.5 TABLET, FILM COATED ORAL at 10:10

## 2022-07-30 RX ADMIN — HYDRALAZINE HYDROCHLORIDE 25 MG: 25 TABLET, FILM COATED ORAL at 20:40

## 2022-07-30 RX ADMIN — ASPIRIN 81 MG: 81 TABLET, COATED ORAL at 10:10

## 2022-07-30 ASSESSMENT — PAIN SCALES - WONG BAKER
WONGBAKER_NUMERICALRESPONSE: 0
WONGBAKER_NUMERICALRESPONSE: 0

## 2022-07-30 ASSESSMENT — PAIN SCALES - GENERAL
PAINLEVEL_OUTOF10: 0

## 2022-07-30 NOTE — PROGRESS NOTES
Internal Medicine   Hospitalist   Progress Note    2022   11:56 AM    Name:  Jamarcus Coffey  MRN:    [id number]     IP Day: 1     Admit Date: 2022  5:26 PM  PCP: PEDRO LUIS Valladares    Code Status:  DNR-CC    Assessment and Plan: Active Problems/ diagnosis:     Acute encephalopathy-etiology not known but possible stroke-improving close to her baseline per family  History of ischemic cardiomyopathy  HTN  Diabetes  Debility  Gait instability    Plan  Family is meeting with hospice later today but they think that she is better mentally and close to her baseline  Resume current medications  Resume gentle IV hydration  Sliding-scale insulin  Monitor glucose  DVT PPx     7 pm- 7 am, please contact on call Hospitalist for any needs     Subjective:      no new events, poor historian.     Physical Examination:      Vitals:  BP (!) 142/74 Comment: manual  Pulse 69   Temp 97.5 °F (36.4 °C)   Resp 18   Ht 5' 3\" (1.6 m)   Wt 245 lb (111.1 kg)   LMP  (LMP Unknown)   SpO2 99%   BMI 43.40 kg/m²   Temp (24hrs), Av.6 °F (37 °C), Min:97.5 °F (36.4 °C), Max:99.7 °F (37.6 °C)      General appearance: alert, cooperative and no distress  Mental Status: Oriented x2  Lungs: clear to auscultation bilaterally, normal effort  Heart: regular rate and rhythm, no murmur  Abdomen: soft, nontender, nondistended, bowel sounds present, no masses  Extremities: no edema, redness, tenderness in the calves  Skin: no gross lesions, rashes  Neurologically generally weak but nonfocal, oriented x2 as above    Data:     Labs:  Recent Labs     22  0553   WBC 7.8 11.3*   HGB 9.8* 9.4*    311     Recent Labs     22  1824 22  0553     --  135   K 4.4  --  4.5   CL 99  --  100   CO2 22  --  20   BUN 37*  --  30*   CREATININE 1.19*  --  1.19*   GLUCOSE 230* 240 205*     Recent Labs     22   AST 9   ALT 9   BILITOT <0.2   ALKPHOS 73       Current Facility-Administered Medications   Medication Dose Route Frequency Provider Last Rate Last Admin    0.9 % sodium chloride infusion   IntraVENous Continuous Kerry Velásquez MD 75 mL/hr at 07/29/22 1120 New Bag at 07/29/22 1120    insulin lispro (HUMALOG) injection vial 0-8 Units  0-8 Units SubCUTAneous Gabriela Wang MD        morphine (PF) injection 2 mg  2 mg IntraVENous Q2H PRN Kerry Velásquez MD   2 mg at 07/29/22 1852    LORazepam (ATIVAN) 2 MG/ML concentrated solution 1 mg  1 mg Oral Q4H PRN Kerry Velásquez MD        sodium chloride flush 0.9 % injection 3 mL  3 mL IntraVENous Q8H Sana Odom DO   3 mL at 07/29/22 1012    sodium chloride flush 0.9 % injection 10 mL  10 mL IntraVENous 2 times per day John Hernandes MD   10 mL at 07/28/22 2210    sodium chloride flush 0.9 % injection 10 mL  10 mL IntraVENous PRN John Hernandes MD        0.9 % sodium chloride infusion   IntraVENous PRN John Hernandes MD        promethazine (PHENERGAN) tablet 12.5 mg  12.5 mg Oral Q6H PRN John Hernandes MD        Or    ondansetron (ZOFRAN) injection 4 mg  4 mg IntraVENous Q6H PRN John Hernandes MD        polyethylene glycol (GLYCOLAX) packet 17 g  17 g Oral Daily PRN John Hernandes MD        acetaminophen (TYLENOL) tablet 650 mg  650 mg Oral Q6H PRN Kerry Velásquez MD        Or    acetaminophen (TYLENOL) suppository 650 mg  650 mg Rectal Q6H PRN Kerry Velásquez MD   650 mg at 07/29/22 2206    aspirin EC tablet 81 mg  81 mg Oral Daily Kerry Velásquez MD   81 mg at 07/30/22 1010    carvedilol (COREG) tablet 12.5 mg  12.5 mg Oral BID WC Kerry Velásquez MD   12.5 mg at 07/30/22 1010    sacubitril-valsartan (ENTRESTO) 24-26 MG per tablet 1 tablet  1 tablet Oral BID John Hernandes MD   1 tablet at 07/30/22 1010    traMADol (ULTRAM) tablet 25 mg  25 mg Oral Q6H PRN Kerry Velásquez MD   25 mg at 07/29/22 0835    hydrALAZINE (APRESOLINE) tablet 25 mg  25 mg Oral BID Kerry Velásquez MD   25 mg at 07/30/22 1010    glucose chewable tablet 16 g  4 tablet Oral ANA Alas MD        dextrose bolus 10% 125 mL  125 mL IntraVENous ANA Velásquez MD        Or    dextrose bolus 10% 250 mL  250 mL IntraVENous PRN Gail Alas MD        glucagon (rDNA) injection 1 mg  1 mg SubCUTAneous PRN Gail Alas MD        dextrose 10 % infusion   IntraVENous Continuous PRN Gail Alas MD           Additional work up or/and treatment plan may be added today or then after based on clinical progression. I am managing a portion of pt care. Some medical issues are handled by other specialists. Additional work up and treatment should be done in out pt setting by pt PCP and other out pt providers. In addition to examining and evaluating pt, I spent additional time explaining care, normaland abnormal findings, and treatment plan. All of pt questions were answered. Counseling, diet and education were provided. Case will be discussed with nursing staff when appropriate. Family will be updated if and when appropriate.        Electronically signed by Neto Moreno DO on 7/30/2022 at 11:56 AM

## 2022-07-30 NOTE — PROGRESS NOTES
Called to the room for change in status. Patient was unable to answer questions appropriately or follow commands. She had a fixed gaze, saying, \"Goodbye\" repeatedly. [facility] nurse is here in the solarium meeting with the family. I spoke with the family immediately after becoming aware of her change in condition, and they stated they want nothing done. No more CT scans, no medical interventions. They are now interested in hospice care, instead of Pall care. They stated they want to take her home, and the family will be able to provide 24 hr/day care. Dr Romayne Pinch is aware and would like to be notified when hospice has paperwork completed and DME ready/delivered to the home.

## 2022-07-30 NOTE — PROGRESS NOTES
IMM Letter given to Patient/Family/Significant other/Guardian/POA/by: 2nd IMM was reviewed with son, Melany Alpers, [address]. He verbalized understanding. Copy was given to [facility] and orig place on paper chart.    IMM Letter date given: 7/30/2022   IMM Letter time given: 0012

## 2022-07-30 NOTE — PROGRESS NOTES
Physical Therapy  Facility/Department: [facility] MED SURG UNIT  Physical Therapy Initial Assessment    Name: Radha Leone  : 6/10/1927  MRN: [id number]  Date of Service: 2022    Discharge Recommendations:             Patient Diagnosis(es): The primary encounter diagnosis was Altered mental status, unspecified altered mental status type. A diagnosis of Generalized weakness was also pertinent to this visit. 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 Past Surgical History:  has a past surgical history that includes Appendectomy; Rectocele repair; Cystocele repair; Ankle surgery; Breast biopsy; Cataract removal (); eye surgery; Tonsillectomy; Hysterectomy; and Coronary angioplasty with stent (2019). Assessment  Tried to evaluate pt this afternoon, difficult to arouse, once opened eyes was staring up into ceiling and to right, made eye contact one time. Did not respond to any tactile or verbal request. When tried t sit up on bed pt did not assist in any manner, head nurse then entered the room  and stated family did not want evaluation done, thinks pt is going to continue to go down hill, seeking hospice placement. Pt positioned in bed and evaluation discontinued. Therapy Time   Individual Concurrent Group Co-treatment   Time In  2:15         Time Out  230         Minutes  15 min                 Davina Quiñones, [id number]

## 2022-07-31 VITALS
SYSTOLIC BLOOD PRESSURE: 129 MMHG | BODY MASS INDEX: 43.41 KG/M2 | WEIGHT: 245 LBS | HEART RATE: 64 BPM | OXYGEN SATURATION: 98 % | RESPIRATION RATE: 16 BRPM | TEMPERATURE: 98.4 F | DIASTOLIC BLOOD PRESSURE: 96 MMHG | HEIGHT: 63 IN

## 2022-07-31 LAB
GLUCOSE BLD-MCNC: 184 MG/DL (ref 70–99)
PERFORMED ON: ABNORMAL

## 2022-07-31 PROCEDURE — 6370000000 HC RX 637 (ALT 250 FOR IP): Performed by: INTERNAL MEDICINE

## 2022-07-31 RX ORDER — CEPHALEXIN 500 MG/1
500 CAPSULE ORAL
Status: DISCONTINUED | OUTPATIENT
Start: 2022-08-01 | End: 2022-07-31 | Stop reason: HOSPADM

## 2022-07-31 RX ADMIN — ASPIRIN 81 MG: 81 TABLET, COATED ORAL at 09:26

## 2022-07-31 RX ADMIN — HYDRALAZINE HYDROCHLORIDE 25 MG: 25 TABLET, FILM COATED ORAL at 09:26

## 2022-07-31 RX ADMIN — POLYETHYLENE GLYCOL 3350 17 G: 17 POWDER, FOR SOLUTION ORAL at 09:39

## 2022-07-31 RX ADMIN — SACUBITRIL AND VALSARTAN 1 TABLET: 24; 26 TABLET, FILM COATED ORAL at 09:26

## 2022-07-31 RX ADMIN — CARVEDILOL 12.5 MG: 12.5 TABLET, FILM COATED ORAL at 09:26

## 2022-07-31 ASSESSMENT — PAIN SCALES - GENERAL
PAINLEVEL_OUTOF10: 0

## 2022-07-31 NOTE — PROGRESS NOTES
Pt transferred home via [address] ambulance. Keesha at [facility] notified, as well as pts [address] [place].

## 2022-07-31 NOTE — PROGRESS NOTES
[facility] is aware that [facility] is to transport patient

## 2022-07-31 NOTE — DISCHARGE SUMMARY
Hospital Medicine Discharge Summary    Mike Later  :  6/10/1927  MRN:  [id number]    Admit date:  2022  Discharge date:  2022    Admitting Physician:  Jeovanny Mederos MD  Primary Care Physician:  PEDRO LUIS Rios       Discharge Diagnoses:      Acute encephalopathy likely ischemic-etiology not known but possible stroke-improving close to her baseline per family  History of ischemic cardiomyopathy  HTN  Diabetes  Debility  Gait instability       Chief Complaint   Patient presents with    Altered Mental Status     Hospital Course:       Patient is a 42-year-old female with past medical history of hypertension, diabetes, debility, previous strokes and TIA, history of ischemic cardiomyopathy who presented to hospital with altered mental status. She was treated for acute encephalopathy setting of possible stroke. Due to patient advanced age and comorbid condition, the family decided on hospice at home. Patient was discharged home with hospice. Exam on discharge:   BP (!) 129/96   Pulse 64   Temp 98.4 °F (36.9 °C)   Resp 16   Ht 5' 3\" (1.6 m)   Wt 245 lb (111.1 kg)   LMP  (LMP Unknown)   SpO2 98%   BMI 43.40 kg/m²     General appearance: alert, cooperative and no distress  Mental Status: Oriented x2  Lungs: clear to auscultation bilaterally, normal effort  Heart: regular rate and rhythm, no murmur  Abdomen: soft, nontender, nondistended, bowel sounds present, no masses  Extremities: no edema, redness, tenderness in the calves  Skin: no gross lesions, rashes  Neurologically generally weak but nonfocal, oriented x2 as above    Patient was seen by the following consultants   Consults:  IP CONSULT TO SOCIAL WORK  IP CONSULT TO HOSPICE    Significant Diagnostic Studies:    Refer to chart     Please refer to chart if no studies are shown here    CT HEAD WO CONTRAST    Result Date: 2022  CT Brain. Contrast medium:  without contrast.. History: Altered mental status.  Technical factors: CT imaging of the brain was obtained and formatted as 5 mm contiguous axial images. 2.5 mm contiguous axial images were obtained through the osseous structures. Sagittal and coronal reconstruction obtained during postprocessing. Comparison:  CT brain, July 28, 2022, March 1, 2021. Findings: Study limited secondary to motion artifact. Extra-axial spaces:  Normal. Intracranial hemorrhage:  None. Ventricular system: Ventricles mildly enlarged. Sulci mildly prominent. Basal Cisterns:  Normal. Cerebral Parenchyma: Bilateral symmetric periventricular areas decreased attenuation. Ill-defined areas of decreased attenuation are found bilaterally within the occipital lobe (series 3, image 10) exerting no mass effect. Midline Shift:  None. Cerebellum:  Normal. Paranasal sinuses and mastoid air cells: Congenital aplasia frontal sinuses. Mucosal thickening, bilateral maxillary sinuses. Mastoid air cells well pneumatized bilaterally. Visualized Orbits:  Normal.     Impression: Bilateral areas decreased attenuation occipital lobes. While findings may represent manifestation of chronic ischemic white matter disease, other etiologies, including acute ischemia/infarct cannot entirely excluded. If clinical concern warrants, MRI brain may be obtained for further evaluation. Mild cerebral atrophy. Chronic ischemic white matter disease. All CT scans at this facility use dose modulation, iterative reconstruction, and/or weight based dosing when appropriate to reduce radiation dose to as low as reasonably achievable. CT HEAD WO CONTRAST    Result Date: 7/28/2022  INDICATION: 55-year-old female presenting with impaired mobility. COMPARISON: March 1, 2021. . TECHNIQUE: Multidetector CT imaging was obtained from the skull base to vertex without the administration of intravenous contrast. Coronal and sagittal reformatted images were obtained.  Automated dose exposure control was used for this exam. FINDINGS: No evidence of parenchymal hemorrhages or contusions. No evidence of intra or extra-axial fluid collection is seen. Areas of low-attenuation are visualized in the periventricular and subcortical white matter demonstrating no change in comparison to the prior study, scattered chronic lacunar infarcts seen, findings consistent with chronic microvascular disease. No evidence of acute territorial infarct is seen. Prominence of the ventricles and sulci are visualized demonstrating no change in comparison to the prior study consistent with chronic atrophic brain changes. Vascular calcifications are seen. Circumferential mucosal thickening visualized in the paranasal sinuses. Unremarkable aeration of the mastoid air cells. The calvarium is intact. No acute intracranial hemorrhage or mass effect. Chronic atrophic changes and chronic microvascular disease demonstrate no significant change in comparison to the prior study. Discharge Medications:         Medication List        CHANGE how you take these medications      warfarin 5 MG tablet  Commonly known as: Coumadin  Take as directed. If you are unsure how to take this medication, talk to your nurse or doctor. Original instructions: Take 1 tablet by mouth daily  What changed: Another medication with the same name was removed. Continue taking this medication, and follow the directions you see here. CONTINUE taking these medications      aspirin EC 81 MG EC tablet  Take 1 tablet by mouth daily     * True Metrix Air Glucose Meter w/Device Kit     * blood glucose monitor kit and supplies  Dx: E11.9     * blood glucose monitor kit and supplies  Test 2 times a day & as needed for symptoms of irregular blood glucose.   Freestyle Freedom Lite     carvedilol 12.5 MG tablet  Commonly known as: COREG  take 1 tablet by mouth twice a day with meals     cephALEXin 500 MG capsule  Commonly known as: KEFLEX     Depend Undergarments Misc  1 each by Does not apply route nightly     Entresto 24-26 MG

## 2022-07-31 NOTE — PROGRESS NOTES
Discharge instructions/medications/luna care reviewed with pts son Norberto Loyd. Pt discharging home today, family is arranging home care with [facility]. Hospital bed delivered. Transportation set for 1pm  via [address] ambulance.

## 2022-08-01 ENCOUNTER — TELEPHONE (OUTPATIENT)
Dept: FAMILY MEDICINE CLINIC | Age: 87
End: 2022-08-01

## 2022-08-01 NOTE — TELEPHONE ENCOUNTER
[facility] Transitions Initial Follow Up Call    Outreach made within 2 business days of discharge: Yes    Patient: Ryan Alexander Patient : 6/10/1927   MRN: [id number]  Reason for Admission: There are no discharge diagnoses documented for the most recent discharge. Discharge Date: 22       Spoke with: [facility]    Discharge department/facility: Med Surg    TCM Interactive Patient Contact:  Was patient able to fill all prescriptions: No: NA  Was patient instructed to bring all medications to the follow-up visit: Yes  Is patient taking all medications as directed in the discharge summary?  Yes  Does patient understand their discharge instructions: Yes  Does patient have questions or concerns that need addressed prior to 7-14 day follow up office visit: no    Scheduled appointment with PCP within 7-14 days    Follow Up  Future Appointments   Date Time Provider José Manuel Adamson   2022  2:00 PM VERNON Kumarho [address]   2022  2:15 PM Elsa Libman, MD [address] [place] [address]

## 2022-08-03 ENCOUNTER — ANTI-COAG VISIT (OUTPATIENT)
Dept: INTERNAL MEDICINE | Age: 87
End: 2022-08-03

## 2022-08-03 DIAGNOSIS — I38 VALVULAR HEART DISEASE: Primary | ICD-10-CM

## 2022-08-03 DIAGNOSIS — Z79.01 CURRENT USE OF LONG TERM ANTICOAGULATION: ICD-10-CM

## 2022-08-03 LAB — INR BLD: 1.8

## 2022-08-10 ENCOUNTER — TELEPHONE (OUTPATIENT)
Dept: INTERNAL MEDICINE | Age: 87
End: 2022-08-10

## 2022-08-10 NOTE — TELEPHONE ENCOUNTER
Hannah called today to go over a critical value of 7.1, I informed them that the pt was on hospice and that hospice will be taking over their INR. [facility] also called while I was on the phone with them I spoke to Rabia Buckley NP  he will be handling her case. She was aware of the critical they checked it again she was at 6.1 she was holding for 2 days. Pt was not eating or drinking and declining rapidly.

## 2022-08-16 DIAGNOSIS — E11.8 TYPE 2 DIABETES MELLITUS WITH COMPLICATION, WITHOUT LONG-TERM CURRENT USE OF INSULIN (HCC): ICD-10-CM

## 2022-09-30 NOTE — PROGRESS NOTES
Physical Therapy  Facility/Department: [facility] MED SURG UNIT  Daily Treatment Note  NAME: Sandi Sr  : 6/10/1927  MRN: [id number]    Date of Service: 2018    Patient Diagnosis(es):   Patient Active Problem List    Diagnosis Date Noted    CKD (chronic kidney disease) 2015     Priority: High    HLD (hyperlipidemia) 2015     Priority: High    HTN (hypertension) 2011     Priority: High    Diabetes mellitus 2011     Priority: High    Vitamin D deficiency 2015     Priority: Low    SI (stress incontinence), female 2012     Priority: Low    Osteoarthritis 2012     Priority: Low    UTI (urinary tract infection) 2018    Lumbar spinal stenosis 2016    Chronic low back pain 2016    Eczematous dermatitis        Past Medical History:   Diagnosis Date    Arthritis     Chicken pox     Chronic back pain     Chronic low back pain 2016    Eczematous dermatitis     History of bladder infections     Hyperlipidemia     Hypertension     Measles     Mumps     Osteoarthritis 2012    SCC (squamous cell carcinoma), arm     right upper arm,  right forarm    SI (stress incontinence), female 2012    Type II or unspecified type diabetes mellitus without mention of complication, not stated as uncontrolled     Whooping cough      Past Surgical History:   Procedure Laterality Date    ANKLE SURGERY      broken left ankle.  APPENDECTOMY      BREAST BIOPSY      x2 left breast    CATARACT REMOVAL      bilateral    CYSTOCELE REPAIR      EYE SURGERY      bilateral cataract    HYSTERECTOMY      complete at age 39    [address]      as a child       Restrictions  Restrictions/Precautions  Restrictions/Precautions: Fall Risk  Required Braces or Orthoses?: No  Position Activity Restriction  Other position/activity restrictions:  Kylah, being tested currently for C-diff possibilty  Subjective   General  Chart Reviewed: Yes  Family / Caregiver Present: Yes  Referring Practitioner: Dr Medrano Bend: Pt laying in bed when I came into see her. Pt states she has no pain this afternoon. Pain Screening  Patient Currently in Pain: Denies  Vital Signs  Level of Consciousness: Alert  Patient Currently in Pain: Denies  Oxygen Therapy  O2 Device: None (Room air)       Orientation     Objective   Bed mobility  Scooting: Moderate assistance (to EOB/position for stand)  Transfers  Sit to Stand: Minimal Assistance; Moderate Assistance (min/modAx2 to stand from recliner. pt able to scoot to edge )  Stand to sit: Minimal Assistance (minAx2)  Bed to Chair: Minimal assistance; Moderate assistance (min/modAx2 w/ assistance to turn AD and maxVC's)  Comment: constant vc's for technique and to participate  Ambulation  Ambulation?: Yes  Ambulation 1  Surface: level tile  Device: Rolling Walker  Other Apparatus: Wheelchair follow  Assistance: Minimal assistance (minAx2 )  Quality of Gait: slow pace w/a small step length   Distance: 10'x2     Balance  Posture: Fair  Sitting - Static: Fair  Sitting - Dynamic: Fair;-  Standing - Static: Fair;-  Standing - Dynamic: Poor;+  Exercises  Heelslides: seated 2x10 each  Knee Long Arc Quad: seated 2x10 each  Ankle Pumps: in supine 2x10 each  Comments: constant vc's to participate w/therapy                        Assessment   Body structures, Functions, Activity limitations: Decreased functional mobility ; Decreased strength;Decreased endurance;Decreased balance  Assessment: Pt tolerated all exs in both supine and seated requiring constant vc's to participate w/therapy. Pt performs sit to stand transfers w/min to mod A followed by gait trials using a fww and wc follow and min Ax1-2. Pt states she has no pain after therapy. Continue and increase to tolerance.   Treatment Diagnosis: difficulty walking, LE weakness  REQUIRES PT FOLLOW UP: Yes  Activity Tolerance  Activity Tolerance: Patient limited musculoskeletal

## 2022-10-03 NOTE — TELEPHONE ENCOUNTER
Usually if it is not one type of bacteria it will be another that will grow, and the resistance to antibiotics is a possibility until the bacteria she grows is not longer susceptible to anything that is available orally - so we must be careful. I will switch her from nitrofurantoin to keflex. Low dose only comes in liquid. Please stop nitrofurantoin and change to kelflex    Requested Prescriptions     Signed Prescriptions Disp Refills    cephALEXin (KEFLEX) 125 MG/5ML suspension 450 mL 1     Sig: Take 5 mLs by mouth daily     Authorizing Provider: Gabriella Oconnell         Thank you!     Katie Hadley MD [facility] Counseling: I discussed with the patient the risks of ixekizumab including but not limited to immunosuppression, serious infections, worsening of inflammatory bowel disease and drug reactions.  The patient understands that monitoring is required including a PPD at baseline and must alert us or the primary physician if symptoms of infection or other concerning signs are noted.

## 2024-10-21 NOTE — PROGRESS NOTES
No care due was identified.  [facility] Embedded Care Due Messages. Reference number: [id number].   10/21/2024 8:02:40 AM CDT   Magnesium Latest Ref Range: 1.7 - 2.3 mg/dL 1.3 (L)    Glucose Latest Ref Range: 74 - 109 mg/dL 188 (H) 224 (H)   Calcium Latest Ref Range: 8.6 - 10.2 mg/dL 10.9 (H) 10.4 (H)   Total Protein Latest Ref Range: 6.4 - 8.1 g/dL 6.6    Albumin Latest Ref Range: 3.9 - 4.9 g/dL 4.2    Globulin Latest Ref Range: 2.3 - 3.5 g/dL 2.4    Alk Phos Latest Ref Range: 40 - 130 U/L 48    ALT Latest Ref Range: 0 - 33 U/L 14    AST Latest Ref Range: 0 - 35 U/L 10    Bilirubin Latest Ref Range: 0.0 - 1.2 mg/dL 0.2    PTH Latest Ref Range: 15.0 - 65.0 pg/mL 13.9 (L)      Assessment:      1.  Hypercalcemia  Basic Metabolic Panel     Hypercalcemia probably from dehydration as pth levels normal       Plan:       Monitor labs   Orders Placed This Encounter   Procedures    Basic Metabolic Panel     Standing Status:   Future     Standing Expiration Date:   3/2/2019     Increase fluids po water intake   f/u in 6-12 months   Pt education done

## 2025-05-30 NOTE — DISCHARGE SUMMARY
Discharge Summary    Patient:  Melissa Leventhal  YOB: 1927    MRN: [id number]   Acct: [de-identified]    Primary Care Physician: Omar Pool MD    Admit date:  1/12/2018    Discharge date:   01/15/18      Discharge Diagnoses:   <principal problem not specified>  Active Problems:    Change in mental status    Hypercalcemia      Admitted for: [facility] Course: pt was treated for AMARJIT/hypercalcemia with IV hydration, UTI were treated, will be transferred to skilled status     Consultants:  Neurology/nephrology    Discharge Medications:     Medication List      CONTINUE taking these medications    aspirin 81 MG tablet     atorvastatin 10 MG tablet  Commonly known as:  LIPITOR  take 1 tablet by mouth once daily     * TRUE METRIX AIR GLUCOSE METER w/Device Kit     * Blood Glucose Monitoring Suppl Jaylyn  Dx: E11.9     BLOOD GLUCOSE TEST STRIPS Strp  Test 2-3 x per day  Dx: E11.9     Compression Stockings Misc  by Does not apply route Knee high, 20-30 mmHg     DEPEND UNDERGARMENTS Misc  1 each by Does not apply route nightly     Fish Oil 1200 MG Caps     fluticasone 50 MCG/ACT nasal spray  Commonly known as:  FLONASE  instill 2 sprays into each nostril once daily     lisinopril 20 MG tablet  Commonly known as:  PRINIVIL;ZESTRIL  TAKE 1 TABLET DAILY     metFORMIN 1000 MG tablet  Commonly known as:  GLUCOPHAGE  Take 1 tablet by mouth 2 times daily (with meals)     metoprolol tartrate 25 MG tablet  Commonly known as:  LOPRESSOR  Take 1 tablet by mouth 2 times daily     triamterene-hydrochlorothiazide 37.5-25 MG per tablet  Commonly known as:  MAXZIDE-25     TRUEPLUS LANCETS 28G Misc  TEST TWO TIMES DAILY     Vitamin D 1000 units Caps capsule  Commonly known as:  CHOLECALCIFEROL        * This list has 2 medication(s) that are the same as other medications prescribed for you. Read the directions carefully, and ask your doctor or other care provider to review them with you.                 Physical Exam:    Vitals:  Vitals:    01/15/18 0023 01/15/18 0127 01/15/18 0639 01/15/18 0839   BP: (!) 152/52 (!) 142/49 (!) 186/70 (!) 185/89   Pulse: 52 (!) 47 61 64   Resp:  18 18 18   Temp: 98.2 °F (36.8 °C) 97.7 °F (36.5 °C) 97.3 °F (36.3 °C) 98.1 °F (36.7 °C)   TempSrc: Oral Oral Oral Oral   SpO2: 100%        Weight:       24 hour intake/output:  Intake/Output Summary (Last 24 hours) at 01/15/18 1346  Last data filed at 01/15/18 0839   Gross per 24 hour   Intake              700 ml   Output                0 ml   Net              700 ml       General appearance - alert, well appearing, and in no distress  Chest - clear to auscultation, no wheezes, rales or rhonchi, symmetric air entry  Heart - normal rate, regular rhythm, normal S1, S2, no murmurs, rubs, clicks or gallops  Abdomen - soft, nontender, nondistended, no masses or organomegaly  Obese: Yes; Protuberant: Yes   Neurological - alert, oriented, normal speech, no focal findings or movement disorder noted  Extremities - peripheral pulses normal, no pedal edema, no clubbing or cyanosis  Skin - normal coloration and turgor, no rashes, no suspicious skin lesions noted        Radiology reports as per the Radiologist  Radiology: No results found. Diet:  DIET DYSPHAGIA I PUREED;  Nectar Thick    Activity:  Activity as tolerated (Patient may move about with assist as indicated or with supervision.)      Disposition: skilled status     Condition: Stable    Time Spent: 45 minutes    Electronically signed by Nestor Villasenor MD on 1/15/2018 at 1:46 PM    Discharging Hospitalist none